# Patient Record
Sex: MALE | Race: BLACK OR AFRICAN AMERICAN | NOT HISPANIC OR LATINO | URBAN - METROPOLITAN AREA
[De-identification: names, ages, dates, MRNs, and addresses within clinical notes are randomized per-mention and may not be internally consistent; named-entity substitution may affect disease eponyms.]

---

## 2018-11-21 ENCOUNTER — INPATIENT (INPATIENT)
Facility: HOSPITAL | Age: 73
LOS: 14 days | Discharge: NURSING, REHAB OR LONG-TERM CARE | End: 2018-12-06
Attending: STUDENT IN AN ORGANIZED HEALTH CARE EDUCATION/TRAINING PROGRAM | Admitting: STUDENT IN AN ORGANIZED HEALTH CARE EDUCATION/TRAINING PROGRAM
Payer: MEDICARE

## 2018-11-21 VITALS
SYSTOLIC BLOOD PRESSURE: 150 MMHG | HEART RATE: 88 BPM | DIASTOLIC BLOOD PRESSURE: 112 MMHG | RESPIRATION RATE: 15 BRPM | TEMPERATURE: 99 F | OXYGEN SATURATION: 97 %

## 2018-11-21 LAB
BASE EXCESS BLDV CALC-SCNC: 2.6 MMOL/L — SIGNIFICANT CHANGE UP
BASOPHILS # BLD AUTO: 0.04 K/UL — SIGNIFICANT CHANGE UP (ref 0–0.2)
BASOPHILS NFR BLD AUTO: 0.2 % — SIGNIFICANT CHANGE UP (ref 0–2)
BLOOD GAS VENOUS - CREATININE: 0.96 MG/DL — SIGNIFICANT CHANGE UP (ref 0.5–1.3)
CHLORIDE BLDV-SCNC: 96 MMOL/L — SIGNIFICANT CHANGE UP (ref 96–108)
EOSINOPHIL # BLD AUTO: 0.01 K/UL — SIGNIFICANT CHANGE UP (ref 0–0.5)
EOSINOPHIL NFR BLD AUTO: 0.1 % — SIGNIFICANT CHANGE UP (ref 0–6)
GAS PNL BLDV: 135 MMOL/L — LOW (ref 136–146)
GLUCOSE BLDV-MCNC: 122 — HIGH (ref 70–99)
HCO3 BLDV-SCNC: 24 MMOL/L — SIGNIFICANT CHANGE UP (ref 20–27)
HCT VFR BLD CALC: 47.4 % — SIGNIFICANT CHANGE UP (ref 39–50)
HCT VFR BLDV CALC: 50.6 % — SIGNIFICANT CHANGE UP (ref 39–51)
HGB BLD-MCNC: 16.5 G/DL — SIGNIFICANT CHANGE UP (ref 13–17)
HGB BLDV-MCNC: 16.5 G/DL — SIGNIFICANT CHANGE UP (ref 13–17)
IMM GRANULOCYTES # BLD AUTO: 0.05 # — SIGNIFICANT CHANGE UP
IMM GRANULOCYTES NFR BLD AUTO: 0.3 % — SIGNIFICANT CHANGE UP (ref 0–1.5)
LACTATE BLDV-MCNC: 3.6 MMOL/L — HIGH (ref 0.5–2)
LYMPHOCYTES # BLD AUTO: 1.37 K/UL — SIGNIFICANT CHANGE UP (ref 1–3.3)
LYMPHOCYTES # BLD AUTO: 8.3 % — LOW (ref 13–44)
MCHC RBC-ENTMCNC: 30.3 PG — SIGNIFICANT CHANGE UP (ref 27–34)
MCHC RBC-ENTMCNC: 34.8 % — SIGNIFICANT CHANGE UP (ref 32–36)
MCV RBC AUTO: 87 FL — SIGNIFICANT CHANGE UP (ref 80–100)
MONOCYTES # BLD AUTO: 1.18 K/UL — HIGH (ref 0–0.9)
MONOCYTES NFR BLD AUTO: 7.1 % — SIGNIFICANT CHANGE UP (ref 2–14)
NEUTROPHILS # BLD AUTO: 13.91 K/UL — HIGH (ref 1.8–7.4)
NEUTROPHILS NFR BLD AUTO: 84 % — HIGH (ref 43–77)
NRBC # FLD: 0 — SIGNIFICANT CHANGE UP
PCO2 BLDV: 47 MMHG — SIGNIFICANT CHANGE UP (ref 41–51)
PH BLDV: 7.38 PH — SIGNIFICANT CHANGE UP (ref 7.32–7.43)
PLATELET # BLD AUTO: 319 K/UL — SIGNIFICANT CHANGE UP (ref 150–400)
PMV BLD: 10.1 FL — SIGNIFICANT CHANGE UP (ref 7–13)
PO2 BLDV: 21 MMHG — LOW (ref 35–40)
POTASSIUM BLDV-SCNC: 4.4 MMOL/L — SIGNIFICANT CHANGE UP (ref 3.4–4.5)
RBC # BLD: 5.45 M/UL — SIGNIFICANT CHANGE UP (ref 4.2–5.8)
RBC # FLD: 14.7 % — HIGH (ref 10.3–14.5)
SAO2 % BLDV: 26.1 % — LOW (ref 60–85)
WBC # BLD: 16.56 K/UL — HIGH (ref 3.8–10.5)
WBC # FLD AUTO: 16.56 K/UL — HIGH (ref 3.8–10.5)

## 2018-11-21 PROCEDURE — 70450 CT HEAD/BRAIN W/O DYE: CPT | Mod: 26

## 2018-11-21 NOTE — ED ADULT NURSE NOTE - INTERVENTIONS DEFINITIONS
Physically safe environment: no spills, clutter or unnecessary equipment/Non-slip footwear when patient is off stretcher/Stretcher in lowest position, wheels locked, appropriate side rails in place/Call bell, personal items and telephone within reach/Monitor for mental status changes and reorient to person, place, and time

## 2018-11-21 NOTE — ED ADULT TRIAGE NOTE - CHIEF COMPLAINT QUOTE
pt BIBEMS from home, found on floor, unknown length of time. family last contacted him at 2000 last night. pt does not remember falling on floor. hx cva with left sided residual. pt denies pain, as per ems left shoulder laceration noted with no active bleeding. as per family, pt usually ambulatory and was unable to lift himself from floor. left facial asymmetry noted, new occurrence as per son. vineet zamudio made aware, no code stroke called.

## 2018-11-21 NOTE — ED PROVIDER NOTE - ATTENDING CONTRIBUTION TO CARE
Patient bibems after being found down by family. Patient was last spoken to the day before by family, couldn't get in touch with him today. When they went to check in person, found him on the ground, awake, confused. Patient now awake and talking but does not remember what happened. The family noticed a new left facial droop as well.  exam  GEN - NAD; A+O x3   HEAD - NC/AT   EYES- PERRL, EOMI  ENT: Airway patent, dry mm, Oral cavity and pharynx normal.    NECK: Neck supple, non-tender without lymphadenopathy, no masses.  PULMONARY - CTA b/l, symmetric breath sounds.   CARDIAC -s1s2, RRR, no M,G,R  ABDOMEN - +BS, ND, NT, soft, no guarding, no rebound, no masses   BACK - no CVA tenderness, Normal  spine   EXTREMITIES - FROM, symmetric pulses, capillary refill < 2 seconds   SKIN - no rash  NEUROLOGIC - alert, speech clear, left facial droop, 4/5 strength lue, lle, 5/5 r. side, sensation grossly intact, ao3 at this time.  PSYCH -nl mood/affect, nl insight.  a/p-patient found down by family, awake and confused, new left facial droop, now ao3, appears dry, facial droop noted with left side weakness (weakness old per family and patient), last known normal 24 hours ago, will check labs, ct head, ekg, fluids reass.

## 2018-11-21 NOTE — ED PROVIDER NOTE - CARE PLAN
Principal Discharge DX:	Brain mass  Secondary Diagnosis:	NSTEMI (non-ST elevated myocardial infarction)

## 2018-11-21 NOTE — ED PROVIDER NOTE - OBJECTIVE STATEMENT
72yo male PMH CVA with left-sided weakness and slurred speech, HTN, DM, presenting with altered mental status and ?syncopal event. Patient's family last spoke to him yesterday. Today, family unable to get in touch with him all day, went to house and patient found down on floor, responsive to voice. Patient confused, does not know what day it is or where he is. Patient also noted to have left-sided facial droop that is new.

## 2018-11-21 NOTE — ED PROVIDER NOTE - PROGRESS NOTE DETAILS
Selina Ricci DO: I was called to evaluate the patient for a stroke. 74yo male PMH CVA with left-sided weakness and slurred speech presenting with left facial droop after being found down by family member this evening. Patient last seen normal last night. Patient now more lethargic and falling asleep, left facial droop worsening. Patient does not meet criteria for tPA, code stroke not called however patient ordered for urgent CT head and labs, instructed to go to critical section of emergency department. Selina Ricci DO Yolanda: Pt was signed out ot me by DR Matamoros pending neurosurgery and neurology recommendations. Neurosurgery recommending CTAp and chest for likely primary malignancy. CT head findings likely brain met. Pt troponin rising with abnormal EKG an dno prior to compare, cards consulted. We will not anticoagulate for now given CNA concerns. Will admit

## 2018-11-21 NOTE — ED PROVIDER NOTE - PHYSICAL EXAMINATION
Gen: NAD, oriented to self, awake, alert  Head: NCAT  Lung: CTAB, no respiratory distress, no wheezing, rales, rhonchi  CV: normal s1/s2, rrr, no murmurs, Normal perfusion  Abd: soft, NTND  MSK: No edema, no visible deformities, full range of motion in all 4 extremities  Neuro: left-sided facial droop present, left sided upper extremity weakness  Skin: No rash   Psych: normal affect

## 2018-11-21 NOTE — ED ADULT NURSE NOTE - OBJECTIVE STATEMENT
Pt presents to rm 12, A&Ox2- disoriented to time, ambulatory at baseline w/o assistance, pt brought in by EMS, granddaughter at bedside states, they found the pt on the floor, pt states he was not on the floor but on his bed, does not recall syncopal event, left sided facial droop observed. As per granddaughter this is not pt baseline.  Denies chest pain, shortness of breath, palpitations, diaphoresis, headaches, fevers, dizziness, nausea, vomiting, diarrhea, or urinary symptoms at this time. IV established in right AC with a 20G, labs drawn and sent, call bell in reach, warm blanket provided, bed in lowest position, side rails up x2. Report given to nurse taking care of pt. Will continue to monitor.

## 2018-11-21 NOTE — ED PROVIDER NOTE - MEDICAL DECISION MAKING DETAILS
74yo male with stroke-like symptoms and AMS, concerning for CVA vs metabolic encephalopathy, will check labs, EKG, CXR, CT head, neuro consult, reassess. Selina Ricci DO

## 2018-11-22 DIAGNOSIS — Z29.9 ENCOUNTER FOR PROPHYLACTIC MEASURES, UNSPECIFIED: ICD-10-CM

## 2018-11-22 DIAGNOSIS — G93.89 OTHER SPECIFIED DISORDERS OF BRAIN: ICD-10-CM

## 2018-11-22 DIAGNOSIS — N40.0 BENIGN PROSTATIC HYPERPLASIA WITHOUT LOWER URINARY TRACT SYMPTOMS: ICD-10-CM

## 2018-11-22 DIAGNOSIS — E11.9 TYPE 2 DIABETES MELLITUS WITHOUT COMPLICATIONS: ICD-10-CM

## 2018-11-22 DIAGNOSIS — G93.9 DISORDER OF BRAIN, UNSPECIFIED: ICD-10-CM

## 2018-11-22 DIAGNOSIS — M62.82 RHABDOMYOLYSIS: ICD-10-CM

## 2018-11-22 DIAGNOSIS — R55 SYNCOPE AND COLLAPSE: ICD-10-CM

## 2018-11-22 DIAGNOSIS — I10 ESSENTIAL (PRIMARY) HYPERTENSION: ICD-10-CM

## 2018-11-22 DIAGNOSIS — E87.6 HYPOKALEMIA: ICD-10-CM

## 2018-11-22 DIAGNOSIS — R74.8 ABNORMAL LEVELS OF OTHER SERUM ENZYMES: ICD-10-CM

## 2018-11-22 LAB
ALBUMIN SERPL ELPH-MCNC: 4 G/DL — SIGNIFICANT CHANGE UP (ref 3.3–5)
ALBUMIN SERPL ELPH-MCNC: 4.3 G/DL — SIGNIFICANT CHANGE UP (ref 3.3–5)
ALBUMIN SERPL ELPH-MCNC: 4.6 G/DL — SIGNIFICANT CHANGE UP (ref 3.3–5)
ALP SERPL-CCNC: 48 U/L — SIGNIFICANT CHANGE UP (ref 40–120)
ALP SERPL-CCNC: 67 U/L — SIGNIFICANT CHANGE UP (ref 40–120)
ALP SERPL-CCNC: 67 U/L — SIGNIFICANT CHANGE UP (ref 40–120)
ALT FLD-CCNC: 22 U/L — SIGNIFICANT CHANGE UP (ref 4–41)
ALT FLD-CCNC: 26 U/L — SIGNIFICANT CHANGE UP (ref 4–41)
ALT FLD-CCNC: SIGNIFICANT CHANGE UP U/L (ref 4–41)
AMPHET UR-MCNC: NEGATIVE — SIGNIFICANT CHANGE UP
APPEARANCE UR: SIGNIFICANT CHANGE UP
APTT BLD: 26.9 SEC — LOW (ref 27.5–36.3)
AST SERPL-CCNC: 102 U/L — HIGH (ref 4–40)
AST SERPL-CCNC: 118 U/L — HIGH (ref 4–40)
AST SERPL-CCNC: SIGNIFICANT CHANGE UP U/L (ref 4–40)
BACTERIA # UR AUTO: SIGNIFICANT CHANGE UP
BARBITURATES UR SCN-MCNC: NEGATIVE — SIGNIFICANT CHANGE UP
BASOPHILS # BLD AUTO: 0.04 K/UL — SIGNIFICANT CHANGE UP (ref 0–0.2)
BASOPHILS NFR BLD AUTO: 0.3 % — SIGNIFICANT CHANGE UP (ref 0–2)
BENZODIAZ UR-MCNC: NEGATIVE — SIGNIFICANT CHANGE UP
BILIRUB SERPL-MCNC: 0.6 MG/DL — SIGNIFICANT CHANGE UP (ref 0.2–1.2)
BILIRUB SERPL-MCNC: 0.7 MG/DL — SIGNIFICANT CHANGE UP (ref 0.2–1.2)
BILIRUB SERPL-MCNC: 0.9 MG/DL — SIGNIFICANT CHANGE UP (ref 0.2–1.2)
BILIRUB UR-MCNC: NEGATIVE — SIGNIFICANT CHANGE UP
BLOOD UR QL VISUAL: SIGNIFICANT CHANGE UP
BUN SERPL-MCNC: 10 MG/DL — SIGNIFICANT CHANGE UP (ref 7–23)
BUN SERPL-MCNC: 10 MG/DL — SIGNIFICANT CHANGE UP (ref 7–23)
BUN SERPL-MCNC: 11 MG/DL — SIGNIFICANT CHANGE UP (ref 7–23)
BUN SERPL-MCNC: 14 MG/DL — SIGNIFICANT CHANGE UP (ref 7–23)
CALCIUM SERPL-MCNC: 8.2 MG/DL — LOW (ref 8.4–10.5)
CALCIUM SERPL-MCNC: 8.9 MG/DL — SIGNIFICANT CHANGE UP (ref 8.4–10.5)
CALCIUM SERPL-MCNC: 9 MG/DL — SIGNIFICANT CHANGE UP (ref 8.4–10.5)
CALCIUM SERPL-MCNC: 9.1 MG/DL — SIGNIFICANT CHANGE UP (ref 8.4–10.5)
CANNABINOIDS UR-MCNC: NEGATIVE — SIGNIFICANT CHANGE UP
CHLORIDE SERPL-SCNC: 102 MMOL/L — SIGNIFICANT CHANGE UP (ref 98–107)
CHLORIDE SERPL-SCNC: 92 MMOL/L — LOW (ref 98–107)
CHLORIDE SERPL-SCNC: 96 MMOL/L — LOW (ref 98–107)
CHLORIDE SERPL-SCNC: 98 MMOL/L — SIGNIFICANT CHANGE UP (ref 98–107)
CK MB BLD-MCNC: 0.4 — SIGNIFICANT CHANGE UP (ref 0–2.5)
CK MB BLD-MCNC: 16.18 NG/ML — HIGH (ref 1–6.6)
CK MB BLD-MCNC: 24.96 NG/ML — HIGH (ref 1–6.6)
CK MB BLD-MCNC: 27.73 NG/ML — HIGH (ref 1–6.6)
CK MB BLD-MCNC: SIGNIFICANT CHANGE UP (ref 0–2.5)
CK SERPL-CCNC: 5846 U/L — HIGH (ref 30–200)
CK SERPL-CCNC: 6200 U/L — HIGH (ref 30–200)
CK SERPL-CCNC: SIGNIFICANT CHANGE UP U/L (ref 30–200)
CO2 SERPL-SCNC: 22 MMOL/L — SIGNIFICANT CHANGE UP (ref 22–31)
CO2 SERPL-SCNC: 24 MMOL/L — SIGNIFICANT CHANGE UP (ref 22–31)
COCAINE METAB.OTHER UR-MCNC: NEGATIVE — SIGNIFICANT CHANGE UP
COLOR SPEC: SIGNIFICANT CHANGE UP
CREAT SERPL-MCNC: 0.91 MG/DL — SIGNIFICANT CHANGE UP (ref 0.5–1.3)
CREAT SERPL-MCNC: 1.01 MG/DL — SIGNIFICANT CHANGE UP (ref 0.5–1.3)
CREAT SERPL-MCNC: 1.04 MG/DL — SIGNIFICANT CHANGE UP (ref 0.5–1.3)
CREAT SERPL-MCNC: 1.24 MG/DL — SIGNIFICANT CHANGE UP (ref 0.5–1.3)
CRYPTOC AG FLD QL: NEGATIVE — SIGNIFICANT CHANGE UP
EOSINOPHIL # BLD AUTO: 0.02 K/UL — SIGNIFICANT CHANGE UP (ref 0–0.5)
EOSINOPHIL NFR BLD AUTO: 0.2 % — SIGNIFICANT CHANGE UP (ref 0–6)
GLUCOSE SERPL-MCNC: 109 MG/DL — HIGH (ref 70–99)
GLUCOSE SERPL-MCNC: 110 MG/DL — HIGH (ref 70–99)
GLUCOSE SERPL-MCNC: 123 MG/DL — HIGH (ref 70–99)
GLUCOSE SERPL-MCNC: 123 MG/DL — HIGH (ref 70–99)
GLUCOSE UR-MCNC: NEGATIVE — SIGNIFICANT CHANGE UP
HBA1C BLD-MCNC: 5.9 % — HIGH (ref 4–5.6)
HCT VFR BLD CALC: 42.9 % — SIGNIFICANT CHANGE UP (ref 39–50)
HGB BLD-MCNC: 15.2 G/DL — SIGNIFICANT CHANGE UP (ref 13–17)
IMM GRANULOCYTES # BLD AUTO: 0.05 # — SIGNIFICANT CHANGE UP
IMM GRANULOCYTES NFR BLD AUTO: 0.4 % — SIGNIFICANT CHANGE UP (ref 0–1.5)
INR BLD: 1.14 — SIGNIFICANT CHANGE UP (ref 0.88–1.17)
KETONES UR-MCNC: SIGNIFICANT CHANGE UP
LACTATE SERPL-SCNC: 2.4 MMOL/L — HIGH (ref 0.5–2)
LEUKOCYTE ESTERASE UR-ACNC: SIGNIFICANT CHANGE UP
LYMPHOCYTES # BLD AUTO: 1.17 K/UL — SIGNIFICANT CHANGE UP (ref 1–3.3)
LYMPHOCYTES # BLD AUTO: 9.3 % — LOW (ref 13–44)
MAGNESIUM SERPL-MCNC: 2.1 MG/DL — SIGNIFICANT CHANGE UP (ref 1.6–2.6)
MCHC RBC-ENTMCNC: 30.6 PG — SIGNIFICANT CHANGE UP (ref 27–34)
MCHC RBC-ENTMCNC: 35.4 % — SIGNIFICANT CHANGE UP (ref 32–36)
MCV RBC AUTO: 86.3 FL — SIGNIFICANT CHANGE UP (ref 80–100)
METHADONE UR-MCNC: NEGATIVE — SIGNIFICANT CHANGE UP
MONOCYTES # BLD AUTO: 1.36 K/UL — HIGH (ref 0–0.9)
MONOCYTES NFR BLD AUTO: 10.8 % — SIGNIFICANT CHANGE UP (ref 2–14)
NEUTROPHILS # BLD AUTO: 9.95 K/UL — HIGH (ref 1.8–7.4)
NEUTROPHILS NFR BLD AUTO: 79 % — HIGH (ref 43–77)
NITRITE UR-MCNC: NEGATIVE — SIGNIFICANT CHANGE UP
NRBC # FLD: 0 — SIGNIFICANT CHANGE UP
OPIATES UR-MCNC: NEGATIVE — SIGNIFICANT CHANGE UP
OSMOLALITY UR: 403 MOSMO/KG — SIGNIFICANT CHANGE UP (ref 50–1200)
OXYCODONE UR-MCNC: NEGATIVE — SIGNIFICANT CHANGE UP
PCP UR-MCNC: NEGATIVE — SIGNIFICANT CHANGE UP
PH UR: 6 — SIGNIFICANT CHANGE UP (ref 5–8)
PHOSPHATE SERPL-MCNC: 3.1 MG/DL — SIGNIFICANT CHANGE UP (ref 2.5–4.5)
PLATELET # BLD AUTO: 277 K/UL — SIGNIFICANT CHANGE UP (ref 150–400)
PMV BLD: 10.9 FL — SIGNIFICANT CHANGE UP (ref 7–13)
POTASSIUM SERPL-MCNC: 2.7 MMOL/L — CRITICAL LOW (ref 3.5–5.3)
POTASSIUM SERPL-MCNC: 2.7 MMOL/L — CRITICAL LOW (ref 3.5–5.3)
POTASSIUM SERPL-MCNC: 3.9 MMOL/L — SIGNIFICANT CHANGE UP (ref 3.5–5.3)
POTASSIUM SERPL-MCNC: SIGNIFICANT CHANGE UP MMOL/L (ref 3.5–5.3)
POTASSIUM SERPL-SCNC: 2.7 MMOL/L — CRITICAL LOW (ref 3.5–5.3)
POTASSIUM SERPL-SCNC: 2.7 MMOL/L — CRITICAL LOW (ref 3.5–5.3)
POTASSIUM SERPL-SCNC: 3.9 MMOL/L — SIGNIFICANT CHANGE UP (ref 3.5–5.3)
POTASSIUM SERPL-SCNC: SIGNIFICANT CHANGE UP MMOL/L (ref 3.5–5.3)
PROCALCITONIN SERPL-MCNC: 0.71 NG/ML — HIGH (ref 0.02–0.1)
PROT SERPL-MCNC: 7.8 G/DL — SIGNIFICANT CHANGE UP (ref 6–8.3)
PROT SERPL-MCNC: 7.9 G/DL — SIGNIFICANT CHANGE UP (ref 6–8.3)
PROT SERPL-MCNC: SIGNIFICANT CHANGE UP G/DL (ref 6–8.3)
PROT UR-MCNC: 100 — HIGH
PROTHROM AB SERPL-ACNC: 13 SEC — SIGNIFICANT CHANGE UP (ref 9.8–13.1)
RBC # BLD: 4.97 M/UL — SIGNIFICANT CHANGE UP (ref 4.2–5.8)
RBC # FLD: 14.7 % — HIGH (ref 10.3–14.5)
RBC CASTS # UR COMP ASSIST: >50 — HIGH (ref 0–?)
SODIUM SERPL-SCNC: 129 MMOL/L — LOW (ref 135–145)
SODIUM SERPL-SCNC: 137 MMOL/L — SIGNIFICANT CHANGE UP (ref 135–145)
SODIUM SERPL-SCNC: 137 MMOL/L — SIGNIFICANT CHANGE UP (ref 135–145)
SODIUM SERPL-SCNC: 138 MMOL/L — SIGNIFICANT CHANGE UP (ref 135–145)
SODIUM UR-SCNC: 92 MMOL/L — SIGNIFICANT CHANGE UP
SP GR SPEC: 1.02 — SIGNIFICANT CHANGE UP (ref 1–1.04)
TROPONIN T, HIGH SENSITIVITY: 114 NG/L — CRITICAL HIGH (ref ?–14)
TROPONIN T, HIGH SENSITIVITY: 138 NG/L — CRITICAL HIGH (ref ?–14)
TROPONIN T, HIGH SENSITIVITY: 98 NG/L — CRITICAL HIGH (ref ?–14)
TSH SERPL-MCNC: 0.37 UIU/ML — SIGNIFICANT CHANGE UP (ref 0.27–4.2)
UROBILINOGEN FLD QL: NORMAL — SIGNIFICANT CHANGE UP
WBC # BLD: 12.59 K/UL — HIGH (ref 3.8–10.5)
WBC # FLD AUTO: 12.59 K/UL — HIGH (ref 3.8–10.5)
WBC UR QL: SIGNIFICANT CHANGE UP (ref 0–?)

## 2018-11-22 PROCEDURE — 99223 1ST HOSP IP/OBS HIGH 75: CPT

## 2018-11-22 PROCEDURE — 99233 SBSQ HOSP IP/OBS HIGH 50: CPT

## 2018-11-22 PROCEDURE — 71260 CT THORAX DX C+: CPT | Mod: 26

## 2018-11-22 PROCEDURE — 71045 X-RAY EXAM CHEST 1 VIEW: CPT | Mod: 26

## 2018-11-22 PROCEDURE — 70552 MRI BRAIN STEM W/DYE: CPT | Mod: 26

## 2018-11-22 PROCEDURE — 74177 CT ABD & PELVIS W/CONTRAST: CPT | Mod: 26

## 2018-11-22 RX ORDER — POTASSIUM CHLORIDE 20 MEQ
10 PACKET (EA) ORAL ONCE
Qty: 0 | Refills: 0 | Status: DISCONTINUED | OUTPATIENT
Start: 2018-11-22 | End: 2018-11-22

## 2018-11-22 RX ORDER — LEVETIRACETAM 250 MG/1
1000 TABLET, FILM COATED ORAL ONCE
Qty: 0 | Refills: 0 | Status: COMPLETED | OUTPATIENT
Start: 2018-11-22 | End: 2018-11-22

## 2018-11-22 RX ORDER — POTASSIUM CHLORIDE 20 MEQ
40 PACKET (EA) ORAL ONCE
Qty: 0 | Refills: 0 | Status: COMPLETED | OUTPATIENT
Start: 2018-11-22 | End: 2018-11-22

## 2018-11-22 RX ORDER — INSULIN LISPRO 100/ML
VIAL (ML) SUBCUTANEOUS EVERY 6 HOURS
Qty: 0 | Refills: 0 | Status: DISCONTINUED | OUTPATIENT
Start: 2018-11-22 | End: 2018-11-23

## 2018-11-22 RX ORDER — GLUCAGON INJECTION, SOLUTION 0.5 MG/.1ML
1 INJECTION, SOLUTION SUBCUTANEOUS ONCE
Qty: 0 | Refills: 0 | Status: DISCONTINUED | OUTPATIENT
Start: 2018-11-22 | End: 2018-11-28

## 2018-11-22 RX ORDER — POTASSIUM CHLORIDE 20 MEQ
40 PACKET (EA) ORAL ONCE
Qty: 0 | Refills: 0 | Status: DISCONTINUED | OUTPATIENT
Start: 2018-11-22 | End: 2018-11-22

## 2018-11-22 RX ORDER — DEXTROSE 50 % IN WATER 50 %
12.5 SYRINGE (ML) INTRAVENOUS ONCE
Qty: 0 | Refills: 0 | Status: DISCONTINUED | OUTPATIENT
Start: 2018-11-22 | End: 2018-11-28

## 2018-11-22 RX ORDER — INFLUENZA VIRUS VACCINE 15; 15; 15; 15 UG/.5ML; UG/.5ML; UG/.5ML; UG/.5ML
0.5 SUSPENSION INTRAMUSCULAR ONCE
Qty: 0 | Refills: 0 | Status: DISCONTINUED | OUTPATIENT
Start: 2018-11-22 | End: 2018-12-06

## 2018-11-22 RX ORDER — SODIUM CHLORIDE 9 MG/ML
2000 INJECTION INTRAMUSCULAR; INTRAVENOUS; SUBCUTANEOUS ONCE
Qty: 0 | Refills: 0 | Status: COMPLETED | OUTPATIENT
Start: 2018-11-22 | End: 2018-11-22

## 2018-11-22 RX ORDER — POTASSIUM CHLORIDE 20 MEQ
40 PACKET (EA) ORAL EVERY 4 HOURS
Qty: 0 | Refills: 0 | Status: DISCONTINUED | OUTPATIENT
Start: 2018-11-22 | End: 2018-11-22

## 2018-11-22 RX ORDER — DEXTROSE 50 % IN WATER 50 %
25 SYRINGE (ML) INTRAVENOUS ONCE
Qty: 0 | Refills: 0 | Status: DISCONTINUED | OUTPATIENT
Start: 2018-11-22 | End: 2018-11-28

## 2018-11-22 RX ORDER — DEXAMETHASONE 0.5 MG/5ML
4 ELIXIR ORAL EVERY 6 HOURS
Qty: 0 | Refills: 0 | Status: DISCONTINUED | OUTPATIENT
Start: 2018-11-22 | End: 2018-11-28

## 2018-11-22 RX ORDER — SODIUM CHLORIDE 9 MG/ML
1000 INJECTION, SOLUTION INTRAVENOUS
Qty: 0 | Refills: 0 | Status: DISCONTINUED | OUTPATIENT
Start: 2018-11-22 | End: 2018-11-28

## 2018-11-22 RX ORDER — LEVETIRACETAM 250 MG/1
500 TABLET, FILM COATED ORAL EVERY 12 HOURS
Qty: 0 | Refills: 0 | Status: DISCONTINUED | OUTPATIENT
Start: 2018-11-22 | End: 2018-11-30

## 2018-11-22 RX ORDER — DEXAMETHASONE 0.5 MG/5ML
4 ELIXIR ORAL ONCE
Qty: 0 | Refills: 0 | Status: COMPLETED | OUTPATIENT
Start: 2018-11-22 | End: 2018-11-22

## 2018-11-22 RX ORDER — POTASSIUM CHLORIDE 20 MEQ
10 PACKET (EA) ORAL
Qty: 0 | Refills: 0 | Status: COMPLETED | OUTPATIENT
Start: 2018-11-22 | End: 2018-11-22

## 2018-11-22 RX ORDER — POTASSIUM CHLORIDE 20 MEQ
10 PACKET (EA) ORAL
Qty: 0 | Refills: 0 | Status: DISCONTINUED | OUTPATIENT
Start: 2018-11-22 | End: 2018-11-22

## 2018-11-22 RX ORDER — DEXTROSE 50 % IN WATER 50 %
15 SYRINGE (ML) INTRAVENOUS ONCE
Qty: 0 | Refills: 0 | Status: DISCONTINUED | OUTPATIENT
Start: 2018-11-22 | End: 2018-11-28

## 2018-11-22 RX ORDER — SODIUM CHLORIDE 9 MG/ML
1000 INJECTION INTRAMUSCULAR; INTRAVENOUS; SUBCUTANEOUS
Qty: 0 | Refills: 0 | Status: DISCONTINUED | OUTPATIENT
Start: 2018-11-22 | End: 2018-11-23

## 2018-11-22 RX ORDER — POTASSIUM CHLORIDE 20 MEQ
40 PACKET (EA) ORAL
Qty: 0 | Refills: 0 | Status: DISCONTINUED | OUTPATIENT
Start: 2018-11-22 | End: 2018-11-22

## 2018-11-22 RX ADMIN — SODIUM CHLORIDE 1000 MILLILITER(S): 9 INJECTION INTRAMUSCULAR; INTRAVENOUS; SUBCUTANEOUS at 00:40

## 2018-11-22 RX ADMIN — Medication 100 MILLIEQUIVALENT(S): at 09:37

## 2018-11-22 RX ADMIN — Medication 40 MILLIEQUIVALENT(S): at 15:18

## 2018-11-22 RX ADMIN — Medication 100 MILLIEQUIVALENT(S): at 11:06

## 2018-11-22 RX ADMIN — Medication 4 MILLIGRAM(S): at 09:19

## 2018-11-22 RX ADMIN — Medication 100 MILLIEQUIVALENT(S): at 12:15

## 2018-11-22 RX ADMIN — Medication 40 MILLIEQUIVALENT(S): at 20:04

## 2018-11-22 RX ADMIN — Medication 100 MILLIEQUIVALENT(S): at 05:01

## 2018-11-22 RX ADMIN — Medication 100 MILLIEQUIVALENT(S): at 08:00

## 2018-11-22 RX ADMIN — LEVETIRACETAM 400 MILLIGRAM(S): 250 TABLET, FILM COATED ORAL at 19:19

## 2018-11-22 RX ADMIN — Medication 100 MILLIEQUIVALENT(S): at 07:38

## 2018-11-22 RX ADMIN — Medication 4 MILLIGRAM(S): at 21:41

## 2018-11-22 RX ADMIN — Medication 4 MILLIGRAM(S): at 15:19

## 2018-11-22 RX ADMIN — LEVETIRACETAM 400 MILLIGRAM(S): 250 TABLET, FILM COATED ORAL at 09:36

## 2018-11-22 NOTE — ED ADULT NURSE REASSESSMENT NOTE - NS ED NURSE REASSESS COMMENT FT1
pt noted to be hypertensive MAR covering called and notified no new orders at this time, report given to floor RN Kin, pt denies any current complaints including headache, chest pian, SOB. Comfort measures taken, pt brought to xray

## 2018-11-22 NOTE — H&P ADULT - PROBLEM SELECTOR PLAN 7
c/w finestaride 5mg qd   -obtain PVR bladder scan to r/o retention in setting of neuro event c/w finasteride 5mg qd   -obtain PVR bladder scan to r/o retention in setting of neuro event

## 2018-11-22 NOTE — H&P ADULT - NSHPLABSRESULTS_GEN_ALL_CORE
.  Labs reviewed personally.                          16.5   16.56 )-----------( 319      ( 2018 23:30 )             47.4     Hgb Trend: 16.5<--  11-    138  |  96<L>  |  10  ----------------------------<  109<H>  2.7<LL>   |  22  |  1.04    Ca    8.9      2018 01:54    TPro  7.9  /  Alb  4.3  /  TBili  0.6  /  DBili  x   /  AST  102<H>  /  ALT  22  /  AlkPhos  67  11    Creatinine Trend: 1.04<--, 0.91<--  PT/INR - ( 2018 23:30 )   PT: 13.0 SEC;   INR: 1.14          PTT - ( 2018 23:30 )  PTT:26.9 SEC  Urinalysis Basic - ( 2018 01:20 )    Color: PINK / Appearance: TURBID / S.016 / pH: 6.0  Gluc: NEGATIVE / Ketone: SMALL  / Bili: NEGATIVE / Urobili: NORMAL   Blood: LARGE / Protein: 100 / Nitrite: NEGATIVE   Leuk Esterase: SMALL / RBC: >50 / WBC 3-5   Sq Epi: x / Non Sq Epi: x / Bacteria: FEW    Imaging reviewed personally.  < from: CT Head No Cont (18 @ 23:20) >  Large fluid density mass within the region of old infarction in the right temporal and parietal lobes with resulting mass effect as described above. This may represent cystic encephalomalacia but can be further evaluated with contrast-enhanced MRI or alternatively comparison with prior imaging if available. No acute intracranial hemorrhage. Old right cerebellar infarct.  < end of copied text > EKG: Twave inversions lead 3 and avF, left anterior fascicular block, no EKG to compare baseline.  Labs reviewed personally.                          16.5   16.56 )-----------( 319      ( 2018 23:30 )             47.4     Hgb Trend: 16.5<--  11-22    138  |  96<L>  |  10  ----------------------------<  109<H>  2.7<LL>   |  22  |  1.04    Ca    8.9      2018 01:54    TPro  7.9  /  Alb  4.3  /  TBili  0.6  /  DBili  x   /  AST  102<H>  /  ALT  22  /  AlkPhos  67  1122    Creatinine Trend: 1.04<--, 0.91<--  PT/INR - ( 2018 23:30 )   PT: 13.0 SEC;   INR: 1.14          PTT - ( 2018 23:30 )  PTT:26.9 SEC  Urinalysis Basic - ( 2018 01:20 )    Color: PINK / Appearance: TURBID / S.016 / pH: 6.0  Gluc: NEGATIVE / Ketone: SMALL  / Bili: NEGATIVE / Urobili: NORMAL   Blood: LARGE / Protein: 100 / Nitrite: NEGATIVE   Leuk Esterase: SMALL / RBC: >50 / WBC 3-5   Sq Epi: x / Non Sq Epi: x / Bacteria: FEW    Imaging reviewed personally.  < from: CT Head No Cont (18 @ 23:20) >  Large fluid density mass within the region of old infarction in the right temporal and parietal lobes with resulting mass effect as described above. This may represent cystic encephalomalacia but can be further evaluated with contrast-enhanced MRI or alternatively comparison with prior imaging if available. No acute intracranial hemorrhage. Old right cerebellar infarct.  < end of copied text >

## 2018-11-22 NOTE — PROGRESS NOTE ADULT - SUBJECTIVE AND OBJECTIVE BOX
73m hx HTN, NIDDM, CVA (2013 w/ left side weakness), BPH brought in by EMS after being found down. admitted for syncope found to have large cystic brain mass, rhabdo and elevated cardiac enzymes.    patient seen and examined at bed side with nurses   more awake and alert   want to go home   does not remember about fall event       MEDICATIONS  (STANDING):  dexamethasone     Tablet 4 milliGRAM(s) Oral every 6 hours  dextrose 5%. 1000 milliLiter(s) (50 mL/Hr) IV Continuous <Continuous>  dextrose 50% Injectable 12.5 Gram(s) IV Push once  dextrose 50% Injectable 25 Gram(s) IV Push once  dextrose 50% Injectable 25 Gram(s) IV Push once  influenza   Vaccine 0.5 milliLiter(s) IntraMuscular once  insulin lispro (HumaLOG) corrective regimen sliding scale   SubCutaneous every 6 hours  levETIRAcetam  IVPB 500 milliGRAM(s) IV Intermittent every 12 hours  potassium chloride   Powder 40 milliEquivalent(s) Oral once  sodium chloride 0.9%. 1000 milliLiter(s) (100 mL/Hr) IV Continuous <Continuous>    MEDICATIONS  (PRN):  dextrose 40% Gel 15 Gram(s) Oral once PRN Blood Glucose LESS THAN 70 milliGRAM(s)/deciliter  glucagon  Injectable 1 milliGRAM(s) IntraMuscular once PRN Glucose LESS THAN 70 milligrams/deciliter  LORazepam     Tablet 2 milliGRAM(s) Oral daily PRN seizures        Vital Signs Last 24 Hrs  T(C): 37.3 (22 Nov 2018 12:31), Max: 37.3 (22 Nov 2018 12:31)  T(F): 99.1 (22 Nov 2018 12:31), Max: 99.1 (22 Nov 2018 12:31)  HR: 85 (22 Nov 2018 12:31) (85 - 90)  BP: 99/70 (22 Nov 2018 12:31) (99/70 - 161/108)  BP(mean): --  RR: 16 (22 Nov 2018 12:31) (15 - 17)  SpO2: 95% (22 Nov 2018 12:31) (95% - 99%)      Gen: awake, alert, NAD  	Head: atraumatic, normocephalic  	Neck: supple, full ROM, no JVD, no LAD  	Eye: PERRL, EOMI, sclerae anicteric  	CV: normal rate, regular rhythm, +S1S2, no murmurs, no LE edema b/l  	Pulm: good inspiratory effort, CTAB, no w/r/r auscultated  	Abd: +BS, soft, NT, ND  	Skin: warm, dry, no rashes  	Ext: 2+ peripheral pulses b/l, no cyanosis  Neuro:aaox3 , left sided weakness      ct head:     IMPRESSION:     Large fluid density mass within the region of old infarction in the right   temporal and parietal lobes with resulting mass effect as described   above. This may represent cystic encephalomalacia but can be further   evaluated with contrast-enhanced MRI or alternatively comparison with   prior imaging if available.    No acute intracranial hemorrhage. Old right cerebellar infarct.    If no contraindications exist, contrast-enhanced MRI is recommended for   further characterization.

## 2018-11-22 NOTE — H&P ADULT - ASSESSMENT
73m hx HTN, NIDDM, CVA (2013 w/ left side weakness), BPH brought in by EMS after being found down. admitted for syncope found to have large cystic brain mass, rhabdo and elevated cardiac enzymes. Awake, alert, Hemodynamically stable and able to protect airway. 73m hx HTN, NIDDM, CVA (2013 w/ left side weakness), BPH brought in by EMS after being found down. admitted for syncope found to have large cystic brain mass, rhabdo and elevated cardiac enzymes. Awake, alert, Hemodynamically stable and able to protect airway, chest pain free.

## 2018-11-22 NOTE — CONSULT NOTE ADULT - ASSESSMENT
73m hx HTN, NIDDM, CVA (2013 w/ left side weakness), BPH brought in by EMS after being found down. CT head w/ possible cystic encephalomalacia w/ 0.3 cm mass effect, MRI head w/ contrast final read pending.     Recs:  Will discuss with neuroradiology, further recs pending 73m hx HTN, NIDDM, CVA (2013 w/ left side weakness), BPH brought in by EMS after being found down. CT head w/ possible cystic encephalomalacia vs met w/ compression of R ventricle as well as 0.3 cm mass effect, MRI head w/ contrast final read pending.     Recs:  Will discuss with neuroradiology, further recs pending 73m hx HTN, NIDDM, CVA (2013 w/ left side weakness), BPH brought in by EMS after being found down. CT head w/ possible cystic encephalomalacia vs met w/ compression of R ventricle as well as 0.3 cm mass effect, MRI head w/ and w/o contrast prelim w/ redemonstration of cystic mass w/ edema, 5-6mm midline shift w/ compression into R lateral ventricle, slight uncal herniation. As per discussion with neurosurgery, patient to be started on steroids, further workup for malignancy pending. Given hx of being found down w/ urinary incontinence, concern for possible seizure at this time.    Recs:  Decadron 4mg q6h  Keppra 1g IV load followed by 500mg BID  EEG pending  q4h neurochecks(monitor closely for change in clinical/neurologic status)  Ativan 2mg PRN for seizure 73m hx HTN, NIDDM, CVA (2013 w/ left side weakness), BPH brought in by EMS after being found down. CT head w/ possible cystic encephalomalacia vs met w/ compression of R ventricle as well as 0.3 cm mass effect, MRI head w/ and w/o contrast prelim w/ redemonstration of cystic mass w/ edema, 5-6mm midline shift w/ compression into R lateral ventricle, slight uncal herniation. As per discussion with neurosurgery, patient to be started on steroids, further workup for malignancy pending. Given hx of being found down w/ urinary incontinence, concern for possible seizure at this time.    Recs:  Elevate head of bed by 30 degrees  Decadron 4mg q6h  Keppra 1g IV load followed by 500mg BID  EEG pending  q4h neurochecks(monitor closely for change in clinical/neurologic status)  Will defer to neurosurgery w/ regards to hypertonic saline, mannitol  Ativan 2mg PRN for seizure 73m hx HTN, NIDDM, CVA (2013 w/ left side weakness), BPH brought in by EMS after being found down. CT head w/ possible cystic encephalomalacia vs met w/ compression of R ventricle as well as 0.3 cm mass effect, MRI head w/ and w/o contrast prelim w/ redemonstration of cystic mass w/ edema, 5-6mm midline shift w/ compression into R lateral ventricle, slight uncal herniation. As per discussion with neurosurgery, patient to be started on steroids, further workup for malignancy pending. Given hx of being found down w/ urinary incontinence, concern for possible seizure at this time.    Recs:    Elevate head of bed by 30 degrees  Decadron 4mg q6h  Keppra 1g IV load followed by 500mg BID  EEG pending  q4h neurochecks(monitor closely for change in clinical/neurologic status)  Will defer to neurosurgery w/ regards to hypertonic saline, mannitol  Ativan 2mg PRN for seizure

## 2018-11-22 NOTE — H&P ADULT - PROBLEM SELECTOR PLAN 2
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  w/ 3mm MLS, alert and able to protect airway   -will start infectious work up w/  blood & urine cultures, aspergillus ab, cryptococcal ag, cysticercosis ab, quant gold  -neurosurgery following pending attending recs   -Consult neurology in AM, ?seizure given rhabdo, incontinence and unable to recall events may need seizure ppx   -neurochecks q4   -speech and swallow evaluation  -pt/ot eval   -seizure precautions, fall precautions, aspiration precautions  -obtain EEG 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

## 2018-11-22 NOTE — PROGRESS NOTE ADULT - PROBLEM SELECTOR PLAN 4
?in setting of seizure given found down in urine and CT findings   -c/w IVF hydration with NS at 100c/hr, stop after 24hrs  -continue to trend CK K replaced   monitor labs

## 2018-11-22 NOTE — H&P ADULT - PROBLEM SELECTOR PLAN 5
On home metformin, will hold while in hospital   keeping patient NPO for now, will start Low HISS w/ FSG checks q6 hrs   -check A1c

## 2018-11-22 NOTE — CONSULT NOTE ADULT - ATTENDING COMMENTS
Patient seen and examined  Agree with above assessment and plan  Chart reviewed and results noted, await results of MRI given mass effect in brain  Neuro and neurosurgical teams following  Elevated CKs could be in setting of NSTEMI +/- rhabdomyolisis  Given findings on CT head would hold off on any anticoagulation at this time and defer management to neuro/neurosurgical teams  Check TTE  Will follow
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
Patient seen and examined and above note reviewed with neurology team and I agree with assessment and plan as outlined. Patient presented after being found on the floor by son and was brought to ER where CT head revealed an abnormal lesion in the right temporal region with edema and mass effect. MRI brain confirmed the lesion with edema and compression on right lateral ventricle. Patient started on IV decadron and keppra and was seen by neurosurgery and plan is for OR on Sunday.  It appears to be a possible glioma however cannot rule out other forms of tumors.  Continue to keep head of bed elevated 30 degrees and IV decadron.   Continue medical management and supportive care and all questions answered and patient understood plan and is awaiting medical clearance prior to surgical resection.

## 2018-11-22 NOTE — PROGRESS NOTE ADULT - ASSESSMENT
73m hx HTN, NIDDM, CVA (2013 w/ left side weakness), BPH brought in by EMS after being found down. admitted for syncope found to have large cystic brain mass, rhabdo and elevated cardiac enzymes.

## 2018-11-22 NOTE — CONSULT NOTE ADULT - SUBJECTIVE AND OBJECTIVE BOX
CHIEF COMPLAINT: patient found on the floor  by family     HISTORY OF PRESENT ILLNESS:74yo male PMH CVA with left-sided weakness HTN, DM II ,live alone  presenting with left facial droop after being found down by family member on the floor  last evening around 7pm. Son last spoke to him 8pm on Tuesday. Last night  son  was unable to get in touch with him all day, so went  house and  found patient  down on floor, responsive to voice, but confused and  covered in urine.  In ED patient was found to have left facial droop. /112 in triage. 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 Cardiology consulted for elevated trop 98>> 138.Patient was seen and examined in 7th floor. Patient sleepy but easy to wake .Ax O 2. Patient denies any floor and stated that his son found him in the bed .He reported h/o stroke but does not remembered if he had any weakness. He also claimed he live alone and son time his son visit him. He denies any chest pain ,sob ,blurred vision, abdomen pain ,n/v or fever. His speech is clear. He does not  remembered when he saw the doctor time  Vital sign : /85,HR 86,RR17,sat 98% RA, temp 98.2  Lab: WBC 16.56,Na 129>>138,creat 0.91 -1.04,K 2.7 ,lactate 3.6  Trop 98>>138  CK 6200,CKMB 27.73 >> 24.96    Allergies: Allergy Status Unknown  	    MEDICATIONS:   metformin 500mg bid  lisinopril/HCTZ 10/12.5mg daily  finasteride 5mg daily  asa 81mg daily  Vit D    PAST MEDICAL & SURGICAL HISTORY:  CVA (cerebral vascular accident)  Diabetes  HTN (hypertension)      FAMILY HISTORY: unable to provide      SOCIAL HISTORY:    [ ] live with:alone  [x ] Non-smoker,[ ] smoker    [x ] no alcohol use [ ] alcohol use:      REVIEW OF SYSTEMS:  General: no fatigue/malaise, weight loss/gain.	  Cardiovascular: no chest pain ,no palpitation, no dizziness, no diaphoresis ,no edema  Respiratory: no SOB, cough or wheeze.  Gastrointestinal:  no N/V/D, no abdomen pain  Genitourinary: no dysuria/hesitancy or hematuria.  Neurological: +Left sided weakness ,no head ache ,no vision changes        PHYSICAL EXAM:  T(C): 36.8 (11-22-18 @ 04:02), Max: 37 (11-21-18 @ 22:18)  HR: 86 (11-22-18 @ 04:02) (86 - 88)  BP: 127/85 (11-22-18 @ 04:02) (127/85 - 161/108)  RR: 17 (11-22-18 @ 04:02) (15 - 17)  SpO2: 98% (11-22-18 @ 04:02) (97% - 98%)        Appearance: comfortable 	  HEENT:   Normal oral mucosa, PERRL, EOMI  Cardiovascular: Normal S1 S2, No JVD, No murmurs, No edema  Respiratory: Lungs clear to auscultation	  Psychiatry: A & O x 2, Mood & affect appropriate  Gastrointestinal:  Soft, Non-tender, + BS		  Neurologic: +left sided weakness ,clear speech, left facial droop ,  Extremities: decrease ROM in left side, No clubbing, cyanosis or edema  Vascular: Peripheral pulses palpable 2+ bilaterally      LABS:	 	    CBC Full  -  ( 21 Nov 2018 23:30 )  WBC Count : 16.56 K/uL  Hemoglobin : 16.5 g/dL  Hematocrit : 47.4 %  Platelet Count - Automated : 319 K/uL  Mean Cell Volume : 87.0 fL  Mean Cell Hemoglobin : 30.3 pg  Mean Cell Hemoglobin Concentration : 34.8 %  Auto Neutrophil # : 13.91 K/uL  Auto Lymphocyte # : 1.37 K/uL  Auto Monocyte # : 1.18 K/uL  Auto Eosinophil # : 0.01 K/uL  Auto Basophil # : 0.04 K/uL  Auto Neutrophil % : 84.0 %  Auto Lymphocyte % : 8.3 %  Auto Monocyte % : 7.1 %  Auto Eosinophil % : 0.1 %  Auto Basophil % : 0.2 %    11-22    138  |  96<L>  |  10  ----------------------------<  109<H>  2.7<LL>   |  22  |  1.04  11-21    129<L>  |  92<L>  |  11  ----------------------------<  110<H>  Test not performed SPECIMEN GROSSLY HEMOLYZED   |  24  |  0.91    Ca    8.9      22 Nov 2018 01:54  Ca    9.1      21 Nov 2018 23:30    TPro  7.9  /  Alb  4.3  /  TBili  0.6  /  DBili  x   /  AST  102<H>  /  ALT  22  /  AlkPhos  67  11-22  TPro  Test not performed SPECIMEN GROSSLY HEMOLYZED  /  Alb  4.6  /  TBili  0.9  /  DBili  x   /  AST  Test not performed SPECIMEN GROSSLY HEMOLYZED  /  ALT  Test not performed SPECIMEN GROSSLY HEMOLYZED  /  AlkPhos  48  11-21    :   TSH: Thyroid Stimulating Hormone, Serum: 0.37 uIU/mL (11-21 @ 23:30)        CARDIAC MARKERS:    CKMB: 24.96 ng/mL (11-22 @ 01:54)  CKMB: 27.73 ng/mL (11-21 @ 23:30)    CKMB Relative Index: 0.4 (11-22 @ 01:54)  CKMB Relative Index: Test not performed (11-21 @ 23:30)      TELEMETRY: 	NSR    ECG:  	NSR with CHRISTOPHER 0.24,deep s wave in II,aVF,V2,V3,TWI in III,aVF, <0.5mm STD in V4-V6   RADIOLOGY:< from: CT Head No Cont (11.21.18 @ 23:20) >  arge fluid density mass within the region of old infarction in the right temporal and parietal lobes with resulting mass effect as described   above. This may represent cystic encephalomalacia but can be further evaluated with contrast-enhanced MRI or alternatively comparison with   prior imaging if available.No acute intracranial hemorrhage. Old right cerebellar infarct.  	      ASSESSMENT/PLAN: 74yo male PMH CVA with left-sided weakness HTN, DM II ,live alone  presenting with left facial droop after being found down by family member on the floor for 24 hours found to have R cystic encephalomalacia area in old infarct area in CTH and elevated trop/CK likley in the setting of NSTEMI vs rhabdomyolysis.    Plan; Trend   CK/trop  serial EKG if chest pain or any change in patient's condition  will defer anticoagulation as patient is  high risk of bleeding  given cystic encephalomalacia verse malignancy mass  echo   will continue to follow

## 2018-11-22 NOTE — H&P ADULT - HISTORY OF PRESENT ILLNESS
73m hx HTN, NIDDM, CVA (2013 w/ left side weakness), BPH brought in by EMS after being found down.    Patient was last seen normal 23hrs prior to arrival. As per son, at baseline is is independent w/ ADLs and runs a welding company. After not answering calls from family of which is not like him, Son and daughter went to his house where upon knocking on the door no one answered. Police were called and patient was found face down in urine. He was alert upon arrival and acknowledged hearing knocking on the door, but was unable to get up. 73m hx HTN, NIDDM, CVA (2013 w/ left side weakness), BPH brought in by EMS after being found down.    Patient was last seen normal 23hrs prior to arrival. 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 After not answering calls from family of which is not like him, Son and daughter went to his house where upon knocking on the door no one answered. Police were called and patient was found face down in urine. He was alert upon arrival and acknowledged hearing knocking on the door, but was unable to get up.  Patient unable to recall events, states nothing is wrong. Denies fevers, chills, night sweats, nausea, vomiting, chest pain, palpations, headaches, changes in vision. myalgias.

## 2018-11-22 NOTE — H&P ADULT - NSHPPHYSICALEXAM_GEN_ALL_CORE
T(C): 36.8 (11-22-18 @ 04:02), Max: 37 (11-21-18 @ 22:18)  HR: 86 (11-22-18 @ 04:02) (86 - 88)  BP: 127/85 (11-22-18 @ 04:02) (127/85 - 161/108)  RR: 17 (11-22-18 @ 04:02) (15 - 17)  SpO2: 98% (11-22-18 @ 04:02) (97% - 98%)    Gen: awake, alert  HENT: neck soft / supple  Lymph: no LAD noted in neck  Eye: PERRL, sclerae anicteric  CV: normal rate, regular rhythm  Pulm: CTAB, no w/r/r auscultated  Abd: +BS, soft, NT, ND  Skin: warm, dry  Ext: no LE edema  Neuro: answering questions appropriately, following commands appropriately, recent and remote memory intact  Psych: normal mood / affect T(C): 36.8 (11-22-18 @ 04:02), Max: 37 (11-21-18 @ 22:18)  HR: 86 (11-22-18 @ 04:02) (86 - 88)  BP: 127/85 (11-22-18 @ 04:02) (127/85 - 161/108)  RR: 17 (11-22-18 @ 04:02) (15 - 17)  SpO2: 98% (11-22-18 @ 04:02) (97% - 98%)    Gen: awake, alert  HENT: neck soft / supple  Lymph: no LAD noted in neck  Eye: PERRL, sclerae anicteric  CV: normal rate, regular rhythm  Pulm: CTAB, no w/r/r auscultated  Abd: +BS, soft, NT, ND  Skin: warm, dry  Ext: no LE edema  Neuro: answering questions appropriately, following commands appropriately,  Psych: normal mood / affect T(C): 36.8 (18 @ 04:02), Max: 37 (18 @ 22:18)  HR: 86 (18 @ 04:02) (86 - 88)  BP: 127/85 (18 @ 04:02) (127/85 - 161/108)  RR: 17 (18 @ 04:02) (15 - 17)  SpO2: 98% (18 @ 04:02) (97% - 98%)    Gen: awake, alert  HENT: neck soft / supple  Lymph: no LAD noted in neck  Eye: PERRL, sclerae anicteric  CV: normal rate, regular rhythm  Pulm: CTAB, no w/r/r auscultated  Abd: +BS, soft, NT, ND  Skin: warm, dry  Ext: no LE edema  Neuro: answering questions appropriately, following commands appropriately, left upper and lower 3/5 strength, decreased sensation left lower extremity, left facial droop w/ flattening of nasolabial fold. speech intact. AOx2(knows name, ,place, month, wrong on year and day), distant memory intact (remembers [place] vs [place] Monday night football game) T(C): 36.8 (18 @ 04:02), Max: 37 (18 @ 22:18)  HR: 86 (18 @ 04:02) (86 - 88)  BP: 127/85 (18 @ 04:02) (127/85 - 161/108)  RR: 17 (18 @ 04:02) (15 - 17)  SpO2: 98% (18 @ 04:02) (97% - 98%)    Gen: awake, alert, NAD  Head: atraumatic, normocephalic  Neck: supple, full ROM, no JVD, no LAD  Eye: PERRL, EOMI, sclerae anicteric  CV: normal rate, regular rhythm, +S1S2, no murmurs, no LE edema b/l  Pulm: good inspiratory effort, CTAB, no w/r/r auscultated  Abd: +BS, soft, NT, ND  Skin: warm, dry, no rashes  Ext: 2+ peripheral pulses b/l, no cyanosis  Neuro: answering questions appropriately, following commands appropriately, left upper and lower 3/5 strength, decreased sensation left lower extremity, left facial droop w/ flattening of nasolabial fold. speech intact. A&Ox2 (knows name, , place, month, wrong on year and day), distant memory intact (remembers [place] vs [place] Monday night football game)

## 2018-11-22 NOTE — H&P ADULT - PROBLEM SELECTOR PLAN 1
Unclear eitiology, patient unable to recall events  -cardiac work-up w/ TTE, Troponins elevated ?ACS vs demand ischemia, will continue to monitor on telemetry   -Neuro workup w/ findings of large cystic mass w/ MLS, ?seizure given rhabdo, will obtain MR brain w/ IV contrast to better visualize mass. Neurosurgery following, obtain neurology consult in AM   -CTA chest preformed r/o PE also looking for malignancy; patient not in any respiratory distress, saturation well on RA. Unclear aitiology, patient unable to recall events  -cardiac work-up w/ TTE, Troponins elevated ?ACS vs demand ischemia, will continue to monitor on telemetry  -Neuro workup w/ findings of large cystic mass w/ MLS, ?seizure given rhabdo, will obtain MR brain w/ IV contrast to better visualize mass. Neurosurgery following, obtain neurology consult in AM   -CTA chest preformed r/o PE also looking for malignancy; patient not in any respiratory distress, saturation well on RA.  TSH WNL  -will obtain EEG and prolatin Unclear aitiology, patient unable to recall events  -cardiac work-up w/ TTE, Troponin elevated ?ACS vs demand ischemia, will continue to monitor on telemetry  -Neuro workup w/ findings of large cystic mass w/ MLS, ?seizure given rhabdo, will obtain MR brain w/ IV contrast to better visualize mass. Neurosurgery following, obtain neurology consult in AM   -CTA chest preformed r/o PE also looking for malignancy; patient not in any respiratory distress, saturation well on RA.  TSH WNL  -will obtain EEG and prolactin Unclear aitiology, patient unable to recall events  -cardiac work-up w/ TTE, Troponin elevated ?ACS vs demand ischemia, will continue to monitor on telemetry  -Neuro workup w/ findings of large cystic mass w/ MLS, ?seizure given rhabdo, will obtain MR brain w/ IV contrast to better visualize mass. Neurosurgery following, obtain neurology consult in AM   -CTA chest preformed r/o PE also looking for malignancy; patient not in any respiratory distress, saturation well on RA.  TSH WNL  -will obtain EEG and prolactin, urine toxicology Unclear aitiology, patient unable to recall events  -cardiac work-up w/ TTE, Troponin elevated ?ACS vs demand ischemia, will continue to monitor on telemetry  -Neuro workup w/ findings of large cystic mass w/ MLS, ?seizure given rhabdo, will obtain MR brain w/ IV contrast to better visualize mass. Neurosurgery following, obtain neurology consult in AM   -CTA chest preformed r/o PE also looking for malignancy; patient not in any respiratory distress, saturation well on RA.  TSH WNL  -will obtain EEG and prolactin, urine toxicology  -hyponatremia now resolved after 2L NS bolus, but corrected too quickly in the ED. will monitor to make sure serum Na is not corrected by more than 8-10 mEq in 24 hrs.

## 2018-11-22 NOTE — CONSULT NOTE ADULT - PROBLEM SELECTOR RECOMMENDATION 9
No acute neurosurgical intervention  MRI brain w/wo contrast  CT CAP to rule out underlying malignancy   will dw attending

## 2018-11-22 NOTE — H&P ADULT - PROBLEM SELECTOR PLAN 6
c/w home lisniopril 10mg, holding HCTZ   -continue to monitor vitals q6 for now A home lisinopril & HCTZ in setting of impending OJ 2/2 rhabdo, can start amlodipine 5mg   -continue to monitor vitals q6 for now A home lisinopril & HCTZ in setting of impending OJ 2/2 rhabdo, can start amlodipine 5mg if SBP >140   -continue to monitor vitals q6 for now

## 2018-11-22 NOTE — PROGRESS NOTE ADULT - PROBLEM SELECTOR PLAN 7
c/w finasteride 5mg qd   -obtain PVR bladder scan to r/o retention in setting of neuro event A home lisinopril & HCTZ in setting of impending OJ 2/2 rhabdo, can start amlodipine 5mg if SBP >140   -continue to monitor vitals q6 for now

## 2018-11-22 NOTE — PATIENT PROFILE ADULT - PATIENT REPRESENTATIVE: ( YOU CAN CHOOSE ANY PERSON THAT CAN ASSIST YOU WITH YOUR HEALTH CARE PREFERENCES, DOES NOT HAVE TO BE A SPOUSE, IMMEDIATE FAMILY OR SIGNIFICANT OTHER/PARTNER)
Pt c/o pain and states [place] didn't help. Had previous conversation with MD about the pt's pain management and request for IV fentanyl and refusal of Munira Khoury MD per pt requests. New orders received and initiated. Will continue to monitor. yes

## 2018-11-22 NOTE — PROVIDER CONTACT NOTE (CRITICAL VALUE NOTIFICATION) - ASSESSMENT
Pt A&O x2, confused and lethargic. Pt incontinent at this time. Clear lungs bilaterally. Apical pulse regular. Clear bowel sounds in all 4 quadrants.

## 2018-11-22 NOTE — PROGRESS NOTE ADULT - PROBLEM SELECTOR PLAN 9
IMPROVE score: 2 (age and possible CA)  DVT ppx: SCDs for now, pending neurosurgery official recs, can start heparin SQ if no anticipations for   Diet: NPO pending S&S   PT/OT consult, seizure precautions, aspiration precautions, fall precautions   Dr. Jade Carmona   Internal Medicine   PGY-2   [phone] (long range pager)  [phone] (short range)

## 2018-11-22 NOTE — H&P ADULT - PROBLEM SELECTOR PLAN 8
IMPROVE score: 2 (age and possible CA)  DVT ppx: SCDs for now, pending neurosurgery official recs, can start heparin SQ   Diet: NPO pending S&S   PT/OT consult, seizure precautions, aspiration precautions, fall precautions   Dr.Benziger Carmona   Internal Medicine   PGY-2   [phone] (long range pager)  [phone] (short range) IMPROVE score: 2 (age and possible CA)  DVT ppx: SCDs for now, pending neurosurgery official recs, can start heparin SQ if no anticipations for   Diet: NPO pending S&S   PT/OT consult, seizure precautions, aspiration precautions, fall precautions   Dr. Jade Carmona   Internal Medicine   PGY-2   [phone] (long range pager)  [phone] (short range)

## 2018-11-22 NOTE — PROGRESS NOTE ADULT - PROBLEM SELECTOR PLAN 6
A home lisinopril & HCTZ in setting of impending OJ 2/2 rhabdo, can start amlodipine 5mg if SBP >140   -continue to monitor vitals q6 for now On home metformin, will hold while in hospital   keeping patient NPO for now, will start Low HISS w/ FSG checks q6 hrs   -check A1c

## 2018-11-22 NOTE — CONSULT NOTE ADULT - SUBJECTIVE AND OBJECTIVE BOX
Neurology Consult    Reason for consult: Loss of consciousness    73m hx HTN, NIDDM, CVA (2013 w/ left side weakness), BPH brought in by EMS after being found down.    REVIEW OF SYSTEMS:  As above    MEDICATIONS  dextrose 40% Gel 15 Gram(s) Oral once PRN  dextrose 5%. 1000 milliLiter(s) IV Continuous <Continuous>  dextrose 50% Injectable 12.5 Gram(s) IV Push once  dextrose 50% Injectable 25 Gram(s) IV Push once  dextrose 50% Injectable 25 Gram(s) IV Push once  glucagon  Injectable 1 milliGRAM(s) IntraMuscular once PRN  influenza   Vaccine 0.5 milliLiter(s) IntraMuscular once  insulin lispro (HumaLOG) corrective regimen sliding scale   SubCutaneous every 6 hours  potassium chloride  10 mEq/100 mL IVPB 10 milliEquivalent(s) IV Intermittent every 1 hour  sodium chloride 0.9%. 1000 milliLiter(s) IV Continuous <Continuous>    PMH: BPH (benign prostatic hyperplasia)  CVA (cerebral vascular accident)  Diabetes  HTN (hypertension)    PSH: No significant past surgical history    FAMILY HISTORY:  No pertinent family history in first degree relatives  No history of dementia, strokes, or seizures     SOCIAL HISTORY:  No history of tobacco or alcohol use     Allergies  Allergy Status Unknown    Intolerances    Vital Signs Last 24 Hrs  T(C): 36.8 (22 Nov 2018 04:02), Max: 37 (21 Nov 2018 22:18)  T(F): 98.2 (22 Nov 2018 04:02), Max: 98.6 (21 Nov 2018 22:18)  HR: 86 (22 Nov 2018 04:02) (86 - 88)  BP: 127/85 (22 Nov 2018 04:02) (127/85 - 161/108)  BP(mean): --  RR: 17 (22 Nov 2018 04:02) (15 - 17)  SpO2: 98% (22 Nov 2018 04:02) (97% - 98%)    Neurological Examination:    Mental Status: Patient is alert, awake, oriented X3. Patient is fluent, no dysarthria, no aphasia. Follows commands well and able to answer questions appropriately. Mood and affect normal.    Cranial Nerves: PERRL, EOMI, visual field intact, V1-V3 intact, no gross facial asymmetry, tongue/uvula midline    Motor Exam: No drift  Right upper extremity: 5/5  Left upper extremity: 5/5  Right lower extremity: 5/5  Left lower extremity: 5/5    Normal bulk/tone    Sensory: Intact to light touch and pinprick bilaterally. No extinction    Coordination: Finger to nose intact bilaterally     Gait: Normal      Reflexes: Bilateral 2+ Biceps, Brachial, Patellar, Ankle  Plantar: Down bilateral    GENERAL: No acute distress  HEENT:  Normocephalic, atraumatic  EXTREMITIES: No edema, clubbing, cyanosis  MUSCULOSKELETAL: Normal range of motion  SKIN: No rashes Neurology Consult    Reason for consult: Loss of consciousness    73m hx HTN, NIDDM, CVA (2013 w/ left side weakness), BPH brought in by EMS after being found down. As per chart, son and daughter unable to reach patient, police called, patient found face down in urine. Has not had any similar previous episodes. As per patient, does not recall being found down. Currently patient feels well, no complaints. Believes he's fine and requesting to go home.     REVIEW OF SYSTEMS:  As above    MEDICATIONS  dextrose 40% Gel 15 Gram(s) Oral once PRN  dextrose 5%. 1000 milliLiter(s) IV Continuous <Continuous>  dextrose 50% Injectable 12.5 Gram(s) IV Push once  dextrose 50% Injectable 25 Gram(s) IV Push once  dextrose 50% Injectable 25 Gram(s) IV Push once  glucagon  Injectable 1 milliGRAM(s) IntraMuscular once PRN  influenza   Vaccine 0.5 milliLiter(s) IntraMuscular once  insulin lispro (HumaLOG) corrective regimen sliding scale   SubCutaneous every 6 hours  potassium chloride  10 mEq/100 mL IVPB 10 milliEquivalent(s) IV Intermittent every 1 hour  sodium chloride 0.9%. 1000 milliLiter(s) IV Continuous <Continuous>    PMH: BPH (benign prostatic hyperplasia)  CVA (cerebral vascular accident)  Diabetes  HTN (hypertension)    PSH: No significant past surgical history    FAMILY HISTORY:  No pertinent family history in first degree relatives  No history of dementia, strokes, or seizures     SOCIAL HISTORY:  No history of tobacco or alcohol use     Allergies  Allergy Status Unknown    Intolerances    Vital Signs Last 24 Hrs  T(C): 36.8 (22 Nov 2018 04:02), Max: 37 (21 Nov 2018 22:18)  T(F): 98.2 (22 Nov 2018 04:02), Max: 98.6 (21 Nov 2018 22:18)  HR: 86 (22 Nov 2018 04:02) (86 - 88)  BP: 127/85 (22 Nov 2018 04:02) (127/85 - 161/108)  BP(mean): --  RR: 17 (22 Nov 2018 04:02) (15 - 17)  SpO2: 98% (22 Nov 2018 04:02) (97% - 98%)    Neurological Examination:    Mental Status: Patient is alert, awake, oriented X3. Patient is fluent, no dysarthria, no aphasia. Follows commands well and able to answer questions appropriately. Mood and affect normal.    Cranial Nerves: PERRL, EOMI, visual field intact, V1-V3 intact, no gross facial asymmetry, tongue/uvula midline    Motor Exam: No drift  Right upper extremity: 5/5  Left upper extremity: 4/5  Right lower extremity: 5/5  Left lower extremity: 3/5    Normal bulk/tone    Sensory: Intact to light touch bilaterally. No extinction    Coordination: Finger to nose intact bilaterally     Reflexes: Bilateral 2+ Biceps, Brachial, Patellar, Ankle  Plantar: Down bilateral    GENERAL: No acute distress  HEENT:  Normocephalic, atraumatic  EXTREMITIES: No edema, clubbing, cyanosis  MUSCULOSKELETAL: Normal range of motion  SKIN: No rashes

## 2018-11-22 NOTE — CONSULT NOTE ADULT - SUBJECTIVE AND OBJECTIVE BOX
HPI: 73y male w/ hx of R CVA in past and residual L sided weakness - lives alone per his son. Patient was found down when son could not reach him. He was covered in urine and appeared extremely confused and not at baseline. New L facial droop noted.     PAST MEDICAL & SURGICAL HISTORY:  CVA (cerebral vascular accident)  Diabetes  HTN (hypertension)    Allergies    Allergy Status Unknown        Vital Signs Last 24 Hrs  T(C): 37 (21 Nov 2018 22:18), Max: 37 (21 Nov 2018 22:18)  T(F): 98.6 (21 Nov 2018 22:18), Max: 98.6 (21 Nov 2018 22:18)  HR: 88 (21 Nov 2018 22:18) (88 - 88)  BP: 150/112 (21 Nov 2018 22:18) (150/112 - 150/112)  BP(mean): --  RR: 15 (21 Nov 2018 22:18) (15 - 15)  SpO2: 97% (21 Nov 2018 22:18) (97% - 97%)    PHYSICAL EXAM:  Awake Alert Attentive Affect appropriate, OX2 (person and place)  Pupils: PERRL  Uncooperative with motor exam but does move all 4 ext   Speech clear  + L facial droop    LABS:                          16.5   16.56 )-----------( 319      ( 21 Nov 2018 23:30 )             47.4     11-21    129<L>  |  92<L>  |  11  ----------------------------<  110<H>  Test not performed SPECIMEN GROSSLY HEMOLYZED   |  24  |  0.91    Ca    9.1      21 Nov 2018 23:30    TPro  Test not performed SPECIMEN GROSSLY HEMOLYZED  /  Alb  4.6  /  TBili  0.9  /  DBili  x   /  AST  x   /  ALT  x   /  AlkPhos  48  11-21    PT/INR - ( 21 Nov 2018 23:30 )   PT: 13.0 SEC;   INR: 1.14          PTT - ( 21 Nov 2018 23:30 )  PTT:26.9 SEC      RADIOLOGY & ADDITIONAL STUDIES:  < from: CT Head No Cont (11.21.18 @ 23:20) >    IMPRESSION:     Large fluid density mass within the region of old infarction in the right   temporal and parietal lobes with resulting mass effect as described   above. This may represent cystic encephalomalacia but can be further   evaluated with contrast-enhanced MRI or alternatively comparison with   prior imaging if available.    No acute intracranial hemorrhage. Old right cerebellar infarct.    If no contraindications exist, contrast-enhanced MRI is recommended for   further characterization.    < end of copied text >

## 2018-11-22 NOTE — PROVIDER CONTACT NOTE (CRITICAL VALUE NOTIFICATION) - SITUATION
high sensitivity troponin 114
Pt has critical lab value of 2.7
patients potassium was 2.7 (received 3 potassium runs) with no change in potassium

## 2018-11-22 NOTE — H&P ADULT - NSHPSOCIALHISTORY_GEN_ALL_CORE
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 Never smoker/ETOH/recreational drug use. 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

## 2018-11-22 NOTE — PROGRESS NOTE ADULT - PROBLEM SELECTOR PLAN 2
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

## 2018-11-22 NOTE — H&P ADULT - PROBLEM SELECTOR PLAN 4
?in setting of seizure given found down in urine and CT findings   -c/w IVF hydration 100c/hr stop after 24hrs  -continue to trend CK ?in setting of seizure given found down in urine and CT findings   -c/w IVF hydration with NS at 100c/hr, stop after 24hrs  -continue to trend CK

## 2018-11-22 NOTE — H&P ADULT - PMH
BPH (benign prostatic hyperplasia)    CVA (cerebral vascular accident)    Diabetes    HTN (hypertension)

## 2018-11-22 NOTE — H&P ADULT - PROBLEM SELECTOR PLAN 3
possible type 2 NSTEMI given high troponin w/ upward trend, patient w/o chest pain, unable to recall events  -continue to trend troponin until peak  -obtain TTE  -cardiology following possible type 2 NSTEMI given high troponin w/ upward trend, also with T-wave inversions on lead 3 and AVF patient w/o chest pain, unable to recall events  -continue to trend troponin until peak  -obtain TTE  -cardiology following possible type 2 MI (demand) given high troponin w/ upward trend, also with T-wave inversions on lead 3 and AVF patient w/o chest pain, unable to recall events  -continue to trend troponin until peak  -obtain TTE  -cardiology following

## 2018-11-22 NOTE — PROVIDER CONTACT NOTE (CRITICAL VALUE NOTIFICATION) - ACTION/TREATMENT ORDERED:
no new orders received, continue to monitor patient
awaiting new orders
Potassium to be given as ordered.

## 2018-11-22 NOTE — PROVIDER CONTACT NOTE (CRITICAL VALUE NOTIFICATION) - BACKGROUND
patient admitted found down at home for unknown amount of time  cardiac enzymes positive, previous troponin 138
71 year old male admitted on 11-22-18 for other conditions of the brain. Pt s/p fall at home. PMH of Diabetes, HTN, & CVA with L sided weakness & new onset L sided facial weakness.
patient admitted with rhabdomyolysis

## 2018-11-22 NOTE — PROGRESS NOTE ADULT - PROBLEM SELECTOR PLAN 1
Unclear aitiology, patient unable to recall events  -cardiac work-up w/ TTE, Troponin elevated ?ACS vs demand ischemia, will continue to monitor on telemetry  echo   f/u with cardio

## 2018-11-22 NOTE — PROGRESS NOTE ADULT - PROBLEM SELECTOR PLAN 8
IMPROVE score: 2 (age and possible CA)  DVT ppx: SCDs for now, pending neurosurgery official recs, can start heparin SQ if no anticipations for   Diet: NPO pending S&S   PT/OT consult, seizure precautions, aspiration precautions, fall precautions   Dr. Jade Carmona   Internal Medicine   PGY-2   [phone] (long range pager)  [phone] (short range) c/w finasteride 5mg qd   -obtain PVR bladder scan to r/o retention in setting of neuro event

## 2018-11-23 LAB
GLUCOSE BLDC GLUCOMTR-MCNC: 122 MG/DL — HIGH (ref 70–99)
GLUCOSE BLDC GLUCOMTR-MCNC: 136 MG/DL — HIGH (ref 70–99)
GLUCOSE BLDC GLUCOMTR-MCNC: 138 MG/DL — HIGH (ref 70–99)
HBA1C BLD-MCNC: 5.8 % — HIGH (ref 4–5.6)
L PNEUMO AG UR QL: NEGATIVE — SIGNIFICANT CHANGE UP
SPECIMEN SOURCE: SIGNIFICANT CHANGE UP

## 2018-11-23 PROCEDURE — 99223 1ST HOSP IP/OBS HIGH 75: CPT | Mod: GC

## 2018-11-23 PROCEDURE — 99233 SBSQ HOSP IP/OBS HIGH 50: CPT

## 2018-11-23 PROCEDURE — 99222 1ST HOSP IP/OBS MODERATE 55: CPT

## 2018-11-23 PROCEDURE — 95819 EEG AWAKE AND ASLEEP: CPT | Mod: 26

## 2018-11-23 PROCEDURE — 93306 TTE W/DOPPLER COMPLETE: CPT | Mod: 26

## 2018-11-23 RX ORDER — SODIUM CHLORIDE 9 MG/ML
1000 INJECTION INTRAMUSCULAR; INTRAVENOUS; SUBCUTANEOUS
Qty: 0 | Refills: 0 | Status: DISCONTINUED | OUTPATIENT
Start: 2018-11-23 | End: 2018-11-26

## 2018-11-23 RX ORDER — INSULIN LISPRO 100/ML
VIAL (ML) SUBCUTANEOUS
Qty: 0 | Refills: 0 | Status: DISCONTINUED | OUTPATIENT
Start: 2018-11-23 | End: 2018-11-29

## 2018-11-23 RX ADMIN — SODIUM CHLORIDE 100 MILLILITER(S): 9 INJECTION INTRAMUSCULAR; INTRAVENOUS; SUBCUTANEOUS at 19:05

## 2018-11-23 RX ADMIN — Medication 4 MILLIGRAM(S): at 17:10

## 2018-11-23 RX ADMIN — Medication 4 MILLIGRAM(S): at 10:54

## 2018-11-23 RX ADMIN — Medication 4 MILLIGRAM(S): at 21:30

## 2018-11-23 RX ADMIN — LEVETIRACETAM 400 MILLIGRAM(S): 250 TABLET, FILM COATED ORAL at 17:10

## 2018-11-23 RX ADMIN — LEVETIRACETAM 400 MILLIGRAM(S): 250 TABLET, FILM COATED ORAL at 06:30

## 2018-11-23 RX ADMIN — Medication 4 MILLIGRAM(S): at 03:59

## 2018-11-23 RX ADMIN — SODIUM CHLORIDE 100 MILLILITER(S): 9 INJECTION INTRAMUSCULAR; INTRAVENOUS; SUBCUTANEOUS at 21:30

## 2018-11-23 NOTE — EEG REPORT - NS EEG TEXT BOX
HELGA WHITE [id number]     Study Date: 		11-23-18    ROUTINE EEG    Technical Information:			  		  Placement and Labeling of Electrodes:  The EEG was performed utilizing 20 channels referential EEG connections (coronal over temporal over parasagittal montage) using all standard 10-20 electrode placements with EKG.  Recording was at a sampling rate of 256 samples per second per channel.  Time synchronized digital video recording was done simultaneously with EEG recording.  A low light infrared camera was used for low light recording.  Rahul and seizure detection algorithms were utilized.    CSA Technical Component:  Quantitative EEG analysis using a separate Compressed Spectral Array (CSA) software package was conducted in real-time and run at bedside after set up by the technician, digitally displaying the power of electrographic frequencies included in the 1-30Hz band using a graded color map.  This data was reviewed and interpreted independently, and is reported in a separate section below.    --------------------------------------------------------------------------------------------------  History:  CC/ HPI Patient is a 73y old  Male who presents with a chief complaint of syncope/collapse/rhabdo/brain mass (23 Nov 2018 10:39)    MEDICATIONS  (STANDING):  dexamethasone     Tablet 4 milliGRAM(s) Oral every 6 hours  dextrose 5%. 1000 milliLiter(s) (50 mL/Hr) IV Continuous <Continuous>  dextrose 50% Injectable 12.5 Gram(s) IV Push once  dextrose 50% Injectable 25 Gram(s) IV Push once  dextrose 50% Injectable 25 Gram(s) IV Push once  influenza   Vaccine 0.5 milliLiter(s) IntraMuscular once  insulin lispro (HumaLOG) corrective regimen sliding scale   SubCutaneous every 6 hours  levETIRAcetam  IVPB 500 milliGRAM(s) IV Intermittent every 12 hours  sodium chloride 0.9%. 1000 milliLiter(s) (100 mL/Hr) IV Continuous <Continuous>    --------------------------------------------------------------------------------------------------  Study Interpretation:    FINDINGS:  The background was continuous, spontaneously variable and reactive. The background predominantly consisted of theta and delta activities. During wakefulness, there was no definitive and sustained posterior dominant rhythm. Low amplitude central beta was noted in wakefulness.     Background Slowing:  Generalized slowing: Diffuse theta and polymorphic delta slowing.  Focal slowing: Continuous rhythmic polymorphic delta slowing over left parasagittal head region.     Sleep Background:  Stage II sleep transients were not recorded.    Epileptiform Activity:   Right lateralized sharply contoured rhythmic delta activity with superimposed sharp waves    Events:  No clinical events were recorded.  No seizures were recorded.    Activation Procedures:   -Hyperventilation was not performed.    -Photic stimulation was not performed.    Artifacts:  Intermittent myogenic and movement artifacts were noted.    ECG:  The heart rate on single channel ECG was predominantly between 70-75 BPM.  -----------------------------------------------------------------------------------------------------    EEG Classification / Summary:  Abnormal EEG in the awake state:  - Right lateralized sharply contoured rhythmic delta activity with superimposed sharp waves  - Diffuse theta and polymorphic delta slowing.  -----------------------------------------------------------------------------------------------------    Clinical Impression:  - Findings indicated risk of focal onset seizures from the right parasagittal region. No seizures were recorded.   - Mild-moderate nonspecific diffuse or multifocal cerebral dysfunction.       -------------------------------------------------------------------------------------------------------  Edilberto Childers M.D.  Epilepsy Fellow    Ravi Fierro MD  Attending, [facility] [place]

## 2018-11-23 NOTE — PROGRESS NOTE ADULT - PROBLEM SELECTOR PLAN 4
?in setting of seizure given found down in urine and CT findings   -c/w IVF hydration with NS at 100c/hr, stop after 24hrs  -continue to trend CK ?in setting of seizure given found down in urine and CT findings   -c/w IVF hydration with NS @ 100c/hr  -continue to trend CK

## 2018-11-23 NOTE — OCCUPATIONAL THERAPY INITIAL EVALUATION ADULT - PERTINENT HX OF CURRENT PROBLEM, REHAB EVAL
admitted for syncope found to have large cystic brain mass, rhabdo and elevated cardiac enzymes Pt admitted for syncope found to have large cystic brain mass, rhabdo and elevated cardiac enzymes

## 2018-11-23 NOTE — PROGRESS NOTE ADULT - PROBLEM SELECTOR PLAN 2
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 Cardiology assessed patient as elevated risk with RCRI of 2 and MACE 6.2% but may proceed to OR w/o further cardiac testing.   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 Cardiology assessed patient as elevated risk with RCRI of 2 and MACE 6.2% but may proceed to OR w/o further cardiac testing.   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 Cardiology assessed patient as elevated risk with RCRI of 2 and MACE 6.2% but may proceed to OR w/o further cardiac testing.   f/u infectious work up w/  blood & urine cultures, aspergillus ab, cryptococcal ag, cysticercosis ab, quant gold  c/w decadron and Keppra  -neuro consult appreciated  -Cardiology consult appreciated  -seizure precautions, fall precautions, aspiration precautions  -EEG abnormal

## 2018-11-23 NOTE — PROGRESS NOTE ADULT - SUBJECTIVE AND OBJECTIVE BOX
Patient seen and examined at bedside.    Overnight Events:     Review Of Systems: No chest pain, shortness of breath, or palpitations            Medications:  dexamethasone     Tablet 4 milliGRAM(s) Oral every 6 hours  dextrose 40% Gel 15 Gram(s) Oral once PRN  dextrose 5%. 1000 milliLiter(s) IV Continuous <Continuous>  dextrose 50% Injectable 12.5 Gram(s) IV Push once  dextrose 50% Injectable 25 Gram(s) IV Push once  dextrose 50% Injectable 25 Gram(s) IV Push once  glucagon  Injectable 1 milliGRAM(s) IntraMuscular once PRN  influenza   Vaccine 0.5 milliLiter(s) IntraMuscular once  insulin lispro (HumaLOG) corrective regimen sliding scale   SubCutaneous every 6 hours  levETIRAcetam  IVPB 500 milliGRAM(s) IV Intermittent every 12 hours  LORazepam     Tablet 2 milliGRAM(s) Oral daily PRN  sodium chloride 0.9%. 1000 milliLiter(s) IV Continuous <Continuous>      PAST MEDICAL & SURGICAL HISTORY:  BPH (benign prostatic hyperplasia)  CVA (cerebral vascular accident)  Diabetes  HTN (hypertension)  No significant past surgical history      Vitals:  T(F): 97.8 (11-23), Max: 99.1 (11-22)  HR: 66 (11-23) (60 - 90)  BP: 101/70 (11-23) (99/70 - 127/89)  RR: 18 (11-23)  SpO2: 99% (11-23)  I&O's Summary    22 Nov 2018 07:01  -  23 Nov 2018 07:00  --------------------------------------------------------  IN: 1400 mL / OUT: 375 mL / NET: 1025 mL    23 Nov 2018 07:01  -  23 Nov 2018 10:39  --------------------------------------------------------  IN: 0 mL / OUT: 0 mL / NET: 0 mL        Physical Exam:  Appearance: No acute distress; well appearing  Eyes: PERRL, EOMI, pink conjunctiva  HENT: Normal oral muscosa  Cardiovascular: RRR, S1, S2, no murmurs, rubs, or gallops; no edema; no JVD  Respiratory: Clear to auscultation bilaterally  Gastrointestinal: soft, non-tender, non-distended with normal bowel sounds  Musculoskeletal: No clubbing; no joint deformity   Neurologic: Non-focal  Lymphatic: No lymphadenopathy  Psychiatry: AAOx3, mood & affect appropriate  Skin: No rashes, ecchymoses, or cyanosis                          15.2   12.59 )-----------( 277      ( 22 Nov 2018 07:15 )             42.9     11-22    137  |  102  |  14  ----------------------------<  123<H>  3.9   |  24  |  1.24    Ca    8.2<L>      22 Nov 2018 19:15  Phos  3.1     11-22  Mg     2.1     11-22    TPro  7.8  /  Alb  4.0  /  TBili  0.7  /  DBili  x   /  AST  118<H>  /  ALT  26  /  AlkPhos  67  11-22    PT/INR - ( 21 Nov 2018 23:30 )   PT: 13.0 SEC;   INR: 1.14          PTT - ( 21 Nov 2018 23:30 )  PTT:26.9 SEC  CARDIAC MARKERS ( 22 Nov 2018 08:15 )  x     / x     / x     / 5846 u/L / 16.18 ng/mL / x      CARDIAC MARKERS ( 22 Nov 2018 01:54 )  x     / x     / x     / 6200 u/L / 24.96 ng/mL / x      CARDIAC MARKERS ( 21 Nov 2018 23:30 )  x     / x     / x     / Test not performed SPECIMEN GROSSLY HEMOLYZED u/L / 27.73 ng/mL / x              Hemoglobin A1C, Whole Blood: 5.8 % (11-23 @ 06:30)      New ECG(s): Personally reviewed    Echo:    Stress Testing:     Cath:    Imaging:    Interpretation of Telemetry: Patient seen and examined at bedside.    Overnight Events: no acute events. Pt not aware of MRI report, explained to pt MRI findings     Review Of Systems: No chest pain, shortness of breath, or palpitations            Medications:  dexamethasone     Tablet 4 milliGRAM(s) Oral every 6 hours  dextrose 40% Gel 15 Gram(s) Oral once PRN  dextrose 5%. 1000 milliLiter(s) IV Continuous <Continuous>  dextrose 50% Injectable 12.5 Gram(s) IV Push once  dextrose 50% Injectable 25 Gram(s) IV Push once  dextrose 50% Injectable 25 Gram(s) IV Push once  glucagon  Injectable 1 milliGRAM(s) IntraMuscular once PRN  influenza   Vaccine 0.5 milliLiter(s) IntraMuscular once  insulin lispro (HumaLOG) corrective regimen sliding scale   SubCutaneous every 6 hours  levETIRAcetam  IVPB 500 milliGRAM(s) IV Intermittent every 12 hours  LORazepam     Tablet 2 milliGRAM(s) Oral daily PRN  sodium chloride 0.9%. 1000 milliLiter(s) IV Continuous <Continuous>      PAST MEDICAL & SURGICAL HISTORY:  BPH (benign prostatic hyperplasia)  CVA (cerebral vascular accident)  Diabetes  HTN (hypertension)  No significant past surgical history      Vitals:  T(F): 97.8 (11-23), Max: 99.1 (11-22)  HR: 66 (11-23) (60 - 90)  BP: 101/70 (11-23) (99/70 - 127/89)  RR: 18 (11-23)  SpO2: 99% (11-23)  I&O's Summary    22 Nov 2018 07:01  -  23 Nov 2018 07:00  --------------------------------------------------------  IN: 1400 mL / OUT: 375 mL / NET: 1025 mL    23 Nov 2018 07:01  -  23 Nov 2018 10:39  --------------------------------------------------------  IN: 0 mL / OUT: 0 mL / NET: 0 mL        Physical Exam:  Appearance: No acute distress; well appearing  Eyes: PERRL, EOMI, pink conjunctiva  HENT: Normal oral muscosa  Cardiovascular: RRR, S1, S2, no murmurs, rubs, or gallops; no edema; no JVD  Respiratory: Clear to auscultation bilaterally  Gastrointestinal: soft, non-tender, non-distended with normal bowel sounds  Musculoskeletal: No clubbing; no joint deformity   Neurologic: Non-focal  Lymphatic: No lymphadenopathy  Psychiatry: AAOx3, mood & affect appropriate  Skin: No rashes, ecchymoses, or cyanosis                          15.2   12.59 )-----------( 277      ( 22 Nov 2018 07:15 )             42.9     11-22    137  |  102  |  14  ----------------------------<  123<H>  3.9   |  24  |  1.24    Ca    8.2<L>      22 Nov 2018 19:15  Phos  3.1     11-22  Mg     2.1     11-22    TPro  7.8  /  Alb  4.0  /  TBili  0.7  /  DBili  x   /  AST  118<H>  /  ALT  26  /  AlkPhos  67  11-22    PT/INR - ( 21 Nov 2018 23:30 )   PT: 13.0 SEC;   INR: 1.14          PTT - ( 21 Nov 2018 23:30 )  PTT:26.9 SEC  CARDIAC MARKERS ( 22 Nov 2018 08:15 )  x     / x     / x     / 5846 u/L / 16.18 ng/mL / x      CARDIAC MARKERS ( 22 Nov 2018 01:54 )  x     / x     / x     / 6200 u/L / 24.96 ng/mL / x      CARDIAC MARKERS ( 21 Nov 2018 23:30 )  x     / x     / x     / Test not performed SPECIMEN GROSSLY HEMOLYZED u/L / 27.73 ng/mL / x              Hemoglobin A1C, Whole Blood: 5.8 % (11-23 @ 06:30)    MRI  < from: MR Head w/ IV Cont (11.22.18 @ 06:52) >  IMPRESSION:     Right 5.9 x 4 x 2.7 cm AP, TR, CC rim-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    Volume loss with encephalomalacia throughout the right inferior medial   cerebellar hemisphere, smaller foci of ischemia in the left cerebellar   hemisphere and supratentorially.    < end of copied text >

## 2018-11-23 NOTE — SWALLOW BEDSIDE ASSESSMENT ADULT - SWALLOW EVAL: RECOMMENDED FEEDING/EATING TECHNIQUES
allow for swallow between intakes/no straws/small sips/bites/maintain upright posture during/after eating for 30 mins/crush medication (when feasible)/position upright (90 degrees)

## 2018-11-23 NOTE — PROGRESS NOTE ADULT - ASSESSMENT
73 y.o. 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

## 2018-11-23 NOTE — PROGRESS NOTE ADULT - PROBLEM SELECTOR PLAN 1
Unclear etiology, patient unable to recall events  TTE negative  tele negative Unclear etiology, patient unable to recall events  may have been a seizure as suggested by EEG and since patient has brain lesion.   will consider keppra for seizure ppx  TTE negative  tele negative Unclear etiology, patient unable to recall events  may have been a seizure as suggested by EEG and since patient has brain lesion.   c/w keppra IV bid for seizure ppx  TTE negative  tele negative

## 2018-11-23 NOTE — PROGRESS NOTE ADULT - SUBJECTIVE AND OBJECTIVE BOX
Patient is a 73y old  Male who presents with a chief complaint of syncope/collapse/rhabdo/brain mass (2018 10:39)      SUBJECTIVE / OVERNIGHT EVENTS:    MEDICATIONS  (STANDING):  dexamethasone     Tablet 4 milliGRAM(s) Oral every 6 hours  dextrose 5%. 1000 milliLiter(s) (50 mL/Hr) IV Continuous <Continuous>  dextrose 50% Injectable 12.5 Gram(s) IV Push once  dextrose 50% Injectable 25 Gram(s) IV Push once  dextrose 50% Injectable 25 Gram(s) IV Push once  influenza   Vaccine 0.5 milliLiter(s) IntraMuscular once  insulin lispro (HumaLOG) corrective regimen sliding scale   SubCutaneous every 6 hours  levETIRAcetam  IVPB 500 milliGRAM(s) IV Intermittent every 12 hours  sodium chloride 0.9%. 1000 milliLiter(s) (100 mL/Hr) IV Continuous <Continuous>    MEDICATIONS  (PRN):  dextrose 40% Gel 15 Gram(s) Oral once PRN Blood Glucose LESS THAN 70 milliGRAM(s)/deciliter  glucagon  Injectable 1 milliGRAM(s) IntraMuscular once PRN Glucose LESS THAN 70 milligrams/deciliter  LORazepam     Tablet 2 milliGRAM(s) Oral daily PRN seizures      Vital Signs Last 24 Hrs  T(C): 36.7 (2018 16:36), Max: 37.1 (2018 21:15)  T(F): 98 (2018 16:36), Max: 98.7 (2018 21:15)  HR: 69 (2018 16:36) (60 - 79)  BP: 108/73 (2018 16:36) (100/71 - 110/73)  BP(mean): 3 (2018 08:59) (3 - 3)  RR: 17 (2018 16:36) (17 - 18)  SpO2: 98% (2018 16:36) (96% - 99%)  CAPILLARY BLOOD GLUCOSE      POCT Blood Glucose.: 136 mg/dL (2018 16:55)  POCT Blood Glucose.: 122 mg/dL (2018 14:06)  POCT Blood Glucose.: 118 mg/dL (2018 05:48)  POCT Blood Glucose.: 125 mg/dL (2018 23:40)    I&O's Summary    2018 07:01  -  2018 07:00  --------------------------------------------------------  IN: 1400 mL / OUT: 375 mL / NET: 1025 mL    2018 07:01  -  2018 17:56  --------------------------------------------------------  IN: 0 mL / OUT: 0 mL / NET: 0 mL        PHYSICAL EXAM:  GENERAL: NAD, well-developed  HEAD:  Atraumatic, Normocephalic  EYES: EOMI, PERRLA, conjunctiva and sclera clear  NECK: Supple, No JVD  CHEST/LUNG: Clear to auscultation bilaterally; No wheeze  HEART: Regular rate and rhythm; No murmurs, rubs, or gallops  ABDOMEN: Soft, Nontender, Nondistended; Bowel sounds present  EXTREMITIES:  2+ Peripheral Pulses, No clubbing, cyanosis, or edema  PSYCH: AAOx3  NEUROLOGY: non-focal  SKIN: No rashes or lesions    LABS:                        15.2   12.59 )-----------( 277      ( 2018 07:15 )             42.9         137  |  102  |  14  ----------------------------<  123<H>  3.9   |  24  |  1.24    Ca    8.2<L>      2018 19:15  Phos  3.1       Mg     2.1         TPro  7.8  /  Alb  4.0  /  TBili  0.7  /  DBili  x   /  AST  118<H>  /  ALT  26  /  AlkPhos  67  11-22    PT/INR - ( 2018 23:30 )   PT: 13.0 SEC;   INR: 1.14          PTT - ( 2018 23:30 )  PTT:26.9 SEC  CARDIAC MARKERS ( 2018 08:15 )  x     / x     / 5846 u/L / 16.18 ng/mL / x      CARDIAC MARKERS ( 2018 01:54 )  x     / x     / 6200 u/L / 24.96 ng/mL / x      CARDIAC MARKERS ( 2018 23:30 )  x     / x     / Test not performed SPECIMEN GROSSLY HEMOLYZED u/L / 27.73 ng/mL / x          Urinalysis Basic - ( 2018 01:20 )    Color: PINK / Appearance: TURBID / S.016 / pH: 6.0  Gluc: NEGATIVE / Ketone: SMALL  / Bili: NEGATIVE / Urobili: NORMAL   Blood: LARGE / Protein: 100 / Nitrite: NEGATIVE   Leuk Esterase: SMALL / RBC: >50 / WBC 3-5   Sq Epi: x / Non Sq Epi: x / Bacteria: FEW        RADIOLOGY & ADDITIONAL TESTS:    Imaging Personally Reviewed: < from: Transthoracic Echocardiogram (18 @ 11:49) >  CONCLUSIONS:  1. Mitral annular calcification, otherwise normal mitral  valve.  2. Calcified trileaflet aortic valve with normal opening.  Mild aortic regurgitation.  3. Increased relative wall thickness with normal left  ventricular mass index, consistent with concentric left  ventricular remodeling.  4. Endocardium not well visualized; grossly normal left  ventricular systolic function.  5. Normal right ventricular size and function.  6. Normal pulmonic valve.  Moderate pulmonic regurgitation.  Estimated pulmonary artery systolic pressure equals 38 mm  Hg, assuming right atrial pressure equals 10  mm Hg,  consistent with borderline pulmonary hypertension.    < end of copied text >      Consultant(s) Notes Reviewed:      Care Discussed with Consultants/Other Providers: Patient is a 73y old  Male who presents with a chief complaint of syncope/collapse/rhabdo/brain mass (2018 10:39)      SUBJECTIVE / OVERNIGHT EVENTS:    MEDICATIONS  (STANDING):  dexamethasone     Tablet 4 milliGRAM(s) Oral every 6 hours  dextrose 5%. 1000 milliLiter(s) (50 mL/Hr) IV Continuous <Continuous>  dextrose 50% Injectable 12.5 Gram(s) IV Push once  dextrose 50% Injectable 25 Gram(s) IV Push once  dextrose 50% Injectable 25 Gram(s) IV Push once  influenza   Vaccine 0.5 milliLiter(s) IntraMuscular once  insulin lispro (HumaLOG) corrective regimen sliding scale   SubCutaneous every 6 hours  levETIRAcetam  IVPB 500 milliGRAM(s) IV Intermittent every 12 hours  sodium chloride 0.9%. 1000 milliLiter(s) (100 mL/Hr) IV Continuous <Continuous>    MEDICATIONS  (PRN):  dextrose 40% Gel 15 Gram(s) Oral once PRN Blood Glucose LESS THAN 70 milliGRAM(s)/deciliter  glucagon  Injectable 1 milliGRAM(s) IntraMuscular once PRN Glucose LESS THAN 70 milligrams/deciliter  LORazepam     Tablet 2 milliGRAM(s) Oral daily PRN seizures      Vital Signs Last 24 Hrs  T(C): 36.7 (2018 16:36), Max: 37.1 (2018 21:15)  T(F): 98 (2018 16:36), Max: 98.7 (2018 21:15)  HR: 69 (2018 16:36) (60 - 79)  BP: 108/73 (2018 16:36) (100/71 - 110/73)  BP(mean): 3 (2018 08:59) (3 - 3)  RR: 17 (2018 16:36) (17 - 18)  SpO2: 98% (2018 16:36) (96% - 99%)  CAPILLARY BLOOD GLUCOSE      POCT Blood Glucose.: 136 mg/dL (2018 16:55)  POCT Blood Glucose.: 122 mg/dL (2018 14:06)  POCT Blood Glucose.: 118 mg/dL (2018 05:48)  POCT Blood Glucose.: 125 mg/dL (2018 23:40)    I&O's Summary    2018 07:01  -  2018 07:00  --------------------------------------------------------  IN: 1400 mL / OUT: 375 mL / NET: 1025 mL    2018 07:01  -  2018 17:56  --------------------------------------------------------  IN: 0 mL / OUT: 0 mL / NET: 0 mL        PHYSICAL EXAM:  GENERAL: NAD, well-developed  HEAD:  Atraumatic, Normocephalic  EYES: EOMI, PERRLA, conjunctiva and sclera clear  NECK: Supple, No JVD  CHEST/LUNG: Clear to auscultation bilaterally; No wheeze  HEART: Regular rate and rhythm; No murmurs, rubs, or gallops  ABDOMEN: Soft, Nontender, Nondistended; Bowel sounds present  EXTREMITIES:  2+ Peripheral Pulses, No clubbing, cyanosis, or edema  PSYCH: AAOx3  NEUROLOGY: non-focal  SKIN: No rashes or lesions    LABS:                        15.2   12.59 )-----------( 277      ( 2018 07:15 )             42.9         137  |  102  |  14  ----------------------------<  123<H>  3.9   |  24  |  1.24    Ca    8.2<L>      2018 19:15  Phos  3.1       Mg     2.1         TPro  7.8  /  Alb  4.0  /  TBili  0.7  /  DBili  x   /  AST  118<H>  /  ALT  26  /  AlkPhos  67  11-22    PT/INR - ( 2018 23:30 )   PT: 13.0 SEC;   INR: 1.14          PTT - ( 2018 23:30 )  PTT:26.9 SEC  CARDIAC MARKERS ( 2018 08:15 )  x     / x     / 5846 u/L / 16.18 ng/mL / x      CARDIAC MARKERS ( 2018 01:54 )  x     / x     / 6200 u/L / 24.96 ng/mL / x      CARDIAC MARKERS ( 2018 23:30 )  x     / x     / Test not performed SPECIMEN GROSSLY HEMOLYZED u/L / 27.73 ng/mL / x          Urinalysis Basic - ( 2018 01:20 )    Color: PINK / Appearance: TURBID / S.016 / pH: 6.0  Gluc: NEGATIVE / Ketone: SMALL  / Bili: NEGATIVE / Urobili: NORMAL   Blood: LARGE / Protein: 100 / Nitrite: NEGATIVE   Leuk Esterase: SMALL / RBC: >50 / WBC 3-5   Sq Epi: x / Non Sq Epi: x / Bacteria: FEW        RADIOLOGY & ADDITIONAL TESTS:    Imaging Personally Reviewed: < from: Transthoracic Echocardiogram (18 @ 11:49) >  CONCLUSIONS:  1. Mitral annular calcification, otherwise normal mitral  valve.  2. Calcified trileaflet aortic valve with normal opening.  Mild aortic regurgitation.  3. Increased relative wall thickness with normal left  ventricular mass index, consistent with concentric left  ventricular remodeling.  4. Endocardium not well visualized; grossly normal left  ventricular systolic function.  5. Normal right ventricular size and function.  6. Normal pulmonic valve.  Moderate pulmonic regurgitation.  Estimated pulmonary artery systolic pressure equals 38 mm  Hg, assuming right atrial pressure equals 10  mm Hg,  consistent with borderline pulmonary hypertension.    < end of copied text >    EEG Classification / Summary:  Abnormal EEG in the awake state:  - Right lateralized sharply contoured rhythmic delta activity with superimposed sharp waves  - Diffuse theta and polymorphic delta slowing.  -----------------------------------------------------------------------------------------------------    Clinical Impression:  - Findings indicated risk of focal onset seizures from the right parasagittal region. No seizures were recorded.   - Mild-moderate nonspecific diffuse or multifocal cerebral dysfunction.     Consultant(s) Notes Reviewed:      Care Discussed with Consultants/Other Providers: Patient is a 73y old  Male who presents with a chief complaint of syncope/collapse/rhabdo/brain mass (2018 10:39)      SUBJECTIVE / OVERNIGHT EVENTS:  Patient has no new complaints. Denies cp, SOB, abdominal pain, N/V/D     MEDICATIONS  (STANDING):  dexamethasone     Tablet 4 milliGRAM(s) Oral every 6 hours  dextrose 5%. 1000 milliLiter(s) (50 mL/Hr) IV Continuous <Continuous>  dextrose 50% Injectable 12.5 Gram(s) IV Push once  dextrose 50% Injectable 25 Gram(s) IV Push once  dextrose 50% Injectable 25 Gram(s) IV Push once  influenza   Vaccine 0.5 milliLiter(s) IntraMuscular once  insulin lispro (HumaLOG) corrective regimen sliding scale   SubCutaneous every 6 hours  levETIRAcetam  IVPB 500 milliGRAM(s) IV Intermittent every 12 hours  sodium chloride 0.9%. 1000 milliLiter(s) (100 mL/Hr) IV Continuous <Continuous>    MEDICATIONS  (PRN):  dextrose 40% Gel 15 Gram(s) Oral once PRN Blood Glucose LESS THAN 70 milliGRAM(s)/deciliter  glucagon  Injectable 1 milliGRAM(s) IntraMuscular once PRN Glucose LESS THAN 70 milligrams/deciliter  LORazepam     Tablet 2 milliGRAM(s) Oral daily PRN seizures      Vital Signs Last 24 Hrs  T(C): 36.7 (2018 16:36), Max: 37.1 (2018 21:15)  T(F): 98 (2018 16:36), Max: 98.7 (2018 21:15)  HR: 69 (2018 16:36) (60 - 79)  BP: 108/73 (2018 16:36) (100/71 - 110/73)  BP(mean): 3 (2018 08:59) (3 - 3)  RR: 17 (2018 16:36) (17 - 18)  SpO2: 98% (2018 16:36) (96% - 99%)  CAPILLARY BLOOD GLUCOSE      POCT Blood Glucose.: 136 mg/dL (2018 16:55)  POCT Blood Glucose.: 122 mg/dL (2018 14:06)  POCT Blood Glucose.: 118 mg/dL (2018 05:48)  POCT Blood Glucose.: 125 mg/dL (2018 23:40)    I&O's Summary    2018 07:  -  2018 07:00  --------------------------------------------------------  IN: 1400 mL / OUT: 375 mL / NET: 1025 mL    2018 07:01  -  2018 17:56  --------------------------------------------------------  IN: 0 mL / OUT: 0 mL / NET: 0 mL        PHYSICAL EXAM:  GENERAL: NAD, well-developed  HEAD:  Atraumatic, Normocephalic  EYES: EOMI, PERRLA, conjunctiva and sclera clear  NECK: Supple, No JVD  CHEST/LUNG: Clear to auscultation bilaterally; No wheeze  HEART: Regular rate and rhythm; No murmurs, rubs, or gallops  ABDOMEN: Soft, Nontender, Nondistended; Bowel sounds present  EXTREMITIES:  2+ Peripheral Pulses, No clubbing, cyanosis, or edema  PSYCH: AAOx3  NEUROLOGY: non-focal  SKIN: No rashes or lesions    LABS:                        15.2   12.59 )-----------( 277      ( 2018 07:15 )             42.9     -    137  |  102  |  14  ----------------------------<  123<H>  3.9   |  24  |  1.24    Ca    8.2<L>      2018 19:15  Phos  3.1     -  Mg     2.1         TPro  7.8  /  Alb  4.0  /  TBili  0.7  /  DBili  x   /  AST  118<H>  /  ALT  26  /  AlkPhos  67  11-22    PT/INR - ( 2018 23:30 )   PT: 13.0 SEC;   INR: 1.14          PTT - ( 2018 23:30 )  PTT:26.9 SEC  CARDIAC MARKERS ( 2018 08:15 )  x     / x     / 5846 u/L / 16.18 ng/mL / x      CARDIAC MARKERS ( 2018 01:54 )  x     / x     / 6200 u/L / 24.96 ng/mL / x      CARDIAC MARKERS ( 2018 23:30 )  x     / x     / Test not performed SPECIMEN GROSSLY HEMOLYZED u/L / 27.73 ng/mL / x          Urinalysis Basic - ( 2018 01:20 )    Color: PINK / Appearance: TURBID / S.016 / pH: 6.0  Gluc: NEGATIVE / Ketone: SMALL  / Bili: NEGATIVE / Urobili: NORMAL   Blood: LARGE / Protein: 100 / Nitrite: NEGATIVE   Leuk Esterase: SMALL / RBC: >50 / WBC 3-5   Sq Epi: x / Non Sq Epi: x / Bacteria: FEW        RADIOLOGY & ADDITIONAL TESTS:    Imaging Personally Reviewed: < from: Transthoracic Echocardiogram (18 @ 11:49) >  CONCLUSIONS:  1. Mitral annular calcification, otherwise normal mitral  valve.  2. Calcified trileaflet aortic valve with normal opening.  Mild aortic regurgitation.  3. Increased relative wall thickness with normal left  ventricular mass index, consistent with concentric left  ventricular remodeling.  4. Endocardium not well visualized; grossly normal left  ventricular systolic function.  5. Normal right ventricular size and function.  6. Normal pulmonic valve.  Moderate pulmonic regurgitation.  Estimated pulmonary artery systolic pressure equals 38 mm  Hg, assuming right atrial pressure equals 10  mm Hg,  consistent with borderline pulmonary hypertension.    < end of copied text >    EEG Classification / Summary:  Abnormal EEG in the awake state:  - Right lateralized sharply contoured rhythmic delta activity with superimposed sharp waves  - Diffuse theta and polymorphic delta slowing.  -----------------------------------------------------------------------------------------------------    Clinical Impression:  - Findings indicated risk of focal onset seizures from the right parasagittal region. No seizures were recorded.   - Mild-moderate nonspecific diffuse or multifocal cerebral dysfunction.     Consultant(s) Notes Reviewed:      Care Discussed with Consultants/Other Providers:

## 2018-11-23 NOTE — SWALLOW BEDSIDE ASSESSMENT ADULT - COMMENTS
Patient is a 73 year old male with PMHx of HTN, NIDDM, CVA (2013 w/ left side weakness), BPH brought in by EMS after being found down. CT head w/ possible cystic encephalomalacia vs met w/ compression of R ventricle as well as 0.3 cm mass effect, MRI head w/ and w/o contrast prelim w/ redemonstration of cystic mass w/ edema, 5-6mm midline shift w/ compression into R lateral ventricle, slight uncal herniation. As per discussion with neurosurgery, patient to be started on steroids, further workup for malignancy pending. Given hx of being found down w/ urinary incontinence, concern for possible seizure at this time. CXR: Small focus of linear subsegmental atelectasis or scar in peripheral left lower lung. Underinflated but otherwise clear remaining visualized lungs. No pleural effusions or pneumothorax.    Patient was received awake, alert and cooperative this AM. Recommendations discussed with patient and primary RN. Patient is a 73 year old male with PMHx of HTN, NIDDM, CVA (2013 w/ left side weakness), BPH brought in by EMS after being found down. CT head w/ possible cystic encephalomalacia vs met w/ compression of R ventricle as well as 0.3 cm mass effect, MRI head w/ and w/o contrast prelim w/ redemonstration of cystic mass w/ edema, 5-6mm midline shift w/ compression into R lateral ventricle, slight uncal herniation. As per discussion with neurosurgery, patient to be started on steroids, further workup for malignancy pending. Given hx of being found down w/ urinary incontinence, concern for possible seizure at this time. CXR: Small focus of linear subsegmental atelectasis or scar in peripheral left lower lung. Underinflated but otherwise clear remaining visualized lungs. No pleural effusions or pneumothorax.    Patient was received awake, alert and cooperative this AM. Recommendations discussed with patient, his girlfriend and primary RN.

## 2018-11-23 NOTE — PROGRESS NOTE ADULT - PROBLEM SELECTOR PLAN 6
A home lisinopril & HCTZ in setting of impending OJ 2/2 rhabdo, can start amlodipine 5mg if SBP >140   -continue to monitor vitals q6 for now

## 2018-11-23 NOTE — SWALLOW BEDSIDE ASSESSMENT ADULT - SWALLOW EVAL: DIAGNOSIS
Patient demonstrated oropharyngeal dysphagia exacerbated by edentulous status Patient demonstrated oropharyngeal dysphagia exacerbated by edentulous status and characterized by slow mastication, delayed bolus collection and delayed anterior-posterior transfer of soft solid textures. Functional oral management noted for puree, mechanical soft, nectar-thick and thin liquids. Pharyngeal stage was marked by a timely pharyngeal trigger, suspect reduced hyolaryngeal elevation upon digital palpation, and throat clearing after the swallow with thin liquids suggestive of larygneal penetration vs aspiration. No overt clinical s/s noted with puree, mechanical soft and nectar-thick liquids.

## 2018-11-23 NOTE — PROGRESS NOTE ADULT - PROBLEM SELECTOR PLAN 8
IMPROVE score: 2 (age and possible CA)  DVT ppx: SCDs for now, pending neurosurgery official recs, can start heparin SQ if no anticipations for   Diet: NPO pending S&S   PT/OT consult, seizure precautions, aspiration precautions, fall precautions   Dr. Jade Carmona   Internal Medicine   PGY-2   [phone] (long range pager)  [phone] (short range) IMPROVE score: 2 (age and possible CA)  DVT ppx: SCDs for now, pending neurosurgery official recs  PT/OT consult, seizure precautions, aspiration precautions, fall precautions

## 2018-11-23 NOTE — SWALLOW BEDSIDE ASSESSMENT ADULT - ASR SWALLOW DENTITION
edentulous, does not have dentures/pt reports obtaining new dentures ~2 months ago however left them at home

## 2018-11-23 NOTE — SWALLOW BEDSIDE ASSESSMENT ADULT - ASR SWALLOW ASPIRATION MONITOR
oral hygiene/fever/pneumonia/throat clearing/upper respiratory infection/change of breathing pattern/position upright (90Y)/cough/gurgly voice

## 2018-11-23 NOTE — PROGRESS NOTE ADULT - PROBLEM SELECTOR PLAN 5
On home metformin, will hold while in hospital   keeping patient NPO for now, will start Low HISS w/ FSG checks q6 hrs   -check A1c Hold home metformin   Low HISS w/ FSG checks q6 hrs   -check A1c

## 2018-11-23 NOTE — PROGRESS NOTE ADULT - ASSESSMENT
A/P:  74yo male with hx of CVA with left-sided weakness, HTN, DM II ,lives alone  presenting with left facial droop after being found down by family member on the floor for 24 hours found to have R temporal mass, and elevated CK, relative index of 0.8, likely in the setting of rhabdomyolysis. Less likely to be an ACS event on presentation. tele without any arrhythmias, exam without evidence of ADHF or active murmurs.     Plan:  - will follow up TTE results     Daniel Alfredo MD A/P:  74yo male with hx of CVA with left-sided weakness, HTN, DM II ,lives alone  presenting with left facial droop after being found down by family member on the floor for 24 hours found to have R temporal mass, and elevated CK, relative index of 0.8, likely in the setting of rhabdomyolysis. Less likely to be an ACS event on presentation. tele without any arrhythmias, exam without evidence of ADHF or active murmurs. TTE done today without evidence of ischemia or wall motion changes. Patient RCRI score is 2, with MACE of 6.2% ,while at increased risk, patient may proceed with neuro surgery from a cardiac standpoint without further risk stratification.     Plan:  - no further cardiac intervention, may proceed with neurosurgery if planned this weekend.     Daniel Alfredo MD

## 2018-11-23 NOTE — SWALLOW BEDSIDE ASSESSMENT ADULT - SWALLOW EVAL: CRITERIA FOR SKILLED INTERVENTION MET
to determine tolerance for further diet advancement/demonstrates skilled criteria for swallowing intervention

## 2018-11-24 LAB
-  AMIKACIN: SIGNIFICANT CHANGE UP
-  AMPICILLIN/SULBACTAM: SIGNIFICANT CHANGE UP
-  AMPICILLIN: SIGNIFICANT CHANGE UP
-  AZTREONAM: SIGNIFICANT CHANGE UP
-  CEFAZOLIN: SIGNIFICANT CHANGE UP
-  CEFEPIME: SIGNIFICANT CHANGE UP
-  CEFOXITIN: SIGNIFICANT CHANGE UP
-  CEFTAZIDIME: SIGNIFICANT CHANGE UP
-  CEFTRIAXONE: SIGNIFICANT CHANGE UP
-  CIPROFLOXACIN: SIGNIFICANT CHANGE UP
-  ERTAPENEM: SIGNIFICANT CHANGE UP
-  GENTAMICIN: SIGNIFICANT CHANGE UP
-  IMIPENEM: SIGNIFICANT CHANGE UP
-  LEVOFLOXACIN: SIGNIFICANT CHANGE UP
-  MEROPENEM: SIGNIFICANT CHANGE UP
-  NITROFURANTOIN: SIGNIFICANT CHANGE UP
-  PIPERACILLIN/TAZOBACTAM: SIGNIFICANT CHANGE UP
-  TIGECYCLINE: SIGNIFICANT CHANGE UP
-  TOBRAMYCIN: SIGNIFICANT CHANGE UP
-  TRIMETHOPRIM/SULFAMETHOXAZOLE: SIGNIFICANT CHANGE UP
BACTERIA UR CULT: SIGNIFICANT CHANGE UP
BASOPHILS # BLD AUTO: 0.01 K/UL — SIGNIFICANT CHANGE UP (ref 0–0.2)
BASOPHILS NFR BLD AUTO: 0.1 % — SIGNIFICANT CHANGE UP (ref 0–2)
BUN SERPL-MCNC: 23 MG/DL — SIGNIFICANT CHANGE UP (ref 7–23)
CALCIUM SERPL-MCNC: 8.5 MG/DL — SIGNIFICANT CHANGE UP (ref 8.4–10.5)
CHLORIDE SERPL-SCNC: 105 MMOL/L — SIGNIFICANT CHANGE UP (ref 98–107)
CK SERPL-CCNC: 3752 U/L — HIGH (ref 30–200)
CK SERPL-CCNC: 4296 U/L — HIGH (ref 30–200)
CO2 SERPL-SCNC: 23 MMOL/L — SIGNIFICANT CHANGE UP (ref 22–31)
CREAT SERPL-MCNC: 0.94 MG/DL — SIGNIFICANT CHANGE UP (ref 0.5–1.3)
EOSINOPHIL # BLD AUTO: 0 K/UL — SIGNIFICANT CHANGE UP (ref 0–0.5)
EOSINOPHIL NFR BLD AUTO: 0 % — SIGNIFICANT CHANGE UP (ref 0–6)
GAMMA INTERFERON BACKGROUND BLD IA-ACNC: 0.02 IU/ML — SIGNIFICANT CHANGE UP
GLUCOSE BLDC GLUCOMTR-MCNC: 120 MG/DL — HIGH (ref 70–99)
GLUCOSE BLDC GLUCOMTR-MCNC: 135 MG/DL — HIGH (ref 70–99)
GLUCOSE BLDC GLUCOMTR-MCNC: 152 MG/DL — HIGH (ref 70–99)
GLUCOSE BLDC GLUCOMTR-MCNC: 314 MG/DL — HIGH (ref 70–99)
GLUCOSE SERPL-MCNC: 139 MG/DL — HIGH (ref 70–99)
HCT VFR BLD CALC: 37.6 % — LOW (ref 39–50)
HGB BLD-MCNC: 12.7 G/DL — LOW (ref 13–17)
IMM GRANULOCYTES # BLD AUTO: 0.21 # — SIGNIFICANT CHANGE UP
IMM GRANULOCYTES NFR BLD AUTO: 1.1 % — SIGNIFICANT CHANGE UP (ref 0–1.5)
LYMPHOCYTES # BLD AUTO: 0.74 K/UL — LOW (ref 1–3.3)
LYMPHOCYTES # BLD AUTO: 3.8 % — LOW (ref 13–44)
M TB IFN-G BLD-IMP: NEGATIVE — SIGNIFICANT CHANGE UP
M TB IFN-G CD4+ BCKGRND COR BLD-ACNC: -0.01 IU/ML — SIGNIFICANT CHANGE UP
M TB IFN-G CD4+CD8+ BCKGRND COR BLD-ACNC: -0.01 IU/ML — SIGNIFICANT CHANGE UP
MAGNESIUM SERPL-MCNC: 2.1 MG/DL — SIGNIFICANT CHANGE UP (ref 1.6–2.6)
MCHC RBC-ENTMCNC: 29.7 PG — SIGNIFICANT CHANGE UP (ref 27–34)
MCHC RBC-ENTMCNC: 33.8 % — SIGNIFICANT CHANGE UP (ref 32–36)
MCV RBC AUTO: 87.9 FL — SIGNIFICANT CHANGE UP (ref 80–100)
METHOD TYPE: SIGNIFICANT CHANGE UP
MONOCYTES # BLD AUTO: 1.01 K/UL — HIGH (ref 0–0.9)
MONOCYTES NFR BLD AUTO: 5.2 % — SIGNIFICANT CHANGE UP (ref 2–14)
NEUTROPHILS # BLD AUTO: 17.64 K/UL — HIGH (ref 1.8–7.4)
NEUTROPHILS NFR BLD AUTO: 89.8 % — HIGH (ref 43–77)
NRBC # FLD: 0 — SIGNIFICANT CHANGE UP
ORGANISM # SPEC MICROSCOPIC CNT: SIGNIFICANT CHANGE UP
ORGANISM # SPEC MICROSCOPIC CNT: SIGNIFICANT CHANGE UP
PLATELET # BLD AUTO: 195 K/UL — SIGNIFICANT CHANGE UP (ref 150–400)
PMV BLD: 11.4 FL — SIGNIFICANT CHANGE UP (ref 7–13)
POTASSIUM SERPL-MCNC: 4.4 MMOL/L — SIGNIFICANT CHANGE UP (ref 3.5–5.3)
POTASSIUM SERPL-SCNC: 4.4 MMOL/L — SIGNIFICANT CHANGE UP (ref 3.5–5.3)
QUANT TB PLUS MITOGEN MINUS NIL: 1.35 IU/ML — SIGNIFICANT CHANGE UP
RBC # BLD: 4.28 M/UL — SIGNIFICANT CHANGE UP (ref 4.2–5.8)
RBC # FLD: 14.9 % — HIGH (ref 10.3–14.5)
SODIUM SERPL-SCNC: 138 MMOL/L — SIGNIFICANT CHANGE UP (ref 135–145)
WBC # BLD: 19.61 K/UL — HIGH (ref 3.8–10.5)
WBC # FLD AUTO: 19.61 K/UL — HIGH (ref 3.8–10.5)

## 2018-11-24 PROCEDURE — 99233 SBSQ HOSP IP/OBS HIGH 50: CPT

## 2018-11-24 RX ADMIN — Medication 4 MILLIGRAM(S): at 13:25

## 2018-11-24 RX ADMIN — LEVETIRACETAM 400 MILLIGRAM(S): 250 TABLET, FILM COATED ORAL at 17:23

## 2018-11-24 RX ADMIN — Medication 1: at 21:37

## 2018-11-24 RX ADMIN — LEVETIRACETAM 400 MILLIGRAM(S): 250 TABLET, FILM COATED ORAL at 05:11

## 2018-11-24 RX ADMIN — Medication 4 MILLIGRAM(S): at 17:23

## 2018-11-24 RX ADMIN — SODIUM CHLORIDE 100 MILLILITER(S): 9 INJECTION INTRAMUSCULAR; INTRAVENOUS; SUBCUTANEOUS at 21:24

## 2018-11-24 RX ADMIN — Medication 4 MILLIGRAM(S): at 21:24

## 2018-11-24 RX ADMIN — Medication 4 MILLIGRAM(S): at 05:10

## 2018-11-24 NOTE — PROGRESS NOTE ADULT - PROBLEM SELECTOR PLAN 2
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 Cardiology assessed patient as elevated risk with RCRI of 2 and MACE 6.2% but may proceed to OR w/o further cardiac testing.   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 Cardiology assessed patient as elevated risk with RCRI of 2 and MACE 6.2% but may proceed to OR w/o further cardiac testing.   f/u infectious work up w/  blood & urine cultures, aspergillus ab, cryptococcal ag, cysticercosis ab, quant gold  c/w decadron and Keppra  -neuro consult appreciated  -Cardiology consult appreciated  -seizure precautions, fall precautions, aspiration precautions  -EEG abnormal  Patient undecided about surgery and want to talk to son prior to making a decision.

## 2018-11-24 NOTE — PROGRESS NOTE ADULT - PROBLEM SELECTOR PLAN 8
IMPROVE score: 2 (age and possible CA)  DVT ppx: SCDs for now, pending neurosurgery official recs  PT/OT consult, seizure precautions, aspiration precautions, fall precautions

## 2018-11-24 NOTE — PROGRESS NOTE ADULT - SUBJECTIVE AND OBJECTIVE BOX
Patient is a 73y old  Male who presents with a chief complaint of syncope/collapse/rhabdo/brain mass (23 Nov 2018 17:55)      SUBJECTIVE / OVERNIGHT EVENTS:  Patient has no new complaints. Denies cp, SOB, abdominal pain, N/V/D     MEDICATIONS  (STANDING):  dexamethasone     Tablet 4 milliGRAM(s) Oral every 6 hours  dextrose 5%. 1000 milliLiter(s) (50 mL/Hr) IV Continuous <Continuous>  dextrose 50% Injectable 12.5 Gram(s) IV Push once  dextrose 50% Injectable 25 Gram(s) IV Push once  dextrose 50% Injectable 25 Gram(s) IV Push once  influenza   Vaccine 0.5 milliLiter(s) IntraMuscular once  insulin lispro (HumaLOG) corrective regimen sliding scale   SubCutaneous Before meals and at bedtime  levETIRAcetam  IVPB 500 milliGRAM(s) IV Intermittent every 12 hours  sodium chloride 0.9%. 1000 milliLiter(s) (100 mL/Hr) IV Continuous <Continuous>    MEDICATIONS  (PRN):  dextrose 40% Gel 15 Gram(s) Oral once PRN Blood Glucose LESS THAN 70 milliGRAM(s)/deciliter  glucagon  Injectable 1 milliGRAM(s) IntraMuscular once PRN Glucose LESS THAN 70 milligrams/deciliter  LORazepam     Tablet 2 milliGRAM(s) Oral daily PRN seizures      Vital Signs Last 24 Hrs  T(C): 36.8 (24 Nov 2018 05:10), Max: 36.8 (24 Nov 2018 05:10)  T(F): 98.2 (24 Nov 2018 05:10), Max: 98.2 (24 Nov 2018 05:10)  HR: 64 (24 Nov 2018 05:10) (60 - 69)  BP: 122/81 (24 Nov 2018 05:10) (101/63 - 122/81)  BP(mean): --  RR: 16 (24 Nov 2018 05:10) (16 - 17)  SpO2: 100% (24 Nov 2018 05:10) (98% - 100%)  CAPILLARY BLOOD GLUCOSE      POCT Blood Glucose.: 135 mg/dL (24 Nov 2018 07:31)  POCT Blood Glucose.: 138 mg/dL (23 Nov 2018 20:34)  POCT Blood Glucose.: 136 mg/dL (23 Nov 2018 16:55)  POCT Blood Glucose.: 122 mg/dL (23 Nov 2018 14:06)    I&O's Summary    23 Nov 2018 07:01  -  24 Nov 2018 07:00  --------------------------------------------------------  IN: 0 mL / OUT: 0 mL / NET: 0 mL        PHYSICAL EXAM:  GENERAL: NAD, well-developed  HEAD:  Atraumatic, Normocephalic  EYES: EOMI, PERRLA, conjunctiva and sclera clear  NECK: Supple, No JVD  CHEST/LUNG: Clear to auscultation bilaterally; No wheeze  HEART: Regular rate and rhythm; No murmurs, rubs, or gallops  ABDOMEN: Soft, Nontender, Nondistended; Bowel sounds present  EXTREMITIES:  2+ Peripheral Pulses, No clubbing, cyanosis, or edema  PSYCH: AAOx3  NEUROLOGY: non-focal  SKIN: No rashes or lesions    LABS:                        12.7   19.61 )-----------( 195      ( 24 Nov 2018 06:19 )             37.6     11-24    138  |  105  |  23  ----------------------------<  139<H>  4.4   |  23  |  0.94    Ca    8.5      24 Nov 2018 06:19  Mg     2.1     11-24        CARDIAC MARKERS ( 24 Nov 2018 06:19 )  x     / x     / 4296 u/L / x     / x              RADIOLOGY & ADDITIONAL TESTS:    Imaging Personally Reviewed:    Consultant(s) Notes Reviewed:      Care Discussed with Consultants/Other Providers:

## 2018-11-24 NOTE — PROGRESS NOTE ADULT - PROBLEM SELECTOR PLAN 1
Unclear etiology, patient unable to recall events  may have been a seizure as suggested by EEG and since patient has brain lesion.   c/w keppra IV bid for seizure ppx  TTE negative  tele negative

## 2018-11-24 NOTE — PROVIDER CONTACT NOTE (OTHER) - ASSESSMENT
Pt A&Ox3 laying comfortably in bed. patient with 5 beats of V tach on tele at this time, Tele PA Idania Valencia aware. Pt moving and VS being obtained at time of event. VS stable as documented /89 HR 60 RR 17 O2 100% T 97.9F. Pt asymptomatic.

## 2018-11-24 NOTE — PROGRESS NOTE ADULT - SUBJECTIVE AND OBJECTIVE BOX
OVERNIGHT EVENTS:  Pt refusing surgery at this time.     HPI:  73m hx HTN, NIDDM, CVA (2013 w/ left side weakness), BPH brought in by EMS after being found down.    Patient was last seen normal 23hrs prior to arrival. 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 After not answering calls from family of which is not like him, Son and daughter went to his house where upon knocking on the door no one answered. Police were called and patient was found face down in urine. He was alert upon arrival and acknowledged hearing knocking on the door, but was unable to get up.  Patient unable to recall events, states nothing is wrong. Denies fevers, chills, night sweats, nausea, vomiting, chest pain, palpations, headaches, changes in vision. myalgias. (22 Nov 2018 06:07)      Vital Signs Last 24 Hrs  T(C): 36.8 (24 Nov 2018 05:10), Max: 36.8 (24 Nov 2018 05:10)  T(F): 98.2 (24 Nov 2018 05:10), Max: 98.2 (24 Nov 2018 05:10)  HR: 64 (24 Nov 2018 05:10) (60 - 69)  BP: 122/81 (24 Nov 2018 05:10) (101/63 - 122/81)  BP(mean): --  RR: 16 (24 Nov 2018 05:10) (16 - 17)  SpO2: 100% (24 Nov 2018 05:10) (98% - 100%)    I&O's Summary    23 Nov 2018 07:01  -  24 Nov 2018 07:00  --------------------------------------------------------  IN: 0 mL / OUT: 0 mL / NET: 0 mL        PHYSICAL EXAM:  Awake Alert Attentive Affect appropriate, OX2 (person and place)  Pupils: PERRL  Uncooperative with motor exam but does move all 4 ext, LUE 4-/5  Speech clear  + L facial droop  LABS:                        12.7   19.61 )-----------( 195      ( 24 Nov 2018 06:19 )             37.6     11-24    138  |  105  |  23  ----------------------------<  139<H>  4.4   |  23  |  0.94    Ca    8.5      24 Nov 2018 06:19  Mg     2.1     11-24      MEDICATIONS:  Antibiotics:    Neuro:  levETIRAcetam  IVPB 500 milliGRAM(s) IV Intermittent every 12 hours  LORazepam     Tablet 2 milliGRAM(s) Oral daily PRN    Anticoagulation    OTHER:  dexamethasone     Tablet 4 milliGRAM(s) Oral every 6 hours  dextrose 40% Gel 15 Gram(s) Oral once PRN  dextrose 50% Injectable 12.5 Gram(s) IV Push once  dextrose 50% Injectable 25 Gram(s) IV Push once  dextrose 50% Injectable 25 Gram(s) IV Push once  glucagon  Injectable 1 milliGRAM(s) IntraMuscular once PRN  influenza   Vaccine 0.5 milliLiter(s) IntraMuscular once  insulin lispro (HumaLOG) corrective regimen sliding scale   SubCutaneous Before meals and at bedtime    IVF:  dextrose 5%. 1000 milliLiter(s) IV Continuous <Continuous>  sodium chloride 0.9%. 1000 milliLiter(s) IV Continuous <Continuous>        RADIOLOGY & ADDITIONAL TESTS:  < from: MR Head w/ IV Cont (11.22.18 @ 06:52) >  FINDINGS:    There is a right temporal 5.9 x 4 x 3.7 cm, AP, TR, CC rim-enhancing   cystic mass with nodular irregular enhancement at the lateral margins   with central hyperintense T2, decreased T1 and decreased DWI signal   without restricted diffusion concerning a tumoral mass with surrounding   edema and mass effect. There is extension of the mass to the ventricular   ependymal margins.    Right temporal lobe is effaced with right uncal herniation and medial   displacement of the right lateral ventricle trapped temporal horn, axial   T2 image 17. There is effacement of the right cerebral hemisphere lateral   ventricle with 6 mm leftward shift. And trapping of the left lateral   ventricle which with mild enlargement of the left temporal horn. There is   adjacent hyperintense T2 and FLAIR signal likely vasogenic edema or   nonenhancing tumor extending to the right lentiform nuclei, and right   splenium and body of the corpus callosum. There is faint restricted   diffusion along the margins of the tumor most demonstrating ADC T2 shine   through although some areas are restricted, differential includes tumoral   extension although ischemia is also not excluded. There is a remote area   of infarction with encephalomalacia gliosis at the right inferior medial   cerebellar hemisphere with small lacunar infarcts throughout the left   cerebellar hemisphere and supratentorial cerebral hemispheres. There are   scattered foci of hyperintense T2 and FLAIR signal in the white matter   likely microvascular disease. SWI demonstrates vascularity along the   margins of the mass without evidence for any large extra-axial   hemorrhage. Basal cisterns demonstrate slight effacement of the left   ambient cistern with widening of the right ambient cistern, T2 series 7   image 16.    Orbital globes are unremarkable, there are retention cyst or polyps in   the paranasal sinuses, the scattered mucosal thickening the mastoid tips.   Calvarium is intact.      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    Volume loss with encephalomalacia throughout the right inferior medial   cerebellar hemisphere, smaller foci of ischemia in the left cerebellar   hemisphere and supratentorially.    Findings discussed with Dr. Marques at immediate time of review on   11/22/2018 at 8:29 AM    < end of copied text >

## 2018-11-24 NOTE — PROGRESS NOTE ADULT - PROBLEM SELECTOR PLAN 1
Pt is not amenable to surgery at this time. Given he is A&O x II will discuss medical decision with his next of kin. Our team Spoke with Catarino (son): [phone] and Araceli(niece) [phone]. His family is out of state. His son catarino is onboard with surgery but would like to be present and speak with his father prior. His son will be in [place] Tuesday.

## 2018-11-24 NOTE — PROGRESS NOTE ADULT - PROBLEM SELECTOR PLAN 4
[facility] [address]    [address]    [place] [address]    Phone:  [phone]    Fax:  [phone]       Thank You for choosing us for your health care visit. We are glad to serve you and happy to provide you with this summary of your visit. Please help us to ensure we have accurate records. If you find anything that needs to be changed, please let our staff know as soon as possible.          Your Demographic Information     Patient Name Sex     Roya Rush Female 1944       Ethnic Group Patient Race    Not of  or  Origin White      Your Visit Details     Date & Time Provider Department    2017 3:00 PM Disha Foley MD [facility] [address]      Your Upcoming Appointment*(Max 10)     2017  8:50 AM CDT   Follow-up Visit with Nicko Slater MD   [facility] [address] ([facility] [address])    [address]  [place] [address]   [phone]              Your To Do List     Future Orders Please Complete On or Around Expires    BASIC METABOLIC PANEL  May 09, 2017 2017    THYROID STIMULATING HORMONE  May 09, 2017 2017    Follow-Up    Return in about 1 year (around 2018) for Medicare Wellness Visit.      Conditions Discussed Today or Order-Related Diagnoses        Comments    Medicare welcome visit    -  Primary     Essential hypertension         Acquired hypothyroidism           Your Vitals Were     BP Pulse SpO2 Smoking Status          124/80 (BP Location: [facility], Patient Position: Sitting, Cuff Size: Regular) 79 98% Never Smoker        Medications Prescribed or Re-Ordered Today     None      Your Current Medications Are        Disp Refills Start End    XELJANZ 5 MG tablet   2017     Sig - Route: Take 5 mg by mouth 2 times daily. - Oral    Class: Historical Med    butalbital-APAP-caffeine (FIORICET) -40 MG per tablet 15 tablet 3  4/13/2017     Sig - Route: Take 1 tablet by mouth every 8 hours as needed for Headaches. - Oral    Class: Script Not Printed    levothyroxine (SYNTHROID, LEVOTHROID) 75 MCG tablet 90 tablet 0 4/12/2017     Sig - Route: Take 1 tablet by mouth daily. - Oral    Class: Eprescribe    LORazepam (ATIVAN) 0.5 MG tablet 30 tablet 5 3/24/2017     Sig: Take 1 tablet daily as needed for anxiety    Class: Script Not Printed    Notes to Pharmacy: This request is for a new prescription for a controlled substance as required by Federal/State law..    cephALEXin (KEFLEX) 250 MG/5ML suspension 200 mL 1 3/9/2017     Sig: Take 4 teaspoons by mouth one hour prior to dental exam for joint replacement prophylaxis    Class: Eprescribe    triamcinolone (ARISTOCORT) 0.1 % cream 30 g 1 11/28/2016     Sig: APPLY TOPICALLY TO LEFT LOWER LEG 2 TIMES DAILY FOR 1 WEEK, THEN AS NEEDED.    Class: Eprescribe    levothyroxine (SYNTHROID, LEVOTHROID) 75 MCG tablet 90 tablet 3 5/9/2016     Sig: TAKE 1 TABLET BY MOUTH DAILY.    Class: Eprescribe    omeprazole (PRILOSEC) 20 MG capsule   1/19/2016     Sig - Route: Take 20 mg by mouth daily. Per Dr. Hernandez/Rheumatologist - Oral    Class: Historical Med    predniSONE (DELTASONE) 1 MG tablet 30 tablet 0 4/11/2016     Sig: Currently taking 5-6mg daily - Per Dr. Hernandez - pts Rheumatologist.    Class: Historical Med    fluticasone (FLONASE) 50 MCG/ACT nasal spray 3 Bottle 3 4/11/2016     Sig - Route: Spray 2 sprays in each nostril daily. - Nasal    Class: Eprescribe    Vitamin D, Ergocalciferol, 00416 UNITS capsule   6/15/2015     Sig: Once weekly.    Class: Historical Med    fexofenadine (ALLEGRA ALLERGY CHILDRENS) 30 MG/5ML suspension   8/3/2015     Sig - Route: Take 10 mLs by mouth daily. - Oral    Class: Historical Med    loperamide (IMODIUM) 1 MG/5ML solution        Sig - Route: Take 2 mg by mouth 3 times daily as needed. - Oral    Class: Historical Med    SUMAtriptan (IMITREX) 25 MG tablet 9 tablet 1  2/18/2014     Sig: Take 1 tablet at onset of migraine, may repeat in 2 hrs if needed.    Class: Eprescribe    lisinopril (ZESTRIL) 5 MG tablet 30 tablet 1 6/24/2011     Sig - Route: Take 1 tablet by mouth daily. 0 - Oral    Class: Eprescribe    pseudoephedrine (SUDAFED) 30 MG tablet 60 tablet 11 12/30/2010     Sig - Route: Take 1 tablet by mouth every 4 hours as needed for Congestion. - Oral    Acetaminophen (TYLENOL EXTRA STRENGTH) 167 MG/5ML LIQD 240 mL 11 12/30/2010     Sig - Route: Take 30 mLs by mouth. Three to Four times daily. - Oral    hydroxychloroquine (PLAQUENIL) 200 MG tablet  0 9/7/2010     Sig - Route: Take 1 tablet by mouth daily. - Oral    Class: Historical Med      Discontinued Medications        Reason for Discontinue    traMADOL (ULTRAM) 50 MG tablet Ineffective    ADALimumab (HUMIRA) 40 MG/0.8ML injection Alternate Therapy    famotidine-calcium carbonate-magnesium hydroxide (PEPCID COMPLETE) -165 MG CHEW Not Needed    ergocalciferol (DRISDOL) 8000 UNIT/ML solution Duplicate Order      Allergies     Penicillins RASH    As a child    Lactose Intolerance DIARRHEA    Latex PRURITUS    Nuts DIARRHEA    Pt Can Not Swallow Pills [Other]     due to surgery      Immunizations History as of 5/9/2017     Name Date    INFLUENZA QUADRIVALENT 9/23/2016    Influenza 10/20/2013, 9/19/2011  5:01 PM, 11/4/2008  4:45 PM, 11/14/2006, 10/28/2005, 10/22/2004, 10/13/2003, 10/21/2002    Pneumococcal Conjugate 13 Valent 3/20/2015    Pneumococcal Polysaccharide Adult 11/4/2008  4:46 PM    Td:Adult type tetanus/diphtheria 12/12/2002    Tdap 4/11/2016      Problem List as of 5/9/2017     Rheumatoid arthritis involving multiple sites with positive rheumatoid factor    Essential hypertension    Wheelchair bound    Cellulitis of right leg    Lower extremity venous stasis    Nausea with vomiting    Anemia, likely chronic disease    Vitamin D deficiency    Allergic rhinitis, cause unspecified    TRACHEA NARROWING,  DIFFICULT TO INTUBATE    Esophageal reflux    LOBULAR CA IN SITU BREAST    Headache    Unspecified hypothyroidism              Patient Instructions      Medicare Wellness Visit  Plan for Preventive Care    A good way for you to stay healthy is to use preventive care.  Medicare covers many services that can help you stay healthy.* The goal of these services is to find any health problems as quickly as possible. Finding problems early can help make them easier to treat.  Your personal plan below lists the services you may need and when they are due.     Health Maintenance Summary     Topic Due On Due Status Completed On Postpone Until Reason    MAMMOGRAM - BREAST CANCER SCREENING Feb 22, 2019 Not Due Feb 22, 2017      Colorectal Cancer Screening - Colonoscopy Oct 30, 1992 Postponed  Jul 15, 2034 Medical Complication    Osteoporosis Screening  Completed Jun 25, 2010      Immunization-Zoster Oct 30, 2002 Postponed  Apr 11, 2026 Medical Complication    Immunization - Pneumococcal  Completed Mar 20, 2015      Immunization - TDAP Pregnancy  Hidden       Medicare Wellness Visit Oct 30, 2007 Overdue       IMMUNIZATION - DTaP/Tdap/Td Apr 11, 2026 Not Due Apr 11, 2016      Immunization-Influenza  Completed Sep 23, 2016             Preventive Care for Women and Men    Heart Screenings (Cardiovascular):  · Blood tests are used to check your cholesterol, lipid and triglyceride levels. High levels can increase your risk for heart disease and stroke. High levels can be treated with medications, diet and exercise. Lowering your levels can help keep your heart and blood vessels healthy.  Your provider will order these tests if they are needed.    · An ultrasound is done to see if you have an abdominal aortic aneurysm (AAA).  This is an enlargement of one of the main blood vessels that delivers blood to the body.   In the [place], 9,000 deaths are caused by AAA.  You may not even know you have this problem and as many as 1 in 3  people will have a serious problem if it is not treated.  Early diagnosis allows for more effective treatment and cure.  If you have a family history of AAA or are a male age 65-75 who has smoked, you are at higher risk of an AAA.  Your provider can order this test, if needed.    Colorectal Screening:  · There are many tests that are used to check for cancer of your colon and rectum. You and your provider should discuss what test is best for you and when to have it done.  Options include:  · Screening Colonoscopy: exam of the entire colon, seen through a flexible lighted tube.  · Flexible Sigmoidoscopy: exam of the last third (sigmoid portion) of the colon and rectum, seen through a flexible lighted tube.  · Cologuard DNA stool test: a sample of your stool is used to screen for cancer and unseen blood in your stool.  · Fecal Occult Blood Test: a sample of your stool is studied to find any unseen blood    Flu Shot:  · An immunization that helps to prevent influenza (the flu). You should get this every year. The best time to get the shot is in the fall.    Pneumococcal Shot:  • Vaccines are available that can help prevent pneumococcal disease, which is any type of infection caused by Streptococcus pneumoniae bacteria.   Their use can prevent some cases of pneumonia, meningitis, and sepsis. There are two types of pneumococcal vaccines:   o Conjugate vaccines (PCV-13 or Prevnar 13®) - helps protect against the 13 types of pneumococcal bacteria that are the most common causes of serious infections in children and adults.    o Polysaccharide vaccine (PPSV23 or [id number]®) - helps protect against 23 types of pneumococcal bacteria for patients who are recommended to get it.  These vaccines should be given at least 12 months apart.  A booster is usually not needed.         Hepatitis B Shot:  · An immunization that helps to protect people from getting Hepatitis B. Hepatitis B is a virus that spreads through contact with  infected blood or body fluids. Many people with the virus do not have symptoms.  The virus can lead to serious problems, such as liver disease. Some people are at higher risk than others. Your doctor will tell you if you need this shot.     Diabetes Screening:  · A test to measure sugar (glucose) in your blood is called a fasting blood sugar. Fasting means you cannot have food or drink for at least 8 hours before the test. This test can detect diabetes long before you may notice symptoms.    Glaucoma Screening:  · Glaucoma screening is performed by your eye doctor. The test measures the fluid pressure inside your eyes to determine if you have glaucoma.     Hepatitis C Screening:  · A blood test to see if you have the hepatitis C virus.  Hepatitis C attacks the liver and is a major cause of chronic liver disease.  Medicare will cover a single screening for all adults born between 1945 & 1965, or high risk patients (people who have injected illegal drugs or people who have had blood transfusions).  High risk patients who continue to inject illegal drugs can be screened for Hepatitis C every year.    Smoking and Tobacco-Use Cessation Counseling:  · Tobacco is the single greatest cause of disease and early death in our country today. Medication and counseling together can increase a person’s chance of quitting for good.   · Medicare covers two quitting attempts per year, with four counseling sessions per attempt (eight sessions in a 12 month period)    Preventive Screening tests for Women    Screening Mammograms and Breast Exams:  · An x-ray of your breasts to check for breast cancer before you or your doctor may be able to feel it.  If breast cancer is found early it can usually be treated with success.    Pelvic Exams and Pap Tests:  · An exam to check for cervical and vaginal cancer. A Pap test is a lab test in which cells are taken from your cervix and sent to the lab to look for signs of cervical cancer. If cancer  of the cervix is found early, chances for a cure are good. Testing can generally end at age 65, or if a woman has a hysterectomy for a benign condition. Your provider may recommend more frequent testing if certain abnormal results are found.    Bone Mass Measurements:  · A painless x-ray of your bone density to see if you are at risk for a broken bone. Bone density refers to the thickness of bones or how tightly the bone tissue is packed.    Preventive Screening tests for Men    Prostate Screening:  · PSA - Prostate Cancer blood test.  Experts do not recommend routine screening of healthy men with no signs or symptoms of prostate disease.  However, men should not ignore urinary symptoms, and should discuss their family history with their doctor.    *Medicare pays for many preventive services to keep you healthy. For some of these services, you might have to pay a deductible, coinsurance, and / or copayment.  The amounts vary depending on the type of services you need and the kind of Medicare health plan you have.                    ?in setting of seizure given found down in urine and CT findings   CPK trending downward. Repeat CPK @ 15:00  -c/w IVF hydration with NS @ 100c/hr  -continue to trend CK

## 2018-11-25 LAB
BASOPHILS # BLD AUTO: 0.02 K/UL — SIGNIFICANT CHANGE UP (ref 0–0.2)
BASOPHILS NFR BLD AUTO: 0.1 % — SIGNIFICANT CHANGE UP (ref 0–2)
BUN SERPL-MCNC: 18 MG/DL — SIGNIFICANT CHANGE UP (ref 7–23)
CALCIUM SERPL-MCNC: 8.3 MG/DL — LOW (ref 8.4–10.5)
CHLORIDE SERPL-SCNC: 106 MMOL/L — SIGNIFICANT CHANGE UP (ref 98–107)
CO2 SERPL-SCNC: 23 MMOL/L — SIGNIFICANT CHANGE UP (ref 22–31)
CREAT SERPL-MCNC: 0.84 MG/DL — SIGNIFICANT CHANGE UP (ref 0.5–1.3)
EOSINOPHIL # BLD AUTO: 0 K/UL — SIGNIFICANT CHANGE UP (ref 0–0.5)
EOSINOPHIL NFR BLD AUTO: 0 % — SIGNIFICANT CHANGE UP (ref 0–6)
GLUCOSE BLDC GLUCOMTR-MCNC: 142 MG/DL — HIGH (ref 70–99)
GLUCOSE BLDC GLUCOMTR-MCNC: 149 MG/DL — HIGH (ref 70–99)
GLUCOSE BLDC GLUCOMTR-MCNC: 153 MG/DL — HIGH (ref 70–99)
GLUCOSE BLDC GLUCOMTR-MCNC: 154 MG/DL — HIGH (ref 70–99)
GLUCOSE SERPL-MCNC: 137 MG/DL — HIGH (ref 70–99)
HCT VFR BLD CALC: 36.9 % — LOW (ref 39–50)
HGB BLD-MCNC: 12.7 G/DL — LOW (ref 13–17)
IMM GRANULOCYTES # BLD AUTO: 0.11 # — SIGNIFICANT CHANGE UP
IMM GRANULOCYTES NFR BLD AUTO: 0.7 % — SIGNIFICANT CHANGE UP (ref 0–1.5)
LYMPHOCYTES # BLD AUTO: 0.53 K/UL — LOW (ref 1–3.3)
LYMPHOCYTES # BLD AUTO: 3.2 % — LOW (ref 13–44)
MAGNESIUM SERPL-MCNC: 1.9 MG/DL — SIGNIFICANT CHANGE UP (ref 1.6–2.6)
MCHC RBC-ENTMCNC: 30.5 PG — SIGNIFICANT CHANGE UP (ref 27–34)
MCHC RBC-ENTMCNC: 34.4 % — SIGNIFICANT CHANGE UP (ref 32–36)
MCV RBC AUTO: 88.7 FL — SIGNIFICANT CHANGE UP (ref 80–100)
MONOCYTES # BLD AUTO: 0.78 K/UL — SIGNIFICANT CHANGE UP (ref 0–0.9)
MONOCYTES NFR BLD AUTO: 4.7 % — SIGNIFICANT CHANGE UP (ref 2–14)
NEUTROPHILS # BLD AUTO: 15.03 K/UL — HIGH (ref 1.8–7.4)
NEUTROPHILS NFR BLD AUTO: 91.3 % — HIGH (ref 43–77)
NRBC # FLD: 0 — SIGNIFICANT CHANGE UP
PLATELET # BLD AUTO: 204 K/UL — SIGNIFICANT CHANGE UP (ref 150–400)
PMV BLD: 11.3 FL — SIGNIFICANT CHANGE UP (ref 7–13)
POTASSIUM SERPL-MCNC: 3.8 MMOL/L — SIGNIFICANT CHANGE UP (ref 3.5–5.3)
POTASSIUM SERPL-SCNC: 3.8 MMOL/L — SIGNIFICANT CHANGE UP (ref 3.5–5.3)
RBC # BLD: 4.16 M/UL — LOW (ref 4.2–5.8)
RBC # FLD: 14.9 % — HIGH (ref 10.3–14.5)
SODIUM SERPL-SCNC: 141 MMOL/L — SIGNIFICANT CHANGE UP (ref 135–145)
WBC # BLD: 16.47 K/UL — HIGH (ref 3.8–10.5)
WBC # FLD AUTO: 16.47 K/UL — HIGH (ref 3.8–10.5)

## 2018-11-25 PROCEDURE — 99233 SBSQ HOSP IP/OBS HIGH 50: CPT

## 2018-11-25 RX ADMIN — LEVETIRACETAM 400 MILLIGRAM(S): 250 TABLET, FILM COATED ORAL at 17:24

## 2018-11-25 RX ADMIN — Medication 1: at 17:24

## 2018-11-25 RX ADMIN — Medication 4 MILLIGRAM(S): at 12:11

## 2018-11-25 RX ADMIN — LEVETIRACETAM 400 MILLIGRAM(S): 250 TABLET, FILM COATED ORAL at 05:03

## 2018-11-25 RX ADMIN — Medication 4 MILLIGRAM(S): at 17:24

## 2018-11-25 RX ADMIN — SODIUM CHLORIDE 100 MILLILITER(S): 9 INJECTION INTRAMUSCULAR; INTRAVENOUS; SUBCUTANEOUS at 17:25

## 2018-11-25 RX ADMIN — Medication 1: at 22:03

## 2018-11-25 RX ADMIN — Medication 4 MILLIGRAM(S): at 05:04

## 2018-11-25 RX ADMIN — Medication 4 MILLIGRAM(S): at 22:05

## 2018-11-25 NOTE — PROGRESS NOTE ADULT - SUBJECTIVE AND OBJECTIVE BOX
Patient is a 73y old  Male who presents with a chief complaint of syncope/collapse/rhabdo/brain mass (24 Nov 2018 11:46)      SUBJECTIVE / OVERNIGHT EVENTS:    MEDICATIONS  (STANDING):  dexamethasone     Tablet 4 milliGRAM(s) Oral every 6 hours  dextrose 5%. 1000 milliLiter(s) (50 mL/Hr) IV Continuous <Continuous>  dextrose 50% Injectable 12.5 Gram(s) IV Push once  dextrose 50% Injectable 25 Gram(s) IV Push once  dextrose 50% Injectable 25 Gram(s) IV Push once  influenza   Vaccine 0.5 milliLiter(s) IntraMuscular once  insulin lispro (HumaLOG) corrective regimen sliding scale   SubCutaneous Before meals and at bedtime  levETIRAcetam  IVPB 500 milliGRAM(s) IV Intermittent every 12 hours  sodium chloride 0.9%. 1000 milliLiter(s) (100 mL/Hr) IV Continuous <Continuous>    MEDICATIONS  (PRN):  dextrose 40% Gel 15 Gram(s) Oral once PRN Blood Glucose LESS THAN 70 milliGRAM(s)/deciliter  glucagon  Injectable 1 milliGRAM(s) IntraMuscular once PRN Glucose LESS THAN 70 milligrams/deciliter  LORazepam     Tablet 2 milliGRAM(s) Oral daily PRN seizures      Vital Signs Last 24 Hrs  T(C): 36.6 (25 Nov 2018 11:00), Max: 36.7 (25 Nov 2018 03:00)  T(F): 97.8 (25 Nov 2018 11:00), Max: 98 (25 Nov 2018 03:00)  HR: 55 (25 Nov 2018 11:00) (55 - 61)  BP: 158/97 (25 Nov 2018 11:00) (126/80 - 158/97)  BP(mean): --  RR: 18 (25 Nov 2018 11:00) (16 - 18)  SpO2: 100% (25 Nov 2018 11:00) (100% - 100%)  CAPILLARY BLOOD GLUCOSE      POCT Blood Glucose.: 142 mg/dL (25 Nov 2018 12:14)  POCT Blood Glucose.: 149 mg/dL (25 Nov 2018 07:52)  POCT Blood Glucose.: 152 mg/dL (24 Nov 2018 21:34)  POCT Blood Glucose.: 120 mg/dL (24 Nov 2018 17:01)    I&O's Summary      PHYSICAL EXAM:  GENERAL: NAD, well-developed  HEAD:  Atraumatic, Normocephalic  EYES: EOMI, PERRLA, conjunctiva and sclera clear  NECK: Supple, No JVD  CHEST/LUNG: Clear to auscultation bilaterally; No wheeze  HEART: Regular rate and rhythm; No murmurs, rubs, or gallops  ABDOMEN: Soft, Nontender, Nondistended; Bowel sounds present  EXTREMITIES:  2+ Peripheral Pulses, No clubbing, cyanosis, or edema  PSYCH: AAOx3  NEUROLOGY: non-focal  SKIN: No rashes or lesions    LABS:                        12.7   16.47 )-----------( 204      ( 25 Nov 2018 06:30 )             36.9     11-25    141  |  106  |  18  ----------------------------<  137<H>  3.8   |  23  |  0.84    Ca    8.3<L>      25 Nov 2018 06:30  Mg     1.9     11-25        CARDIAC MARKERS ( 24 Nov 2018 19:45 )  x     / x     / 3752 u/L / x     / x      CARDIAC MARKERS ( 24 Nov 2018 06:19 )  x     / x     / 4296 u/L / x     / x              RADIOLOGY & ADDITIONAL TESTS:    Imaging Personally Reviewed:    Consultant(s) Notes Reviewed:      Care Discussed with Consultants/Other Providers:

## 2018-11-25 NOTE — PROGRESS NOTE ADULT - PROBLEM SELECTOR PLAN 2
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 Son (yumiko- [phone]) is out of state and will be in [place] on tuesday. Spoke to patient today and he is still undecided. Cardiology assessed patient as elevated risk with RCRI of 2 and MACE 6.2% but may proceed to OR w/o further cardiac testing.   f/u infectious work up w/  blood & urine cultures, aspergillus ab, cryptococcal ag, cysticercosis ab, quant gold  c/w decadron and Keppra  -neuro consult appreciated  -Cardiology consult appreciated  -seizure precautions, fall precautions, aspiration precautions  -EEG abnormal

## 2018-11-25 NOTE — PROGRESS NOTE ADULT - PROBLEM SELECTOR PLAN 2
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 Cardiology assessed patient as elevated risk with RCRI of 2 and MACE 6.2% but may proceed to OR w/o further cardiac testing.   f/u infectious work up w/  blood & urine cultures, aspergillus ab, cryptococcal ag, cysticercosis ab, quant gold  c/w decadron and Keppra  -neuro consult appreciated  -Cardiology consult appreciated  -seizure precautions, fall precautions, aspiration precautions  -EEG abnormal  Patient undecided about surgery and want to talk to son prior to making a decision.

## 2018-11-25 NOTE — PROGRESS NOTE ADULT - PROBLEM SELECTOR PLAN 4
?in setting of seizure given found down in urine and CT findings   CPK trending downward.   -c/w IVF hydration with NS @ 100ml/hr  -continue to trend CK

## 2018-11-25 NOTE — PROGRESS NOTE ADULT - PROBLEM SELECTOR PLAN 4
?in setting of seizure given found down in urine and CT findings   CPK trending downward. Repeat CPK @ 15:00  -c/w IVF hydration with NS @ 100c/hr  -continue to trend CK

## 2018-11-25 NOTE — PROGRESS NOTE ADULT - SUBJECTIVE AND OBJECTIVE BOX
Patient is a 73y old  Male who presents with a chief complaint of syncope/collapse/rhabdo/brain mass (25 Nov 2018 15:55)      SUBJECTIVE / OVERNIGHT EVENTS:  Patient has no new complaints. Patient says he is scared of surgery and needs time to think it over with his family.  Denies cp, SOB, abdominal pain, N/V/D     MEDICATIONS  (STANDING):  dexamethasone     Tablet 4 milliGRAM(s) Oral every 6 hours  dextrose 5%. 1000 milliLiter(s) (50 mL/Hr) IV Continuous <Continuous>  dextrose 50% Injectable 12.5 Gram(s) IV Push once  dextrose 50% Injectable 25 Gram(s) IV Push once  dextrose 50% Injectable 25 Gram(s) IV Push once  influenza   Vaccine 0.5 milliLiter(s) IntraMuscular once  insulin lispro (HumaLOG) corrective regimen sliding scale   SubCutaneous Before meals and at bedtime  levETIRAcetam  IVPB 500 milliGRAM(s) IV Intermittent every 12 hours  sodium chloride 0.9%. 1000 milliLiter(s) (100 mL/Hr) IV Continuous <Continuous>    MEDICATIONS  (PRN):  dextrose 40% Gel 15 Gram(s) Oral once PRN Blood Glucose LESS THAN 70 milliGRAM(s)/deciliter  glucagon  Injectable 1 milliGRAM(s) IntraMuscular once PRN Glucose LESS THAN 70 milligrams/deciliter  LORazepam     Tablet 2 milliGRAM(s) Oral daily PRN seizures      Vital Signs Last 24 Hrs  T(C): 36.6 (25 Nov 2018 11:00), Max: 36.7 (25 Nov 2018 03:00)  T(F): 97.8 (25 Nov 2018 11:00), Max: 98 (25 Nov 2018 03:00)  HR: 55 (25 Nov 2018 11:00) (55 - 61)  BP: 158/97 (25 Nov 2018 11:00) (126/80 - 158/97)  BP(mean): --  RR: 18 (25 Nov 2018 11:00) (16 - 18)  SpO2: 100% (25 Nov 2018 11:00) (100% - 100%)  CAPILLARY BLOOD GLUCOSE      POCT Blood Glucose.: 142 mg/dL (25 Nov 2018 12:14)  POCT Blood Glucose.: 149 mg/dL (25 Nov 2018 07:52)  POCT Blood Glucose.: 152 mg/dL (24 Nov 2018 21:34)  POCT Blood Glucose.: 120 mg/dL (24 Nov 2018 17:01)    I&O's Summary      PHYSICAL EXAM:  GENERAL: NAD, well-developed  HEAD:  Atraumatic, Normocephalic  EYES: EOMI, PERRLA, conjunctiva and sclera clear  NECK: Supple, No JVD  CHEST/LUNG: Clear to auscultation bilaterally; No wheeze  HEART: Regular rate and rhythm; No murmurs, rubs, or gallops  ABDOMEN: Soft, Nontender, Nondistended; Bowel sounds present  EXTREMITIES:  2+ Peripheral Pulses, No clubbing, cyanosis, or edema  PSYCH: AAOx3  NEUROLOGY: non-focal  SKIN: No rashes or lesions    LABS:                        12.7   16.47 )-----------( 204      ( 25 Nov 2018 06:30 )             36.9     11-25    141  |  106  |  18  ----------------------------<  137<H>  3.8   |  23  |  0.84    Ca    8.3<L>      25 Nov 2018 06:30  Mg     1.9     11-25        CARDIAC MARKERS ( 24 Nov 2018 19:45 )  x     / x     / 3752 u/L / x     / x      CARDIAC MARKERS ( 24 Nov 2018 06:19 )  x     / x     / 4296 u/L / x     / x              RADIOLOGY & ADDITIONAL TESTS:    Imaging Personally Reviewed:    Consultant(s) Notes Reviewed:      Care Discussed with Consultants/Other Providers:

## 2018-11-26 LAB
BASOPHILS # BLD AUTO: 0.01 K/UL — SIGNIFICANT CHANGE UP (ref 0–0.2)
BASOPHILS NFR BLD AUTO: 0.1 % — SIGNIFICANT CHANGE UP (ref 0–2)
BUN SERPL-MCNC: 16 MG/DL — SIGNIFICANT CHANGE UP (ref 7–23)
CALCIUM SERPL-MCNC: 8.2 MG/DL — LOW (ref 8.4–10.5)
CHLORIDE SERPL-SCNC: 105 MMOL/L — SIGNIFICANT CHANGE UP (ref 98–107)
CK SERPL-CCNC: 1611 U/L — HIGH (ref 30–200)
CO2 SERPL-SCNC: 24 MMOL/L — SIGNIFICANT CHANGE UP (ref 22–31)
CREAT SERPL-MCNC: 0.81 MG/DL — SIGNIFICANT CHANGE UP (ref 0.5–1.3)
EOSINOPHIL # BLD AUTO: 0 K/UL — SIGNIFICANT CHANGE UP (ref 0–0.5)
EOSINOPHIL NFR BLD AUTO: 0 % — SIGNIFICANT CHANGE UP (ref 0–6)
GLUCOSE BLDC GLUCOMTR-MCNC: 135 MG/DL — HIGH (ref 70–99)
GLUCOSE BLDC GLUCOMTR-MCNC: 141 MG/DL — HIGH (ref 70–99)
GLUCOSE BLDC GLUCOMTR-MCNC: 173 MG/DL — HIGH (ref 70–99)
GLUCOSE SERPL-MCNC: 138 MG/DL — HIGH (ref 70–99)
HCT VFR BLD CALC: 38.3 % — LOW (ref 39–50)
HGB BLD-MCNC: 13.2 G/DL — SIGNIFICANT CHANGE UP (ref 13–17)
IMM GRANULOCYTES # BLD AUTO: 0.1 # — SIGNIFICANT CHANGE UP
IMM GRANULOCYTES NFR BLD AUTO: 0.6 % — SIGNIFICANT CHANGE UP (ref 0–1.5)
LYMPHOCYTES # BLD AUTO: 0.49 K/UL — LOW (ref 1–3.3)
LYMPHOCYTES # BLD AUTO: 3.2 % — LOW (ref 13–44)
MAGNESIUM SERPL-MCNC: 1.9 MG/DL — SIGNIFICANT CHANGE UP (ref 1.6–2.6)
MCHC RBC-ENTMCNC: 30.3 PG — SIGNIFICANT CHANGE UP (ref 27–34)
MCHC RBC-ENTMCNC: 34.5 % — SIGNIFICANT CHANGE UP (ref 32–36)
MCV RBC AUTO: 87.8 FL — SIGNIFICANT CHANGE UP (ref 80–100)
MONOCYTES # BLD AUTO: 0.9 K/UL — SIGNIFICANT CHANGE UP (ref 0–0.9)
MONOCYTES NFR BLD AUTO: 5.8 % — SIGNIFICANT CHANGE UP (ref 2–14)
NEUTROPHILS # BLD AUTO: 13.98 K/UL — HIGH (ref 1.8–7.4)
NEUTROPHILS NFR BLD AUTO: 90.3 % — HIGH (ref 43–77)
NRBC # FLD: 0 — SIGNIFICANT CHANGE UP
PLATELET # BLD AUTO: 200 K/UL — SIGNIFICANT CHANGE UP (ref 150–400)
PMV BLD: 11.6 FL — SIGNIFICANT CHANGE UP (ref 7–13)
POTASSIUM SERPL-MCNC: 3.6 MMOL/L — SIGNIFICANT CHANGE UP (ref 3.5–5.3)
POTASSIUM SERPL-SCNC: 3.6 MMOL/L — SIGNIFICANT CHANGE UP (ref 3.5–5.3)
RBC # BLD: 4.36 M/UL — SIGNIFICANT CHANGE UP (ref 4.2–5.8)
RBC # FLD: 14.6 % — HIGH (ref 10.3–14.5)
SODIUM SERPL-SCNC: 140 MMOL/L — SIGNIFICANT CHANGE UP (ref 135–145)
WBC # BLD: 15.48 K/UL — HIGH (ref 3.8–10.5)
WBC # FLD AUTO: 15.48 K/UL — HIGH (ref 3.8–10.5)

## 2018-11-26 PROCEDURE — 99233 SBSQ HOSP IP/OBS HIGH 50: CPT

## 2018-11-26 RX ORDER — CHLORHEXIDINE GLUCONATE 213 G/1000ML
1 SOLUTION TOPICAL
Qty: 0 | Refills: 0 | Status: DISCONTINUED | OUTPATIENT
Start: 2018-11-26 | End: 2018-11-30

## 2018-11-26 RX ORDER — AMLODIPINE BESYLATE 2.5 MG/1
5 TABLET ORAL DAILY
Qty: 0 | Refills: 0 | Status: DISCONTINUED | OUTPATIENT
Start: 2018-11-26 | End: 2018-12-06

## 2018-11-26 RX ADMIN — Medication 4 MILLIGRAM(S): at 22:34

## 2018-11-26 RX ADMIN — LEVETIRACETAM 400 MILLIGRAM(S): 250 TABLET, FILM COATED ORAL at 05:41

## 2018-11-26 RX ADMIN — Medication 4 MILLIGRAM(S): at 04:44

## 2018-11-26 RX ADMIN — SODIUM CHLORIDE 100 MILLILITER(S): 9 INJECTION INTRAMUSCULAR; INTRAVENOUS; SUBCUTANEOUS at 04:45

## 2018-11-26 RX ADMIN — SODIUM CHLORIDE 100 MILLILITER(S): 9 INJECTION INTRAMUSCULAR; INTRAVENOUS; SUBCUTANEOUS at 11:06

## 2018-11-26 RX ADMIN — LEVETIRACETAM 400 MILLIGRAM(S): 250 TABLET, FILM COATED ORAL at 17:14

## 2018-11-26 RX ADMIN — Medication 1: at 12:24

## 2018-11-26 RX ADMIN — Medication 4 MILLIGRAM(S): at 11:04

## 2018-11-26 RX ADMIN — AMLODIPINE BESYLATE 5 MILLIGRAM(S): 2.5 TABLET ORAL at 17:20

## 2018-11-26 RX ADMIN — CHLORHEXIDINE GLUCONATE 1 APPLICATION(S): 213 SOLUTION TOPICAL at 11:05

## 2018-11-26 RX ADMIN — Medication 4 MILLIGRAM(S): at 17:20

## 2018-11-26 NOTE — PROGRESS NOTE ADULT - PROBLEM SELECTOR PLAN 1
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   f/u infectious work up w/  blood & urine cultures NGTD, aspergillus ab, cryptococcal ag negative, cysticercosis ab pending, quant gold negative  c/w decadron and Keppra  -seizure precautions, fall precautions, aspiration precautions, neuro checks q4

## 2018-11-26 NOTE — PROGRESS NOTE ADULT - PROBLEM SELECTOR PLAN 8
IMPROVE score: 2 (age and possible CA)  DVT ppx: SCDs for now  PT/OT consult, seizure precautions, aspiration precautions, fall precautions

## 2018-11-26 NOTE — PROGRESS NOTE ADULT - PROBLEM SELECTOR PLAN 2
Unclear etiology, possible seizure as suggested by EEG   Also with Right 5.9 x 4 x 2.7 cm AP, TR, CC rim-enhancing temporal mass  c/w keppra IV bid for seizure ppx  TTE negative, no events on tele

## 2018-11-26 NOTE — PROGRESS NOTE ADULT - SUBJECTIVE AND OBJECTIVE BOX
Patient is a 73y old  Male who presents with a chief complaint of syncope/collapse/rhabdo/brain mass (25 Nov 2018 16:00)      SUBJECTIVE / OVERNIGHT EVENTS: No acute events overnight. Denies chest pain, headache, SOB, N/V/D. States both Catarino and hCirag are his sons though different mothers. Patient would like providers to discuss with both sons plan.    MEDICATIONS  (STANDING):  chlorhexidine 4% Liquid 1 Application(s) Topical <User Schedule>  dexamethasone     Tablet 4 milliGRAM(s) Oral every 6 hours  dextrose 5%. 1000 milliLiter(s) (50 mL/Hr) IV Continuous <Continuous>  dextrose 50% Injectable 12.5 Gram(s) IV Push once  dextrose 50% Injectable 25 Gram(s) IV Push once  dextrose 50% Injectable 25 Gram(s) IV Push once  influenza   Vaccine 0.5 milliLiter(s) IntraMuscular once  insulin lispro (HumaLOG) corrective regimen sliding scale   SubCutaneous Before meals and at bedtime  levETIRAcetam  IVPB 500 milliGRAM(s) IV Intermittent every 12 hours  sodium chloride 0.9%. 1000 milliLiter(s) (100 mL/Hr) IV Continuous <Continuous>    MEDICATIONS  (PRN):  dextrose 40% Gel 15 Gram(s) Oral once PRN Blood Glucose LESS THAN 70 milliGRAM(s)/deciliter  glucagon  Injectable 1 milliGRAM(s) IntraMuscular once PRN Glucose LESS THAN 70 milligrams/deciliter  LORazepam     Tablet 2 milliGRAM(s) Oral daily PRN seizures      T(C): 36.8 (11-26-18 @ 14:10), Max: 36.8 (11-26-18 @ 14:10)  HR: 66 (11-26-18 @ 14:10) (53 - 66)  BP: 162/96 (11-26-18 @ 14:10) (150/90 - 165/92)  RR: 18 (11-26-18 @ 14:10) (16 - 18)  SpO2: 100% (11-26-18 @ 14:10) (99% - 100%)  CAPILLARY BLOOD GLUCOSE      POCT Blood Glucose.: 173 mg/dL (26 Nov 2018 11:50)  POCT Blood Glucose.: 141 mg/dL (26 Nov 2018 08:00)  POCT Blood Glucose.: 154 mg/dL (25 Nov 2018 21:07)  POCT Blood Glucose.: 153 mg/dL (25 Nov 2018 17:09)    I&O's Summary      PHYSICAL EXAM:  GENERAL: NAD, well-developed  EYES: sclera clear  CHEST/LUNG: Clear to auscultation bilaterally; No wheezing  HEART: Regular rate and rhythm; No murmurs, rubs, or gallops  ABDOMEN: Soft, Nontender, Nondistended; Bowel sounds present  EXTREMITIES:  2+ Peripheral Pulses, No clubbing, cyanosis, or edema  PSYCH: AAOx3  NEUROLOGY: moving all extremities, no slurred speech    LABS:                        13.2   15.48 )-----------( 200      ( 26 Nov 2018 05:45 )             38.3     11-26    140  |  105  |  16  ----------------------------<  138<H>  3.6   |  24  |  0.81    Ca    8.2<L>      26 Nov 2018 05:45  Mg     1.9     11-26        CARDIAC MARKERS ( 26 Nov 2018 05:45 )  x     / x     / 1611 u/L / x     / x      CARDIAC MARKERS ( 24 Nov 2018 19:45 )  x     / x     / 3752 u/L / x     / x              RADIOLOGY & ADDITIONAL TESTS:

## 2018-11-27 ENCOUNTER — TRANSCRIPTION ENCOUNTER (OUTPATIENT)
Age: 73
End: 2018-11-27

## 2018-11-27 LAB
APTT BLD: 25.3 SEC — LOW (ref 27.5–36.3)
BACTERIA BLD CULT: SIGNIFICANT CHANGE UP
BACTERIA BLD CULT: SIGNIFICANT CHANGE UP
BASOPHILS # BLD AUTO: 0.02 K/UL — SIGNIFICANT CHANGE UP (ref 0–0.2)
BASOPHILS NFR BLD AUTO: 0.1 % — SIGNIFICANT CHANGE UP (ref 0–2)
BLD GP AB SCN SERPL QL: NEGATIVE — SIGNIFICANT CHANGE UP
BUN SERPL-MCNC: 15 MG/DL — SIGNIFICANT CHANGE UP (ref 7–23)
BUN SERPL-MCNC: 16 MG/DL — SIGNIFICANT CHANGE UP (ref 7–23)
CALCIUM SERPL-MCNC: 8.7 MG/DL — SIGNIFICANT CHANGE UP (ref 8.4–10.5)
CALCIUM SERPL-MCNC: 8.8 MG/DL — SIGNIFICANT CHANGE UP (ref 8.4–10.5)
CHLORIDE SERPL-SCNC: 98 MMOL/L — SIGNIFICANT CHANGE UP (ref 98–107)
CHLORIDE SERPL-SCNC: 98 MMOL/L — SIGNIFICANT CHANGE UP (ref 98–107)
CK SERPL-CCNC: 937 U/L — HIGH (ref 30–200)
CO2 SERPL-SCNC: 26 MMOL/L — SIGNIFICANT CHANGE UP (ref 22–31)
CO2 SERPL-SCNC: 27 MMOL/L — SIGNIFICANT CHANGE UP (ref 22–31)
CREAT SERPL-MCNC: 0.71 MG/DL — SIGNIFICANT CHANGE UP (ref 0.5–1.3)
CREAT SERPL-MCNC: 0.8 MG/DL — SIGNIFICANT CHANGE UP (ref 0.5–1.3)
EOSINOPHIL # BLD AUTO: 0 K/UL — SIGNIFICANT CHANGE UP (ref 0–0.5)
EOSINOPHIL NFR BLD AUTO: 0 % — SIGNIFICANT CHANGE UP (ref 0–6)
GLUCOSE SERPL-MCNC: 135 MG/DL — HIGH (ref 70–99)
GLUCOSE SERPL-MCNC: 180 MG/DL — HIGH (ref 70–99)
HCT VFR BLD CALC: 39.7 % — SIGNIFICANT CHANGE UP (ref 39–50)
HGB BLD-MCNC: 13.9 G/DL — SIGNIFICANT CHANGE UP (ref 13–17)
IMM GRANULOCYTES # BLD AUTO: 0.15 # — SIGNIFICANT CHANGE UP
IMM GRANULOCYTES NFR BLD AUTO: 1 % — SIGNIFICANT CHANGE UP (ref 0–1.5)
INR BLD: 1.22 — HIGH (ref 0.88–1.17)
LYMPHOCYTES # BLD AUTO: 0.69 K/UL — LOW (ref 1–3.3)
LYMPHOCYTES # BLD AUTO: 4.5 % — LOW (ref 13–44)
MAGNESIUM SERPL-MCNC: 2 MG/DL — SIGNIFICANT CHANGE UP (ref 1.6–2.6)
MCHC RBC-ENTMCNC: 30.3 PG — SIGNIFICANT CHANGE UP (ref 27–34)
MCHC RBC-ENTMCNC: 35 % — SIGNIFICANT CHANGE UP (ref 32–36)
MCV RBC AUTO: 86.7 FL — SIGNIFICANT CHANGE UP (ref 80–100)
MONOCYTES # BLD AUTO: 0.93 K/UL — HIGH (ref 0–0.9)
MONOCYTES NFR BLD AUTO: 6.1 % — SIGNIFICANT CHANGE UP (ref 2–14)
NEUTROPHILS # BLD AUTO: 13.43 K/UL — HIGH (ref 1.8–7.4)
NEUTROPHILS NFR BLD AUTO: 88.3 % — HIGH (ref 43–77)
NRBC # FLD: 0 — SIGNIFICANT CHANGE UP
PLATELET # BLD AUTO: 224 K/UL — SIGNIFICANT CHANGE UP (ref 150–400)
PMV BLD: 11.3 FL — SIGNIFICANT CHANGE UP (ref 7–13)
POTASSIUM SERPL-MCNC: 3.4 MMOL/L — LOW (ref 3.5–5.3)
POTASSIUM SERPL-MCNC: 3.7 MMOL/L — SIGNIFICANT CHANGE UP (ref 3.5–5.3)
POTASSIUM SERPL-SCNC: 3.4 MMOL/L — LOW (ref 3.5–5.3)
POTASSIUM SERPL-SCNC: 3.7 MMOL/L — SIGNIFICANT CHANGE UP (ref 3.5–5.3)
PROTHROM AB SERPL-ACNC: 13.6 SEC — HIGH (ref 9.8–13.1)
RBC # BLD: 4.58 M/UL — SIGNIFICANT CHANGE UP (ref 4.2–5.8)
RBC # FLD: 14.1 % — SIGNIFICANT CHANGE UP (ref 10.3–14.5)
RH IG SCN BLD-IMP: POSITIVE — SIGNIFICANT CHANGE UP
SODIUM SERPL-SCNC: 136 MMOL/L — SIGNIFICANT CHANGE UP (ref 135–145)
SODIUM SERPL-SCNC: 138 MMOL/L — SIGNIFICANT CHANGE UP (ref 135–145)
T SOL LAR IGG SER IA-ACNC: SIGNIFICANT CHANGE UP
WBC # BLD: 15.22 K/UL — HIGH (ref 3.8–10.5)
WBC # FLD AUTO: 15.22 K/UL — HIGH (ref 3.8–10.5)

## 2018-11-27 PROCEDURE — 99233 SBSQ HOSP IP/OBS HIGH 50: CPT

## 2018-11-27 PROCEDURE — 99222 1ST HOSP IP/OBS MODERATE 55: CPT | Mod: GC

## 2018-11-27 RX ORDER — POTASSIUM CHLORIDE 20 MEQ
40 PACKET (EA) ORAL EVERY 4 HOURS
Qty: 0 | Refills: 0 | Status: COMPLETED | OUTPATIENT
Start: 2018-11-27 | End: 2018-11-27

## 2018-11-27 RX ORDER — SODIUM CHLORIDE 9 MG/ML
1000 INJECTION INTRAMUSCULAR; INTRAVENOUS; SUBCUTANEOUS
Qty: 0 | Refills: 0 | Status: DISCONTINUED | OUTPATIENT
Start: 2018-11-27 | End: 2018-11-30

## 2018-11-27 RX ADMIN — Medication 4 MILLIGRAM(S): at 05:51

## 2018-11-27 RX ADMIN — Medication 40 MILLIEQUIVALENT(S): at 10:47

## 2018-11-27 RX ADMIN — Medication 4 MILLIGRAM(S): at 10:47

## 2018-11-27 RX ADMIN — Medication 4 MILLIGRAM(S): at 17:04

## 2018-11-27 RX ADMIN — AMLODIPINE BESYLATE 5 MILLIGRAM(S): 2.5 TABLET ORAL at 05:51

## 2018-11-27 RX ADMIN — Medication 4 MILLIGRAM(S): at 22:21

## 2018-11-27 RX ADMIN — LEVETIRACETAM 400 MILLIGRAM(S): 250 TABLET, FILM COATED ORAL at 17:04

## 2018-11-27 RX ADMIN — LEVETIRACETAM 400 MILLIGRAM(S): 250 TABLET, FILM COATED ORAL at 05:51

## 2018-11-27 RX ADMIN — Medication 1: at 17:47

## 2018-11-27 RX ADMIN — Medication 40 MILLIEQUIVALENT(S): at 17:04

## 2018-11-27 RX ADMIN — CHLORHEXIDINE GLUCONATE 1 APPLICATION(S): 213 SOLUTION TOPICAL at 10:50

## 2018-11-27 NOTE — CONSULT NOTE ADULT - ASSESSMENT
1. PT- bed mobility,transfers, gait and balance training  2. OT- ADL'S  3. brain mass- surgery 11/28  4. Will assess  for rehab needs following surgery.

## 2018-11-27 NOTE — PROGRESS NOTE ADULT - SUBJECTIVE AND OBJECTIVE BOX
Patient is a 73y old  Male who presents with a chief complaint of syncope/collapse/rhabdo/brain mass (27 Nov 2018 09:55)      SUBJECTIVE / OVERNIGHT EVENTS: No acute events overnight. No complaints. No chest pain, SOB.    MEDICATIONS  (STANDING):  amLODIPine   Tablet 5 milliGRAM(s) Oral daily  chlorhexidine 4% Liquid 1 Application(s) Topical <User Schedule>  dexamethasone     Tablet 4 milliGRAM(s) Oral every 6 hours  dextrose 5%. 1000 milliLiter(s) (50 mL/Hr) IV Continuous <Continuous>  dextrose 50% Injectable 12.5 Gram(s) IV Push once  dextrose 50% Injectable 25 Gram(s) IV Push once  dextrose 50% Injectable 25 Gram(s) IV Push once  influenza   Vaccine 0.5 milliLiter(s) IntraMuscular once  insulin lispro (HumaLOG) corrective regimen sliding scale   SubCutaneous Before meals and at bedtime  levETIRAcetam  IVPB 500 milliGRAM(s) IV Intermittent every 12 hours  potassium chloride    Tablet ER 40 milliEquivalent(s) Oral every 4 hours  sodium chloride 0.9%. 1000 milliLiter(s) (60 mL/Hr) IV Continuous <Continuous>    MEDICATIONS  (PRN):  dextrose 40% Gel 15 Gram(s) Oral once PRN Blood Glucose LESS THAN 70 milliGRAM(s)/deciliter  glucagon  Injectable 1 milliGRAM(s) IntraMuscular once PRN Glucose LESS THAN 70 milligrams/deciliter  LORazepam     Tablet 2 milliGRAM(s) Oral daily PRN seizures      T(C): 36.9 (11-27-18 @ 05:49), Max: 36.9 (11-27-18 @ 05:49)  HR: 60 (11-27-18 @ 05:49) (60 - 77)  BP: 148/88 (11-27-18 @ 05:49) (143/103 - 162/96)  RR: 18 (11-27-18 @ 05:49) (18 - 19)  SpO2: 98% (11-27-18 @ 05:49) (97% - 100%)  CAPILLARY BLOOD GLUCOSE      POCT Blood Glucose.: 137 mg/dL (27 Nov 2018 12:31)  POCT Blood Glucose.: 132 mg/dL (27 Nov 2018 08:28)  POCT Blood Glucose.: 144 mg/dL (26 Nov 2018 22:46)  POCT Blood Glucose.: 135 mg/dL (26 Nov 2018 17:46)    I&O's Summary    PHYSICAL EXAM:  GENERAL: NAD, well-developed  EYES: sclera clear  CHEST/LUNG: Clear to auscultation bilaterally; No wheezing  HEART: Regular rate and rhythm; No murmurs, rubs, or gallops  ABDOMEN: Soft, Nontender, Nondistended; Bowel sounds present  EXTREMITIES:  2+ Peripheral Pulses, No clubbing, cyanosis, or edema  PSYCH: AAOx3  NEUROLOGY: left arm weaker>right 3-4/5 muscle strength on left, 5/5 on right, equal strength in b/l legs, no slurred speech    LABS:                        13.9   15.22 )-----------( 224      ( 27 Nov 2018 06:30 )             39.7     11-27    138  |  98  |  15  ----------------------------<  135<H>  3.4<L>   |  27  |  0.80    Ca    8.7      27 Nov 2018 06:30  Mg     2.0     11-27        CARDIAC MARKERS ( 27 Nov 2018 06:30 )  x     / x     / 937 u/L / x     / x      CARDIAC MARKERS ( 26 Nov 2018 05:45 )  x     / x     / 1611 u/L / x     / x              RADIOLOGY & ADDITIONAL TESTS:

## 2018-11-27 NOTE — CONSULT NOTE ADULT - SUBJECTIVE AND OBJECTIVE BOX
HPI:  73m hx HTN, NIDDM, CVA (2013 w/ left side weakness), BPH brought in by EMS after being found down.    Patient was last seen normal 23hrs prior to arrival. 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 After not answering calls from family of which is not like him, Son and daughter went to his house where upon knocking on the door no one answered. Police were called and patient was found face down in urine. He was alert upon arrival and acknowledged hearing knocking on the door, but was unable to get up.  Patient unable to recall events, states nothing is wrong. Denies fevers, chills, night sweats, nausea, vomiting, chest pain, palpations, headaches, changes in vision. myalgias. 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  On Keppra IV for possible seizure leading to syncope. On Decadron  Surgery planned for 11/28     REVIEW OF SYSTEMS: No chest pain, shortness of breath, nausea, vomiting or diarhea.      PAST MEDICAL & SURGICAL HISTORY  BPH (benign prostatic hyperplasia)  CVA (cerebral vascular accident)  Diabetes  HTN (hypertension)  No significant past surgical history      SOCIAL HISTORY  Smoking - Denied, EtOH - Denied, Drugs - Denied    FUNCTIONAL HISTORY:   Lives   Independent    CURRENT FUNCTIONAL STATUS:      FAMILY HISTORY   No pertinent family history in first degree relatives      RECENT LABS/IMAGING  CBC Full  -  ( 27 Nov 2018 06:30 )  WBC Count : 15.22 K/uL  Hemoglobin : 13.9 g/dL  Hematocrit : 39.7 %  Platelet Count - Automated : 224 K/uL  Mean Cell Volume : 86.7 fL  Mean Cell Hemoglobin : 30.3 pg  Mean Cell Hemoglobin Concentration : 35.0 %  Auto Neutrophil # : 13.43 K/uL  Auto Lymphocyte # : 0.69 K/uL  Auto Monocyte # : 0.93 K/uL  Auto Eosinophil # : 0.00 K/uL  Auto Basophil # : 0.02 K/uL  Auto Neutrophil % : 88.3 %  Auto Lymphocyte % : 4.5 %  Auto Monocyte % : 6.1 %  Auto Eosinophil % : 0.0 %  Auto Basophil % : 0.1 %    11-27    138  |  98  |  15  ----------------------------<  135<H>  3.4<L>   |  27  |  0.80    Ca    8.7      27 Nov 2018 06:30  Mg     2.0     11-27          VITALS  T(C): 36.9 (11-27-18 @ 05:49), Max: 36.9 (11-27-18 @ 05:49)  HR: 60 (11-27-18 @ 05:49) (60 - 77)  BP: 148/88 (11-27-18 @ 05:49) (143/103 - 162/96)  RR: 18 (11-27-18 @ 05:49) (18 - 19)  SpO2: 98% (11-27-18 @ 05:49) (97% - 100%)  Wt(kg): --    ALLERGIES  Allergy Status Unknown      MEDICATIONS   amLODIPine   Tablet 5 milliGRAM(s) Oral daily  chlorhexidine 4% Liquid 1 Application(s) Topical <User Schedule>  dexamethasone     Tablet 4 milliGRAM(s) Oral every 6 hours  dextrose 40% Gel 15 Gram(s) Oral once PRN  dextrose 5%. 1000 milliLiter(s) IV Continuous <Continuous>  dextrose 50% Injectable 12.5 Gram(s) IV Push once  dextrose 50% Injectable 25 Gram(s) IV Push once  dextrose 50% Injectable 25 Gram(s) IV Push once  glucagon  Injectable 1 milliGRAM(s) IntraMuscular once PRN  influenza   Vaccine 0.5 milliLiter(s) IntraMuscular once  insulin lispro (HumaLOG) corrective regimen sliding scale   SubCutaneous Before meals and at bedtime  levETIRAcetam  IVPB 500 milliGRAM(s) IV Intermittent every 12 hours  LORazepam     Tablet 2 milliGRAM(s) Oral daily PRN  potassium chloride    Tablet ER 40 milliEquivalent(s) Oral every 4 hours      ----------------------------------------------------------------------------------------  PHYSICAL EXAM  Constitutional - NAD, Comfortable  HEENT - NCAT, EOMI  Neck - Supple, No limited ROM  Chest - CTA bilaterally, No wheeze, No rhonchi, No crackles  Cardiovascular - RRR, S1S2, No murmurs  Abdomen - BS+, Soft, NTND  Extremities - No C/C/E, No calf tenderness   Neurologic Exam -                    Cognitive - Awake, Alert, AAO to self, place, date, year, situation     Communication - Fluent, No dysarthria, no aphasia     Cranial Nerves - CN 2-12 intact     Motor - No focal deficits                       Sensory - Intact to LT     Reflexes - DTR Intact, No primitive reflexive     Balance - WNL Static  Psychiatric - Mood stable, Affect WNL HPI:  73m hx HTN, NIDDM, CVA (2013 w/ left side weakness), BPH brought in by EMS after being found down.    Patient was last seen normal 23hrs prior to arrival. 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 After not answering calls from family of which is not like him, Son and daughter went to his house where upon knocking on the door no one answered. Police were called and patient was found face down in urine. He was alert upon arrival and acknowledged hearing knocking on the door, but was unable to get up.  Patient unable to recall events, states nothing is wrong. Denies fevers, chills, night sweats, nausea, vomiting, chest pain, palpations, headaches, changes in vision. myalgias. 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  On Keppra IV for possible seizure leading to syncope. On Decadron  Surgery planned for 11/28     REVIEW OF SYSTEMS: No chest pain, shortness of breath, nausea, vomiting or diarhea.      PAST MEDICAL & SURGICAL HISTORY  BPH (benign prostatic hyperplasia)  CVA (cerebral vascular accident)  Diabetes  HTN (hypertension)  No significant past surgical history      SOCIAL HISTORY  Smoking - Denied, EtOH - Denied, Drugs - Denied    FUNCTIONAL HISTORY:   Lives with spouse   Independent PTA    CURRENT FUNCTIONAL STATUS: CGA      FAMILY HISTORY   No pertinent family history in first degree relatives      RECENT LABS/IMAGING  CBC Full  -  ( 27 Nov 2018 06:30 )  WBC Count : 15.22 K/uL  Hemoglobin : 13.9 g/dL  Hematocrit : 39.7 %  Platelet Count - Automated : 224 K/uL  Mean Cell Volume : 86.7 fL  Mean Cell Hemoglobin : 30.3 pg  Mean Cell Hemoglobin Concentration : 35.0 %  Auto Neutrophil # : 13.43 K/uL  Auto Lymphocyte # : 0.69 K/uL  Auto Monocyte # : 0.93 K/uL  Auto Eosinophil # : 0.00 K/uL  Auto Basophil # : 0.02 K/uL  Auto Neutrophil % : 88.3 %  Auto Lymphocyte % : 4.5 %  Auto Monocyte % : 6.1 %  Auto Eosinophil % : 0.0 %  Auto Basophil % : 0.1 %    11-27    138  |  98  |  15  ----------------------------<  135<H>  3.4<L>   |  27  |  0.80    Ca    8.7      27 Nov 2018 06:30  Mg     2.0     11-27          VITALS  T(C): 36.9 (11-27-18 @ 05:49), Max: 36.9 (11-27-18 @ 05:49)  HR: 60 (11-27-18 @ 05:49) (60 - 77)  BP: 148/88 (11-27-18 @ 05:49) (143/103 - 162/96)  RR: 18 (11-27-18 @ 05:49) (18 - 19)  SpO2: 98% (11-27-18 @ 05:49) (97% - 100%)  Wt(kg): --    ALLERGIES  Allergy Status Unknown      MEDICATIONS   amLODIPine   Tablet 5 milliGRAM(s) Oral daily  chlorhexidine 4% Liquid 1 Application(s) Topical <User Schedule>  dexamethasone     Tablet 4 milliGRAM(s) Oral every 6 hours  dextrose 40% Gel 15 Gram(s) Oral once PRN  dextrose 5%. 1000 milliLiter(s) IV Continuous <Continuous>  dextrose 50% Injectable 12.5 Gram(s) IV Push once  dextrose 50% Injectable 25 Gram(s) IV Push once  dextrose 50% Injectable 25 Gram(s) IV Push once  glucagon  Injectable 1 milliGRAM(s) IntraMuscular once PRN  influenza   Vaccine 0.5 milliLiter(s) IntraMuscular once  insulin lispro (HumaLOG) corrective regimen sliding scale   SubCutaneous Before meals and at bedtime  levETIRAcetam  IVPB 500 milliGRAM(s) IV Intermittent every 12 hours  LORazepam     Tablet 2 milliGRAM(s) Oral daily PRN  potassium chloride    Tablet ER 40 milliEquivalent(s) Oral every 4 hours      ----------------------------------------------------------------------------------------  PHYSICAL EXAM  Constitutional - NAD, Comfortable  HEENT - NCAT, EOMI  Neck - Supple, No limited ROM  Chest - CTA bilaterally, No wheeze, No rhonchi, No crackles  Cardiovascular - RRR, S1S2, No murmurs  Abdomen - BS+, Soft, NTND  Extremities - No C/C/E, No calf tenderness   Neurologic Exam -    3/5                 Cognitive - Awake, Alert, AAO to self, place, date, year, situation     Communication - Fluent, No dysarthria, no aphasia     Cranial Nerves - CN 2-12 intact     Motor - RUE 4/5, RLE                        Sensory - Intact to LT     Reflexes - DTR Intact, No primitive reflexive     Balance - WNL Static  Psychiatric - Mood stable, Affect WNL

## 2018-11-27 NOTE — PROGRESS NOTE ADULT - PROBLEM SELECTOR PLAN 1
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   f/u infectious work up w/  blood & urine cultures NGTD, aspergillus ab, cryptococcal ag negative, cysticercosis ab pending, quant gold negative  c/w decadron and Keppra  -seizure precautions, fall precautions, aspiration precautions, neuro checks q4

## 2018-11-28 ENCOUNTER — RESULT REVIEW (OUTPATIENT)
Age: 73
End: 2018-11-28

## 2018-11-28 ENCOUNTER — APPOINTMENT (OUTPATIENT)
Dept: NEUROSURGERY | Facility: HOSPITAL | Age: 73
End: 2018-11-28

## 2018-11-28 LAB
BASE EXCESS BLDA CALC-SCNC: 2.4 MMOL/L — SIGNIFICANT CHANGE UP
BASOPHILS # BLD AUTO: 0.03 K/UL — SIGNIFICANT CHANGE UP (ref 0–0.2)
BASOPHILS NFR BLD AUTO: 0.2 % — SIGNIFICANT CHANGE UP (ref 0–2)
BUN SERPL-MCNC: 20 MG/DL — SIGNIFICANT CHANGE UP (ref 7–23)
CA-I BLDA-SCNC: 1.1 MMOL/L — LOW (ref 1.15–1.29)
CALCIUM SERPL-MCNC: 8.8 MG/DL — SIGNIFICANT CHANGE UP (ref 8.4–10.5)
CHLORIDE SERPL-SCNC: 99 MMOL/L — SIGNIFICANT CHANGE UP (ref 98–107)
CO2 SERPL-SCNC: 26 MMOL/L — SIGNIFICANT CHANGE UP (ref 22–31)
CREAT SERPL-MCNC: 0.77 MG/DL — SIGNIFICANT CHANGE UP (ref 0.5–1.3)
EOSINOPHIL # BLD AUTO: 0 K/UL — SIGNIFICANT CHANGE UP (ref 0–0.5)
EOSINOPHIL NFR BLD AUTO: 0 % — SIGNIFICANT CHANGE UP (ref 0–6)
GLUCOSE BLDA-MCNC: 149 MG/DL — HIGH (ref 70–99)
GLUCOSE SERPL-MCNC: 139 MG/DL — HIGH (ref 70–99)
HCO3 BLDA-SCNC: 27 MMOL/L — HIGH (ref 22–26)
HCT VFR BLD CALC: 42 % — SIGNIFICANT CHANGE UP (ref 39–50)
HCT VFR BLDA CALC: 38.1 % — LOW (ref 39–51)
HGB BLD-MCNC: 14.5 G/DL — SIGNIFICANT CHANGE UP (ref 13–17)
HGB BLDA-MCNC: 12.4 G/DL — LOW (ref 13–17)
IMM GRANULOCYTES # BLD AUTO: 0.22 # — SIGNIFICANT CHANGE UP
IMM GRANULOCYTES NFR BLD AUTO: 1.1 % — SIGNIFICANT CHANGE UP (ref 0–1.5)
LYMPHOCYTES # BLD AUTO: 0.72 K/UL — LOW (ref 1–3.3)
LYMPHOCYTES # BLD AUTO: 3.7 % — LOW (ref 13–44)
MAGNESIUM SERPL-MCNC: 2.1 MG/DL — SIGNIFICANT CHANGE UP (ref 1.6–2.6)
MCHC RBC-ENTMCNC: 30.7 PG — SIGNIFICANT CHANGE UP (ref 27–34)
MCHC RBC-ENTMCNC: 34.5 % — SIGNIFICANT CHANGE UP (ref 32–36)
MCV RBC AUTO: 88.8 FL — SIGNIFICANT CHANGE UP (ref 80–100)
MONOCYTES # BLD AUTO: 1.26 K/UL — HIGH (ref 0–0.9)
MONOCYTES NFR BLD AUTO: 6.4 % — SIGNIFICANT CHANGE UP (ref 2–14)
NEUTROPHILS # BLD AUTO: 17.39 K/UL — HIGH (ref 1.8–7.4)
NEUTROPHILS NFR BLD AUTO: 88.6 % — HIGH (ref 43–77)
NRBC # FLD: 0 — SIGNIFICANT CHANGE UP
PCO2 BLDA: 34 MMHG — LOW (ref 35–48)
PH BLDA: 7.5 PH — HIGH (ref 7.35–7.45)
PLATELET # BLD AUTO: 241 K/UL — SIGNIFICANT CHANGE UP (ref 150–400)
PMV BLD: 11.2 FL — SIGNIFICANT CHANGE UP (ref 7–13)
PO2 BLDA: 97 MMHG — SIGNIFICANT CHANGE UP (ref 83–108)
POTASSIUM BLDA-SCNC: 3.8 MMOL/L — SIGNIFICANT CHANGE UP (ref 3.4–4.5)
POTASSIUM SERPL-MCNC: 4.5 MMOL/L — SIGNIFICANT CHANGE UP (ref 3.5–5.3)
POTASSIUM SERPL-SCNC: 4.5 MMOL/L — SIGNIFICANT CHANGE UP (ref 3.5–5.3)
RBC # BLD: 4.73 M/UL — SIGNIFICANT CHANGE UP (ref 4.2–5.8)
RBC # FLD: 14.2 % — SIGNIFICANT CHANGE UP (ref 10.3–14.5)
RH IG SCN BLD-IMP: POSITIVE — SIGNIFICANT CHANGE UP
SAO2 % BLDA: 97.8 % — SIGNIFICANT CHANGE UP (ref 95–99)
SODIUM BLDA-SCNC: 130 MMOL/L — LOW (ref 136–146)
SODIUM SERPL-SCNC: 137 MMOL/L — SIGNIFICANT CHANGE UP (ref 135–145)
WBC # BLD: 19.62 K/UL — HIGH (ref 3.8–10.5)
WBC # FLD AUTO: 19.62 K/UL — HIGH (ref 3.8–10.5)

## 2018-11-28 PROCEDURE — 88307 TISSUE EXAM BY PATHOLOGIST: CPT | Mod: 26

## 2018-11-28 PROCEDURE — 88334 PATH CONSLTJ SURG CYTO XM EA: CPT | Mod: 26,59

## 2018-11-28 PROCEDURE — 61510 CRNEC TREPH EXC BRN TUM STTL: CPT

## 2018-11-28 PROCEDURE — 99233 SBSQ HOSP IP/OBS HIGH 50: CPT

## 2018-11-28 PROCEDURE — 88341 IMHCHEM/IMCYTCHM EA ADD ANTB: CPT | Mod: 26,59

## 2018-11-28 PROCEDURE — 88331 PATH CONSLTJ SURG 1 BLK 1SPC: CPT | Mod: 26

## 2018-11-28 PROCEDURE — 88360 TUMOR IMMUNOHISTOCHEM/MANUAL: CPT | Mod: 26

## 2018-11-28 PROCEDURE — 61781 SCAN PROC CRANIAL INTRA: CPT

## 2018-11-28 PROCEDURE — 88342 IMHCHEM/IMCYTCHM 1ST ANTB: CPT | Mod: 26,59

## 2018-11-28 RX ORDER — MIDAZOLAM HYDROCHLORIDE 1 MG/ML
2 INJECTION, SOLUTION INTRAMUSCULAR; INTRAVENOUS ONCE
Qty: 0 | Refills: 0 | Status: DISCONTINUED | OUTPATIENT
Start: 2018-11-28 | End: 2018-11-28

## 2018-11-28 RX ORDER — ONDANSETRON 8 MG/1
4 TABLET, FILM COATED ORAL ONCE
Qty: 0 | Refills: 0 | Status: DISCONTINUED | OUTPATIENT
Start: 2018-11-28 | End: 2018-11-29

## 2018-11-28 RX ORDER — HYDROMORPHONE HYDROCHLORIDE 2 MG/ML
0.5 INJECTION INTRAMUSCULAR; INTRAVENOUS; SUBCUTANEOUS ONCE
Qty: 0 | Refills: 0 | Status: DISCONTINUED | OUTPATIENT
Start: 2018-11-28 | End: 2018-11-29

## 2018-11-28 RX ORDER — HYDROMORPHONE HYDROCHLORIDE 2 MG/ML
0.25 INJECTION INTRAMUSCULAR; INTRAVENOUS; SUBCUTANEOUS
Qty: 0 | Refills: 0 | Status: DISCONTINUED | OUTPATIENT
Start: 2018-11-28 | End: 2018-11-29

## 2018-11-28 RX ORDER — FENTANYL CITRATE 50 UG/ML
25 INJECTION INTRAVENOUS
Qty: 0 | Refills: 0 | Status: DISCONTINUED | OUTPATIENT
Start: 2018-11-28 | End: 2018-11-29

## 2018-11-28 RX ORDER — DEXAMETHASONE 0.5 MG/5ML
4 ELIXIR ORAL EVERY 6 HOURS
Qty: 0 | Refills: 0 | Status: DISCONTINUED | OUTPATIENT
Start: 2018-11-28 | End: 2018-12-03

## 2018-11-28 RX ADMIN — MIDAZOLAM HYDROCHLORIDE 2 MILLIGRAM(S): 1 INJECTION, SOLUTION INTRAMUSCULAR; INTRAVENOUS at 16:20

## 2018-11-28 RX ADMIN — Medication 4 MILLIGRAM(S): at 12:15

## 2018-11-28 RX ADMIN — LEVETIRACETAM 400 MILLIGRAM(S): 250 TABLET, FILM COATED ORAL at 06:03

## 2018-11-28 RX ADMIN — Medication 1: at 23:33

## 2018-11-28 RX ADMIN — CHLORHEXIDINE GLUCONATE 1 APPLICATION(S): 213 SOLUTION TOPICAL at 12:17

## 2018-11-28 RX ADMIN — SODIUM CHLORIDE 60 MILLILITER(S): 9 INJECTION INTRAMUSCULAR; INTRAVENOUS; SUBCUTANEOUS at 06:03

## 2018-11-28 RX ADMIN — SODIUM CHLORIDE 60 MILLILITER(S): 9 INJECTION INTRAMUSCULAR; INTRAVENOUS; SUBCUTANEOUS at 00:13

## 2018-11-28 RX ADMIN — SODIUM CHLORIDE 60 MILLILITER(S): 9 INJECTION INTRAMUSCULAR; INTRAVENOUS; SUBCUTANEOUS at 12:18

## 2018-11-28 NOTE — PROGRESS NOTE ADULT - PROBLEM SELECTOR PLAN 1
MRI show right temporal rim enhancing cystic mass with midline shift -- neurosx planned for 11/28, sons: Catarino and Chirag aware  RCRI of 2 and MACE 6.2% but may proceed to OR w/o further cardiac testing.   f/u infectious work up w/  blood & urine cultures NGTD, aspergillus ab, cryptococcal ag negative, cysticercosis ab pending, quant gold negative  c/w decadron and Keppra  -seizure precautions, fall precautions, aspiration precautions, neuro checks q4

## 2018-11-28 NOTE — PROGRESS NOTE ADULT - PROBLEM SELECTOR PLAN 8
IMPROVE score: 2 (age and possible CA)  DVT ppx: SCDs for now  PT/OT consult, seizure precautions, aspiration precautions, fall precautions  Felicia Bautista to asses rehab needs post surgery

## 2018-11-28 NOTE — CONSULT NOTE ADULT - REASON FOR ADMISSION
stroke
syncope/collapse/rhabdo/brain mass

## 2018-11-28 NOTE — PROGRESS NOTE ADULT - PROBLEM SELECTOR PLAN 4
improving  ?in setting of seizure given found down in urine and CT findings   CPK trending downward.

## 2018-11-28 NOTE — PROGRESS NOTE ADULT - PROBLEM SELECTOR PROBLEM 3
Elevated troponin

## 2018-11-28 NOTE — PROGRESS NOTE ADULT - SUBJECTIVE AND OBJECTIVE BOX
Patient is a 73y old  Male who presents with a chief complaint of syncope/collapse/rhabdo/brain mass (28 Nov 2018 05:06)      SUBJECTIVE / OVERNIGHT EVENTS: No acute events overnight. Denies chest pain, SOB, N/V/D. NPO for surgery today    MEDICATIONS  (STANDING):  amLODIPine   Tablet 5 milliGRAM(s) Oral daily  chlorhexidine 4% Liquid 1 Application(s) Topical <User Schedule>  dexamethasone     Tablet 4 milliGRAM(s) Oral every 6 hours  dextrose 5%. 1000 milliLiter(s) (50 mL/Hr) IV Continuous <Continuous>  dextrose 50% Injectable 12.5 Gram(s) IV Push once  dextrose 50% Injectable 25 Gram(s) IV Push once  dextrose 50% Injectable 25 Gram(s) IV Push once  influenza   Vaccine 0.5 milliLiter(s) IntraMuscular once  insulin lispro (HumaLOG) corrective regimen sliding scale   SubCutaneous Before meals and at bedtime  levETIRAcetam  IVPB 500 milliGRAM(s) IV Intermittent every 12 hours  sodium chloride 0.9%. 1000 milliLiter(s) (60 mL/Hr) IV Continuous <Continuous>    MEDICATIONS  (PRN):  dextrose 40% Gel 15 Gram(s) Oral once PRN Blood Glucose LESS THAN 70 milliGRAM(s)/deciliter  glucagon  Injectable 1 milliGRAM(s) IntraMuscular once PRN Glucose LESS THAN 70 milligrams/deciliter  LORazepam     Tablet 2 milliGRAM(s) Oral daily PRN seizures      T(C): 37.1 (11-28-18 @ 06:01), Max: 37.2 (11-27-18 @ 22:58)  HR: 75 (11-28-18 @ 06:01) (65 - 75)  BP: 152/85 (11-28-18 @ 06:01) (129/92 - 152/85)  RR: 18 (11-28-18 @ 06:01) (18 - 18)  SpO2: 97% (11-28-18 @ 06:01) (94% - 97%)  CAPILLARY BLOOD GLUCOSE      POCT Blood Glucose.: 163 mg/dL (27 Nov 2018 22:22)  POCT Blood Glucose.: 180 mg/dL (27 Nov 2018 17:34)  POCT Blood Glucose.: 137 mg/dL (27 Nov 2018 12:31)    I&O's Summary      PHYSICAL EXAM:  GENERAL: NAD, lying in bed  EYES: sclera clear  CHEST/LUNG: Clear to auscultation bilaterally; No wheezing or crackles appreciated  HEART: s1/s2, no murmurs appreciated  ABDOMEN: Soft, Nontender, Nondistended; Bowel sounds present  EXTREMITIES:  2+ Peripheral Pulses, No clubbing, cyanosis, or edema  PSYCH: AAOx3, knows he is going for surgery today for brain mass  NEUROLOGY: left arm weaker>right 4/5 muscle strength on left, 5/5 on right, equal strength in b/l legs, no slurred speech    LABS:                        14.5   19.62 )-----------( 241      ( 28 Nov 2018 06:26 )             42.0     11-27    136  |  98  |  16  ----------------------------<  180<H>  3.7   |  26  |  0.71    Ca    8.8      27 Nov 2018 14:00  Mg     2.0     11-27      PT/INR - ( 27 Nov 2018 14:00 )   PT: 13.6 SEC;   INR: 1.22          PTT - ( 27 Nov 2018 14:00 )  PTT:25.3 SEC  CARDIAC MARKERS ( 27 Nov 2018 06:30 )  x     / x     / 937 u/L / x     / x              RADIOLOGY & ADDITIONAL TESTS:

## 2018-11-28 NOTE — BRIEF OPERATIVE NOTE - PROCEDURE
<<-----Click on this checkbox to enter Procedure Left craniotomy for tumor  11/28/2018    Active  RPRUITT

## 2018-11-28 NOTE — CONSULT NOTE ADULT - ASSESSMENT
ASSESSMENT:  73 y.o. 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 Pt s/p seizure-like activity in the pre-op period now post-ictal.     PLAN:   Pt to proceed with scheduled surgery and will be admitted to SICU post-op.    --------------------------------------------------------------------------------------

## 2018-11-28 NOTE — PROGRESS NOTE ADULT - SUBJECTIVE AND OBJECTIVE BOX
neurosurgery preop                          13.9   15.22 )-----------( 224      ( 27 Nov 2018 06:30 )             39.7   11-27    136  |  98  |  16  ----------------------------<  180<H>  3.7   |  26  |  0.71    Ca    8.8      27 Nov 2018 14:00  Mg     2.0     11-27    PT/INR - ( 27 Nov 2018 14:00 )   PT: 13.6 SEC;   INR: 1.22          PTT - ( 27 Nov 2018 14:00 )  PTT:25.3 SEC    Type + Screen (11.27.18 @ 12:53)    ABO Interpretation: B    Rh Interpretation: Positive    Antibody Screen: Negative

## 2018-11-28 NOTE — PROGRESS NOTE ADULT - SUBJECTIVE AND OBJECTIVE BOX
Anesthesia Holding Area Note    The patient is a 72yo Male with PMHx significant for BPH, CVA, Diabete, HTN, and with no significant past surgical history, who had a seizure on Thanksgiving day and was brought to the hospitsal and found to have a brain tumor.  He is pre-surgical for crainiotomy, tumor resection with Dr. Gonzalez.  While in the holding area, the patient had been AAO x 3 and conversing with staff when he suddenly became unconscious.  His son, who was at bedside at the time, described what sounded like a general tonic-clonic seizure.  A code was called by the holding area nursing staff.  When I arrived at bedside, the patient was unconscious, drooling, not moving extremities and was hypertensive and tachycardic.  Since he was obstructing a little as he breathed, I assisted his airway until he started to come around.  After a few minutes, he was somnolent but able to open eyes spontaneously to his name.  His HR and BP stablized to normal ranges.  He received 2 mg I.V. midazolam at 15:14 to prevent a recurrence. Mr. Duncan was not yet due for his keppra but did receive the AM dose on time this morning.  Further care was discussed with Dr. Gonzalez, who did not wish to give anything more than the midazolam.  Anesthesia and nursing staff will continue to watch the patient in holding area until he goes in for surgery.

## 2018-11-28 NOTE — PRE-OP CHECKLIST - 2.
Pt had seizure disorder at 15:03 and last about 5"-7" Rapid response called. Versed 2mg IVP given by Dr. Horta at 15: 15pm

## 2018-11-28 NOTE — PROGRESS NOTE ADULT - PROBLEM SELECTOR PLAN 2
Unclear etiology, possible seizure as suggested by EEG   Also with Right 5.9 x 4 x 2.7 cm AP, TR, CC rim-enhancing temporal mass  c/w keppra IV bid for seizure ppx  TTE negative, c/w tele monitoring

## 2018-11-28 NOTE — CONSULT NOTE ADULT - SUBJECTIVE AND OBJECTIVE BOX
SICU Consultation Note  =====================================================  HPI: 73y male  ***    Surgery Information: Right Craniotomy for tumor resection 11/28/18  Case Duration: 	EBL: 	IV Fluids: 	Blood Products: 	Urine Output:   Complications:     HISTORY  73y Male  HPI:  73 y.o. 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  Patient was last seen normal 23hrs prior to arrival 11/22. 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 After not answering calls from family, son and daughter went to his house where upon knocking on the door no one answered. Police were called and patient was found face down in urine. He was alert upon arrival to hospital and acknowledged hearing knocking on the door, but was unable to get up.  Patient unable to recall events, stated nothing was wrong. Pt found to have a right temporal rim enhancing cystic mass with midline shift on MRI and scheduled for right craniectomy for tumor resection 11/28. In holding pt was noted to have seizure activity by his eldest son. His son reports that the pt was alert and conversing prior to the event, then his right arm began to shake. The nurse at bedside reports tonic activity and pts eyes rolling in the back of his head. The pt was given versed and seizure activity stopped. Pt is more alert and pt's son reports that he is closer to baseline.    Allergies: NKDA    PAST MEDICAL & SURGICAL HISTORY:  BPH (benign prostatic hyperplasia)  CVA (cerebral vascular accident)  Diabetes  HTN (hypertension)  No significant past surgical history    FAMILY HISTORY:  No pertinent family history in first degree relatives      SOCIAL HISTORY:  Non-smoker. No alcohol use.     ADVANCE DIRECTIVES: Presumed Full Code     REVIEW OF SYSTEMS:   General: Non-Contributory  Skin/Breast: Non-Contributory  Ophthalmologic: Non-Contributory  ENMT: Non-Contributory  Respiratory and Thorax: Non-Contributory  Cardiovascular: Non-Contributory  Gastrointestinal: Non-Contributory  Genitourinary: Non-Contributory  Musculoskeletal: Non-Contributory  Neurological: Non-Contributory  Psychiatric: Non-Contributory  Hematology/Lymphatics: Non-Contributory  Endocrine: Non-Contributory  Allergic/Immunologic: Non-Contributory    HOME MEDICATIONS:  ***    CURRENT MEDICATIONS:   --------------------------------------------------------------------------------------  Neurologic Medications  levETIRAcetam  IVPB 500 milliGRAM(s) IV Intermittent every 12 hours  LORazepam     Tablet 2 milliGRAM(s) Oral daily PRN seizures  midazolam Injectable 2 milliGRAM(s) IV Push once    Respiratory Medications    Cardiovascular Medications  amLODIPine   Tablet 5 milliGRAM(s) Oral daily    Gastrointestinal Medications  dextrose 5%. 1000 milliLiter(s) IV Continuous <Continuous>  sodium chloride 0.9%. 1000 milliLiter(s) IV Continuous <Continuous>    Genitourinary Medications    Hematologic/Oncologic Medications  influenza   Vaccine 0.5 milliLiter(s) IntraMuscular once    Antimicrobial/Immunologic Medications    Endocrine/Metabolic Medications  dexamethasone     Tablet 4 milliGRAM(s) Oral every 6 hours  dextrose 40% Gel 15 Gram(s) Oral once PRN Blood Glucose LESS THAN 70 milliGRAM(s)/deciliter  dextrose 50% Injectable 12.5 Gram(s) IV Push once  dextrose 50% Injectable 25 Gram(s) IV Push once  dextrose 50% Injectable 25 Gram(s) IV Push once  glucagon  Injectable 1 milliGRAM(s) IntraMuscular once PRN Glucose LESS THAN 70 milligrams/deciliter  insulin lispro (HumaLOG) corrective regimen sliding scale   SubCutaneous Before meals and at bedtime    Topical/Other Medications  chlorhexidine 4% Liquid 1 Application(s) Topical <User Schedule>    --------------------------------------------------------------------------------------    VITAL SIGNS, INS/OUTS (last 24 hours):  --------------------------------------------------------------------------------------  T(C): 36.3 (28 Nov 2018 14:34), Max: 37.2 (27 Nov 2018 22:58)  T(F): 97.3 (28 Nov 2018 14:34), Max: 99 (27 Nov 2018 22:58)  HR: 90 (28 Nov 2018 16:15) (65 - 91)  BP: 121/94 (28 Nov 2018 16:15) (107/88 - 187/100)  RR: 15 (28 Nov 2018 16:15) (15 - 18)  SpO2: 96% (28 Nov 2018 16:15) (94% - 98%)  --------------------------------------------------------------------------------------    EXAM  NEUROLOGY  RASS:-1   	GCS: 14   Exam: Normal, NAD, alert, oriented x 3, residual left sided weakness from prior CVA.     SKIN:  Exam: Good skin turgor, no skin breakdown.     METABOLIC/FLUIDS/ELECTROLYTES  dextrose 5%. 1000 milliLiter(s) IV Continuous <Continuous>  sodium chloride 0.9%. 1000 milliLiter(s) IV Continuous <Continuous>      HEMATOLOGIC  [x] DVT Prophylaxis:   Transfusions:	[] PRBC	[] Platelets		[] FFP	[] Cryoprecipitate      Tubes/Lines/Drains  ***  [x] Peripheral IV  [] Central Venous Line     	[] R	[] L	[] IJ	[] Fem	[] SC	Date Placed:   [] Arterial Line		[] R	[] L	[] Fem	[] Rad	[] Ax	Date Placed:   [] PICC:         	[] Midline		[] Mediport  [] Urinary Catheter		Date Placed:     LABS  --------------------------------------------------------------------------------------                        14.5   19.62 )-----------( 241      ( 28 Nov 2018 06:26 )             42.0   11-28    137  |  99  |  20  ----------------------------<  139<H>  4.5   |  26  |  0.77    Ca    8.8      28 Nov 2018 06:26  Mg     2.1     11-28    PT/INR - ( 27 Nov 2018 14:00 )   PT: 13.6 SEC;   INR: 1.22          PTT - ( 27 Nov 2018 14:00 )  PTT:25.3 SEC  --------------------------------------------------------------------------------------    OTHER LABS    IMAGING RESULTS  Echo: < from: Transthoracic Echocardiogram (11.23.18 @ 11:49) >  Patient name: HELGA WHITE  YOB: 1945   Age: 73 (M)   MR#: [id number]  Study Date: 11/23/2018  Location: [id number]: Yadiel Al [address] Sonographer: Aryan Mora M.D.  Study quality: Technically Fair  Referring Physician: Ignacio Alas MD  Blood Pressure: 101/70 mmHg  Height: 177 cm  Weight: 98 kg  BSA: 2.2 m2  ------------------------------------------------------------------------  PROCEDURE: Transthoracic echocardiogram with 2-D, M-Mode  and complete spectraland color flow Doppler.  INDICATION: Syncope and collapse (R55)  ------------------------------------------------------------------------  DIMENSIONS:  Dimensions:     Normal Values:  LA:     3.6 cm    2.0 - 4.0 cm  Ao:     3.8 cm    2.0 - 3.8 cm  SEPTUM: 1.2 cm    0.6 - 1.2 cm  PWT:    1.2 cm    0.6 - 1.1 cm  LVIDd:  4.8 cm    3.0 - 5.6 cm  LVIDs:  3.2 cm    1.8 - 4.0 cm  Derived Variables:  LVMI: 101 g/m2  RWT: 0.50  Fractional short: 33 %  Ejection Fraction (Teicholtz): 62 %  ------------------------------------------------------------------------  OBSERVATIONS:  Mitral Valve: Mitral annular calcification, otherwise  normal mitral valve. Minimal mitral regurgitation.  Aortic Root: Aortic Root: 3.8 cm.  Ascending Aorta: 3.9 cm.  Aortic Valve: Calcified trileaflet aortic valve with normal  opening. Mild aortic regurgitation.  Left Atrium: Normal left atrium.  LA volume index = 17  cc/m2.  Left Ventricle: Endocardium not well visualized; grossly  normal left ventricular systolic function.Increased  relative wall thickness with normal left ventricular mass  index, consistent with concentric left ventricular  remodeling.  Right Heart: Normal right atrium. Normal right ventricular  size and function.  Normal tricuspid valve. Minimal  tricuspid regurgitation. Normal pulmonic valve.  Moderate  pulmonic regurgitation.  Pericardium/PleuraNormal pericardium with no pericardial  effusion.  Hemodynamic: Estimated right ventricular systolic pressure  equals 38 mm Hg, assuming right atrial pressure equals 10  mm Hg, consistent with borderline pulmonary hypertension.  ------------------------------------------------------------------------  CONCLUSIONS:  1. Mitral annular calcification, otherwise normal mitral  valve.  2. Calcified trileaflet aortic valve with normal opening.  Mild aortic regurgitation.  3. Increased relative wall thickness with normal left  ventricular mass index, consistent with concentric left  ventricular remodeling.  4. Endocardium not well visualized; grossly normal left  ventricular systolic function.  5. Normal right ventricular size and function.  6. Normal pulmonic valve.  Moderate pulmonic regurgitation.  Estimated pulmonary artery systolic pressure equals 38 mm  Hg, assuming right atrial pressure equals 10  mm Hg,  consistent with borderline pulmonary hypertension.  ------------------------------------------------------------------------  Confirmed on  11/23/2018 - 13:46:05 by Angel Bob M.D. RPVI  ------------------------------------------------------------------------    < end of copied text >      CT: < from: CT Chest w/ IV Cont (11.22.18 @ 02:31) >    EXAM:  CT ABDOMEN AND PELVIS IC      EXAM:  CT CHEST IC        PROCEDURE DATE:  Nov 22 2018         INTERPRETATION:  CLINICAL INFORMATION: Cystic brain lesion concerning for   encephalomalacia versus malignancy. Evaluate for metastases.    COMPARISON: None.    PROCEDURE:   CT of the Chest, Abdomen and Pelvis was performed with intravenous   contrast.   Intravenous contrast: 90 ml Omnipaque 350. 10 ml discarded.  Oral contrast: None.  Sagittal and coronal reformats were performed.    FINDINGS:    CHEST:     LUNGS AND LARGE AIRWAYS: Small amount tracheal debris. Bilateral   posterior dependent atelectasis and lingular linear atelectasis. No   pneumonia or  PLEURA: No pleural effusion.  VESSELS: Coronary artery calcifications.   HEART: Heart size is normal. No pericardial effusion.  MEDIASTINUM AND YOKO: No lymphadenopathy.  CHEST WALL AND LOWER NECK: Within normal limits.    ABDOMEN AND PELVIS:    Multiple images are degraded secondary to motion.    LIVER: Within normal limits.  BILE DUCTS: Normal caliber.  GALLBLADDER: Within normal limits.  SPLEEN: Within normal limits.  PANCREAS: Within normal limits.  ADRENALS: Indeterminant 1.3 cm right adrenal nodule measures 64   Hounsfield units. The left adrenal gland is unremarkable.  KIDNEYS/URETERS: Within normal limits.    BLADDER: Small bladder diverticula. Otherwise within normal limits.  REPRODUCTIVE ORGANS: Prostate is enlarged.    BOWEL: No bowel obstruction. Colonic diverticulosis. Appendix is normal.  PERITONEUM: No ascites.  VESSELS:  Atherosclerotic calcifications.  RETROPERITONEUM: No lymphadenopathy.    ABDOMINAL WALL: Small fat-containing umbilical hernia.  BONES: Degenerative changes.    IMPRESSION:     Indeterminant right adrenal gland nodule. Dedicated adrenal CT orMR may   be obtained for further evaluation as clinically indicated..     No evidence of intrathoracic metastatic disease.              ROBERTH HONG M.D., RADIOLOGY RESIDENT  This document has been electronically signed.  DIEGO RICKS M.D., ATTENDING RADIOLOGIST  This document has been electronically signed. Nov 22 2018 12:33PM        < end of copied text >    MRI: < from: MR Head w/ IV Cont (11.22.18 @ 06:52) >  EXAM:  MR BRAIN IC        PROCEDURE DATE:  Nov 22 2018         INTERPRETATION:  History: History of stroke, hypertension, diabetes   mellitus, found down, evaluate for brain mass    Technique: Contrast enhanced brain MRI was performed.    Sagittal and axial T1, axial T2, FLAIR, SWI, diffusion-weighted images   and an ADC map, contrast T1, contrast T1 MRPAGE, were obtained.  Coronal   and sagittal contrast reformations were made.    10 cc of Gadavist gallium was uneventfully administered.  0 cc was   discarded.    COMPARISON: Head CT dated 11/21/2018    FINDINGS:    There is a right temporal 5.9 x 4 x 3.7 cm, AP, TR, CC rim-enhancing   cystic mass with nodular irregular enhancement at the lateral margins   with central hyperintense T2, decreased T1 and decreased DWI signal   without restricted diffusion concerning a tumoral mass with surrounding   edema and mass effect. There is extension of the mass to the ventricular   ependymal margins.    Right temporal lobe is effaced with right uncal herniation and medial   displacement of the right lateral ventricle trapped temporal horn, axial   T2 image 17. There is effacement of the right cerebral hemisphere lateral   ventricle with 6 mm leftward shift. And trapping of the left lateral   ventricle which with mild enlargement of the left temporal horn. There is   adjacent hyperintense T2 and FLAIR signal likely vasogenic edema or   nonenhancing tumor extending to the right lentiform nuclei, and right   splenium and body of the corpus callosum. There is faint restricted   diffusion along the margins of the tumor most demonstrating ADC T2 shine   through although some areas are restricted, differential includes tumoral   extension although ischemia is also not excluded. There is a remote area   of infarction with encephalomalacia gliosis at the right inferior medial   cerebellar hemisphere with small lacunar infarcts throughout the left   cerebellar hemisphere and supratentorial cerebral hemispheres. There are   scattered foci of hyperintense T2 and FLAIR signal in the white matter   likely microvascular disease. SWI demonstrates vascularity along the   margins of the mass without evidence for any large extra-axial   hemorrhage. Basal cisterns demonstrate slight effacement of the left   ambient cistern with widening of the right ambient cistern, T2 series 7   image 16.    Orbital globes are unremarkable, there are retention cyst or polyps in   the paranasal sinuses, the scattered mucosal thickening the mastoid tips.   Calvarium is intact.      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    Volume loss with encephalomalacia throughout the right inferior medial   cerebellar hemisphere, smaller foci of ischemia in the left cerebellar   hemisphere and supratentorially.    Findings discussed with Dr. Marques at immediate time of review on   11/22/2018 at 8:29 AM                  RYAN DICKSON M.D., ATTENDING RADIOLOGIST  This document has been electronically signed. Nov 22 2018  1:53PM                  < end of copied text >    Xray: < from: Xray Chest 1 View AP/PA (11.22.18 @ 03:23) >  EXAM:  XR CHEST AP OR PA 1V        PROCEDURE DATE:  Nov 22 2018         INTERPRETATION:  CLINICAL INDICATION: altered mental status    EXAM:  Portable supine frontal chest from 11/22/2018 at 0323. Reviewed in   conjunction with concurrently performed chest CT.     Rotated right.    IMPRESSION:  Small focus of linear subsegmental atelectasis or scar in peripheral left   lower lung. Underinflated but otherwise clear remaining visualized lungs.   No pleural effusions or pneumothorax.    Prominent appearing cardiac and mediastinal silhouettes and tortuous   descending thoracic aorta.    Trachea projects to right of midline but proportionate to degree of   patient rotation.     Generalized osteopenia and spinal degenerative change again noted.      < end of copied text >

## 2018-11-29 PROBLEM — E11.9 TYPE 2 DIABETES MELLITUS WITHOUT COMPLICATIONS: Chronic | Status: ACTIVE | Noted: 2018-11-21

## 2018-11-29 PROBLEM — I10 ESSENTIAL (PRIMARY) HYPERTENSION: Chronic | Status: ACTIVE | Noted: 2018-11-21

## 2018-11-29 PROBLEM — I63.9 CEREBRAL INFARCTION, UNSPECIFIED: Chronic | Status: ACTIVE | Noted: 2018-11-21

## 2018-11-29 PROBLEM — N40.0 BENIGN PROSTATIC HYPERPLASIA WITHOUT LOWER URINARY TRACT SYMPTOMS: Chronic | Status: ACTIVE | Noted: 2018-11-22

## 2018-11-29 LAB
BASOPHILS # BLD AUTO: 0.03 K/UL — SIGNIFICANT CHANGE UP (ref 0–0.2)
BASOPHILS NFR BLD AUTO: 0.2 % — SIGNIFICANT CHANGE UP (ref 0–2)
BUN SERPL-MCNC: 20 MG/DL — SIGNIFICANT CHANGE UP (ref 7–23)
CALCIUM SERPL-MCNC: 8.3 MG/DL — LOW (ref 8.4–10.5)
CHLORIDE SERPL-SCNC: 100 MMOL/L — SIGNIFICANT CHANGE UP (ref 98–107)
CO2 SERPL-SCNC: 26 MMOL/L — SIGNIFICANT CHANGE UP (ref 22–31)
CREAT SERPL-MCNC: 0.81 MG/DL — SIGNIFICANT CHANGE UP (ref 0.5–1.3)
EOSINOPHIL # BLD AUTO: 0 K/UL — SIGNIFICANT CHANGE UP (ref 0–0.5)
EOSINOPHIL NFR BLD AUTO: 0 % — SIGNIFICANT CHANGE UP (ref 0–6)
GLUCOSE SERPL-MCNC: 167 MG/DL — HIGH (ref 70–99)
HCT VFR BLD CALC: 38.9 % — LOW (ref 39–50)
HGB BLD-MCNC: 13.2 G/DL — SIGNIFICANT CHANGE UP (ref 13–17)
IMM GRANULOCYTES # BLD AUTO: 0.25 # — SIGNIFICANT CHANGE UP
IMM GRANULOCYTES NFR BLD AUTO: 1.4 % — SIGNIFICANT CHANGE UP (ref 0–1.5)
LYMPHOCYTES # BLD AUTO: 0.36 K/UL — LOW (ref 1–3.3)
LYMPHOCYTES # BLD AUTO: 2 % — LOW (ref 13–44)
MAGNESIUM SERPL-MCNC: 2.1 MG/DL — SIGNIFICANT CHANGE UP (ref 1.6–2.6)
MCHC RBC-ENTMCNC: 29.8 PG — SIGNIFICANT CHANGE UP (ref 27–34)
MCHC RBC-ENTMCNC: 33.9 % — SIGNIFICANT CHANGE UP (ref 32–36)
MCV RBC AUTO: 87.8 FL — SIGNIFICANT CHANGE UP (ref 80–100)
MONOCYTES # BLD AUTO: 1.21 K/UL — HIGH (ref 0–0.9)
MONOCYTES NFR BLD AUTO: 6.7 % — SIGNIFICANT CHANGE UP (ref 2–14)
NEUTROPHILS # BLD AUTO: 16.3 K/UL — HIGH (ref 1.8–7.4)
NEUTROPHILS NFR BLD AUTO: 89.7 % — HIGH (ref 43–77)
NRBC # FLD: 0 — SIGNIFICANT CHANGE UP
PLATELET # BLD AUTO: 219 K/UL — SIGNIFICANT CHANGE UP (ref 150–400)
PMV BLD: 11.1 FL — SIGNIFICANT CHANGE UP (ref 7–13)
POTASSIUM SERPL-MCNC: 4.3 MMOL/L — SIGNIFICANT CHANGE UP (ref 3.5–5.3)
POTASSIUM SERPL-SCNC: 4.3 MMOL/L — SIGNIFICANT CHANGE UP (ref 3.5–5.3)
RBC # BLD: 4.43 M/UL — SIGNIFICANT CHANGE UP (ref 4.2–5.8)
RBC # FLD: 14.8 % — HIGH (ref 10.3–14.5)
SODIUM SERPL-SCNC: 136 MMOL/L — SIGNIFICANT CHANGE UP (ref 135–145)
WBC # BLD: 18.15 K/UL — HIGH (ref 3.8–10.5)
WBC # FLD AUTO: 18.15 K/UL — HIGH (ref 3.8–10.5)

## 2018-11-29 PROCEDURE — 99233 SBSQ HOSP IP/OBS HIGH 50: CPT

## 2018-11-29 PROCEDURE — 70450 CT HEAD/BRAIN W/O DYE: CPT | Mod: 26,GC

## 2018-11-29 RX ORDER — INSULIN LISPRO 100/ML
VIAL (ML) SUBCUTANEOUS
Qty: 0 | Refills: 0 | Status: DISCONTINUED | OUTPATIENT
Start: 2018-11-29 | End: 2018-12-06

## 2018-11-29 RX ORDER — CHLORHEXIDINE GLUCONATE 213 G/1000ML
1 SOLUTION TOPICAL
Qty: 0 | Refills: 0 | Status: DISCONTINUED | OUTPATIENT
Start: 2018-11-29 | End: 2018-11-30

## 2018-11-29 RX ORDER — CEFAZOLIN SODIUM 1 G
2000 VIAL (EA) INJECTION EVERY 8 HOURS
Qty: 0 | Refills: 0 | Status: COMPLETED | OUTPATIENT
Start: 2018-11-29 | End: 2018-11-30

## 2018-11-29 RX ORDER — INSULIN LISPRO 100/ML
VIAL (ML) SUBCUTANEOUS AT BEDTIME
Qty: 0 | Refills: 0 | Status: DISCONTINUED | OUTPATIENT
Start: 2018-11-29 | End: 2018-12-06

## 2018-11-29 RX ORDER — INSULIN LISPRO 100/ML
VIAL (ML) SUBCUTANEOUS EVERY 6 HOURS
Qty: 0 | Refills: 0 | Status: DISCONTINUED | OUTPATIENT
Start: 2018-11-29 | End: 2018-11-29

## 2018-11-29 RX ADMIN — Medication 4 MILLIGRAM(S): at 23:11

## 2018-11-29 RX ADMIN — AMLODIPINE BESYLATE 5 MILLIGRAM(S): 2.5 TABLET ORAL at 05:03

## 2018-11-29 RX ADMIN — SODIUM CHLORIDE 60 MILLILITER(S): 9 INJECTION INTRAMUSCULAR; INTRAVENOUS; SUBCUTANEOUS at 15:49

## 2018-11-29 RX ADMIN — Medication 4 MILLIGRAM(S): at 05:00

## 2018-11-29 RX ADMIN — LEVETIRACETAM 400 MILLIGRAM(S): 250 TABLET, FILM COATED ORAL at 18:46

## 2018-11-29 RX ADMIN — Medication 100 MILLIGRAM(S): at 14:48

## 2018-11-29 RX ADMIN — Medication 4 MILLIGRAM(S): at 00:55

## 2018-11-29 RX ADMIN — SODIUM CHLORIDE 60 MILLILITER(S): 9 INJECTION INTRAMUSCULAR; INTRAVENOUS; SUBCUTANEOUS at 08:09

## 2018-11-29 RX ADMIN — Medication 4 MILLIGRAM(S): at 12:49

## 2018-11-29 RX ADMIN — LEVETIRACETAM 400 MILLIGRAM(S): 250 TABLET, FILM COATED ORAL at 05:03

## 2018-11-29 RX ADMIN — Medication 4 MILLIGRAM(S): at 18:46

## 2018-11-29 RX ADMIN — Medication 100 MILLIGRAM(S): at 23:11

## 2018-11-29 NOTE — OCCUPATIONAL THERAPY INITIAL EVALUATION ADULT - PLANNED THERAPY INTERVENTIONS, OT EVAL
fine motor coordination training/parent/caregiver training.../strengthening/transfer training/cognitive, visual perceptual/neuromuscular re-education/ADL retraining/balance training/motor coordination training
cognitive, visual perceptual/neuromuscular re-education/strengthening/bed mobility training/balance training/ROM/ADL retraining/transfer training

## 2018-11-29 NOTE — OCCUPATIONAL THERAPY INITIAL EVALUATION ADULT - GENERAL OBSERVATIONS, REHAB EVAL
+ right head dressing clean/ dry/ intact. + bulb drain. + A-line. + luke. SREE Garza present throughout encounter.
Patient is alert and following directions.

## 2018-11-29 NOTE — OCCUPATIONAL THERAPY INITIAL EVALUATION ADULT - DIAGNOSIS, OT EVAL
s/p left craniotomy for tumor; decreased visual tracking, left UE weakness, decreased spatial awareness, decreased balance, decreased functional mobility, decreased ADL independnce
CVA

## 2018-11-29 NOTE — PHYSICAL THERAPY INITIAL EVALUATION ADULT - GENERAL OBSERVATIONS, REHAB EVAL
Pt received supine in bed this AM with visitor present
Consult received, chart reviewed. Patient received supine in bed, NAD, + SUSANA drain. Patient agreed to EVALUATION from Physical Therapist.

## 2018-11-29 NOTE — OCCUPATIONAL THERAPY INITIAL EVALUATION ADULT - NS ASR FOLLOW COMMAND OT EVAL
able to follow single-step instructions/75% of the time
able to follow multistep instructions/100% of the time

## 2018-11-29 NOTE — OCCUPATIONAL THERAPY INITIAL EVALUATION ADULT - LEVEL OF INDEPENDENCE: DRESS UPPER BODY, OT EVAL
minimum assist (75% patients effort)
moderate assist (50% patients effort)/minimum assist (75% patients effort)

## 2018-11-29 NOTE — DIETITIAN INITIAL EVALUATION ADULT. - ENERGY NEEDS
pt. meets the criteria for MODERATE malnutrition evidenced by wt. loss , fat & muscle loss and < 75% PO nutrition since admission to hospital - 7 days

## 2018-11-29 NOTE — DIETITIAN INITIAL EVALUATION ADULT. - PROBLEM SELECTOR PLAN 8
IMPROVE score: 2 (age and possible CA)  DVT ppx: SCDs for now, pending neurosurgery official recs, can start heparin SQ if no anticipations for   Diet: NPO pending S&S   PT/OT consult, seizure precautions, aspiration precautions, fall precautions   Dr. Jade Carmona   Internal Medicine   PGY-2   [phone] (long range pager)  [phone] (short range)

## 2018-11-29 NOTE — DIETITIAN INITIAL EVALUATION ADULT. - NUTRITIONGOAL OUTCOME1
pt. to initiate texture modified diet - Consistent carbohydrate , low sodium & take >75% of the meal

## 2018-11-29 NOTE — DIETITIAN INITIAL EVALUATION ADULT. - PROBLEM SELECTOR PLAN 1
Unclear aitiology, patient unable to recall events  -cardiac work-up w/ TTE, Troponin elevated ?ACS vs demand ischemia, will continue to monitor on telemetry  -Neuro workup w/ findings of large cystic mass w/ MLS, ?seizure given rhabdo, will obtain MR brain w/ IV contrast to better visualize mass. Neurosurgery following, obtain neurology consult in AM   -CTA chest preformed r/o PE also looking for malignancy; patient not in any respiratory distress, saturation well on RA.  TSH WNL  -will obtain EEG and prolactin, urine toxicology  -hyponatremia now resolved after 2L NS bolus, but corrected too quickly in the ED. will monitor to make sure serum Na is not corrected by more than 8-10 mEq in 24 hrs.

## 2018-11-29 NOTE — PROGRESS NOTE ADULT - PROBLEM SELECTOR PLAN 1
Stable post op patient  1. MRI brain w./w.o in AM  2. Monitor SUSNAA drain q Shift  3. Q1H neurochecks  4. C/w Dex  5. C/w Keppra  Case d/w Dr. Gonzaelz

## 2018-11-29 NOTE — PHYSICAL THERAPY INITIAL EVALUATION ADULT - CRITERIA FOR SKILLED THERAPEUTIC INTERVENTIONS
anticipated equipment needs at discharge/impairments found/predicted duration of therapy intervention/anticipated discharge recommendation/rehab potential/therapy frequency
impairments found

## 2018-11-29 NOTE — PHYSICAL THERAPY INITIAL EVALUATION ADULT - IMPAIRMENTS FOUND, PT EVAL
posture/muscle strength/arousal, attention, and cognition/gait, locomotion, and balance
gait, locomotion, and balance/aerobic capacity/endurance

## 2018-11-29 NOTE — DIETITIAN INITIAL EVALUATION ADULT. - PROBLEM SELECTOR PLAN 2
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

## 2018-11-29 NOTE — OCCUPATIONAL THERAPY INITIAL EVALUATION ADULT - RANGE OF MOTION EXAMINATION, UPPER EXTREMITY
Right UE Active ROM was WFL (within functional limits)/Left UE Active Assistive ROM was WFL  (within functional limits)
Left UE Active Assistive ROM was WFL  (within functional limits)/Right UE Active ROM was WFL (within functional limits)

## 2018-11-29 NOTE — DIETITIAN INITIAL EVALUATION ADULT. - FACTORS AFF FOOD INTAKE
other (specify)/Nutrition focused physical exam conducted - found signs of malnutrition - MODERATE Subcutaneous fat loss Orbital fat pads region,  Buccal fat region, &  MODERATE  Muscle wasting: Temples region,  Clavicle region , Shoulder region,  Scapula region,/difficulty swallowing

## 2018-11-29 NOTE — PHYSICAL THERAPY INITIAL EVALUATION ADULT - GAIT DEVIATIONS NOTED, PT EVAL
decreased stride length/decreased step length/decreased velocity of limb motion/decreased lyle/increased time in double stance
decreased lyle

## 2018-11-29 NOTE — PHYSICAL THERAPY INITIAL EVALUATION ADULT - RANGE OF MOTION EXAMINATION, REHAB EVAL
no ROM deficits were identified
bilateral upper extremity ROM was WFL (within functional limits)/bilateral lower extremity ROM was WFL (within functional limits)

## 2018-11-29 NOTE — DIETITIAN INITIAL EVALUATION ADULT. - OTHER INFO
Nutrition assessment initiated for critical care LOS. Pt. alert , oriented x2 upon waking , reports no known food allergies, was on PO diet , followed therapeutic diet , was checking serum glucose , unable to state  recent wt. changes . Noted the current diet order met with medical team and discussed  nutrition recommendation.

## 2018-11-29 NOTE — PROGRESS NOTE ADULT - SUBJECTIVE AND OBJECTIVE BOX
SICU Consultation Note  =====================================================  HPI: 73 y.o. 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                                                                                                                         Patient was last seen normal 23hrs prior to arrival 11/22. 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 After not answering calls from family, son and daughter went to his house where upon knocking on the door no one answered. Police were called and patient was found face down in urine. He was alert upon arrival to hospital and acknowledged hearing knocking on the door, but was unable to get up.  Patient unable to recall events, stated nothing was wrong. Pt found to have a right temporal rim enhancing cystic mass with midline shift on MRI and scheduled for right craniectomy for tumor resection 11/28. In holding pt was noted to have seizure activity by his eldest son. His son reports that the pt was alert and conversing prior to the event, then his right arm began to shake. The nurse at bedside reports tonic activity and pts eyes rolling in the back of his head. The pt was given versed and seizure activity stopped. Pt is more alert and pt's son reports that he is closer to baseline.      Surgery Information  OR time:      EBL:       UOP:              IV Fluids:       Blood Products:             PAST MEDICAL & SURGICAL HISTORY:  BPH (benign prostatic hyperplasia)  CVA (cerebral vascular accident)  Diabetes  HTN (hypertension)  No significant past surgical history    Home Meds:   Allergies:   Soc:   Advanced Directives: Presumed Full Code     ROS:    General: Non-Contributory  Skin/Breast: Non-Contributory  Ophthalmologic: Non-Contributory  ENMT: Non-Contributory  Respiratory and Thorax: Non-Contributory  Cardiovascular: Non-Contributory  Gastrointestinal: Non-Contributory  Genitourinary: Non-Contributory  Musculoskeletal: Non-Contributory  Neurological: Non-Contributory  Psychiatric: Non-Contributory  Hematology/Lymphatics: Non-Contributory  Endocrine: Non-Contributory  Allergic/Immunologic: Non-Contributory    CURRENT MEDICATIONS:   --------------------------------------------------------------------------------------  Neurologic Medications  levETIRAcetam  IVPB 500 milliGRAM(s) IV Intermittent every 12 hours  LORazepam     Tablet 2 milliGRAM(s) Oral daily PRN seizures    Respiratory Medications    Cardiovascular Medications  amLODIPine   Tablet 5 milliGRAM(s) Oral daily    Gastrointestinal Medications  sodium chloride 0.9%. 1000 milliLiter(s) IV Continuous <Continuous>    Genitourinary Medications    Hematologic/Oncologic Medications  influenza   Vaccine 0.5 milliLiter(s) IntraMuscular once    Antimicrobial/Immunologic Medications    Endocrine/Metabolic Medications  dexamethasone  Injectable 4 milliGRAM(s) IV Push every 6 hours  insulin lispro (HumaLOG) corrective regimen sliding scale   SubCutaneous Before meals and at bedtime    Topical/Other Medications  chlorhexidine 4% Liquid 1 Application(s) Topical <User Schedule>    --------------------------------------------------------------------------------------    VITAL SIGNS, INS/OUTS (last 24 hours):  --------------------------------------------------------------------------------------  ((Insert SICU Vitals / Is+Os here)) ***  --------------------------------------------------------------------------------------    EXAM:  General/Neuro  RASS:   GCS:   Exam: Normal, NAD, alert, oriented x 3, no focal deficits. PERRLA  ***    Respiratory  Exam: Lungs clear to auscultation, Normal expansion/effort.  ***  [] Tracheostomy   [] Intubated  Mechanical Ventilation:     Cardiovascular  Exam: S1, S2.  Regular rate and rhythm.  Peripheral edema  ***  Cardiac Rhythm: Normal Sinus Rhythm  ECHO:     GI  Exam: Abdomen soft, Non-tender, Non-distended.  Gastrostomy / Jejunostomy tube in place.  Nasogastric tube in place.  Colostomy / Ileostomy.  ***  Wound:   ***  Current Diet:  NPO***      Tubes/Lines/Drains  ***  [x] Peripheral IV  [] Central Venous Line     	[] R	[] L	[] IJ	[] Fem	[] SC        Type:	    Date Placed:   [] Arterial Line		[] R	[] L	[] Fem	[] Rad	[] Ax	Date Placed:   [] PICC:         	[] Midline		[] Mediport           [] Urinary Catheter		Date Placed:     Extremities  Exam: Extremities warm, pink, well-perfused.        Derm:  Exam: Good skin turgor, no skin breakdown.      :   Exam: Roberson catheter in place.     LABS  --------------------------------------------------------------------------------------  ((Insert SICU Labs here))***  --------------------------------------------------------------------------------------    OTHER LABS    IMAGING RESULTS      ASSESSMENT:  73y Male ***    PLAN:   Neurologic:   Respiratory:   Cardiovascular:   Gastrointestinal/Nutrition:   Renal/Genitourinary:   Hematologic:   Infectious Disease:   Lines/Tubes:  Endocrine:   Disposition:     --------------------------------------------------------------------------------------    Critical Care Diagnoses: SICU Consultation Note  =====================================================  HPI: 73 y.o. 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 Patient was last seen normal 23hrs prior to arrival 11/22. 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 After not answering calls from family, son and daughter went to his house where upon knocking on the door no one answered. Police were called and patient was found face down in urine. He was alert upon arrival to hospital and acknowledged hearing knocking on the door, but was unable to get up.  Patient unable to recall events, stated nothing was wrong. Pt found to have a right temporal rim enhancing cystic mass with midline shift on MRI and scheduled for right craniectomy for tumor resection 11/28. In holding, pt was noted to have seizure activity by his eldest son. His son reports that the pt was alert and conversing prior to the event, then his right arm began to shake. The nurse at bedside reports tonic activity and pts eyes rolling in the back of his head. The pt was given versed and seizure activity stopped. Pt is more alert and pt's son reports that he is closer to baseline.        PAST MEDICAL & SURGICAL HISTORY:  BPH (benign prostatic hyperplasia)  CVA (cerebral vascular accident)  Diabetes  HTN (hypertension)  No significant past surgical history      Advanced Directives: Presumed Full Code     ROS:    Unable to assess because pt being drowsy due to pain meds    CURRENT MEDICATIONS:   --------------------------------------------------------------------------------------  Neurologic Medications  levETIRAcetam  IVPB 500 milliGRAM(s) IV Intermittent every 12 hours  LORazepam     Tablet 2 milliGRAM(s) Oral daily PRN seizures    Respiratory Medications    Cardiovascular Medications  amLODIPine   Tablet 5 milliGRAM(s) Oral daily    Gastrointestinal Medications  sodium chloride 0.9%. 1000 milliLiter(s) IV Continuous <Continuous>    Genitourinary Medications    Hematologic/Oncologic Medications  influenza   Vaccine 0.5 milliLiter(s) IntraMuscular once    Antimicrobial/Immunologic Medications    Endocrine/Metabolic Medications  dexamethasone  Injectable 4 milliGRAM(s) IV Push every 6 hours  insulin lispro (HumaLOG) corrective regimen sliding scale   SubCutaneous Before meals and at bedtime    Topical/Other Medications  chlorhexidine 4% Liquid 1 Application(s) Topical <User Schedule>    --------------------------------------------------------------------------------------  ICU Vital Signs Last 24 Hrs  T(C): 37.1 (29 Nov 2018 00:00), Max: 37.1 (28 Nov 2018 06:01)  T(F): 98.8 (29 Nov 2018 00:00), Max: 98.8 (29 Nov 2018 00:00)  HR: 62 (29 Nov 2018 03:00) (60 - 91)  BP: 126/97 (29 Nov 2018 03:00) (107/88 - 187/100)  BP(mean): 104 (29 Nov 2018 03:00) (104 - 104)  ABP: 137/84 (29 Nov 2018 03:00) (137/84 - 164/95)  ABP(mean): 102 (29 Nov 2018 03:00) (102 - 119)  RR: 16 (29 Nov 2018 03:00) (8 - 18)  SpO2: 97% (29 Nov 2018 03:00) (95% - 98%)    --------------------------------------------------------------------------------------  I&O's Detail    28 Nov 2018 07:01  -  29 Nov 2018 03:52  --------------------------------------------------------  IN:    sodium chloride 0.9%.: 300 mL  Total IN: 300 mL    OUT:    Drain: 45 mL    Indwelling Catheter - Urethral: 660 mL    Voided: 300 mL  Total OUT: 1005 mL    Total NET: -705 mL    --------------------------------------------------------------------------------------    EXAM:  General/Neuro  Exam: NAD, awake, follows commands, oriented to self, time, and place , PERRL   L Facial Droop  RUE ad RLE adequate strength, motor, sensory  LUE: inability to move extremity, impaired sensation  LLE: some motor function but, impaired compared to contralateral side  Incision: C/D/I    Respiratory  Exam: Lungs clear to auscultation, Normal expansion/effort.       Cardiovascular  Exam: S1, S2.  Regular rate and rhythm.  GI  Exam: Abdomen soft, Non-tender, Non-distended.   Current Diet:  NPO      Tubes/Lines/Drains  [x] Peripheral IV  [x] Urinary Catheter		      :   Exam: Luke catheter in place.     LABS  --------------------------------------------------------------------------------------  CBC (11-29 @ 02:40)                              13.2                           18.15<H>  )----------------(  219        89.7<H>% Neutrophils, 2.0<L>% Lymphocytes, ANC: 16.30<H>                              38.9<L>  CBC (11-28 @ 06:26)                              14.5                           19.62<H>  )----------------(  241        88.6<H>% Neutrophils, 3.7<L>% Lymphocytes, ANC: 17.39<H>                              42.0      BMP (11-29 @ 02:40)             136     |  100     |  20    		Ca++ --      Ca 8.3<L>             ---------------------------------( 167<H>		Mg 2.1                4.3     |  26      |  0.81  			Ph --      BMP (11-28 @ 06:26)             137     |  99      |  20    		Ca++ --      Ca 8.8                ---------------------------------( 139<H>		Mg 2.1                4.5     |  26      |  0.77  			Ph --              ABG (11-28 @ 19:48)     7.50<H> / 34<L> / 97 / 27<H> / 2.4 / 97.8%     Lactate:      --------------------------------------------------------------------------------------    ASSESSMENT:   73 y.o. 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 Patient was last seen normal 23hrs prior to arrival 11/22. 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 After not answering calls from family, son and daughter went to his house where upon knocking on the door no one answered. Police were called and patient was found face down in urine. He was alert upon arrival to hospital and acknowledged hearing knocking on the door, but was unable to get up.  Patient unable to recall events, stated nothing was wrong. Pt found to have a right temporal rim enhancing cystic mass with midline shift on MRI and scheduled for right craniectomy for tumor resection 11/28. In holding, pt was noted to have seizure activity by his eldest son. His son reports that the pt was alert and conversing prior to the event, then his right arm began to shake. The nurse at bedside reports tonic activity and pts eyes rolling in the back of his head. The pt was given versed and seizure activity stopped. Pt is more alert and pt's son reports that he is closer to baseline.        PLAN:   Neurologic:   - Q1 hour neuro checks  - c/w decadron and keppra     Respiratory:   -IS    Cardiovascular:   -c/w amlodipine    Gastrointestinal/Nutrition:   -NPO with meds    Renal/Genitourinary:   -c/w luke  -strict Is and Os    Lines/Tubes:  SUSANA X 1    Endocrine:   CASI VEGAS    Disposition:   -SICU for Q1 hour neuro checks  --------------------------------------------------------------------------------------    Critical Care Diagnoses: s/p craniotomy SICU Consultation Note  =====================================================  HPI: 73 y.o. 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 Patient was last seen normal 23hrs prior to arrival 11/22. 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 After not answering calls from family, son and daughter went to his house where upon knocking on the door no one answered. Police were called and patient was found face down in urine. He was alert upon arrival to hospital and acknowledged hearing knocking on the door, but was unable to get up.  Patient unable to recall events, stated nothing was wrong. Pt found to have a right temporal rim enhancing cystic mass with midline shift on MRI and scheduled for right craniectomy for tumor resection 11/28. In holding, pt was noted to have seizure activity by his eldest son. His son reported that the pt was alert and conversing prior to the event, then his right arm began to shake. The nurse at bedside reported tonic activity and pts eyes rolling in the back of his head. The pt was given versed and seizure activity stopped. Pt was more alert and pt's son reported that he is closer to baseline.        PAST MEDICAL & SURGICAL HISTORY:  BPH (benign prostatic hyperplasia)  CVA (cerebral vascular accident)  Diabetes  HTN (hypertension)  No significant past surgical history      Advanced Directives: Presumed Full Code     ROS:        CURRENT MEDICATIONS:   --------------------------------------------------------------------------------------  Neurologic Medications  levETIRAcetam  IVPB 500 milliGRAM(s) IV Intermittent every 12 hours  LORazepam     Tablet 2 milliGRAM(s) Oral daily PRN seizures    Respiratory Medications    Cardiovascular Medications  amLODIPine   Tablet 5 milliGRAM(s) Oral daily    Gastrointestinal Medications  sodium chloride 0.9%. 1000 milliLiter(s) IV Continuous <Continuous>    Genitourinary Medications    Hematologic/Oncologic Medications  influenza   Vaccine 0.5 milliLiter(s) IntraMuscular once    Antimicrobial/Immunologic Medications    Endocrine/Metabolic Medications  dexamethasone  Injectable 4 milliGRAM(s) IV Push every 6 hours  insulin lispro (HumaLOG) corrective regimen sliding scale   SubCutaneous Before meals and at bedtime    Topical/Other Medications  chlorhexidine 4% Liquid 1 Application(s) Topical <User Schedule>    --------------------------------------------------------------------------------------  ICU Vital Signs Last 24 Hrs  T(C): 37.1 (29 Nov 2018 00:00), Max: 37.1 (28 Nov 2018 06:01)  T(F): 98.8 (29 Nov 2018 00:00), Max: 98.8 (29 Nov 2018 00:00)  HR: 62 (29 Nov 2018 03:00) (60 - 91)  BP: 126/97 (29 Nov 2018 03:00) (107/88 - 187/100)  BP(mean): 104 (29 Nov 2018 03:00) (104 - 104)  ABP: 137/84 (29 Nov 2018 03:00) (137/84 - 164/95)  ABP(mean): 102 (29 Nov 2018 03:00) (102 - 119)  RR: 16 (29 Nov 2018 03:00) (8 - 18)  SpO2: 97% (29 Nov 2018 03:00) (95% - 98%)    --------------------------------------------------------------------------------------  I&O's Detail    28 Nov 2018 07:01  -  29 Nov 2018 03:52  --------------------------------------------------------  IN:    sodium chloride 0.9%.: 300 mL  Total IN: 300 mL    OUT:    Drain: 45 mL    Indwelling Catheter - Urethral: 660 mL    Voided: 300 mL  Total OUT: 1005 mL    Total NET: -705 mL    --------------------------------------------------------------------------------------    EXAM:  General/Neuro  Exam: NAD, awake, follows commands, oriented to self and time, thinks he's at home, PERRL   L Facial Droop  RUE and RLE adequate strength, motor, sensory  LUE: inability to move extremity, impaired sensation  LLE: some motor function but, impaired compared to contralateral side  Incision: C/D/I, R cranial SUSANA drain - serosanguinous    Respiratory  Exam: Lungs clear to auscultation, Normal expansion/effort.       Cardiovascular  Exam: S1, S2.  Regular rate and rhythm.    GI  Exam: Abdomen soft, Non-tender, Non-distended.   Current Diet:  NPO    Tubes/Lines/Drains  [x] Peripheral IV  [x] Urinary Catheter		      :   Exam: Roberson catheter in place.     LABS  --------------------------------------------------------------------------------------  CBC (11-29 @ 02:40)                              13.2                           18.15<H>  )----------------(  219        89.7<H>% Neutrophils, 2.0<L>% Lymphocytes, ANC: 16.30<H>                              38.9<L>  CBC (11-28 @ 06:26)                              14.5                           19.62<H>  )----------------(  241        88.6<H>% Neutrophils, 3.7<L>% Lymphocytes, ANC: 17.39<H>                              42.0      BMP (11-29 @ 02:40)             136     |  100     |  20    		Ca++ --      Ca 8.3<L>             ---------------------------------( 167<H>		Mg 2.1                4.3     |  26      |  0.81  			Ph --      BMP (11-28 @ 06:26)             137     |  99      |  20    		Ca++ --      Ca 8.8                ---------------------------------( 139<H>		Mg 2.1                4.5     |  26      |  0.77  			Ph --              ABG (11-28 @ 19:48)     7.50<H> / 34<L> / 97 / 27<H> / 2.4 / 97.8%     Lactate:      -------------------------------------------------------------------------------------- SICU Progress Note  =====================================================  HPI: 73 y.o. 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 In holding prior to OR, pt was noted to have seizure activity by his eldest son. His son reported that the pt was alert and conversing prior to the event, then his right arm began to shake. The nurse at bedside reported tonic activity and pts eyes rolling in the back of his head. The pt was given versed and seizure activity stopped. Patient underwent R craniectomy and tumor resection 11/28. Admitted to SICU.    Advanced Directives: Presumed Full Code     CURRENT MEDICATIONS:   --------------------------------------------------------------------------------------  Neurologic Medications  levETIRAcetam  IVPB 500 milliGRAM(s) IV Intermittent every 12 hours  LORazepam     Tablet 2 milliGRAM(s) Oral daily PRN seizures    Respiratory Medications    Cardiovascular Medications  amLODIPine   Tablet 5 milliGRAM(s) Oral daily    Gastrointestinal Medications  sodium chloride 0.9%. 1000 milliLiter(s) IV Continuous <Continuous>    Genitourinary Medications    Hematologic/Oncologic Medications  influenza   Vaccine 0.5 milliLiter(s) IntraMuscular once    Antimicrobial/Immunologic Medications    Endocrine/Metabolic Medications  dexamethasone  Injectable 4 milliGRAM(s) IV Push every 6 hours  insulin lispro (HumaLOG) corrective regimen sliding scale   SubCutaneous Before meals and at bedtime    Topical/Other Medications  chlorhexidine 4% Liquid 1 Application(s) Topical <User Schedule>    --------------------------------------------------------------------------------------  ICU Vital Signs Last 24 Hrs  T(C): 37.1 (29 Nov 2018 00:00), Max: 37.1 (28 Nov 2018 06:01)  T(F): 98.8 (29 Nov 2018 00:00), Max: 98.8 (29 Nov 2018 00:00)  HR: 62 (29 Nov 2018 03:00) (60 - 91)  BP: 126/97 (29 Nov 2018 03:00) (107/88 - 187/100)  BP(mean): 104 (29 Nov 2018 03:00) (104 - 104)  ABP: 137/84 (29 Nov 2018 03:00) (137/84 - 164/95)  ABP(mean): 102 (29 Nov 2018 03:00) (102 - 119)  RR: 16 (29 Nov 2018 03:00) (8 - 18)  SpO2: 97% (29 Nov 2018 03:00) (95% - 98%)    --------------------------------------------------------------------------------------  I&O's Detail    28 Nov 2018 07:01  -  29 Nov 2018 03:52  --------------------------------------------------------  IN:    sodium chloride 0.9%.: 300 mL  Total IN: 300 mL    OUT:    Drain: 45 mL    Indwelling Catheter - Urethral: 660 mL    Voided: 300 mL  Total OUT: 1005 mL    Total NET: -705 mL    --------------------------------------------------------------------------------------    EXAM:  General/Neuro  Exam: NAD, awake, follows commands, oriented to self and time, thinks he's at home, PERRL   L Facial Droop  RUE and RLE adequate strength, motor, sensory  LUE: inability to move extremity, impaired sensation  LLE: some motor function but, impaired compared to contralateral side  Incision: C/D/I, R cranial SUSANA drain - serosanguinous    Respiratory  Exam: Lungs clear to auscultation, Normal expansion/effort.       Cardiovascular  Exam: S1, S2.  Regular rate and rhythm.    GI  Exam: Abdomen soft, Non-tender, Non-distended.   Current Diet:  NPO    Tubes/Lines/Drains  [x] Peripheral IV  [x] Urinary Catheter		      :   Exam: Roberson catheter in place.     LABS  --------------------------------------------------------------------------------------  CBC (11-29 @ 02:40)                              13.2                           18.15<H>  )----------------(  219        89.7<H>% Neutrophils, 2.0<L>% Lymphocytes, ANC: 16.30<H>                              38.9<L>  CBC (11-28 @ 06:26)                              14.5                           19.62<H>  )----------------(  241        88.6<H>% Neutrophils, 3.7<L>% Lymphocytes, ANC: 17.39<H>                              42.0      BMP (11-29 @ 02:40)             136     |  100     |  20    		Ca++ --      Ca 8.3<L>             ---------------------------------( 167<H>		Mg 2.1                4.3     |  26      |  0.81  			Ph --      BMP (11-28 @ 06:26)             137     |  99      |  20    		Ca++ --      Ca 8.8                ---------------------------------( 139<H>		Mg 2.1                4.5     |  26      |  0.77  			Ph --              LIANA (11-28 @ 19:48)     7.50<H> / 34<L> / 97 / 27<H> / 2.4 / 97.8%     Lactate:      --------------------------------------------------------------------------------------

## 2018-11-29 NOTE — CHART NOTE - NSCHARTNOTEFT_GEN_A_CORE
NUTRITION SERVICES                                                                                  MALNUTRITION ALERT     Attention Health Care Provider: Upon nutritional assessment by the Registered Dietitian your patient was determined to meet criteria / has evidence of the following diagnosis/diagnoses:    [ ] Mild Protein Calorie Malnutrition   [x ] Moderate Protein Calorie Malnutrition   [ ] Severe Protein Calorie Malnutrition   [ ] Unspecified Protein Calorie Malnutrition   [ ] Underweight / BMI <19  [ ] Morbid Obesity / BMI >40          By signing this assessment you are acknowledging the diagnosis/diagnoses.       PLAN OF CARE: Refer to Initial Dietitian Evaluation or Nutrition Follow-Up Documentation for Nutritional Recommendations.

## 2018-11-29 NOTE — PHYSICAL THERAPY INITIAL EVALUATION ADULT - MANUAL MUSCLE TESTING RESULTS, REHAB EVAL
cardiac issues/grossly assessed due to
Grossly >3/5 throughout through ability to perform functional mobility

## 2018-11-29 NOTE — PHYSICAL THERAPY INITIAL EVALUATION ADULT - PLANNED THERAPY INTERVENTIONS, PT EVAL
transfer training/balance training/bed mobility training/gait training/postural re-education/strengthening
gait training/strengthening/balance training/bed mobility training/transfer training

## 2018-11-29 NOTE — DIETITIAN INITIAL EVALUATION ADULT. - PROBLEM SELECTOR PLAN 4
?in setting of seizure given found down in urine and CT findings   -c/w IVF hydration with NS at 100c/hr, stop after 24hrs  -continue to trend CK

## 2018-11-29 NOTE — OCCUPATIONAL THERAPY INITIAL EVALUATION ADULT - MD ORDER
Occupational Therapy to evaluate and treat.  Ambulate as tolerated.
Occupational Therapy to evaluate and treat.

## 2018-11-29 NOTE — OCCUPATIONAL THERAPY INITIAL EVALUATION ADULT - IMPAIRED TRANSFERS: SIT/STAND, REHAB EVAL
impaired motor control/impaired postural control/impaired balance/decreased strength
impaired balance/impaired vision/impaired postural control/decreased strength

## 2018-11-29 NOTE — OCCUPATIONAL THERAPY INITIAL EVALUATION ADULT - LEVEL OF INDEPENDENCE: DRESS LOWER BODY, OT EVAL
maximum assist (25% patients effort)/moderate assist (50% patients effort)
maximum assist (25% patients effort)

## 2018-11-29 NOTE — PROGRESS NOTE ADULT - SUBJECTIVE AND OBJECTIVE BOX
POST ANESTHESIA EVALUATION    73y Male POSTOP DAY 1    MENTAL STATUS: Patient participation [x  ] Awake     [  ] Arousable     [  ] Sedated    AIRWAY PATENCY: [  x] Satisfactory  [  ] Other:     Vital Signs Last 24 Hrs  T(C): 36.7 (29 Nov 2018 08:00), Max: 37.1 (29 Nov 2018 00:00)  T(F): 98 (29 Nov 2018 08:00), Max: 98.8 (29 Nov 2018 00:00)  HR: 70 (29 Nov 2018 10:00) (58 - 91)  BP: 126/97 (29 Nov 2018 03:00) (107/88 - 187/100)  BP(mean): 104 (29 Nov 2018 03:00) (104 - 104)  RR: 14 (29 Nov 2018 10:00) (8 - 18)  SpO2: 93% (29 Nov 2018 10:00) (92% - 98%)  I&O's Summary    28 Nov 2018 07:01  -  29 Nov 2018 07:00  --------------------------------------------------------  IN: 730 mL / OUT: 1350 mL / NET: -620 mL    29 Nov 2018 07:01  -  29 Nov 2018 10:46  --------------------------------------------------------  IN: 180 mL / OUT: 245 mL / NET: -65 mL          NAUSEA/ VOMITTING:  [ x ] NONE  [  ] CONTROLLED [  ] OTHER     PAIN: [ x ] CONTROLLED WITH CURRENT REGIMEN  [  ] OTHER    [ x ] NO APPARENT ANESTHESIA COMPLICATIONS      Comments:

## 2018-11-29 NOTE — DIETITIAN INITIAL EVALUATION ADULT. - PROBLEM SELECTOR PLAN 3
possible type 2 MI (demand) given high troponin w/ upward trend, also with T-wave inversions on lead 3 and AVF patient w/o chest pain, unable to recall events  -continue to trend troponin until peak  -obtain TTE  -cardiology following

## 2018-11-29 NOTE — PROGRESS NOTE ADULT - SUBJECTIVE AND OBJECTIVE BOX
Post op Note    Dx: Brain Lesion    73y    Male   s/p R Crani for resection of Brain Tumor          T(C): 37.1 (11-29-18 @ 00:00), Max: 37.1 (11-28-18 @ 06:01)  HR: 62 (11-29-18 @ 00:00) (62 - 91)  BP: 135/91 (11-28-18 @ 21:50) (107/88 - 187/100)  RR: 16 (11-29-18 @ 00:00) (8 - 18)  SpO2: 98% (11-29-18 @ 00:00) (95% - 98%)  Wt(kg): --      Physical exam  Awake, alert, oriented x 1, PERRL, EOMI  RUE 5/5  LUE 0/5  RLE 5/5  LLE 4/5  Follows commands, speech clear  ERWIN X4 with good strength   Incision: C/D/I Post op Note    Dx: Brain Lesion    73y    Male   s/p R Crani for resection of Brain Tumor          T(C): 37.1 (11-29-18 @ 00:00), Max: 37.1 (11-28-18 @ 06:01)  HR: 62 (11-29-18 @ 00:00) (62 - 91)  BP: 135/91 (11-28-18 @ 21:50) (107/88 - 187/100)  RR: 16 (11-29-18 @ 00:00) (8 - 18)  SpO2: 98% (11-29-18 @ 00:00) (95% - 98%)  Wt(kg): --      Physical exam  Awake, alert, oriented x 1, PERRL, EOMI  L Facial Droop  RUE 5/5  LUE 0/5  RLE 5/5  LLE 4/5  Follows commands, speech clear  ERWIN X4 with good strength   Incision: C/D/I

## 2018-11-29 NOTE — OCCUPATIONAL THERAPY INITIAL EVALUATION ADULT - PERTINENT HX OF CURRENT PROBLEM, REHAB EVAL
73m hx HTN, NIDDM, CVA (2013 w/ left side weakness), BPH brought in by EMS after being found down. CT Brain: Large fluid density mass within the region of old infarction in the right temporal and parietal lobes with resulting mass effect. Pt now s/p left craniotomy for tumor;

## 2018-11-29 NOTE — PROGRESS NOTE ADULT - ASSESSMENT
ASSESSMENT:   73 y.o. 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     PLAN:   Neurologic:   - Q1 hour neuro checks  - c/w decadron and keppra     Respiratory:   -Incentive spirometer  -Nasal Cannula    Cardiovascular:   -c/w amlodipine    Gastrointestinal/Nutrition:   -Advance diet as tolerated    Renal/Genitourinary:   -c/w luke  -strict Is and Os    Lines/Tubes:  SUSANA X 1    Endocrine:   CASI VEGAS    Disposition:   -SICU for Q1 hour neuro checks  --------------------------------------------------------------------------------------    Critical Care Diagnoses: s/p craniotomy ASSESSMENT:   73 y.o. 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     PLAN:   Neurologic:   - Q1H neuro checks  - c/w decadron and keppra     Respiratory:   - Encourage IS  - Nasal cannula as needed    Cardiovascular:   - c/w amlodipine    Gastrointestinal/Nutrition:   - Diabetic diet    Renal/Genitourinary:   - c/w luke  - strict Is and Os    Heme  - H/h stable  - Plan to start SQH tomorrow AM    Lines/Tubes: SUSANA X 1    Endocrine:  - ASCENCION  - FS    Disposition: SICU for Q1 hour neuro checks    Critical Care Diagnoses: s/p craniotomy ASSESSMENT:   73 y.o. 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     PLAN:   Neurologic:   - Q1H neuro checks  - c/w decadron and keppra     Respiratory:   - Encourage IS  - Nasal cannula as needed    Cardiovascular:   - c/w amlodipine    Gastrointestinal/Nutrition:   - Diabetic diet    Renal/Genitourinary:   - c/w luke  - strict Is and Os    Heme  - H/h stable  - Plan to start SQH tomorrow AM    ID  - Trend WBC, Tmax  - Ancef x 3 doses for drain coverage    Lines/Tubes: SUSANA X 1    Endocrine:  - ASCENCION  - FS    Disposition: SICU for Q1 hour neuro checks    Critical Care Diagnoses: s/p craniotomy

## 2018-11-29 NOTE — PHYSICAL THERAPY INITIAL EVALUATION ADULT - PERTINENT HX OF CURRENT PROBLEM, REHAB EVAL
Pt presented with a syncopal episode at home
73 y.o. 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, rhabdo and elevated cardiac enzymes, pt. is now status post R craniectomy and tumor resection 11/28/2018.

## 2018-11-30 LAB
APTT BLD: 23 SEC — LOW (ref 27.5–36.3)
BUN SERPL-MCNC: 25 MG/DL — HIGH (ref 7–23)
CA-I BLD-SCNC: 1.07 MMOL/L — SIGNIFICANT CHANGE UP (ref 1.03–1.23)
CALCIUM SERPL-MCNC: 7.9 MG/DL — LOW (ref 8.4–10.5)
CHLORIDE SERPL-SCNC: 100 MMOL/L — SIGNIFICANT CHANGE UP (ref 98–107)
CO2 SERPL-SCNC: 26 MMOL/L — SIGNIFICANT CHANGE UP (ref 22–31)
CREAT SERPL-MCNC: 0.87 MG/DL — SIGNIFICANT CHANGE UP (ref 0.5–1.3)
GLUCOSE SERPL-MCNC: 165 MG/DL — HIGH (ref 70–99)
HCT VFR BLD CALC: 35.4 % — LOW (ref 39–50)
HGB BLD-MCNC: 12.1 G/DL — LOW (ref 13–17)
INR BLD: 1.39 — HIGH (ref 0.88–1.17)
MAGNESIUM SERPL-MCNC: 2.1 MG/DL — SIGNIFICANT CHANGE UP (ref 1.6–2.6)
MCHC RBC-ENTMCNC: 30.8 PG — SIGNIFICANT CHANGE UP (ref 27–34)
MCHC RBC-ENTMCNC: 34.2 % — SIGNIFICANT CHANGE UP (ref 32–36)
MCV RBC AUTO: 90.1 FL — SIGNIFICANT CHANGE UP (ref 80–100)
NRBC # FLD: 0 — SIGNIFICANT CHANGE UP
PHOSPHATE SERPL-MCNC: 2.5 MG/DL — SIGNIFICANT CHANGE UP (ref 2.5–4.5)
PLATELET # BLD AUTO: 187 K/UL — SIGNIFICANT CHANGE UP (ref 150–400)
PMV BLD: 10.8 FL — SIGNIFICANT CHANGE UP (ref 7–13)
POTASSIUM SERPL-MCNC: 4 MMOL/L — SIGNIFICANT CHANGE UP (ref 3.5–5.3)
POTASSIUM SERPL-SCNC: 4 MMOL/L — SIGNIFICANT CHANGE UP (ref 3.5–5.3)
PROTHROM AB SERPL-ACNC: 15.6 SEC — HIGH (ref 9.8–13.1)
RBC # BLD: 3.93 M/UL — LOW (ref 4.2–5.8)
RBC # FLD: 14.5 % — SIGNIFICANT CHANGE UP (ref 10.3–14.5)
SODIUM SERPL-SCNC: 136 MMOL/L — SIGNIFICANT CHANGE UP (ref 135–145)
WBC # BLD: 17.38 K/UL — HIGH (ref 3.8–10.5)
WBC # FLD AUTO: 17.38 K/UL — HIGH (ref 3.8–10.5)

## 2018-11-30 PROCEDURE — 99233 SBSQ HOSP IP/OBS HIGH 50: CPT

## 2018-11-30 PROCEDURE — 99232 SBSQ HOSP IP/OBS MODERATE 35: CPT | Mod: GC

## 2018-11-30 RX ORDER — LEVETIRACETAM 250 MG/1
1000 TABLET, FILM COATED ORAL EVERY 12 HOURS
Qty: 0 | Refills: 0 | Status: DISCONTINUED | OUTPATIENT
Start: 2018-11-30 | End: 2018-12-03

## 2018-11-30 RX ORDER — SODIUM CHLORIDE 9 MG/ML
3 INJECTION INTRAMUSCULAR; INTRAVENOUS; SUBCUTANEOUS EVERY 8 HOURS
Qty: 0 | Refills: 0 | Status: DISCONTINUED | OUTPATIENT
Start: 2018-11-30 | End: 2018-12-06

## 2018-11-30 RX ORDER — SODIUM CHLORIDE 9 MG/ML
1000 INJECTION INTRAMUSCULAR; INTRAVENOUS; SUBCUTANEOUS
Qty: 0 | Refills: 0 | Status: DISCONTINUED | OUTPATIENT
Start: 2018-11-30 | End: 2018-11-30

## 2018-11-30 RX ORDER — HEPARIN SODIUM 5000 [USP'U]/ML
5000 INJECTION INTRAVENOUS; SUBCUTANEOUS EVERY 8 HOURS
Qty: 0 | Refills: 0 | Status: DISCONTINUED | OUTPATIENT
Start: 2018-11-30 | End: 2018-12-06

## 2018-11-30 RX ADMIN — Medication 100 MILLIGRAM(S): at 05:31

## 2018-11-30 RX ADMIN — LEVETIRACETAM 400 MILLIGRAM(S): 250 TABLET, FILM COATED ORAL at 17:11

## 2018-11-30 RX ADMIN — LEVETIRACETAM 400 MILLIGRAM(S): 250 TABLET, FILM COATED ORAL at 05:31

## 2018-11-30 RX ADMIN — AMLODIPINE BESYLATE 5 MILLIGRAM(S): 2.5 TABLET ORAL at 05:31

## 2018-11-30 RX ADMIN — Medication 4 MILLIGRAM(S): at 05:30

## 2018-11-30 RX ADMIN — Medication 4 MILLIGRAM(S): at 17:03

## 2018-11-30 RX ADMIN — HEPARIN SODIUM 5000 UNIT(S): 5000 INJECTION INTRAVENOUS; SUBCUTANEOUS at 21:38

## 2018-11-30 RX ADMIN — SODIUM CHLORIDE 60 MILLILITER(S): 9 INJECTION INTRAMUSCULAR; INTRAVENOUS; SUBCUTANEOUS at 07:28

## 2018-11-30 RX ADMIN — Medication 1: at 11:13

## 2018-11-30 RX ADMIN — Medication 1: at 08:02

## 2018-11-30 RX ADMIN — SODIUM CHLORIDE 3 MILLILITER(S): 9 INJECTION INTRAMUSCULAR; INTRAVENOUS; SUBCUTANEOUS at 21:45

## 2018-11-30 RX ADMIN — Medication 2: at 16:03

## 2018-11-30 RX ADMIN — CHLORHEXIDINE GLUCONATE 1 APPLICATION(S): 213 SOLUTION TOPICAL at 11:14

## 2018-11-30 RX ADMIN — Medication 1: at 22:56

## 2018-11-30 RX ADMIN — HEPARIN SODIUM 5000 UNIT(S): 5000 INJECTION INTRAVENOUS; SUBCUTANEOUS at 13:34

## 2018-11-30 RX ADMIN — Medication 4 MILLIGRAM(S): at 11:13

## 2018-11-30 NOTE — PROGRESS NOTE ADULT - ATTENDING COMMENTS
73 y.o. 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, rhabdo and elevated cardiac enzymes s/p R craniectomy and tumor resection w/ subgaleal drain placement  11/28    PLAN:   Neurologic:  - Q2H neuro checks  - c/w decadron and keppra (keppra dose increased in the setting of concern for seizures)  - MRI pending - Consent done in chart - schedule today (11/30/18)   - 24h video EEG after MRI    Respiratory:   - Encourage IS  - Nasal cannula as needed    Cardiovascular:   - c/w amlodipine    Gastrointestinal/Nutrition:   - c/w Dysphagia 2 Diet     Renal/Genitourinary:   - c/w luke  - strict Is and Os    Heme  - H/h stable  - Start SQH today    ID  - Trend WBC, Tmax  - status post Ancef x 3 doses for drain coverage    Lines/Tubes: SUSANA X 1    Endocrine:  - ASCENCION  - FS    Disposition: SICU for Q1 hour neuro checks    Critical Care Diagnoses: s/p craniotomy   The patient is a critical care patient with life threatening hemodynamic and metabolic instability in SICU.  I have personally interviewed when possible and examined the patient, reviewed data and laboratory tests/x-rays and all pertinent electronic images.  I was physically present for the key portions of the evaluation and management (E/M) service provided.   The SICU team has a constant risk benefit analyzes discussion with the primary team, all consultants, House Staff and PA's on all decisions.  These diagnoses are unrelated to the surgical procedure noted above.  I meet with family if needed to get further history, discuss the case and make care decisions for this patient who might not be able to participate.  Time involved in performance of separately billable procedures was not counted toward my critical care time. There is no overlap.  I spent 55-75 minutes of critical care time for the diagnoses, assessment, plan and interventions.
Seen and examined, chart and note reviewed, case discussed with SICU team    s/p right craniectomy for tumor  a.  NVS Q 1  b.  Continue keppra, decadron  c.  Hold HSQ    Hypertension  a.  Restart amlodipine    At risk for malnutrition  a.  Diet as tolerated  b.   Continue NSS IVF
plan d/w with [facility] rosales and nurses
Conversation held with son, Chirag and Catarino. Updated on current status and recommendations by neurosurgery. Sons ok with patient on OR schedule for now. Tentative neurosurgery planned for Wed 11/28
Dispo: neurosurgery 11/28
Spoke to son: ChiragPhi Says he wants to talk to Catarino some more today but is aware of patient on schedule for OR 11/28

## 2018-11-30 NOTE — PROGRESS NOTE ADULT - SUBJECTIVE AND OBJECTIVE BOX
SICU Progress Note  =====================================================  Overnight events:    Pt was  out of bed to chair . Mental Status waxes and wanes . Pt was tolerating the  Dysphagia Diet @ . On ancef doses . No acute events overnight        HPI: 73 y.o. 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 In holding prior to OR, pt was noted to have seizure activity by his eldest son. His son reported that the pt was alert and conversing prior to the event, then his right arm began to shake. The nurse at bedside reported tonic activity and pts eyes rolling in the back of his head. The pt was given versed and seizure activity stopped. Patient underwent R craniectomy and tumor resection 11/28. Admitted to SICU.    Advanced Directives: Presumed Full Code     CURRENT MEDICATIONS:   --------------------------------------------------------------------------------------  Neurologic Medications:  levETIRAcetam  IVPB 500 milliGRAM(s) IV Intermittent every 12 hours  LORazepam     Tablet 2 milliGRAM(s) Oral daily PRN    Respiratory Medications:    Cardiovascular Medications:  amLODIPine   Tablet 5 milliGRAM(s) Oral daily    Gastrointestinal Medications:  sodium chloride 0.9%. 1000 milliLiter(s) IV Continuous <Continuous>    Genitourinary Medications:    Hematologic/Oncologic Medications:    Antimicrobials/Immunologic Medications:  ceFAZolin   IVPB 2000 milliGRAM(s) IV Intermittent every 8 hours  influenza   Vaccine 0.5 milliLiter(s) IntraMuscular once    Endocrine/Metabolic Medications:  dexamethasone  Injectable 4 milliGRAM(s) IV Push every 6 hours  insulin lispro (HumaLOG) corrective regimen sliding scale   SubCutaneous three times a day before meals  insulin lispro (HumaLOG) corrective regimen sliding scale   SubCutaneous at bedtime    Topical/Other Medications:  chlorhexidine 4% Liquid 1 Application(s) Topical <User Schedule>  chlorhexidine 4% Liquid 1 Application(s) Topical <User Schedule>    --------------------------------------------------------------------------------------  ICU Vital Signs Last 24 Hrs    T(C): 36.7 (11-30-18 @ 00:00), Max: 36.9 (11-29-18 @ 04:00)  HR: 68 (11-30-18 @ 00:00) (58 - 74)  BP: 130/85 (11-29-18 @ 23:00) (126/97 - 145/94)  BP(mean): 95 (11-29-18 @ 23:00) (91 - 104)  ABP: 154/88 (11-29-18 @ 16:00) (130/75 - 164/95)  ABP(mean): 110 (11-29-18 @ 16:00) (93 - 119)  RR: 15 (11-30-18 @ 00:00) (11 - 23)  SpO2: 98% (11-30-18 @ 00:00) (92% - 98%)  Wt(kg): --  CVP(mm Hg): --  CI: --  CAPILLARY BLOOD GLUCOSE      POCT Blood Glucose.: 165 mg/dL (29 Nov 2018 23:10)  POCT Blood Glucose.: 133 mg/dL (29 Nov 2018 12:47)  POCT Blood Glucose.: 143 mg/dL (29 Nov 2018 04:59)   N/A      11-28 @ 07:01  -  11-29 @ 07:00  --------------------------------------------------------  IN:    IV PiggyBack: 100 mL    Oral Fluid: 30 mL    sodium chloride 0.9%.: 600 mL  Total IN: 730 mL    OUT:    Drain: 105 mL    Indwelling Catheter - Urethral: 945 mL    Voided: 300 mL  Total OUT: 1350 mL    Total NET: -620 mL      11-29 @ 07:01  -  11-30 @ 00:16  --------------------------------------------------------  IN:    IV PiggyBack: 300 mL    sodium chloride 0.9%.: 780 mL  Total IN: 1080 mL    OUT:    Drain: 60 mL    Indwelling Catheter - Urethral: 1058 mL  Total OUT: 1118 mL    Total NET: -38 mL        --------------------------------------------------------------------------------------    EXAM:  General/Neuro  Exam: NAD, awake, follows commands, oriented to self and time, thinks he's at home, PERRL   L Facial Droop  RUE and RLE adequate strength, motor, sensory  LUE: inability to move extremity, impaired sensation  LLE: some motor function but, impaired compared to contralateral side  Incision: C/D/I, R subgaleal  SUSANA drain - serosanguinous output     Respiratory  Exam: Lungs clear to auscultation, Normal expansion/effort.       Cardiovascular  Exam: S1, S2.  Regular rate and rhythm.    GI  Exam: Abdomen soft, Non-tender, Non-distended.   Current Diet:  NPO    Tubes/Lines/Drains  [x] Peripheral IV  [x] Urinary Catheter		      :   Exam: Roberson catheter in place.     LABS  --------------------------------------------------------------------------------------  CBC (11-29 @ 02:40)                              13.2                         18.15<H>  )--------------(  219        89.7<H>% Neuts, 2.0<L>% Lymphs, ANC: 16.30<H>                              38.9<L>    BMP (11-29 @ 02:40)             136     |  100     |  20    		Ca++ --      Ca 8.3<L>             ---------------------------------( 167<H>		Mg 2.1                4.3     |  26      |  0.81  			Ph --              ABG (11-28 @ 19:48)     7.50<H> / 34<L> / 97 / 27<H> / 2.4 / 97.8%     Lactate:           -> BLOOD VENOUS Culture (11-22 @ 13:04)     NG    NG  NG    -> BLOOD PERIPHERAL Culture (11-22 @ 07:36)     NG    NG  NG    -> URINE MIDSTREAM Culture (11-22 @ 01:42)     NG    Escherichia coli  NG        -------------------------------------------------------------------------------------- SICU Progress Note  =====================================================  Overnight events: Pt was out of bed to chair . Mental Status waxes and wanes . Pt was tolerating the  Dysphagia Diet. On ancef doses. No acute events overnight        HPI: 73 y.o. 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 In holding prior to OR, pt was noted to have seizure activity by his eldest son. His son reported that the pt was alert and conversing prior to the event, then his right arm began to shake. The nurse at bedside reported tonic activity and pts eyes rolling in the back of his head. The pt was given versed and seizure activity stopped. Patient underwent R craniectomy and tumor resection 11/28. Admitted to SICU.    Advanced Directives: Presumed Full Code     CURRENT MEDICATIONS:   --------------------------------------------------------------------------------------  Neurologic Medications:  levETIRAcetam  IVPB 500 milliGRAM(s) IV Intermittent every 12 hours  LORazepam     Tablet 2 milliGRAM(s) Oral daily PRN    Respiratory Medications:    Cardiovascular Medications:  amLODIPine   Tablet 5 milliGRAM(s) Oral daily    Gastrointestinal Medications:  sodium chloride 0.9%. 1000 milliLiter(s) IV Continuous <Continuous>    Genitourinary Medications:    Hematologic/Oncologic Medications:    Antimicrobials/Immunologic Medications:  ceFAZolin   IVPB 2000 milliGRAM(s) IV Intermittent every 8 hours  influenza   Vaccine 0.5 milliLiter(s) IntraMuscular once    Endocrine/Metabolic Medications:  dexamethasone  Injectable 4 milliGRAM(s) IV Push every 6 hours  insulin lispro (HumaLOG) corrective regimen sliding scale   SubCutaneous three times a day before meals  insulin lispro (HumaLOG) corrective regimen sliding scale   SubCutaneous at bedtime    Topical/Other Medications:  chlorhexidine 4% Liquid 1 Application(s) Topical <User Schedule>  chlorhexidine 4% Liquid 1 Application(s) Topical <User Schedule>    --------------------------------------------------------------------------------------  ICU Vital Signs Last 24 Hrs    T(C): 36.7 (11-30-18 @ 00:00), Max: 36.9 (11-29-18 @ 04:00)  HR: 68 (11-30-18 @ 00:00) (58 - 74)  BP: 130/85 (11-29-18 @ 23:00) (126/97 - 145/94)  BP(mean): 95 (11-29-18 @ 23:00) (91 - 104)  ABP: 154/88 (11-29-18 @ 16:00) (130/75 - 164/95)  ABP(mean): 110 (11-29-18 @ 16:00) (93 - 119)  RR: 15 (11-30-18 @ 00:00) (11 - 23)  SpO2: 98% (11-30-18 @ 00:00) (92% - 98%)  Wt(kg): --  CVP(mm Hg): --  CI: --  CAPILLARY BLOOD GLUCOSE      POCT Blood Glucose.: 165 mg/dL (29 Nov 2018 23:10)  POCT Blood Glucose.: 133 mg/dL (29 Nov 2018 12:47)  POCT Blood Glucose.: 143 mg/dL (29 Nov 2018 04:59)   N/A      11-28 @ 07:01  -  11-29 @ 07:00  --------------------------------------------------------  IN:    IV PiggyBack: 100 mL    Oral Fluid: 30 mL    sodium chloride 0.9%.: 600 mL  Total IN: 730 mL    OUT:    Drain: 105 mL    Indwelling Catheter - Urethral: 945 mL    Voided: 300 mL  Total OUT: 1350 mL    Total NET: -620 mL      11-29 @ 07:01  -  11-30 @ 00:16  --------------------------------------------------------  IN:    IV PiggyBack: 300 mL    sodium chloride 0.9%.: 780 mL  Total IN: 1080 mL    OUT:    Drain: 60 mL    Indwelling Catheter - Urethral: 1058 mL  Total OUT: 1118 mL    Total NET: -38 mL        --------------------------------------------------------------------------------------    EXAM:  General/Neuro  Exam: NAD, awake, follows commands, oriented to self and time, thinks he's at home, PERRL   L Facial Droop  RUE and RLE adequate strength, motor, sensory  LUE: inability to move extremity, impaired sensation  LLE: some motor function but, impaired compared to contralateral side  Incision: C/D/I, R subgaleal  SUSANA drain - serosanguinous output     Respiratory  Exam: Lungs clear to auscultation, Normal expansion/effort.       Cardiovascular  Exam: S1, S2.  Regular rate and rhythm.    GI  Exam: Abdomen soft, Non-tender, Non-distended.   Current Diet:  NPO    Tubes/Lines/Drains  [x] Peripheral IV  [x] Urinary Catheter		      :   Exam: Roberson catheter in place.     LABS  --------------------------------------------------------------------------------------  CBC (11-29 @ 02:40)                              13.2                         18.15<H>  )--------------(  219        89.7<H>% Neuts, 2.0<L>% Lymphs, ANC: 16.30<H>                              38.9<L>    BMP (11-29 @ 02:40)             136     |  100     |  20    		Ca++ --      Ca 8.3<L>             ---------------------------------( 167<H>		Mg 2.1                4.3     |  26      |  0.81  			Ph --              ABG (11-28 @ 19:48)     7.50<H> / 34<L> / 97 / 27<H> / 2.4 / 97.8%     Lactate:           -> BLOOD VENOUS Culture (11-22 @ 13:04)     NG    NG  NG    -> BLOOD PERIPHERAL Culture (11-22 @ 07:36)     NG    NG  NG    -> URINE MIDSTREAM Culture (11-22 @ 01:42)     NG    Escherichia coli  NG        -------------------------------------------------------------------------------------- SICU Progress Note  =====================================================  Overnight events: Pt was out of bed to chair . Mental Status waxes and wanes, neuro exam neurosurgery concerned about seizures.      HPI: 73 y.o. 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 In holding prior to OR, pt was noted to have seizure activity by his eldest son. His son reported that the pt was alert and conversing prior to the event, then his right arm began to shake. The nurse at bedside reported tonic activity and pts eyes rolling in the back of his head. The pt was given versed and seizure activity stopped. Patient underwent R craniectomy and tumor resection 11/28. Admitted to SICU.    Advanced Directives: Presumed Full Code     CURRENT MEDICATIONS:   --------------------------------------------------------------------------------------  Neurologic Medications:  levETIRAcetam  IVPB 500 milliGRAM(s) IV Intermittent every 12 hours  LORazepam     Tablet 2 milliGRAM(s) Oral daily PRN    Respiratory Medications:    Cardiovascular Medications:  amLODIPine   Tablet 5 milliGRAM(s) Oral daily    Gastrointestinal Medications:  sodium chloride 0.9%. 1000 milliLiter(s) IV Continuous <Continuous>    Genitourinary Medications:    Hematologic/Oncologic Medications:    Antimicrobials/Immunologic Medications:  ceFAZolin   IVPB 2000 milliGRAM(s) IV Intermittent every 8 hours  influenza   Vaccine 0.5 milliLiter(s) IntraMuscular once    Endocrine/Metabolic Medications:  dexamethasone  Injectable 4 milliGRAM(s) IV Push every 6 hours  insulin lispro (HumaLOG) corrective regimen sliding scale   SubCutaneous three times a day before meals  insulin lispro (HumaLOG) corrective regimen sliding scale   SubCutaneous at bedtime    Topical/Other Medications:  chlorhexidine 4% Liquid 1 Application(s) Topical <User Schedule>  chlorhexidine 4% Liquid 1 Application(s) Topical <User Schedule>    --------------------------------------------------------------------------------------  ICU Vital Signs Last 24 Hrs    T(C): 36.7 (11-30-18 @ 00:00), Max: 36.9 (11-29-18 @ 04:00)  HR: 68 (11-30-18 @ 00:00) (58 - 74)  BP: 130/85 (11-29-18 @ 23:00) (126/97 - 145/94)  BP(mean): 95 (11-29-18 @ 23:00) (91 - 104)  ABP: 154/88 (11-29-18 @ 16:00) (130/75 - 164/95)  ABP(mean): 110 (11-29-18 @ 16:00) (93 - 119)  RR: 15 (11-30-18 @ 00:00) (11 - 23)  SpO2: 98% (11-30-18 @ 00:00) (92% - 98%)  Wt(kg): --  CVP(mm Hg): --  CI: --  CAPILLARY BLOOD GLUCOSE      POCT Blood Glucose.: 165 mg/dL (29 Nov 2018 23:10)  POCT Blood Glucose.: 133 mg/dL (29 Nov 2018 12:47)  POCT Blood Glucose.: 143 mg/dL (29 Nov 2018 04:59)   N/A      11-28 @ 07:01  -  11-29 @ 07:00  --------------------------------------------------------  IN:    IV PiggyBack: 100 mL    Oral Fluid: 30 mL    sodium chloride 0.9%.: 600 mL  Total IN: 730 mL    OUT:    Drain: 105 mL    Indwelling Catheter - Urethral: 945 mL    Voided: 300 mL  Total OUT: 1350 mL    Total NET: -620 mL      11-29 @ 07:01  -  11-30 @ 00:16  --------------------------------------------------------  IN:    IV PiggyBack: 300 mL    sodium chloride 0.9%.: 780 mL  Total IN: 1080 mL    OUT:    Drain: 60 mL    Indwelling Catheter - Urethral: 1058 mL  Total OUT: 1118 mL    Total NET: -38 mL        --------------------------------------------------------------------------------------    EXAM:  General/Neuro  Exam: NAD, awake, follows commands, oriented to self and time, thinks he's at home, PERRL   L Facial Droop  RUE and RLE adequate strength, motor, sensory  LUE: inability to move extremity, impaired sensation  LLE: some motor function but, impaired compared to contralateral side  Incision: C/D/I, R subgaleal  SUSANA drain - serosanguinous output     Respiratory  Exam: Lungs clear to auscultation, Normal expansion/effort.       Cardiovascular  Exam: S1, S2.  Regular rate and rhythm.    GI  Exam: Abdomen soft, Non-tender, Non-distended.   Current Diet:  NPO    Tubes/Lines/Drains  [x] Peripheral IV  [x] Urinary Catheter		      :   Exam: Roberson catheter in place.     LABS  --------------------------------------------------------------------------------------  CBC (11-29 @ 02:40)                              13.2                         18.15<H>  )--------------(  219        89.7<H>% Neuts, 2.0<L>% Lymphs, ANC: 16.30<H>                              38.9<L>    BMP (11-29 @ 02:40)             136     |  100     |  20    		Ca++ --      Ca 8.3<L>             ---------------------------------( 167<H>		Mg 2.1                4.3     |  26      |  0.81  			Ph --              ABG (11-28 @ 19:48)     7.50<H> / 34<L> / 97 / 27<H> / 2.4 / 97.8%     Lactate:           -> BLOOD VENOUS Culture (11-22 @ 13:04)     NG    NG  NG    -> BLOOD PERIPHERAL Culture (11-22 @ 07:36)     NG    NG  NG    -> URINE MIDSTREAM Culture (11-22 @ 01:42)     NG    Escherichia coli  NG        --------------------------------------------------------------------------------------

## 2018-11-30 NOTE — PROGRESS NOTE ADULT - SUBJECTIVE AND OBJECTIVE BOX
OVERNIGHT EVENTS:  Pt s/p R crani for resection of brain tumor POD #2. Doing well. SUSANA ouput 60cc/24H    HPI:  73m hx HTN, NIDDM, CVA (2013 w/ left side weakness), BPH brought in by EMS after being found down.    Patient was last seen normal 23hrs prior to arrival. 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 After not answering calls from family of which is not like him, Son and daughter went to his house where upon knocking on the door no one answered. Police were called and patient was found face down in urine. He was alert upon arrival and acknowledged hearing knocking on the door, but was unable to get up.  Patient unable to recall events, states nothing is wrong. Denies fevers, chills, night sweats, nausea, vomiting, chest pain, palpations, headaches, changes in vision. myalgias. (22 Nov 2018 06:07)          Vital Signs Last 24 Hrs  T(C): 36.7 (30 Nov 2018 00:00), Max: 36.7 (29 Nov 2018 08:00)  T(F): 98 (30 Nov 2018 00:00), Max: 98 (29 Nov 2018 08:00)  HR: 59 (30 Nov 2018 04:00) (58 - 74)  BP: 126/82 (30 Nov 2018 04:00) (115/77 - 152/95)  BP(mean): 90 (30 Nov 2018 04:00) (86 - 108)  RR: 14 (30 Nov 2018 04:00) (11 - 27)  SpO2: 96% (30 Nov 2018 04:00) (92% - 98%)    I&O's Summary    28 Nov 2018 07:01  -  29 Nov 2018 07:00  --------------------------------------------------------  IN: 730 mL / OUT: 1350 mL / NET: -620 mL    29 Nov 2018 07:01  -  30 Nov 2018 05:06  --------------------------------------------------------  IN: 1440 mL / OUT: 1483 mL / NET: -43 mL        PHYSICAL EXAM:  Mental Staus: AA&O x II  PERRL, EOMI  Motor:   RUE 5/5 strength  LUE 3/5  BLE 5/5  No drift  Incision/Wound: c/d/i    TUBES/LINES:  [ ] A-line   [ ] Lumbar Drain:   [ ] Ventriculostomy:  [x] Other SUSANA    DIET:  [ ] NPO  [ ] Regular    LABS:                        12.1   17.38 )-----------( 187      ( 30 Nov 2018 02:40 )             35.4     11-30    136  |  100  |  25<H>  ----------------------------<  165<H>  4.0   |  26  |  0.87    Ca    7.9<L>      30 Nov 2018 02:40  Phos  2.5     11-30  Mg     2.1     11-30      PT/INR - ( 30 Nov 2018 02:40 )   PT: 15.6 SEC;   INR: 1.39          PTT - ( 30 Nov 2018 02:40 )  PTT:23.0 SEC          MEDICATIONS:  Antibiotics:  ceFAZolin   IVPB 2000 milliGRAM(s) IV Intermittent every 8 hours    Neuro:  levETIRAcetam  IVPB 500 milliGRAM(s) IV Intermittent every 12 hours    Anticoagulation    OTHER:  amLODIPine   Tablet 5 milliGRAM(s) Oral daily  chlorhexidine 4% Liquid 1 Application(s) Topical <User Schedule>  chlorhexidine 4% Liquid 1 Application(s) Topical <User Schedule>  dexamethasone  Injectable 4 milliGRAM(s) IV Push every 6 hours  influenza   Vaccine 0.5 milliLiter(s) IntraMuscular once  insulin lispro (HumaLOG) corrective regimen sliding scale   SubCutaneous three times a day before meals  insulin lispro (HumaLOG) corrective regimen sliding scale   SubCutaneous at bedtime    IVF:  sodium chloride 0.9%. 1000 milliLiter(s) IV Continuous <Continuous>      DVT PROPHYLAXIS:  [] Venodynes                                [] Heparin/Lovenox    RADIOLOGY & ADDITIONAL TESTS:

## 2018-11-30 NOTE — PROGRESS NOTE ADULT - PROBLEM SELECTOR PLAN 1
1. MRI brain w./w.o  2. Monitor SUSANA drain q Shift  3. Q1H neurochecks  4. C/w Dex  5. C/w Keppra  Case d/w Dr. Gonzalez

## 2018-11-30 NOTE — PROGRESS NOTE ADULT - ASSESSMENT
1. PT- bed mobility,transfers, gait and balance training  2. OT- ADL'S  3. brain mass- w/u pending   4. dispo- acute rehab

## 2018-11-30 NOTE — PROGRESS NOTE ADULT - ASSESSMENT
ASSESSMENT:   73 y.o. 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, rhabdo and elevated cardiac enzymes s/p R craniectomy and tumor resection w/ subgaleal drain placement  11/28    PLAN:   Neurologic:   - Q1H neuro checks  - c/w decadron and keppra   -MRI pending - Consent done in chart - schedule today (11/30/18)     Respiratory:   - Encourage IS  - Nasal cannula as needed    Cardiovascular:   - c/w amlodipine    Gastrointestinal/Nutrition:   - Dysphagia 2 Diet     Renal/Genitourinary:   - c/w luke  - strict Is and Os    Heme  - H/h stable  - Plan to start SQH tomorrow AM    ID  - Trend WBC, Tmax  - Ancef x 3 doses for drain coverage    Lines/Tubes: SUSANA X 1    Endocrine:  - ASCENCION  - FS    Disposition: SICU for Q1 hour neuro checks    Critical Care Diagnoses: s/p craniotomy ASSESSMENT:   73 y.o. 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, rhabdo and elevated cardiac enzymes s/p R craniectomy and tumor resection w/ subgaleal drain placement  11/28    PLAN:   Neurologic:  - Q1H neuro checks  - c/w decadron and keppra (keppra dose increased in the setting of concern for seizures)  - MRI pending - Consent done in chart - schedule today (11/30/18)   - 24h video EEG after MRI    Respiratory:   - Encourage IS  - Nasal cannula as needed    Cardiovascular:   - c/w amlodipine    Gastrointestinal/Nutrition:   - c/w Dysphagia 2 Diet     Renal/Genitourinary:   - c/w luke  - strict Is and Os    Heme  - H/h stable  - Start SQH today    ID  - Trend WBC, Tmax  - status post Ancef x 3 doses for drain coverage    Lines/Tubes: SUSANA X 1    Endocrine:  - ASCENCION  - FS    Disposition: SICU for Q1 hour neuro checks    Critical Care Diagnoses: s/p craniotomy ASSESSMENT:   73 y.o. 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, rhabdo and elevated cardiac enzymes s/p R craniectomy and tumor resection w/ subgaleal drain placement  11/28    PLAN:   Neurologic:  - Q2H neuro checks  - c/w decadron and keppra (keppra dose increased in the setting of concern for seizures)  - MRI pending - Consent done in chart - schedule today (11/30/18)   - 24h video EEG after MRI    Respiratory:   - Encourage IS  - Nasal cannula as needed    Cardiovascular:   - c/w amlodipine    Gastrointestinal/Nutrition:   - c/w Dysphagia 2 Diet     Renal/Genitourinary:   - c/w luke  - strict Is and Os    Heme  - H/h stable  - Start SQH today    ID  - Trend WBC, Tmax  - status post Ancef x 3 doses for drain coverage    Lines/Tubes: SUSANA X 1    Endocrine:  - ASCENCION  - FS    Disposition: SICU for Q1 hour neuro checks    Critical Care Diagnoses: s/p craniotomy

## 2018-11-30 NOTE — PROGRESS NOTE ADULT - SUBJECTIVE AND OBJECTIVE BOX
HPI:  73m hx HTN, NIDDM, CVA (2013 w/ left side weakness), BPH brought in by EMS after being found down.    Patient was last seen normal 23hrs prior to arrival. 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 After not answering calls from family of which is not like him, Son and daughter went to his house where upon knocking on the door no one answered. Police were called and patient was found face down in urine. He was alert upon arrival and acknowledged hearing knocking on the door, but was unable to get up.  Patient unable to recall events, states nothing is wrong. Denies fevers, chills, night sweats, nausea, vomiting, chest pain, palpations, headaches, changes in vision. myalgias. 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  On Keppra, Decadron  s/p mass resection 11/28     REVIEW OF SYSTEMS: No chest pain, shortness of breath, nausea, vomiting or diarhea.        CURRENT FUNCTIONAL STATUS: mod a     ICU Vital Signs Last 24 Hrs  T(C): 36.7 (30 Nov 2018 12:00), Max: 36.7 (30 Nov 2018 00:00)  T(F): 98.1 (30 Nov 2018 12:00), Max: 98.1 (30 Nov 2018 04:00)  HR: 72 (30 Nov 2018 15:00) (58 - 74)  BP: 150/93 (30 Nov 2018 15:00) (115/77 - 152/95)  BP(mean): 108 (30 Nov 2018 15:00) (86 - 108)  ABP: 154/88 (29 Nov 2018 16:00) (154/88 - 154/88)  ABP(mean): 110 (29 Nov 2018 16:00) (110 - 110)  RR: 15 (30 Nov 2018 15:00) (11 - 27)  SpO2: 96% (30 Nov 2018 15:00) (94% - 98%)        ----------------------------------------------------------------------------------------  PHYSICAL EXAM  Constitutional - NAD, Comfortable  HEENT - NCAT, EOMI  Neck - Supple, No limited ROM  Chest - CTA bilaterally, No wheeze, No rhonchi, No crackles  Cardiovascular - RRR, S1S2, No murmurs  Abdomen - BS+, Soft, NTND  Extremities - No C/C/E, No calf tenderness   Neurologic Exam -    3/5                 Cognitive - Awake, Alert, AAO to self, place, date, year, situation     Communication - Fluent, No dysarthria, no aphasia     Cranial Nerves - CN 2-12 intact     Motor - RUE 4/5, RLE                        Sensory - Intact to LT     Reflexes - DTR Intact, No primitive reflexive     Balance - poor balance   Psychiatric - Mood stable, Affect WNL

## 2018-12-01 DIAGNOSIS — Z98.890 OTHER SPECIFIED POSTPROCEDURAL STATES: ICD-10-CM

## 2018-12-01 LAB
APTT BLD: 23.5 SEC — LOW (ref 27.5–36.3)
BUN SERPL-MCNC: 25 MG/DL — HIGH (ref 7–23)
CA-I BLD-SCNC: 1.06 MMOL/L — SIGNIFICANT CHANGE UP (ref 1.03–1.23)
CALCIUM SERPL-MCNC: 8 MG/DL — LOW (ref 8.4–10.5)
CHLORIDE SERPL-SCNC: 99 MMOL/L — SIGNIFICANT CHANGE UP (ref 98–107)
CO2 SERPL-SCNC: 24 MMOL/L — SIGNIFICANT CHANGE UP (ref 22–31)
CREAT SERPL-MCNC: 0.8 MG/DL — SIGNIFICANT CHANGE UP (ref 0.5–1.3)
GLUCOSE SERPL-MCNC: 186 MG/DL — HIGH (ref 70–99)
HCT VFR BLD CALC: 35.9 % — LOW (ref 39–50)
HGB BLD-MCNC: 12.3 G/DL — LOW (ref 13–17)
INR BLD: 1.29 — HIGH (ref 0.88–1.17)
MAGNESIUM SERPL-MCNC: 2.1 MG/DL — SIGNIFICANT CHANGE UP (ref 1.6–2.6)
MCHC RBC-ENTMCNC: 29.9 PG — SIGNIFICANT CHANGE UP (ref 27–34)
MCHC RBC-ENTMCNC: 34.3 % — SIGNIFICANT CHANGE UP (ref 32–36)
MCV RBC AUTO: 87.3 FL — SIGNIFICANT CHANGE UP (ref 80–100)
NRBC # FLD: 0 — SIGNIFICANT CHANGE UP
PHOSPHATE SERPL-MCNC: 2.2 MG/DL — LOW (ref 2.5–4.5)
PLATELET # BLD AUTO: 197 K/UL — SIGNIFICANT CHANGE UP (ref 150–400)
PMV BLD: 11.3 FL — SIGNIFICANT CHANGE UP (ref 7–13)
POTASSIUM SERPL-MCNC: 4.2 MMOL/L — SIGNIFICANT CHANGE UP (ref 3.5–5.3)
POTASSIUM SERPL-SCNC: 4.2 MMOL/L — SIGNIFICANT CHANGE UP (ref 3.5–5.3)
PROTHROM AB SERPL-ACNC: 14.4 SEC — HIGH (ref 9.8–13.1)
RBC # BLD: 4.11 M/UL — LOW (ref 4.2–5.8)
RBC # FLD: 14.6 % — HIGH (ref 10.3–14.5)
SODIUM SERPL-SCNC: 135 MMOL/L — SIGNIFICANT CHANGE UP (ref 135–145)
WBC # BLD: 16.51 K/UL — HIGH (ref 3.8–10.5)
WBC # FLD AUTO: 16.51 K/UL — HIGH (ref 3.8–10.5)

## 2018-12-01 PROCEDURE — 70553 MRI BRAIN STEM W/O & W/DYE: CPT | Mod: 26

## 2018-12-01 RX ADMIN — Medication 4 MILLIGRAM(S): at 17:29

## 2018-12-01 RX ADMIN — Medication 1: at 17:29

## 2018-12-01 RX ADMIN — HEPARIN SODIUM 5000 UNIT(S): 5000 INJECTION INTRAVENOUS; SUBCUTANEOUS at 05:55

## 2018-12-01 RX ADMIN — Medication 4 MILLIGRAM(S): at 00:31

## 2018-12-01 RX ADMIN — HEPARIN SODIUM 5000 UNIT(S): 5000 INJECTION INTRAVENOUS; SUBCUTANEOUS at 22:33

## 2018-12-01 RX ADMIN — AMLODIPINE BESYLATE 5 MILLIGRAM(S): 2.5 TABLET ORAL at 05:55

## 2018-12-01 RX ADMIN — HEPARIN SODIUM 5000 UNIT(S): 5000 INJECTION INTRAVENOUS; SUBCUTANEOUS at 14:13

## 2018-12-01 RX ADMIN — SODIUM CHLORIDE 3 MILLILITER(S): 9 INJECTION INTRAMUSCULAR; INTRAVENOUS; SUBCUTANEOUS at 22:22

## 2018-12-01 RX ADMIN — LEVETIRACETAM 400 MILLIGRAM(S): 250 TABLET, FILM COATED ORAL at 17:29

## 2018-12-01 RX ADMIN — SODIUM CHLORIDE 3 MILLILITER(S): 9 INJECTION INTRAMUSCULAR; INTRAVENOUS; SUBCUTANEOUS at 13:42

## 2018-12-01 RX ADMIN — Medication 3: at 12:39

## 2018-12-01 RX ADMIN — Medication 4 MILLIGRAM(S): at 05:55

## 2018-12-01 RX ADMIN — LEVETIRACETAM 400 MILLIGRAM(S): 250 TABLET, FILM COATED ORAL at 06:44

## 2018-12-01 RX ADMIN — Medication 1: at 22:34

## 2018-12-01 RX ADMIN — Medication 4 MILLIGRAM(S): at 12:40

## 2018-12-01 RX ADMIN — Medication 1: at 09:04

## 2018-12-01 NOTE — PROGRESS NOTE ADULT - SUBJECTIVE AND OBJECTIVE BOX
No issues overnight  Vital Signs Last 24 Hrs  T(C): 36.3 (01 Dec 2018 02:10), Max: 36.8 (30 Nov 2018 20:58)  T(F): 97.3 (01 Dec 2018 02:10), Max: 98.3 (30 Nov 2018 20:58)  HR: 67 (01 Dec 2018 02:10) (59 - 79)  BP: 138/76 (01 Dec 2018 02:10) (117/78 - 152/90)  BP(mean): 100 (30 Nov 2018 17:00) (88 - 108)  RR: 18 (01 Dec 2018 02:10) (11 - 18)  SpO2: 97% (01 Dec 2018 02:10) (94% - 98%)    AAO X 2  PERRLA, EOMI  RUE 5/5 strength  LUE 3/5  BLE 5/5  No drift    SUSANA: 80cc    MEDICATIONS  (STANDING):  amLODIPine   Tablet 5 milliGRAM(s) Oral daily  dexamethasone  Injectable 4 milliGRAM(s) IV Push every 6 hours  heparin  Injectable 5000 Unit(s) SubCutaneous every 8 hours  influenza   Vaccine 0.5 milliLiter(s) IntraMuscular once  insulin lispro (HumaLOG) corrective regimen sliding scale   SubCutaneous three times a day before meals  insulin lispro (HumaLOG) corrective regimen sliding scale   SubCutaneous at bedtime  levETIRAcetam  IVPB 1000 milliGRAM(s) IV Intermittent every 12 hours  sodium chloride 0.9% lock flush 3 milliLiter(s) IV Push every 8 hours    MEDICATIONS  (PRN):                          12.1   17.38 )-----------( 187      ( 30 Nov 2018 02:40 )             35.4   11-30    136  |  100  |  25<H>  ----------------------------<  165<H>  4.0   |  26  |  0.87    Ca    7.9<L>      30 Nov 2018 02:40  Phos  2.5     11-30  Mg     2.1     11-30

## 2018-12-02 RX ADMIN — SODIUM CHLORIDE 3 MILLILITER(S): 9 INJECTION INTRAMUSCULAR; INTRAVENOUS; SUBCUTANEOUS at 05:23

## 2018-12-02 RX ADMIN — AMLODIPINE BESYLATE 5 MILLIGRAM(S): 2.5 TABLET ORAL at 05:09

## 2018-12-02 RX ADMIN — SODIUM CHLORIDE 3 MILLILITER(S): 9 INJECTION INTRAMUSCULAR; INTRAVENOUS; SUBCUTANEOUS at 14:31

## 2018-12-02 RX ADMIN — LEVETIRACETAM 400 MILLIGRAM(S): 250 TABLET, FILM COATED ORAL at 05:09

## 2018-12-02 RX ADMIN — LEVETIRACETAM 400 MILLIGRAM(S): 250 TABLET, FILM COATED ORAL at 17:15

## 2018-12-02 RX ADMIN — Medication 3: at 22:19

## 2018-12-02 RX ADMIN — Medication 4 MILLIGRAM(S): at 17:16

## 2018-12-02 RX ADMIN — Medication 2: at 17:15

## 2018-12-02 RX ADMIN — Medication 4 MILLIGRAM(S): at 00:38

## 2018-12-02 RX ADMIN — Medication 4 MILLIGRAM(S): at 23:24

## 2018-12-02 RX ADMIN — HEPARIN SODIUM 5000 UNIT(S): 5000 INJECTION INTRAVENOUS; SUBCUTANEOUS at 05:09

## 2018-12-02 RX ADMIN — Medication 2: at 12:32

## 2018-12-02 RX ADMIN — HEPARIN SODIUM 5000 UNIT(S): 5000 INJECTION INTRAVENOUS; SUBCUTANEOUS at 14:32

## 2018-12-02 RX ADMIN — HEPARIN SODIUM 5000 UNIT(S): 5000 INJECTION INTRAVENOUS; SUBCUTANEOUS at 21:55

## 2018-12-02 RX ADMIN — SODIUM CHLORIDE 3 MILLILITER(S): 9 INJECTION INTRAMUSCULAR; INTRAVENOUS; SUBCUTANEOUS at 21:55

## 2018-12-02 RX ADMIN — Medication 4 MILLIGRAM(S): at 05:23

## 2018-12-02 RX ADMIN — Medication 4 MILLIGRAM(S): at 11:04

## 2018-12-02 RX ADMIN — Medication 1: at 07:57

## 2018-12-02 NOTE — PROGRESS NOTE ADULT - SUBJECTIVE AND OBJECTIVE BOX
No issues overnight  Vital Signs Last 24 Hrs  T(C): 37.1 (02 Dec 2018 01:12), Max: 37.1 (02 Dec 2018 01:12)  T(F): 98.7 (02 Dec 2018 01:12), Max: 98.7 (02 Dec 2018 01:12)  HR: 91 (02 Dec 2018 01:12) (53 - 91)  BP: 125/79 (02 Dec 2018 01:12) (121/81 - 183/97)  BP(mean): --  RR: 18 (02 Dec 2018 01:12) (16 - 18)  SpO2: 98% (02 Dec 2018 01:12) (98% - 100%)    AAO X 2  PERRLA, EOMI  RUE 5/5 strength  LUE 3/5  BLE 5/5  No drift    SUSANA: 20cc    MEDICATIONS  (STANDING):  amLODIPine   Tablet 5 milliGRAM(s) Oral daily  dexamethasone  Injectable 4 milliGRAM(s) IV Push every 6 hours  heparin  Injectable 5000 Unit(s) SubCutaneous every 8 hours  influenza   Vaccine 0.5 milliLiter(s) IntraMuscular once  insulin lispro (HumaLOG) corrective regimen sliding scale   SubCutaneous three times a day before meals  insulin lispro (HumaLOG) corrective regimen sliding scale   SubCutaneous at bedtime  levETIRAcetam  IVPB 1000 milliGRAM(s) IV Intermittent every 12 hours  sodium chloride 0.9% lock flush 3 milliLiter(s) IV Push every 8 hours    MEDICATIONS  (PRN):                          12.3   16.51 )-----------( 197      ( 01 Dec 2018 05:30 )             35.9     12-01    135  |  99  |  25<H>  ----------------------------<  186<H>  4.2   |  24  |  0.80    Ca    8.0<L>      01 Dec 2018 05:30  Phos  2.2     12-01  Mg     2.1     12-01

## 2018-12-03 PROCEDURE — 99232 SBSQ HOSP IP/OBS MODERATE 35: CPT | Mod: GC

## 2018-12-03 RX ORDER — DEXAMETHASONE 0.5 MG/5ML
4 ELIXIR ORAL EVERY 8 HOURS
Qty: 0 | Refills: 0 | Status: COMPLETED | OUTPATIENT
Start: 2018-12-03 | End: 2018-12-05

## 2018-12-03 RX ORDER — DEXAMETHASONE 0.5 MG/5ML
2 ELIXIR ORAL EVERY 12 HOURS
Qty: 0 | Refills: 0 | Status: CANCELLED | OUTPATIENT
Start: 2018-12-10 | End: 2018-12-06

## 2018-12-03 RX ORDER — LEVETIRACETAM 250 MG/1
1000 TABLET, FILM COATED ORAL
Qty: 0 | Refills: 0 | Status: DISCONTINUED | OUTPATIENT
Start: 2018-12-03 | End: 2018-12-06

## 2018-12-03 RX ORDER — DEXAMETHASONE 0.5 MG/5ML
3 ELIXIR ORAL EVERY 8 HOURS
Qty: 0 | Refills: 0 | Status: DISCONTINUED | OUTPATIENT
Start: 2018-12-05 | End: 2018-12-06

## 2018-12-03 RX ORDER — DEXAMETHASONE 0.5 MG/5ML
ELIXIR ORAL
Qty: 0 | Refills: 0 | Status: DISCONTINUED | OUTPATIENT
Start: 2018-12-03 | End: 2018-12-06

## 2018-12-03 RX ORDER — DEXAMETHASONE 0.5 MG/5ML
2 ELIXIR ORAL EVERY 8 HOURS
Qty: 0 | Refills: 0 | Status: DISCONTINUED | OUTPATIENT
Start: 2018-12-07 | End: 2018-12-06

## 2018-12-03 RX ADMIN — HEPARIN SODIUM 5000 UNIT(S): 5000 INJECTION INTRAVENOUS; SUBCUTANEOUS at 13:42

## 2018-12-03 RX ADMIN — Medication 4 MILLIGRAM(S): at 06:31

## 2018-12-03 RX ADMIN — Medication 2: at 21:44

## 2018-12-03 RX ADMIN — Medication 1: at 08:39

## 2018-12-03 RX ADMIN — SODIUM CHLORIDE 3 MILLILITER(S): 9 INJECTION INTRAMUSCULAR; INTRAVENOUS; SUBCUTANEOUS at 21:41

## 2018-12-03 RX ADMIN — SODIUM CHLORIDE 3 MILLILITER(S): 9 INJECTION INTRAMUSCULAR; INTRAVENOUS; SUBCUTANEOUS at 13:41

## 2018-12-03 RX ADMIN — LEVETIRACETAM 1000 MILLIGRAM(S): 250 TABLET, FILM COATED ORAL at 17:44

## 2018-12-03 RX ADMIN — AMLODIPINE BESYLATE 5 MILLIGRAM(S): 2.5 TABLET ORAL at 06:31

## 2018-12-03 RX ADMIN — SODIUM CHLORIDE 3 MILLILITER(S): 9 INJECTION INTRAMUSCULAR; INTRAVENOUS; SUBCUTANEOUS at 06:32

## 2018-12-03 RX ADMIN — Medication 4 MILLIGRAM(S): at 13:42

## 2018-12-03 RX ADMIN — HEPARIN SODIUM 5000 UNIT(S): 5000 INJECTION INTRAVENOUS; SUBCUTANEOUS at 21:42

## 2018-12-03 RX ADMIN — HEPARIN SODIUM 5000 UNIT(S): 5000 INJECTION INTRAVENOUS; SUBCUTANEOUS at 06:31

## 2018-12-03 RX ADMIN — Medication 4 MILLIGRAM(S): at 21:42

## 2018-12-03 RX ADMIN — Medication 1: at 16:53

## 2018-12-03 RX ADMIN — LEVETIRACETAM 400 MILLIGRAM(S): 250 TABLET, FILM COATED ORAL at 06:32

## 2018-12-03 NOTE — PROGRESS NOTE ADULT - SUBJECTIVE AND OBJECTIVE BOX
HPI:  73m hx HTN, NIDDM, CVA (2013 w/ left side weakness), BPH brought in by EMS after being found down.    Patient was last seen normal 23hrs prior to arrival. 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 After not answering calls from family of which is not like him, Son and daughter went to his house where upon knocking on the door no one answered. Police were called and patient was found face down in urine. He was alert upon arrival and acknowledged hearing knocking on the door, but was unable to get up.  Patient unable to recall events, states nothing is wrong. Denies fevers, chills, night sweats, nausea, vomiting, chest pain, palpations, headaches, changes in vision. myalgias. 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  On Keppra, Decadron  s/p mass resection 11/28     REVIEW OF SYSTEMS: No chest pain, shortness of breath, nausea, vomiting or diarhea.        CURRENT FUNCTIONAL STATUS: min a     ICU Vital Signs Last 24 Hrs  T(C): 36.8 (03 Dec 2018 13:39), Max: 36.9 (02 Dec 2018 22:01)  T(F): 98.2 (03 Dec 2018 13:39), Max: 98.4 (02 Dec 2018 22:01)  HR: 67 (03 Dec 2018 13:39) (61 - 79)  BP: 105/91 (03 Dec 2018 13:39) (105/91 - 136/85)  BP(mean): --  ABP: --  ABP(mean): --  RR: 16 (03 Dec 2018 13:39) (16 - 18)  SpO2: 94% (03 Dec 2018 13:39) (94% - 99%)        ----------------------------------------------------------------------------------------  PHYSICAL EXAM  Constitutional - NAD, Comfortable  HEENT - NCAT, EOMI  Neck - Supple, No limited ROM  Chest - CTA bilaterally, No wheeze, No rhonchi, No crackles  Cardiovascular - RRR, S1S2, No murmurs  Abdomen - BS+, Soft, NTND  Extremities - No C/C/E, No calf tenderness   Neurologic Exam -    3/5                 Cognitive - Awake, Alert, AAO to self, place, date, year, situation     Communication - Fluent, No dysarthria, no aphasia     Cranial Nerves - CN 2-12 intact     Motor - RUE 3/5, RLE 4/.5                       Sensory - Intact to LT     Reflexes - DTR Intact, No primitive reflexive     Balance - poor balance   Psychiatric - Mood stable, Affect WNL

## 2018-12-03 NOTE — PROGRESS NOTE ADULT - SUBJECTIVE AND OBJECTIVE BOX
No issues overnight  Vital Signs Last 24 Hrs  T(C): 37.1 (02 Dec 2018 01:12), Max: 37.1 (02 Dec 2018 01:12)  T(F): 98.7 (02 Dec 2018 01:12), Max: 98.7 (02 Dec 2018 01:12)  HR: 91 (02 Dec 2018 01:12) (53 - 91)  BP: 125/79 (02 Dec 2018 01:12) (121/81 - 183/97)  BP(mean): --  RR: 18 (02 Dec 2018 01:12) (16 - 18)  SpO2: 98% (02 Dec 2018 01:12) (98% - 100%)    AAO X 2  PERRLA, EOMI  RUE 5/5 strength  LUE 3/5  BLE 5/5  No drift    MEDICATIONS  (STANDING):  amLODIPine   Tablet 5 milliGRAM(s) Oral daily  dexamethasone  Injectable 4 milliGRAM(s) IV Push every 6 hours  heparin  Injectable 5000 Unit(s) SubCutaneous every 8 hours  influenza   Vaccine 0.5 milliLiter(s) IntraMuscular once  insulin lispro (HumaLOG) corrective regimen sliding scale   SubCutaneous three times a day before meals  insulin lispro (HumaLOG) corrective regimen sliding scale   SubCutaneous at bedtime  levETIRAcetam  IVPB 1000 milliGRAM(s) IV Intermittent every 12 hours  sodium chloride 0.9% lock flush 3 milliLiter(s) IV Push every 8 hours    MEDICATIONS  (PRN):

## 2018-12-04 PROCEDURE — 99232 SBSQ HOSP IP/OBS MODERATE 35: CPT | Mod: GC

## 2018-12-04 RX ORDER — PANTOPRAZOLE SODIUM 20 MG/1
40 TABLET, DELAYED RELEASE ORAL
Qty: 0 | Refills: 0 | Status: DISCONTINUED | OUTPATIENT
Start: 2018-12-04 | End: 2018-12-06

## 2018-12-04 RX ADMIN — Medication 3: at 21:51

## 2018-12-04 RX ADMIN — Medication 4 MILLIGRAM(S): at 05:13

## 2018-12-04 RX ADMIN — Medication 4 MILLIGRAM(S): at 13:14

## 2018-12-04 RX ADMIN — HEPARIN SODIUM 5000 UNIT(S): 5000 INJECTION INTRAVENOUS; SUBCUTANEOUS at 21:51

## 2018-12-04 RX ADMIN — HEPARIN SODIUM 5000 UNIT(S): 5000 INJECTION INTRAVENOUS; SUBCUTANEOUS at 05:13

## 2018-12-04 RX ADMIN — Medication 4 MILLIGRAM(S): at 21:51

## 2018-12-04 RX ADMIN — HEPARIN SODIUM 5000 UNIT(S): 5000 INJECTION INTRAVENOUS; SUBCUTANEOUS at 13:14

## 2018-12-04 RX ADMIN — LEVETIRACETAM 1000 MILLIGRAM(S): 250 TABLET, FILM COATED ORAL at 17:17

## 2018-12-04 RX ADMIN — SODIUM CHLORIDE 3 MILLILITER(S): 9 INJECTION INTRAMUSCULAR; INTRAVENOUS; SUBCUTANEOUS at 21:56

## 2018-12-04 RX ADMIN — AMLODIPINE BESYLATE 5 MILLIGRAM(S): 2.5 TABLET ORAL at 05:13

## 2018-12-04 RX ADMIN — LEVETIRACETAM 1000 MILLIGRAM(S): 250 TABLET, FILM COATED ORAL at 05:13

## 2018-12-04 RX ADMIN — SODIUM CHLORIDE 3 MILLILITER(S): 9 INJECTION INTRAMUSCULAR; INTRAVENOUS; SUBCUTANEOUS at 13:02

## 2018-12-04 RX ADMIN — Medication 1: at 08:51

## 2018-12-04 RX ADMIN — SODIUM CHLORIDE 3 MILLILITER(S): 9 INJECTION INTRAMUSCULAR; INTRAVENOUS; SUBCUTANEOUS at 05:13

## 2018-12-04 RX ADMIN — Medication 2: at 12:46

## 2018-12-04 RX ADMIN — Medication 2: at 17:18

## 2018-12-04 NOTE — PROGRESS NOTE ADULT - SUBJECTIVE AND OBJECTIVE BOX
No issues overnight  Vital Signs Last 24 Hrs  T(C): 36.3 (03 Dec 2018 21:03), Max: 36.8 (03 Dec 2018 13:39)  T(F): 97.3 (03 Dec 2018 21:03), Max: 98.2 (03 Dec 2018 13:39)  HR: 62 (03 Dec 2018 21:03) (61 - 71)  BP: 124/78 (03 Dec 2018 21:03) (105/91 - 134/70)  BP(mean): --  RR: 17 (03 Dec 2018 21:03) (16 - 18)  SpO2: 99% (03 Dec 2018 21:03) (94% - 99%)    AAO X 2  PERRLA, EOMI  RUE 5/5 strength  LUE 3/5  BLE 5/5  No drift    MEDICATIONS  (STANDING):  amLODIPine   Tablet 5 milliGRAM(s) Oral daily  dexamethasone     Tablet   Oral   dexamethasone     Tablet 4 milliGRAM(s) Oral every 8 hours  heparin  Injectable 5000 Unit(s) SubCutaneous every 8 hours  influenza   Vaccine 0.5 milliLiter(s) IntraMuscular once  insulin lispro (HumaLOG) corrective regimen sliding scale   SubCutaneous three times a day before meals  insulin lispro (HumaLOG) corrective regimen sliding scale   SubCutaneous at bedtime  levETIRAcetam 1000 milliGRAM(s) Oral two times a day  sodium chloride 0.9% lock flush 3 milliLiter(s) IV Push every 8 hours    MEDICATIONS  (PRN):

## 2018-12-04 NOTE — CHART NOTE - NSCHARTNOTEFT_GEN_A_CORE
Source: Patient [ ]    Family [ ]     other [ x] RN- pt sleeping at time of interview. A&Ox2- confused    Pt seen for nutrition follow-up. Pt is a 74 y/o M with brain tumor s/p craniotomy and tumor resection 11/38. Per RN, pt with good appetite of mechanical soft diet. Pt also drinks lots of fluids (nectar consistency). No GI distress (nausea/vomiting/diarrhea/constipation.) No chewing or swallowing difficulties with current diet order.       Current Diet : Dysphagia 2 Mechanical Soft-Nectar Consistency Fluids. Consistent carbohydrate, DASH.   PO intake:  < 50% [ ] 50-75% [ ]   % [ x]  other :    Current Weight: Weight 11/28 220 pounds. 12/2 209.8 pounds. Weights are inconsistent in chart; unable to determine true wt loss.     __________________ Pertinent Medications__________________   MEDICATIONS  (STANDING):  amLODIPine   Tablet 5 milliGRAM(s) Oral daily  dexamethasone     Tablet   Oral   dexamethasone     Tablet 4 milliGRAM(s) Oral every 8 hours  heparin  Injectable 5000 Unit(s) SubCutaneous every 8 hours  influenza   Vaccine 0.5 milliLiter(s) IntraMuscular once  insulin lispro (HumaLOG) corrective regimen sliding scale   SubCutaneous three times a day before meals  insulin lispro (HumaLOG) corrective regimen sliding scale   SubCutaneous at bedtime  levETIRAcetam 1000 milliGRAM(s) Oral two times a day  pantoprazole    Tablet 40 milliGRAM(s) Oral before breakfast  sodium chloride 0.9% lock flush 3 milliLiter(s) IV Push every 8 hours    __________________ Pertinent Labs__________________    12-01 Phos 2.2 mg/dL<L> 11-23 EvltgpsomeT1U 5.8 %<H>     CAPILLARY BLOOD GLUCOSE      POCT Blood Glucose.: 220 mg/dL (04 Dec 2018 12:13)  POCT Blood Glucose.: 184 mg/dL (04 Dec 2018 08:05)  POCT Blood Glucose.: 209 mg/dL (03 Dec 2018 21:38)  POCT Blood Glucose.: 189 mg/dL (03 Dec 2018 16:53)        Skin: No edema, no pressure injuries. Multiple skin tears documented in chart.    Estimated Needs:   [x ] no change since previous assessment  [ ] recalculated:       Previous Nutrition Diagnosis: Moderate malnutrition    Nutrition Diagnosis is [x ] ongoing  - improved PO intake     New Nutrition Diagnosis: [ x] not applicable      Recommend    1. Continue consistent carbohydrate, DASH mechanical soft, nectar thick liquids.   2. Encourage PO intake and honor food preferences as able.   3. Monitor weights, labs, BM's, skin integrity, p.o. intake.

## 2018-12-04 NOTE — PROGRESS NOTE ADULT - SUBJECTIVE AND OBJECTIVE BOX
HPI:  73m hx HTN, NIDDM, CVA (2013 w/ left side weakness), BPH brought in by EMS after being found down.    Patient was last seen normal 23hrs prior to arrival. 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 After not answering calls from family of which is not like him, Son and daughter went to his house where upon knocking on the door no one answered. Police were called and patient was found face down in urine. He was alert upon arrival and acknowledged hearing knocking on the door, but was unable to get up.  Patient unable to recall events, states nothing is wrong. Denies fevers, chills, night sweats, nausea, vomiting, chest pain, palpations, headaches, changes in vision. myalgias. 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  On Keppra, Decadron  s/p mass resection 11/28   Tolerating therapy     REVIEW OF SYSTEMS: No chest pain, shortness of breath, nausea, vomiting or diarhea.        CURRENT FUNCTIONAL STATUS mod a     Vital Signs Last 24 Hrs  T(C): 36.3 (04 Dec 2018 13:19), Max: 36.7 (04 Dec 2018 02:06)  T(F): 97.4 (04 Dec 2018 13:19), Max: 98.1 (04 Dec 2018 02:06)  HR: 69 (04 Dec 2018 13:19) (55 - 74)  BP: 119/84 (04 Dec 2018 13:19) (119/84 - 149/90)  BP(mean): --  RR: 16 (04 Dec 2018 13:19) (16 - 18)  SpO2: 99% (04 Dec 2018 13:19) (99% - 100%)  ----------------------------------------------------------------------------------------  PHYSICAL EXAM  Constitutional - NAD, Comfortable  HEENT - NCAT, EOMI  Neck - Supple, No limited ROM  Chest - CTA bilaterally, No wheeze, No rhonchi, No crackles  Cardiovascular - RRR, S1S2, No murmurs  Abdomen - BS+, Soft, NTND  Extremities - No C/C/E, No calf tenderness   Neurologic Exam -    3/5                 Cognitive - Awake, Alert, AAO to self, place, date, year, situation     Communication - Fluent, No dysarthria, no aphasia     Cranial Nerves - CN 2-12 intact     Motor - RUE 3/5, RLE 4/.5                       Sensory - Intact to LT     Reflexes - DTR Intact, No primitive reflexive     Balance - poor balance   Psychiatric - Mood stable, Affect WNL

## 2018-12-05 LAB
BUN SERPL-MCNC: 22 MG/DL — SIGNIFICANT CHANGE UP (ref 7–23)
CALCIUM SERPL-MCNC: 8.5 MG/DL — SIGNIFICANT CHANGE UP (ref 8.4–10.5)
CHLORIDE SERPL-SCNC: 97 MMOL/L — LOW (ref 98–107)
CO2 SERPL-SCNC: 26 MMOL/L — SIGNIFICANT CHANGE UP (ref 22–31)
CREAT SERPL-MCNC: 0.73 MG/DL — SIGNIFICANT CHANGE UP (ref 0.5–1.3)
GLUCOSE SERPL-MCNC: 195 MG/DL — HIGH (ref 70–99)
HCT VFR BLD CALC: 37.4 % — LOW (ref 39–50)
HGB BLD-MCNC: 13 G/DL — SIGNIFICANT CHANGE UP (ref 13–17)
MCHC RBC-ENTMCNC: 30 PG — SIGNIFICANT CHANGE UP (ref 27–34)
MCHC RBC-ENTMCNC: 34.8 % — SIGNIFICANT CHANGE UP (ref 32–36)
MCV RBC AUTO: 86.2 FL — SIGNIFICANT CHANGE UP (ref 80–100)
NRBC # FLD: 0 — SIGNIFICANT CHANGE UP
PLATELET # BLD AUTO: 217 K/UL — SIGNIFICANT CHANGE UP (ref 150–400)
PMV BLD: 10.8 FL — SIGNIFICANT CHANGE UP (ref 7–13)
POTASSIUM SERPL-MCNC: 4.6 MMOL/L — SIGNIFICANT CHANGE UP (ref 3.5–5.3)
POTASSIUM SERPL-SCNC: 4.6 MMOL/L — SIGNIFICANT CHANGE UP (ref 3.5–5.3)
RBC # BLD: 4.34 M/UL — SIGNIFICANT CHANGE UP (ref 4.2–5.8)
RBC # FLD: 14.7 % — HIGH (ref 10.3–14.5)
SODIUM SERPL-SCNC: 132 MMOL/L — LOW (ref 135–145)
WBC # BLD: 27.87 K/UL — HIGH (ref 3.8–10.5)
WBC # FLD AUTO: 27.87 K/UL — HIGH (ref 3.8–10.5)

## 2018-12-05 RX ADMIN — HEPARIN SODIUM 5000 UNIT(S): 5000 INJECTION INTRAVENOUS; SUBCUTANEOUS at 14:00

## 2018-12-05 RX ADMIN — SODIUM CHLORIDE 3 MILLILITER(S): 9 INJECTION INTRAMUSCULAR; INTRAVENOUS; SUBCUTANEOUS at 13:30

## 2018-12-05 RX ADMIN — LEVETIRACETAM 1000 MILLIGRAM(S): 250 TABLET, FILM COATED ORAL at 05:39

## 2018-12-05 RX ADMIN — Medication 3 MILLIGRAM(S): at 22:17

## 2018-12-05 RX ADMIN — SODIUM CHLORIDE 3 MILLILITER(S): 9 INJECTION INTRAMUSCULAR; INTRAVENOUS; SUBCUTANEOUS at 05:39

## 2018-12-05 RX ADMIN — HEPARIN SODIUM 5000 UNIT(S): 5000 INJECTION INTRAVENOUS; SUBCUTANEOUS at 22:17

## 2018-12-05 RX ADMIN — Medication 3 MILLIGRAM(S): at 13:49

## 2018-12-05 RX ADMIN — Medication 2: at 22:20

## 2018-12-05 RX ADMIN — Medication 1: at 08:42

## 2018-12-05 RX ADMIN — SODIUM CHLORIDE 3 MILLILITER(S): 9 INJECTION INTRAMUSCULAR; INTRAVENOUS; SUBCUTANEOUS at 22:14

## 2018-12-05 RX ADMIN — Medication 4 MILLIGRAM(S): at 05:39

## 2018-12-05 RX ADMIN — PANTOPRAZOLE SODIUM 40 MILLIGRAM(S): 20 TABLET, DELAYED RELEASE ORAL at 06:47

## 2018-12-05 RX ADMIN — LEVETIRACETAM 1000 MILLIGRAM(S): 250 TABLET, FILM COATED ORAL at 17:42

## 2018-12-05 RX ADMIN — Medication 1: at 17:42

## 2018-12-05 RX ADMIN — Medication 2: at 12:27

## 2018-12-05 RX ADMIN — HEPARIN SODIUM 5000 UNIT(S): 5000 INJECTION INTRAVENOUS; SUBCUTANEOUS at 05:39

## 2018-12-05 RX ADMIN — AMLODIPINE BESYLATE 5 MILLIGRAM(S): 2.5 TABLET ORAL at 05:39

## 2018-12-05 NOTE — PROGRESS NOTE ADULT - SUBJECTIVE AND OBJECTIVE BOX
No issues overnight  Vital Signs Last 24 Hrs  T(C): 36.4 (05 Dec 2018 01:10), Max: 37.3 (04 Dec 2018 17:36)  T(F): 97.5 (05 Dec 2018 01:10), Max: 99.2 (04 Dec 2018 17:36)  HR: 56 (05 Dec 2018 01:10) (55 - 74)  BP: 112/71 (05 Dec 2018 01:10) (112/71 - 149/90)  BP(mean): --  RR: 16 (05 Dec 2018 01:10) (16 - 18)  SpO2: 100% (05 Dec 2018 01:10) (98% - 100%)    AAO X 2  PERRLA, EOMI  RUE 5/5 strength  LUE 3/5  BLE 5/5  No drift    MEDICATIONS  (STANDING):  amLODIPine   Tablet 5 milliGRAM(s) Oral daily  dexamethasone     Tablet   Oral   dexamethasone     Tablet 4 milliGRAM(s) Oral every 8 hours  dexamethasone     Tablet 3 milliGRAM(s) Oral every 8 hours  heparin  Injectable 5000 Unit(s) SubCutaneous every 8 hours  influenza   Vaccine 0.5 milliLiter(s) IntraMuscular once  insulin lispro (HumaLOG) corrective regimen sliding scale   SubCutaneous three times a day before meals  insulin lispro (HumaLOG) corrective regimen sliding scale   SubCutaneous at bedtime  levETIRAcetam 1000 milliGRAM(s) Oral two times a day  pantoprazole    Tablet 40 milliGRAM(s) Oral before breakfast  sodium chloride 0.9% lock flush 3 milliLiter(s) IV Push every 8 hours    MEDICATIONS  (PRN):

## 2018-12-06 ENCOUNTER — TRANSCRIPTION ENCOUNTER (OUTPATIENT)
Age: 73
End: 2018-12-06

## 2018-12-06 VITALS
SYSTOLIC BLOOD PRESSURE: 115 MMHG | OXYGEN SATURATION: 99 % | HEART RATE: 64 BPM | DIASTOLIC BLOOD PRESSURE: 75 MMHG | RESPIRATION RATE: 17 BRPM | TEMPERATURE: 98 F

## 2018-12-06 PROCEDURE — 99232 SBSQ HOSP IP/OBS MODERATE 35: CPT | Mod: GC

## 2018-12-06 RX ORDER — DEXAMETHASONE 0.5 MG/5ML
2 ELIXIR ORAL
Qty: 66 | Refills: 0 | OUTPATIENT
Start: 2018-12-06 | End: 2019-01-04

## 2018-12-06 RX ORDER — ASPIRIN/CALCIUM CARB/MAGNESIUM 324 MG
81 TABLET ORAL
Qty: 0 | Refills: 0 | COMMUNITY

## 2018-12-06 RX ORDER — LEVETIRACETAM 250 MG/1
1 TABLET, FILM COATED ORAL
Qty: 60 | Refills: 0 | OUTPATIENT
Start: 2018-12-06 | End: 2019-01-04

## 2018-12-06 RX ORDER — DEXAMETHASONE 0.5 MG/5ML
2 ELIXIR ORAL
Qty: 0 | Refills: 0 | COMMUNITY
Start: 2018-12-06 | End: 2019-01-04

## 2018-12-06 RX ADMIN — SODIUM CHLORIDE 3 MILLILITER(S): 9 INJECTION INTRAMUSCULAR; INTRAVENOUS; SUBCUTANEOUS at 13:17

## 2018-12-06 RX ADMIN — LEVETIRACETAM 1000 MILLIGRAM(S): 250 TABLET, FILM COATED ORAL at 17:08

## 2018-12-06 RX ADMIN — Medication 2: at 12:08

## 2018-12-06 RX ADMIN — Medication 1: at 08:26

## 2018-12-06 RX ADMIN — Medication 3 MILLIGRAM(S): at 13:18

## 2018-12-06 RX ADMIN — HEPARIN SODIUM 5000 UNIT(S): 5000 INJECTION INTRAVENOUS; SUBCUTANEOUS at 05:28

## 2018-12-06 RX ADMIN — AMLODIPINE BESYLATE 5 MILLIGRAM(S): 2.5 TABLET ORAL at 05:29

## 2018-12-06 RX ADMIN — Medication 1: at 17:07

## 2018-12-06 RX ADMIN — LEVETIRACETAM 1000 MILLIGRAM(S): 250 TABLET, FILM COATED ORAL at 05:28

## 2018-12-06 RX ADMIN — SODIUM CHLORIDE 3 MILLILITER(S): 9 INJECTION INTRAMUSCULAR; INTRAVENOUS; SUBCUTANEOUS at 06:16

## 2018-12-06 RX ADMIN — Medication 3 MILLIGRAM(S): at 05:28

## 2018-12-06 RX ADMIN — PANTOPRAZOLE SODIUM 40 MILLIGRAM(S): 20 TABLET, DELAYED RELEASE ORAL at 06:21

## 2018-12-06 RX ADMIN — HEPARIN SODIUM 5000 UNIT(S): 5000 INJECTION INTRAVENOUS; SUBCUTANEOUS at 13:18

## 2018-12-06 NOTE — DISCHARGE NOTE ADULT - HOSPITAL COURSE
HPI:  73m hx HTN, NIDDM, CVA (2013 w/ left side weakness), BPH brought in by EMS after being found down.    Patient was last seen normal 23hrs prior to arrival. 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 After not answering calls from family of which is not like him, Son and daughter went to his house where upon knocking on the door no one answered. Police were called and patient was found face down in urine. He was alert upon arrival and acknowledged hearing knocking on the door, but was unable to get up.  Patient unable to recall events, states nothing is wrong. Denies fevers, chills, night sweats, nausea, vomiting, chest pain, palpations, headaches, changes in vision. myalgias. (22 Nov 2018 06:07)    patient underwent a craniotomy with dr. bowles on 11/28 for removal of brain tumor

## 2018-12-06 NOTE — DISCHARGE NOTE ADULT - CARE PROVIDERS DIRECT ADDRESSES
[email or web address]

## 2018-12-06 NOTE — DISCHARGE NOTE ADULT - PATIENT PORTAL LINK FT
You can access the [email or web address] Patient Portal, offered by [facility], by registering with the following website: [email or web address]

## 2018-12-06 NOTE — DISCHARGE NOTE ADULT - MEDICATION SUMMARY - MEDICATIONS TO TAKE
I will START or STAY ON the medications listed below when I get home from the hospital:    finasteride 5 mg oral tablet  -- 1 tab(s) by mouth once a day  -- Indication: For Home med     dexamethasone 2 mg oral tablet  -- take 2 tabs every 8 hours for 2 days, then take 2 tabs every 12 hours for 30 days  -- It is very important that you take or use this exactly as directed.  Do not skip doses or discontinue unless directed by your doctor.  Obtain medical advice before taking any non-prescription drugs as some may affect the action of this medication.  Take with food or milk.    -- Indication: For Steroids for swelling     levETIRAcetam 1000 mg oral tablet  -- 1 tab(s) by mouth 2 times a day   -- Check with your doctor before becoming pregnant.  It is very important that you take or use this exactly as directed.  Do not skip doses or discontinue unless directed by your doctor.  May cause drowsiness or dizziness.  Obtain medical advice before taking any non-prescription drugs as some may affect the action of this medication.  Swallow whole.  Do not crush.  This drug may impair the ability to drive or operate machinery.  Use care until you become familiar with its effects.    -- Indication: For Seziures     metFORMIN  -- 500 milligram(s) by mouth 2 times a day  -- Indication: For DM    lisinopril-hydroCHLOROthiazide 10 mg-12.5 mg oral tablet  -- 1 tab(s) by mouth once a day  -- Indication: For HLD

## 2018-12-06 NOTE — DISCHARGE NOTE ADULT - CARE PLAN
Principal Discharge DX:	Brain mass  Goal:	to recover from surgery  Assessment and plan of treatment:	needs to follow up with outpatient oncology for possible radiation treatment

## 2018-12-06 NOTE — PROGRESS NOTE ADULT - PROVIDER SPECIALTY LIST ADULT
Anesthesia
Anesthesia
Cardiology
Hospitalist
Neurosurgery
Rehab Medicine
SICU
SICU
Neurosurgery
Neurosurgery
Hospitalist

## 2018-12-06 NOTE — DISCHARGE NOTE ADULT - CARE PROVIDER_API CALL
Fabian Gonzalez (MD), Neurosurgery  General  [address] [address]  Phone: [phone]  Fax: [phone]

## 2018-12-06 NOTE — DISCHARGE NOTE ADULT - PLAN OF CARE
to recover from surgery needs to follow up with outpatient oncology for possible radiation treatment

## 2018-12-06 NOTE — PROGRESS NOTE ADULT - SUBJECTIVE AND OBJECTIVE BOX
HPI:  73m hx HTN, NIDDM, CVA (2013 w/ left side weakness), BPH brought in by EMS after being found down.    Patient was last seen normal 23hrs prior to arrival. 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 After not answering calls from family of which is not like him, Son and daughter went to his house where upon knocking on the door no one answered. Police were called and patient was found face down in urine. He was alert upon arrival and acknowledged hearing knocking on the door, but was unable to get up.  Patient unable to recall events, states nothing is wrong. Denies fevers, chills, night sweats, nausea, vomiting, chest pain, palpations, headaches, changes in vision. myalgias. 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  On Keppra, Decadron  s/p mass resection 11/28   No pain     REVIEW OF SYSTEMS: No chest pain, shortness of breath, nausea, vomiting or diarhea.        CURRENT FUNCTIONAL STATUS mod a   Vital Signs Last 24 Hrs  T(C): 36.9 (06 Dec 2018 13:20), Max: 36.9 (06 Dec 2018 13:20)  T(F): 98.4 (06 Dec 2018 13:20), Max: 98.4 (06 Dec 2018 13:20)  HR: 66 (06 Dec 2018 13:20) (57 - 67)  BP: 112/68 (06 Dec 2018 13:20) (103/71 - 123/77)  BP(mean): --  RR: 16 (06 Dec 2018 13:20) (16 - 18)  SpO2: 98% (06 Dec 2018 13:20) (97% - 100%)  ----------------------------------------------------------------------------------------  PHYSICAL EXAM  Constitutional - NAD, Comfortable  Extremities - No C/C/E, No calf tenderness   Neurologic Exam -    3/5                 Cognitive - Awake, Alert, AAO to self, place, date, year, situation     Communication - Fluent, No dysarthria, no aphasia     Cranial Nerves - CN 2-12 intact     Motor - RUE 3/5, RLE 4/.5                       Sensory - Intact to LT     Reflexes - DTR Intact, No primitive reflexive     Balance - poor balance   Psychiatric - Mood stable, Affect WNL

## 2018-12-06 NOTE — DISCHARGE NOTE ADULT - MEDICATION SUMMARY - MEDICATIONS TO STOP TAKING
I will STOP taking the medications listed below when I get home from the hospital:    aspirin  -- 81 milligram(s) by mouth once a day

## 2018-12-06 NOTE — DISCHARGE NOTE ADULT - NS AS ACTIVITY OBS
Walking-Indoors allowed/Walking-Outdoors allowed/Do not drive or operate machinery/No Heavy lifting/straining/Do not make important decisions/Showering allowed

## 2018-12-06 NOTE — PROGRESS NOTE ADULT - PROBLEM SELECTOR PROBLEM 1
Brain mass
S/P craniotomy
Syncope
Brain mass
Syncope

## 2018-12-18 ENCOUNTER — APPOINTMENT (OUTPATIENT)
Dept: NEUROSURGERY | Facility: CLINIC | Age: 73
End: 2018-12-18

## 2019-01-15 ENCOUNTER — APPOINTMENT (OUTPATIENT)
Dept: RADIATION ONCOLOGY | Facility: CLINIC | Age: 74
End: 2019-01-15
Payer: MEDICARE

## 2019-01-15 ENCOUNTER — OUTPATIENT (OUTPATIENT)
Dept: OUTPATIENT SERVICES | Facility: HOSPITAL | Age: 74
LOS: 1 days | Discharge: HOME | End: 2019-01-15
Payer: MEDICARE

## 2019-01-29 ENCOUNTER — FORM ENCOUNTER (OUTPATIENT)
Age: 74
End: 2019-01-29

## 2019-01-29 ENCOUNTER — APPOINTMENT (OUTPATIENT)
Dept: NEUROSURGERY | Facility: CLINIC | Age: 74
End: 2019-01-29
Payer: MEDICARE

## 2019-01-29 VITALS
DIASTOLIC BLOOD PRESSURE: 81 MMHG | WEIGHT: 216 LBS | BODY MASS INDEX: 30.92 KG/M2 | SYSTOLIC BLOOD PRESSURE: 111 MMHG | HEIGHT: 70 IN | HEART RATE: 96 BPM

## 2019-01-29 DIAGNOSIS — Z98.890 OTHER SPECIFIED POSTPROCEDURAL STATES: ICD-10-CM

## 2019-01-29 PROCEDURE — 99024 POSTOP FOLLOW-UP VISIT: CPT

## 2019-01-30 ENCOUNTER — APPOINTMENT (OUTPATIENT)
Dept: MRI IMAGING | Facility: CLINIC | Age: 74
End: 2019-01-30
Payer: MEDICARE

## 2019-01-30 ENCOUNTER — OUTPATIENT (OUTPATIENT)
Dept: OUTPATIENT SERVICES | Facility: HOSPITAL | Age: 74
LOS: 1 days | End: 2019-01-30
Payer: COMMERCIAL

## 2019-01-30 ENCOUNTER — APPOINTMENT (OUTPATIENT)
Dept: RADIATION ONCOLOGY | Facility: CLINIC | Age: 74
End: 2019-01-30
Payer: MEDICARE

## 2019-01-30 DIAGNOSIS — Z00.8 ENCOUNTER FOR OTHER GENERAL EXAMINATION: ICD-10-CM

## 2019-01-30 PROCEDURE — 70553 MRI BRAIN STEM W/O & W/DYE: CPT

## 2019-01-30 PROCEDURE — A9585: CPT

## 2019-01-30 PROCEDURE — 70553 MRI BRAIN STEM W/O & W/DYE: CPT | Mod: 26

## 2019-01-31 ENCOUNTER — APPOINTMENT (OUTPATIENT)
Dept: RADIATION ONCOLOGY | Facility: CLINIC | Age: 74
End: 2019-01-31
Payer: MEDICARE

## 2019-02-04 PROBLEM — Z98.890 S/P CRANIOTOMY: Status: ACTIVE | Noted: 2019-01-29

## 2019-02-05 NOTE — HISTORY OF PRESENT ILLNESS
[FreeTextEntry1] : Mr. Duncan is a 75 yo male with PMH of HTN, DM2, BPH, and CVA 2013 (residual Left weakness), who arrives via wheelchair for initial post op visit s/p 11/28/18 Right craniotomy for resection of a Right temporal mass.  Pathology returned GBM.  He is A+Ox2 to person and place, and to month not year.\par He is able to stand with assistance.  The surgical incision is clean, dry, well approximated, well healed with no signs of irritations, erythema, or drainage.\par He presented 11/22/18 s/p syncope, collapse, rhabdomyolysis, and brain mass found on MRI.\par 12/6/18 discharged to rehab.  I spoke with Dr. Frye from [facility] rehab on 12/12/18 when staples removed POD #14.  12/12/18, I also conveyed the pathology results to Dr. Frye and the necessary urgent follow up with both Dr. Grace and Dr. Strickland for coordination of care and urgent treatment for the GBM.  Dr. Frye provided the patient and family with all the information and contacts and ensured follow up secured.  \par The pt's son (Mr. Brewer), was also provided with all the information, and insisted he was bringing his father to [place] for treatment where he works in [place] so they can better take care of him.  We both explained the urgency of immediate follow up.\par \par Today, he is accompanied by his family, who states, "his son never conveyed the information to anyone", and "he is his first son that he had very young and that son thinks he is in charge".

## 2019-02-05 NOTE — PHYSICAL EXAM
[General Appearance - Alert] : alert [General Appearance - In No Acute Distress] : in no acute distress [General Appearance - Well Nourished] : well nourished [General Appearance - Well Developed] : well developed [Clean] : clean [Dry] : dry [Well-Healed] : well-healed [No Drainage] : without drainage [Normal Skin] : normal [Oriented To Time, Place, And Person] : oriented to person, place, and time [Impaired Insight] : insight and judgment were intact [Affect] : the affect was normal [Mood] : the mood was normal [Cranial Nerves Optic (II)] : visual acuity intact bilaterally,  pupils equal round and reactive to light [Cranial Nerves Oculomotor (III)] : extraocular motion intact [Cranial Nerves Trigeminal (V)] : facial sensation intact symmetrically [Cranial Nerves Facial (VII)] : face symmetrical [Cranial Nerves Vestibulocochlear (VIII)] : hearing was intact bilaterally [Cranial Nerves Glossopharyngeal (IX)] : tongue and palate midline [Cranial Nerves Accessory (XI - Cranial And Spinal)] : head turning and shoulder shrug symmetric [Cranial Nerves Hypoglossal (XII)] : there was no tongue deviation with protrusion [Sensation Tactile Decrease] : light touch was intact [Romberg's Sign] : a positive Romberg's sign was present [Limited Balance] : the patient's balance was impaired [5] : L4/5 ankle dorsiflexors 5/5 [4] : L4/5 ankle dorsiflexors 4/5 [Intact] : all sensory within normal limits bilaterally [PERRL With Normal Accommodation] : pupils were equal in size, round, reactive to light, with normal accommodation [Hearing Threshold Finger Rub Not Glascock] : hearing was normal [Neck Appearance] : the appearance of the neck was normal [] : no respiratory distress [Respiration, Rhythm And Depth] : normal respiratory rhythm and effort [Auscultation Breath Sounds / Voice Sounds] : lungs were clear to auscultation bilaterally [Heart Sounds] : normal S1 and S2 [Edema] : there was no peripheral edema [Involuntary Movements] : no involuntary movements were seen [Skin Color & Pigmentation] : normal skin color and pigmentation [Erythema] : not erythematous [Warm] : not warm [Tremor] : no tremor present [FreeTextEntry1] : mild Left sided weakness, - able to stand from w/c with mod assist

## 2019-02-05 NOTE — REASON FOR VISIT
[Family Member] : family member [de-identified] : s/p 11/28/18 Right craiotomy for Right temporal mass

## 2019-02-05 NOTE — ASSESSMENT
[FreeTextEntry1] : Mr. Duncan is a 75 yo male recovering well from recent GBM resection on 11/28/18.  He was in rehab and first seeking follow up at this time.  We will obtain an MRI asap to evaluate the progression of the GBM and develop a treatment plan.  He has appt.'s with both Dr. Grace and Dr. Strickland next week.  \par \par 1)  MRI Brain w/wo - ASAP (done 1/30/19)\par 2)  treatment to be determined\par 3)  RTO 1 month - to assess progress

## 2019-02-05 NOTE — REVIEW OF SYSTEMS
[As Noted in HPI] : as noted in HPI [Difficulty Walking] : difficulty walking [Inability to Walk] : inability to walk [Negative] : Endocrine [Difficulty with Language] : no ~M difficulty with language [Abdominal Pain] : no abdominal pain [Vomiting] : no vomiting [Diarrhea] : no diarrhea [Dysuria] : no dysuria [Incontinence] : no incontinence [Skin Lesions] : no skin lesions [Skin Wound] : no skin wound [Itching] : no itching [Easy Bleeding] : no tendency for easy bleeding [Easy Bruising] : no tendency for easy bruising

## 2019-02-05 NOTE — PROCEDURE
[FreeTextEntry1] : Patient Name: CHRISTOPHER PARTIDA   Report Date: 30-Jan-2019 16:23.00 \par Patient ID: [id number] ([id number] ([id number])  Accession No.: [id number] \par Patient Birth Date: 1945  Report Status: F \par Referring Physician: [id number] CAROLIN ZHOU   Reason For Study:  \par \par \par \par \par \par \par \par \par \par EXAM: MR BRAIN WAW IC \par \par \par PROCEDURE DATE: 01/30/2019 \par \par \par \par INTERPRETATION: Contrast-enhanced MRI of the brain. \par \par CLINICAL INDICATION: Status post resection of glioblastoma \par \par TECHNIQUE: Multiplanar, multisequence MR images of the brain were obtained \par with images acquired prior to and following the intravenous administration \par of 10 cc of gadovist. \par \par COMPARISON: Brain MRI dated 12/1/2008 \par \par FINDINGS: Redemonstrated is right temporal craniectomy defect. There is \par increasing irregular thick rimmed enhancement in the right temporal lobe \par with now measuring 4.9 cm AP x 3.1 cm transverse x 2.8 cm cc. This is \par associated with increasing surrounding FLAIR/T2 signal abnormality extending \par throughout the right temporal lobe and into the right occipital lobe \par surrounding the right occipital periventricular white matter. \par \par There is mild increase in size of the right lateral ventricle possibly from \par volume loss. \par \par No other areas of abnormal enhancement are identified. \par \par Scattered foci of elevated signal intensity are noted within the white \par matter which appear more bilaterally symmetrical and likely represent \par microvascular ischemic change. Chronic bilateral cerebellar infarcts are \par again identified. A chronic right thalamic infarct is present. Flow voids at \par the skull base remain within normal limits. \par \par The visualized paranasal sinuses and tympanomastoid cavities are free of \par acute disease. \par \par Impression: \par \par Increasing enhancement enhancement and nonenhancing FLAIR/T2 signal \par abnormality within the right temporal lobe. Differential diagnosis includes \par progressive a neoplasm versus pseudoprogression. Further imaging with \par spectroscopy and MR perfusion may be helpful for further evaluation. \par \par \par \par \par \par \par \par \par KENZIE LONG M.D., ATTENDING RADIOLOGIST \par This document has been electronically signed. Jan 30 2019 4:23PM \par \par \par  \par

## 2019-02-05 NOTE — DATA REVIEWED
[de-identified] : 1/30/19 MRI Brain w/wo - PACS - see data narrative below in procedure  (not able to put in results)

## 2019-02-07 ENCOUNTER — APPOINTMENT (OUTPATIENT)
Dept: NEUROLOGY | Facility: CLINIC | Age: 74
End: 2019-02-07
Payer: MEDICARE

## 2019-02-07 ENCOUNTER — APPOINTMENT (OUTPATIENT)
Dept: RADIATION ONCOLOGY | Facility: CLINIC | Age: 74
End: 2019-02-07
Payer: MEDICARE

## 2019-02-07 VITALS
RESPIRATION RATE: 18 BRPM | WEIGHT: 217 LBS | SYSTOLIC BLOOD PRESSURE: 105 MMHG | BODY MASS INDEX: 31.07 KG/M2 | TEMPERATURE: 98.3 F | HEART RATE: 96 BPM | DIASTOLIC BLOOD PRESSURE: 75 MMHG | HEIGHT: 70 IN | OXYGEN SATURATION: 99 %

## 2019-02-07 VITALS
WEIGHT: 181.22 LBS | RESPIRATION RATE: 16 BRPM | OXYGEN SATURATION: 94 % | SYSTOLIC BLOOD PRESSURE: 102 MMHG | DIASTOLIC BLOOD PRESSURE: 62 MMHG | HEART RATE: 80 BPM | BODY MASS INDEX: 26 KG/M2

## 2019-02-07 DIAGNOSIS — I10 ESSENTIAL (PRIMARY) HYPERTENSION: ICD-10-CM

## 2019-02-07 DIAGNOSIS — N40.0 BENIGN PROSTATIC HYPERPLASIA WITHOUT LOWER URINARY TRACT SYMPMS: ICD-10-CM

## 2019-02-07 DIAGNOSIS — Z78.9 OTHER SPECIFIED HEALTH STATUS: ICD-10-CM

## 2019-02-07 DIAGNOSIS — I63.9 CEREBRAL INFARCTION, UNSPECIFIED: ICD-10-CM

## 2019-02-07 DIAGNOSIS — E11.9 TYPE 2 DIABETES MELLITUS W/OUT COMPLICATIONS: ICD-10-CM

## 2019-02-07 DIAGNOSIS — C71.9 MALIGNANT NEOPLASM OF BRAIN, UNSPECIFIED: ICD-10-CM

## 2019-02-07 PROCEDURE — 99205 OFFICE O/P NEW HI 60 MIN: CPT | Mod: 25,GC

## 2019-02-07 PROCEDURE — 99214 OFFICE O/P EST MOD 30 MIN: CPT

## 2019-02-07 RX ORDER — AMLODIPINE BESYLATE 5 MG/1
5 TABLET ORAL DAILY
Qty: 30 | Refills: 3 | Status: ACTIVE | COMMUNITY

## 2019-02-07 RX ORDER — ZINC OXIDE 200 MG/G
20 OINTMENT TOPICAL
Qty: 20 | Refills: 0 | Status: ACTIVE | COMMUNITY
Start: 2019-02-07

## 2019-02-07 RX ORDER — FINASTERIDE 5 MG/1
5 TABLET, FILM COATED ORAL DAILY
Qty: 30 | Refills: 0 | Status: ACTIVE | COMMUNITY

## 2019-02-07 RX ORDER — CHOLECALCIFEROL (VITAMIN D3)
CRYSTALS MISCELLANEOUS
Qty: 30 | Refills: 0 | Status: ACTIVE | COMMUNITY
Start: 2019-02-07

## 2019-02-07 RX ORDER — ACETAMINOPHEN 325 MG/1
325 TABLET ORAL EVERY 6 HOURS
Qty: 60 | Refills: 0 | Status: ACTIVE | COMMUNITY

## 2019-02-07 RX ORDER — DEXAMETHASONE 4 MG/1
4 TABLET ORAL
Qty: 30 | Refills: 0 | Status: ACTIVE | COMMUNITY
Start: 2019-02-07

## 2019-02-07 RX ORDER — LEVETIRACETAM 750 MG/1
750 TABLET, FILM COATED ORAL TWICE DAILY
Qty: 120 | Refills: 6 | Status: ACTIVE | COMMUNITY

## 2019-02-07 RX ORDER — SIMVASTATIN 20 MG/1
20 TABLET, FILM COATED ORAL
Qty: 30 | Refills: 0 | Status: ACTIVE | COMMUNITY

## 2019-02-07 RX ORDER — DOCUSATE SODIUM 100 MG/1
100 CAPSULE, LIQUID FILLED ORAL TWICE DAILY
Qty: 30 | Refills: 0 | Status: ACTIVE | COMMUNITY
Start: 2019-02-07

## 2019-02-07 RX ORDER — METFORMIN HYDROCHLORIDE 500 MG/1
500 TABLET, COATED ORAL
Qty: 60 | Refills: 0 | Status: ACTIVE | COMMUNITY

## 2019-02-07 RX ORDER — MAGNESIUM HYDROXIDE 400 MG/5ML
400 SUSPENSION ORAL
Qty: 30 | Refills: 0 | Status: ACTIVE | COMMUNITY

## 2019-02-07 NOTE — HISTORY OF PRESENT ILLNESS
[FreeTextEntry1] : Mr. Guru Duncan is a 75 yo left handed man who was found on the floor of his home on 11/22/18. His daughter-in-law says that in the days before this he seemed unchanged but anxious. He was brought to the hospital and found to have a right temporal mass - 5.9 x 4 x 2.7 cm with ring enhancement and mass effect. He underwent a large resection of the mass on 11/28/18 - pathology was GBM, IDH wt. Post-operative MRI showed small nodular residual. He was sent to Rehab and family had some conflict about whether he was going to [place] for treatment or staying local and ultimately did nothing.\par \par Rehab was interrupted by seizures on 12/7 - he was admitted to [facility] where he also had bilateral PEs. He recovered well and has been back at Rehab for over one month.\par \par He was seen in follow up by Dr. Gonzalez who repeated the MRI brain on 1/30/19 which showed a recurrent mass - 4.9 x 3.1 x 2.8 cm. He remains in rehab.\par \par PMH notable for HTN, BPH, and history of a stroke in 2013 which had manifested as right sided weakness from which he made a good recovery. Per the daughter-in-law he was functioning independently prior to this brain tumor diagnosis.\par \par He denies headache, focal weakness, or numbness. He reports that he is now being allowed to walk with a walker at rehab but he is deconditioned.\par \par He is not on Decadron. He is taking Keppra 1500 mg bid and has been seizure free.\par

## 2019-02-07 NOTE — DISCUSSION/SUMMARY
[FreeTextEntry1] : In summary, this is a 73 yo man with a diagnosis of a left temporal GBM, IDH wt, in 11/18 now with disease recurrence prior to starting treatment.\par Options at this point include reresection as well as radiation and Temodar.\par Will discuss with Bridgette Gonzalez and Lesly.\par Recommend adding Decadron 4 mg bid for alertness and to see if left neglect improves.\par Will speak to family after above.

## 2019-02-07 NOTE — PHYSICAL EXAM
[FreeTextEntry1] : He is awake but a little sleepy - oriented to month, year, and President.\par Able to name objects without difficulty and follow commands.\par He can spell the word "HOUSE" forwards but not backwards.\par His memory at 5 minutes was 3/3.\par \par PERRl, EOMI,\par Left VF cut.\par Face is symmetric and tongue protrudes midline.\par There is trace left drift\par Strength on direct power testing is full.\par Extinction on the left to double simultaneous stimulation.\par His balance is impaired.

## 2019-02-08 PROBLEM — Z78.9 NO FAMILY HISTORY OF CANCER: Status: ACTIVE | Noted: 2019-02-08

## 2019-02-08 PROBLEM — C71.9 GLIOBLASTOMA: Status: ACTIVE | Noted: 2018-12-17

## 2019-02-08 PROBLEM — I63.9 STROKE: Status: ACTIVE | Noted: 2019-02-07

## 2019-02-08 NOTE — VITALS
[Maximal Pain Intensity: 0/10] : 0/10 [Least Pain Intensity: 0/10] : 0/10 [60: Requires occasional assistance, but is able to care for most of his/her needs] : 60: Requires occasional assistance, but is able to care for most of his/her needs [ECOG Performance Status: 2 - Ambulatory and capable of all self care but unable to carry out any work activities] : Performance Status: 2 - Ambulatory and capable of all self care but unable to carry out any work activities. Up and about more than 50% of waking hours

## 2019-02-12 ENCOUNTER — INPATIENT (INPATIENT)
Facility: HOSPITAL | Age: 74
LOS: 21 days | Discharge: NURSING, REHAB OR LONG-TERM CARE | End: 2019-03-06
Attending: STUDENT IN AN ORGANIZED HEALTH CARE EDUCATION/TRAINING PROGRAM | Admitting: STUDENT IN AN ORGANIZED HEALTH CARE EDUCATION/TRAINING PROGRAM
Payer: MEDICARE

## 2019-02-12 VITALS
SYSTOLIC BLOOD PRESSURE: 110 MMHG | DIASTOLIC BLOOD PRESSURE: 80 MMHG | HEART RATE: 101 BPM | TEMPERATURE: 101 F | OXYGEN SATURATION: 100 % | RESPIRATION RATE: 19 BRPM

## 2019-02-12 DIAGNOSIS — R56.9 UNSPECIFIED CONVULSIONS: ICD-10-CM

## 2019-02-12 LAB
ALBUMIN SERPL ELPH-MCNC: 4.1 G/DL — SIGNIFICANT CHANGE UP (ref 3.3–5)
ALP SERPL-CCNC: 78 U/L — SIGNIFICANT CHANGE UP (ref 40–120)
ALT FLD-CCNC: 29 U/L — SIGNIFICANT CHANGE UP (ref 4–41)
ANION GAP SERPL CALC-SCNC: 25 MMO/L — HIGH (ref 7–14)
AST SERPL-CCNC: 22 U/L — SIGNIFICANT CHANGE UP (ref 4–40)
B PERT DNA SPEC QL NAA+PROBE: NOT DETECTED — SIGNIFICANT CHANGE UP
BASE EXCESS BLDV CALC-SCNC: -6.3 MMOL/L — SIGNIFICANT CHANGE UP
BASOPHILS # BLD AUTO: 0.18 K/UL — SIGNIFICANT CHANGE UP (ref 0–0.2)
BASOPHILS NFR BLD AUTO: 0.6 % — SIGNIFICANT CHANGE UP (ref 0–2)
BASOPHILS NFR SPEC: 0 % — SIGNIFICANT CHANGE UP (ref 0–2)
BILIRUB SERPL-MCNC: < 0.2 MG/DL — LOW (ref 0.2–1.2)
BLASTS # FLD: 0 % — SIGNIFICANT CHANGE UP (ref 0–0)
BLOOD GAS VENOUS - CREATININE: 0.65 MG/DL — SIGNIFICANT CHANGE UP (ref 0.5–1.3)
BUN SERPL-MCNC: 20 MG/DL — SIGNIFICANT CHANGE UP (ref 7–23)
BURR CELLS BLD QL SMEAR: PRESENT — SIGNIFICANT CHANGE UP
C PNEUM DNA SPEC QL NAA+PROBE: NOT DETECTED — SIGNIFICANT CHANGE UP
CALCIUM SERPL-MCNC: 10.1 MG/DL — SIGNIFICANT CHANGE UP (ref 8.4–10.5)
CHLORIDE BLDV-SCNC: 107 MMOL/L — SIGNIFICANT CHANGE UP (ref 96–108)
CHLORIDE SERPL-SCNC: 98 MMOL/L — SIGNIFICANT CHANGE UP (ref 98–107)
CK SERPL-CCNC: 30 U/L — SIGNIFICANT CHANGE UP (ref 30–200)
CO2 SERPL-SCNC: 17 MMOL/L — LOW (ref 22–31)
CREAT SERPL-MCNC: 0.82 MG/DL — SIGNIFICANT CHANGE UP (ref 0.5–1.3)
EOSINOPHIL # BLD AUTO: 0.01 K/UL — SIGNIFICANT CHANGE UP (ref 0–0.5)
EOSINOPHIL NFR BLD AUTO: 0 % — SIGNIFICANT CHANGE UP (ref 0–6)
EOSINOPHIL NFR FLD: 0 % — SIGNIFICANT CHANGE UP (ref 0–6)
FLUAV H1 2009 PAND RNA SPEC QL NAA+PROBE: NOT DETECTED — SIGNIFICANT CHANGE UP
FLUAV H1 RNA SPEC QL NAA+PROBE: NOT DETECTED — SIGNIFICANT CHANGE UP
FLUAV H3 RNA SPEC QL NAA+PROBE: NOT DETECTED — SIGNIFICANT CHANGE UP
FLUAV SUBTYP SPEC NAA+PROBE: NOT DETECTED — SIGNIFICANT CHANGE UP
FLUBV RNA SPEC QL NAA+PROBE: NOT DETECTED — SIGNIFICANT CHANGE UP
GAS PNL BLDV: 134 MMOL/L — LOW (ref 136–146)
GLUCOSE BLDV-MCNC: 147 — HIGH (ref 70–99)
GLUCOSE SERPL-MCNC: 145 MG/DL — HIGH (ref 70–99)
HADV DNA SPEC QL NAA+PROBE: NOT DETECTED — SIGNIFICANT CHANGE UP
HCO3 BLDV-SCNC: 19 MMOL/L — LOW (ref 20–27)
HCOV PNL SPEC NAA+PROBE: DETECTED — HIGH
HCT VFR BLD CALC: 40.7 % — SIGNIFICANT CHANGE UP (ref 39–50)
HCT VFR BLDV CALC: 40.8 % — SIGNIFICANT CHANGE UP (ref 39–51)
HGB BLD-MCNC: 13 G/DL — SIGNIFICANT CHANGE UP (ref 13–17)
HGB BLDV-MCNC: 13.3 G/DL — SIGNIFICANT CHANGE UP (ref 13–17)
HMPV RNA SPEC QL NAA+PROBE: NOT DETECTED — SIGNIFICANT CHANGE UP
HPIV1 RNA SPEC QL NAA+PROBE: NOT DETECTED — SIGNIFICANT CHANGE UP
HPIV2 RNA SPEC QL NAA+PROBE: NOT DETECTED — SIGNIFICANT CHANGE UP
HPIV3 RNA SPEC QL NAA+PROBE: NOT DETECTED — SIGNIFICANT CHANGE UP
HPIV4 RNA SPEC QL NAA+PROBE: NOT DETECTED — SIGNIFICANT CHANGE UP
IMM GRANULOCYTES NFR BLD AUTO: 3.6 % — HIGH (ref 0–1.5)
LACTATE BLDV-MCNC: 12.3 MMOL/L — CRITICAL HIGH (ref 0.5–2)
LYMPHOCYTES # BLD AUTO: 22.6 % — SIGNIFICANT CHANGE UP (ref 13–44)
LYMPHOCYTES # BLD AUTO: 7.12 K/UL — HIGH (ref 1–3.3)
LYMPHOCYTES NFR SPEC AUTO: 21 % — SIGNIFICANT CHANGE UP (ref 13–44)
MAGNESIUM SERPL-MCNC: 1.8 MG/DL — SIGNIFICANT CHANGE UP (ref 1.6–2.6)
MCHC RBC-ENTMCNC: 29 PG — SIGNIFICANT CHANGE UP (ref 27–34)
MCHC RBC-ENTMCNC: 31.9 % — LOW (ref 32–36)
MCV RBC AUTO: 90.8 FL — SIGNIFICANT CHANGE UP (ref 80–100)
METAMYELOCYTES # FLD: 0.9 % — SIGNIFICANT CHANGE UP (ref 0–1)
MONOCYTES # BLD AUTO: 2.26 K/UL — HIGH (ref 0–0.9)
MONOCYTES NFR BLD AUTO: 7.2 % — SIGNIFICANT CHANGE UP (ref 2–14)
MONOCYTES NFR BLD: 8.8 % — SIGNIFICANT CHANGE UP (ref 2–9)
MYELOCYTES NFR BLD: 0.9 % — HIGH (ref 0–0)
NEUTROPHIL AB SER-ACNC: 67.5 % — SIGNIFICANT CHANGE UP (ref 43–77)
NEUTROPHILS # BLD AUTO: 20.86 K/UL — HIGH (ref 1.8–7.4)
NEUTROPHILS NFR BLD AUTO: 66 % — SIGNIFICANT CHANGE UP (ref 43–77)
NEUTS BAND # BLD: 0 % — SIGNIFICANT CHANGE UP (ref 0–6)
NRBC # FLD: 0.25 K/UL — LOW (ref 25–125)
OTHER - HEMATOLOGY %: 0 — SIGNIFICANT CHANGE UP
PCO2 BLDV: 33 MMHG — LOW (ref 41–51)
PH BLDV: 7.36 PH — SIGNIFICANT CHANGE UP (ref 7.32–7.43)
PHOSPHATE SERPL-MCNC: 4.2 MG/DL — SIGNIFICANT CHANGE UP (ref 2.5–4.5)
PLATELET # BLD AUTO: 580 K/UL — HIGH (ref 150–400)
PLATELET COUNT - ESTIMATE: NORMAL — SIGNIFICANT CHANGE UP
PMV BLD: 10.2 FL — SIGNIFICANT CHANGE UP (ref 7–13)
PO2 BLDV: 71 MMHG — HIGH (ref 35–40)
POIKILOCYTOSIS BLD QL AUTO: SLIGHT — SIGNIFICANT CHANGE UP
POTASSIUM BLDV-SCNC: 5.1 MMOL/L — HIGH (ref 3.4–4.5)
POTASSIUM SERPL-MCNC: 5.5 MMOL/L — HIGH (ref 3.5–5.3)
POTASSIUM SERPL-SCNC: 5.5 MMOL/L — HIGH (ref 3.5–5.3)
PROMYELOCYTES # FLD: 0 % — SIGNIFICANT CHANGE UP (ref 0–0)
PROT SERPL-MCNC: 7.9 G/DL — SIGNIFICANT CHANGE UP (ref 6–8.3)
RBC # BLD: 4.48 M/UL — SIGNIFICANT CHANGE UP (ref 4.2–5.8)
RBC # FLD: 14.7 % — HIGH (ref 10.3–14.5)
RSV RNA SPEC QL NAA+PROBE: NOT DETECTED — SIGNIFICANT CHANGE UP
RV+EV RNA SPEC QL NAA+PROBE: NOT DETECTED — SIGNIFICANT CHANGE UP
SAO2 % BLDV: 92 % — HIGH (ref 60–85)
SODIUM SERPL-SCNC: 140 MMOL/L — SIGNIFICANT CHANGE UP (ref 135–145)
VARIANT LYMPHS # BLD: 0.9 % — SIGNIFICANT CHANGE UP
WBC # BLD: 31.56 K/UL — HIGH (ref 3.8–10.5)
WBC # FLD AUTO: 31.56 K/UL — HIGH (ref 3.8–10.5)

## 2019-02-12 PROCEDURE — 71045 X-RAY EXAM CHEST 1 VIEW: CPT | Mod: 26

## 2019-02-12 PROCEDURE — 70450 CT HEAD/BRAIN W/O DYE: CPT | Mod: 26

## 2019-02-12 RX ORDER — FOSPHENYTOIN 50 MG/ML
1600 INJECTION INTRAMUSCULAR; INTRAVENOUS ONCE
Qty: 0 | Refills: 0 | Status: DISCONTINUED | OUTPATIENT
Start: 2019-02-12 | End: 2019-02-12

## 2019-02-12 RX ORDER — DEXAMETHASONE 0.5 MG/5ML
10 ELIXIR ORAL ONCE
Qty: 0 | Refills: 0 | Status: COMPLETED | OUTPATIENT
Start: 2019-02-12 | End: 2019-02-12

## 2019-02-12 RX ORDER — PIPERACILLIN AND TAZOBACTAM 4; .5 G/20ML; G/20ML
3.38 INJECTION, POWDER, LYOPHILIZED, FOR SOLUTION INTRAVENOUS ONCE
Qty: 0 | Refills: 0 | Status: COMPLETED | OUTPATIENT
Start: 2019-02-12 | End: 2019-02-12

## 2019-02-12 RX ORDER — SODIUM CHLORIDE 9 MG/ML
2000 INJECTION INTRAMUSCULAR; INTRAVENOUS; SUBCUTANEOUS ONCE
Qty: 0 | Refills: 0 | Status: COMPLETED | OUTPATIENT
Start: 2019-02-12 | End: 2019-02-12

## 2019-02-12 RX ORDER — ACETAMINOPHEN 500 MG
650 TABLET ORAL ONCE
Qty: 0 | Refills: 0 | Status: COMPLETED | OUTPATIENT
Start: 2019-02-12 | End: 2019-02-12

## 2019-02-12 RX ORDER — LEVETIRACETAM 250 MG/1
1000 TABLET, FILM COATED ORAL ONCE
Qty: 0 | Refills: 0 | Status: COMPLETED | OUTPATIENT
Start: 2019-02-12 | End: 2019-02-12

## 2019-02-12 RX ORDER — VANCOMYCIN HCL 1 G
1000 VIAL (EA) INTRAVENOUS ONCE
Qty: 0 | Refills: 0 | Status: COMPLETED | OUTPATIENT
Start: 2019-02-12 | End: 2019-02-12

## 2019-02-12 RX ADMIN — Medication 2 MILLIGRAM(S): at 20:53

## 2019-02-12 RX ADMIN — Medication 650 MILLIGRAM(S): at 21:05

## 2019-02-12 RX ADMIN — PIPERACILLIN AND TAZOBACTAM 200 GRAM(S): 4; .5 INJECTION, POWDER, LYOPHILIZED, FOR SOLUTION INTRAVENOUS at 21:39

## 2019-02-12 RX ADMIN — Medication 2 MILLIGRAM(S): at 20:58

## 2019-02-12 RX ADMIN — Medication 102 MILLIGRAM(S): at 21:01

## 2019-02-12 RX ADMIN — SODIUM CHLORIDE 4000 MILLILITER(S): 9 INJECTION INTRAMUSCULAR; INTRAVENOUS; SUBCUTANEOUS at 21:30

## 2019-02-12 RX ADMIN — Medication 2 MILLIGRAM(S): at 21:10

## 2019-02-12 RX ADMIN — Medication 250 MILLIGRAM(S): at 22:24

## 2019-02-12 RX ADMIN — LEVETIRACETAM 400 MILLIGRAM(S): 250 TABLET, FILM COATED ORAL at 21:00

## 2019-02-12 NOTE — CONSULT NOTE ADULT - ASSESSMENT
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 Patient had jerking of the left face, arm and leg, approx 45-60 minutes in duration and was given a total of 6mg of Ativan in ED and 1000mg Keppra. Most recent MRI on 1/30/19 revealed increasing enhancement within right temporal lobe concerning for progressive a neoplasm versus pseudoprogression. Patient's seizure clinically correlates with area of previous mass/surgery. Will optimize Keppra dosing with additional 1000mg bolus and increase Keppra to 750mg TID. Appreciate NS recs regarding possibility of tumor progression.     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 Patient had jerking of the left face, arm and leg, approx 45-60 minutes in duration and was given a total of 6mg of Ativan in ED and 1000mg Keppra. Most recent MRI on 1/30/19 revealed increasing enhancement within right temporal lobe concerning for progressive a neoplasm versus pseudoprogression. CT done today revealed "Edema is visualized in the right temporal convexity. Unable to determine whether this is simply postoperative change or tumor progression."  Patient's seizure clinically correlates with area of previous mass/surgery. Will optimize Keppra dosing with additional 1000mg bolus and increase Keppra to 750mg TID. Appreciate NS recs regarding possibility of tumor progression.     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 Patient had jerking of the left face, arm and leg, approx 45-60 minutes in duration and was given a total of 6mg of Ativan in ED and 1000mg Keppra. Most recent MRI on 1/30/19 revealed increasing enhancement within right temporal lobe concerning for progressive a neoplasm versus pseudoprogression. CT done today revealed "Edema is visualized in the right temporal convexity. Unable to determine whether this is simply postoperative change or tumor progression."  Patient's seizure clinically correlates with area of previous mass/surgery. Will optimize Keppra dosing with additional 1000mg bolus and increase Keppra to 750mg TID. Appreciate NS recs regarding possibility of tumor progression.     Plan  - Give additional 1000mg Keppra bolus now  - Increase Keppra to 1000mg BID  - MRI brain and/or MR Spect to evaluate for progression of tumor  - If continues to seize, consider addition of focal AED such as Vimpat (Lacosamide)

## 2019-02-12 NOTE — ED ADULT NURSE REASSESSMENT NOTE - NS ED NURSE REASSESS COMMENT FT1
Following multiple rounds of medications for status epilepticus pt improved - S/E resolved currently.  Pt vitals stable, rectally febrile, medicated w/ tylenol suppository, as noted on CM, resps even/unlabored on 3L NC - maintaining airway patency, HR STachy on CM.  Pt easily arousable to voice, responsive and appropriate at this time.  Facilitator RN at bedside at present for continued monitoring and care.

## 2019-02-12 NOTE — CONSULT NOTE ADULT - SUBJECTIVE AND OBJECTIVE BOX
HPI:  74 year old male with HTN, NIDDM, CVA (2013 w/ left side weakness), R glioma (s/p R craniectomy and tumor resection 11/28/2018), BPH brought in by EMS for active seizure.   In ED  REVIEW OF SYSTEMS:  Constitutional - no irritability, no fever, no recent weight loss, no poor weight gain  Eyes - no conjunctivitis, no blurry vision, no double vision  Ears/Nose/Mouth/Throat - no ear pain, no rhinorrhea, no congestion, no sore throat  Neck - no pain or stiffness  Respiratory - no tachypnea, no increased work of breathing, no cough  Cardiovascular - no chest pain, no palpitations, no cyanosis, no syncope  Gastrointestinal - no abdominal pain, no nausea, no vomiting, no diarrhea  Genitourinary - no change in urination, no hematuria  Integumentary - no rash, no jaundice, no pallor, no color change  Musculoskeletal - no joint swelling, no joint stiffness, no back pain, no extremity pain  Endocrine - no heat or cold intolerance, no jitteriness, no failure to thrive  Hematologic- no easy bruising, no bleeding  Neurological - see HPI  Psychiatric: No depression, anxiety, mood swings or difficulty sleeping  All Other Systems - reviewed, negative    PAST MEDICAL & SURGICAL HISTORY:  BPH (benign prostatic hyperplasia)  CVA (cerebral vascular accident)  Diabetes  HTN (hypertension)  No significant past surgical history      MEDICATIONS  (STANDING):    MEDICATIONS  (PRN):    Allergies  No Known Allergies    Social History  Lives with in nursing home.    Vital Signs Last 24 Hrs  T(C): 38.2 (12 Feb 2019 21:20), Max: 38.2 (12 Feb 2019 21:20)  T(F): 100.7 (12 Feb 2019 21:20), Max: 100.7 (12 Feb 2019 21:20)  HR: 79 (12 Feb 2019 23:05) (79 - 101)  BP: 121/87 (12 Feb 2019 23:05) (110/80 - 121/87)  BP(mean): --  RR: 16 (12 Feb 2019 23:05) (16 - 19)  SpO2: 100% (12 Feb 2019 23:05) (100% - 100%)    GENERAL PHYSICAL EXAM  General:        Well nourished, no acute distress  HEENT:         Normocephalic, atraumatic, clear conjunctiva, external ear normal, nasal mucosa normal, oral pharynx clear  Neck:            Supple, full range of motion, no nuchal rigidity  CV:               Regular rate and rhythm, no murmurs. Warm and well perfused.  Respiratory:   Clear to auscultation; Even, nonlabored breathing  Abdominal:    Soft, nontender, nondistended, no masses, no organomegaly  Extremities:    No joint swelling, erythema, tenderness; normal ROM, no contractures  Skin:              No rash, no neurocutaneous stigmata     NEUROLOGIC EXAM  Mental Status:     Oriented to person, place, and date; Good eye contact; follows simple commands  Cranial Nerves:    PERRL, EOMI, no facial asymmetry, V1-V3 intact , symmetric palate, tongue midline.   Muscle Strength:  Full strength 5/5, proximal and distal,  upper and lower extremities  Muscle Tone:       Normal tone  DTR:                    2+/4 Biceps, Brachioradialis, Triceps Bilateral;  2+/4  Patellar, Ankle bilateral. No clonus.  Babinski:              Plantar reflexes flexion bilaterally  Sensation:            Intact to pain, light touch, temperature and vibration throughout.  Coordination:       No dysmetria in finger to nose test bilaterally  Gait:                    Normal gait, normal tandem gait, normal toe walking, normal heel walking  Romberg:            Negative Romberg    Lab Results:                        13.0   31.56 )-----------( 580      ( 12 Feb 2019 21:00 )             40.7     02-12    140  |  98  |  20  ----------------------------<  145<H>  5.5<H>   |  17<L>  |  0.82    Ca    10.1      12 Feb 2019 21:00  Phos  4.2     02-12  Mg     1.8     02-12    TPro  7.9  /  Alb  4.1  /  TBili  < 0.2<L>  /  DBili  x   /  AST  22  /  ALT  29  /  AlkPhos  78  02-12    LIVER FUNCTIONS - ( 12 Feb 2019 21:00 )  Alb: 4.1 g/dL / Pro: 7.9 g/dL / ALK PHOS: 78 u/L / ALT: 29 u/L / AST: 22 u/L / GGT: x           EEG Results:  EEG 11-23-18  Clinical Impression:  - Findings indicated risk of focal onset seizures from the right parasagittal region. No seizures were recorded.   - Mild-moderate nonspecific diffuse or multifocal cerebral dysfunction.     Imaging Studies:  MR Head w/ IV Cont (11.22.18 @ 06:52)   IMPRESSION:   Right 5.9 x 4 x 2.7 cm AP, TR, CC rim-enhancing temporal mass concerning for recurrent tumor with trapping of the ventricles, 6 cm leftward shift, with stranding areas of restricted diffusion which may reflect tumor or possible ischemic change as detailed above.  Volume loss with encephalomalacia throughout the right inferior medial cerebellar hemisphere, smaller foci of ischemia in the left cerebellar hemisphere and supratentorially.    R craniectomy and tumor resection 11/28/2018    MR Head w/wo IV Cont (01.30.19 @ 15:12)   Impression:  Increasing enhancement enhancement and nonenhancingFLAIR/T2 signal abnormality within the right temporal lobe. Differential diagnosis includes progressive a neoplasm versus pseudoprogression. Further imaging with spectroscopy and MR perfusion may be helpful for further evaluation.    MR Head w/wo IV Cont (12.01.18 @ 10:43)  Impression:   Postop changes are identified. Please note the possibility of residual or recurrent tumor must be considered as described above. 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  Patient began to have left sided muscle jerking approx half hour prior to EMS arrival, on EMS arrival continues to have jerking of the left face, arm and leg, approx 45-60 minutes in duration upon ED arrival. Pt responsive, able to move right side and follow commands during this episode. On keppra 750 BID, dexamethasone 4mg daily.    In ED patient received Ativan 2mg x 3 and 1000mg Keppra x1. Patient febrile to 100.7 in ED.     REVIEW OF SYSTEMS:  Constitutional - no irritability, +fever  Eyes - no conjunctivitis, no blurry vision, no double vision  Ears/Nose/Mouth/Throat - no ear pain, no rhinorrhea, no congestion, no sore throat  Neck - no pain or stiffness  Respiratory - no tachypnea, no increased work of breathing, no cough  Cardiovascular - no chest pain, no palpitations, no cyanosis, no syncope  Gastrointestinal - no abdominal pain, no nausea, no vomiting, no diarrhea  Genitourinary - no change in urination, no hematuria  Integumentary - no rash, no jaundice, no pallor, no color change  Musculoskeletal - no joint swelling, no joint stiffness, no back pain, no extremity pain  Endocrine - no heat or cold intolerance, no jitteriness, no failure to thrive  Hematologic- no easy bruising, no bleeding  Neurological - see HPI  Psychiatric: No depression, anxiety, mood swings or difficulty sleeping  All Other Systems - reviewed, negative    PAST MEDICAL & SURGICAL HISTORY:  BPH (benign prostatic hyperplasia)  CVA (cerebral vascular accident)  Diabetes  HTN (hypertension)  No significant past surgical history      MEDICATIONS  (STANDING):    MEDICATIONS  (PRN):    Allergies  No Known Allergies    Social History  Lives with in nursing home.    Vital Signs Last 24 Hrs  T(C): 38.2 (12 Feb 2019 21:20), Max: 38.2 (12 Feb 2019 21:20)  T(F): 100.7 (12 Feb 2019 21:20), Max: 100.7 (12 Feb 2019 21:20)  HR: 79 (12 Feb 2019 23:05) (79 - 101)  BP: 121/87 (12 Feb 2019 23:05) (110/80 - 121/87)  BP(mean): --  RR: 16 (12 Feb 2019 23:05) (16 - 19)  SpO2: 100% (12 Feb 2019 23:05) (100% - 100%)    GENERAL PHYSICAL EXAM  General:        Well nourished, no acute distress  HEENT:         Normocephalic, atraumatic, clear conjunctiva, external ear normal, nasal mucosa normal, oral pharynx clear  Neck:            Supple, full range of motion, no nuchal rigidity  CV:               Regular rate and rhythm, no murmurs. Warm and well perfused.  Respiratory:   Clear to auscultation; Even, nonlabored breathing  Abdominal:    Soft, nontender, nondistended, no masses, no organomegaly  Extremities:    No joint swelling, erythema, tenderness; normal ROM, no contractures  Skin:              No rash, no neurocutaneous stigmata     NEUROLOGIC EXAM  Mental Status:     Oriented to person, place, and date; Good eye contact; follows simple commands  Cranial Nerves:    PERRL, EOMI, no facial asymmetry, V1-V3 intact , symmetric palate, tongue midline.   Muscle Strength:  Full strength 5/5, proximal and distal,  upper and lower extremities  Muscle Tone:       Normal tone  DTR:                    2+/4 Biceps, Brachioradialis, Triceps Bilateral;  2+/4  Patellar, Ankle bilateral. No clonus.  Babinski:              Plantar reflexes flexion bilaterally  Sensation:            Intact to pain, light touch, temperature and vibration throughout.  Coordination:       No dysmetria in finger to nose test bilaterally  Gait:                    Normal gait, normal tandem gait, normal toe walking, normal heel walking  Romberg:            Negative Romberg    Lab Results:                        13.0   31.56 )-----------( 580      ( 12 Feb 2019 21:00 )             40.7     02-12    140  |  98  |  20  ----------------------------<  145<H>  5.5<H>   |  17<L>  |  0.82    Ca    10.1      12 Feb 2019 21:00  Phos  4.2     02-12  Mg     1.8     02-12    TPro  7.9  /  Alb  4.1  /  TBili  < 0.2<L>  /  DBili  x   /  AST  22  /  ALT  29  /  AlkPhos  78  02-12    LIVER FUNCTIONS - ( 12 Feb 2019 21:00 )  Alb: 4.1 g/dL / Pro: 7.9 g/dL / ALK PHOS: 78 u/L / ALT: 29 u/L / AST: 22 u/L / GGT: x           EEG Results:  EEG 11-23-18  Clinical Impression:  - Findings indicated risk of focal onset seizures from the right parasagittal region. No seizures were recorded.   - Mild-moderate nonspecific diffuse or multifocal cerebral dysfunction.     Imaging Studies:  MR Head w/ IV Cont (11.22.18 @ 06:52)   IMPRESSION:   Right 5.9 x 4 x 2.7 cm AP, TR, CC rim-enhancing temporal mass concerning for recurrent tumor with trapping of the ventricles, 6 cm leftward shift, with stranding areas of restricted diffusion which may reflect tumor or possible ischemic change as detailed above.  Volume loss with encephalomalacia throughout the right inferior medial cerebellar hemisphere, smaller foci of ischemia in the left cerebellar hemisphere and supratentorially.    R craniectomy and tumor resection 11/28/2018    MR Head w/wo IV Cont (12.01.18 @ 10:43)  Impression:   Postop changes are identified. Please note the possibility of residual or recurrent tumor must be considered as described above.    MR Head w/wo IV Cont (01.30.19 @ 15:12)   Impression:  Increasing enhancement enhancement and nonenhancingFLAIR/T2 signal abnormality within the right temporal lobe. Differential diagnosis includes progressive a neoplasm versus pseudoprogression. Further imaging with spectroscopy and MR perfusion may be helpful for further evaluation. HPI:    HPI: Patient unable to provide hx, hx provided by chart review and ED providers.    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  Patient began to have left sided muscle jerking approx half hour prior to EMS arrival, on EMS arrival continues to have jerking of the left face, arm and leg, approx 45-60 minutes in duration upon ED arrival. Pt responsive, able to move right side and follow commands during this episode. On keppra 750 BID, dexamethasone 4mg daily.    In ED patient received Ativan 2mg x 3 and 1000mg Keppra x1. Patient febrile to 100.7 in ED.     REVIEW OF SYSTEMS:  Unable to obtain due to patient condition  All Other Systems - reviewed, negative    PAST MEDICAL & SURGICAL HISTORY:  BPH (benign prostatic hyperplasia)  CVA (cerebral vascular accident)  Diabetes  HTN (hypertension)  R craniectomy and tumor resection 11/28/2018    Allergies  No Known Allergies    Social History  Lives with in nursing home.    Vital Signs Last 24 Hrs  T(C): 38.2 (12 Feb 2019 21:20), Max: 38.2 (12 Feb 2019 21:20)  T(F): 100.7 (12 Feb 2019 21:20), Max: 100.7 (12 Feb 2019 21:20)  HR: 79 (12 Feb 2019 23:05) (79 - 101)  BP: 121/87 (12 Feb 2019 23:05) (110/80 - 121/87)  BP(mean): --  RR: 16 (12 Feb 2019 23:05) (16 - 19)  SpO2: 100% (12 Feb 2019 23:05) (100% - 100%)    GENERAL PHYSICAL EXAM  **Patient examined after receiving total 6mg Ativan, exam limited by patient's mental status and cooperation.**  General:        Laying in bed, no acute distress  HEENT:         Healed surgical scar  CV:               Warm and well perfused.  Respiratory:   Even, nonlabored breathing  Abdominal:    Soft, nontender, nondistended, no masses, no organomegaly  Extremities:    No joint swelling, erythema, tenderness  Skin:              No rash     NEUROLOGIC EXAM  Mental Status:     Drifting in and out of wakefulness Follows some commands but ignores others. Spoke a few words that were incomprehensible.  Cranial Nerves:    No obvious facial asymmetry.   Muscle Strength:  Moving RUE and RLE extremity against gravity. Seemed to move right side spontaneously > L side. Able to move LUE against gravity and squeeze fingers. Did not voluntarily move LLE.   Sensation:            Intact to pain throughout.  Coordination:       Did not test due to patient condition  Gait:                    Did not test due to patient condition  Romberg:            Did not test due to patient condition    Lab Results:                        13.0   31.56 )-----------( 580      ( 12 Feb 2019 21:00 )             40.7     02-12    140  |  98  |  20  ----------------------------<  145<H>  5.5<H>   |  17<L>  |  0.82    Ca    10.1      12 Feb 2019 21:00  Phos  4.2     02-12  Mg     1.8     02-12    TPro  7.9  /  Alb  4.1  /  TBili  < 0.2<L>  /  DBili  x   /  AST  22  /  ALT  29  /  AlkPhos  78  02-12    LIVER FUNCTIONS - ( 12 Feb 2019 21:00 )  Alb: 4.1 g/dL / Pro: 7.9 g/dL / ALK PHOS: 78 u/L / ALT: 29 u/L / AST: 22 u/L / GGT: x           EEG Results:  EEG 11-23-18  Clinical Impression:  - Findings indicated risk of focal onset seizures from the right parasagittal region. No seizures were recorded.   - Mild-moderate nonspecific diffuse or multifocal cerebral dysfunction.     Imaging Studies:  MR Head w/ IV Cont (11.22.18 @ 06:52)   IMPRESSION:   Right 5.9 x 4 x 2.7 cm AP, TR, CC rim-enhancing temporal mass concerning for recurrent tumor with trapping of the ventricles, 6 cm leftward shift, with stranding areas of restricted diffusion which may reflect tumor or possible ischemic change as detailed above.  Volume loss with encephalomalacia throughout the right inferior medial cerebellar hemisphere, smaller foci of ischemia in the left cerebellar hemisphere and supratentorially.    R craniectomy and tumor resection 11/28/2018    MR Head w/wo IV Cont (12.01.18 @ 10:43)  Impression:   Postop changes are identified. Please note the possibility of residual or recurrent tumor must be considered as described above.    MR Head w/wo IV Cont (01.30.19 @ 15:12)   Impression:  Increasing enhancement enhancement and nonenhancingFLAIR/T2 signal abnormality within the right temporal lobe. Differential diagnosis includes progressive a neoplasm versus pseudoprogression. Further imaging with spectroscopy and MR perfusion may be helpful for further evaluation.    CT Head No Cont (02.12.19 @ 22:40)   IMPRESSION:   No CT evidence of acute intracranial hemorrhage, extra-axial collection, or calvarial fracture. Edema is visualized in the right temporal convexity. Unable to determine whether this is simply postoperative change or tumor progression. Recommend brain MRI with and without contrast if there is continued suspicion for tumor progression. HPI: Patient unable to provide hx, hx provided by chart review and ED providers.    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  Patient began to have left sided muscle jerking approx half hour prior to EMS arrival, on EMS arrival continues to have jerking of the left face, arm and leg, approx 45-60 minutes in duration upon ED arrival. Pt responsive, able to move right side and follow commands during this episode. On keppra 750 BID, dexamethasone 4mg daily.    In ED patient received Ativan 2mg x 3 and 1000mg Keppra x1. Patient febrile to 100.7 in ED.     REVIEW OF SYSTEMS:  Unable to obtain due to patient condition  All Other Systems - reviewed, negative    PAST MEDICAL & SURGICAL HISTORY:  BPH (benign prostatic hyperplasia)  CVA (cerebral vascular accident)  Diabetes  HTN (hypertension)  R craniectomy and tumor resection 11/28/2018    Allergies  No Known Allergies    Social History  Lives with in nursing home.    Vital Signs Last 24 Hrs  T(C): 38.2 (12 Feb 2019 21:20), Max: 38.2 (12 Feb 2019 21:20)  T(F): 100.7 (12 Feb 2019 21:20), Max: 100.7 (12 Feb 2019 21:20)  HR: 79 (12 Feb 2019 23:05) (79 - 101)  BP: 121/87 (12 Feb 2019 23:05) (110/80 - 121/87)  BP(mean): --  RR: 16 (12 Feb 2019 23:05) (16 - 19)  SpO2: 100% (12 Feb 2019 23:05) (100% - 100%)    GENERAL PHYSICAL EXAM  **Patient examined after receiving total 6mg Ativan, exam limited by patient's mental status and cooperation.**  General:        Laying in bed, no acute distress  HEENT:         Healed surgical scar  CV:               Warm and well perfused.  Respiratory:   Even, nonlabored breathing  Abdominal:    Soft, nontender, nondistended, no masses, no organomegaly  Extremities:    No joint swelling, erythema, tenderness  Skin:              No rash     NEUROLOGIC EXAM  Mental Status:     Drifting in and out of wakefulness Follows some commands but ignores others. Spoke a few words that were incomprehensible.  Cranial Nerves:    No obvious facial asymmetry.   Muscle Strength:  Moving RUE and RLE extremity against gravity. Seemed to move right side spontaneously > L side. Able to move LUE against gravity and squeeze fingers. Did not voluntarily move LLE.   Sensation:            Intact to pain throughout.  Coordination:       Did not test due to patient condition  Gait:                    Did not test due to patient condition  Romberg:            Did not test due to patient condition    Lab Results:                        13.0   31.56 )-----------( 580      ( 12 Feb 2019 21:00 )             40.7     02-12    140  |  98  |  20  ----------------------------<  145<H>  5.5<H>   |  17<L>  |  0.82    Ca    10.1      12 Feb 2019 21:00  Phos  4.2     02-12  Mg     1.8     02-12    TPro  7.9  /  Alb  4.1  /  TBili  < 0.2<L>  /  DBili  x   /  AST  22  /  ALT  29  /  AlkPhos  78  02-12    LIVER FUNCTIONS - ( 12 Feb 2019 21:00 )  Alb: 4.1 g/dL / Pro: 7.9 g/dL / ALK PHOS: 78 u/L / ALT: 29 u/L / AST: 22 u/L / GGT: x           EEG Results:  EEG 11-23-18  Clinical Impression:  - Findings indicated risk of focal onset seizures from the right parasagittal region. No seizures were recorded.   - Mild-moderate nonspecific diffuse or multifocal cerebral dysfunction.     Imaging Studies:  MR Head w/ IV Cont (11.22.18 @ 06:52)   IMPRESSION:   Right 5.9 x 4 x 2.7 cm AP, TR, CC rim-enhancing temporal mass concerning for recurrent tumor with trapping of the ventricles, 6 cm leftward shift, with stranding areas of restricted diffusion which may reflect tumor or possible ischemic change as detailed above.  Volume loss with encephalomalacia throughout the right inferior medial cerebellar hemisphere, smaller foci of ischemia in the left cerebellar hemisphere and supratentorially.    R craniectomy and tumor resection 11/28/2018    MR Head w/wo IV Cont (12.01.18 @ 10:43)  Impression:   Postop changes are identified. Please note the possibility of residual or recurrent tumor must be considered as described above.    MR Head w/wo IV Cont (01.30.19 @ 15:12)   Impression:  Increasing enhancement enhancement and nonenhancingFLAIR/T2 signal abnormality within the right temporal lobe. Differential diagnosis includes progressive a neoplasm versus pseudoprogression. Further imaging with spectroscopy and MR perfusion may be helpful for further evaluation.    CT Head No Cont (02.12.19 @ 22:40)   IMPRESSION:   No CT evidence of acute intracranial hemorrhage, extra-axial collection, or calvarial fracture. Edema is visualized in the right temporal convexity. Unable to determine whether this is simply postoperative change or tumor progression. Recommend brain MRI with and without contrast if there is continued suspicion for tumor progression.

## 2019-02-12 NOTE — ED ADULT NURSE NOTE - OBJECTIVE STATEMENT
pt received to [facility] as a notification, arrives in status epilepticus x 40 minutes as per [facility] EMS crew. pt comes from [facility], resides there for hx CVA with residual left sided weakness, hx seizure disorder, brain tumor.  pt presents with left sides body convulsions, on supplemental O2 via NRB mask, diaphoretic, warm to touch. pt able to respond verbally and appropriately to questions asked to him upon arrival.  seizure activity continues for approximately 20 more minutes with medications given. rectal temp 100.7. 20G IV placed R AC, 20G IV placed L AC, 18G IV placed right hand. labs sent. seizure activity ceased. 14 Fr Roberson placed with little to no urine output. skin intact. pt cleaned, dried, repositioned, comfort measure provided. NSR on monitor. pt resting, arousable to voice, appears comfortable and in NAD at this time. on nasal cannula supplemental O2.

## 2019-02-12 NOTE — CONSULT NOTE ADULT - SUBJECTIVE AND OBJECTIVE BOX
CHIEF COMPLAINT: Seizure    HPI: Patient unable to provide hx, hx provided by chart review and ED providers.    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 The patient came form a nursing home where he was seizing for 40 minutes PTA. The patient was then brought to the ED and was having continuos seizure activity characterized by L arm twitching. The patient was reportedly AAox4 and said he is full code. The patient was given ativan 6 mg and keppra with resolution of the seizure activity after 20 minutes. Was given Vanc, Zosyn, RVP BCx, UCx collected. Neuro consulted. CT Head pending.     PAST MEDICAL & SURGICAL HISTORY:  BPH (benign prostatic hyperplasia)  CVA (cerebral vascular accident)  Diabetes  HTN (hypertension)  No significant past surgical history      FAMILY HISTORY:  No pertinent family history in first degree relatives      SOCIAL HISTORY:  Unable to obtain    Allergies    No Known Allergies    Intolerances        HOME MEDICATIONS:  Unable to obtain.    REVIEW OF SYSTEMS:  Constitutional: [ ] negative [ ] fevers [ ] chills [ ] weight loss [ ] weight gain  HEENT: [ ] negative [ ] dry eyes [ ] eye irritation [ ] postnasal drip [ ] nasal congestion  CV: [ ] negative  [ ] chest pain [ ] orthopnea [ ] palpitations [ ] murmur  Resp: [ ] negative [ ] cough [ ] shortness of breath [ ] dyspnea [ ] wheezing [ ] sputum [ ] hemoptysis  GI: [ ] negative [ ] nausea [ ] vomiting [ ] diarrhea [ ] constipation [ ] abd pain [ ] dysphagia   : [ ] negative [ ] dysuria [ ] nocturia [ ] hematuria [ ] increased urinary frequency  Musculoskeletal: [ ] negative [ ] back pain [ ] myalgias [ ] arthralgias [ ] fracture  Skin: [ ] negative [ ] rash [ ] itch  Neurological: [ ] negative [ ] headache [ ] dizziness [ ] syncope [ ] weakness [ ] numbness  Psychiatric: [ ] negative [ ] anxiety [ ] depression  Endocrine: [ ] negative [ ] diabetes [ ] thyroid problem  Hematologic/Lymphatic: [ ] negative [ ] anemia [ ] bleeding problem  Allergic/Immunologic: [ ] negative [ ] itchy eyes [ ] nasal discharge [ ] hives [ ] angioedema  [ ] All other systems negative  [x ] Unable to assess ROS because __altered______    OBJECTIVE:  ICU Vital Signs Last 24 Hrs  T(C): 38.2 (12 Feb 2019 21:20), Max: 38.2 (12 Feb 2019 21:20)  T(F): 100.7 (12 Feb 2019 21:20), Max: 100.7 (12 Feb 2019 21:20)  HR: 79 (12 Feb 2019 23:05) (79 - 101)  BP: 121/87 (12 Feb 2019 23:05) (110/80 - 121/87)  BP(mean): --  ABP: --  ABP(mean): --  RR: 16 (12 Feb 2019 23:05) (16 - 19)  SpO2: 100% (12 Feb 2019 23:05) (100% - 100%)        CAPILLARY BLOOD GLUCOSE      POCT Blood Glucose.: 147 mg/dL (12 Feb 2019 20:57)      PHYSICAL EXAM:    GENERAL: Altered, somnolent  HEAD:  Normocephalic, atraumatic  EYES: EOMI, PERRLA  HEENT: Moist mucous membranes  NECK: Supple, No JVD  NERVOUS SYSTEM:  Altered. Unable to follow commands. Responds to noxious stimuli.  CHEST/LUNG: CTAB no wrr  HEART: Regular rate and rhythm, no murmur   ABDOMEN: Soft, Nontender, Nondistended, Bowel sounds present  EXTREMITIES:   No clubbing, cyanosis, or edema  MUSCULOSKELETAL: No muscle tenderness, no joint tenderness  SKIN: warm and dry, no rash         LINES:     HOSPITAL MEDICATIONS:    VANC  ZOSYN  ATCARSON MOSCOSORA  DECADRON    LABS:                        13.0   31.56 )-----------( 580      ( 12 Feb 2019 21:00 )             40.7     Hgb Trend: 13.0<--  02-12    140  |  98  |  20  ----------------------------<  145<H>  5.5<H>   |  17<L>  |  0.82    Ca    10.1      12 Feb 2019 21:00  Phos  4.2     02-12  Mg     1.8     02-12    TPro  7.9  /  Alb  4.1  /  TBili  < 0.2<L>  /  DBili  x   /  AST  22  /  ALT  29  /  AlkPhos  78  02-12    Creatinine Trend: 0.82<--        Venous Blood Gas:  02-12 @ 21:00  7.36/33/71/19/92.0  VBG Lactate: 12.3      MICROBIOLOGY:   RVP  BCx  UCx  UA  pending    RADIOLOGY:  CT Head performed    EKG: Sinus tachy    EKG:

## 2019-02-12 NOTE — ED PROVIDER NOTE - ATTENDING CONTRIBUTION TO CARE
DR. WILLAMS, ATTENDING MD-  I performed a face to face bedside interview with patient regarding history of present illness, review of symptoms and past medical history. I completed an independent physical exam.  I have discussed patient's plan of care with the resident.   Documentation as above in the note.    73 y/o male bibems from rehab facility for status epilepticus.  Per xfer note, pt has known right brain mass s/p resection, however, recent onc eval demonstrated concern for recurrence.  Has known seizure d/o on keppra.  Per ems, pt had been having rhythmic twitching to left side of body x40 min pta.  Febrile, tachy, hypertensive, conversant, oriented to person and place, left sided gaze dev, rhythmic convulsions to lue/lle, poor insp effort otherwise ctabil, s1s2 tachy reg rhythm no m/r/g, abd soft non ttp no r/g, no cva tenderness b/l, no leg swelling b/l, no rash.  Pt with complex partial seizure in status, concern for brain mass / edema / bld.  Fever likely secondary to central seizure activity though infectious etiology cannot be excluded.  Will attempt to break with mult rounds of ativan, keppra load, may need intubation for propofol infusion if continued seizure.  Obtain cbc, cmp, vbg, blood cx x2, ua, ucx, cxr, ct head, give ivf bolus, will need admission for further care and evaluation.

## 2019-02-12 NOTE — CONSULT NOTE ADULT - ASSESSMENT
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    - F/u Neuro recs  - F/u CT head  - Full note to follow      Bernardino Stafford MD  PGY3 Internal Medicine Resident  Pager: [phone] - [place]  [phone] - [facility] The patient is a 74 year old male with HTN, NIDDM, CVA (2013 w/ left side weakness), R glioma (s/p R craniectomy and tumor resection 11/28/2018), BPH brought in by EMS for active seizure.    - F/u Neuro recs  - F/u CT head  - Does not require MICU level of care at this time.  - Suggest admit to telemetry.      Bernardino Stafford MD  PGY3 Internal Medicine Resident  Pager: [phone] - [place]  [phone] - [facility]

## 2019-02-12 NOTE — ED PROVIDER NOTE - NEUROLOGICAL, MLM
Alert and oriented, left sided seizure activity face, arm and leg, following commands, moving right side

## 2019-02-12 NOTE — ED PROVIDER NOTE - CLINICAL SUMMARY MEDICAL DECISION MAKING FREE TEXT BOX
Known seizure history w/ status requiring multiple rounds of benzo, keppra. Steroid given empirically due to history of brain mass. Will need CT of the head to eval for progression of mass, edema, bleed, etc. Unclear source of fever, possible secondary to prolonged seizure activity but will pan culture, check rvp, given empiric antibiotics. Neurology eval for recommendations regarding EEG and AED management, possible MICU level care if recurrent status.

## 2019-02-12 NOTE — ED PROVIDER NOTE - OBJECTIVE STATEMENT
74yom w/ known seizure history w/ a prior right parietal mass had onset of left sided muscle jerking approx half hour prior to EMS arrival, on EMS arrival continues to have jerking of the left face, arm and leg, approx 45-60 minutes in duration upon ED arrival. Pt responsive, able to move right side and follow commands. On keppra 750 BID, dexamethasone 4mg daily.

## 2019-02-12 NOTE — ED PROVIDER NOTE - PROGRESS NOTE DETAILS
Guszack: Cessation of seizures w/ 6mg lorazepam, 1000mg keppra, 10mg dexamethasone. Protecting airway at this time. Neurology, MICU consulted for status epilepticus. CTH performed, pending read, will consult neurosurgery if needed. Elio Harrison (Resident): Spoke with Dixie from Neurology - recommended giving extra 1000 of keppra and increasing to 750 BID - paged hospitalist for admission

## 2019-02-12 NOTE — ED PROVIDER NOTE - CRITICAL CARE PROVIDED
consultation with other physicians/conducted a detailed discussion of DNR status/documentation/direct patient care (not related to procedure)/interpretation of diagnostic studies/additional history taking

## 2019-02-13 ENCOUNTER — TRANSCRIPTION ENCOUNTER (OUTPATIENT)
Age: 74
End: 2019-02-13

## 2019-02-13 DIAGNOSIS — Z29.9 ENCOUNTER FOR PROPHYLACTIC MEASURES, UNSPECIFIED: ICD-10-CM

## 2019-02-13 DIAGNOSIS — R50.9 FEVER, UNSPECIFIED: ICD-10-CM

## 2019-02-13 DIAGNOSIS — A41.9 SEPSIS, UNSPECIFIED ORGANISM: ICD-10-CM

## 2019-02-13 DIAGNOSIS — R56.9 UNSPECIFIED CONVULSIONS: ICD-10-CM

## 2019-02-13 DIAGNOSIS — E11.8 TYPE 2 DIABETES MELLITUS WITH UNSPECIFIED COMPLICATIONS: ICD-10-CM

## 2019-02-13 DIAGNOSIS — B34.2 CORONAVIRUS INFECTION, UNSPECIFIED: ICD-10-CM

## 2019-02-13 DIAGNOSIS — I10 ESSENTIAL (PRIMARY) HYPERTENSION: ICD-10-CM

## 2019-02-13 DIAGNOSIS — G40.901 EPILEPSY, UNSPECIFIED, NOT INTRACTABLE, WITH STATUS EPILEPTICUS: ICD-10-CM

## 2019-02-13 DIAGNOSIS — R65.10 SYSTEMIC INFLAMMATORY RESPONSE SYNDROME (SIRS) OF NON-INFECTIOUS ORIGIN WITHOUT ACUTE ORGAN DYSFUNCTION: ICD-10-CM

## 2019-02-13 DIAGNOSIS — Z00.8 ENCOUNTER FOR OTHER GENERAL EXAMINATION: ICD-10-CM

## 2019-02-13 DIAGNOSIS — D72.829 ELEVATED WHITE BLOOD CELL COUNT, UNSPECIFIED: ICD-10-CM

## 2019-02-13 DIAGNOSIS — Z98.890 OTHER SPECIFIED POSTPROCEDURAL STATES: Chronic | ICD-10-CM

## 2019-02-13 LAB
ANION GAP SERPL CALC-SCNC: 13 MMO/L — SIGNIFICANT CHANGE UP (ref 7–14)
ANION GAP SERPL CALC-SCNC: 34 MMO/L — HIGH (ref 7–14)
APTT BLD: 24.2 SEC — LOW (ref 27.5–36.3)
BASE EXCESS BLDV CALC-SCNC: 1 MMOL/L — SIGNIFICANT CHANGE UP
BASE EXCESS BLDV CALC-SCNC: 1.2 MMOL/L — SIGNIFICANT CHANGE UP
BASE EXCESS BLDV CALC-SCNC: 4 MMOL/L — SIGNIFICANT CHANGE UP
BLOOD GAS VENOUS - CREATININE: 0.48 MG/DL — LOW (ref 0.5–1.3)
BLOOD GAS VENOUS - CREATININE: 0.58 MG/DL — SIGNIFICANT CHANGE UP (ref 0.5–1.3)
BLOOD GAS VENOUS - CREATININE: 0.61 MG/DL — SIGNIFICANT CHANGE UP (ref 0.5–1.3)
BUN SERPL-MCNC: 13 MG/DL — SIGNIFICANT CHANGE UP (ref 7–23)
BUN SERPL-MCNC: 17 MG/DL — SIGNIFICANT CHANGE UP (ref 7–23)
CALCIUM SERPL-MCNC: 6.6 MG/DL — LOW (ref 8.4–10.5)
CALCIUM SERPL-MCNC: 9.5 MG/DL — SIGNIFICANT CHANGE UP (ref 8.4–10.5)
CHLORIDE BLDV-SCNC: 103 MMOL/L — SIGNIFICANT CHANGE UP (ref 96–108)
CHLORIDE BLDV-SCNC: 104 MMOL/L — SIGNIFICANT CHANGE UP (ref 96–108)
CHLORIDE BLDV-SCNC: 107 MMOL/L — SIGNIFICANT CHANGE UP (ref 96–108)
CHLORIDE SERPL-SCNC: 66 MMOL/L — LOW (ref 98–107)
CHLORIDE SERPL-SCNC: 98 MMOL/L — SIGNIFICANT CHANGE UP (ref 98–107)
CK SERPL-CCNC: 20 U/L — LOW (ref 30–200)
CO2 SERPL-SCNC: 18 MMOL/L — LOW (ref 22–31)
CO2 SERPL-SCNC: 26 MMOL/L — SIGNIFICANT CHANGE UP (ref 22–31)
CREAT SERPL-MCNC: 0.5 MG/DL — SIGNIFICANT CHANGE UP (ref 0.5–1.3)
CREAT SERPL-MCNC: 0.7 MG/DL — SIGNIFICANT CHANGE UP (ref 0.5–1.3)
GAS PNL BLDV: 131 MMOL/L — LOW (ref 136–146)
GAS PNL BLDV: 132 MMOL/L — LOW (ref 136–146)
GAS PNL BLDV: 133 MMOL/L — LOW (ref 136–146)
GLUCOSE BLDC GLUCOMTR-MCNC: 169 MG/DL — HIGH (ref 70–99)
GLUCOSE BLDC GLUCOMTR-MCNC: 200 MG/DL — HIGH (ref 70–99)
GLUCOSE BLDV-MCNC: 146 — HIGH (ref 70–99)
GLUCOSE BLDV-MCNC: 205 — HIGH (ref 70–99)
GLUCOSE BLDV-MCNC: 227 — HIGH (ref 70–99)
GLUCOSE SERPL-MCNC: 1205 MG/DL — CRITICAL HIGH (ref 70–99)
GLUCOSE SERPL-MCNC: 151 MG/DL — HIGH (ref 70–99)
HBA1C BLD-MCNC: 7.7 % — HIGH (ref 4–5.6)
HCO3 BLDV-SCNC: 24 MMOL/L — SIGNIFICANT CHANGE UP (ref 20–27)
HCO3 BLDV-SCNC: 25 MMOL/L — SIGNIFICANT CHANGE UP (ref 20–27)
HCO3 BLDV-SCNC: 26 MMOL/L — SIGNIFICANT CHANGE UP (ref 20–27)
HCT VFR BLD CALC: 35.8 % — LOW (ref 39–50)
HCT VFR BLDV CALC: 34.2 % — LOW (ref 39–51)
HCT VFR BLDV CALC: 36.8 % — LOW (ref 39–51)
HCT VFR BLDV CALC: 40 % — SIGNIFICANT CHANGE UP (ref 39–51)
HGB BLD-MCNC: 11.9 G/DL — LOW (ref 13–17)
HGB BLDV-MCNC: 11.1 G/DL — LOW (ref 13–17)
HGB BLDV-MCNC: 12 G/DL — LOW (ref 13–17)
HGB BLDV-MCNC: 13 G/DL — SIGNIFICANT CHANGE UP (ref 13–17)
INR BLD: 1.14 — SIGNIFICANT CHANGE UP (ref 0.88–1.17)
LACTATE BLDV-MCNC: 3.8 MMOL/L — HIGH (ref 0.5–2)
LACTATE BLDV-MCNC: 3.8 MMOL/L — HIGH (ref 0.5–2)
LACTATE BLDV-MCNC: 4.7 MMOL/L — CRITICAL HIGH (ref 0.5–2)
MAGNESIUM SERPL-MCNC: 1.2 MG/DL — LOW (ref 1.6–2.6)
MCHC RBC-ENTMCNC: 29.9 PG — SIGNIFICANT CHANGE UP (ref 27–34)
MCHC RBC-ENTMCNC: 33.2 % — SIGNIFICANT CHANGE UP (ref 32–36)
MCV RBC AUTO: 89.9 FL — SIGNIFICANT CHANGE UP (ref 80–100)
METHOD TYPE: SIGNIFICANT CHANGE UP
NRBC # FLD: 0.04 K/UL — LOW (ref 25–125)
NT-PROBNP SERPL-SCNC: 673 PG/ML — SIGNIFICANT CHANGE UP
ORGANISM # SPEC MICROSCOPIC CNT: SIGNIFICANT CHANGE UP
ORGANISM # SPEC MICROSCOPIC CNT: SIGNIFICANT CHANGE UP
PCO2 BLDV: 42 MMHG — SIGNIFICANT CHANGE UP (ref 41–51)
PCO2 BLDV: 46 MMHG — SIGNIFICANT CHANGE UP (ref 41–51)
PCO2 BLDV: 50 MMHG — SIGNIFICANT CHANGE UP (ref 41–51)
PH BLDV: 7.37 PH — SIGNIFICANT CHANGE UP (ref 7.32–7.43)
PH BLDV: 7.38 PH — SIGNIFICANT CHANGE UP (ref 7.32–7.43)
PH BLDV: 7.4 PH — SIGNIFICANT CHANGE UP (ref 7.32–7.43)
PHOSPHATE SERPL-MCNC: 2.5 MG/DL — SIGNIFICANT CHANGE UP (ref 2.5–4.5)
PLATELET # BLD AUTO: 386 K/UL — SIGNIFICANT CHANGE UP (ref 150–400)
PMV BLD: 10.1 FL — SIGNIFICANT CHANGE UP (ref 7–13)
PO2 BLDV: 28 MMHG — LOW (ref 35–40)
PO2 BLDV: 38 MMHG — SIGNIFICANT CHANGE UP (ref 35–40)
PO2 BLDV: 56 MMHG — HIGH (ref 35–40)
POTASSIUM BLDV-SCNC: 4 MMOL/L — SIGNIFICANT CHANGE UP (ref 3.4–4.5)
POTASSIUM BLDV-SCNC: 4 MMOL/L — SIGNIFICANT CHANGE UP (ref 3.4–4.5)
POTASSIUM BLDV-SCNC: 4.1 MMOL/L — SIGNIFICANT CHANGE UP (ref 3.4–4.5)
POTASSIUM SERPL-MCNC: 3.3 MMOL/L — LOW (ref 3.5–5.3)
POTASSIUM SERPL-MCNC: 4.4 MMOL/L — SIGNIFICANT CHANGE UP (ref 3.5–5.3)
POTASSIUM SERPL-SCNC: 3.3 MMOL/L — LOW (ref 3.5–5.3)
POTASSIUM SERPL-SCNC: 4.4 MMOL/L — SIGNIFICANT CHANGE UP (ref 3.5–5.3)
PROTHROM AB SERPL-ACNC: 12.7 SEC — SIGNIFICANT CHANGE UP (ref 9.8–13.1)
RBC # BLD: 3.98 M/UL — LOW (ref 4.2–5.8)
RBC # FLD: 14.8 % — HIGH (ref 10.3–14.5)
SAO2 % BLDV: 41.6 % — LOW (ref 60–85)
SAO2 % BLDV: 62.7 % — SIGNIFICANT CHANGE UP (ref 60–85)
SAO2 % BLDV: 86.2 % — HIGH (ref 60–85)
SODIUM SERPL-SCNC: 118 MMOL/L — CRITICAL LOW (ref 135–145)
SODIUM SERPL-SCNC: 137 MMOL/L — SIGNIFICANT CHANGE UP (ref 135–145)
SPECIMEN SOURCE: SIGNIFICANT CHANGE UP
SPECIMEN SOURCE: SIGNIFICANT CHANGE UP
WBC # BLD: 20.87 K/UL — HIGH (ref 3.8–10.5)
WBC # FLD AUTO: 20.87 K/UL — HIGH (ref 3.8–10.5)

## 2019-02-13 PROCEDURE — 12345: CPT | Mod: NC

## 2019-02-13 PROCEDURE — 99222 1ST HOSP IP/OBS MODERATE 55: CPT | Mod: 24

## 2019-02-13 PROCEDURE — 99223 1ST HOSP IP/OBS HIGH 75: CPT

## 2019-02-13 PROCEDURE — 99223 1ST HOSP IP/OBS HIGH 75: CPT | Mod: GC

## 2019-02-13 RX ORDER — ACETAMINOPHEN 500 MG
650 TABLET ORAL EVERY 6 HOURS
Qty: 0 | Refills: 0 | Status: DISCONTINUED | OUTPATIENT
Start: 2019-02-13 | End: 2019-02-13

## 2019-02-13 RX ORDER — LEVETIRACETAM 250 MG/1
1000 TABLET, FILM COATED ORAL EVERY 12 HOURS
Qty: 0 | Refills: 0 | Status: DISCONTINUED | OUTPATIENT
Start: 2019-02-13 | End: 2019-02-15

## 2019-02-13 RX ORDER — LEVETIRACETAM 250 MG/1
750 TABLET, FILM COATED ORAL
Qty: 0 | Refills: 0 | Status: DISCONTINUED | OUTPATIENT
Start: 2019-02-13 | End: 2019-02-13

## 2019-02-13 RX ORDER — PIPERACILLIN AND TAZOBACTAM 4; .5 G/20ML; G/20ML
3.38 INJECTION, POWDER, LYOPHILIZED, FOR SOLUTION INTRAVENOUS EVERY 8 HOURS
Qty: 0 | Refills: 0 | Status: DISCONTINUED | OUTPATIENT
Start: 2019-02-13 | End: 2019-02-15

## 2019-02-13 RX ORDER — LEVETIRACETAM 250 MG/1
1000 TABLET, FILM COATED ORAL ONCE
Qty: 0 | Refills: 0 | Status: DISCONTINUED | OUTPATIENT
Start: 2019-02-13 | End: 2019-02-13

## 2019-02-13 RX ADMIN — PIPERACILLIN AND TAZOBACTAM 25 GRAM(S): 4; .5 INJECTION, POWDER, LYOPHILIZED, FOR SOLUTION INTRAVENOUS at 13:27

## 2019-02-13 RX ADMIN — PIPERACILLIN AND TAZOBACTAM 25 GRAM(S): 4; .5 INJECTION, POWDER, LYOPHILIZED, FOR SOLUTION INTRAVENOUS at 07:14

## 2019-02-13 RX ADMIN — LEVETIRACETAM 400 MILLIGRAM(S): 250 TABLET, FILM COATED ORAL at 06:30

## 2019-02-13 RX ADMIN — PIPERACILLIN AND TAZOBACTAM 25 GRAM(S): 4; .5 INJECTION, POWDER, LYOPHILIZED, FOR SOLUTION INTRAVENOUS at 21:43

## 2019-02-13 RX ADMIN — LEVETIRACETAM 400 MILLIGRAM(S): 250 TABLET, FILM COATED ORAL at 18:35

## 2019-02-13 RX ADMIN — LEVETIRACETAM 400 MILLIGRAM(S): 250 TABLET, FILM COATED ORAL at 13:10

## 2019-02-13 NOTE — H&P ADULT - NSHPLABSRESULTS_GEN_ALL_CORE
The labs were reviewed by me. Imaging was reviewed by me. EKG was reviewed by me.                        13.0   31.56 )-----------( 580      ( 12 Feb 2019 21:00 )             40.7       02-12    140  |  98  |  20  ----------------------------<  145<H>  5.5<H>   |  17<L>  |  0.82    Ca    10.1      12 Feb 2019 21:00  Phos  4.2     02-12  Mg     1.8     02-12    TPro  7.9  /  Alb  4.1  /  TBili  < 0.2<L>  /  DBili  x   /  AST  22  /  ALT  29  /  AlkPhos  78  02-12            POCT Blood Glucose.: 147 mg/dL (12 Feb 2019 20:57)  Blood Gas Venous Comprehensive (02.13.19 @ 00:49)    Blood Gas Venous - Lactate: 4.7: Please note updated reference range. mmol/L    Blood Gas Venous - Chloride: 104 mmol/L    Blood Gas Venous - Creatinine: 0.58 mg/dL    pH, Venous: 7.37 pH    pCO2, Venous: 46 mmHg    pO2, Venous: 38 mmHg    HCO3, Venous: 24 mmol/L    Base Excess, Venous: 1.0: REFERENCE RANGE = -3 + 2 mmol/L mmol/L    Oxygen Saturation, Venous: 62.7 %    Blood Gas Venous - Sodium: 131 mmol/L    Blood Gas Venous - Potassium: 4.0 mmol/L    Blood Gas Venous - Glucose: 205    Blood Gas Venous - Hemoglobin: 11.1 g/dL    Blood Gas Venous - Hematocrit: 34.2 %    Rapid Respiratory Viral Panel (02.12.19 @ 21:00)    Coronavirus (229E,HKU1,NL63,OC43): Detected:     EKG: NSR - vR 76, qtc 461, TWI in V1 - seen on previous    RADIOLOGY:  < from: CT Head No Cont (02.12.19 @ 22:40) >    IMPRESSION:   No CT evidence of acute intracranial hemorrhage, extra-axial collection,   or calvarial fracture. Edema is visualized in the right temporal   convexity. Unable to determine whether this is simply postoperative   change or tumor progression. Recommend brain MRI with and without   contrast if there is continued suspicion for tumor progression.    < from: Xray Chest 1 View- PORTABLE-Urgent (02.12.19 @ 22:15) >    ******PRELIMINARY REPORT******            INTERPRETATION:  The cardiac silhouette appears widened on this   projection and is unchanged compared to prior from 11/22/2018 and CT   chest 11/20/2018.  Clear lungs.    < end of copied text >

## 2019-02-13 NOTE — PROGRESS NOTE ADULT - PROBLEM SELECTOR PLAN 2
- sepsis on admission- sepsis cascade resolving   - CXR with no infiltrates concerning for PNA but due to prolonged seizure activity there is concern for aspiration  - cont supportive care; cont with vanc/zosyn - check trough before 4th dose  - f/u blood cultures  - monitor fever curve, acetaminophen PRN  - monitor leukocytosis, bmp and CK  - consider ID consult - sepsis on admission- sepsis cascade resolving   - CXR with no infiltrates concerning for PNA but due to prolonged seizure activity there is concern for aspiration  - cont supportive care; cont with zosyn   - f/u blood cultures  - monitor fever curve, acetaminophen PRN  - monitor leukocytosis, bmp and CK  - consider ID consult

## 2019-02-13 NOTE — CONSULT NOTE ADULT - SUBJECTIVE AND OBJECTIVE BOX
HELGA WHITE 74y Male  MRN-[id number]    Patient is a 74y old  Male who presents with a chief complaint of Seizure (13 Feb 2019 13:14)      HPI:  74 year old male with HTN, NIDDM, CVA (2013 w/ left side weakness), R glioma (s/p R craniectomy and tumor resection 11/28/2018), BPH brought in by EMS from nursing home for active seizure. Per ED notes, pt was actively seizing for 30 mins prior to EMS arrival. Pt displayed left sided muscle jerking and continued with jerking of left face, arm, and leg in the ED. He received ativan 2mg x3 and keppra 1g and seizure broke after 20 minutes in the ED.     On interview, pt is responsive to commands but currently nonverbal, attempting to speak but not audible and with no active muscle jerking.   In the ED VS Tm100.7, Hr 101, /80, RR 19/100% on 3L NC. He also received vanc 1g, zosyn, dexamethasone 10mg. (13 Feb 2019 01:21)      PAST MEDICAL & SURGICAL HISTORY:  Oxygen dependent  Vitamin deficiency  Constipation  Pulmonary embolism  Glioblastoma  BPH (benign prostatic hyperplasia)  CVA (cerebral vascular accident)  Diabetes  HTN (hypertension)  S/P craniotomy: ~ resection of R-pariental mass (11/28/2018)      Allergies    No Known Allergies    Intolerances        ANTIMICROBIALS:  piperacillin/tazobactam IVPB. 3.375 every 8 hours      MEDICATIONS  (STANDING):  dextrose 5%. 1000 milliLiter(s) (50 mL/Hr) IV Continuous <Continuous>  dextrose 50% Injectable 12.5 Gram(s) IV Push once  dextrose 50% Injectable 25 Gram(s) IV Push once  dextrose 50% Injectable 25 Gram(s) IV Push once  insulin lispro (HumaLOG) corrective regimen sliding scale   SubCutaneous three times a day before meals  insulin lispro (HumaLOG) corrective regimen sliding scale   SubCutaneous at bedtime  levETIRAcetam  IVPB 750 milliGRAM(s) IV Intermittent <User Schedule>  piperacillin/tazobactam IVPB. 3.375 Gram(s) IV Intermittent every 8 hours  sodium chloride 0.9%. 1000 milliLiter(s) (75 mL/Hr) IV Continuous <Continuous>      Social History  Smoking:  Etoh:  Drug use:      FAMILY HISTORY:  No pertinent family history in first degree relatives      Vital Signs Last 24 Hrs  T(C): 36.3 (13 Feb 2019 13:40), Max: 38.2 (12 Feb 2019 21:20)  T(F): 97.4 (13 Feb 2019 13:40), Max: 100.7 (12 Feb 2019 21:20)  HR: 72 (13 Feb 2019 13:40) (63 - 101)  BP: 144/99 (13 Feb 2019 13:40) (110/80 - 144/99)  BP(mean): --  RR: 17 (13 Feb 2019 13:40) (14 - 19)  SpO2: 97% (13 Feb 2019 13:40) (97% - 100%)    CBC Full  -  ( 13 Feb 2019 06:00 )  WBC Count : 20.87 K/uL  Hemoglobin : 11.9 g/dL  Hematocrit : 35.8 %  Platelet Count - Automated : 386 K/uL  Mean Cell Volume : 89.9 fL  Mean Cell Hemoglobin : 29.9 pg  Mean Cell Hemoglobin Concentration : 33.2 %  Auto Neutrophil # : x  Auto Lymphocyte # : x  Auto Monocyte # : x  Auto Eosinophil # : x  Auto Basophil # : x  Auto Neutrophil % : x  Auto Lymphocyte % : x  Auto Monocyte % : x  Auto Eosinophil % : x  Auto Basophil % : x    02-13    137  |  98  |  17  ----------------------------<  151<H>  4.4   |  26  |  0.70    Ca    9.5      13 Feb 2019 14:18  Phos  2.5     02-13  Mg     1.2     02-13    TPro  7.9  /  Alb  4.1  /  TBili  < 0.2<L>  /  DBili  x   /  AST  22  /  ALT  29  /  AlkPhos  78  02-12    LIVER FUNCTIONS - ( 12 Feb 2019 21:00 )  Alb: 4.1 g/dL / Pro: 7.9 g/dL / ALK PHOS: 78 u/L / ALT: 29 u/L / AST: 22 u/L / GGT: x               MICROBIOLOGY:    Rapid Respiratory Viral Panel (02.12.19 @ 21:00)    Adenovirus (RapRVP): Not Detected    Influenza A (RapRVP): Not Detected    Influenza AH1 2009 (RapRVP): Not Detected    Influenza AH1 (RapRVP): Not Detected    Influenza AH3 (RapRVP): Not Detected    Influenza B (RapRVP): Not Detected    Parainfluenza 1 (RapRVP): Not Detected    Parainfluenza 2 (RapRVP): Not Detected    Parainfluenza 3 (RapRVP): Not Detected    Parainfluenza 4 (RapRVP): Not Detected    Resp Syncytial Virus (RapRVP): Not Detected    Chlamydia pneumoniae (RapRVP): Not Detected    Mycoplasma pneumoniae (RapRVP): Not Detected    Entero/Rhinovirus (RapRVP): Not Detected    hMPV (RapRVP): Not Detected    Coronavirus (229E,HKU1,NL63,OC43): Detected: This Respiratory Panel uses polymerase chain reaction (PCR)  to detect for adenovirus; coronavirus (HKU1, NL63, 229E,  OC43); human metapneumovirus (hMPV); human  enterovirus/rhinovirus (Entero/RV); influenza A; influenza  A/H1: influenza A/H3; influenza A/H1-2009; influenza B;  parainfluenza viruses 1,2,3,4; respiratory syncytial virus;  Mycoplasma pneumoniae; and Chlamydophila pneumoniae.      RADIOLOGY  CT Head No Cont (02.12.19 @ 22:40) >  No CT evidence of acute intracranial hemorrhage, extra-axial collection,   or calvarial fracture. Edema is visualized in the right temporal   convexity. Unable to determine whether this is simply postoperative   change or tumor progression. Recommend brain MRI with and without   contrast if there is continued suspicion for tumor progression.    Xray Chest 1 View- PORTABLE-Urgent (02.12.19 @ 22:15) >  Clear lungs.    MR Head w/wo IV Cont (01.30.19 @ 15:12) >  Increasing enhancement enhancement and nonenhancingFLAIR/T2 signal   abnormality within the right temporal lobe. Differential diagnosis   includes progressive a neoplasm versus pseudoprogression. Further imaging   with spectroscopy and MR perfusion may be helpful for further evaluation.

## 2019-02-13 NOTE — H&P ADULT - NSHPPHYSICALEXAM_GEN_ALL_CORE
T(C): 38.2 (02-12-19 @ 21:20), Max: 38.2 (02-12-19 @ 21:20)  HR: 79 (02-12-19 @ 23:05) (79 - 101)  BP: 121/87 (02-12-19 @ 23:05) (110/80 - 121/87)  RR: 16 (02-12-19 @ 23:05) (16 - 19)  SpO2: 100% (02-12-19 @ 23:05) (100% - 100%)    PHYSICAL EXAM:  GENERAL: NAD, lethargic  HEAD:  Atraumatic, Normocephalic  EYES: PERRL, conjunctiva and sclera clear  ENMT: Neck supple, dry mucous membranes, no obvious oral lesions  NECK: Supple, +JVD  CHEST/LUNG: Clear to auscultation bilaterally although diminished breaht sounds; No rales, rhonchi, wheezing  HEART: Regular rate and rhythm; No murmurs, rubs, or gallops  ABDOMEN: Soft, Nontender, Nondistended; Bowel sounds present  EXTREMITIES:  2+ Peripheral Pulses, No clubbing, cyanosis, or edema  LYMPH: No lymphadenopathy noted  SKIN: No rashes or lesions  NERVOUS SYSTEM:  responds to commands, attempting to open eyes and speak but nonverbal, strength - spontaneously moves UEs and RLE. unable to move LLE extremity. T(C): 38.2 (02-12-19 @ 21:20), Max: 38.2 (02-12-19 @ 21:20)  HR: 79 (02-12-19 @ 23:05) (79 - 101)  BP: 121/87 (02-12-19 @ 23:05) (110/80 - 121/87)  RR: 16 (02-12-19 @ 23:05) (16 - 19)  SpO2: 100% (02-12-19 @ 23:05) (100% - 100%)    PHYSICAL EXAM:  GENERAL: NAD, lethargic  HEAD:  Atraumatic, Normocephalic  EYES: PERRL, conjunctiva and sclera clear  ENMT: Neck supple, dry mucous membranes, no obvious oral lesions  NECK: Supple, +JVD  CHEST/LUNG: Clear to auscultation bilaterally although diminished breath sounds; No rales, rhonchi, wheezing  HEART: Regular rate and rhythm; No murmurs, rubs, or gallops  ABDOMEN: Soft, Nontender, Nondistended; Bowel sounds present  EXTREMITIES:  2+ Peripheral Pulses, No clubbing, cyanosis, or edema  LYMPH: No lymphadenopathy noted  SKIN: No rashes or lesions  NERVOUS SYSTEM:  responds to commands, attempting to open eyes and speak but nonverbal, strength - spontaneously moves UEs and RLE. unable to move LLE extremity.

## 2019-02-13 NOTE — DISCHARGE NOTE ADULT - CARE PROVIDERS DIRECT ADDRESSES
[email or web address],[email or web address]

## 2019-02-13 NOTE — CHART NOTE - NSCHARTNOTEFT_GEN_A_CORE
Neurology called for f/u question MRI with spec vs MRI with Con. Informed by covering resident they are currently in rounds. TELE PA will contacted once they round on this patient.

## 2019-02-13 NOTE — PROGRESS NOTE ADULT - ASSESSMENT
74 year old male with HTN, NIDDM, CVA (2013 w/ left side weakness), R glioma (s/p R craniectomy and tumor resection 11/28/2018), BPH presents with seizure episode 2/2 brain malignancy vs infection, and found to have sepsis 2/2 coronavirus+

## 2019-02-13 NOTE — H&P ADULT - PROBLEM SELECTOR PLAN 1
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 Evaluated by Neuro. CT with edema in right temporal region concerning for post op changes vs recurrence of tumor. s/p keppra 1g in ED and dexamethasone 10mg. Also received Vanc and zosyn  - Neuro recs appreciated, start keppra 750 TID  - Will f/u in AM with Neuro to determine need for MR spec vs MR w/wo cont  - treat viral infection as below 2/2 recurrent brain malignancy vs infection. Evaluated by Neuro. CT with edema in right temporal region concerning for post op changes vs recurrence of tumor. s/p keppra 1g in ED and dexamethasone 10mg. Also received Vanc and zosyn  - Neuro recs appreciated, start keppra 750 TID  - Will f/u in AM with Neuro to determine need for MR spec vs MR w/wo cont  - repeat VBG for to monitor lactate  - supportive care for viral infection 2/2 recurrent brain malignancy vs infection. Significant leukocytosis and fever in setting of chronic steroid use and after seizure episode of ~1 hr. Evaluated by Neuro. CT with edema in right temporal region concerning for post op changes vs recurrence of tumor. s/p keppra 1g in ED and dexamethasone 10mg. Also received Vanc and zosyn  - Neuro recs appreciated, start keppra 750 TID  - f/u in AM with Day Neuro team to determine need for MR spec vs MR w/wo cont as pt with recent MRI 2 weeks ago and to determine need for continuing steroids  - Consider EEG in AM to eval for ongoing seizure activity  - repeat VBG to monitor lactate and CK in AM  - supportive care for viral infection  - cont zosyn for possible aspiration event during seizure, consider repeat CXR in 24 hr 2/2 recurrent brain malignancy vs infection. Significant leukocytosis and fever in setting of chronic steroid use and after seizure episode of ~1 hr.   - initially with unintelligible slow speech, but with some improvement when seen again this AM  - Evaluated by Neuro. CT with edema in right temporal region concerning for post op changes vs recurrence of tumor.  S/P keppra 1g in ED and dexamethasone 10mg. Also received Vanc and zosyn  - Neuro recs appreciated, start keppra 750 TID  - f/u in AM with Day Neuro team to determine need for MR spec vs MR w/wo cont as pt with recent MRI 2 weeks ago and to determine need for continuing steroids  - Consider EEG in AM to eval for ongoing seizure activity  - repeat VBG to monitor lactate and CK in AM  - supportive care for viral infection  - cont zosyn for possible aspiration event during seizure, consider repeat CXR in 24 hr  - IVF hydration  - f/u renal function  - HOB elevation, Aspiration precautions

## 2019-02-13 NOTE — DISCHARGE NOTE ADULT - MEDICATION SUMMARY - MEDICATIONS TO TAKE
I will START or STAY ON the medications listed below when I get home from the hospital:    finasteride 5 mg oral tablet  -- 1 tab(s) by mouth once a day  -- Indication: For Home med    dexamethasone 1.5 mg oral tablet  -- 3mg q8H x 2 days  2mg q8H x 2 days  2mg q12H x 30 days  -- Indication: For S/P craniotomy    acetaminophen 325 mg oral tablet  -- 2 tab(s) by mouth every 6 hours, As needed, Mild Pain (1 - 3)  -- Indication: For Fever/pain    Oxaydo 5 mg oral tablet  -- 1 tab(s) by mouth every 4 hours, As needed, Moderate Pain (4 - 6)  -- Indication: For S/P craniotomy    lisinopril 20 mg oral tablet  -- 1 tab(s) by mouth once a day  -- Indication: For Home med    lacosamide 100 mg oral tablet  -- 1 tab(s) by mouth 2 times a day  -- Indication: For Seizure    levETIRAcetam 750 mg oral tablet  -- 2 tab(s) by mouth 2 times a day  -- Indication: For Seizure    insulin glargine  -- Indication: For Hyperglycemia    metFORMIN  -- 500 milligram(s) by mouth 2 times a day  -- Indication: For Home med    lisinopril-hydroCHLOROthiazide 10 mg-12.5 mg oral tablet  -- 1 tab(s) by mouth once a day  -- Indication: For Home med    nystatin 100,000 units/g topical powder  -- 1 application on skin 3 times a day, As needed, candida  -- Indication: For Candida    docusate sodium 100 mg oral capsule  -- 1 cap(s) by mouth 3 times a day  -- Indication: For Constipation    senna oral tablet  -- 2 tab(s) by mouth once a day (at bedtime)  -- Indication: For Constipation    pantoprazole 40 mg oral delayed release tablet  -- 1 tab(s) by mouth once a day (before a meal)  -- Indication: For Gi ppx

## 2019-02-13 NOTE — H&P ADULT - PROBLEM SELECTOR PLAN 2
2/2 corona virus infection. CXR with no infiltrates concerning for PNA. s/p 2L NS and vanc zosyn in ED.  - cont supportive care  - cont IVF at 75cc hr x10 hr  - f/u blood cultures  - monitor fever curve, acetaminophen PRN 2/2 corona virus infection. CXR with no infiltrates concerning for PNA. s/p 2L NS and vanc zosyn in ED.  - cont supportive care  - cont IVF at 75cc hr x10 hr  - f/u blood cultures  - monitor fever curve, acetaminophen PRN  - f/u repeat 2/2 corona virus infection. CXR with no infiltrates concerning for PNA. s/p 2L NS and vanc zosyn in ED.  - cont supportive care  - cont IVF at 75cc hr x10 hr  - f/u blood cultures  - monitor fever curve, acetaminophen PRN  - monitor leukocytosis, bmp and CK Likely due to 2/2 Coronvirus infection, but could also be due to aspiration since patient with prolonged seizure activity  - CXR with no infiltrates concerning for PNA. s/p 2L NS and vanc zosyn in ED.  - cont supportive care  - cont IVF at 75cc hr x10 hr, then re-evaluate for continuation (especially since possibility of rhabdomyolysis)  - f/u blood cultures  - monitor fever curve, acetaminophen PRN  - monitor leukocytosis, bmp and CK  - (leukocytosis may also be influenced by chronic steroid use, as well as stress demargination from seizure activity)  - ID consult in the AM (please call) Likely due to 2/2 Coronavirus infection, but could also be due to aspiration since patient with prolonged seizure activity  - CXR with no infiltrates concerning for PNA. s/p 2L NS and vanc zosyn in ED.  - cont supportive care  - cont IVF at 75cc hr x10 hr, then re-evaluate for continuation (especially since possibility of rhabdomyolysis)  - f/u blood cultures  - monitor fever curve, acetaminophen PRN  - monitor leukocytosis, bmp and CK  - (leukocytosis may also be influenced by chronic steroid use, as well as stress demargination from seizure activity)  - ID consult in the AM (please call)

## 2019-02-13 NOTE — DISCHARGE NOTE ADULT - CARE PROVIDER_API CALL
Fabian Gonzalez)  Neurosurgery  General  [address] [facility], [address]  [place] [address]  Phone: [phone]  Fax: [phone]  Follow Up Time:     Dhiraj Redd)  Neurology  [address], [place] [address]  Phone: [phone]  Fax: [phone]  Follow Up Time: Fabian Gonzalez)  Neurosurgery  General  [address] [facility], [address]  [place] [address]  Phone: [phone]  Fax: [phone]  Follow Up Time:     Dhiraj Redd)  Neurology  [address] [facility], [place] [address]  Phone: [phone]  Fax: [phone]  Follow Up Time:     Mil Grace)  Radiation Oncology  [address] [place] [address], [facility] [place] [address]  Phone: [phone]  Fax: [phone]  Follow Up Time: Fabian Gonzalez)  Neurosurgery  General  [address] [facility], [address]  [place] [address]  Phone: [phone]  Fax: [phone]  Follow Up Time: 2 weeks    Dhiraj Redd)  Neurology  [address] [place] [address], [place] [address]  Phone: [phone]  Fax: [phone]  Follow Up Time: 2 weeks    Mil Grace)  Radiation Oncology  [address] [place] [address], [facility]  [place] [address]  Phone: [phone]  Fax: [phone]  Follow Up Time: 2 weeks

## 2019-02-13 NOTE — CONSULT NOTE ADULT - SUBJECTIVE AND OBJECTIVE BOX
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  Patient began to have left sided muscle jerking approx half hour prior to EMS arrival, on EMS arrival continues to have jerking of the left face, arm and leg, approx 45-60 minutes in duration upon ED arrival. Pt responsive, able to move right side and follow commands during this episode. On keppra 750 BID, dexamethasone 4mg daily.    In ED patient received Ativan 2mg x 3 and 1000mg Keppra x1. Patient febrile to 100.7 in ED.     MEDS:   acetaminophen  Suppository .. 650 milliGRAM(s) Rectal every 6 hours PRN  dextrose 40% Gel 15 Gram(s) Oral once PRN  dextrose 5%. 1000 milliLiter(s) IV Continuous <Continuous>  dextrose 50% Injectable 12.5 Gram(s) IV Push once  dextrose 50% Injectable 25 Gram(s) IV Push once  dextrose 50% Injectable 25 Gram(s) IV Push once  glucagon  Injectable 1 milliGRAM(s) IntraMuscular once PRN  insulin lispro (HumaLOG) corrective regimen sliding scale   SubCutaneous three times a day before meals  insulin lispro (HumaLOG) corrective regimen sliding scale   SubCutaneous at bedtime  levETIRAcetam  IVPB 750 milliGRAM(s) IV Intermittent <User Schedule>  piperacillin/tazobactam IVPB. 3.375 Gram(s) IV Intermittent every 8 hours  sodium chloride 0.9%. 1000 milliLiter(s) IV Continuous <Continuous>    PHYSICAL EXAM:    Vital Signs Last 24 Hrs  T(C): 36.2 (13 Feb 2019 09:48), Max: 38.2 (12 Feb 2019 21:20)  T(F): 97.2 (13 Feb 2019 09:48), Max: 100.7 (12 Feb 2019 21:20)  HR: 66 (13 Feb 2019 09:48) (63 - 101)  BP: 125/91 (13 Feb 2019 09:48) (110/80 - 139/87)  BP(mean): --  RR: 17 (13 Feb 2019 09:48) (14 - 19)  SpO2: 100% (13 Feb 2019 09:48) (100% - 100%)    AOx1 to self   PERRL, EOMI  RUE 5/5 strength  LUE 3/5  BLE 5/5  No drift    LABS:    [id number] @ 13:14                        11.9   20.87 )-----------( 386      ( 13 Feb 2019 06:00 )             35.8     02-13    118<LL>  |  66<L>  |  13  ----------------------------<  1205<HH>  3.3<L>   |  18<L>  |  0.50    Ca    6.6<L>      13 Feb 2019 11:15  Phos  2.5     02-13  Mg     1.2     02-13    TPro  7.9  /  Alb  4.1  /  TBili  < 0.2<L>  /  DBili  x   /  AST  22  /  ALT  29  /  AlkPhos  78  02-12  PT/INR - ( 13 Feb 2019 05:45 )   PT: 12.7 SEC;   INR: 1.14     PTT - ( 13 Feb 2019 05:45 )  PTT:24.2 SEC    RADIOLOGY:   < from: CT Head No Cont (02.12.19 @ 22:40) >  Area of gyral and fingerlike hyperdensity as well as encephalomalacia   noted within the right temporal lobe. There is compensatory mild dilation   of the right lateral ventricle. Increasing enhancement and signal   abnormality in the right temporal lobe visualized on MRI is not well   visualized on the current exam due to the apparent differences in   sensitivity between computed tomography and MRI as well as lack of   administration of intravenous contrast.    Mild parenchymal volume loss and microvascular ischemic changes.    The visualized paranasal sinuses are clear.    The mastoid air cells and middle ear cavities are grossly clear.    Status post right pterional craniotomy..    IMPRESSION:   No CT evidence of acute intracranial hemorrhage, extra-axial collection,   or calvarial fracture. Edema is visualized in the right temporal   convexity. Unable to determine whether this is simply postoperative   change or tumor progression. Recommend brain MRI with and without   contrast if there is continued suspicion for tumor progression.

## 2019-02-13 NOTE — DISCHARGE NOTE ADULT - CONDITIONS AT DISCHARGE
pt axox4; with periods of forgetfulness. pt denies pain/discomfort. educated on discharge information. verbalized understanding via teachback. safety maintained. discharged safely with transport team to [facility].

## 2019-02-13 NOTE — DISCHARGE NOTE ADULT - PROVIDER TOKENS
PROVIDER:[TOKEN:[[id number]]],PROVIDER:[TOKEN:[[id number]]] PROVIDER:[TOKEN:[[id number]]],PROVIDER:[TOKEN:[[id number]]],PROVIDER:[TOKEN:[[id number]]] PROVIDER:[TOKEN:[[id number]],FOLLOWUP:[2 weeks]],PROVIDER:[TOKEN:[[id number]],FOLLOWUP:[2 weeks]],PROVIDER:[TOKEN:[[id number]],FOLLOWUP:[2 weeks]]

## 2019-02-13 NOTE — H&P ADULT - PROBLEM SELECTOR PLAN 3
Not on home insulin.  - start ISS, FS TID and QHS  - diabetic diet/ monitor blood glucose Not on home insulin.  - start ISS, FS TID and QHS  - diabetic diet (when optimal)/ monitor blood glucose

## 2019-02-13 NOTE — H&P ADULT - HISTORY OF PRESENT ILLNESS
a 74 year old male with HTN, NIDDM, CVA (2013 w/ left side weakness), R glioma (s/p R craniectomy and tumor resection 11/28/2018), BPH brought in by EMS from nursing home for active seizure. Per ED notes, pt was actively seizing for 30 mins prior to EMS arrival. Pt displayed left sided muscle jerking and continued with jerking of left face, arm, and leg in the ED. He recieved ativan 2mg x3 and keppra 1g and seizure broke after 20 minutes in the ED.     On interview, pt is responsive to commands but currently nonverbal, attempting to speak with no active muscle jerking.   In the ED VS Tm100.7, Hr 101, /80, RR 19/100% on 3L NC. 74 year old male with HTN, NIDDM, CVA (2013 w/ left side weakness), R glioma (s/p R craniectomy and tumor resection 11/28/2018), BPH brought in by EMS from nursing home for active seizure. Per ED notes, pt was actively seizing for 30 mins prior to EMS arrival. Pt displayed left sided muscle jerking and continued with jerking of left face, arm, and leg in the ED. He recieved ativan 2mg x3 and keppra 1g and seizure broke after 20 minutes in the ED.     On interview, pt is responsive to commands but currently nonverbal, attempting to speak but not audible and wwith no active muscle jerking.   In the ED VS Tm100.7, Hr 101, /80, RR 19/100% on 3L NC. He also received vanc 1g, zosyn, dexamethasone 10mg. 74 year old male with HTN, NIDDM, CVA (2013 w/ left side weakness), R glioma (s/p R craniectomy and tumor resection 11/28/2018), BPH brought in by EMS from nursing home for active seizure. Per ED notes, pt was actively seizing for 30 mins prior to EMS arrival. Pt displayed left sided muscle jerking and continued with jerking of left face, arm, and leg in the ED. He received ativan 2mg x3 and keppra 1g and seizure broke after 20 minutes in the ED.     On interview, pt is responsive to commands but currently nonverbal, attempting to speak but not audible and with no active muscle jerking.   In the ED VS Tm100.7, Hr 101, /80, RR 19/100% on 3L NC. He also received vanc 1g, zosyn, dexamethasone 10mg.

## 2019-02-13 NOTE — DISCHARGE NOTE ADULT - PATIENT PORTAL LINK FT
You can access the [facility] Patient Portal, offered by [facility], by registering with the following website: [email or web address]

## 2019-02-13 NOTE — H&P ADULT - PMH
BPH (benign prostatic hyperplasia)    CVA (cerebral vascular accident)    Diabetes    HTN (hypertension) BPH (benign prostatic hyperplasia)    Constipation    CVA (cerebral vascular accident)    Diabetes    Glioblastoma    HTN (hypertension)    Oxygen dependent    Pulmonary embolism    Vitamin deficiency

## 2019-02-13 NOTE — H&P ADULT - ASSESSMENT
4 year old male with HTN, NIDDM, CVA (2013 w/ left side weakness), R glioma (s/p R craniectomy and tumor resection 11/28/2018), BPH presents with seizure episode 2/2 brain malignancy vs infection, and found to have sepsis 2/2 coronavirus+ 74 year old male with HTN, NIDDM, CVA (2013 w/ left side weakness), R glioma (s/p R craniectomy and tumor resection 11/28/2018), BPH presents with seizure episode 2/2 brain malignancy vs infection, and found to have sepsis 2/2 coronavirus+

## 2019-02-13 NOTE — CONSULT NOTE ADULT - ASSESSMENT
75 yo male history of craniotomy nov 2018, presenting with seizure, possible progression of GBM on CTH

## 2019-02-13 NOTE — H&P ADULT - NSHPREVIEWOFSYSTEMS_GEN_ALL_CORE
REVIEW OF SYSTEMS:  CONSTITUTIONAL: No weakness, fevers or chills  EYES/ENT: No visual changes;  No vertigo or throat pain   NECK: No pain or stiffness  RESPIRATORY: No cough, wheezing, hemoptysis; No shortness of breath  CARDIOVASCULAR: No chest pain or palpitations  GASTROINTESTINAL: No abdominal or epigastric pain. No nausea, vomiting, or hematemesis; No diarrhea or constipation. No melena or hematochezia.  GENITOURINARY: No dysuria, frequency or hematuria  NEUROLOGICAL: No numbness or weakness  SKIN: No itching, burning, rashes, or lesions   All other review of systems is negative unless indicated above. REVIEW OF SYSTEMS:  unable to assess as pt currently nonverbal.

## 2019-02-13 NOTE — H&P ADULT - PROBLEM SELECTOR PLAN 4
Hold home HTN meds in setting of sepsis. No issues presently  - Hold home HTN meds in setting of sepsis.

## 2019-02-13 NOTE — DISCHARGE NOTE ADULT - CARE PLAN
Principal Discharge DX:	Status epilepticus  Goal:	Transfer to [facility].  Assessment and plan of treatment:	Patient presented with seizure episode and initially with unintelligible slow speech but now with fluent speech. There was concern for progression of disease on outpt MRI and CT here. S/P keppra 1g in ED and dexamethasone 10mg. Seen by neurology who recommended increasing Keppra to 1000mg. Neurosurgery also saw patient - recommended transfer to [facility] for surgery. Will initiate transfer. Lactate has been downtrending and likely due to prolonged seizure activity rather than sepsis. Patient is s/p IVF and on mechanical soft diet. HOB elevation, Aspiration precautions in place. Await bed assignment.  Secondary Diagnosis:	Essential hypertension  Goal:	To maintain a normal blood pressure to prevent heart attack, stroke and renal failure.  Assessment and plan of treatment:	Low sodium and fat diet, continue anti-hypertensive medications, and follow up with primary care physician.  Secondary Diagnosis:	Sepsis, due to unspecified organism  Goal:	Monitor.  Assessment and plan of treatment:	Sepsis cascade resolving, CXR with no infiltrates concerning for PNA but due to prolonged seizure activity there is concern for aspiration, continue supportive care and Zosyn. BCx pending, monitor fever curve. Principal Discharge DX:	Glioblastoma  Goal:	To Recover From Surgery  Assessment and plan of treatment:	Recover at Rehab Facility. Radiation as outpatient with Dr. Grace  Secondary Diagnosis:	Essential hypertension  Goal:	To maintain a normal blood pressure to prevent heart attack, stroke and renal failure.  Assessment and plan of treatment:	Low sodium and fat diet, continue anti-hypertensive medications, and follow up with primary care physician.

## 2019-02-13 NOTE — PROGRESS NOTE ADULT - SUBJECTIVE AND OBJECTIVE BOX
Patient is a 74y old  Male who presents with a chief complaint of Seizure (13 Feb 2019 01:21)      SUBJECTIVE / OVERNIGHT EVENTS:  pt alert and oriented - aware of his surroundings, cannot recall details of seizure but notes that he was eating soup right before.  denies pain or sob.  no vision changes.  no difficulty breathing.  denies sinus congestion or runny nose.     MEDICATIONS  (STANDING):  dextrose 5%. 1000 milliLiter(s) (50 mL/Hr) IV Continuous <Continuous>  dextrose 50% Injectable 12.5 Gram(s) IV Push once  dextrose 50% Injectable 25 Gram(s) IV Push once  dextrose 50% Injectable 25 Gram(s) IV Push once  insulin lispro (HumaLOG) corrective regimen sliding scale   SubCutaneous three times a day before meals  insulin lispro (HumaLOG) corrective regimen sliding scale   SubCutaneous at bedtime  levETIRAcetam  IVPB 750 milliGRAM(s) IV Intermittent <User Schedule>  piperacillin/tazobactam IVPB. 3.375 Gram(s) IV Intermittent every 8 hours  sodium chloride 0.9%. 1000 milliLiter(s) (75 mL/Hr) IV Continuous <Continuous>    MEDICATIONS  (PRN):  acetaminophen  Suppository .. 650 milliGRAM(s) Rectal every 6 hours PRN Temp greater or equal to 38C (100.4F)  dextrose 40% Gel 15 Gram(s) Oral once PRN Blood Glucose LESS THAN 70 milliGRAM(s)/deciliter  glucagon  Injectable 1 milliGRAM(s) IntraMuscular once PRN Glucose LESS THAN 70 milligrams/deciliter      Vital Signs Last 24 Hrs  T(C): 36.2 (13 Feb 2019 09:48), Max: 38.2 (12 Feb 2019 21:20)  T(F): 97.2 (13 Feb 2019 09:48), Max: 100.7 (12 Feb 2019 21:20)  HR: 66 (13 Feb 2019 09:48) (63 - 101)  BP: 125/91 (13 Feb 2019 09:48) (110/80 - 139/87)  BP(mean): --  RR: 17 (13 Feb 2019 09:48) (14 - 19)  SpO2: 100% (13 Feb 2019 09:48) (100% - 100%)  CAPILLARY BLOOD GLUCOSE      POCT Blood Glucose.: 147 mg/dL (12 Feb 2019 20:57)    I&O's Summary    13 Feb 2019 07:01  -  13 Feb 2019 12:00  --------------------------------------------------------  IN: 0 mL / OUT: 400 mL / NET: -400 mL      PHYSICAL EXAM:  GENERAL: NAD, well-developed  HEAD:  Atraumatic, Normocephalic  EYES: EOMI, conjunctiva and sclera clear  NECK: Supple, No JVD  CHEST/LUNG: Clear to auscultation bilaterally; No wheeze  HEART: Regular rate and rhythm; No murmurs  ABDOMEN: Soft, Nontender, Nondistended; Bowel sounds present  EXTREMITIES:  2+ Peripheral Pulses, No edema  PSYCH: AAOx3  NEUROLOGY: non-focal  SKIN: No rashes or lesions    LABS:                        11.9   20.87 )-----------( 386      ( 13 Feb 2019 06:00 )             35.8     02-12    140  |  98  |  20  ----------------------------<  145<H>  5.5<H>   |  17<L>  |  0.82    Ca    10.1      12 Feb 2019 21:00  Phos  4.2     02-12  Mg     1.8     02-12    TPro  7.9  /  Alb  4.1  /  TBili  < 0.2<L>  /  DBili  x   /  AST  22  /  ALT  29  /  AlkPhos  78  02-12    PT/INR - ( 13 Feb 2019 05:45 )   PT: 12.7 SEC;   INR: 1.14          PTT - ( 13 Feb 2019 05:45 )  PTT:24.2 SEC  CARDIAC MARKERS ( 13 Feb 2019 05:45 )  x     / x     / 20 u/L / x     / x      CARDIAC MARKERS ( 12 Feb 2019 21:00 )  x     / x     / 30 u/L / x     / x Patient is a 74y old  Male who presents with a chief complaint of Seizure (13 Feb 2019 01:21)      SUBJECTIVE / OVERNIGHT EVENTS:  pt alert and oriented - aware of his surroundings, cannot recall details of seizure but notes that he was eating soup right before.  denies pain or sob.  no vision changes.  no difficulty breathing.  denies sinus congestion or runny nose.     MEDICATIONS  (STANDING):  dextrose 5%. 1000 milliLiter(s) (50 mL/Hr) IV Continuous <Continuous>  dextrose 50% Injectable 12.5 Gram(s) IV Push once  dextrose 50% Injectable 25 Gram(s) IV Push once  dextrose 50% Injectable 25 Gram(s) IV Push once  insulin lispro (HumaLOG) corrective regimen sliding scale   SubCutaneous three times a day before meals  insulin lispro (HumaLOG) corrective regimen sliding scale   SubCutaneous at bedtime  levETIRAcetam  IVPB 750 milliGRAM(s) IV Intermittent <User Schedule>  piperacillin/tazobactam IVPB. 3.375 Gram(s) IV Intermittent every 8 hours  sodium chloride 0.9%. 1000 milliLiter(s) (75 mL/Hr) IV Continuous <Continuous>    MEDICATIONS  (PRN):  acetaminophen  Suppository .. 650 milliGRAM(s) Rectal every 6 hours PRN Temp greater or equal to 38C (100.4F)  dextrose 40% Gel 15 Gram(s) Oral once PRN Blood Glucose LESS THAN 70 milliGRAM(s)/deciliter  glucagon  Injectable 1 milliGRAM(s) IntraMuscular once PRN Glucose LESS THAN 70 milligrams/deciliter      Vital Signs Last 24 Hrs  T(C): 36.2 (13 Feb 2019 09:48), Max: 38.2 (12 Feb 2019 21:20)  T(F): 97.2 (13 Feb 2019 09:48), Max: 100.7 (12 Feb 2019 21:20)  HR: 66 (13 Feb 2019 09:48) (63 - 101)  BP: 125/91 (13 Feb 2019 09:48) (110/80 - 139/87)  BP(mean): --  RR: 17 (13 Feb 2019 09:48) (14 - 19)  SpO2: 100% (13 Feb 2019 09:48) (100% - 100%)  CAPILLARY BLOOD GLUCOSE      POCT Blood Glucose.: 147 mg/dL (12 Feb 2019 20:57)    I&O's Summary    13 Feb 2019 07:01  -  13 Feb 2019 12:00  --------------------------------------------------------  IN: 0 mL / OUT: 400 mL / NET: -400 mL      PHYSICAL EXAM:  GENERAL: NAD, well-developed  HEAD:  Atraumatic, Normocephalic  EYES: EOMI, conjunctiva and sclera clear  NECK: Supple, No JVD  CHEST/LUNG: Clear to auscultation bilaterally; No wheeze  HEART: Regular rate and rhythm; No murmurs  ABDOMEN: Soft, Nontender, Nondistended; Bowel sounds present  EXTREMITIES:  2+ Peripheral Pulses, No edema  PSYCH: AAOx3  NEUROLOGY: LUE strength 3/5; other extremities 5/5  SKIN: No rashes or lesions    LABS:                        11.9   20.87 )-----------( 386      ( 13 Feb 2019 06:00 )             35.8     02-12    140  |  98  |  20  ----------------------------<  145<H>  5.5<H>   |  17<L>  |  0.82    Ca    10.1      12 Feb 2019 21:00  Phos  4.2     02-12  Mg     1.8     02-12    TPro  7.9  /  Alb  4.1  /  TBili  < 0.2<L>  /  DBili  x   /  AST  22  /  ALT  29  /  AlkPhos  78  02-12    PT/INR - ( 13 Feb 2019 05:45 )   PT: 12.7 SEC;   INR: 1.14          PTT - ( 13 Feb 2019 05:45 )  PTT:24.2 SEC  CARDIAC MARKERS ( 13 Feb 2019 05:45 )  x     / x     / 20 u/L / x     / x      CARDIAC MARKERS ( 12 Feb 2019 21:00 )  x     / x     / 30 u/L / x     / x

## 2019-02-13 NOTE — DISCHARGE NOTE ADULT - PLAN OF CARE
Transfer to [facility]. Patient presented with seizure episode and initially with unintelligible slow speech but now with fluent speech. There was concern for progression of disease on outpt MRI and CT here. S/P keppra 1g in ED and dexamethasone 10mg. Seen by neurology who recommended increasing Keppra to 1000mg. Neurosurgery also saw patient - recommended transfer to [facility] for surgery. Will initiate transfer. Lactate has been downtrending and likely due to prolonged seizure activity rather than sepsis. Patient is s/p IVF and on mechanical soft diet. HOB elevation, Aspiration precautions in place. Await bed assignment. To maintain a normal blood pressure to prevent heart attack, stroke and renal failure. Low sodium and fat diet, continue anti-hypertensive medications, and follow up with primary care physician. Monitor. Sepsis cascade resolving, CXR with no infiltrates concerning for PNA but due to prolonged seizure activity there is concern for aspiration, continue supportive care and Zosyn. BCx pending, monitor fever curve. To Recover From Surgery Recover at Rehab Facility. Radiation as outpatient with Dr. Grace

## 2019-02-13 NOTE — DISCHARGE NOTE ADULT - HOSPITAL COURSE
74 year old male with HTN, NIDDM, CVA (2013 w/ left side weakness), R glioma (s/p R craniectomy and tumor resection 11/28/2018), BPH presents with seizure episode 2/2 brain malignancy vs infection, and found to have sepsis 2/2 coronavirus+    1. Seizure: initially with unintelligible slow speech but now pt has fluent speech, concern for progression of disease on outpt MRI and CT here. S/P keppra 1g in ED and dexamethasone 10mg  - Neuro recs appreciated, keppra 750 TID  - paged neurosx - will follow up their recs for further imaging; possible transfer to [facility] for neurosx  - will repeat lactate but likely due to prolonged seizure activity than sepsis   - IVF hydration  - f/u renal function  - HOB elevation, Aspiration precautions.      Problem/Plan - 2:  ·  Problem: Sepsis, due to unspecified organism.  Plan: - sepsis on admission- sepsis cascade resolving   - CXR with no infiltrates concerning for PNA but due to prolonged seizure activity there is concern for aspiration  - cont supportive care; cont with zosyn   - f/u blood cultures  - monitor fever curve, acetaminophen PRN  - monitor leukocytosis, bmp and CK  - consider ID consult.      Problem/Plan - 3:  ·  Problem: Type 2 diabetes mellitus with complication, without long-term current use of insulin.  Plan: - start ISS, FS TID and QHS  - diabetic diet (when optimal)/ monitor blood glucose.      Problem/Plan - 4:  ·  Problem: Essential hypertension.  Plan: No issues presently  - Hold home HTN meds in setting of sepsis.      Problem/Plan - 5:  ·  Problem: Nutritional assessment.  Plan: - would start mechanical soft diet - pt usually tolerates regular diet at NH/rehab   - consider swallow eval if there is concern for dysphagia.      Problem/Plan - 6:  Problem: Prophylactic measure. Plan: VTE ppx: SCDs  dispo: possible txer to [facility] for neurosx. 74 year old male with HTN, NIDDM, CVA (2013 w/ left side weakness), R glioma (s/p R craniectomy and tumor resection 11/28/2018), BPH presents with seizure episode 2/2 brain malignancy vs infection, and found to have sepsis 2/2 coronavirus+    1. Seizure: initially with unintelligible slow speech but now pt has fluent speech, concern for progression of disease on outpt MRI and CT here. S/P keppra 1g in ED and dexamethasone 10mg. Seen by neurology who recommended increasing Keppra. Neurosurgery also saw patient - recommended transfer to [facility] for surgery. Awaiting bed. Lactate downtrending, likely due to prolonged seizure activity rather than sepsis. Patient is s/p IVF and on mechanical soft diet. HOB elevation, Aspiration precautions in place.   2. Sepsis: sepsis cascade resolving, CXR with no infiltrates concerning for PNA but due to prolonged seizure activity there is concern for aspiration, continue supportive care and Zosyn. BCx pending, will monitor fever curve.   3. DM2: Started ISS.    4. HTN: Holding home HTN meds in setting of sepsis.   5. Nutritional assessment - initially NPO, now on mechanical soft diet though usually tolerates regular diet at NH/rehab. Consider swallow eval if there is concern for dysphagia.     Patient seen and evaluated, no acute somatic complaints. Medically cleared/stable for TRANSFER to [facility] as per Dr. Ramírez and neurosurgery team. 74 year old male with HTN, NIDDM, CVA (2013 w/ left side weakness), R glioma (s/p R craniectomy and tumor resection 11/28/2018), BPH presents with seizure episode 2/2 brain malignancy vs infection, and found to have sepsis 2/2 coronavirus+    1. Seizure: initially with unintelligible slow speech but now pt has fluent speech, concern for progression of disease on outpt MRI and CT here. S/P keppra 1g in ED and dexamethasone 10mg. Seen by neurology who recommended increasing Keppra. Neurosurgery also saw patient - recommended transfer to [facility] for surgery. Awaiting bed. Lactate downtrending, likely due to prolonged seizure activity rather than sepsis. Patient is s/p IVF and on mechanical soft diet. HOB elevation, Aspiration precautions in place.   2. Sepsis: sepsis cascade resolving, CXR with no infiltrates concerning for PNA but due to prolonged seizure activity there is concern for aspiration, continue supportive care and Zosyn. BCx pending, will monitor fever curve.   3. DM2: Started ISS.    4. HTN: Holding home HTN meds in setting of sepsis.   5. Nutritional assessment - initially NPO, now on mechanical soft diet though usually tolerates regular diet at NH/rehab. Consider swallow eval if there is concern for dysphagia.     Pt went ot OR 2/27/19 for resection of recurrent GBM. He tolerated the procedure well. He was found to have some hemorrhage in the post operative bed on MRI/CTH. He had seizure post operatively, keppra was increased and vimpat was added. Neurology was consulted. Pt was monitored in SICU with q1H neurochecks. He is not stable for discharge to Rehab.

## 2019-02-13 NOTE — H&P ADULT - PROBLEM SELECTOR PLAN 5
VTE ppx: SCDs  diet: npo  dispo: pending clinical improvement Currently NPO due to prolonged seizure activity, AMS (improving)  - reassess for ability to tolerate oral nutrition  - IVF in progress; evaluate for continuation  - f/u AM lab-work

## 2019-02-13 NOTE — DISCHARGE NOTE ADULT - ADDITIONAL INSTRUCTIONS
Follow up with PCP within 1-2 weeks of discharge.   Follow up with neurology/neurosurgery within 1-2 weeks of discharge. Follow up with Dr. Grace for scheduling of Radiation Therapy.  Follow up with neurology/neurosurgery within 1weeks of discharge.  Follow up with PCP within 1-2 weeks of discharge.   Staples to be removed on 3/13/19 in Rehab Facility or at Dr. Gonzalez's office.  No Anticoagulation until 3/10/19.  Do not lift more than 5-10 lbs.   You may shower or shampoo your incision 4 days after surgery. You can let soap and water run on it and pat it dry.   Keep incision clean and dry and do not use any ointments.   Report to the ED for persistent fever, vomiting, headaches not relieved by medications, drainage from the incision or any change in neurologic or mental status or seizures.   Call the office to make a follow up appointment Follow up with Dr. Grace 3/20/19 @ 1:30pm @ [address] [facility]  Follow up with neurology/neurosurgery within 1weeks of discharge.  Follow up with PCP within 1-2 weeks of discharge.   Staples to be removed on 3/13/19 in Rehab Facility or at Dr. Gonzalez's office.  No Anticoagulation until 3/10/19.  Do not lift more than 5-10 lbs.   You may shower or shampoo your incision 4 days after surgery. You can let soap and water run on it and pat it dry.   Keep incision clean and dry and do not use any ointments.   Report to the ED for persistent fever, vomiting, headaches not relieved by medications, drainage from the incision or any change in neurologic or mental status or seizures.   Call the office to make a follow up appointment

## 2019-02-13 NOTE — CONSULT NOTE ADULT - ASSESSMENT
74 year old male with HTN, NIDDM, CVA (2013 w/ left side weakness), R glioma (s/p R craniectomy and tumor resection 11/28/2018), BPH presents with seizure episode 2/2 brain malignancy with fever/leukocytosis/SIRS and found to have coronavirus+

## 2019-02-13 NOTE — PROGRESS NOTE ADULT - PROBLEM SELECTOR PLAN 1
- initially with unintelligible slow speech but now pt has fluent speech   - concern for progression of disease on outpt MRI and CT here  - S/P keppra 1g in ED and dexamethasone 10mg  - Neuro recs appreciated, keppra 750 TID  - paged neurosx - will follow up their recs for further imaging; possible transfer to [facility] for neurosx  - will repeat lactate but likely due to prolonged seizure activity than sepsis   - IVF hydration  - f/u renal function  - HOB elevation, Aspiration precautions

## 2019-02-13 NOTE — H&P ADULT - NSHPSOCIALHISTORY_GEN_ALL_CORE
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    Former /business owner.   Never smoker and no ETOH/recreational drug use.

## 2019-02-14 DIAGNOSIS — C71.9 MALIGNANT NEOPLASM OF BRAIN, UNSPECIFIED: ICD-10-CM

## 2019-02-14 DIAGNOSIS — R78.81 BACTEREMIA: ICD-10-CM

## 2019-02-14 DIAGNOSIS — I63.9 CEREBRAL INFARCTION, UNSPECIFIED: ICD-10-CM

## 2019-02-14 DIAGNOSIS — E11.8 TYPE 2 DIABETES MELLITUS WITH UNSPECIFIED COMPLICATIONS: ICD-10-CM

## 2019-02-14 DIAGNOSIS — J06.9 ACUTE UPPER RESPIRATORY INFECTION, UNSPECIFIED: ICD-10-CM

## 2019-02-14 LAB
ANION GAP SERPL CALC-SCNC: 12 MMO/L — SIGNIFICANT CHANGE UP (ref 7–14)
BASOPHILS # BLD AUTO: 0.13 K/UL — SIGNIFICANT CHANGE UP (ref 0–0.2)
BASOPHILS NFR BLD AUTO: 0.7 % — SIGNIFICANT CHANGE UP (ref 0–2)
BUN SERPL-MCNC: 13 MG/DL — SIGNIFICANT CHANGE UP (ref 7–23)
CALCIUM SERPL-MCNC: 9.1 MG/DL — SIGNIFICANT CHANGE UP (ref 8.4–10.5)
CHLORIDE SERPL-SCNC: 99 MMOL/L — SIGNIFICANT CHANGE UP (ref 98–107)
CO2 SERPL-SCNC: 27 MMOL/L — SIGNIFICANT CHANGE UP (ref 22–31)
CREAT SERPL-MCNC: 0.67 MG/DL — SIGNIFICANT CHANGE UP (ref 0.5–1.3)
EOSINOPHIL # BLD AUTO: 0.15 K/UL — SIGNIFICANT CHANGE UP (ref 0–0.5)
EOSINOPHIL NFR BLD AUTO: 0.9 % — SIGNIFICANT CHANGE UP (ref 0–6)
GLUCOSE BLDC GLUCOMTR-MCNC: 118 MG/DL — HIGH (ref 70–99)
GLUCOSE BLDC GLUCOMTR-MCNC: 130 MG/DL — HIGH (ref 70–99)
GLUCOSE SERPL-MCNC: 116 MG/DL — HIGH (ref 70–99)
HCT VFR BLD CALC: 40.5 % — SIGNIFICANT CHANGE UP (ref 39–50)
HGB BLD-MCNC: 13.1 G/DL — SIGNIFICANT CHANGE UP (ref 13–17)
IMM GRANULOCYTES NFR BLD AUTO: 3.1 % — HIGH (ref 0–1.5)
LYMPHOCYTES # BLD AUTO: 15.4 % — SIGNIFICANT CHANGE UP (ref 13–44)
LYMPHOCYTES # BLD AUTO: 2.71 K/UL — SIGNIFICANT CHANGE UP (ref 1–3.3)
MAGNESIUM SERPL-MCNC: 1.9 MG/DL — SIGNIFICANT CHANGE UP (ref 1.6–2.6)
MCHC RBC-ENTMCNC: 29.5 PG — SIGNIFICANT CHANGE UP (ref 27–34)
MCHC RBC-ENTMCNC: 32.3 % — SIGNIFICANT CHANGE UP (ref 32–36)
MCV RBC AUTO: 91.2 FL — SIGNIFICANT CHANGE UP (ref 80–100)
MONOCYTES # BLD AUTO: 1.72 K/UL — HIGH (ref 0–0.9)
MONOCYTES NFR BLD AUTO: 9.8 % — SIGNIFICANT CHANGE UP (ref 2–14)
NEUTROPHILS # BLD AUTO: 12.29 K/UL — HIGH (ref 1.8–7.4)
NEUTROPHILS NFR BLD AUTO: 70.1 % — SIGNIFICANT CHANGE UP (ref 43–77)
NRBC # FLD: 0.02 K/UL — LOW (ref 25–125)
PHOSPHATE SERPL-MCNC: 3.1 MG/DL — SIGNIFICANT CHANGE UP (ref 2.5–4.5)
PLATELET # BLD AUTO: 327 K/UL — SIGNIFICANT CHANGE UP (ref 150–400)
PMV BLD: 10.3 FL — SIGNIFICANT CHANGE UP (ref 7–13)
POTASSIUM SERPL-MCNC: 3.7 MMOL/L — SIGNIFICANT CHANGE UP (ref 3.5–5.3)
POTASSIUM SERPL-SCNC: 3.7 MMOL/L — SIGNIFICANT CHANGE UP (ref 3.5–5.3)
RBC # BLD: 4.44 M/UL — SIGNIFICANT CHANGE UP (ref 4.2–5.8)
RBC # FLD: 14.8 % — HIGH (ref 10.3–14.5)
SODIUM SERPL-SCNC: 138 MMOL/L — SIGNIFICANT CHANGE UP (ref 135–145)
WBC # BLD: 17.55 K/UL — HIGH (ref 3.8–10.5)
WBC # FLD AUTO: 17.55 K/UL — HIGH (ref 3.8–10.5)

## 2019-02-14 PROCEDURE — 99233 SBSQ HOSP IP/OBS HIGH 50: CPT

## 2019-02-14 PROCEDURE — 99232 SBSQ HOSP IP/OBS MODERATE 35: CPT

## 2019-02-14 RX ORDER — DEXAMETHASONE 0.5 MG/5ML
2 ELIXIR ORAL
Qty: 0 | Refills: 0 | Status: DISCONTINUED | OUTPATIENT
Start: 2019-02-14 | End: 2019-02-15

## 2019-02-14 RX ADMIN — Medication 2 MILLIGRAM(S): at 18:38

## 2019-02-14 RX ADMIN — PIPERACILLIN AND TAZOBACTAM 25 GRAM(S): 4; .5 INJECTION, POWDER, LYOPHILIZED, FOR SOLUTION INTRAVENOUS at 14:14

## 2019-02-14 RX ADMIN — PIPERACILLIN AND TAZOBACTAM 25 GRAM(S): 4; .5 INJECTION, POWDER, LYOPHILIZED, FOR SOLUTION INTRAVENOUS at 06:00

## 2019-02-14 RX ADMIN — LEVETIRACETAM 400 MILLIGRAM(S): 250 TABLET, FILM COATED ORAL at 18:00

## 2019-02-14 RX ADMIN — PIPERACILLIN AND TAZOBACTAM 25 GRAM(S): 4; .5 INJECTION, POWDER, LYOPHILIZED, FOR SOLUTION INTRAVENOUS at 22:04

## 2019-02-14 RX ADMIN — LEVETIRACETAM 400 MILLIGRAM(S): 250 TABLET, FILM COATED ORAL at 05:00

## 2019-02-14 NOTE — HISTORY OF PRESENT ILLNESS
[FreeTextEntry1] : Mr. Duncan presents today for consideration for radiation therapy.   \par \par 73/m with HTN, BPH, CVA with left sided weakness [2013] found down by EMS, police called as patient did not answer his phone.   MRI brain from 11/22/18 showed Right 5.9 x 4 x 2.7 cm AP, TR, CC rim-enhancing temporal mass concerning for recurrent tumor with trapping of the ventricles, 6 cm leftward shift, with stranding areas of restricted diffusion which may reflect tumor or possible ischemic change as detailed above. Volume loss with encephalomalacia throughout the right inferior medial cerebellar hemisphere, smaller foci of ischemia in the left cerebellar hemisphere and supratentorially.   EEG at that time showed increased risk of focal onset seizures from the right parasagittal region.  Underwent resection on 11/28. 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 IDH negative.   Post operative MRI on 12/1 showed nodular areas of enhancement in the postop region. This enhancement is identified along the posterior lateral edge of a dilated right temporal horn.  There is a nodular area of enhancement measures approximately 2.3 x 1.1 cm.   Discharged to acute rehab. He has unfortunately been delayed for various reasons in being able to come to this appointment.  His most recent imaging from 1/30/2019 shows increasing enhancement within the right temporal lobe, suggestive of progression.  He now presents to discuss radiation therapy options.\par \par In clinic today he feels mostly well.  He continues to stay at his rehabilitation center and is walking with a cane.  On questioning, he denies any significant symptoms.  However, he requires occasional assistance with bathing and dressing himself.  He can typically use the bathroom by himself but someone checks on him.  He is able to feed himself, though not prepare food.  He met with Dr. Strickland just prior to this appointment.

## 2019-02-14 NOTE — PROGRESS NOTE ADULT - SUBJECTIVE AND OBJECTIVE BOX
HELGA WHITE 74y MRN-[id number]    Patient is a 74y old  Male who presents with a chief complaint of Seizure (14 Feb 2019 12:42)      Follow Up/CC:  ID following for fever    Interval History/ROS: no fever, denies HA    Allergies    No Known Allergies    Intolerances        ANTIMICROBIALS:  piperacillin/tazobactam IVPB. 3.375 every 8 hours      MEDICATIONS  (STANDING):  dextrose 5%. 1000 milliLiter(s) (50 mL/Hr) IV Continuous <Continuous>  dextrose 50% Injectable 12.5 Gram(s) IV Push once  dextrose 50% Injectable 25 Gram(s) IV Push once  dextrose 50% Injectable 25 Gram(s) IV Push once  influenza   Vaccine 0.5 milliLiter(s) IntraMuscular once  insulin lispro (HumaLOG) corrective regimen sliding scale   SubCutaneous three times a day before meals  insulin lispro (HumaLOG) corrective regimen sliding scale   SubCutaneous at bedtime  levETIRAcetam  IVPB 1000 milliGRAM(s) IV Intermittent every 12 hours  piperacillin/tazobactam IVPB. 3.375 Gram(s) IV Intermittent every 8 hours  sodium chloride 0.9%. 1000 milliLiter(s) (75 mL/Hr) IV Continuous <Continuous>    MEDICATIONS  (PRN):  acetaminophen  Suppository .. 650 milliGRAM(s) Rectal every 6 hours PRN Temp greater or equal to 38C (100.4F)  dextrose 40% Gel 15 Gram(s) Oral once PRN Blood Glucose LESS THAN 70 milliGRAM(s)/deciliter  glucagon  Injectable 1 milliGRAM(s) IntraMuscular once PRN Glucose LESS THAN 70 milligrams/deciliter        Vital Signs Last 24 Hrs  T(C): 36.8 (14 Feb 2019 08:50), Max: 37.3 (14 Feb 2019 02:04)  T(F): 98.3 (14 Feb 2019 08:50), Max: 99.2 (14 Feb 2019 02:04)  HR: 78 (14 Feb 2019 08:50) (62 - 78)  BP: 129/99 (14 Feb 2019 08:50) (126/89 - 144/99)  BP(mean): --  RR: 16 (14 Feb 2019 08:50) (16 - 18)  SpO2: 99% (14 Feb 2019 08:50) (97% - 100%)    CBC Full  -  ( 14 Feb 2019 06:30 )  WBC Count : 17.55 K/uL  Hemoglobin : 13.1 g/dL  Hematocrit : 40.5 %  Platelet Count - Automated : 327 K/uL  Mean Cell Volume : 91.2 fL  Mean Cell Hemoglobin : 29.5 pg  Mean Cell Hemoglobin Concentration : 32.3 %  Auto Neutrophil # : 12.29 K/uL  Auto Lymphocyte # : 2.71 K/uL  Auto Monocyte # : 1.72 K/uL  Auto Eosinophil # : 0.15 K/uL  Auto Basophil # : 0.13 K/uL  Auto Neutrophil % : 70.1 %  Auto Lymphocyte % : 15.4 %  Auto Monocyte % : 9.8 %  Auto Eosinophil % : 0.9 %  Auto Basophil % : 0.7 %    02-14    138  |  99  |  13  ----------------------------<  116<H>  3.7   |  27  |  0.67    Ca    9.1      14 Feb 2019 06:30  Phos  3.1     02-14  Mg     1.9     02-14    TPro  7.9  /  Alb  4.1  /  TBili  < 0.2<L>  /  DBili  x   /  AST  22  /  ALT  29  /  AlkPhos  78  02-12    LIVER FUNCTIONS - ( 12 Feb 2019 21:00 )  Alb: 4.1 g/dL / Pro: 7.9 g/dL / ALK PHOS: 78 u/L / ALT: 29 u/L / AST: 22 u/L / GGT: x               MICROBIOLOGY:  BLOOD PERIPHERAL  02-12-19 --  --  BLOOD CULTURE PCR    Rapid Respiratory Viral Panel (02.12.19 @ 21:00)    Adenovirus (RapRVP): Not Detected    Influenza A (RapRVP): Not Detected    Influenza AH1 2009 (RapRVP): Not Detected    Influenza AH1 (RapRVP): Not Detected    Influenza AH3 (RapRVP): Not Detected    Influenza B (RapRVP): Not Detected    Parainfluenza 1 (RapRVP): Not Detected    Parainfluenza 2 (RapRVP): Not Detected    Parainfluenza 3 (RapRVP): Not Detected    Parainfluenza 4 (RapRVP): Not Detected    Resp Syncytial Virus (RapRVP): Not Detected    Chlamydia pneumoniae (RapRVP): Not Detected    Mycoplasma pneumoniae (RapRVP): Not Detected    Entero/Rhinovirus (RapRVP): Not Detected    hMPV (RapRVP): Not Detected    Coronavirus (229E,HKU1,NL63,OC43): Detected: This Respiratory Panel uses polymerase chain reaction (PCR)  to detect for adenovirus; coronavirus (HKU1, NL63, 229E,  OC43); human metapneumovirus (hMPV); human  enterovirus/rhinovirus (Entero/RV); influenza A; influenza  A/H1: influenza A/H3; influenza A/H1-2009; influenza B;  parainfluenza viruses 1,2,3,4; respiratory syncytial virus;  Mycoplasma pneumoniae; and Chlamydophila pneumoniae.        RADIOLOGY    CT Head No Cont (02.12.19 @ 22:40) >  IMPRESSION:   No CT evidence of acute intracranial hemorrhage, extra-axial collection,   or calvarial fracture. Edema is visualized in the right temporal   convexity. Unable to determine whether this is simply postoperative   change or tumor progression. Recommend brain MRI with and without   contrast if there is continued suspicion for tumor progression.

## 2019-02-14 NOTE — PROGRESS NOTE ADULT - ATTENDING COMMENTS
Chauncey Neal  Attending Physician   Division of Infectious Disease  Pager #[phone]  After 5pm/weekend or no response, call #[phone]

## 2019-02-14 NOTE — PROVIDER CONTACT NOTE (OTHER) - BACKGROUND
Presented to the ED following a seizure episode, found to have sepsis . {Patient has a past medical history of PE, BPH, HTN, DM and CVA.

## 2019-02-14 NOTE — DISEASE MANAGEMENT
[Clinical] : TNM Stage: c [N/A] : Currently not applicable [FreeTextEntry4] : Glioblastoma [TTNM] : - [NTNM] : - [MTNM] : -

## 2019-02-14 NOTE — PROGRESS NOTE ADULT - ASSESSMENT
74M HTN, DM2, CVA w/ residual L weakness, GBM s/p Rt craniotomy 2018, BPH presents with SZ likely from recurrence of GBM complicated by sepsis POA from viral URI, bacteremia - suspect contaminant.

## 2019-02-14 NOTE — PROGRESS NOTE ADULT - SUBJECTIVE AND OBJECTIVE BOX
CC: F/U for seizure    SUBJECTIVE / OVERNIGHT EVENTS:  Patient offers no new complaints.  No F/C, N/V, CP, SOB, Cough, lightheadedness, dizziness, abdominal pain, diarrhea, dysuria.    MEDICATIONS  (STANDING):  dextrose 5%. 1000 milliLiter(s) (50 mL/Hr) IV Continuous <Continuous>  dextrose 50% Injectable 12.5 Gram(s) IV Push once  dextrose 50% Injectable 25 Gram(s) IV Push once  dextrose 50% Injectable 25 Gram(s) IV Push once  influenza   Vaccine 0.5 milliLiter(s) IntraMuscular once  insulin lispro (HumaLOG) corrective regimen sliding scale   SubCutaneous three times a day before meals  insulin lispro (HumaLOG) corrective regimen sliding scale   SubCutaneous at bedtime  levETIRAcetam  IVPB 1000 milliGRAM(s) IV Intermittent every 12 hours  piperacillin/tazobactam IVPB. 3.375 Gram(s) IV Intermittent every 8 hours  sodium chloride 0.9%. 1000 milliLiter(s) (75 mL/Hr) IV Continuous <Continuous>    MEDICATIONS  (PRN):  acetaminophen  Suppository .. 650 milliGRAM(s) Rectal every 6 hours PRN Temp greater or equal to 38C (100.4F)  dextrose 40% Gel 15 Gram(s) Oral once PRN Blood Glucose LESS THAN 70 milliGRAM(s)/deciliter  glucagon  Injectable 1 milliGRAM(s) IntraMuscular once PRN Glucose LESS THAN 70 milligrams/deciliter      Vital Signs Last 24 Hrs  T(C): 36.8 (14 Feb 2019 08:50), Max: 37.3 (14 Feb 2019 02:04)  T(F): 98.3 (14 Feb 2019 08:50), Max: 99.2 (14 Feb 2019 02:04)  HR: 78 (14 Feb 2019 08:50) (62 - 78)  BP: 129/99 (14 Feb 2019 08:50) (126/89 - 144/99)  BP(mean): --  RR: 16 (14 Feb 2019 08:50) (16 - 18)  SpO2: 99% (14 Feb 2019 08:50) (97% - 100%)  CAPILLARY BLOOD GLUCOSE      POCT Blood Glucose.: 118 mg/dL (14 Feb 2019 09:03)  POCT Blood Glucose.: 200 mg/dL (13 Feb 2019 22:07)  POCT Blood Glucose.: 169 mg/dL (13 Feb 2019 17:37)    I&O's Summary    13 Feb 2019 07:01  -  14 Feb 2019 07:00  --------------------------------------------------------  IN: 240 mL / OUT: 1500 mL / NET: -1260 mL    14 Feb 2019 07:01  -  14 Feb 2019 12:43  --------------------------------------------------------  IN: 600 mL / OUT: 0 mL / NET: 600 mL        PHYSICAL EXAM:  GENERAL: NAD  HEAD:  Atraumatic, Normocephalic  EYES: EOMI, PERRLA, conjunctiva and sclera clear  NECK: Supple, No JVD  CHEST/LUNG: Clear to auscultation bilaterally; No wheeze  HEART: Regular rate and rhythm; No murmurs, rubs, or gallops  ABDOMEN: Soft, Nontender, Nondistended; Bowel sounds present  EXTREMITIES:  2+ Peripheral Pulses, No clubbing, cyanosis, or edema  PSYCH: Calm  NEUROLOGY: A/Ox3, LUE weakness  SKIN: No rashes or lesions    LABS:                        13.1   17.55 )-----------( 327      ( 14 Feb 2019 06:30 )             40.5     02-14    138  |  99  |  13  ----------------------------<  116<H>  3.7   |  27  |  0.67    Ca    9.1      14 Feb 2019 06:30  Phos  3.1     02-14  Mg     1.9     02-14    TPro  7.9  /  Alb  4.1  /  TBili  < 0.2<L>  /  DBili  x   /  AST  22  /  ALT  29  /  AlkPhos  78  02-12    PT/INR - ( 13 Feb 2019 05:45 )   PT: 12.7 SEC;   INR: 1.14          PTT - ( 13 Feb 2019 05:45 )  PTT:24.2 SEC  CARDIAC MARKERS ( 13 Feb 2019 05:45 )  x     / x     / 20 u/L / x     / x      CARDIAC MARKERS ( 12 Feb 2019 21:00 )  x     / x     / 30 u/L / x     / x          Culture - Blood (collected 12 Feb 2019 21:29)  Source: BLOOD VENOUS  Preliminary Report (13 Feb 2019 21:27):    NO ORGANISMS ISOLATED    NO ORGANISMS ISOLATED AT 24 HOURS    Culture - Blood (collected 12 Feb 2019 21:29)  Source: BLOOD PERIPHERAL  Preliminary Report (14 Feb 2019 09:51):    Insufficient growth, culture re-incubated.  Organism: BLOOD CULTURE PCR (13 Feb 2019 22:50)  Organism: BLOOD CULTURE PCR  ***Blood Panel PCR results on this specimen are available  approximately 3 hours after the Gram stain result***  Gram stain, PCR, and/or culture results may not always  correspond due to difference in methodologies  ------------------------------------------------------------  This PCR assay was performed using [employer].  The  following targets are tested for:  Enterococcus, vancomycin  resistant enterococci, Listeria monocytogenes,  coagulase  negative staphylococci, S. aureus, methicillin resistant S.  aureus, Streptococcus agalactiae (Group B), S. pneumoniae,  S. pyogenes (Group A), Acinetobacter baumannii, Enterobacter  cloacae, E. coli, Klebsiella oxytoca, K. pneumoniae, Proteus  sp., Serratia marcescens, Haemophilus influenzae, Neisseria  meningitidis, Pseudomonas aeruginosa, Candida albicans, C.  glabrata, C. krusei, C. parapsilosis, C. tropicalis and the  KPC resistance gene.  **NOTE: Due to technical problems, Proteus sp. will NOT be  reported as part of the BCID panel until further notice. (13 Feb 2019 22:50)          RADIOLOGY & ADDITIONAL TESTS:    Imaging Personally Reviewed:    Care Discussed with Consultants/Other Providers:

## 2019-02-14 NOTE — PHYSICAL EXAM
[Normal] : oriented to person, place and time, the affect was normal, the mood was normal and not anxious [de-identified] : Neurologic examination interrupted arguments between family members.  Strength appears is 5/5, with asymmetry on the left weaker than right. Unable to examine gait. [de-identified] : M [FreeTextEntry1] : D

## 2019-02-14 NOTE — PROGRESS NOTE ADULT - PROBLEM SELECTOR PLAN 2
complicated by seizure d/o.  Continue keppra IV.  Pending neurosurgical procedure - delayed due to concern for bacteremia.  Neurosurgery input appreciated.

## 2019-02-15 DIAGNOSIS — C71.9 MALIGNANT NEOPLASM OF BRAIN, UNSPECIFIED: ICD-10-CM

## 2019-02-15 LAB
-  COAGULASE NEGATIVE STAPHYLOCOCCUS: SIGNIFICANT CHANGE UP
ALBUMIN SERPL ELPH-MCNC: 2.9 G/DL — LOW (ref 3.3–5)
ALP SERPL-CCNC: 65 U/L — SIGNIFICANT CHANGE UP (ref 40–120)
ALT FLD-CCNC: 54 U/L — HIGH (ref 4–41)
ANION GAP SERPL CALC-SCNC: 14 MMO/L — SIGNIFICANT CHANGE UP (ref 7–14)
ANION GAP SERPL CALC-SCNC: 25 MMO/L — HIGH (ref 7–14)
APTT BLD: 23.7 SEC — LOW (ref 27.5–36.3)
AST SERPL-CCNC: 22 U/L — SIGNIFICANT CHANGE UP (ref 4–40)
BACTERIA BLD CULT: SIGNIFICANT CHANGE UP
BASE EXCESS BLDA CALC-SCNC: -7.5 MMOL/L — SIGNIFICANT CHANGE UP
BASOPHILS # BLD AUTO: 0.08 K/UL — SIGNIFICANT CHANGE UP (ref 0–0.2)
BASOPHILS # BLD AUTO: 0.11 K/UL — SIGNIFICANT CHANGE UP (ref 0–0.2)
BASOPHILS NFR BLD AUTO: 0.4 % — SIGNIFICANT CHANGE UP (ref 0–2)
BASOPHILS NFR BLD AUTO: 0.5 % — SIGNIFICANT CHANGE UP (ref 0–2)
BILIRUB SERPL-MCNC: 0.3 MG/DL — SIGNIFICANT CHANGE UP (ref 0.2–1.2)
BLD GP AB SCN SERPL QL: NEGATIVE — SIGNIFICANT CHANGE UP
BUN SERPL-MCNC: 13 MG/DL — SIGNIFICANT CHANGE UP (ref 7–23)
BUN SERPL-MCNC: 13 MG/DL — SIGNIFICANT CHANGE UP (ref 7–23)
CA-I BLDA-SCNC: 1.13 MMOL/L — LOW (ref 1.15–1.29)
CALCIUM SERPL-MCNC: 8.7 MG/DL — SIGNIFICANT CHANGE UP (ref 8.4–10.5)
CALCIUM SERPL-MCNC: 9 MG/DL — SIGNIFICANT CHANGE UP (ref 8.4–10.5)
CHLORIDE SERPL-SCNC: 96 MMOL/L — LOW (ref 98–107)
CHLORIDE SERPL-SCNC: 99 MMOL/L — SIGNIFICANT CHANGE UP (ref 98–107)
CO2 SERPL-SCNC: 16 MMOL/L — LOW (ref 22–31)
CO2 SERPL-SCNC: 25 MMOL/L — SIGNIFICANT CHANGE UP (ref 22–31)
CREAT SERPL-MCNC: 0.72 MG/DL — SIGNIFICANT CHANGE UP (ref 0.5–1.3)
CREAT SERPL-MCNC: 0.83 MG/DL — SIGNIFICANT CHANGE UP (ref 0.5–1.3)
EOSINOPHIL # BLD AUTO: 0.05 K/UL — SIGNIFICANT CHANGE UP (ref 0–0.5)
EOSINOPHIL # BLD AUTO: 0.05 K/UL — SIGNIFICANT CHANGE UP (ref 0–0.5)
EOSINOPHIL NFR BLD AUTO: 0.2 % — SIGNIFICANT CHANGE UP (ref 0–6)
EOSINOPHIL NFR BLD AUTO: 0.3 % — SIGNIFICANT CHANGE UP (ref 0–6)
GLUCOSE BLDA-MCNC: 190 MG/DL — HIGH (ref 70–99)
GLUCOSE SERPL-MCNC: 162 MG/DL — HIGH (ref 70–99)
GLUCOSE SERPL-MCNC: 192 MG/DL — HIGH (ref 70–99)
HCO3 BLDA-SCNC: 19 MMOL/L — LOW (ref 22–26)
HCT VFR BLD CALC: 37.5 % — LOW (ref 39–50)
HCT VFR BLD CALC: 38.4 % — LOW (ref 39–50)
HCT VFR BLDA CALC: 39.1 % — SIGNIFICANT CHANGE UP (ref 39–51)
HGB BLD-MCNC: 12.2 G/DL — LOW (ref 13–17)
HGB BLD-MCNC: 12.7 G/DL — LOW (ref 13–17)
HGB BLDA-MCNC: 12.7 G/DL — LOW (ref 13–17)
IMM GRANULOCYTES NFR BLD AUTO: 2.7 % — HIGH (ref 0–1.5)
IMM GRANULOCYTES NFR BLD AUTO: 2.7 % — HIGH (ref 0–1.5)
INR BLD: 1.22 — HIGH (ref 0.88–1.17)
LACTATE BLDA-SCNC: 13.6 MMOL/L — CRITICAL HIGH (ref 0.5–2)
LYMPHOCYTES # BLD AUTO: 1.75 K/UL — SIGNIFICANT CHANGE UP (ref 1–3.3)
LYMPHOCYTES # BLD AUTO: 10.7 % — LOW (ref 13–44)
LYMPHOCYTES # BLD AUTO: 29.2 % — SIGNIFICANT CHANGE UP (ref 13–44)
LYMPHOCYTES # BLD AUTO: 7.42 K/UL — HIGH (ref 1–3.3)
MAGNESIUM SERPL-MCNC: 1.8 MG/DL — SIGNIFICANT CHANGE UP (ref 1.6–2.6)
MAGNESIUM SERPL-MCNC: 1.9 MG/DL — SIGNIFICANT CHANGE UP (ref 1.6–2.6)
MCHC RBC-ENTMCNC: 29.3 PG — SIGNIFICANT CHANGE UP (ref 27–34)
MCHC RBC-ENTMCNC: 30.1 PG — SIGNIFICANT CHANGE UP (ref 27–34)
MCHC RBC-ENTMCNC: 32.5 % — SIGNIFICANT CHANGE UP (ref 32–36)
MCHC RBC-ENTMCNC: 33.1 % — SIGNIFICANT CHANGE UP (ref 32–36)
MCV RBC AUTO: 89.9 FL — SIGNIFICANT CHANGE UP (ref 80–100)
MCV RBC AUTO: 91 FL — SIGNIFICANT CHANGE UP (ref 80–100)
MONOCYTES # BLD AUTO: 1.41 K/UL — HIGH (ref 0–0.9)
MONOCYTES # BLD AUTO: 1.87 K/UL — HIGH (ref 0–0.9)
MONOCYTES NFR BLD AUTO: 7.4 % — SIGNIFICANT CHANGE UP (ref 2–14)
MONOCYTES NFR BLD AUTO: 8.6 % — SIGNIFICANT CHANGE UP (ref 2–14)
NEUTROPHILS # BLD AUTO: 12.69 K/UL — HIGH (ref 1.8–7.4)
NEUTROPHILS # BLD AUTO: 15.28 K/UL — HIGH (ref 1.8–7.4)
NEUTROPHILS NFR BLD AUTO: 60.1 % — SIGNIFICANT CHANGE UP (ref 43–77)
NEUTROPHILS NFR BLD AUTO: 77.2 % — HIGH (ref 43–77)
NRBC # FLD: 0 K/UL — LOW (ref 25–125)
NRBC # FLD: 0.02 K/UL — LOW (ref 25–125)
ORGANISM # SPEC MICROSCOPIC CNT: SIGNIFICANT CHANGE UP
ORGANISM # SPEC MICROSCOPIC CNT: SIGNIFICANT CHANGE UP
PCO2 BLDA: 31 MMHG — LOW (ref 35–48)
PH BLDA: 7.36 PH — SIGNIFICANT CHANGE UP (ref 7.35–7.45)
PHOSPHATE SERPL-MCNC: 2.7 MG/DL — SIGNIFICANT CHANGE UP (ref 2.5–4.5)
PHOSPHATE SERPL-MCNC: 3.9 MG/DL — SIGNIFICANT CHANGE UP (ref 2.5–4.5)
PLATELET # BLD AUTO: 342 K/UL — SIGNIFICANT CHANGE UP (ref 150–400)
PLATELET # BLD AUTO: 359 K/UL — SIGNIFICANT CHANGE UP (ref 150–400)
PMV BLD: 10.1 FL — SIGNIFICANT CHANGE UP (ref 7–13)
PMV BLD: 9.4 FL — SIGNIFICANT CHANGE UP (ref 7–13)
PO2 BLDA: 117 MMHG — HIGH (ref 83–108)
POTASSIUM BLDA-SCNC: 3.1 MMOL/L — LOW (ref 3.4–4.5)
POTASSIUM SERPL-MCNC: 3.6 MMOL/L — SIGNIFICANT CHANGE UP (ref 3.5–5.3)
POTASSIUM SERPL-MCNC: 3.7 MMOL/L — SIGNIFICANT CHANGE UP (ref 3.5–5.3)
POTASSIUM SERPL-SCNC: 3.6 MMOL/L — SIGNIFICANT CHANGE UP (ref 3.5–5.3)
POTASSIUM SERPL-SCNC: 3.7 MMOL/L — SIGNIFICANT CHANGE UP (ref 3.5–5.3)
PROT SERPL-MCNC: 6.2 G/DL — SIGNIFICANT CHANGE UP (ref 6–8.3)
PROTHROM AB SERPL-ACNC: 13.6 SEC — HIGH (ref 9.8–13.1)
RBC # BLD: 4.17 M/UL — LOW (ref 4.2–5.8)
RBC # BLD: 4.22 M/UL — SIGNIFICANT CHANGE UP (ref 4.2–5.8)
RBC # FLD: 15.3 % — HIGH (ref 10.3–14.5)
RBC # FLD: 15.4 % — HIGH (ref 10.3–14.5)
RH IG SCN BLD-IMP: POSITIVE — SIGNIFICANT CHANGE UP
SAO2 % BLDA: 97.9 % — SIGNIFICANT CHANGE UP (ref 95–99)
SODIUM BLDA-SCNC: 131 MMOL/L — LOW (ref 136–146)
SODIUM SERPL-SCNC: 137 MMOL/L — SIGNIFICANT CHANGE UP (ref 135–145)
SODIUM SERPL-SCNC: 138 MMOL/L — SIGNIFICANT CHANGE UP (ref 135–145)
SPECIMEN SOURCE: SIGNIFICANT CHANGE UP
SPECIMEN SOURCE: SIGNIFICANT CHANGE UP
WBC # BLD: 16.42 K/UL — HIGH (ref 3.8–10.5)
WBC # BLD: 25.41 K/UL — HIGH (ref 3.8–10.5)
WBC # FLD AUTO: 16.42 K/UL — HIGH (ref 3.8–10.5)
WBC # FLD AUTO: 25.41 K/UL — HIGH (ref 3.8–10.5)

## 2019-02-15 PROCEDURE — 93010 ELECTROCARDIOGRAM REPORT: CPT | Mod: 76

## 2019-02-15 PROCEDURE — 99024 POSTOP FOLLOW-UP VISIT: CPT

## 2019-02-15 PROCEDURE — 99233 SBSQ HOSP IP/OBS HIGH 50: CPT

## 2019-02-15 PROCEDURE — 99232 SBSQ HOSP IP/OBS MODERATE 35: CPT

## 2019-02-15 PROCEDURE — 70552 MRI BRAIN STEM W/DYE: CPT | Mod: 26

## 2019-02-15 PROCEDURE — 70450 CT HEAD/BRAIN W/O DYE: CPT | Mod: 26

## 2019-02-15 PROCEDURE — 95819 EEG AWAKE AND ASLEEP: CPT | Mod: 26

## 2019-02-15 RX ORDER — MAGNESIUM SULFATE 500 MG/ML
1 VIAL (ML) INJECTION ONCE
Qty: 0 | Refills: 0 | Status: COMPLETED | OUTPATIENT
Start: 2019-02-15 | End: 2019-02-15

## 2019-02-15 RX ORDER — DEXAMETHASONE 0.5 MG/5ML
4 ELIXIR ORAL EVERY 6 HOURS
Qty: 0 | Refills: 0 | Status: DISCONTINUED | OUTPATIENT
Start: 2019-02-15 | End: 2019-02-15

## 2019-02-15 RX ORDER — POTASSIUM CHLORIDE 20 MEQ
40 PACKET (EA) ORAL ONCE
Qty: 0 | Refills: 0 | Status: COMPLETED | OUTPATIENT
Start: 2019-02-15 | End: 2019-02-15

## 2019-02-15 RX ADMIN — Medication 4 MILLIGRAM(S): at 06:12

## 2019-02-15 RX ADMIN — PIPERACILLIN AND TAZOBACTAM 25 GRAM(S): 4; .5 INJECTION, POWDER, LYOPHILIZED, FOR SOLUTION INTRAVENOUS at 06:12

## 2019-02-15 NOTE — PROGRESS NOTE ADULT - ATTENDING COMMENTS
Chauncey Neal  Attending Physician   Division of Infectious Disease  Pager #[phone]  After 5pm/weekend or no response, call #[phone]    Will sign off, recall ID if needed #[phone].

## 2019-02-15 NOTE — PROGRESS NOTE ADULT - ASSESSMENT
74 year old h/o Glioblastoma Multiforme Dx Nov 2018 after Craniotomy for resection - patient did not pursue adjunctive treatment (Chemo/RT) and Tumor recurred as expected s/p seizures. CTH Stable- no midline shift

## 2019-02-15 NOTE — PROGRESS NOTE ADULT - SUBJECTIVE AND OBJECTIVE BOX
CC: F/U for seizure    SUBJECTIVE / OVERNIGHT EVENTS:  Events from previous 24 hours.  Patient is post-ictal and lethargic.  Unable to interact with history taking.  No F/C, vomiting, SOB, Cough, diarrhea, dysuria.    MEDICATIONS  (STANDING):  dexamethasone  Injectable 6 milliGRAM(s) IV Push every 6 hours  dextrose 5%. 1000 milliLiter(s) (50 mL/Hr) IV Continuous <Continuous>  dextrose 50% Injectable 12.5 Gram(s) IV Push once  dextrose 50% Injectable 25 Gram(s) IV Push once  dextrose 50% Injectable 25 Gram(s) IV Push once  influenza   Vaccine 0.5 milliLiter(s) IntraMuscular once  insulin lispro (HumaLOG) corrective regimen sliding scale   SubCutaneous three times a day before meals  insulin lispro (HumaLOG) corrective regimen sliding scale   SubCutaneous at bedtime  levETIRAcetam  IVPB 1250 milliGRAM(s) IV Intermittent every 12 hours  sodium chloride 0.9%. 1000 milliLiter(s) (75 mL/Hr) IV Continuous <Continuous>    MEDICATIONS  (PRN):  acetaminophen  Suppository .. 650 milliGRAM(s) Rectal every 6 hours PRN Temp greater or equal to 38C (100.4F)  dextrose 40% Gel 15 Gram(s) Oral once PRN Blood Glucose LESS THAN 70 milliGRAM(s)/deciliter  glucagon  Injectable 1 milliGRAM(s) IntraMuscular once PRN Glucose LESS THAN 70 milligrams/deciliter  LORazepam   Injectable 1 milliGRAM(s) IV Push every 6 hours PRN seizure      Vital Signs Last 24 Hrs  T(C): 37.1 (15 Feb 2019 07:05), Max: 37.5 (14 Feb 2019 19:52)  T(F): 98.8 (15 Feb 2019 07:05), Max: 99.5 (14 Feb 2019 19:52)  HR: 100 (15 Feb 2019 07:05) (80 - 100)  BP: 149/110 (15 Feb 2019 07:05) (97/73 - 149/110)  BP(mean): --  RR: 22 (15 Feb 2019 07:05) (16 - 22)  SpO2: 100% (15 Feb 2019 07:05) (96% - 100%)  CAPILLARY BLOOD GLUCOSE      POCT Blood Glucose.: 148 mg/dL (15 Feb 2019 08:48)  POCT Blood Glucose.: 164 mg/dL (15 Feb 2019 07:07)  POCT Blood Glucose.: 174 mg/dL (15 Feb 2019 03:49)  POCT Blood Glucose.: 184 mg/dL (14 Feb 2019 22:55)  POCT Blood Glucose.: 177 mg/dL (14 Feb 2019 22:34)  POCT Blood Glucose.: 158 mg/dL (14 Feb 2019 18:21)  POCT Blood Glucose.: 130 mg/dL (14 Feb 2019 12:55)    I&O's Summary    14 Feb 2019 07:01  -  15 Feb 2019 07:00  --------------------------------------------------------  IN: 600 mL / OUT: 0 mL / NET: 600 mL        PHYSICAL EXAM:  GENERAL: NAD  HEAD:  Atraumatic, Normocephalic  EYES: EOMI, PERRLA, conjunctiva and sclera clear  NECK: Supple, No JVD  CHEST/LUNG: Clear to auscultation bilaterally; No wheeze  HEART: Regular rate and rhythm; No murmurs, rubs, or gallops  ABDOMEN: Soft, Nontender, Nondistended; Bowel sounds present  EXTREMITIES:  2+ Peripheral Pulses, No clubbing, cyanosis, or edema  PSYCH: Calm  NEUROLOGY: Obtunded, LUE weakness as baseline.  SKIN: No rashes or lesions    LABS:                        12.2   16.42 )-----------( 342      ( 15 Feb 2019 05:30 )             37.5     02-15    138  |  99  |  13  ----------------------------<  162<H>  3.6   |  25  |  0.72    Ca    8.7      15 Feb 2019 05:30  Phos  2.7     02-15  Mg     1.8     02-15    TPro  6.2  /  Alb  2.9<L>  /  TBili  0.3  /  DBili  x   /  AST  22  /  ALT  54<H>  /  AlkPhos  65  02-15    PT/INR - ( 15 Feb 2019 03:55 )   PT: 13.6 SEC;   INR: 1.22          PTT - ( 15 Feb 2019 03:55 )  PTT:23.7 SEC          RADIOLOGY & ADDITIONAL TESTS:    Imaging Personally Reviewed:    Care Discussed with Consultants/Other Providers:

## 2019-02-15 NOTE — PROVIDER CONTACT NOTE (OTHER) - BACKGROUND
Patient presented with seizure episode  due to brain malignancy vs infection and found to have sepsis due to coronavirus. Patient has a history of CVA (residual L side weakness),

## 2019-02-15 NOTE — CHART NOTE - NSCHARTNOTEFT_GEN_A_CORE
MAR RRT NOTE MAR RRT NOTE    RRT called for seizure and unresponsiveness.  In summary, 74 year old male with PMH R GBM s/p prior resection, CVA (2013 w/ left side weakness),  BPH brought in by EMS from nursing home for active seizure likely 2/2 recurrent GBM. MAR RRT NOTE    RRT called for seizure and unresponsiveness.  In summary, 74 year old male with PMH R GBM s/p prior resection, CVA (2013 w/ left side weakness), BPH presented w/ seizures likely 2/2 recurrent GBM, pending transfer to [facility] for [place].  Per RN patient w/ witnessed seizure activity followed by unresponsiveness so called RRT.  At time of arrival patient lethargic but arousable, AAOx2, moving all extremities, intermittently follows commands.  /87 98.4 90 100% 2L . CBC, CMP, coags, ABG w/ lactate drawn.  Seizure activity broke and did not require ativan.  Patient w/ seizure again lasting < 1 minute.  Patient sent for urgent CTH.  Primary team to notify neurosurgery and neurology of events, neurosurgery to f/u on CTH.      Anne Soto, PGY-3  [id number] MAR RRT NOTE    RRT called for seizure and unresponsiveness.  In summary, 74 year old male with PMH R GBM s/p prior resection, CVA (2013 w/ left side weakness), BPH presented w/ seizures likely 2/2 recurrent GBM, pending transfer to [facility] for resection.  Per RN patient w/ witnessed seizure activity followed by unresponsiveness so called RRT.  At time of arrival patient lethargic but arousable, AAOx2, moving all extremities, intermittently follows commands.  /87 98.4 90 100% 2L . CBC, CMP, coags, ABG w/ lactate drawn.  Seizure activity broke and did not require ativan.  Patient w/ seizure again lasting < 1 minute.  Patient sent for urgent CTH.  Primary team to notify neurosurgery and neurology of events, and to f/u on CTH and labs.    D/w primary team     Anne Soto, PGY-3  [id number] MAR RRT NOTE    RRT called for seizure and unresponsiveness.  In summary, 74 year old male with PMH R GBM s/p prior resection, CVA (2013 w/ left side weakness), BPH presented w/ seizures likely 2/2 recurrent GBM, pending transfer to [facility] for resection.  Per RN patient w/ witnessed seizure activity followed by unresponsiveness so called RRT.  At time of arrival patient lethargic but arousable, AAOx2, moving all extremities, intermittently follows commands.  /87 98.4 axillary, 99.2 rectal, 90 100% 2L . CBC, CMP, coags, ABG w/ lactate drawn.  Seizure activity broke and did not require ativan.  Patient w/ 2nd seizure w/ facial twitching again lasting < 1 minute.  Patient sent for urgent CTH.  Primary team to notify neurosurgery and neurology of events, and to f/u on CTH and labs.    D/w primary team     Anne Soto, PGY-3  [id number] MAR RRT NOTE    RRT called for seizure and unresponsiveness.  In summary, 74 year old male with PMH R GBM s/p prior resection, CVA (2013 w/ left side weakness), BPH presented w/ seizures likely 2/2 recurrent GBM, pending transfer to [facility] for resection.  Per RN patient w/ witnessed seizure activity followed by unresponsiveness so called RRT.  At time of arrival patient lethargic but arousable, AAOx2, moving all extremities, intermittently follows commands.  /87 98.4 axillary, 99.2 rectal, 90 100% 2L . CBC, CMP, coags, ABG w/ lactate drawn.  Seizure activity broke and did not require ativan.  Patient w/ 2nd seizure w/ facial twitching again lasting < 1 minute.  Patient sent for urgent CTH.  Primary team to notify neurosurgery and neurology of events, and to f/u on CTH and labs.    RRT called again at 703AM for repeat seizure activity, broke with 1mg ativan.  Patient lethargic but arousable, new L facial droop noted.  CT scan done during last RRT reviewed with neurosurgery, unchanged and no intervention recommended.  Repeat seizure activity with facial twitching, resolved with additional 1 mg ativan.  Neurology called to RRT -     D/w primary team     Anne Soto, PGY-3  [id number] MAR RRT NOTE    RRT called for seizure and unresponsiveness.  In summary, 74 year old male with PMH R GBM s/p prior resection, CVA (2013 w/ left side weakness), BPH presented w/ seizures likely 2/2 recurrent GBM, pending transfer to [facility] for resection.  Per RN patient w/ witnessed seizure activity followed by unresponsiveness so called RRT.  At time of arrival patient lethargic but arousable, AAOx2, moving all extremities, intermittently follows commands.  /87 98.4 axillary, 99.2 rectal, 90 100% 2L . CBC, CMP, coags, ABG w/ lactate drawn.  Seizure activity broke and did not require ativan.  Patient w/ 2nd seizure w/ facial twitching again lasting < 1 minute.  Patient sent for urgent CTH.  Primary team to notify neurosurgery and neurology of events, and to f/u on CTH and labs.    RRT called again at 703AM for repeat seizure activity, broke with 1mg ativan.  Patient lethargic but arousable, new L facial droop noted.  CT scan done during last RRT reviewed with neurosurgery, unchanged and no intervention recommended.  Repeat seizure activity with facial twitching, resolved with additional 1 mg ativan.  Neurology called to RRT    D/w primary team     Anne Soto, PGY-3  [id number] MAR RRT NOTE    RRT called for seizure and unresponsiveness.  In summary, 74 year old male with PMH R GBM s/p prior resection, CVA (2013 w/ left side weakness), BPH presented w/ seizures likely 2/2 recurrent GBM, pending transfer to [facility] for resection.  Per RN patient w/ witnessed seizure activity followed by unresponsiveness so called RRT.  At time of arrival patient lethargic but arousable, AAOx2, moving all extremities, intermittently follows commands.  /87 98.4 axillary, 99.2 rectal, 90 100% 2L . CBC, CMP, coags, ABG w/ lactate drawn.  Seizure activity broke and did not require ativan.  Patient w/ 2nd seizure w/ facial twitching again lasting < 1 minute.  Patient sent for urgent CTH.  Primary team to notify neurosurgery and neurology of events, and to f/u on CTH and labs.    RRT called again at 703AM for repeat seizure activity, broke with 1mg ativan.  Patient lethargic but arousable, new L facial droop noted.  Repeat /108 98%2L 98.5 109  CT scan done during last RRT reviewed with neurosurgery, unchanged and no intervention recommended.  Repeat seizure activity with facial twitching, resolved with additional 1 mg ativan.  Neurology called to RRT to evaluate given new facial droop - at time of their exam no droop noted as patient lethargic and not participating in neuro exam.  Patient protecting airway, no need for intubation, no sign of status epilepticus.  24-hour EEG ordered, primary team to f/u.  D/w primary team and neuro at bedside.       D/w primary team     Anne Soto, PGY-3  [id number]

## 2019-02-15 NOTE — PROGRESS NOTE ADULT - PROBLEM SELECTOR PLAN 2
Asymptomatic.  High suspicion for contaminant. Repeat BCx from 2/14 - results pending.  Monitor off abx.  ID consult appreciated - no contraindication to proceed to neurosurgical intervention.

## 2019-02-15 NOTE — PROGRESS NOTE ADULT - PROBLEM SELECTOR PLAN 6
-coag neg staph is likely a contaminant -coag neg staph is likely a contaminant  -f/u repeat bcx  -no need for abx  -no obvious ID contraindication to neurosurgical procedure if needed

## 2019-02-15 NOTE — PROVIDER CONTACT NOTE (CRITICAL VALUE NOTIFICATION) - ASSESSMENT
Patient received AXOX4. Patient found seizing, PATs up to 169, AXOX0. BP  147/89, HR 90, Temp 98.4, RR 22, 100% on 2L NC.

## 2019-02-15 NOTE — PROGRESS NOTE ADULT - PROBLEM SELECTOR PLAN 1
complicated by seizure d/o.  Continue keppra IV.  Pending neurosurgical procedure - potentially on 2/16 @ [facility] vs early next week @ [facility].  Patient had good functional status prior to admission with no documented TAY, LOC, CHF symptoms.  Medically optimized to proceed to neurosurgical intervention.

## 2019-02-15 NOTE — PROVIDER CONTACT NOTE (CRITICAL VALUE NOTIFICATION) - BACKGROUND
Patient presented with seizure episode  due to brain malignancy vs infection and found to have sepsis due to coronavirus. Patient has a history of CVA (residual L side weakness), Right glioma (s/p resection), BPH, DM, PE and HTN

## 2019-02-15 NOTE — PROGRESS NOTE ADULT - RS GEN PE MLT RESP DETAILS PC
clear to auscultation bilaterally/good air movement/respirations non-labored
respirations non-labored/clear to auscultation bilaterally

## 2019-02-15 NOTE — PROGRESS NOTE ADULT - PROBLEM SELECTOR PLAN 1
1. CT head stable. Please restart steroids at 6mg q 6 hours, then increase Keppra for now. Consider Neurology eval for AED managment  2. Surgical rescheduled for 2/27 with Dr. Gonzalez  as he is out of town next week  3. Will continue to follow along with you- Dr. Gonzalez working on booking OR for tomorrow- if Unable- will consult Dr. Aly Sibley for OR availability next week at [facility].   4. Seizure precautions  5. Neuro checks q 4 hours

## 2019-02-15 NOTE — PROGRESS NOTE ADULT - SUBJECTIVE AND OBJECTIVE BOX
OVERNIGHT EVENTS: s/p 3 seizure overnight. Surgery originally scheduled for today however OR cancelled due to Coag Neg Staph Blood cultures not confirmed to be contaminent. Cleared from ID perspective for surgery       Vital Signs Last 24 Hrs  T(C): 37.1 (15 Feb 2019 07:05), Max: 37.5 (14 Feb 2019 19:52)  T(F): 98.8 (15 Feb 2019 07:05), Max: 99.5 (14 Feb 2019 19:52)  HR: 100 (15 Feb 2019 07:05) (80 - 100)  BP: 149/110 (15 Feb 2019 07:05) (97/73 - 149/110)  BP(mean): --  RR: 22 (15 Feb 2019 07:05) (16 - 22)  SpO2: 100% (15 Feb 2019 07:05) (96% - 100%)      PHYSICAL EXAM:  Lethargic (post ictal) Patient was evaluated at 7:45  Moans   OE to voice  ERWIN spontaeously  Incision/Wound: c/d/i      LABS:                        12.2   16.42 )-----------( 342      ( 15 Feb 2019 05:30 )             37.5     02-15    138  |  99  |  13  ----------------------------<  162<H>  3.6   |  25  |  0.72    Ca    8.7      15 Feb 2019 05:30  Phos  2.7     02-15  Mg     1.8     02-15    TPro  6.2  /  Alb  2.9<L>  /  TBili  0.3  /  DBili  x   /  AST  22  /  ALT  54<H>  /  AlkPhos  65  02-15    PT/INR - ( 15 Feb 2019 03:55 )   PT: 13.6 SEC;   INR: 1.22         Neuro:  acetaminophen  Suppository .. 650 milliGRAM(s) Rectal every 6 hours PRN  levETIRAcetam  IVPB 1250 milliGRAM(s) IV Intermittent every 12 hours  LORazepam   Injectable 1 milliGRAM(s) IV Push every 6 hours PRN    Anticoagulation    OTHER:  dexamethasone  Injectable 6 milliGRAM(s) IV Push every 6 hours  dextrose 40% Gel 15 Gram(s) Oral once PRN  dextrose 50% Injectable 12.5 Gram(s) IV Push once  dextrose 50% Injectable 25 Gram(s) IV Push once  dextrose 50% Injectable 25 Gram(s) IV Push once  glucagon  Injectable 1 milliGRAM(s) IntraMuscular once PRN  influenza   Vaccine 0.5 milliLiter(s) IntraMuscular once  insulin lispro (HumaLOG) corrective regimen sliding scale   SubCutaneous three times a day before meals  insulin lispro (HumaLOG) corrective regimen sliding scale   SubCutaneous at bedtime    IVF:  dextrose 5%. 1000 milliLiter(s) IV Continuous <Continuous>  sodium chloride 0.9%. 1000 milliLiter(s) IV Continuous <Continuous>            RADIOLOGY & ADDITIONAL TESTS:  < from: CT Head No Cont (02.15.19 @ 04:27) >  There is an unchanged right MCA territory infarction with heterogeneous   hypodensity involving the right temporal and parietal lobes.    Chronic right thalamic infarction with third ventricular and subarachnoid   dilatation is unchanged. Chronic right PICA territory infarction along   the right paramedian inferior cerebellum is seen. There is no   intraparenchymal hematoma, mass effect or midline shift. No abnormal   extra-axial fluid collections are present. There is no evidence of acute   transcortical territorial infarction.    The calvariumis intact. The visualized intraorbital compartments,   paranasal sinuses and tympanomastoid cavities appear free of acute   disease.    IMPRESSION:  Stable exam.  No interval acute intracranial hemorrhage.      < end of copied text >

## 2019-02-15 NOTE — CHART NOTE - NSCHARTNOTEFT_GEN_A_CORE
Called by nurse for 42 beats of AIVR on tele  Pt is asymptomatic   VSS  12 Lead EKG:  Mg  & K repleted Called by nurse for 42 beats of AIVR on tele  Pt is asymptomatic   Vital Signs Last 24 Hrs  T(C): 36.7 (15 Feb 2019 22:25), Max: 37.3 (15 Feb 2019 05:23)  T(F): 98 (15 Feb 2019 22:25), Max: 99.2 (15 Feb 2019 05:23)  HR: 89 (15 Feb 2019 22:25) (81 - 100)  BP: 111/68 (15 Feb 2019 22:25) (97/73 - 149/110)  RR: 18 (15 Feb 2019 22:25) (16 - 22)  SpO2: 98% (15 Feb 2019 22:25) (97% - 100%)    12 Lead EKG: NSR, no acute changes  Will Keep Mg > 2 & K > 4, Mg  & K repleted  Case discussed with Dr. Irwin, agreed with above  Will continue to monitor.

## 2019-02-15 NOTE — PROGRESS NOTE ADULT - ATTENDING COMMENTS
Patient examined, case discussed on rounds.  Multiple seizures overnight may be related to Tx with piperacillin which can decrease seizure threshold. Suggest discuss change in Abx with ID. Pt is post-ictal and S/P Tx with ativan.  Agree with above assessment and plan.

## 2019-02-15 NOTE — PROGRESS NOTE ADULT - SUBJECTIVE AND OBJECTIVE BOX
HELGA WHITE 74y [id number]    Patient is a 74y old  Male who presents with a chief complaint of Seizure (14 Feb 2019 13:07)      Follow Up/CC:  ID following for seizure    Interval History/ROS: RRT o/n, no fever, given ativan so lethargic this AM    Allergies    No Known Allergies    Intolerances        ANTIMICROBIALS:  piperacillin/tazobactam IVPB. 3.375 every 8 hours      MEDICATIONS  (STANDING):  dexamethasone     Tablet 4 milliGRAM(s) Oral every 6 hours  dextrose 5%. 1000 milliLiter(s) (50 mL/Hr) IV Continuous <Continuous>  dextrose 50% Injectable 12.5 Gram(s) IV Push once  dextrose 50% Injectable 25 Gram(s) IV Push once  dextrose 50% Injectable 25 Gram(s) IV Push once  influenza   Vaccine 0.5 milliLiter(s) IntraMuscular once  insulin lispro (HumaLOG) corrective regimen sliding scale   SubCutaneous three times a day before meals  insulin lispro (HumaLOG) corrective regimen sliding scale   SubCutaneous at bedtime  levETIRAcetam  IVPB 1250 milliGRAM(s) IV Intermittent every 12 hours  piperacillin/tazobactam IVPB. 3.375 Gram(s) IV Intermittent every 8 hours  sodium chloride 0.9%. 1000 milliLiter(s) (75 mL/Hr) IV Continuous <Continuous>    MEDICATIONS  (PRN):  acetaminophen  Suppository .. 650 milliGRAM(s) Rectal every 6 hours PRN Temp greater or equal to 38C (100.4F)  dextrose 40% Gel 15 Gram(s) Oral once PRN Blood Glucose LESS THAN 70 milliGRAM(s)/deciliter  glucagon  Injectable 1 milliGRAM(s) IntraMuscular once PRN Glucose LESS THAN 70 milligrams/deciliter  LORazepam   Injectable 1 milliGRAM(s) IV Push every 6 hours PRN seizure        Vital Signs Last 24 Hrs  T(C): 37.3 (15 Feb 2019 05:23), Max: 37.5 (14 Feb 2019 19:52)  T(F): 99.2 (15 Feb 2019 05:23), Max: 99.5 (14 Feb 2019 19:52)  HR: 91 (15 Feb 2019 05:23) (78 - 95)  BP: 97/73 (15 Feb 2019 05:23) (97/73 - 147/89)  BP(mean): --  RR: 16 (15 Feb 2019 05:23) (16 - 22)  SpO2: 97% (15 Feb 2019 05:23) (96% - 100%)    CBC Full  -  ( 15 Feb 2019 05:30 )  WBC Count : 16.42 K/uL  Hemoglobin : 12.2 g/dL  Hematocrit : 37.5 %  Platelet Count - Automated : 342 K/uL  Mean Cell Volume : 89.9 fL  Mean Cell Hemoglobin : 29.3 pg  Mean Cell Hemoglobin Concentration : 32.5 %  Auto Neutrophil # : 12.69 K/uL  Auto Lymphocyte # : 1.75 K/uL  Auto Monocyte # : 1.41 K/uL  Auto Eosinophil # : 0.05 K/uL  Auto Basophil # : 0.08 K/uL  Auto Neutrophil % : 77.2 %  Auto Lymphocyte % : 10.7 %  Auto Monocyte % : 8.6 %  Auto Eosinophil % : 0.3 %  Auto Basophil % : 0.5 %    02-15    138  |  99  |  13  ----------------------------<  162<H>  3.6   |  25  |  0.72    Ca    8.7      15 Feb 2019 05:30  Phos  2.7     02-15  Mg     1.8     02-15    TPro  6.2  /  Alb  2.9<L>  /  TBili  0.3  /  DBili  x   /  AST  22  /  ALT  54<H>  /  AlkPhos  65  02-15    LIVER FUNCTIONS - ( 15 Feb 2019 03:55 )  Alb: 2.9 g/dL / Pro: 6.2 g/dL / ALK PHOS: 65 u/L / ALT: 54 u/L / AST: 22 u/L / GGT: x               MICROBIOLOGY:  Culture - Blood (02.12.19 @ 21:29)    Culture - Blood:   NO ORGANISMS ISOLATED  NO ORGANISMS ISOLATED AT 48 HRS.    Specimen Source: BLOOD VENOUS    Culture - Blood (02.12.19 @ 21:29)    Culture - Blood:   Insufficient growth, culture re-incubated.    -  Coagulase negative Staphylococcus: + DETECT SHAYY    Specimen Source: BLOOD PERIPHERAL    Organism: BLOOD CULTURE PCR  ***Blood Panel PCR results on this specimen are available  approximately 3 hours after the Gram stain result***  Gram stain, PCR, and/or culture results may not always  correspond due to difference in methodologies  ------------------------------------------------------------  This PCR assay was performed using [employer].  The  following targets are tested for:  Enterococcus, vancomycin  resistant enterococci, Listeria monocytogenes,  coagulase  negative staphylococci, S. aureus, methicillin resistant S.  aureus, Streptococcus agalactiae (Group B), S. pneumoniae,  S. pyogenes (Group A), Acinetobacter baumannii, Enterobacter  cloacae, E. coli, Klebsiella oxytoca, K. pneumoniae, Proteus  sp., Serratia marcescens, Haemophilus influenzae, Neisseria  meningitidis, Pseudomonas aeruginosa, Candida albicans, C.  glabrata, C. krusei, C. parapsilosis, C. tropicalis and the  KPC resistance gene.  **NOTE: Due to technical problems, Proteus sp. will NOT be  reported as part of the BCID panel until further notice.    Gram Stain Blood:   ***** CRITICAL RESULT *****  PERSON CALLED / READ-BACK:ALANNA QUEEN  DATE / TIME CALLED: 02/13/19 2248  CALLED BY: SOPHY MARSHALL  GPCCL^Gram Pos Cocci In Clusters  AFTER: 22 HOURS INCUBATION  BOTTLE: AEROBIC BOTTLE    Organism Identification: BLOOD CULTURE PCR    Method Type: PCR    Rapid Respiratory Viral Panel (02.12.19 @ 21:00)    Adenovirus (RapRVP): Not Detected    Influenza A (RapRVP): Not Detected    Influenza AH1 2009 (RapRVP): Not Detected    Influenza AH1 (RapRVP): Not Detected    Influenza AH3 (RapRVP): Not Detected    Influenza B (RapRVP): Not Detected    Parainfluenza 1 (RapRVP): Not Detected    Parainfluenza 2 (RapRVP): Not Detected    Parainfluenza 3 (RapRVP): Not Detected    Parainfluenza 4 (RapRVP): Not Detected    Resp Syncytial Virus (RapRVP): Not Detected    Chlamydia pneumoniae (RapRVP): Not Detected    Mycoplasma pneumoniae (RapRVP): Not Detected    Entero/Rhinovirus (RapRVP): Not Detected    hMPV (RapRVP): Not Detected    Coronavirus (229E,HKU1,NL63,OC43): Detected: This Respiratory Panel uses polymerase chain reaction (PCR)  to detect for adenovirus; coronavirus (HKU1, NL63, 229E,  OC43); human metapneumovirus (hMPV); human  enterovirus/rhinovirus (Entero/RV); influenza A; influenza  A/H1: influenza A/H3; influenza A/H1-2009; influenza B;  parainfluenza viruses 1,2,3,4; respiratory syncytial virus;  Mycoplasma pneumoniae; and Chlamydophila pneumoniae.            RADIOLOGY    Xray Chest 1 View- PORTABLE-Urgent (02.12.19 @ 22:15) >  Clear lungs.

## 2019-02-15 NOTE — CHART NOTE - NSCHARTNOTEFT_GEN_A_CORE
RRT called again at 7am for pt having active seizure. Ativan 1mg IV given. Neurosurgery called and stated to continue Keppra and Ativan. Neurology at bedside and EEG ordered. If pt continues to be lethargic, will call MICU consult.

## 2019-02-15 NOTE — EEG REPORT - NS EEG TEXT BOX
[facility]   COMPREHENSIVE EPILEPSY CENTER   REPORT OF ROUTINE VIDEO EEG     [facility]: [address], [place] [address], Ph#: [phone]    Patient Name: HELGA WHITE  Age and : 74y ([birth year])  MRN #: [id number]  Location: [place] [address] [address]  Referring Physician: Fabrice Jackson    Study Date: 02-15-19    _____________________________________________________________  TECHNICAL INFORMATION    Placement and Labeling of Electrodes:  The EEG was performed utilizing 20 channels referential EEG connections (coronal over temporal over parasagittal montage) using all standard 10-20 electrode placements with EKG.  Recording was at a sampling rate of 256 samples per second per channel.  Time synchronized digital video recording was done simultaneously with EEG recording.  A low light infrared camera was used for low light recording.  Rahul and seizure detection algorithms were utilized.    _____________________________________________________________  HISTORY    Patient is a 74y old  Male who presents with a chief complaint of Seizure (15 Feb 2019 11:43)      PERTINENT MEDICATION: s/p ativan  MEDICATIONS  (STANDING):  levETIRAcetam  IVPB 1250 milliGRAM(s) IV Intermittent every 12 hours    _____________________________________________________________  STUDY INTERPRETATION    Findings: The background was continuous, spontaneously variable and reactive. During wakefulness, the posterior dominant rhythm consisted of symmetric, well-modulated 8-9 Hz activity, with amplitude to 16 uV, that attenuated to eye opening.      Generalized Slowing:  Intermittent diffuse polymorphic theta and delta slowing.    Focal Slowing:   Continuous right sided slowing.    Sleep Background:  Drowsiness was characterized by fragmentation, attenuation, and slowing of the background activity.      Other Non-Epileptiform Findings:  None were present.    Interictal Epileptiform Activity:   Lateralized periodic discharges, maximum over right centro-parietal region.    Events:  Clinical events: None recorded.  Seizures: None recorded.    Activation Procedures:   Hyperventilation was not performed.    Photic stimulation was performed and did not elicit any abnormality.     Artifacts:  Intermittent myogenic and movement artifacts were noted.    ECG:  The heart rate on single channel ECG was predominantly between 70-90 BPM.    _____________________________________________________________  EEG SUMMARY/CLASSIFICATION    Abnormal EEG in the awake and drowsy states.  -Lateralized periodic discharges, maximum over right centro-parietal region.  -Continuous right sided slowing.  -Intermittent diffuse theta and delta polymorphic slowing.  _____________________________________________________________  EEG IMPRESSION/CLINICAL CORRELATE    Abnormal EEG study.  1. Acute-subacute structural lesion in the right centro-parietal region, that is potentially epileptogenic.  2. Structural abnormality in the right hemisphere.  3. Mild nonspecific diffuse cerebral dysfunction.     _____________________________________________________________  PRELIMINARY REPORT:    Neema Gracia MD  Neurology Resident, [facility]    Magdi Winn MD  Attending Physician, [facility]ilepsy [facility]   COMPREHENSIVE EPILEPSY CENTER   REPORT OF ROUTINE VIDEO EEG     [facility]: [address], [place] [address], Ph#: [phone]    Patient Name: HELGA WHITE  Age and : 74y (43)  MRN #: [id number]  Location: [place] [address] [address]  Referring Physician: Fabrice Jackson    Study Date: 02-15-19    _____________________________________________________________  TECHNICAL INFORMATION    Placement and Labeling of Electrodes:  The EEG was performed utilizing 20 channels referential EEG connections (coronal over temporal over parasagittal montage) using all standard 10-20 electrode placements with EKG.  Recording was at a sampling rate of 256 samples per second per channel.  Time synchronized digital video recording was done simultaneously with EEG recording.  A low light infrared camera was used for low light recording.  Rahul and seizure detection algorithms were utilized.    _____________________________________________________________  HISTORY    Patient is a 74y old  Male who presents with a chief complaint of Seizure (15 Feb 2019 11:43)      PERTINENT MEDICATION: s/p ativan  MEDICATIONS  (STANDING):  levETIRAcetam  IVPB 1250 milliGRAM(s) IV Intermittent every 12 hours    _____________________________________________________________  STUDY INTERPRETATION    Findings: The background was continuous, spontaneously variable and reactive. During wakefulness, the posterior dominant rhythm consisted of symmetric, well-modulated 8-9 Hz activity, with amplitude to 16 uV, that attenuated to eye opening.      Generalized Slowing:  Intermittent diffuse polymorphic theta and delta slowing.    Focal Slowing:   Continuous Slowing, Lateralized, Right hemisphere    Sleep Background:  Drowsiness was characterized by fragmentation, attenuation, and slowing of the background activity.      Other Non-Epileptiform Findings:  None were present.    Interictal Epileptiform Activity:   Lateralized periodic discharges, Regional, Right centro-parietal region (1 hz)    Events:  Clinical events: None recorded.  Seizures: None recorded.    Activation Procedures:   Hyperventilation was not performed.    Photic stimulation was performed and did not elicit any abnormality.     Artifacts:  Intermittent myogenic and movement artifacts were noted.    ECG:  The heart rate on single channel ECG was predominantly between 70-90 BPM.    _____________________________________________________________  EEG SUMMARY/CLASSIFICATION    Abnormal EEG in the awake and drowsy states.  -Lateralized periodic discharges, Regional, Right centro-parietal region (1 hz)  -Continuous Slowing, Lateralized, Right hemisphere  -Intermittent diffuse theta and delta polymorphic slowing.  _____________________________________________________________  EEG IMPRESSION/CLINICAL CORRELATE    Abnormal EEG study.  1. Acute/subacute structural lesion in the right centro-parietal region, that is potentially epileptogenic.  2. Structural abnormality in the right hemisphere.  3. Mild nonspecific diffuse cerebral dysfunction.       Neema Gracia MD  Neurology Resident, [facility]    Magdi Winn MD  Attending Physician, [facility] Epilepsy [facility]

## 2019-02-15 NOTE — PROGRESS NOTE ADULT - SUBJECTIVE AND OBJECTIVE BOX
Neurology Follow up note    Name  HELGA WHITE        Subjective: Saw and examined patient at bedside. In no acute distress but seems postictal, lethargic from ativan from earlier in AM. Had 2 seizures this morning, given ativan and keppra.    MEDICATIONS  (STANDING):  dexamethasone  Injectable 6 milliGRAM(s) IV Push every 6 hours  dextrose 5%. 1000 milliLiter(s) (50 mL/Hr) IV Continuous <Continuous>  dextrose 50% Injectable 12.5 Gram(s) IV Push once  dextrose 50% Injectable 25 Gram(s) IV Push once  dextrose 50% Injectable 25 Gram(s) IV Push once  influenza   Vaccine 0.5 milliLiter(s) IntraMuscular once  insulin lispro (HumaLOG) corrective regimen sliding scale   SubCutaneous three times a day before meals  insulin lispro (HumaLOG) corrective regimen sliding scale   SubCutaneous at bedtime  levETIRAcetam  IVPB 1250 milliGRAM(s) IV Intermittent every 12 hours  sodium chloride 0.9%. 1000 milliLiter(s) (75 mL/Hr) IV Continuous <Continuous>    MEDICATIONS  (PRN):  acetaminophen  Suppository .. 650 milliGRAM(s) Rectal every 6 hours PRN Temp greater or equal to 38C (100.4F)  dextrose 40% Gel 15 Gram(s) Oral once PRN Blood Glucose LESS THAN 70 milliGRAM(s)/deciliter  glucagon  Injectable 1 milliGRAM(s) IntraMuscular once PRN Glucose LESS THAN 70 milligrams/deciliter  LORazepam   Injectable 1 milliGRAM(s) IV Push every 6 hours PRN seizure      Allergies    No Known Allergies    Intolerances        Objective:   Vital Signs Last 24 Hrs  T(C): 37.1 (15 Feb 2019 07:05), Max: 37.5 (14 Feb 2019 19:52)  T(F): 98.8 (15 Feb 2019 07:05), Max: 99.5 (14 Feb 2019 19:52)  HR: 100 (15 Feb 2019 07:05) (80 - 100)  BP: 149/110 (15 Feb 2019 07:05) (97/73 - 149/110)  BP(mean): --  RR: 22 (15 Feb 2019 07:05) (16 - 22)  SpO2: 100% (15 Feb 2019 07:05) (96% - 100%)    limited exam due to MS from post-ictal state and ativan administration    General Exam:   General appearance: No acute distress                   Neurological Exam:  Mental Status: lethargic, seemingly still post-ictal, does not open eyes or follow commands    Cranial Nerves: PERRL, facial symmetry intact    Motor: hemiparetic on left side, moves right side spontaneously        Other:    02-15    138  |  99  |  13  ----------------------------<  162<H>  3.6   |  25  |  0.72    Ca    8.7      15 Feb 2019 05:30  Phos  2.7     02-15  Mg     1.8     02-15    TPro  6.2  /  Alb  2.9<L>  /  TBili  0.3  /  DBili  x   /  AST  22  /  ALT  54<H>  /  AlkPhos  65  02-15    02-15    138  |  99  |  13  ----------------------------<  162<H>  3.6   |  25  |  0.72    Ca    8.7      15 Feb 2019 05:30  Phos  2.7     02-15  Mg     1.8     02-15    TPro  6.2  /  Alb  2.9<L>  /  TBili  0.3  /  DBili  x   /  AST  22  /  ALT  54<H>  /  AlkPhos  65  02-15    LIVER FUNCTIONS - ( 15 Feb 2019 03:55 )  Alb: 2.9 g/dL / Pro: 6.2 g/dL / ALK PHOS: 65 u/L / ALT: 54 u/L / AST: 22 u/L / GGT: x             Radiology    EKG:  tele:  TTE:  EEG:

## 2019-02-15 NOTE — PROGRESS NOTE ADULT - GASTROINTESTINAL DETAILS
nontender/no rigidity/no guarding/soft/no rebound tenderness/no distention
no distention/no guarding/nontender/soft/no rebound tenderness/no rigidity

## 2019-02-15 NOTE — PROGRESS NOTE ADULT - ASSESSMENT
74 year old h/o Glioblastoma Multiforme Dx Nov 2018 after Craniotomy for resection - now presenting with seizures. Had 3 seizures overnight, given ativan and keppra increased. Now lethargic and postictal.  CTH Stable- no midline shift    c/w Keppra 1250 BID  Patient to be transferred to [facility], however got an infection and is currently being treated.   EEG 74 year old h/o Glioblastoma Multiforme Dx Nov 2018 after Craniotomy for resection - now presenting with seizures.     Had 3 seizures overnight, given ativan and keppra increased. Now lethargic and postictal.  CTH Stable- no midline shift    Plan:  c/w Keppra 1250 BID  Patient to be transferred to [facility] when infection under control  EEG

## 2019-02-16 LAB
ANION GAP SERPL CALC-SCNC: 14 MMO/L — SIGNIFICANT CHANGE UP (ref 7–14)
BASOPHILS # BLD AUTO: 0.02 K/UL — SIGNIFICANT CHANGE UP (ref 0–0.2)
BASOPHILS NFR BLD AUTO: 0.2 % — SIGNIFICANT CHANGE UP (ref 0–2)
BUN SERPL-MCNC: 16 MG/DL — SIGNIFICANT CHANGE UP (ref 7–23)
CALCIUM SERPL-MCNC: 8.5 MG/DL — SIGNIFICANT CHANGE UP (ref 8.4–10.5)
CHLORIDE SERPL-SCNC: 99 MMOL/L — SIGNIFICANT CHANGE UP (ref 98–107)
CO2 SERPL-SCNC: 24 MMOL/L — SIGNIFICANT CHANGE UP (ref 22–31)
CREAT SERPL-MCNC: 0.65 MG/DL — SIGNIFICANT CHANGE UP (ref 0.5–1.3)
EOSINOPHIL # BLD AUTO: 0 K/UL — SIGNIFICANT CHANGE UP (ref 0–0.5)
EOSINOPHIL NFR BLD AUTO: 0 % — SIGNIFICANT CHANGE UP (ref 0–6)
GLUCOSE SERPL-MCNC: 224 MG/DL — HIGH (ref 70–99)
HCT VFR BLD CALC: 32.7 % — LOW (ref 39–50)
HGB BLD-MCNC: 10.7 G/DL — LOW (ref 13–17)
IMM GRANULOCYTES NFR BLD AUTO: 1.9 % — HIGH (ref 0–1.5)
LYMPHOCYTES # BLD AUTO: 1.29 K/UL — SIGNIFICANT CHANGE UP (ref 1–3.3)
LYMPHOCYTES # BLD AUTO: 10.2 % — LOW (ref 13–44)
MAGNESIUM SERPL-MCNC: 2.1 MG/DL — SIGNIFICANT CHANGE UP (ref 1.6–2.6)
MCHC RBC-ENTMCNC: 30.1 PG — SIGNIFICANT CHANGE UP (ref 27–34)
MCHC RBC-ENTMCNC: 32.7 % — SIGNIFICANT CHANGE UP (ref 32–36)
MCV RBC AUTO: 91.9 FL — SIGNIFICANT CHANGE UP (ref 80–100)
MONOCYTES # BLD AUTO: 0.36 K/UL — SIGNIFICANT CHANGE UP (ref 0–0.9)
MONOCYTES NFR BLD AUTO: 2.8 % — SIGNIFICANT CHANGE UP (ref 2–14)
NEUTROPHILS # BLD AUTO: 10.75 K/UL — HIGH (ref 1.8–7.4)
NEUTROPHILS NFR BLD AUTO: 84.9 % — HIGH (ref 43–77)
NRBC # FLD: 0 K/UL — LOW (ref 25–125)
PHOSPHATE SERPL-MCNC: 2.6 MG/DL — SIGNIFICANT CHANGE UP (ref 2.5–4.5)
PLATELET # BLD AUTO: 269 K/UL — SIGNIFICANT CHANGE UP (ref 150–400)
PMV BLD: 9.9 FL — SIGNIFICANT CHANGE UP (ref 7–13)
POTASSIUM SERPL-MCNC: 4.5 MMOL/L — SIGNIFICANT CHANGE UP (ref 3.5–5.3)
POTASSIUM SERPL-SCNC: 4.5 MMOL/L — SIGNIFICANT CHANGE UP (ref 3.5–5.3)
RBC # BLD: 3.56 M/UL — LOW (ref 4.2–5.8)
RBC # FLD: 15.4 % — HIGH (ref 10.3–14.5)
SODIUM SERPL-SCNC: 137 MMOL/L — SIGNIFICANT CHANGE UP (ref 135–145)
WBC # BLD: 12.66 K/UL — HIGH (ref 3.8–10.5)
WBC # FLD AUTO: 12.66 K/UL — HIGH (ref 3.8–10.5)

## 2019-02-16 PROCEDURE — 99233 SBSQ HOSP IP/OBS HIGH 50: CPT

## 2019-02-16 RX ADMIN — Medication 40 MILLIEQUIVALENT(S): at 00:23

## 2019-02-16 RX ADMIN — Medication 100 GRAM(S): at 00:23

## 2019-02-16 NOTE — PROGRESS NOTE ADULT - ATTENDING COMMENTS
Pt examined, chart reviewed and case presented on rounds. MRI images reviewed with bilateral subcortical acute infarcts consistent with cardiogenic emboli.  Neuro exam remains stable with no reported seizure activity. Right hemispheric syndrome with left hemimotor, hemisensory and hemianopsia and bilateral upgoing toes. Patient may have PAF and would recommend telemetry. Agree with above recommendations.

## 2019-02-16 NOTE — PROGRESS NOTE ADULT - SUBJECTIVE AND OBJECTIVE BOX
Neurology Follow up note    Patient is a 74y old  Male who presents with a chief complaint of Seizure (16 Feb 2019 09:17)      Subjective:Interval History - No events overnight    Objective:   Vital Signs Last 24 Hrs  T(C): 36.7 (16 Feb 2019 06:24), Max: 37 (15 Feb 2019 15:32)  T(F): 98 (16 Feb 2019 06:24), Max: 98.6 (15 Feb 2019 15:32)  HR: 81 (16 Feb 2019 06:24) (75 - 89)  BP: 115/76 (16 Feb 2019 06:24) (105/72 - 115/76)  BP(mean): --  RR: 18 (16 Feb 2019 06:24) (17 - 19)  SpO2: 97% (16 Feb 2019 06:24) (97% - 100%)    General Exam:   General appearance: No acute distress                 Cardiovascular: Pedal dorsalis pulses intact bilaterally    Neurological Exam:  Mental Status: AOx3 Attention intact.  No dysarthria, aphasia or neglect.  Knowledge intact.  Registration intact.  Short and long term memory grossly intact.      Cranial Nerves:  L homonymous hemionopsia  No facial asymmetry. L neglect    Motor:   Tone: normal.                  Strength: hemiparesis on LUE, LLE  Pronator drift: none                 No truncal ataxia.    Tremor: No resting, postural or action tremor.  No myoclonus.  Sensation: decreased sensation on L  Toes bilaterally upgoing  Gait: deferred      Other:  02-16    137  |  99  |  16  ----------------------------<  224<H>  4.5   |  24  |  0.65    Ca    8.5      16 Feb 2019 05:15  Phos  2.6     02-16  Mg     2.1     02-16    TPro  6.2  /  Alb  2.9<L>  /  TBili  0.3  /  DBili  x   /  AST  22  /  ALT  54<H>  /  AlkPhos  65  02-15    LIVER FUNCTIONS - ( 15 Feb 2019 03:55 )  Alb: 2.9 g/dL / Pro: 6.2 g/dL / ALK PHOS: 65 u/L / ALT: 54 u/L / AST: 22 u/L / GGT: x                               10.7   12.66 )-----------( 269      ( 16 Feb 2019 05:15 )             32.7     MEDICATIONS  (STANDING):  dexamethasone  Injectable 6 milliGRAM(s) IV Push every 6 hours  dextrose 5%. 1000 milliLiter(s) (50 mL/Hr) IV Continuous <Continuous>  dextrose 50% Injectable 12.5 Gram(s) IV Push once  dextrose 50% Injectable 25 Gram(s) IV Push once  dextrose 50% Injectable 25 Gram(s) IV Push once  influenza   Vaccine 0.5 milliLiter(s) IntraMuscular once  insulin lispro (HumaLOG) corrective regimen sliding scale   SubCutaneous three times a day before meals  insulin lispro (HumaLOG) corrective regimen sliding scale   SubCutaneous at bedtime  levETIRAcetam  IVPB 1250 milliGRAM(s) IV Intermittent every 12 hours  sodium chloride 0.9%. 1000 milliLiter(s) (75 mL/Hr) IV Continuous <Continuous>    MEDICATIONS  (PRN):  acetaminophen  Suppository .. 650 milliGRAM(s) Rectal every 6 hours PRN Temp greater or equal to 38C (100.4F)  dextrose 40% Gel 15 Gram(s) Oral once PRN Blood Glucose LESS THAN 70 milliGRAM(s)/deciliter  glucagon  Injectable 1 milliGRAM(s) IntraMuscular once PRN Glucose LESS THAN 70 milligrams/deciliter  LORazepam   Injectable 1 milliGRAM(s) IV Push every 6 hours PRN seizure

## 2019-02-16 NOTE — PROGRESS NOTE ADULT - ASSESSMENT
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 Patient had jerking of the left face, arm and leg, approx 45-60 minutes in duration and was given a total of 6mg of Ativan in ED and 1000mg Keppra. Most recent MRI on 1/30/19 revealed increasing enhancement within right temporal lobe concerning for progressive a neoplasm versus pseudoprogression. 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 Patient had jerking of the left face, arm and leg, approx 45-60 minutes in duration and was given a total of 6mg of Ativan in ED and 1000mg Keppra. Most recent MRI on 1/30/19 revealed increasing enhancement within right temporal lobe concerning for progressive a neoplasm versus pseudoprogression. 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 Patient had jerking of the left face, arm and leg, approx 45-60 minutes in duration and was given a total of 6mg of Ativan in ED and 1000mg Keppra. Most recent MRI on 1/30/19 revealed increasing enhancement within right temporal lobe concerning for progressive a neoplasm versus pseudoprogression. Patient's seizure clinically correlates with area of previous mass/surgery.PAtient was scheduled for tumor resection at [facility] however on MRI was found to have 3 small acute infarcts in both hemispheres, procedure will be rescheduled    Plan  [] resume ASA 81  [] TTE noted from November 2018, please repeat + ALBERTO to r/o cardioembolic source  [] telemetry  [] venous duplex  [] continue keppra 1250 BID  [] ativan PRN for seizures  [] A1C: 7.7 - continue risk modification

## 2019-02-16 NOTE — PROGRESS NOTE ADULT - SUBJECTIVE AND OBJECTIVE BOX
Patient is a 74y old  Male who presents with a chief complaint of Seizure (15 Feb 2019 11:43)      SUBJECTIVE / OVERNIGHT EVENTS: Pt seen and examined and chart reviewed. No complaints. Denies any pain. No HA, No CP or SOB    MEDICATIONS  (STANDING):  dexamethasone  Injectable 6 milliGRAM(s) IV Push every 6 hours  dextrose 5%. 1000 milliLiter(s) (50 mL/Hr) IV Continuous <Continuous>  dextrose 50% Injectable 12.5 Gram(s) IV Push once  dextrose 50% Injectable 25 Gram(s) IV Push once  dextrose 50% Injectable 25 Gram(s) IV Push once  influenza   Vaccine 0.5 milliLiter(s) IntraMuscular once  insulin lispro (HumaLOG) corrective regimen sliding scale   SubCutaneous three times a day before meals  insulin lispro (HumaLOG) corrective regimen sliding scale   SubCutaneous at bedtime  levETIRAcetam  IVPB 1250 milliGRAM(s) IV Intermittent every 12 hours  sodium chloride 0.9%. 1000 milliLiter(s) (75 mL/Hr) IV Continuous <Continuous>    MEDICATIONS  (PRN):  acetaminophen  Suppository .. 650 milliGRAM(s) Rectal every 6 hours PRN Temp greater or equal to 38C (100.4F)  dextrose 40% Gel 15 Gram(s) Oral once PRN Blood Glucose LESS THAN 70 milliGRAM(s)/deciliter  glucagon  Injectable 1 milliGRAM(s) IntraMuscular once PRN Glucose LESS THAN 70 milligrams/deciliter  LORazepam   Injectable 1 milliGRAM(s) IV Push every 6 hours PRN seizure      Vital Signs Last 24 Hrs  T(C): 36.7 (16 Feb 2019 06:24), Max: 37 (15 Feb 2019 11:00)  T(F): 98 (16 Feb 2019 06:24), Max: 98.6 (15 Feb 2019 11:00)  HR: 81 (16 Feb 2019 06:24) (75 - 89)  BP: 115/76 (16 Feb 2019 06:24) (105/72 - 115/76)  BP(mean): --  RR: 18 (16 Feb 2019 06:24) (17 - 19)  SpO2: 97% (16 Feb 2019 06:24) (97% - 100%)  CAPILLARY BLOOD GLUCOSE      POCT Blood Glucose.: 210 mg/dL (16 Feb 2019 08:50)  POCT Blood Glucose.: 246 mg/dL (16 Feb 2019 05:13)  POCT Blood Glucose.: 223 mg/dL (16 Feb 2019 00:25)  POCT Blood Glucose.: 167 mg/dL (15 Feb 2019 12:55)    I&O's Summary    15 Feb 2019 07:01  -  16 Feb 2019 07:00  --------------------------------------------------------  IN: 2540 mL / OUT: 800 mL / NET: 1740 mL        PHYSICAL EXAM:  GENERAL: NAD, well-developed  HEAD:  Atraumatic, Normocephalic  EYES: EOMI, PERRLA, conjunctiva and sclera clear  NECK: Supple, No JVD  CHEST/LUNG: Clear to auscultation bilaterally; No wheeze  HEART: Regular rate and rhythm; No murmurs, rubs, or gallops  ABDOMEN: Soft, Nontender, Nondistended; Bowel sounds present  EXTREMITIES:  2+ Peripheral Pulses, No clubbing, cyanosis, or edema  PSYCH: AAO  NEUROLOGY: (+) left hemiparesis  SKIN: No rashes or lesions    LABS:                        10.7   12.66 )-----------( 269      ( 16 Feb 2019 05:15 )             32.7     02-16    137  |  99  |  16  ----------------------------<  224<H>  4.5   |  24  |  0.65    Ca    8.5      16 Feb 2019 05:15  Phos  2.6     02-16  Mg     2.1     02-16    TPro  6.2  /  Alb  2.9<L>  /  TBili  0.3  /  DBili  x   /  AST  22  /  ALT  54<H>  /  AlkPhos  65  02-15    PT/INR - ( 15 Feb 2019 03:55 )   PT: 13.6 SEC;   INR: 1.22          PTT - ( 15 Feb 2019 03:55 )  PTT:23.7 SEC          RADIOLOGY & ADDITIONAL TESTS:  < from: MR Brain Stereotactic w/ IV Cont (02.15.19 @ 17:51) >    IMPRESSION:    Right temporal lobe mass without significant interval change from the   prior exam. Surrounding T2 hyperintensity has increased which may be   secondary to increasing edema, posttreatment changes or tumor extension.    3 discrete foci of acute infarction within the cerebral hemispheres as   described above. The possibility of an embolic source is raised.    < end of copied text >  < from: MR Brain Stereotactic w/ IV Cont (02.15.19 @ 17:51) >  *** ADDENDUM 02/15/2019  ***    New findings involving acute infarcts were discussed with primary team   provider LOVE Blackburn by radiology resident, Letha Stern MD, with readback   on 2/15/2019 at 7:10 PM.    < end of copied text >      Imaging Personally Reviewed:    Consultant(s) Notes Reviewed:      Care Discussed with Consultants/Other Providers:

## 2019-02-16 NOTE — PROGRESS NOTE ADULT - PROBLEM SELECTOR PLAN 1
complicated by seizure d/o.  Continue keppra IV.  Pending neurosurgical procedure - potentially on 2/16 @ [facility] vs early next week @ [facility].  Patient had good functional status prior to admission with no documented TAY, LOC, CHF symptoms.  Medically optimized to proceed to neurosurgical intervention.  however, MRI showed 3 foci of acute infarcts. Procedure cancelled by Neurosurgery.  -Contact Neurology for the findings of MRI. Pt is asymptomatic at this time.

## 2019-02-16 NOTE — CHART NOTE - NSCHARTNOTEFT_GEN_A_CORE
consulted with neuro svc for Heparin for  patient has h/o PE and brain has s/p 3 foci infarcts.  NEURO svc agreed to start patient on low dose ASA 81mg QD since patient has multiple medical comorbidities.  Krissy ALEMAN

## 2019-02-17 LAB
ANION GAP SERPL CALC-SCNC: 12 MMO/L — SIGNIFICANT CHANGE UP (ref 7–14)
BACTERIA BLD CULT: SIGNIFICANT CHANGE UP
BASOPHILS # BLD AUTO: 0.05 K/UL — SIGNIFICANT CHANGE UP (ref 0–0.2)
BASOPHILS NFR BLD AUTO: 0.3 % — SIGNIFICANT CHANGE UP (ref 0–2)
BUN SERPL-MCNC: 15 MG/DL — SIGNIFICANT CHANGE UP (ref 7–23)
CALCIUM SERPL-MCNC: 8.8 MG/DL — SIGNIFICANT CHANGE UP (ref 8.4–10.5)
CHLORIDE SERPL-SCNC: 99 MMOL/L — SIGNIFICANT CHANGE UP (ref 98–107)
CO2 SERPL-SCNC: 25 MMOL/L — SIGNIFICANT CHANGE UP (ref 22–31)
CREAT SERPL-MCNC: 0.63 MG/DL — SIGNIFICANT CHANGE UP (ref 0.5–1.3)
EOSINOPHIL # BLD AUTO: 0 K/UL — SIGNIFICANT CHANGE UP (ref 0–0.5)
EOSINOPHIL NFR BLD AUTO: 0 % — SIGNIFICANT CHANGE UP (ref 0–6)
GLUCOSE SERPL-MCNC: 225 MG/DL — HIGH (ref 70–99)
HCT VFR BLD CALC: 34.5 % — LOW (ref 39–50)
HGB BLD-MCNC: 11.3 G/DL — LOW (ref 13–17)
IMM GRANULOCYTES NFR BLD AUTO: 4 % — HIGH (ref 0–1.5)
LYMPHOCYTES # BLD AUTO: 1.37 K/UL — SIGNIFICANT CHANGE UP (ref 1–3.3)
LYMPHOCYTES # BLD AUTO: 9.5 % — LOW (ref 13–44)
MAGNESIUM SERPL-MCNC: 1.9 MG/DL — SIGNIFICANT CHANGE UP (ref 1.6–2.6)
MCHC RBC-ENTMCNC: 29.2 PG — SIGNIFICANT CHANGE UP (ref 27–34)
MCHC RBC-ENTMCNC: 32.8 % — SIGNIFICANT CHANGE UP (ref 32–36)
MCV RBC AUTO: 89.1 FL — SIGNIFICANT CHANGE UP (ref 80–100)
MONOCYTES # BLD AUTO: 0.52 K/UL — SIGNIFICANT CHANGE UP (ref 0–0.9)
MONOCYTES NFR BLD AUTO: 3.6 % — SIGNIFICANT CHANGE UP (ref 2–14)
NEUTROPHILS # BLD AUTO: 11.91 K/UL — HIGH (ref 1.8–7.4)
NEUTROPHILS NFR BLD AUTO: 82.6 % — HIGH (ref 43–77)
NRBC # FLD: 0.02 K/UL — LOW (ref 25–125)
PHOSPHATE SERPL-MCNC: 2.8 MG/DL — SIGNIFICANT CHANGE UP (ref 2.5–4.5)
PLATELET # BLD AUTO: 223 K/UL — SIGNIFICANT CHANGE UP (ref 150–400)
PMV BLD: 10 FL — SIGNIFICANT CHANGE UP (ref 7–13)
POTASSIUM SERPL-MCNC: 4.2 MMOL/L — SIGNIFICANT CHANGE UP (ref 3.5–5.3)
POTASSIUM SERPL-SCNC: 4.2 MMOL/L — SIGNIFICANT CHANGE UP (ref 3.5–5.3)
RBC # BLD: 3.87 M/UL — LOW (ref 4.2–5.8)
RBC # FLD: 15.3 % — HIGH (ref 10.3–14.5)
SODIUM SERPL-SCNC: 136 MMOL/L — SIGNIFICANT CHANGE UP (ref 135–145)
WBC # BLD: 14.43 K/UL — HIGH (ref 3.8–10.5)
WBC # FLD AUTO: 14.43 K/UL — HIGH (ref 3.8–10.5)

## 2019-02-17 PROCEDURE — 99233 SBSQ HOSP IP/OBS HIGH 50: CPT

## 2019-02-17 RX ORDER — MAGNESIUM OXIDE 400 MG ORAL TABLET 241.3 MG
400 TABLET ORAL ONCE
Qty: 0 | Refills: 0 | Status: COMPLETED | OUTPATIENT
Start: 2019-02-17 | End: 2019-02-17

## 2019-02-17 RX ADMIN — MAGNESIUM OXIDE 400 MG ORAL TABLET 400 MILLIGRAM(S): 241.3 TABLET ORAL at 12:34

## 2019-02-17 NOTE — PROGRESS NOTE ADULT - PROBLEM SELECTOR PLAN 2
Asymptomatic.  High suspicion for contaminant. Repeat BCx from 2/14 - results pending.  Monitor off abx.  ID consult appreciated - no contraindication to proceed to neurosurgical intervention. Blood cx negative so far

## 2019-02-17 NOTE — PROGRESS NOTE ADULT - ATTENDING COMMENTS
Patient examined, chart reviewed and case presented on rounds. Pt is awake and alert today.  No seizures reported overnight. Stable left hemimotor, hemisensory and hemianopia.  Now with 4/5 strength in the LUE. Bilateral upgoing toes. EEG shows right sided focal slowing with a potential right epileptic focus. On Keppra 1250 bid and high dose steroids. Awaiting cardiac echo to R/O embolic source of bilateral acute punctate strokes. Continue telemetry.  Awaiting neurosurgery.

## 2019-02-17 NOTE — PROGRESS NOTE ADULT - SUBJECTIVE AND OBJECTIVE BOX
Patient is a 74y old  Male who presents with a chief complaint of Seizure (16 Feb 2019 12:04)      SUBJECTIVE / OVERNIGHT EVENTS: No complaints.     MEDICATIONS  (STANDING):  aspirin enteric coated 81 milliGRAM(s) Oral daily  dexamethasone  Injectable 6 milliGRAM(s) IV Push every 6 hours  dextrose 5%. 1000 milliLiter(s) (50 mL/Hr) IV Continuous <Continuous>  dextrose 50% Injectable 12.5 Gram(s) IV Push once  dextrose 50% Injectable 25 Gram(s) IV Push once  dextrose 50% Injectable 25 Gram(s) IV Push once  influenza   Vaccine 0.5 milliLiter(s) IntraMuscular once  insulin lispro (HumaLOG) corrective regimen sliding scale   SubCutaneous three times a day before meals  insulin lispro (HumaLOG) corrective regimen sliding scale   SubCutaneous at bedtime  levETIRAcetam  IVPB 1250 milliGRAM(s) IV Intermittent every 12 hours  sodium chloride 0.9%. 1000 milliLiter(s) (75 mL/Hr) IV Continuous <Continuous>    MEDICATIONS  (PRN):  acetaminophen  Suppository .. 650 milliGRAM(s) Rectal every 6 hours PRN Temp greater or equal to 38C (100.4F)  dextrose 40% Gel 15 Gram(s) Oral once PRN Blood Glucose LESS THAN 70 milliGRAM(s)/deciliter  glucagon  Injectable 1 milliGRAM(s) IntraMuscular once PRN Glucose LESS THAN 70 milligrams/deciliter  LORazepam   Injectable 1 milliGRAM(s) IV Push every 6 hours PRN seizure      Vital Signs Last 24 Hrs  T(C): 36.8 (17 Feb 2019 06:18), Max: 36.8 (16 Feb 2019 18:24)  T(F): 98.3 (17 Feb 2019 06:18), Max: 98.3 (17 Feb 2019 06:18)  HR: 61 (17 Feb 2019 06:18) (61 - 72)  BP: 121/82 (17 Feb 2019 06:18) (109/81 - 126/71)  BP(mean): --  RR: 18 (17 Feb 2019 06:18) (18 - 18)  SpO2: 99% (17 Feb 2019 06:18) (92% - 99%)  CAPILLARY BLOOD GLUCOSE      POCT Blood Glucose.: 218 mg/dL (17 Feb 2019 08:30)  POCT Blood Glucose.: 270 mg/dL (16 Feb 2019 23:20)  POCT Blood Glucose.: 308 mg/dL (16 Feb 2019 17:36)  POCT Blood Glucose.: 182 mg/dL (16 Feb 2019 12:29)    I&O's Summary    16 Feb 2019 07:01  -  17 Feb 2019 07:00  --------------------------------------------------------  IN: 1000 mL / OUT: 450 mL / NET: 550 mL        PHYSICAL EXAM:  GENERAL: NAD, well-developed  HEAD:  Atraumatic, Normocephalic  EYES: EOMI, PERRLA, conjunctiva and sclera clear  NECK: Supple, No JVD  CHEST/LUNG: Clear to auscultation bilaterally; No wheeze  HEART: Regular rate and rhythm; No murmurs, rubs, or gallops  ABDOMEN: Soft, Nontender, Nondistended; Bowel sounds present  EXTREMITIES:  2+ Peripheral Pulses, No clubbing, cyanosis, or edema  PSYCH: AAOx3  NEUROLOGY: (+) left hemiparesis  SKIN: No rashes or lesions    LABS:                        11.3   14.43 )-----------( 223      ( 17 Feb 2019 07:40 )             34.5     02-16    137  |  99  |  16  ----------------------------<  224<H>  4.5   |  24  |  0.65    Ca    8.5      16 Feb 2019 05:15  Phos  2.6     02-16  Mg     2.1     02-16                RADIOLOGY & ADDITIONAL TESTS:    Imaging Personally Reviewed:    Consultant(s) Notes Reviewed:  Neuro    Care Discussed with Consultants/Other Providers:

## 2019-02-17 NOTE — PROGRESS NOTE ADULT - PROBLEM SELECTOR PLAN 1
complicated by seizure d/o.  Continue keppra IV.  Pending neurosurgical procedure - potentially on 2/16 @ [facility] vs early next week @ [facility].  Patient had good functional status prior to admission with no documented TAY, LOC, CHF symptoms.  Medically optimized to proceed to neurosurgical intervention.  however, MRI showed 3 foci of acute infarcts. Procedure cancelled by Neurosurgery.  -Contact Neurology for the findings of MRI. Pt is asymptomatic at this time.

## 2019-02-18 LAB
ANION GAP SERPL CALC-SCNC: 12 MMO/L — SIGNIFICANT CHANGE UP (ref 7–14)
APTT BLD: 23 SEC — LOW (ref 27.5–36.3)
BASOPHILS # BLD AUTO: 0.06 K/UL — SIGNIFICANT CHANGE UP (ref 0–0.2)
BASOPHILS NFR BLD AUTO: 0.4 % — SIGNIFICANT CHANGE UP (ref 0–2)
BUN SERPL-MCNC: 12 MG/DL — SIGNIFICANT CHANGE UP (ref 7–23)
CALCIUM SERPL-MCNC: 9.1 MG/DL — SIGNIFICANT CHANGE UP (ref 8.4–10.5)
CHLORIDE SERPL-SCNC: 97 MMOL/L — LOW (ref 98–107)
CO2 SERPL-SCNC: 27 MMOL/L — SIGNIFICANT CHANGE UP (ref 22–31)
CREAT SERPL-MCNC: 0.64 MG/DL — SIGNIFICANT CHANGE UP (ref 0.5–1.3)
EOSINOPHIL # BLD AUTO: 0 K/UL — SIGNIFICANT CHANGE UP (ref 0–0.5)
EOSINOPHIL NFR BLD AUTO: 0 % — SIGNIFICANT CHANGE UP (ref 0–6)
GLUCOSE SERPL-MCNC: 243 MG/DL — HIGH (ref 70–99)
HCT VFR BLD CALC: 38.3 % — LOW (ref 39–50)
HGB BLD-MCNC: 12.4 G/DL — LOW (ref 13–17)
IMM GRANULOCYTES NFR BLD AUTO: 4.8 % — HIGH (ref 0–1.5)
LYMPHOCYTES # BLD AUTO: 1.37 K/UL — SIGNIFICANT CHANGE UP (ref 1–3.3)
LYMPHOCYTES # BLD AUTO: 8.7 % — LOW (ref 13–44)
MAGNESIUM SERPL-MCNC: 1.8 MG/DL — SIGNIFICANT CHANGE UP (ref 1.6–2.6)
MCHC RBC-ENTMCNC: 29.1 PG — SIGNIFICANT CHANGE UP (ref 27–34)
MCHC RBC-ENTMCNC: 32.4 % — SIGNIFICANT CHANGE UP (ref 32–36)
MCV RBC AUTO: 89.9 FL — SIGNIFICANT CHANGE UP (ref 80–100)
MONOCYTES # BLD AUTO: 0.78 K/UL — SIGNIFICANT CHANGE UP (ref 0–0.9)
MONOCYTES NFR BLD AUTO: 4.9 % — SIGNIFICANT CHANGE UP (ref 2–14)
NEUTROPHILS # BLD AUTO: 12.86 K/UL — HIGH (ref 1.8–7.4)
NEUTROPHILS NFR BLD AUTO: 81.2 % — HIGH (ref 43–77)
NRBC # FLD: 0.04 K/UL — LOW (ref 25–125)
PHOSPHATE SERPL-MCNC: 2.5 MG/DL — SIGNIFICANT CHANGE UP (ref 2.5–4.5)
PLATELET # BLD AUTO: 246 K/UL — SIGNIFICANT CHANGE UP (ref 150–400)
PMV BLD: 10.6 FL — SIGNIFICANT CHANGE UP (ref 7–13)
POTASSIUM SERPL-MCNC: 3.9 MMOL/L — SIGNIFICANT CHANGE UP (ref 3.5–5.3)
POTASSIUM SERPL-SCNC: 3.9 MMOL/L — SIGNIFICANT CHANGE UP (ref 3.5–5.3)
RBC # BLD: 4.26 M/UL — SIGNIFICANT CHANGE UP (ref 4.2–5.8)
RBC # FLD: 15.2 % — HIGH (ref 10.3–14.5)
SODIUM SERPL-SCNC: 136 MMOL/L — SIGNIFICANT CHANGE UP (ref 135–145)
WBC # BLD: 15.83 K/UL — HIGH (ref 3.8–10.5)
WBC # FLD AUTO: 15.83 K/UL — HIGH (ref 3.8–10.5)

## 2019-02-18 PROCEDURE — 99233 SBSQ HOSP IP/OBS HIGH 50: CPT

## 2019-02-18 PROCEDURE — 93970 EXTREMITY STUDY: CPT | Mod: 26

## 2019-02-18 RX ORDER — HEPARIN SODIUM 5000 [USP'U]/ML
1000 INJECTION INTRAVENOUS; SUBCUTANEOUS
Qty: 25000 | Refills: 0 | Status: DISCONTINUED | OUTPATIENT
Start: 2019-02-18 | End: 2019-02-19

## 2019-02-18 RX ORDER — POTASSIUM CHLORIDE 20 MEQ
40 PACKET (EA) ORAL ONCE
Qty: 0 | Refills: 0 | Status: COMPLETED | OUTPATIENT
Start: 2019-02-18 | End: 2019-02-18

## 2019-02-18 RX ORDER — MAGNESIUM OXIDE 400 MG ORAL TABLET 241.3 MG
400 TABLET ORAL ONCE
Qty: 0 | Refills: 0 | Status: COMPLETED | OUTPATIENT
Start: 2019-02-18 | End: 2019-02-18

## 2019-02-18 RX ORDER — HEPARIN SODIUM 5000 [USP'U]/ML
1400 INJECTION INTRAVENOUS; SUBCUTANEOUS
Qty: 25000 | Refills: 0 | Status: DISCONTINUED | OUTPATIENT
Start: 2019-02-18 | End: 2019-02-18

## 2019-02-18 RX ORDER — LISINOPRIL 2.5 MG/1
10 TABLET ORAL DAILY
Qty: 0 | Refills: 0 | Status: DISCONTINUED | OUTPATIENT
Start: 2019-02-18 | End: 2019-02-22

## 2019-02-18 RX ADMIN — LISINOPRIL 10 MILLIGRAM(S): 2.5 TABLET ORAL at 17:15

## 2019-02-18 RX ADMIN — Medication 40 MILLIEQUIVALENT(S): at 11:47

## 2019-02-18 RX ADMIN — MAGNESIUM OXIDE 400 MG ORAL TABLET 400 MILLIGRAM(S): 241.3 TABLET ORAL at 11:46

## 2019-02-18 RX ADMIN — HEPARIN SODIUM 10 UNIT(S)/HR: 5000 INJECTION INTRAVENOUS; SUBCUTANEOUS at 19:31

## 2019-02-18 NOTE — PROGRESS NOTE ADULT - SUBJECTIVE AND OBJECTIVE BOX
Patient is a 74y old  Male who presents with a chief complaint of Seizure (17 Feb 2019 13:49)      SUBJECTIVE / OVERNIGHT EVENTS: No complaints.     MEDICATIONS  (STANDING):  aspirin enteric coated 81 milliGRAM(s) Oral daily  dexamethasone  Injectable 6 milliGRAM(s) IV Push every 6 hours  dextrose 5%. 1000 milliLiter(s) (50 mL/Hr) IV Continuous <Continuous>  dextrose 50% Injectable 12.5 Gram(s) IV Push once  dextrose 50% Injectable 25 Gram(s) IV Push once  dextrose 50% Injectable 25 Gram(s) IV Push once  influenza   Vaccine 0.5 milliLiter(s) IntraMuscular once  insulin lispro (HumaLOG) corrective regimen sliding scale   SubCutaneous three times a day before meals  insulin lispro (HumaLOG) corrective regimen sliding scale   SubCutaneous at bedtime  levETIRAcetam  IVPB 1250 milliGRAM(s) IV Intermittent every 12 hours  sodium chloride 0.9%. 1000 milliLiter(s) (75 mL/Hr) IV Continuous <Continuous>    MEDICATIONS  (PRN):  acetaminophen  Suppository .. 650 milliGRAM(s) Rectal every 6 hours PRN Temp greater or equal to 38C (100.4F)  dextrose 40% Gel 15 Gram(s) Oral once PRN Blood Glucose LESS THAN 70 milliGRAM(s)/deciliter  glucagon  Injectable 1 milliGRAM(s) IntraMuscular once PRN Glucose LESS THAN 70 milligrams/deciliter  LORazepam   Injectable 1 milliGRAM(s) IV Push every 6 hours PRN seizure      Vital Signs Last 24 Hrs  T(C): 36.2 (18 Feb 2019 05:00), Max: 37 (17 Feb 2019 13:54)  T(F): 97.1 (18 Feb 2019 05:00), Max: 98.6 (17 Feb 2019 13:54)  HR: 60 (18 Feb 2019 05:00) (54 - 76)  BP: 162/104 (18 Feb 2019 05:00) (119/79 - 163/100)  BP(mean): --  RR: 18 (18 Feb 2019 05:00) (16 - 18)  SpO2: 96% (18 Feb 2019 05:00) (95% - 97%)  CAPILLARY BLOOD GLUCOSE      POCT Blood Glucose.: 213 mg/dL (18 Feb 2019 08:42)  POCT Blood Glucose.: 259 mg/dL (17 Feb 2019 21:27)  POCT Blood Glucose.: 306 mg/dL (17 Feb 2019 17:26)  POCT Blood Glucose.: 324 mg/dL (17 Feb 2019 15:00)  POCT Blood Glucose.: 239 mg/dL (17 Feb 2019 12:37)    I&O's Summary    17 Feb 2019 07:01  -  18 Feb 2019 07:00  --------------------------------------------------------  IN: 1775 mL / OUT: 325 mL / NET: 1450 mL        PHYSICAL EXAM:  GENERAL: NAD, well-developed  HEAD:  Atraumatic, Normocephalic  EYES: EOMI, PERRLA, conjunctiva and sclera clear  NECK: Supple, No JVD  CHEST/LUNG: Clear to auscultation bilaterally; No wheeze  HEART: Regular rate and rhythm; No murmurs, rubs, or gallops  ABDOMEN: Soft, Nontender, Nondistended; Bowel sounds present  EXTREMITIES:  2+ Peripheral Pulses, No clubbing, cyanosis, or edema  PSYCH: AAOx3  NEUROLOGY: (+) left hemiparesis  SKIN: No rashes or lesions    LABS:                        11.3   14.43 )-----------( 223      ( 17 Feb 2019 07:40 )             34.5     02-17    136  |  99  |  15  ----------------------------<  225<H>  4.2   |  25  |  0.63    Ca    8.8      17 Feb 2019 07:40  Phos  2.8     02-17  Mg     1.9     02-17                RADIOLOGY & ADDITIONAL TESTS:    Imaging Personally Reviewed:    Consultant(s) Notes Reviewed:  Neuro    Care Discussed with Consultants/Other Providers:

## 2019-02-18 NOTE — CHART NOTE - NSCHARTNOTEFT_GEN_A_CORE
Neurology Consult Service      MRI brain reviewed. Case discussed with stroke fellow, Dr. Best. From stroke perspective, 3 discrete foci of acute infarcts are not contraindication to heparin gtt. 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    Cristo Ramirez MD  PGY-2  Neurology resident

## 2019-02-18 NOTE — PROGRESS NOTE ADULT - PROBLEM SELECTOR PLAN 1
complicated by seizure d/o.  Continue keppra IV.  Pending neurosurgical procedure - potentially on 2/16 @ [facility] vs early next week @ [facility].  Patient had good functional status prior to admission with no documented TAY, LOC, CHF symptoms.  Medically optimized to proceed to neurosurgical intervention.  however, MRI showed 3 foci of acute infarcts. Procedure cancelled by Neurosurgery.  -Neuro input appreciated. Will obtain echo to search for source of emboli  -continue ASA

## 2019-02-18 NOTE — CHART NOTE - NSCHARTNOTEFT_GEN_A_CORE
LE U/S with acute DVT of left common femoral, popliteal, posterior tibial and peroneal vein. Discussed with neurosurgery- unable to start heparin gtt for AC as patient is planned for OR on Wednesday 2/20/19. Discussed with vascular- team unable to place IVC filter today. Writer and Dr. Us d/w Dr. Cope from interventional radiology regarding urgency of IVC filter as patient cannot be anticoagulated. Interventional radiology to determine if patient can be placed on the schedule for today. Will follow.    Therey Nicholson PA-C  [id number] LE U/S with acute DVT of left common femoral, popliteal, posterior tibial and peroneal vein. Discussed with neurosurgery- patient is planned for OR on Wednesday 2/20/19 however no contraindication to starting hep gtt from neurosurgery standpoint. OR may need to be delayed due intervention for DVT. Discussed with neurology- no contraindication from stroke perspective to start heparin gtt.     Discussed with vascular- team unable to place IVC filter today. Writer and Dr. Us d/w Dr. Cope from interventional radiology regarding urgency of IVC filter as patient cannot be anticoagulated. Interventional radiology to determine if patient can be placed on the schedule for today. If patient not going for IVC filter tonight, will start hep gtt at 19:00. No bolus with goal PTT 60-80. Discussed in depth with Dr. Us. Will monitor patient closely.     Heraclio Nicholson PA-C  [id number] LE U/S with acute DVT of left common femoral, popliteal, posterior tibial and peroneal vein. Discussed with neurosurgery- patient is planned for OR on Wednesday 2/20/19 however no contraindication to starting hep gtt from neurosurgery standpoint. OR may need to be delayed due intervention for DVT. Discussed with neurology- no contraindication from stroke perspective to start heparin gtt.     Discussed with vascular- team unable to place IVC filter today. Writer and Dr. Us d/w Dr. Cope from interventional radiology regarding urgency of IVC filter as patient cannot be anticoagulated. Interventional radiology to determine if patient can be placed on the schedule for today. If patient not going for IVC filter tonight, will start hep gtt at 19:00. No bolus with goal PTT 40-60 as per neurosurgery. Discussed in depth with Dr. Us. Will monitor patient closely.     Heraclio Nicholson PA-C  [id number]

## 2019-02-18 NOTE — CHART NOTE - NSCHARTNOTEFT_GEN_A_CORE
Patient with uncontrolled blood pressure. On Lisinopril-HCTZ at home. Started on Lisinopril 10mg daily. Monitor BP.

## 2019-02-19 DIAGNOSIS — I82.402 ACUTE EMBOLISM AND THROMBOSIS OF UNSPECIFIED DEEP VEINS OF LEFT LOWER EXTREMITY: ICD-10-CM

## 2019-02-19 LAB
ANION GAP SERPL CALC-SCNC: 12 MMO/L — SIGNIFICANT CHANGE UP (ref 7–14)
APTT BLD: 42.3 SEC — HIGH (ref 27.5–36.3)
APTT BLD: 78.7 SEC — HIGH (ref 27.5–36.3)
BACTERIA BLD CULT: SIGNIFICANT CHANGE UP
BACTERIA BLD CULT: SIGNIFICANT CHANGE UP
BASOPHILS # BLD AUTO: 0.07 K/UL — SIGNIFICANT CHANGE UP (ref 0–0.2)
BASOPHILS NFR BLD AUTO: 0.4 % — SIGNIFICANT CHANGE UP (ref 0–2)
BLD GP AB SCN SERPL QL: NEGATIVE — SIGNIFICANT CHANGE UP
BUN SERPL-MCNC: 16 MG/DL — SIGNIFICANT CHANGE UP (ref 7–23)
CALCIUM SERPL-MCNC: 9 MG/DL — SIGNIFICANT CHANGE UP (ref 8.4–10.5)
CHLORIDE SERPL-SCNC: 96 MMOL/L — LOW (ref 98–107)
CO2 SERPL-SCNC: 26 MMOL/L — SIGNIFICANT CHANGE UP (ref 22–31)
CREAT SERPL-MCNC: 0.64 MG/DL — SIGNIFICANT CHANGE UP (ref 0.5–1.3)
EOSINOPHIL # BLD AUTO: 0 K/UL — SIGNIFICANT CHANGE UP (ref 0–0.5)
EOSINOPHIL NFR BLD AUTO: 0 % — SIGNIFICANT CHANGE UP (ref 0–6)
GLUCOSE SERPL-MCNC: 291 MG/DL — HIGH (ref 70–99)
HCT VFR BLD CALC: 35.4 % — LOW (ref 39–50)
HCT VFR BLD CALC: 36.6 % — LOW (ref 39–50)
HGB BLD-MCNC: 12.1 G/DL — LOW (ref 13–17)
HGB BLD-MCNC: 12.2 G/DL — LOW (ref 13–17)
IMM GRANULOCYTES NFR BLD AUTO: 4.1 % — HIGH (ref 0–1.5)
INR BLD: 1.29 — HIGH (ref 0.88–1.17)
LYMPHOCYTES # BLD AUTO: 1.59 K/UL — SIGNIFICANT CHANGE UP (ref 1–3.3)
LYMPHOCYTES # BLD AUTO: 8.7 % — LOW (ref 13–44)
MAGNESIUM SERPL-MCNC: 1.8 MG/DL — SIGNIFICANT CHANGE UP (ref 1.6–2.6)
MCHC RBC-ENTMCNC: 29.7 PG — SIGNIFICANT CHANGE UP (ref 27–34)
MCHC RBC-ENTMCNC: 30.1 PG — SIGNIFICANT CHANGE UP (ref 27–34)
MCHC RBC-ENTMCNC: 33.3 % — SIGNIFICANT CHANGE UP (ref 32–36)
MCHC RBC-ENTMCNC: 34.2 % — SIGNIFICANT CHANGE UP (ref 32–36)
MCV RBC AUTO: 88.1 FL — SIGNIFICANT CHANGE UP (ref 80–100)
MCV RBC AUTO: 89.1 FL — SIGNIFICANT CHANGE UP (ref 80–100)
MONOCYTES # BLD AUTO: 0.88 K/UL — SIGNIFICANT CHANGE UP (ref 0–0.9)
MONOCYTES NFR BLD AUTO: 4.8 % — SIGNIFICANT CHANGE UP (ref 2–14)
NEUTROPHILS # BLD AUTO: 15.02 K/UL — HIGH (ref 1.8–7.4)
NEUTROPHILS NFR BLD AUTO: 82 % — HIGH (ref 43–77)
NRBC # FLD: 0.1 K/UL — LOW (ref 25–125)
NRBC # FLD: 0.11 K/UL — LOW (ref 25–125)
PHOSPHATE SERPL-MCNC: 2.7 MG/DL — SIGNIFICANT CHANGE UP (ref 2.5–4.5)
PLATELET # BLD AUTO: 204 K/UL — SIGNIFICANT CHANGE UP (ref 150–400)
PLATELET # BLD AUTO: 208 K/UL — SIGNIFICANT CHANGE UP (ref 150–400)
PMV BLD: 10.5 FL — SIGNIFICANT CHANGE UP (ref 7–13)
PMV BLD: 9.7 FL — SIGNIFICANT CHANGE UP (ref 7–13)
POTASSIUM SERPL-MCNC: 4 MMOL/L — SIGNIFICANT CHANGE UP (ref 3.5–5.3)
POTASSIUM SERPL-SCNC: 4 MMOL/L — SIGNIFICANT CHANGE UP (ref 3.5–5.3)
PROTHROM AB SERPL-ACNC: 14.4 SEC — HIGH (ref 9.8–13.1)
RBC # BLD: 4.02 M/UL — LOW (ref 4.2–5.8)
RBC # BLD: 4.11 M/UL — LOW (ref 4.2–5.8)
RBC # FLD: 15.1 % — HIGH (ref 10.3–14.5)
RBC # FLD: 15.2 % — HIGH (ref 10.3–14.5)
RH IG SCN BLD-IMP: POSITIVE — SIGNIFICANT CHANGE UP
SODIUM SERPL-SCNC: 134 MMOL/L — LOW (ref 135–145)
WBC # BLD: 17.24 K/UL — HIGH (ref 3.8–10.5)
WBC # BLD: 18.32 K/UL — HIGH (ref 3.8–10.5)
WBC # FLD AUTO: 17.24 K/UL — HIGH (ref 3.8–10.5)
WBC # FLD AUTO: 18.32 K/UL — HIGH (ref 3.8–10.5)

## 2019-02-19 PROCEDURE — 37191 INS ENDOVAS VENA CAVA FILTR: CPT

## 2019-02-19 PROCEDURE — 99233 SBSQ HOSP IP/OBS HIGH 50: CPT

## 2019-02-19 RX ORDER — HEPARIN SODIUM 5000 [USP'U]/ML
1300 INJECTION INTRAVENOUS; SUBCUTANEOUS
Qty: 25000 | Refills: 0 | Status: DISCONTINUED | OUTPATIENT
Start: 2019-02-19 | End: 2019-02-19

## 2019-02-19 RX ORDER — HEPARIN SODIUM 5000 [USP'U]/ML
1300 INJECTION INTRAVENOUS; SUBCUTANEOUS
Qty: 25000 | Refills: 0 | Status: DISCONTINUED | OUTPATIENT
Start: 2019-02-19 | End: 2019-02-20

## 2019-02-19 RX ADMIN — HEPARIN SODIUM 13 UNIT(S)/HR: 5000 INJECTION INTRAVENOUS; SUBCUTANEOUS at 18:50

## 2019-02-19 RX ADMIN — HEPARIN SODIUM 13 UNIT(S)/HR: 5000 INJECTION INTRAVENOUS; SUBCUTANEOUS at 02:22

## 2019-02-19 RX ADMIN — LISINOPRIL 10 MILLIGRAM(S): 2.5 TABLET ORAL at 17:23

## 2019-02-19 NOTE — PROGRESS NOTE ADULT - PROBLEM SELECTOR PLAN 2
New CVA on MRI  - F/U ALBERTO to r/o LV thrombus or PFO as embolic source.  - heparin gtt for now - discussed with neurosurgery and approved the hep gtt  - continue ASA New CVA on MRI - unlikely to be the etiology for the intractable seizure that he's been having.  - F/U ALBERTO to r/o LV thrombus or PFO as embolic source.  - heparin gtt for now - discussed with neurosurgery and approved the hep gtt  - continue ASA

## 2019-02-19 NOTE — CHART NOTE - NSCHARTNOTEFT_GEN_A_CORE
Interventional Radiology Brief- Operative Note    Operators: Guillermo Cope MD    Procedure:   IVC cavogram  Deployment of a retrievable Ruth IVC filter    Post-op Dx: Left lower extremity DVT, glioblastoma    EBL: 10 mL    Medications: 1% lidocaine    Contrast: 40 mL    Complications: no immediate complications    Findings/Plan:  IVC cavogram demonstrated no evidence of IVC thrombus or anatomic variation  Successful deployment of an infrarenal IVC filter   Restart Heparin per primary team Interventional Radiology Brief- Operative Note    Operators: Guillermo Cope MD    Procedure:   IVC cavogram  Deployment of a retrievable Ruth IVC filter    Post-op Dx: Left lower extremity DVT, glioblastoma    EBL: 10 mL    Medications: 1% lidocaine    Contrast: 40 mL    Complications: no immediate complications    Findings/Plan:  IVC cavogram demonstrated no evidence of IVC thrombus or anatomic variation  Successful deployment of an infrarenal IVC filter   Restart Heparin per primary team  When ready to restart anticoagulation, please contact IR for scheduling IVC filter retrieval

## 2019-02-19 NOTE — PROGRESS NOTE ADULT - ASSESSMENT
74M HTN, DM2, CVA w/ residual L weakness, GBM s/p Rt craniotomy 2018, BPH presents with SZ likely from recurrence of GBM complicated by sepsis POA from viral URI, acute embolic CVA, LLE DVT.

## 2019-02-19 NOTE — PROGRESS NOTE ADULT - PROBLEM SELECTOR PLAN 4
Likely contaminant. Repeat BCx from 2/14 - no growth. Monitor off abx.  ID consult appreciated - no contraindication to proceed to neurosurgical intervention.

## 2019-02-19 NOTE — PROGRESS NOTE ADULT - SUBJECTIVE AND OBJECTIVE BOX
CC: F/U for Seizure    SUBJECTIVE / OVERNIGHT EVENTS:  No new episodes of seizure overnight. Offers no new complaints.    No F/C, N/V, CP, SOB, Cough, lightheadedness, dizziness, abdominal pain, diarrhea, dysuria.    MEDICATIONS  (STANDING):  dexamethasone  Injectable 6 milliGRAM(s) IV Push every 6 hours  dextrose 5%. 1000 milliLiter(s) (50 mL/Hr) IV Continuous <Continuous>  dextrose 50% Injectable 12.5 Gram(s) IV Push once  dextrose 50% Injectable 25 Gram(s) IV Push once  dextrose 50% Injectable 25 Gram(s) IV Push once  heparin  Infusion 1300 Unit(s)/Hr (13 mL/Hr) IV Continuous <Continuous>  influenza   Vaccine 0.5 milliLiter(s) IntraMuscular once  insulin lispro (HumaLOG) corrective regimen sliding scale   SubCutaneous three times a day before meals  insulin lispro (HumaLOG) corrective regimen sliding scale   SubCutaneous at bedtime  levETIRAcetam  IVPB 1250 milliGRAM(s) IV Intermittent every 12 hours  lisinopril 10 milliGRAM(s) Oral daily  sodium chloride 0.9%. 1000 milliLiter(s) (75 mL/Hr) IV Continuous <Continuous>    MEDICATIONS  (PRN):  acetaminophen  Suppository .. 650 milliGRAM(s) Rectal every 6 hours PRN Temp greater or equal to 38C (100.4F)  dextrose 40% Gel 15 Gram(s) Oral once PRN Blood Glucose LESS THAN 70 milliGRAM(s)/deciliter  glucagon  Injectable 1 milliGRAM(s) IntraMuscular once PRN Glucose LESS THAN 70 milligrams/deciliter  LORazepam   Injectable 1 milliGRAM(s) IV Push every 6 hours PRN seizure      Vital Signs Last 24 Hrs  T(C): 37 (19 Feb 2019 09:29), Max: 37 (19 Feb 2019 09:29)  T(F): 98.6 (19 Feb 2019 09:29), Max: 98.6 (19 Feb 2019 09:29)  HR: 96 (19 Feb 2019 09:29) (60 - 96)  BP: 127/91 (19 Feb 2019 09:29) (116/88 - 148/96)  BP(mean): --  RR: 15 (19 Feb 2019 09:29) (15 - 18)  SpO2: 96% (19 Feb 2019 09:29) (96% - 97%)  CAPILLARY BLOOD GLUCOSE      POCT Blood Glucose.: 280 mg/dL (19 Feb 2019 06:18)  POCT Blood Glucose.: 323 mg/dL (18 Feb 2019 21:39)  POCT Blood Glucose.: 422 mg/dL (18 Feb 2019 21:37)  POCT Blood Glucose.: 360 mg/dL (18 Feb 2019 17:43)  POCT Blood Glucose.: 312 mg/dL (18 Feb 2019 12:31)    I&O's Summary    18 Feb 2019 07:01  -  19 Feb 2019 07:00  --------------------------------------------------------  IN: 1650 mL / OUT: 1030 mL / NET: 620 mL    19 Feb 2019 07:01  -  19 Feb 2019 12:18  --------------------------------------------------------  IN: 352 mL / OUT: 0 mL / NET: 352 mL        PHYSICAL EXAM:  GENERAL: NAD, well-developed  HEAD:  Atraumatic, Normocephalic  EYES: EOMI, PERRLA, conjunctiva and sclera clear  NECK: Supple, No JVD  CHEST/LUNG: Clear to auscultation bilaterally; No wheeze  HEART: Regular rate and rhythm; No murmurs, rubs, or gallops  ABDOMEN: Soft, Nontender, Nondistended; Bowel sounds present  EXTREMITIES:  2+ Peripheral Pulses, No clubbing, cyanosis. LLE edema present.  PSYCH: AAOx3  NEUROLOGY: (+) left hemiparesis  SKIN: No rashes or lesions    LABS:                        12.2   18.32 )-----------( 208      ( 19 Feb 2019 07:50 )             36.6     02-19    134<L>  |  96<L>  |  16  ----------------------------<  291<H>  4.0   |  26  |  0.64    Ca    9.0      19 Feb 2019 07:50  Phos  2.7     02-19  Mg     1.8     02-19      PT/INR - ( 19 Feb 2019 07:50 )   PT: 14.4 SEC;   INR: 1.29          PTT - ( 19 Feb 2019 07:50 )  PTT:78.7 SEC          RADIOLOGY & ADDITIONAL TESTS:  < from: US Duplex Venous Lower Ext Complete, Bilateral (02.18.19 @ 14:30) >  IMPRESSION:     Acute DVT of left common femoral, popliteal, posterior tibial and   peroneal vein.    Dr. Ma discussed the findings with LOVE Nicholson at 2:41 PM on   2/18/2019 with read back.               ALMA MA M.D., RADIOLOGY RESIDENT  This document has been electronically signed.  NIHARIKA MORAN M.D., ATTENDING RADIOLOGIST  This document has been electronically signed. Feb 18 2019  2:47PM    < end of copied text >    Imaging Personally Reviewed:    Care Discussed with Consultants/Other Providers:

## 2019-02-19 NOTE — PROGRESS NOTE ADULT - PROBLEM SELECTOR PLAN 1
complicated by seizure d/o.  Continue keppra IV.    - Pending neurosurgical procedure - awaiting IVC placement to discontinue hep gtt  - Patient had good functional status prior to admission with no documented TAY, LOC, CHF symptoms.  Medically optimized to proceed to neurosurgical intervention. complicated by seizure d/o.  Continue keppra IV  - This malignancy is very vascular and ideally would avoid anticoagulation if possible - but due to extensive DVT and embolic CVA, benefits of A/C outweighs the risks at this time  - Pending neurosurgical procedure - planned for 2/20  - awaiting IVC placement to discontinue hep gtt for the craniotomy  - Patient had good functional status prior to admission with no documented TAY, LOC, CHF symptoms.  Medically optimized to proceed to neurosurgical intervention.

## 2019-02-19 NOTE — PROGRESS NOTE ADULT - PROBLEM SELECTOR PLAN 3
- on hep gtt  - IR planning to place IVC filter Likely due to history of malignancy and post-CVA L sided weakness as baseline.   - on hep gtt  - IR planning to place IVC filter

## 2019-02-20 LAB
ANION GAP SERPL CALC-SCNC: 13 MMO/L — SIGNIFICANT CHANGE UP (ref 7–14)
APTT BLD: 20.5 SEC — LOW (ref 27.5–36.3)
APTT BLD: 51.6 SEC — HIGH (ref 27.5–36.3)
BUN SERPL-MCNC: 17 MG/DL — SIGNIFICANT CHANGE UP (ref 7–23)
CALCIUM SERPL-MCNC: 8.9 MG/DL — SIGNIFICANT CHANGE UP (ref 8.4–10.5)
CHLORIDE SERPL-SCNC: 96 MMOL/L — LOW (ref 98–107)
CO2 SERPL-SCNC: 24 MMOL/L — SIGNIFICANT CHANGE UP (ref 22–31)
CREAT SERPL-MCNC: 0.62 MG/DL — SIGNIFICANT CHANGE UP (ref 0.5–1.3)
GLUCOSE SERPL-MCNC: 279 MG/DL — HIGH (ref 70–99)
HCT VFR BLD CALC: 37 % — LOW (ref 39–50)
HCT VFR BLD CALC: 39.2 % — SIGNIFICANT CHANGE UP (ref 39–50)
HGB BLD-MCNC: 12.2 G/DL — LOW (ref 13–17)
HGB BLD-MCNC: 12.9 G/DL — LOW (ref 13–17)
INR BLD: 1.18 — HIGH (ref 0.88–1.17)
MAGNESIUM SERPL-MCNC: 1.8 MG/DL — SIGNIFICANT CHANGE UP (ref 1.6–2.6)
MCHC RBC-ENTMCNC: 29.6 PG — SIGNIFICANT CHANGE UP (ref 27–34)
MCHC RBC-ENTMCNC: 29.8 PG — SIGNIFICANT CHANGE UP (ref 27–34)
MCHC RBC-ENTMCNC: 32.9 % — SIGNIFICANT CHANGE UP (ref 32–36)
MCHC RBC-ENTMCNC: 33 % — SIGNIFICANT CHANGE UP (ref 32–36)
MCV RBC AUTO: 89.8 FL — SIGNIFICANT CHANGE UP (ref 80–100)
MCV RBC AUTO: 90.5 FL — SIGNIFICANT CHANGE UP (ref 80–100)
NRBC # FLD: 0.03 K/UL — LOW (ref 25–125)
NRBC # FLD: 0.04 K/UL — LOW (ref 25–125)
PHOSPHATE SERPL-MCNC: 3.5 MG/DL — SIGNIFICANT CHANGE UP (ref 2.5–4.5)
PLATELET # BLD AUTO: 203 K/UL — SIGNIFICANT CHANGE UP (ref 150–400)
PLATELET # BLD AUTO: 216 K/UL — SIGNIFICANT CHANGE UP (ref 150–400)
PMV BLD: 10.2 FL — SIGNIFICANT CHANGE UP (ref 7–13)
PMV BLD: 10.8 FL — SIGNIFICANT CHANGE UP (ref 7–13)
POTASSIUM SERPL-MCNC: 4.2 MMOL/L — SIGNIFICANT CHANGE UP (ref 3.5–5.3)
POTASSIUM SERPL-SCNC: 4.2 MMOL/L — SIGNIFICANT CHANGE UP (ref 3.5–5.3)
PROTHROM AB SERPL-ACNC: 13.5 SEC — HIGH (ref 9.8–13.1)
RBC # BLD: 4.12 M/UL — LOW (ref 4.2–5.8)
RBC # BLD: 4.33 M/UL — SIGNIFICANT CHANGE UP (ref 4.2–5.8)
RBC # FLD: 15.4 % — HIGH (ref 10.3–14.5)
RBC # FLD: 15.6 % — HIGH (ref 10.3–14.5)
SODIUM SERPL-SCNC: 133 MMOL/L — LOW (ref 135–145)
WBC # BLD: 15.76 K/UL — HIGH (ref 3.8–10.5)
WBC # BLD: 16.55 K/UL — HIGH (ref 3.8–10.5)
WBC # FLD AUTO: 15.76 K/UL — HIGH (ref 3.8–10.5)
WBC # FLD AUTO: 16.55 K/UL — HIGH (ref 3.8–10.5)

## 2019-02-20 PROCEDURE — 93325 DOPPLER ECHO COLOR FLOW MAPG: CPT | Mod: 26,GC

## 2019-02-20 PROCEDURE — 99233 SBSQ HOSP IP/OBS HIGH 50: CPT

## 2019-02-20 PROCEDURE — 93312 ECHO TRANSESOPHAGEAL: CPT | Mod: 26

## 2019-02-20 PROCEDURE — 93320 DOPPLER ECHO COMPLETE: CPT | Mod: 26,GC

## 2019-02-20 PROCEDURE — ZZZZZ: CPT

## 2019-02-20 RX ADMIN — LISINOPRIL 10 MILLIGRAM(S): 2.5 TABLET ORAL at 05:21

## 2019-02-20 NOTE — PROGRESS NOTE ADULT - ASSESSMENT
74M HTN, DM2, CVA w/ residual L weakness, GBM s/p Rt craniotomy 2018, BPH presents with SZ likely from recurrence of GBM complicated by sepsis POA from viral URI, acute embolic CVA, LLE DVT s/p IVC.

## 2019-02-20 NOTE — PROGRESS NOTE ADULT - SUBJECTIVE AND OBJECTIVE BOX
CC: F/U for seizures    SUBJECTIVE / OVERNIGHT EVENTS:  No new events overnight.  Tolerated IVC filter placement well.  No F/C, N/V, CP, SOB, Cough, lightheadedness, dizziness, abdominal pain, diarrhea, dysuria.    MEDICATIONS  (STANDING):  dexamethasone  Injectable 6 milliGRAM(s) IV Push every 6 hours  dextrose 5%. 1000 milliLiter(s) (50 mL/Hr) IV Continuous <Continuous>  dextrose 50% Injectable 12.5 Gram(s) IV Push once  dextrose 50% Injectable 25 Gram(s) IV Push once  dextrose 50% Injectable 25 Gram(s) IV Push once  influenza   Vaccine 0.5 milliLiter(s) IntraMuscular once  insulin lispro (HumaLOG) corrective regimen sliding scale   SubCutaneous three times a day before meals  insulin lispro (HumaLOG) corrective regimen sliding scale   SubCutaneous at bedtime  levETIRAcetam  IVPB 1250 milliGRAM(s) IV Intermittent every 12 hours  lisinopril 10 milliGRAM(s) Oral daily  sodium chloride 0.9%. 1000 milliLiter(s) (75 mL/Hr) IV Continuous <Continuous>    MEDICATIONS  (PRN):  acetaminophen  Suppository .. 650 milliGRAM(s) Rectal every 6 hours PRN Temp greater or equal to 38C (100.4F)  dextrose 40% Gel 15 Gram(s) Oral once PRN Blood Glucose LESS THAN 70 milliGRAM(s)/deciliter  glucagon  Injectable 1 milliGRAM(s) IntraMuscular once PRN Glucose LESS THAN 70 milligrams/deciliter  LORazepam   Injectable 1 milliGRAM(s) IV Push every 6 hours PRN seizure      Vital Signs Last 24 Hrs  T(C): 36.6 (20 Feb 2019 13:15), Max: 36.6 (19 Feb 2019 14:53)  T(F): 97.8 (20 Feb 2019 13:15), Max: 97.9 (19 Feb 2019 17:53)  HR: 47 (20 Feb 2019 13:15) (47 - 56)  BP: 143/89 (20 Feb 2019 13:15) (129/88 - 148/91)  BP(mean): --  RR: 16 (20 Feb 2019 13:15) (14 - 17)  SpO2: 95% (20 Feb 2019 13:15) (95% - 100%)  CAPILLARY BLOOD GLUCOSE      POCT Blood Glucose.: 232 mg/dL (20 Feb 2019 12:07)  POCT Blood Glucose.: 247 mg/dL (20 Feb 2019 05:50)  POCT Blood Glucose.: 242 mg/dL (19 Feb 2019 23:29)  POCT Blood Glucose.: 201 mg/dL (19 Feb 2019 17:51)  POCT Blood Glucose.: 212 mg/dL (19 Feb 2019 14:46)    I&O's Summary    19 Feb 2019 07:01  -  20 Feb 2019 07:00  --------------------------------------------------------  IN: 1028 mL / OUT: 400 mL / NET: 628 mL        PHYSICAL EXAM:  GENERAL: NAD, well-developed  HEAD:  Atraumatic, Normocephalic  EYES: EOMI, PERRLA, conjunctiva and sclera clear  NECK: Supple, No JVD  CHEST/LUNG: Clear to auscultation bilaterally; No wheeze  HEART: Regular rate and rhythm; No murmurs, rubs, or gallops  ABDOMEN: Soft, Nontender, Nondistended; Bowel sounds present  EXTREMITIES:  2+ Peripheral Pulses, No clubbing, cyanosis. LLE edema present.  PSYCH: AAOx3  NEUROLOGY: (+) left hemiparesis  SKIN: No rashes or lesions    LABS:                        12.9   16.55 )-----------( 216      ( 20 Feb 2019 08:59 )             39.2     02-20    133<L>  |  96<L>  |  17  ----------------------------<  279<H>  4.2   |  24  |  0.62    Ca    8.9      20 Feb 2019 08:05  Phos  3.5     02-20  Mg     1.8     02-20      PT/INR - ( 20 Feb 2019 08:59 )   PT: 13.5 SEC;   INR: 1.18          PTT - ( 20 Feb 2019 08:59 )  PTT:20.5 SEC          RADIOLOGY & ADDITIONAL TESTS:    Imaging Personally Reviewed:    Care Discussed with Consultants/Other Providers:

## 2019-02-20 NOTE — PROGRESS NOTE ADULT - PROBLEM SELECTOR PLAN 2
New CVA on MRI - unlikely to be the etiology for the intractable seizure that he's been having.  - F/U ALBERTO to r/o LV thrombus or PFO as embolic source.  - off heparin gtt  - continue ASA

## 2019-02-20 NOTE — PROGRESS NOTE ADULT - SUBJECTIVE AND OBJECTIVE BOX
neurosurgery preop                          12.2   15.76 )-----------( 203      ( 20 Feb 2019 01:00 )             37.0   02-19    134<L>  |  96<L>  |  16  ----------------------------<  291<H>  4.0   |  26  |  0.64    Ca    9.0      19 Feb 2019 07:50  Phos  2.7     02-19  Mg     1.8     02-19    PT/INR - ( 19 Feb 2019 07:50 )   PT: 14.4 SEC;   INR: 1.29          PTT - ( 20 Feb 2019 01:00 )  PTT:51.6 SEC    Type + Screen (02.19.19 @ 06:00)    ABO Interpretation: B    Rh Interpretation: Positive    Antibody Screen: Negative

## 2019-02-20 NOTE — PROGRESS NOTE ADULT - PROBLEM SELECTOR PLAN 1
complicated by seizure d/o.  Continue keppra IV  - Pending neurosurgical procedure - planned for 2/20  - Patient had good functional status prior to admission with no documented TAY, LOC, CHF symptoms.  Medically optimized to proceed to neurosurgical intervention.

## 2019-02-21 LAB
ANION GAP SERPL CALC-SCNC: 11 MMO/L — SIGNIFICANT CHANGE UP (ref 7–14)
BASOPHILS # BLD AUTO: 0.04 K/UL — SIGNIFICANT CHANGE UP (ref 0–0.2)
BASOPHILS NFR BLD AUTO: 0.2 % — SIGNIFICANT CHANGE UP (ref 0–2)
BUN SERPL-MCNC: 19 MG/DL — SIGNIFICANT CHANGE UP (ref 7–23)
CALCIUM SERPL-MCNC: 8.3 MG/DL — LOW (ref 8.4–10.5)
CHLORIDE SERPL-SCNC: 97 MMOL/L — LOW (ref 98–107)
CO2 SERPL-SCNC: 24 MMOL/L — SIGNIFICANT CHANGE UP (ref 22–31)
CREAT SERPL-MCNC: 0.64 MG/DL — SIGNIFICANT CHANGE UP (ref 0.5–1.3)
EOSINOPHIL # BLD AUTO: 0 K/UL — SIGNIFICANT CHANGE UP (ref 0–0.5)
EOSINOPHIL NFR BLD AUTO: 0 % — SIGNIFICANT CHANGE UP (ref 0–6)
GLUCOSE SERPL-MCNC: 306 MG/DL — HIGH (ref 70–99)
HCT VFR BLD CALC: 35.9 % — LOW (ref 39–50)
HGB BLD-MCNC: 12.1 G/DL — LOW (ref 13–17)
IMM GRANULOCYTES NFR BLD AUTO: 1.8 % — HIGH (ref 0–1.5)
LYMPHOCYTES # BLD AUTO: 1.02 K/UL — SIGNIFICANT CHANGE UP (ref 1–3.3)
LYMPHOCYTES # BLD AUTO: 5.3 % — LOW (ref 13–44)
MAGNESIUM SERPL-MCNC: 1.9 MG/DL — SIGNIFICANT CHANGE UP (ref 1.6–2.6)
MCHC RBC-ENTMCNC: 30 PG — SIGNIFICANT CHANGE UP (ref 27–34)
MCHC RBC-ENTMCNC: 33.7 % — SIGNIFICANT CHANGE UP (ref 32–36)
MCV RBC AUTO: 88.9 FL — SIGNIFICANT CHANGE UP (ref 80–100)
MONOCYTES # BLD AUTO: 1.07 K/UL — HIGH (ref 0–0.9)
MONOCYTES NFR BLD AUTO: 5.5 % — SIGNIFICANT CHANGE UP (ref 2–14)
NEUTROPHILS # BLD AUTO: 16.81 K/UL — HIGH (ref 1.8–7.4)
NEUTROPHILS NFR BLD AUTO: 87.2 % — HIGH (ref 43–77)
NRBC # FLD: 0.02 K/UL — LOW (ref 25–125)
PHOSPHATE SERPL-MCNC: 2.4 MG/DL — LOW (ref 2.5–4.5)
PLATELET # BLD AUTO: 165 K/UL — SIGNIFICANT CHANGE UP (ref 150–400)
PMV BLD: 11 FL — SIGNIFICANT CHANGE UP (ref 7–13)
POTASSIUM SERPL-MCNC: 4.1 MMOL/L — SIGNIFICANT CHANGE UP (ref 3.5–5.3)
POTASSIUM SERPL-SCNC: 4.1 MMOL/L — SIGNIFICANT CHANGE UP (ref 3.5–5.3)
RBC # BLD: 4.04 M/UL — LOW (ref 4.2–5.8)
RBC # FLD: 15.4 % — HIGH (ref 10.3–14.5)
SODIUM SERPL-SCNC: 132 MMOL/L — LOW (ref 135–145)
WBC # BLD: 19.29 K/UL — HIGH (ref 3.8–10.5)
WBC # FLD AUTO: 19.29 K/UL — HIGH (ref 3.8–10.5)

## 2019-02-21 PROCEDURE — 99233 SBSQ HOSP IP/OBS HIGH 50: CPT

## 2019-02-21 RX ORDER — INSULIN GLARGINE 100 [IU]/ML
6 INJECTION, SOLUTION SUBCUTANEOUS AT BEDTIME
Qty: 0 | Refills: 0 | Status: DISCONTINUED | OUTPATIENT
Start: 2019-02-21 | End: 2019-02-22

## 2019-02-21 RX ADMIN — INSULIN GLARGINE 6 UNIT(S): 100 INJECTION, SOLUTION SUBCUTANEOUS at 22:58

## 2019-02-21 RX ADMIN — LISINOPRIL 10 MILLIGRAM(S): 2.5 TABLET ORAL at 05:58

## 2019-02-21 NOTE — PROGRESS NOTE ADULT - ATTENDING COMMENTS
Fabrice Jackson MD  pager# [phone] Updated the son on a phone about patient's condition.  Answered all the questions.    Fabrice Jackson MD  pager# [phone]

## 2019-02-21 NOTE — PROVIDER CONTACT NOTE (CRITICAL VALUE NOTIFICATION) - ACTION/TREATMENT ORDERED:
BMP re-ordered.
RRT called. Stat CTH. AM blood work.
provider made aware. insulin coverage administered as ordered. RN educated pt on diet.

## 2019-02-21 NOTE — PROGRESS NOTE ADULT - PROBLEM SELECTOR PLAN 2
New CVA on MRI - likely embolic due to DVT and PFO.  - will review the need for ASA after discussion with neurology.

## 2019-02-21 NOTE — PROGRESS NOTE ADULT - PROBLEM SELECTOR PLAN 4
FS QID, NISS.  Continue to monitor. FS QID, NISS.  Likely elevated due to decadron.  Added lantus for more optimal control.

## 2019-02-21 NOTE — PROGRESS NOTE ADULT - SUBJECTIVE AND OBJECTIVE BOX
CC: F/U for SZ    SUBJECTIVE / OVERNIGHT EVENTS:  Unable to get neurosurgical procedure yesterday.  Patient denies any new complaints.  Upset about being NPO all day yesterday but explained to him that we were hoping to get him to OR yesterday - patient understood.  No F/C, N/V, CP, SOB, Cough, lightheadedness, dizziness, abdominal pain, diarrhea, dysuria.    MEDICATIONS  (STANDING):  dexamethasone  Injectable 6 milliGRAM(s) IV Push every 6 hours  dextrose 5%. 1000 milliLiter(s) (50 mL/Hr) IV Continuous <Continuous>  dextrose 50% Injectable 12.5 Gram(s) IV Push once  dextrose 50% Injectable 25 Gram(s) IV Push once  dextrose 50% Injectable 25 Gram(s) IV Push once  influenza   Vaccine 0.5 milliLiter(s) IntraMuscular once  insulin glargine Injectable (LANTUS) 6 Unit(s) SubCutaneous at bedtime  insulin lispro (HumaLOG) corrective regimen sliding scale   SubCutaneous at bedtime  insulin lispro (HumaLOG) corrective regimen sliding scale   SubCutaneous three times a day before meals  levETIRAcetam  IVPB 1250 milliGRAM(s) IV Intermittent every 12 hours  lisinopril 10 milliGRAM(s) Oral daily  sodium chloride 0.9%. 1000 milliLiter(s) (75 mL/Hr) IV Continuous <Continuous>    MEDICATIONS  (PRN):  acetaminophen  Suppository .. 650 milliGRAM(s) Rectal every 6 hours PRN Temp greater or equal to 38C (100.4F)  dextrose 40% Gel 15 Gram(s) Oral once PRN Blood Glucose LESS THAN 70 milliGRAM(s)/deciliter  glucagon  Injectable 1 milliGRAM(s) IntraMuscular once PRN Glucose LESS THAN 70 milligrams/deciliter  LORazepam   Injectable 1 milliGRAM(s) IV Push every 6 hours PRN seizure      Vital Signs Last 24 Hrs  T(C): 36.8 (21 Feb 2019 09:15), Max: 37 (21 Feb 2019 05:15)  T(F): 98.2 (21 Feb 2019 09:15), Max: 98.6 (21 Feb 2019 05:15)  HR: 59 (21 Feb 2019 09:15) (59 - 74)  BP: 139/97 (21 Feb 2019 09:15) (110/75 - 139/97)  BP(mean): 88 (20 Feb 2019 21:15) (88 - 88)  RR: 16 (21 Feb 2019 09:15) (15 - 16)  SpO2: 97% (21 Feb 2019 09:15) (97% - 100%)  CAPILLARY BLOOD GLUCOSE      POCT Blood Glucose.: 321 mg/dL (21 Feb 2019 12:42)  POCT Blood Glucose.: 272 mg/dL (21 Feb 2019 09:01)  POCT Blood Glucose.: 450 mg/dL (20 Feb 2019 23:15)  POCT Blood Glucose.: 396 mg/dL (20 Feb 2019 17:51)    I&O's Summary    20 Feb 2019 07:01  -  21 Feb 2019 07:00  --------------------------------------------------------  IN: 100 mL / OUT: 200 mL / NET: -100 mL        PHYSICAL EXAM:  GENERAL: NAD, well-developed  HEAD:  Atraumatic, Normocephalic  EYES: EOMI, PERRLA, conjunctiva and sclera clear  NECK: Supple, No JVD  CHEST/LUNG: Clear to auscultation bilaterally; No wheeze  HEART: Regular rate and rhythm; No murmurs, rubs, or gallops  ABDOMEN: Soft, Nontender, Nondistended; Bowel sounds present  EXTREMITIES:  2+ Peripheral Pulses, No clubbing, cyanosis. LLE edema present.  PSYCH: AAOx3  NEUROLOGY: (+) left hemiparesis  SKIN: No rashes or lesions    LABS:                        12.1   19.29 )-----------( 165      ( 21 Feb 2019 06:40 )             35.9     02-21    132<L>  |  97<L>  |  19  ----------------------------<  306<H>  4.1   |  24  |  0.64    Ca    8.3<L>      21 Feb 2019 06:40  Phos  2.4     02-21  Mg     1.9     02-21      PT/INR - ( 20 Feb 2019 08:59 )   PT: 13.5 SEC;   INR: 1.18          PTT - ( 20 Feb 2019 08:59 )  PTT:20.5 SEC          RADIOLOGY & ADDITIONAL TESTS:  < from: Transesophageal Echocardiogram w/o TTE (02.20.19 @ 17:14) >  CONCLUSIONS:  1. Normal mitral valve. Minimal mitral regurgitation.  2. Calcified trileaflet aortic valve with normal opening.  3. Normal left ventricular systolic function. No segmental  wall motion abnormalities.  4. Normal right ventricular size and function.  5. Agitated saline injection demonstrates evidence of a  patent foramen ovale.  ------------------------------------------------------------------------  Confirmed on  2/20/2019 - 17:59:52 by Angel Bob M.D. [facility]  ------------------------------------------------------------------------    < end of copied text >    Imaging Personally Reviewed:    Care Discussed with Consultants/Other Providers:

## 2019-02-21 NOTE — PROGRESS NOTE ADULT - PROBLEM SELECTOR PLAN 1
complicated by seizure d/o.  Continue keppra IV  - Awaiting Neurosurgery for updated OR date  - Patient had good functional status prior to admission with no documented TAY, LOC, CHF symptoms.  Medically optimized to proceed to neurosurgical intervention. ALBERTO and tele monitor reviewed.

## 2019-02-22 LAB
ANION GAP SERPL CALC-SCNC: 11 MMO/L — SIGNIFICANT CHANGE UP (ref 7–14)
BASOPHILS # BLD AUTO: 0.05 K/UL — SIGNIFICANT CHANGE UP (ref 0–0.2)
BASOPHILS NFR BLD AUTO: 0.2 % — SIGNIFICANT CHANGE UP (ref 0–2)
BUN SERPL-MCNC: 21 MG/DL — SIGNIFICANT CHANGE UP (ref 7–23)
CALCIUM SERPL-MCNC: 8.4 MG/DL — SIGNIFICANT CHANGE UP (ref 8.4–10.5)
CHLORIDE SERPL-SCNC: 98 MMOL/L — SIGNIFICANT CHANGE UP (ref 98–107)
CO2 SERPL-SCNC: 25 MMOL/L — SIGNIFICANT CHANGE UP (ref 22–31)
CREAT SERPL-MCNC: 0.72 MG/DL — SIGNIFICANT CHANGE UP (ref 0.5–1.3)
EOSINOPHIL # BLD AUTO: 0 K/UL — SIGNIFICANT CHANGE UP (ref 0–0.5)
EOSINOPHIL NFR BLD AUTO: 0 % — SIGNIFICANT CHANGE UP (ref 0–6)
GLUCOSE SERPL-MCNC: 375 MG/DL — HIGH (ref 70–99)
HCT VFR BLD CALC: 34.7 % — LOW (ref 39–50)
HGB BLD-MCNC: 11.4 G/DL — LOW (ref 13–17)
IMM GRANULOCYTES NFR BLD AUTO: 2.2 % — HIGH (ref 0–1.5)
LYMPHOCYTES # BLD AUTO: 0.99 K/UL — LOW (ref 1–3.3)
LYMPHOCYTES # BLD AUTO: 4.7 % — LOW (ref 13–44)
MAGNESIUM SERPL-MCNC: 1.8 MG/DL — SIGNIFICANT CHANGE UP (ref 1.6–2.6)
MCHC RBC-ENTMCNC: 30.1 PG — SIGNIFICANT CHANGE UP (ref 27–34)
MCHC RBC-ENTMCNC: 32.9 % — SIGNIFICANT CHANGE UP (ref 32–36)
MCV RBC AUTO: 91.6 FL — SIGNIFICANT CHANGE UP (ref 80–100)
MONOCYTES # BLD AUTO: 1.21 K/UL — HIGH (ref 0–0.9)
MONOCYTES NFR BLD AUTO: 5.8 % — SIGNIFICANT CHANGE UP (ref 2–14)
NEUTROPHILS # BLD AUTO: 18.3 K/UL — HIGH (ref 1.8–7.4)
NEUTROPHILS NFR BLD AUTO: 87.1 % — HIGH (ref 43–77)
NRBC # FLD: 0 K/UL — LOW (ref 25–125)
PHOSPHATE SERPL-MCNC: 2.1 MG/DL — LOW (ref 2.5–4.5)
PLATELET # BLD AUTO: 135 K/UL — LOW (ref 150–400)
PMV BLD: 11.2 FL — SIGNIFICANT CHANGE UP (ref 7–13)
POTASSIUM SERPL-MCNC: 4.1 MMOL/L — SIGNIFICANT CHANGE UP (ref 3.5–5.3)
POTASSIUM SERPL-SCNC: 4.1 MMOL/L — SIGNIFICANT CHANGE UP (ref 3.5–5.3)
RBC # BLD: 3.79 M/UL — LOW (ref 4.2–5.8)
RBC # FLD: 15.6 % — HIGH (ref 10.3–14.5)
SODIUM SERPL-SCNC: 134 MMOL/L — LOW (ref 135–145)
WBC # BLD: 21.02 K/UL — HIGH (ref 3.8–10.5)
WBC # FLD AUTO: 21.02 K/UL — HIGH (ref 3.8–10.5)

## 2019-02-22 PROCEDURE — 99233 SBSQ HOSP IP/OBS HIGH 50: CPT

## 2019-02-22 RX ORDER — INSULIN GLARGINE 100 [IU]/ML
10 INJECTION, SOLUTION SUBCUTANEOUS AT BEDTIME
Qty: 0 | Refills: 0 | Status: DISCONTINUED | OUTPATIENT
Start: 2019-02-22 | End: 2019-02-23

## 2019-02-22 RX ORDER — MAGNESIUM SULFATE 500 MG/ML
1 VIAL (ML) INJECTION ONCE
Qty: 0 | Refills: 0 | Status: COMPLETED | OUTPATIENT
Start: 2019-02-22 | End: 2019-02-22

## 2019-02-22 RX ORDER — LISINOPRIL 2.5 MG/1
20 TABLET ORAL DAILY
Qty: 0 | Refills: 0 | Status: DISCONTINUED | OUTPATIENT
Start: 2019-02-22 | End: 2019-03-06

## 2019-02-22 RX ADMIN — INSULIN GLARGINE 10 UNIT(S): 100 INJECTION, SOLUTION SUBCUTANEOUS at 22:12

## 2019-02-22 RX ADMIN — LISINOPRIL 20 MILLIGRAM(S): 2.5 TABLET ORAL at 12:49

## 2019-02-22 RX ADMIN — Medication 100 GRAM(S): at 17:50

## 2019-02-22 NOTE — DIETITIAN INITIAL EVALUATION ADULT. - PROBLEM SELECTOR PLAN 2
Likely due to 2/2 Coronavirus infection, but could also be due to aspiration since patient with prolonged seizure activity  - CXR with no infiltrates concerning for PNA. s/p 2L NS and vanc zosyn in ED.  - cont supportive care  - cont IVF at 75cc hr x10 hr, then re-evaluate for continuation (especially since possibility of rhabdomyolysis)  - f/u blood cultures  - monitor fever curve, acetaminophen PRN  - monitor leukocytosis, bmp and CK  - (leukocytosis may also be influenced by chronic steroid use, as well as stress demargination from seizure activity)  - ID consult in the AM (please call)

## 2019-02-22 NOTE — CHART NOTE - NSCHARTNOTEFT_GEN_A_CORE
Imaging and notes reviewed  Patient has IVC filter in place for +PFO, found to have DVT  No contraindication from neurovascular standpoint for resection of GBM  Avoid intraop and post-op hypotensive episodes given recent infarcts    Discussed with Dr Libman and Dr Renetta Luna  [facility] Neurology Resident Imaging and notes reviewed  Patient has IVC filter in place for +PFO, found to have DVT  No contraindication from neurovascular standpoint for resection of GBM  Avoid intraop and post-op hypotension given recent infarcts    Discussed with Dr Libman and Dr Renetta Luna  [facility]2 Neurology Resident    Stroke attending. Agree with above.

## 2019-02-22 NOTE — PROGRESS NOTE ADULT - PROBLEM SELECTOR PLAN 2
New CVA on MRI - likely embolic due to DVT and PFO.  - IVC filter in place  - neuro to clear the patient for craniotomy

## 2019-02-22 NOTE — DIETITIAN INITIAL EVALUATION ADULT. - PROBLEM SELECTOR PLAN 3
Not on home insulin.  - start ISS, FS TID and QHS  - diabetic diet (when optimal)/ monitor blood glucose

## 2019-02-22 NOTE — PROGRESS NOTE ADULT - ASSESSMENT
74M HTN, DM2, CVA w/ residual L weakness, GBM s/p Rt craniotomy 2018, BPH presents with SZ likely from recurrence of GBM complicated by sepsis POA from viral URI, acute embolic CVA, PFO, LLE DVT s/p IVC.

## 2019-02-22 NOTE — DIETITIAN INITIAL EVALUATION ADULT. - ENERGY NEEDS
Ht: 70 in Wt: (2/21) 195.3 pounds  BMI: 28.0   IBW: 166 pounds   Skin: No edema, no pressure injuries

## 2019-02-22 NOTE — DIETITIAN INITIAL EVALUATION ADULT. - PROBLEM SELECTOR PLAN 5
Currently NPO due to prolonged seizure activity, AMS (improving)  - reassess for ability to tolerate oral nutrition  - IVF in progress; evaluate for continuation  - f/u AM lab-work

## 2019-02-22 NOTE — DIETITIAN INITIAL EVALUATION ADULT. - PROBLEM SELECTOR PLAN 1
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 Significant leukocytosis and fever in setting of chronic steroid use and after seizure episode of ~1 hr.   - initially with unintelligible slow speech, but with some improvement when seen again this AM  - Evaluated by Neuro. CT with edema in right temporal region concerning for post op changes vs recurrence of tumor.  S/P keppra 1g in ED and dexamethasone 10mg. Also received Vanc and zosyn  - Neuro recs appreciated, start keppra 750 TID  - f/u in AM with Day Neuro team to determine need for MR spec vs MR w/wo cont as pt with recent MRI 2 weeks ago and to determine need for continuing steroids  - Consider EEG in AM to eval for ongoing seizure activity  - repeat VBG to monitor lactate and CK in AM  - supportive care for viral infection  - cont zosyn for possible aspiration event during seizure, consider repeat CXR in 24 hr  - IVF hydration  - f/u renal function  - HOB elevation, Aspiration precautions

## 2019-02-22 NOTE — DIETITIAN INITIAL EVALUATION ADULT. - PERTINENT MEDS FT
CAPILLARY BLOOD GLUCOSE  POCT Blood Glucose.: 462 mg/dL (22 Feb 2019 12:36)  POCT Blood Glucose.: 339 mg/dL (22 Feb 2019 09:01)  POCT Blood Glucose.: 399 mg/dL (21 Feb 2019 22:09)  POCT Blood Glucose.: 430 mg/dL (21 Feb 2019 22:00)  POCT Blood Glucose.: 344 mg/dL (21 Feb 2019 17:27)    MEDICATIONS  (STANDING):  dexamethasone  Injectable 6 milliGRAM(s) IV Push every 6 hours  dextrose 5%. 1000 milliLiter(s) (50 mL/Hr) IV Continuous <Continuous>  dextrose 50% Injectable 12.5 Gram(s) IV Push once  dextrose 50% Injectable 25 Gram(s) IV Push once  dextrose 50% Injectable 25 Gram(s) IV Push once  influenza   Vaccine 0.5 milliLiter(s) IntraMuscular once  insulin glargine Injectable (LANTUS) 10 Unit(s) SubCutaneous at bedtime  insulin lispro (HumaLOG) corrective regimen sliding scale   SubCutaneous three times a day before meals  insulin lispro (HumaLOG) corrective regimen sliding scale   SubCutaneous at bedtime  levETIRAcetam  IVPB 1250 milliGRAM(s) IV Intermittent every 12 hours  lisinopril 20 milliGRAM(s) Oral daily  sodium chloride 0.9%. 1000 milliLiter(s) (75 mL/Hr) IV Continuous <Continuous>

## 2019-02-22 NOTE — DIETITIAN INITIAL EVALUATION ADULT. - OTHER INFO
Length Of Stay nutrition evaluation. 75 y/o presenting from NH with seizures due to recurrence of GBM. PMH of CVA, DM2, GBM s/p craniectomy resection 11/2018. Pt is A&Ox4 at time of RD visit. Reports good PO intake. No GI distress (nausea/vomiting/diarrhea/constipation.) No chewing or swallowing difficulties at this time. Currently on mechanical soft diet with thin liquids. Unable to obtain diet hx at this time.

## 2019-02-22 NOTE — DIETITIAN INITIAL EVALUATION ADULT. - NS AS NUTRI INTERV MEALS SNACK
1. Continue PO diet. Recommend consistent carbohydrate, mechanical soft diet as pt with ^FS. Pt declined supplements at this time. Monitor for malnutrition.

## 2019-02-22 NOTE — PROGRESS NOTE ADULT - PROBLEM SELECTOR PLAN 1
complicated by seizure d/o.  Continue jazmine PELAYO  - Dr. Gonzalez attempting to obtain OR time for 2/25 AM.  Otherwise 2/27.  - Patient had good functional status prior to admission with no documented TAY, LOC, CHF symptoms.  Medically optimized to proceed to neurosurgical intervention. ALBERTO and tele monitor reviewed.

## 2019-02-22 NOTE — PROGRESS NOTE ADULT - SUBJECTIVE AND OBJECTIVE BOX
CC: F/U for SZ    SUBJECTIVE / OVERNIGHT EVENTS:  No new complaints today.    No F/C, N/V, CP, SOB, Cough, lightheadedness, dizziness, abdominal pain, diarrhea, dysuria.      MEDICATIONS  (STANDING):  dexamethasone  Injectable 6 milliGRAM(s) IV Push every 6 hours  dextrose 5%. 1000 milliLiter(s) (50 mL/Hr) IV Continuous <Continuous>  dextrose 50% Injectable 12.5 Gram(s) IV Push once  dextrose 50% Injectable 25 Gram(s) IV Push once  dextrose 50% Injectable 25 Gram(s) IV Push once  influenza   Vaccine 0.5 milliLiter(s) IntraMuscular once  insulin glargine Injectable (LANTUS) 10 Unit(s) SubCutaneous at bedtime  insulin lispro (HumaLOG) corrective regimen sliding scale   SubCutaneous three times a day before meals  insulin lispro (HumaLOG) corrective regimen sliding scale   SubCutaneous at bedtime  levETIRAcetam  IVPB 1250 milliGRAM(s) IV Intermittent every 12 hours  lisinopril 20 milliGRAM(s) Oral daily  sodium chloride 0.9%. 1000 milliLiter(s) (75 mL/Hr) IV Continuous <Continuous>    MEDICATIONS  (PRN):  acetaminophen  Suppository .. 650 milliGRAM(s) Rectal every 6 hours PRN Temp greater or equal to 38C (100.4F)  dextrose 40% Gel 15 Gram(s) Oral once PRN Blood Glucose LESS THAN 70 milliGRAM(s)/deciliter  glucagon  Injectable 1 milliGRAM(s) IntraMuscular once PRN Glucose LESS THAN 70 milligrams/deciliter  LORazepam   Injectable 1 milliGRAM(s) IV Push every 6 hours PRN seizure      Vital Signs Last 24 Hrs  T(C): 36.5 (22 Feb 2019 08:30), Max: 37.2 (21 Feb 2019 17:08)  T(F): 97.7 (22 Feb 2019 08:30), Max: 98.9 (21 Feb 2019 17:08)  HR: 63 (22 Feb 2019 08:30) (58 - 73)  BP: 113/92 (22 Feb 2019 08:30) (113/92 - 142/91)  BP(mean): --  RR: 18 (22 Feb 2019 08:30) (16 - 18)  SpO2: 96% (22 Feb 2019 08:30) (96% - 100%)  CAPILLARY BLOOD GLUCOSE      POCT Blood Glucose.: 339 mg/dL (22 Feb 2019 09:01)  POCT Blood Glucose.: 399 mg/dL (21 Feb 2019 22:09)  POCT Blood Glucose.: 430 mg/dL (21 Feb 2019 22:00)  POCT Blood Glucose.: 344 mg/dL (21 Feb 2019 17:27)  POCT Blood Glucose.: 321 mg/dL (21 Feb 2019 12:42)    I&O's Summary    21 Feb 2019 07:01  -  22 Feb 2019 07:00  --------------------------------------------------------  IN: 0 mL / OUT: 150 mL / NET: -150 mL        PHYSICAL EXAM:  GENERAL: NAD, well-developed  HEAD:  Atraumatic, Normocephalic  EYES: EOMI, PERRLA, conjunctiva and sclera clear  NECK: Supple, No JVD  CHEST/LUNG: Clear to auscultation bilaterally; No wheeze  HEART: Regular rate and rhythm; No murmurs, rubs, or gallops  ABDOMEN: Soft, Nontender, Nondistended; Bowel sounds present  EXTREMITIES:  2+ Peripheral Pulses, No clubbing, cyanosis. LLE edema present.  PSYCH: AAOx3  NEUROLOGY: (+) left hemiparesis  SKIN: No rashes or lesions    LABS:                        11.4   21.02 )-----------( 135      ( 22 Feb 2019 07:40 )             34.7     02-22    134<L>  |  98  |  21  ----------------------------<  375<H>  4.1   |  25  |  0.72    Ca    8.4      22 Feb 2019 07:40  Phos  2.1     02-22  Mg     1.8     02-22                RADIOLOGY & ADDITIONAL TESTS:    Imaging Personally Reviewed:    Care Discussed with Consultants/Other Providers:

## 2019-02-22 NOTE — DIETITIAN INITIAL EVALUATION ADULT. - PERTINENT LABORATORY DATA
02-22 Na 134 mmol/L<L> Glu 375 mg/dL<H> K+ 4.1 mmol/L Cr 0.72 mg/dL BUN 21 mg/dL Phos 2.1 mg/dL<L>  Hgb 11.4 g/dL<L> Hct 34.7 %<L> Hemoglobin A1C, Whole Blood: 7.7 % (02-13-19 @ 05:45)

## 2019-02-23 LAB
ANION GAP SERPL CALC-SCNC: 12 MMO/L — SIGNIFICANT CHANGE UP (ref 7–14)
BASOPHILS # BLD AUTO: 0.07 K/UL — SIGNIFICANT CHANGE UP (ref 0–0.2)
BASOPHILS NFR BLD AUTO: 0.4 % — SIGNIFICANT CHANGE UP (ref 0–2)
BUN SERPL-MCNC: 16 MG/DL — SIGNIFICANT CHANGE UP (ref 7–23)
CALCIUM SERPL-MCNC: 8.6 MG/DL — SIGNIFICANT CHANGE UP (ref 8.4–10.5)
CHLORIDE SERPL-SCNC: 96 MMOL/L — LOW (ref 98–107)
CO2 SERPL-SCNC: 25 MMOL/L — SIGNIFICANT CHANGE UP (ref 22–31)
CREAT SERPL-MCNC: 0.6 MG/DL — SIGNIFICANT CHANGE UP (ref 0.5–1.3)
EOSINOPHIL # BLD AUTO: 0 K/UL — SIGNIFICANT CHANGE UP (ref 0–0.5)
EOSINOPHIL NFR BLD AUTO: 0 % — SIGNIFICANT CHANGE UP (ref 0–6)
GLUCOSE SERPL-MCNC: 365 MG/DL — HIGH (ref 70–99)
HCT VFR BLD CALC: 36.6 % — LOW (ref 39–50)
HGB BLD-MCNC: 12.1 G/DL — LOW (ref 13–17)
IMM GRANULOCYTES NFR BLD AUTO: 2.3 % — HIGH (ref 0–1.5)
LYMPHOCYTES # BLD AUTO: 1 K/UL — SIGNIFICANT CHANGE UP (ref 1–3.3)
LYMPHOCYTES # BLD AUTO: 5 % — LOW (ref 13–44)
MAGNESIUM SERPL-MCNC: 1.9 MG/DL — SIGNIFICANT CHANGE UP (ref 1.6–2.6)
MCHC RBC-ENTMCNC: 29.5 PG — SIGNIFICANT CHANGE UP (ref 27–34)
MCHC RBC-ENTMCNC: 33.1 % — SIGNIFICANT CHANGE UP (ref 32–36)
MCV RBC AUTO: 89.3 FL — SIGNIFICANT CHANGE UP (ref 80–100)
MONOCYTES # BLD AUTO: 0.75 K/UL — SIGNIFICANT CHANGE UP (ref 0–0.9)
MONOCYTES NFR BLD AUTO: 3.8 % — SIGNIFICANT CHANGE UP (ref 2–14)
NEUTROPHILS # BLD AUTO: 17.69 K/UL — HIGH (ref 1.8–7.4)
NEUTROPHILS NFR BLD AUTO: 88.5 % — HIGH (ref 43–77)
NRBC # FLD: 0 K/UL — LOW (ref 25–125)
PHOSPHATE SERPL-MCNC: 2.5 MG/DL — SIGNIFICANT CHANGE UP (ref 2.5–4.5)
PLATELET # BLD AUTO: 115 K/UL — LOW (ref 150–400)
PMV BLD: 10.8 FL — SIGNIFICANT CHANGE UP (ref 7–13)
POTASSIUM SERPL-MCNC: 4.1 MMOL/L — SIGNIFICANT CHANGE UP (ref 3.5–5.3)
POTASSIUM SERPL-SCNC: 4.1 MMOL/L — SIGNIFICANT CHANGE UP (ref 3.5–5.3)
RBC # BLD: 4.1 M/UL — LOW (ref 4.2–5.8)
RBC # FLD: 15.8 % — HIGH (ref 10.3–14.5)
SODIUM SERPL-SCNC: 133 MMOL/L — LOW (ref 135–145)
WBC # BLD: 19.97 K/UL — HIGH (ref 3.8–10.5)
WBC # FLD AUTO: 19.97 K/UL — HIGH (ref 3.8–10.5)

## 2019-02-23 PROCEDURE — 99233 SBSQ HOSP IP/OBS HIGH 50: CPT

## 2019-02-23 RX ORDER — INSULIN GLARGINE 100 [IU]/ML
15 INJECTION, SOLUTION SUBCUTANEOUS AT BEDTIME
Qty: 0 | Refills: 0 | Status: DISCONTINUED | OUTPATIENT
Start: 2019-02-23 | End: 2019-02-24

## 2019-02-23 RX ORDER — INSULIN LISPRO 100/ML
4 VIAL (ML) SUBCUTANEOUS ONCE
Qty: 0 | Refills: 0 | Status: COMPLETED | OUTPATIENT
Start: 2019-02-23 | End: 2019-02-23

## 2019-02-23 RX ADMIN — Medication 4 UNIT(S): at 22:36

## 2019-02-23 RX ADMIN — INSULIN GLARGINE 15 UNIT(S): 100 INJECTION, SOLUTION SUBCUTANEOUS at 21:12

## 2019-02-23 RX ADMIN — LISINOPRIL 20 MILLIGRAM(S): 2.5 TABLET ORAL at 05:32

## 2019-02-23 NOTE — CHART NOTE - NSCHARTNOTEFT_GEN_A_CORE
Notified by RN pt's Finger stick 450, pt asymptomatic.   finger stick trend evaluated pt noted to have persistent hyperglycemia , blood glucose was 365     Patient on Decadron IV - as recommended by Neurosurgery. Patient currently on Insulin sliding scale coverage pre meals and bedtime coverage & Lantus @ bedtime     Assessment:  Uncontrolled DM - likely related to steroids.     Patient received Humalog 4 Units bedtime ISS coverage & Lantus 15 Units qhs, will give additional insulin lispro Injectable (HumaLOG) 4 Unit(s) SubCutaneous once   Will reassess     CAPILLARY BLOOD GLUCOSE      POCT Blood Glucose.: 450 mg/dL (23 Feb 2019 21:49)  POCT Blood Glucose.: 308 mg/dL (23 Feb 2019 17:31)  POCT Blood Glucose.: 317 mg/dL (23 Feb 2019 12:58)  POCT Blood Glucose.: 340 mg/dL (23 Feb 2019 08:39)      02-23    133<L>  |  96<L>  |  16  ----------------------------<  365<H>  4.1   |  25  |  0.60    Ca    8.6      23 Feb 2019 06:00  Phos  2.5     02-23  Mg     1.9     02-23

## 2019-02-23 NOTE — PROGRESS NOTE ADULT - SUBJECTIVE AND OBJECTIVE BOX
Camacho Roberson Jr, MD  page [phone]  Patient is a 74y old  Male who presents with a chief complaint of Seizure (22 Feb 2019 11:37)      SUBJECTIVE / OVERNIGHT EVENTS: Denies c/o    MEDICATIONS  (STANDING):  dexamethasone  Injectable 6 milliGRAM(s) IV Push every 6 hours  dextrose 5%. 1000 milliLiter(s) (50 mL/Hr) IV Continuous <Continuous>  dextrose 50% Injectable 12.5 Gram(s) IV Push once  dextrose 50% Injectable 25 Gram(s) IV Push once  dextrose 50% Injectable 25 Gram(s) IV Push once  influenza   Vaccine 0.5 milliLiter(s) IntraMuscular once  insulin glargine Injectable (LANTUS) 10 Unit(s) SubCutaneous at bedtime  insulin lispro (HumaLOG) corrective regimen sliding scale   SubCutaneous three times a day before meals  insulin lispro (HumaLOG) corrective regimen sliding scale   SubCutaneous at bedtime  levETIRAcetam  IVPB 1250 milliGRAM(s) IV Intermittent every 12 hours  lisinopril 20 milliGRAM(s) Oral daily  sodium chloride 0.9%. 1000 milliLiter(s) (75 mL/Hr) IV Continuous <Continuous>    MEDICATIONS  (PRN):  acetaminophen  Suppository .. 650 milliGRAM(s) Rectal every 6 hours PRN Temp greater or equal to 38C (100.4F)  dextrose 40% Gel 15 Gram(s) Oral once PRN Blood Glucose LESS THAN 70 milliGRAM(s)/deciliter  glucagon  Injectable 1 milliGRAM(s) IntraMuscular once PRN Glucose LESS THAN 70 milligrams/deciliter      Vital Signs Last 24 Hrs  T(C): 36.9 (22 Feb 2019 18:20), Max: 36.9 (22 Feb 2019 18:20)  T(F): 98.4 (22 Feb 2019 18:20), Max: 98.4 (22 Feb 2019 18:20)  HR: 54 (22 Feb 2019 18:20) (54 - 62)  BP: 146/94 (22 Feb 2019 18:20) (137/75 - 146/94)  BP(mean): --  RR: 16 (22 Feb 2019 18:20) (16 - 18)  SpO2: 100% (22 Feb 2019 18:20) (98% - 100%)  CAPILLARY BLOOD GLUCOSE      POCT Blood Glucose.: 340 mg/dL (23 Feb 2019 08:39)  POCT Blood Glucose.: 363 mg/dL (22 Feb 2019 23:02)  POCT Blood Glucose.: 315 mg/dL (22 Feb 2019 17:32)    I&O's Summary      PHYSICAL EXAM:  GENERAL: NAD, well-developed  HEAD:  Atraumatic, Normocephalic  EYES: EOMI, conjunctiva and sclera clear  NECK: Supple, No JVD  CHEST/LUNG: Clear to auscultation bilaterally; No wheeze  HEART: Regular rate and rhythm; No murmurs, rubs, or gallops  ABDOMEN: Soft, Nontender, Nondistended; Bowel sounds present  EXTREMITIES:  2+ Peripheral Pulses, No clubbing, cyanosis, or edema  PSYCH: AAOx3  NEUROLOGY: non-focal  SKIN: No rashes or lesions    LABS:                        12.1   19.97 )-----------( 115      ( 23 Feb 2019 06:00 )             36.6     02-23    133<L>  |  96<L>  |  16  ----------------------------<  365<H>  4.1   |  25  |  0.60    Ca    8.6      23 Feb 2019 06:00  Phos  2.5     02-23  Mg     1.9     02-23          Care Discussed with Consultants/Other Providers: [email or web address]

## 2019-02-23 NOTE — PROGRESS NOTE ADULT - PROBLEM SELECTOR PLAN 1
complicated by seizure d/o.  Continue keppra IV  - For surgery by Dr. Gonzalez 2/25 AM.   - Patient had good functional status prior to admission with no documented TAY, LOC, CHF symptoms.  Medically optimized to proceed to neurosurgical intervention. ALBERTO and tele monitor reviewed.

## 2019-02-24 DIAGNOSIS — E87.1 HYPO-OSMOLALITY AND HYPONATREMIA: ICD-10-CM

## 2019-02-24 LAB
ANION GAP SERPL CALC-SCNC: 7 MMO/L — SIGNIFICANT CHANGE UP (ref 7–14)
ANION GAP SERPL CALC-SCNC: 7 MMO/L — SIGNIFICANT CHANGE UP (ref 7–14)
APTT BLD: 23.8 SEC — LOW (ref 27.5–36.3)
BASOPHILS # BLD AUTO: 0.05 K/UL — SIGNIFICANT CHANGE UP (ref 0–0.2)
BASOPHILS NFR BLD AUTO: 0.3 % — SIGNIFICANT CHANGE UP (ref 0–2)
BLD GP AB SCN SERPL QL: NEGATIVE — SIGNIFICANT CHANGE UP
BUN SERPL-MCNC: 17 MG/DL — SIGNIFICANT CHANGE UP (ref 7–23)
BUN SERPL-MCNC: 17 MG/DL — SIGNIFICANT CHANGE UP (ref 7–23)
CALCIUM SERPL-MCNC: 8.5 MG/DL — SIGNIFICANT CHANGE UP (ref 8.4–10.5)
CALCIUM SERPL-MCNC: 8.5 MG/DL — SIGNIFICANT CHANGE UP (ref 8.4–10.5)
CHLORIDE SERPL-SCNC: 94 MMOL/L — LOW (ref 98–107)
CHLORIDE SERPL-SCNC: 94 MMOL/L — LOW (ref 98–107)
CO2 SERPL-SCNC: 28 MMOL/L — SIGNIFICANT CHANGE UP (ref 22–31)
CO2 SERPL-SCNC: 28 MMOL/L — SIGNIFICANT CHANGE UP (ref 22–31)
CREAT SERPL-MCNC: 0.61 MG/DL — SIGNIFICANT CHANGE UP (ref 0.5–1.3)
CREAT SERPL-MCNC: 0.61 MG/DL — SIGNIFICANT CHANGE UP (ref 0.5–1.3)
EOSINOPHIL # BLD AUTO: 0 K/UL — SIGNIFICANT CHANGE UP (ref 0–0.5)
EOSINOPHIL NFR BLD AUTO: 0 % — SIGNIFICANT CHANGE UP (ref 0–6)
GLUCOSE SERPL-MCNC: 315 MG/DL — HIGH (ref 70–99)
GLUCOSE SERPL-MCNC: 315 MG/DL — HIGH (ref 70–99)
HCT VFR BLD CALC: 37.9 % — LOW (ref 39–50)
HCT VFR BLD CALC: 37.9 % — LOW (ref 39–50)
HGB BLD-MCNC: 12.8 G/DL — LOW (ref 13–17)
HGB BLD-MCNC: 12.8 G/DL — LOW (ref 13–17)
IMM GRANULOCYTES NFR BLD AUTO: 2.5 % — HIGH (ref 0–1.5)
INR BLD: 1.2 — HIGH (ref 0.88–1.17)
LYMPHOCYTES # BLD AUTO: 0.95 K/UL — LOW (ref 1–3.3)
LYMPHOCYTES # BLD AUTO: 4.9 % — LOW (ref 13–44)
MAGNESIUM SERPL-MCNC: 1.9 MG/DL — SIGNIFICANT CHANGE UP (ref 1.6–2.6)
MCHC RBC-ENTMCNC: 29.8 PG — SIGNIFICANT CHANGE UP (ref 27–34)
MCHC RBC-ENTMCNC: 29.8 PG — SIGNIFICANT CHANGE UP (ref 27–34)
MCHC RBC-ENTMCNC: 33.8 % — SIGNIFICANT CHANGE UP (ref 32–36)
MCHC RBC-ENTMCNC: 33.8 % — SIGNIFICANT CHANGE UP (ref 32–36)
MCV RBC AUTO: 88.3 FL — SIGNIFICANT CHANGE UP (ref 80–100)
MCV RBC AUTO: 88.3 FL — SIGNIFICANT CHANGE UP (ref 80–100)
MONOCYTES # BLD AUTO: 0.87 K/UL — SIGNIFICANT CHANGE UP (ref 0–0.9)
MONOCYTES NFR BLD AUTO: 4.5 % — SIGNIFICANT CHANGE UP (ref 2–14)
NEUTROPHILS # BLD AUTO: 16.99 K/UL — HIGH (ref 1.8–7.4)
NEUTROPHILS NFR BLD AUTO: 87.8 % — HIGH (ref 43–77)
NRBC # FLD: 0 K/UL — LOW (ref 25–125)
NRBC # FLD: 0 K/UL — LOW (ref 25–125)
PHOSPHATE SERPL-MCNC: 2.3 MG/DL — LOW (ref 2.5–4.5)
PLATELET # BLD AUTO: 124 K/UL — LOW (ref 150–400)
PLATELET # BLD AUTO: 124 K/UL — LOW (ref 150–400)
PMV BLD: 11.6 FL — SIGNIFICANT CHANGE UP (ref 7–13)
PMV BLD: 11.6 FL — SIGNIFICANT CHANGE UP (ref 7–13)
POTASSIUM SERPL-MCNC: 3.7 MMOL/L — SIGNIFICANT CHANGE UP (ref 3.5–5.3)
POTASSIUM SERPL-MCNC: 3.7 MMOL/L — SIGNIFICANT CHANGE UP (ref 3.5–5.3)
POTASSIUM SERPL-SCNC: 3.7 MMOL/L — SIGNIFICANT CHANGE UP (ref 3.5–5.3)
POTASSIUM SERPL-SCNC: 3.7 MMOL/L — SIGNIFICANT CHANGE UP (ref 3.5–5.3)
PROTHROM AB SERPL-ACNC: 13.7 SEC — HIGH (ref 9.8–13.1)
RBC # BLD: 4.29 M/UL — SIGNIFICANT CHANGE UP (ref 4.2–5.8)
RBC # BLD: 4.29 M/UL — SIGNIFICANT CHANGE UP (ref 4.2–5.8)
RBC # FLD: 15.9 % — HIGH (ref 10.3–14.5)
RBC # FLD: 15.9 % — HIGH (ref 10.3–14.5)
RH IG SCN BLD-IMP: POSITIVE — SIGNIFICANT CHANGE UP
SODIUM SERPL-SCNC: 129 MMOL/L — LOW (ref 135–145)
SODIUM SERPL-SCNC: 129 MMOL/L — LOW (ref 135–145)
WBC # BLD: 19.35 K/UL — HIGH (ref 3.8–10.5)
WBC # BLD: 19.35 K/UL — HIGH (ref 3.8–10.5)
WBC # FLD AUTO: 19.35 K/UL — HIGH (ref 3.8–10.5)
WBC # FLD AUTO: 19.35 K/UL — HIGH (ref 3.8–10.5)

## 2019-02-24 PROCEDURE — 99233 SBSQ HOSP IP/OBS HIGH 50: CPT

## 2019-02-24 RX ORDER — SODIUM,POTASSIUM PHOSPHATES 278-250MG
1 POWDER IN PACKET (EA) ORAL
Qty: 0 | Refills: 0 | Status: COMPLETED | OUTPATIENT
Start: 2019-02-24 | End: 2019-02-25

## 2019-02-24 RX ORDER — INSULIN GLARGINE 100 [IU]/ML
20 INJECTION, SOLUTION SUBCUTANEOUS AT BEDTIME
Qty: 0 | Refills: 0 | Status: DISCONTINUED | OUTPATIENT
Start: 2019-02-24 | End: 2019-02-26

## 2019-02-24 RX ORDER — INSULIN LISPRO 100/ML
5 VIAL (ML) SUBCUTANEOUS
Qty: 0 | Refills: 0 | Status: DISCONTINUED | OUTPATIENT
Start: 2019-02-24 | End: 2019-03-01

## 2019-02-24 RX ADMIN — Medication 1 TABLET(S): at 21:06

## 2019-02-24 RX ADMIN — Medication 5 UNIT(S): at 18:52

## 2019-02-24 RX ADMIN — Medication 1 TABLET(S): at 18:58

## 2019-02-24 RX ADMIN — LISINOPRIL 20 MILLIGRAM(S): 2.5 TABLET ORAL at 06:01

## 2019-02-24 RX ADMIN — INSULIN GLARGINE 20 UNIT(S): 100 INJECTION, SOLUTION SUBCUTANEOUS at 23:12

## 2019-02-24 RX ADMIN — Medication 1 TABLET(S): at 12:49

## 2019-02-24 NOTE — PROGRESS NOTE ADULT - SUBJECTIVE AND OBJECTIVE BOX
Camacho Roberson Jr, MD  page [phone]  Patient is a 74y old  Male who presents with a chief complaint of Seizure (23 Feb 2019 12:56)      SUBJECTIVE / OVERNIGHT EVENTS: denies c/o    MEDICATIONS  (STANDING):  dexamethasone  Injectable 6 milliGRAM(s) IV Push every 6 hours  dextrose 5%. 1000 milliLiter(s) (50 mL/Hr) IV Continuous <Continuous>  dextrose 50% Injectable 12.5 Gram(s) IV Push once  dextrose 50% Injectable 25 Gram(s) IV Push once  dextrose 50% Injectable 25 Gram(s) IV Push once  influenza   Vaccine 0.5 milliLiter(s) IntraMuscular once  insulin glargine Injectable (LANTUS) 15 Unit(s) SubCutaneous at bedtime  insulin lispro (HumaLOG) corrective regimen sliding scale   SubCutaneous three times a day before meals  insulin lispro (HumaLOG) corrective regimen sliding scale   SubCutaneous at bedtime  levETIRAcetam  IVPB 1250 milliGRAM(s) IV Intermittent every 12 hours  lisinopril 20 milliGRAM(s) Oral daily  potassium acid phosphate/sodium acid phosphate tablet (K-PHOS No. 2) 1 Tablet(s) Oral four times a day with meals  sodium chloride 0.9%. 1000 milliLiter(s) (75 mL/Hr) IV Continuous <Continuous>    MEDICATIONS  (PRN):  acetaminophen  Suppository .. 650 milliGRAM(s) Rectal every 6 hours PRN Temp greater or equal to 38C (100.4F)  dextrose 40% Gel 15 Gram(s) Oral once PRN Blood Glucose LESS THAN 70 milliGRAM(s)/deciliter  glucagon  Injectable 1 milliGRAM(s) IntraMuscular once PRN Glucose LESS THAN 70 milligrams/deciliter      Vital Signs Last 24 Hrs  T(C): 36.7 (24 Feb 2019 04:02), Max: 36.8 (23 Feb 2019 16:09)  T(F): 98 (24 Feb 2019 04:02), Max: 98.2 (23 Feb 2019 16:09)  HR: 64 (24 Feb 2019 04:02) (64 - 79)  BP: 131/84 (24 Feb 2019 04:02) (115/79 - 132/98)  BP(mean): --  RR: 18 (24 Feb 2019 04:02) (18 - 22)  SpO2: 98% (24 Feb 2019 04:02) (98% - 98%)  CAPILLARY BLOOD GLUCOSE      POCT Blood Glucose.: 374 mg/dL (24 Feb 2019 12:40)  POCT Blood Glucose.: 300 mg/dL (24 Feb 2019 08:58)  POCT Blood Glucose.: 377 mg/dL (23 Feb 2019 23:38)  POCT Blood Glucose.: 450 mg/dL (23 Feb 2019 21:49)  POCT Blood Glucose.: 308 mg/dL (23 Feb 2019 17:31)    I&O's Summary    23 Feb 2019 07:01  -  24 Feb 2019 07:00  --------------------------------------------------------  IN: 1080 mL / OUT: 750 mL / NET: 330 mL        PHYSICAL EXAM:  GENERAL: NAD, well-developed  HEAD:  Atraumatic, Normocephalic  EYES: EOMI, PERRLA, conjunctiva and sclera clear  NECK: Supple, No JVD  CHEST/LUNG: Clear to auscultation bilaterally; No wheeze  HEART: Regular rate and rhythm; No murmurs, rubs, or gallops  ABDOMEN: Soft, Nontender, Nondistended; Bowel sounds present  EXTREMITIES:  2+ Peripheral Pulses, No clubbing, cyanosis. LLE edema present.  PSYCH: AAOx3  NEUROLOGY: (+) left hemiparesis  SKIN: No rashes or lesions      LABS:                        12.8   19.35 )-----------( 124      ( 24 Feb 2019 06:11 )             37.9     02-24    129<L>  |  94<L>  |  17  ----------------------------<  315<H>  3.7   |  28  |  0.61    Ca    8.5      24 Feb 2019 06:11  Phos  2.3     02-24  Mg     1.9     02-24      PT/INR - ( 24 Feb 2019 06:11 )   PT: 13.7 SEC;   INR: 1.20          PTT - ( 24 Feb 2019 06:11 )  PTT:23.8 SEC

## 2019-02-24 NOTE — PROVIDER CONTACT NOTE (OTHER) - ACTION/TREATMENT ORDERED:
No further intervention ordered at this time per LOVE Gonzalez. Next fingerstick to be re-checked at 08:00 before breakfast.
Additional Humalog 4 units subQ ordered per LOVE Gonzalez. Recheck fingerstick at midnight.
None ordered.
Perform 12-Lead EKG.
Please refer to RRT. EEG ordered.
hold heparin per karina larson telephone order
provider to assess pt.
will repeat ptt in 6 hours ~130p

## 2019-02-24 NOTE — PROVIDER CONTACT NOTE (OTHER) - RECOMMENDATIONS
Give additional insulin if warranted.
Perform 12-Lead EKG. Continue to monitor.
Provider to assess pt.
Re-check fingerstick at later time. Administer additional insulin.
Will continue to monitor patient for any signs/symptoms of acute distress.
Will continue to monitor.
discussed in am rounds in person, pt at goal x 1,
discussed with pa

## 2019-02-24 NOTE — PROGRESS NOTE ADULT - PROBLEM SELECTOR PROBLEM 3
Acute deep vein thrombosis (DVT) of left lower extremity, unspecified vein Cerebrovascular accident (CVA), unspecified mechanism

## 2019-02-24 NOTE — PROGRESS NOTE ADULT - PROBLEM SELECTOR PLAN 4
FS QID, NISS.  Likely elevated due to decadron.  Titrate lantus for more optimal control--to 20 units tonight from 15 Likely due to history of malignancy and post-CVA L sided weakness as baseline.   - s/p IVC filter

## 2019-02-24 NOTE — PROGRESS NOTE ADULT - PROBLEM SELECTOR PROBLEM 4
Type 2 diabetes mellitus with complication, unspecified whether long term insulin use Acute deep vein thrombosis (DVT) of left lower extremity, unspecified vein

## 2019-02-24 NOTE — PROGRESS NOTE ADULT - PROBLEM SELECTOR PLAN 2
New CVA on MRI - likely embolic due to DVT and PFO.  - IVC filter in place  - neuro to clear the patient for craniotomy Slow downtrend, asymptomatic.    May be a component of psuedohyponatremia due to elevated BG.  Incresing lantus and adding mealtime coverage.    Will place pt on fluid retriction, check urine osm and lytes.

## 2019-02-24 NOTE — PROGRESS NOTE ADULT - PROBLEM SELECTOR PLAN 3
Likely due to history of malignancy and post-CVA L sided weakness as baseline.   - s/p IVC filter New CVA on MRI - likely embolic due to DVT and PFO.  - IVC filter in place  - neuro to clear the patient for craniotomy

## 2019-02-24 NOTE — PROVIDER CONTACT NOTE (OTHER) - ASSESSMENT
Patient remains asymptomatic.
Patient asymptomatic.
Patient asymptomatic. See flowsheet for vital signs.
Patient found to be actively seizing with PSVT up to 190s. Patient AXOX0. /110, , RR 20, Rectal temp 98.8. BPH, DM, PE and HTN.
Patient is alert and oriented X3, reoriented as needed. VSS.
Pt is A&OX4, Denies headache, chest pain, SOB and NV>
alert, hx of sz
alert, incontinent

## 2019-02-25 LAB
ANION GAP SERPL CALC-SCNC: 11 MMO/L — SIGNIFICANT CHANGE UP (ref 7–14)
APTT BLD: 23.8 SEC — LOW (ref 27.5–36.3)
BASOPHILS # BLD AUTO: 0.03 K/UL — SIGNIFICANT CHANGE UP (ref 0–0.2)
BASOPHILS NFR BLD AUTO: 0.2 % — SIGNIFICANT CHANGE UP (ref 0–2)
BLD GP AB SCN SERPL QL: NEGATIVE — SIGNIFICANT CHANGE UP
BUN SERPL-MCNC: 17 MG/DL — SIGNIFICANT CHANGE UP (ref 7–23)
CALCIUM SERPL-MCNC: 8.4 MG/DL — SIGNIFICANT CHANGE UP (ref 8.4–10.5)
CHLORIDE SERPL-SCNC: 94 MMOL/L — LOW (ref 98–107)
CO2 SERPL-SCNC: 26 MMOL/L — SIGNIFICANT CHANGE UP (ref 22–31)
CREAT SERPL-MCNC: 0.65 MG/DL — SIGNIFICANT CHANGE UP (ref 0.5–1.3)
EOSINOPHIL # BLD AUTO: 0 K/UL — SIGNIFICANT CHANGE UP (ref 0–0.5)
EOSINOPHIL NFR BLD AUTO: 0 % — SIGNIFICANT CHANGE UP (ref 0–6)
GLUCOSE SERPL-MCNC: 321 MG/DL — HIGH (ref 70–99)
HCT VFR BLD CALC: 38 % — LOW (ref 39–50)
HGB BLD-MCNC: 12.6 G/DL — LOW (ref 13–17)
IMM GRANULOCYTES NFR BLD AUTO: 1.9 % — HIGH (ref 0–1.5)
INR BLD: 1.18 — HIGH (ref 0.88–1.17)
LYMPHOCYTES # BLD AUTO: 0.85 K/UL — LOW (ref 1–3.3)
LYMPHOCYTES # BLD AUTO: 4.7 % — LOW (ref 13–44)
MAGNESIUM SERPL-MCNC: 1.8 MG/DL — SIGNIFICANT CHANGE UP (ref 1.6–2.6)
MCHC RBC-ENTMCNC: 30 PG — SIGNIFICANT CHANGE UP (ref 27–34)
MCHC RBC-ENTMCNC: 33.2 % — SIGNIFICANT CHANGE UP (ref 32–36)
MCV RBC AUTO: 90.5 FL — SIGNIFICANT CHANGE UP (ref 80–100)
MONOCYTES # BLD AUTO: 0.61 K/UL — SIGNIFICANT CHANGE UP (ref 0–0.9)
MONOCYTES NFR BLD AUTO: 3.4 % — SIGNIFICANT CHANGE UP (ref 2–14)
NEUTROPHILS # BLD AUTO: 16.11 K/UL — HIGH (ref 1.8–7.4)
NEUTROPHILS NFR BLD AUTO: 89.8 % — HIGH (ref 43–77)
NRBC # FLD: 0 K/UL — LOW (ref 25–125)
PHOSPHATE SERPL-MCNC: 2.8 MG/DL — SIGNIFICANT CHANGE UP (ref 2.5–4.5)
PLATELET # BLD AUTO: 117 K/UL — LOW (ref 150–400)
PMV BLD: 11.6 FL — SIGNIFICANT CHANGE UP (ref 7–13)
POTASSIUM SERPL-MCNC: 4.1 MMOL/L — SIGNIFICANT CHANGE UP (ref 3.5–5.3)
POTASSIUM SERPL-SCNC: 4.1 MMOL/L — SIGNIFICANT CHANGE UP (ref 3.5–5.3)
PROTHROM AB SERPL-ACNC: 13.2 SEC — HIGH (ref 9.8–13.1)
RBC # BLD: 4.2 M/UL — SIGNIFICANT CHANGE UP (ref 4.2–5.8)
RBC # FLD: 16 % — HIGH (ref 10.3–14.5)
RH IG SCN BLD-IMP: POSITIVE — SIGNIFICANT CHANGE UP
SODIUM SERPL-SCNC: 131 MMOL/L — LOW (ref 135–145)
WBC # BLD: 17.94 K/UL — HIGH (ref 3.8–10.5)
WBC # FLD AUTO: 17.94 K/UL — HIGH (ref 3.8–10.5)

## 2019-02-25 PROCEDURE — 99233 SBSQ HOSP IP/OBS HIGH 50: CPT

## 2019-02-25 RX ORDER — LEVETIRACETAM 250 MG/1
1250 TABLET, FILM COATED ORAL EVERY 12 HOURS
Qty: 0 | Refills: 0 | Status: DISCONTINUED | OUTPATIENT
Start: 2019-02-25 | End: 2019-02-28

## 2019-02-25 RX ORDER — DEXAMETHASONE 0.5 MG/5ML
6 ELIXIR ORAL EVERY 6 HOURS
Qty: 0 | Refills: 0 | Status: DISCONTINUED | OUTPATIENT
Start: 2019-02-25 | End: 2019-03-02

## 2019-02-25 RX ORDER — INSULIN LISPRO 100/ML
VIAL (ML) SUBCUTANEOUS AT BEDTIME
Qty: 0 | Refills: 0 | Status: DISCONTINUED | OUTPATIENT
Start: 2019-02-25 | End: 2019-03-06

## 2019-02-25 RX ORDER — INSULIN LISPRO 100/ML
VIAL (ML) SUBCUTANEOUS
Qty: 0 | Refills: 0 | Status: DISCONTINUED | OUTPATIENT
Start: 2019-02-25 | End: 2019-03-06

## 2019-02-25 RX ADMIN — Medication 6 MILLIGRAM(S): at 21:27

## 2019-02-25 RX ADMIN — INSULIN GLARGINE 20 UNIT(S): 100 INJECTION, SOLUTION SUBCUTANEOUS at 23:00

## 2019-02-25 RX ADMIN — Medication 3: at 18:35

## 2019-02-25 RX ADMIN — Medication 5 UNIT(S): at 12:40

## 2019-02-25 RX ADMIN — Medication 2: at 23:00

## 2019-02-25 RX ADMIN — Medication 5 UNIT(S): at 08:46

## 2019-02-25 RX ADMIN — LISINOPRIL 20 MILLIGRAM(S): 2.5 TABLET ORAL at 06:37

## 2019-02-25 RX ADMIN — Medication 5 UNIT(S): at 18:36

## 2019-02-25 RX ADMIN — LEVETIRACETAM 400 MILLIGRAM(S): 250 TABLET, FILM COATED ORAL at 18:35

## 2019-02-25 NOTE — PROGRESS NOTE ADULT - SUBJECTIVE AND OBJECTIVE BOX
Patient is a 74y old  Male who presents with a chief complaint of Seizure (25 Feb 2019 03:12)      SUBJECTIVE / OVERNIGHT EVENTS: Pt noted to have elevated BP this AM no acute complaints. denies N/V/D. FS elevated    MEDICATIONS  (STANDING):  dexamethasone  Injectable 6 milliGRAM(s) IV Push every 6 hours  dextrose 5%. 1000 milliLiter(s) (50 mL/Hr) IV Continuous <Continuous>  dextrose 50% Injectable 12.5 Gram(s) IV Push once  dextrose 50% Injectable 25 Gram(s) IV Push once  dextrose 50% Injectable 25 Gram(s) IV Push once  influenza   Vaccine 0.5 milliLiter(s) IntraMuscular once  insulin glargine Injectable (LANTUS) 20 Unit(s) SubCutaneous at bedtime  insulin lispro (HumaLOG) corrective regimen sliding scale   SubCutaneous three times a day before meals  insulin lispro (HumaLOG) corrective regimen sliding scale   SubCutaneous at bedtime  insulin lispro Injectable (HumaLOG) 5 Unit(s) SubCutaneous three times a day before meals  levETIRAcetam  IVPB 1250 milliGRAM(s) IV Intermittent every 12 hours  lisinopril 20 milliGRAM(s) Oral daily  potassium acid phosphate/sodium acid phosphate tablet (K-PHOS No. 2) 1 Tablet(s) Oral four times a day with meals    MEDICATIONS  (PRN):  acetaminophen  Suppository .. 650 milliGRAM(s) Rectal every 6 hours PRN Temp greater or equal to 38C (100.4F)  dextrose 40% Gel 15 Gram(s) Oral once PRN Blood Glucose LESS THAN 70 milliGRAM(s)/deciliter  glucagon  Injectable 1 milliGRAM(s) IntraMuscular once PRN Glucose LESS THAN 70 milligrams/deciliter        CAPILLARY BLOOD GLUCOSE      POCT Blood Glucose.: 278 mg/dL (25 Feb 2019 08:56)  POCT Blood Glucose.: 263 mg/dL (25 Feb 2019 07:10)  POCT Blood Glucose.: 416 mg/dL (24 Feb 2019 22:42)  POCT Blood Glucose.: 320 mg/dL (24 Feb 2019 17:32)  POCT Blood Glucose.: 374 mg/dL (24 Feb 2019 12:40)    I&O's Summary    24 Feb 2019 07:01  -  25 Feb 2019 07:00  --------------------------------------------------------  IN: 700 mL / OUT: 0 mL / NET: 700 mL        T(C): 36.8 (02-25-19 @ 06:34), Max: 37 (02-24-19 @ 14:54)  HR: 56 (02-25-19 @ 10:12) (52 - 69)  BP: 148/99 (02-25-19 @ 10:12) (113/60 - 173/105)  RR: 18 (02-25-19 @ 06:34) (18 - 18)  SpO2: 99% (02-25-19 @ 06:34) (96% - 99%)    PHYSICAL EXAM:  GENERAL: NAD,   HEAD:  Atraumatic,   EYES: EOMI,  conjunctiva and sclera clear  NECK: Supple, No JVD  CHEST/LUNG: Clear to auscultation bilaterally; No wheeze  HEART: Regular rate and rhythm;   ABDOMEN: Soft, Nontender, Nondistended; Bowel sounds present  EXTREMITIES:  2+ Peripheral Pulses, No clubbing, cyanosis, or edema  PSYCH: AAOx3  NEUROLOGY: +Lt hemiparesis 4/5  SKIN: No rashes or lesions    LABS:                        12.6   17.94 )-----------( 117      ( 25 Feb 2019 07:56 )             38.0     02-25    131<L>  |  94<L>  |  17  ----------------------------<  321<H>  4.1   |  26  |  0.65    Ca    8.4      25 Feb 2019 07:56  Phos  2.8     02-25  Mg     1.8     02-25      PT/INR - ( 25 Feb 2019 07:56 )   PT: 13.2 SEC;   INR: 1.18          PTT - ( 25 Feb 2019 07:56 )  PTT:23.8 SEC            RADIOLOGY & ADDITIONAL TESTS:    Imaging Personally Reviewed:    Consultant(s) Notes Reviewed:      Care Discussed with Consultants/Other Providers: Patient is a 74y old  Male who presents with a chief complaint of Seizure (25 Feb 2019 03:12)      SUBJECTIVE / OVERNIGHT EVENTS: Pt noted to have elevated BP this AM no acute complaints now improved. denies N/V/D. FS elevated    MEDICATIONS  (STANDING):  dexamethasone  Injectable 6 milliGRAM(s) IV Push every 6 hours  dextrose 5%. 1000 milliLiter(s) (50 mL/Hr) IV Continuous <Continuous>  dextrose 50% Injectable 12.5 Gram(s) IV Push once  dextrose 50% Injectable 25 Gram(s) IV Push once  dextrose 50% Injectable 25 Gram(s) IV Push once  influenza   Vaccine 0.5 milliLiter(s) IntraMuscular once  insulin glargine Injectable (LANTUS) 20 Unit(s) SubCutaneous at bedtime  insulin lispro (HumaLOG) corrective regimen sliding scale   SubCutaneous three times a day before meals  insulin lispro (HumaLOG) corrective regimen sliding scale   SubCutaneous at bedtime  insulin lispro Injectable (HumaLOG) 5 Unit(s) SubCutaneous three times a day before meals  levETIRAcetam  IVPB 1250 milliGRAM(s) IV Intermittent every 12 hours  lisinopril 20 milliGRAM(s) Oral daily  potassium acid phosphate/sodium acid phosphate tablet (K-PHOS No. 2) 1 Tablet(s) Oral four times a day with meals    MEDICATIONS  (PRN):  acetaminophen  Suppository .. 650 milliGRAM(s) Rectal every 6 hours PRN Temp greater or equal to 38C (100.4F)  dextrose 40% Gel 15 Gram(s) Oral once PRN Blood Glucose LESS THAN 70 milliGRAM(s)/deciliter  glucagon  Injectable 1 milliGRAM(s) IntraMuscular once PRN Glucose LESS THAN 70 milligrams/deciliter        CAPILLARY BLOOD GLUCOSE      POCT Blood Glucose.: 278 mg/dL (25 Feb 2019 08:56)  POCT Blood Glucose.: 263 mg/dL (25 Feb 2019 07:10)  POCT Blood Glucose.: 416 mg/dL (24 Feb 2019 22:42)  POCT Blood Glucose.: 320 mg/dL (24 Feb 2019 17:32)  POCT Blood Glucose.: 374 mg/dL (24 Feb 2019 12:40)    I&O's Summary    24 Feb 2019 07:01  -  25 Feb 2019 07:00  --------------------------------------------------------  IN: 700 mL / OUT: 0 mL / NET: 700 mL        T(C): 36.8 (02-25-19 @ 06:34), Max: 37 (02-24-19 @ 14:54)  HR: 56 (02-25-19 @ 10:12) (52 - 69)  BP: 148/99 (02-25-19 @ 10:12) (113/60 - 173/105)  RR: 18 (02-25-19 @ 06:34) (18 - 18)  SpO2: 99% (02-25-19 @ 06:34) (96% - 99%)    PHYSICAL EXAM:  GENERAL: NAD,   HEAD:  Atraumatic,   EYES: EOMI,  conjunctiva and sclera clear  NECK: Supple, No JVD  CHEST/LUNG: Clear to auscultation bilaterally; No wheeze  HEART: Regular rate and rhythm;   ABDOMEN: Soft, Nontender, Nondistended; Bowel sounds present  EXTREMITIES:  2+ Peripheral Pulses, No clubbing, cyanosis, or edema  PSYCH: AAOx3  NEUROLOGY: +Lt hemiparesis 4/5  SKIN: No rashes or lesions    LABS:                        12.6   17.94 )-----------( 117      ( 25 Feb 2019 07:56 )             38.0     02-25    131<L>  |  94<L>  |  17  ----------------------------<  321<H>  4.1   |  26  |  0.65    Ca    8.4      25 Feb 2019 07:56  Phos  2.8     02-25  Mg     1.8     02-25      PT/INR - ( 25 Feb 2019 07:56 )   PT: 13.2 SEC;   INR: 1.18          PTT - ( 25 Feb 2019 07:56 )  PTT:23.8 SEC            RADIOLOGY & ADDITIONAL TESTS:    Imaging Personally Reviewed:    Consultant(s) Notes Reviewed:      Care Discussed with Consultants/Other Providers:

## 2019-02-25 NOTE — PROGRESS NOTE ADULT - SUBJECTIVE AND OBJECTIVE BOX
Neurosurgery preop                          12.8   19.35 )-----------( 124      ( 24 Feb 2019 06:11 )             37.9   02-24    129<L>  |  94<L>  |  17  ----------------------------<  315<H>  3.7   |  28  |  0.61    Ca    8.5      24 Feb 2019 06:11  Phos  2.3     02-24  Mg     1.9     02-24    PT/INR - ( 24 Feb 2019 06:11 )   PT: 13.7 SEC;   INR: 1.20          PTT - ( 24 Feb 2019 06:11 )  PTT:23.8 SEC    Type + Screen (02.24.19 @ 06:00)    ABO Interpretation: B    Rh Interpretation: Positive    Antibody Screen: Negative    < from: 12 Lead ECG (02.12.19 @ 22:45) >    Ventricular Rate 76 BPM    Atrial Rate 76 BPM    P-R Interval 154 ms    QRS Duration 98 ms    Q-T Interval 410 ms    QTC Calculation(Bezet) 461 ms    P Axis 21 degrees    R Axis -44 degrees    T Axis 3 degrees    Diagnosis Line Sinus rhythm with Premature atrial complexes  Left axis deviation  Abnormal ECG    < end of copied text >    < from: Transesophageal Echocardiogram w/o TTE (02.20.19 @ 17:14) >  Patient name: HELGA WHITE  YOB: 1945   Age: 74 (M)   MR#: [id number]  Study Date: 2/20/2019  Location: [place] [address] [address] OnlySonographer: Kevin Ferrari M.D.  Study quality: Technically Fair  Referring Physician: Fabrice Jackson MD  Blood Pressure: 166/108 mmHg  Height: 152 cm  Weight: 78 kg  BSA: 1.8 m2  ------------------------------------------------------------------------  PROCEDURE: Transesophageal (ALBERTO) was performed in the  echocardiography laboratory.  Informed consent was first  obtained for ALBERTO.  The patient was not sedated.   The  procedure was monitored with automatic blood pressure  monitoring, ECG tracings and pulse oximetry.  Gag reflex  was abolished with topical Cetacaine and the  transesophageal probe was placed in the esophagus posterior  to the heart without complications.  The patient tolerated  the procedure well.  INDICATION: Cerebral infarction, unspecified (I63.9)  ------------------------------------------------------------------------  OBSERVATIONS:  Mitral Valve: Normal mitral valve. Minimal mitral  regurgitation.  Aortic Root: Normal aortic root, aortic arch and descending  thoracic aorta.  Aortic Valve: Calcified trileaflet aortic valve with normal  opening.  Left Atrium: Normal left atrium.  Left Ventricle: Endocardium not well visualized; grossly  normal left ventricular systolic function.  Right Heart: Normal right atrium. Normal right ventricular  size and function. Normal tricuspid valve. Normal pulmonic  valve.  Pericardium/PleuraNormal pericardium with no pericardial  effusion.  Hemodynamic: Agitated saline injection demonstrates  evidence of a patent foramen ovale.  ------------------------------------------------------------------------  CONCLUSIONS:  1. Normal mitral valve. Minimal mitral regurgitation.  2. Calcified trileaflet aortic valve with normal opening.  3. Normal left ventricular systolic function. No segmental  wall motion abnormalities.  4. Normal right ventricular size and function.  5. Agitated saline injection demonstrates evidence of a  patent foramen ovale.  ------------------------------------------------------------------------  Confirmed on  2/20/2019 - 17:59:52 by Angel Bob M.D. [facility]    < end of copied text >

## 2019-02-25 NOTE — PROGRESS NOTE ADULT - PROBLEM SELECTOR PLAN 2
-suspect component of SIADH and psuedohyponatremia secondary to hyperglycemia  -improved with adjusting insulin and fluid restrict   May be a component of psuedohyponatremia due to elevated BG.  Increasing lantus and adding mealtime coverage.    Will place pt on fluid retriction, check urine osm and lytes. -suspect component of SIADH and psuedohyponatremia secondary to hyperglycemia  -improved with adjusting insulin and fluid restrict   - check urine osm and lytes.

## 2019-02-25 NOTE — PROGRESS NOTE ADULT - PROBLEM SELECTOR PLAN 3
New CVA on MRI - likely embolic due to DVT and PFO.  - IVC filter in place  - neuro to clear the patient for craniotomy New CVA on MRI - likely embolic due to DVT and PFO.  - IVC filter in place

## 2019-02-25 NOTE — CHART NOTE - NSCHARTNOTEFT_GEN_A_CORE
plan for patient to go to OR with neurosurgery on Wednesday. Will let primary team know when time is confirmed.

## 2019-02-26 ENCOUNTER — APPOINTMENT (OUTPATIENT)
Dept: NEUROSURGERY | Facility: CLINIC | Age: 74
End: 2019-02-26

## 2019-02-26 LAB
ANION GAP SERPL CALC-SCNC: 11 MMO/L — SIGNIFICANT CHANGE UP (ref 7–14)
BASOPHILS # BLD AUTO: 0.04 K/UL — SIGNIFICANT CHANGE UP (ref 0–0.2)
BASOPHILS NFR BLD AUTO: 0.2 % — SIGNIFICANT CHANGE UP (ref 0–2)
BUN SERPL-MCNC: 20 MG/DL — SIGNIFICANT CHANGE UP (ref 7–23)
CALCIUM SERPL-MCNC: 8.5 MG/DL — SIGNIFICANT CHANGE UP (ref 8.4–10.5)
CHLORIDE SERPL-SCNC: 96 MMOL/L — LOW (ref 98–107)
CO2 SERPL-SCNC: 26 MMOL/L — SIGNIFICANT CHANGE UP (ref 22–31)
CREAT SERPL-MCNC: 0.6 MG/DL — SIGNIFICANT CHANGE UP (ref 0.5–1.3)
EOSINOPHIL # BLD AUTO: 0 K/UL — SIGNIFICANT CHANGE UP (ref 0–0.5)
EOSINOPHIL NFR BLD AUTO: 0 % — SIGNIFICANT CHANGE UP (ref 0–6)
GLUCOSE SERPL-MCNC: 291 MG/DL — HIGH (ref 70–99)
HCT VFR BLD CALC: 38.2 % — LOW (ref 39–50)
HGB BLD-MCNC: 12.8 G/DL — LOW (ref 13–17)
IMM GRANULOCYTES NFR BLD AUTO: 1.9 % — HIGH (ref 0–1.5)
LYMPHOCYTES # BLD AUTO: 0.77 K/UL — LOW (ref 1–3.3)
LYMPHOCYTES # BLD AUTO: 4.3 % — LOW (ref 13–44)
MAGNESIUM SERPL-MCNC: 1.8 MG/DL — SIGNIFICANT CHANGE UP (ref 1.6–2.6)
MCHC RBC-ENTMCNC: 29.7 PG — SIGNIFICANT CHANGE UP (ref 27–34)
MCHC RBC-ENTMCNC: 33.5 % — SIGNIFICANT CHANGE UP (ref 32–36)
MCV RBC AUTO: 88.6 FL — SIGNIFICANT CHANGE UP (ref 80–100)
MONOCYTES # BLD AUTO: 0.77 K/UL — SIGNIFICANT CHANGE UP (ref 0–0.9)
MONOCYTES NFR BLD AUTO: 4.3 % — SIGNIFICANT CHANGE UP (ref 2–14)
NEUTROPHILS # BLD AUTO: 15.8 K/UL — HIGH (ref 1.8–7.4)
NEUTROPHILS NFR BLD AUTO: 89.3 % — HIGH (ref 43–77)
NRBC # FLD: 0 K/UL — LOW (ref 25–125)
PHOSPHATE SERPL-MCNC: 2.8 MG/DL — SIGNIFICANT CHANGE UP (ref 2.5–4.5)
PLATELET # BLD AUTO: 126 K/UL — LOW (ref 150–400)
PMV BLD: 11.5 FL — SIGNIFICANT CHANGE UP (ref 7–13)
POTASSIUM SERPL-MCNC: 4.1 MMOL/L — SIGNIFICANT CHANGE UP (ref 3.5–5.3)
POTASSIUM SERPL-SCNC: 4.1 MMOL/L — SIGNIFICANT CHANGE UP (ref 3.5–5.3)
RBC # BLD: 4.31 M/UL — SIGNIFICANT CHANGE UP (ref 4.2–5.8)
RBC # FLD: 16.2 % — HIGH (ref 10.3–14.5)
SODIUM SERPL-SCNC: 133 MMOL/L — LOW (ref 135–145)
WBC # BLD: 17.71 K/UL — HIGH (ref 3.8–10.5)
WBC # FLD AUTO: 17.71 K/UL — HIGH (ref 3.8–10.5)

## 2019-02-26 PROCEDURE — 99233 SBSQ HOSP IP/OBS HIGH 50: CPT

## 2019-02-26 PROCEDURE — 99222 1ST HOSP IP/OBS MODERATE 55: CPT

## 2019-02-26 RX ORDER — INSULIN GLARGINE 100 [IU]/ML
25 INJECTION, SOLUTION SUBCUTANEOUS AT BEDTIME
Qty: 0 | Refills: 0 | Status: DISCONTINUED | OUTPATIENT
Start: 2019-02-26 | End: 2019-03-01

## 2019-02-26 RX ORDER — SODIUM CHLORIDE 9 MG/ML
1000 INJECTION INTRAMUSCULAR; INTRAVENOUS; SUBCUTANEOUS
Qty: 0 | Refills: 0 | Status: DISCONTINUED | OUTPATIENT
Start: 2019-02-26 | End: 2019-02-28

## 2019-02-26 RX ADMIN — INSULIN GLARGINE 25 UNIT(S): 100 INJECTION, SOLUTION SUBCUTANEOUS at 23:44

## 2019-02-26 RX ADMIN — Medication 5 UNIT(S): at 13:17

## 2019-02-26 RX ADMIN — Medication 4: at 17:49

## 2019-02-26 RX ADMIN — LEVETIRACETAM 400 MILLIGRAM(S): 250 TABLET, FILM COATED ORAL at 05:39

## 2019-02-26 RX ADMIN — LISINOPRIL 20 MILLIGRAM(S): 2.5 TABLET ORAL at 05:39

## 2019-02-26 RX ADMIN — Medication 6 MILLIGRAM(S): at 15:54

## 2019-02-26 RX ADMIN — Medication 5 UNIT(S): at 17:49

## 2019-02-26 RX ADMIN — Medication 3: at 09:28

## 2019-02-26 RX ADMIN — LEVETIRACETAM 400 MILLIGRAM(S): 250 TABLET, FILM COATED ORAL at 17:50

## 2019-02-26 RX ADMIN — Medication 6 MILLIGRAM(S): at 03:29

## 2019-02-26 RX ADMIN — Medication 6 MILLIGRAM(S): at 22:14

## 2019-02-26 RX ADMIN — Medication 4: at 13:17

## 2019-02-26 RX ADMIN — Medication 6 MILLIGRAM(S): at 09:35

## 2019-02-26 RX ADMIN — Medication 1: at 23:44

## 2019-02-26 RX ADMIN — Medication 5 UNIT(S): at 09:27

## 2019-02-26 NOTE — CONSULT NOTE ADULT - ASSESSMENT
74 year old male with a PMH of CVA 2013 with L sided weakness, glioblastoma s/p craniotomy 11/2018, pulmonary embolism, DVT, DM, HTN and BPH presented on 2/13 with seizure.  MRI of brain showed 3 foci of acute embolic infarction and recurrent R temporal mass on this admission.  Patient is scheduled for craniotomy tomorrow.  Findings of asymptomatic PAT/transient SB in 40's /non-sustained AIVR occurred on 2/14 &2/15(on telemetry most likely associated with acute cerebral ischemia.  His rhythm improved at present time.  HR trend in 60's-70 bpm without persistent significant bradycardia with HR <40.    -Avoid AV shi agents  -No acute pacing indication  -Keep K >4 and Mg >1.8   -No contraindication for OR from EP standpoint  -Will discuss with Dr. Renae

## 2019-02-26 NOTE — PROGRESS NOTE ADULT - PROBLEM SELECTOR PLAN 3
FS QID, NISS.  Likely elevated due to decadron.  Titrate lantus for more optimal control--to 20 units will not adjust as pt NPO for OR in AM cont long acting insulin regimen at current dose as pt with cont hyperglycemia. FS QID, NISS.  Likely elevated due to decadron.  Titrate lantus for more optimal control--increase to 25 U SQ as FS still ranging 250-300

## 2019-02-26 NOTE — PROGRESS NOTE ADULT - PROBLEM SELECTOR PLAN 1
complicated by seizure d/o.  Continue keppra IV  - For surgery by Dr. Gonzalez 2/27  - Patient had good functional status prior to admission with no documented TAY, LOC, CHF symptoms.  ALBERTO no WMA and tele monitor showed some bradycardia in 30-40 off BB will have EP eval suspect likely vagal in etiology/post CVA. will have EP eval if clear by EP then no contraindication for OR.

## 2019-02-26 NOTE — PROGRESS NOTE ADULT - SUBJECTIVE AND OBJECTIVE BOX
Patient is a 74y old  Male who presents with a chief complaint of Seizure (25 Feb 2019 10:25)      SUBJECTIVE / OVERNIGHT EVENTS: pt in NAD episodes of bradycardia in 40's denies SOB/CP    MEDICATIONS  (STANDING):  dexamethasone  Injectable 6 milliGRAM(s) IV Push every 6 hours  dextrose 5%. 1000 milliLiter(s) (50 mL/Hr) IV Continuous <Continuous>  dextrose 50% Injectable 12.5 Gram(s) IV Push once  dextrose 50% Injectable 25 Gram(s) IV Push once  dextrose 50% Injectable 25 Gram(s) IV Push once  influenza   Vaccine 0.5 milliLiter(s) IntraMuscular once  insulin glargine Injectable (LANTUS) 20 Unit(s) SubCutaneous at bedtime  insulin lispro (HumaLOG) corrective regimen sliding scale   SubCutaneous three times a day before meals  insulin lispro (HumaLOG) corrective regimen sliding scale   SubCutaneous at bedtime  insulin lispro Injectable (HumaLOG) 5 Unit(s) SubCutaneous three times a day before meals  levETIRAcetam  IVPB 1250 milliGRAM(s) IV Intermittent every 12 hours  lisinopril 20 milliGRAM(s) Oral daily    MEDICATIONS  (PRN):  acetaminophen  Suppository .. 650 milliGRAM(s) Rectal every 6 hours PRN Temp greater or equal to 38C (100.4F)  dextrose 40% Gel 15 Gram(s) Oral once PRN Blood Glucose LESS THAN 70 milliGRAM(s)/deciliter  glucagon  Injectable 1 milliGRAM(s) IntraMuscular once PRN Glucose LESS THAN 70 milligrams/deciliter        CAPILLARY BLOOD GLUCOSE      POCT Blood Glucose.: 259 mg/dL (26 Feb 2019 09:00)  POCT Blood Glucose.: 331 mg/dL (25 Feb 2019 22:31)  POCT Blood Glucose.: 295 mg/dL (25 Feb 2019 17:44)  POCT Blood Glucose.: 280 mg/dL (25 Feb 2019 12:17)    I&O's Summary    25 Feb 2019 07:01  -  26 Feb 2019 07:00  --------------------------------------------------------  IN: 500 mL / OUT: 500 mL / NET: 0 mL        T(C): 36.4 (02-26-19 @ 05:36), Max: 36.8 (02-25-19 @ 21:24)  HR: 57 (02-26-19 @ 05:36) (57 - 70)  BP: 131/95 (02-26-19 @ 05:36) (103/85 - 131/95)  RR: 18 (02-26-19 @ 05:36) (18 - 18)  SpO2: 99% (02-26-19 @ 05:36) (98% - 99%)    PHYSICAL EXAM:    LABS:                        12.8   17.71 )-----------( 126      ( 26 Feb 2019 07:28 )             38.2     02-26    133<L>  |  96<L>  |  20  ----------------------------<  291<H>  4.1   |  26  |  0.60    Ca    8.5      26 Feb 2019 07:28  Phos  2.8     02-26  Mg     1.8     02-26      PT/INR - ( 25 Feb 2019 07:56 )   PT: 13.2 SEC;   INR: 1.18          PTT - ( 25 Feb 2019 07:56 )  PTT:23.8 SEC            RADIOLOGY & ADDITIONAL TESTS:    Imaging Personally Reviewed:    Consultant(s) Notes Reviewed:      Care Discussed with Consultants/Other Providers: Patient is a 74y old  Male who presents with a chief complaint of Seizure (25 Feb 2019 10:25)      SUBJECTIVE / OVERNIGHT EVENTS: pt in NAD episodes of bradycardia in 40's. procedure canceled yesterday plan for OR tomm denies SOB/CP    MEDICATIONS  (STANDING):  dexamethasone  Injectable 6 milliGRAM(s) IV Push every 6 hours  dextrose 5%. 1000 milliLiter(s) (50 mL/Hr) IV Continuous <Continuous>  dextrose 50% Injectable 12.5 Gram(s) IV Push once  dextrose 50% Injectable 25 Gram(s) IV Push once  dextrose 50% Injectable 25 Gram(s) IV Push once  influenza   Vaccine 0.5 milliLiter(s) IntraMuscular once  insulin glargine Injectable (LANTUS) 20 Unit(s) SubCutaneous at bedtime  insulin lispro (HumaLOG) corrective regimen sliding scale   SubCutaneous three times a day before meals  insulin lispro (HumaLOG) corrective regimen sliding scale   SubCutaneous at bedtime  insulin lispro Injectable (HumaLOG) 5 Unit(s) SubCutaneous three times a day before meals  levETIRAcetam  IVPB 1250 milliGRAM(s) IV Intermittent every 12 hours  lisinopril 20 milliGRAM(s) Oral daily    MEDICATIONS  (PRN):  acetaminophen  Suppository .. 650 milliGRAM(s) Rectal every 6 hours PRN Temp greater or equal to 38C (100.4F)  dextrose 40% Gel 15 Gram(s) Oral once PRN Blood Glucose LESS THAN 70 milliGRAM(s)/deciliter  glucagon  Injectable 1 milliGRAM(s) IntraMuscular once PRN Glucose LESS THAN 70 milligrams/deciliter        CAPILLARY BLOOD GLUCOSE      POCT Blood Glucose.: 259 mg/dL (26 Feb 2019 09:00)  POCT Blood Glucose.: 331 mg/dL (25 Feb 2019 22:31)  POCT Blood Glucose.: 295 mg/dL (25 Feb 2019 17:44)  POCT Blood Glucose.: 280 mg/dL (25 Feb 2019 12:17)    I&O's Summary    25 Feb 2019 07:01  -  26 Feb 2019 07:00  --------------------------------------------------------  IN: 500 mL / OUT: 500 mL / NET: 0 mL        T(C): 36.4 (02-26-19 @ 05:36), Max: 36.8 (02-25-19 @ 21:24)  HR: 57 (02-26-19 @ 05:36) (57 - 70)  BP: 131/95 (02-26-19 @ 05:36) (103/85 - 131/95)  RR: 18 (02-26-19 @ 05:36) (18 - 18)  SpO2: 99% (02-26-19 @ 05:36) (98% - 99%)    PHYSICAL EXAM:    LABS:                        12.8   17.71 )-----------( 126      ( 26 Feb 2019 07:28 )             38.2     02-26    133<L>  |  96<L>  |  20  ----------------------------<  291<H>  4.1   |  26  |  0.60    Ca    8.5      26 Feb 2019 07:28  Phos  2.8     02-26  Mg     1.8     02-26      PT/INR - ( 25 Feb 2019 07:56 )   PT: 13.2 SEC;   INR: 1.18          PTT - ( 25 Feb 2019 07:56 )  PTT:23.8 SEC            RADIOLOGY & ADDITIONAL TESTS:    Imaging Personally Reviewed:    Consultant(s) Notes Reviewed:      Care Discussed with Consultants/Other Providers:

## 2019-02-26 NOTE — PROGRESS NOTE ADULT - PROBLEM SELECTOR PLAN 2
-suspect component of SIADH and psuedohyponatremia secondary to hyperglycemia  -improved with adjusting insulin and fluid restrict

## 2019-02-26 NOTE — CONSULT NOTE ADULT - SUBJECTIVE AND OBJECTIVE BOX
Patient is a 74y old  Male who presents with a chief complaint of Seizure (26 Feb 2019 12:03)          HPI:        74 year old male with a PMH of CVA 2013 with L sided weakness, glioblastoma s/p craniotomy 11/2018, pulmonary embolism, DVT, DM, HTN and BPH presented on 2/13 with seizure.  MRI of brain showed 3 foci of acute embolic infarction and recurrent R temporal mass on this admission.  Patient is scheduled for craniotomy tomorrow.  Telemetry recorded SR with episodes very transient SB down to 40's for 3-4 seconds and few runs of transient PAT.  HR trend in 60-70 bpm.  Patient was asymptomatic during the nevin episodes.  Review of his telemetry recordings from 2/14 and 2/15 demonstrated non-sustained PAT with RVR and AIVR at 90bpm.  ALBERTO showed normal LV systolic function without evidence of intracardiac source of emboli.  Duplex of LE showed DVT.       PAST MEDICAL & SURGICAL HISTORY:  Oxygen dependent  Vitamin deficiency  Constipation  Pulmonary embolism  Glioblastoma  BPH (benign prostatic hyperplasia)  CVA (cerebral vascular accident)  Diabetes  HTN (hypertension)  S/P craniotomy: ~ resection of R-pariental mass (11/28/2018)      MEDICATIONS  (STANDING):  dexamethasone  Injectable 6 milliGRAM(s) IV Push every 6 hours  dextrose 5%. 1000 milliLiter(s) (50 mL/Hr) IV Continuous <Continuous>  dextrose 50% Injectable 12.5 Gram(s) IV Push once  dextrose 50% Injectable 25 Gram(s) IV Push once  dextrose 50% Injectable 25 Gram(s) IV Push once  influenza   Vaccine 0.5 milliLiter(s) IntraMuscular once  insulin glargine Injectable (LANTUS) 25 Unit(s) SubCutaneous at bedtime  insulin lispro (HumaLOG) corrective regimen sliding scale   SubCutaneous three times a day before meals  insulin lispro (HumaLOG) corrective regimen sliding scale   SubCutaneous at bedtime  insulin lispro Injectable (HumaLOG) 5 Unit(s) SubCutaneous three times a day before meals  levETIRAcetam  IVPB 1250 milliGRAM(s) IV Intermittent every 12 hours  lisinopril 20 milliGRAM(s) Oral daily    MEDICATIONS  (PRN):  acetaminophen  Suppository .. 650 milliGRAM(s) Rectal every 6 hours PRN Temp greater or equal to 38C (100.4F)  dextrose 40% Gel 15 Gram(s) Oral once PRN Blood Glucose LESS THAN 70 milliGRAM(s)/deciliter  glucagon  Injectable 1 milliGRAM(s) IntraMuscular once PRN Glucose LESS THAN 70 milligrams/deciliter    Allergies    No Known Allergies    Intolerances      FAMILY HISTORY:  No pertinent family history in first degree relatives      SOCIAL HISTORY:  Denies smoking; no   Alcohol  or  Drug abuse               REVIEW OF SYSTEMS:    CONSTITUTIONAL: No fever, weight loss, chills, shakes, or fatigue  EYES: No eye pain, visual disturbances, or discharge  ENMT:  No difficulty hearing, tinnitus, vertigo; No sinus or throat pain  NECK: No pain or stiffness  RESPIRATORY: No cough, wheezing, hemoptysis, or shortness of breath  CARDIOVASCULAR: No chest pain, dyspnea, palpitations, dizziness, syncope, paroxysmal nocturnal dyspnea, orthopnea, or arm or leg swelling  GASTROINTESTINAL: No abdominal  or epigastric pain, nausea, vomiting, hematemesis, diarrhea, constipation, melena or bright red blood.  GENITOURINARY: No dysuria, nocturia, hematuria, or urinary incontinence  NEUROLOGICAL: No headaches, memory loss, slurred speech, limb weakness, loss of strength, numbness, or tremors  SKIN: No itching, burning, rashes, or lesions   LYMPH NODES: No enlarged glands  ENDOCRINE: No heat or cold intolerance, or hair loss  MUSCULOSKELETAL: No joint pain or swelling, muscle, back, or extremity pain  PSYCHIATRIC: No depression, anxiety, or difficulty sleeping  HEME/LYMPH: No easy bruising or bleeding gums  ALLERY AND IMMUNOLOGIC: No hives or rash.      Vital Signs Last 24 Hrs  T(C): 36.4 (26 Feb 2019 05:36), Max: 36.8 (25 Feb 2019 21:24)  T(F): 97.6 (26 Feb 2019 05:36), Max: 98.3 (25 Feb 2019 21:24)  HR: 57 (26 Feb 2019 05:36) (57 - 66)  BP: 131/95 (26 Feb 2019 05:36) (111/74 - 131/95)  BP(mean): --  RR: 18 (26 Feb 2019 05:36) (18 - 18)  SpO2: 99% (26 Feb 2019 05:36) (98% - 99%)    PHYSICAL EXAM:    GENERAL: In no apparent distress, well nourished, and hydrated.  HEAD:  Atraumatic, Normocephalic  NECK: Supple . No JVD or carotid bruit or thyroidmegaly.  Carotid pulse is 2+ bilaterally.  PULMONARY: Clear to auscultation and perfusion.  No rales, wheezing, or rhonchi bilaterally.  HEART: Regular rate and rhythm; No murmurs, rubs, or gallops.  ABDOMEN: Soft, Nontender, Nondistended; Bowel sounds present  EXTREMITIES: No clubbing, cyanosis, or edema  NEUROLOGICAL: Alert oriented to person, place and time.  Speech clear.  +L hemiparesis  Skin: Dry intact, no rashes or lesions.          INTERPRETATION OF TELEMETRY:  Sinus rhythm episodes of transient SB down to 40 for few seconds, occasional PVC's/APC's,   Telemetry recording on 2/14 and 2/15:   SR with bursts of PAT up to 190 bpm; runs of non-sustained AIVR at 90 bpm lasting few seconds  ECG: NSR 67pbm.         LABS:                        12.8   17.71 )-----------( 126      ( 26 Feb 2019 07:28 )             38.2     02-26    133<L>  |  96<L>  |  20  ----------------------------<  291<H>  4.1   |  26  |  0.60    Ca    8.5      26 Feb 2019 07:28  Phos  2.8     02-26  Mg     1.8     02-26          PT/INR - ( 25 Feb 2019 07:56 )   PT: 13.2 SEC;   INR: 1.18          PTT - ( 25 Feb 2019 07:56 )  PTT:23.8 SEC      BNP  RADIOLOGY & ADDITIONAL STUDIES:  PREVIOUS DIAGNOSTIC TESTING:      ECHO FINDINGS:  CONCLUSIONS:  1. Normal mitral valve. Minimal mitral regurgitation.  2. Calcified trileaflet aortic valve with normal opening.  3. Normal left ventricular systolic function. No segmental  wall motion abnormalities.  4. Normal right ventricular size and function.  5. Agitated saline injection demonstrates evidence of a  patent foramen ovale.  ------------------------------------------------------------------------  Confirmed on  2/20/2019 - 17:59:52 by Angel Bob M.D. [facility]PVI  ------------------------------------------------------------------------    STRESS FINDINGS:    CATHETERIZATION FINDINGS:

## 2019-02-26 NOTE — PROGRESS NOTE ADULT - SUBJECTIVE AND OBJECTIVE BOX
Surgery: Right side craniotomy for resection of brain tumor   Consent: telephone from son Catarino     T(C): 36.4 (02-26-19 @ 05:36), Max: 36.8 (02-25-19 @ 21:24)  HR: 57 (02-26-19 @ 05:36) (57 - 70)  BP: 131/95 (02-26-19 @ 05:36) (103/85 - 131/95)  RR: 18 (02-26-19 @ 05:36) (18 - 18)  SpO2: 99% (02-26-19 @ 05:36) (98% - 99%)  Wt(kg): --  02-26    133<L>  |  96<L>  |  20  ----------------------------<  291<H>  4.1   |  26  |  0.60    Ca    8.5      26 Feb 2019 07:28  Phos  2.8     02-26  Mg     1.8     02-26      CBC Full  -  ( 26 Feb 2019 07:28 )  WBC Count : 17.71 K/uL  Hemoglobin : 12.8 g/dL  Hematocrit : 38.2 %  Platelet Count - Automated : 126 K/uL  Mean Cell Volume : 88.6 fL  Mean Cell Hemoglobin : 29.7 pg  Mean Cell Hemoglobin Concentration : 33.5 %  Auto Neutrophil # : 15.80 K/uL  Auto Lymphocyte # : 0.77 K/uL  Auto Monocyte # : 0.77 K/uL  Auto Eosinophil # : 0.00 K/uL  Auto Basophil # : 0.04 K/uL  Auto Neutrophil % : 89.3 %  Auto Lymphocyte % : 4.3 %  Auto Monocyte % : 4.3 %  Auto Eosinophil % : 0.0 %  Auto Basophil % : 0.2 %  PT/INR - ( 25 Feb 2019 07:56 )   PT: 13.2 SEC;   INR: 1.18     PTT - ( 25 Feb 2019 07:56 )  PTT:23.8 SEC    Type & Screen (in past 72hrs): ordered

## 2019-02-27 ENCOUNTER — APPOINTMENT (OUTPATIENT)
Dept: NEUROSURGERY | Facility: HOSPITAL | Age: 74
End: 2019-02-27

## 2019-02-27 ENCOUNTER — RESULT REVIEW (OUTPATIENT)
Age: 74
End: 2019-02-27

## 2019-02-27 LAB
ANION GAP SERPL CALC-SCNC: 9 MMO/L — SIGNIFICANT CHANGE UP (ref 7–14)
APTT BLD: 23.4 SEC — LOW (ref 27.5–36.3)
BLD GP AB SCN SERPL QL: NEGATIVE — SIGNIFICANT CHANGE UP
BUN SERPL-MCNC: 18 MG/DL — SIGNIFICANT CHANGE UP (ref 7–23)
CALCIUM SERPL-MCNC: 9 MG/DL — SIGNIFICANT CHANGE UP (ref 8.4–10.5)
CHLORIDE SERPL-SCNC: 95 MMOL/L — LOW (ref 98–107)
CO2 SERPL-SCNC: 30 MMOL/L — SIGNIFICANT CHANGE UP (ref 22–31)
CREAT SERPL-MCNC: 0.8 MG/DL — SIGNIFICANT CHANGE UP (ref 0.5–1.3)
GLUCOSE SERPL-MCNC: 236 MG/DL — HIGH (ref 70–99)
HCT VFR BLD CALC: 43.4 % — SIGNIFICANT CHANGE UP (ref 39–50)
HGB BLD-MCNC: 14 G/DL — SIGNIFICANT CHANGE UP (ref 13–17)
INR BLD: 1.17 — SIGNIFICANT CHANGE UP (ref 0.88–1.17)
MAGNESIUM SERPL-MCNC: 2 MG/DL — SIGNIFICANT CHANGE UP (ref 1.6–2.6)
MCHC RBC-ENTMCNC: 29.9 PG — SIGNIFICANT CHANGE UP (ref 27–34)
MCHC RBC-ENTMCNC: 32.3 % — SIGNIFICANT CHANGE UP (ref 32–36)
MCV RBC AUTO: 92.7 FL — SIGNIFICANT CHANGE UP (ref 80–100)
NRBC # FLD: 0 K/UL — LOW (ref 25–125)
PLATELET # BLD AUTO: 124 K/UL — LOW (ref 150–400)
PMV BLD: 11.6 FL — SIGNIFICANT CHANGE UP (ref 7–13)
POTASSIUM SERPL-MCNC: 4.5 MMOL/L — SIGNIFICANT CHANGE UP (ref 3.5–5.3)
POTASSIUM SERPL-SCNC: 4.5 MMOL/L — SIGNIFICANT CHANGE UP (ref 3.5–5.3)
PROTHROM AB SERPL-ACNC: 13.4 SEC — HIGH (ref 9.8–13.1)
RBC # BLD: 4.68 M/UL — SIGNIFICANT CHANGE UP (ref 4.2–5.8)
RBC # FLD: 16.8 % — HIGH (ref 10.3–14.5)
RH IG SCN BLD-IMP: POSITIVE — SIGNIFICANT CHANGE UP
SODIUM SERPL-SCNC: 134 MMOL/L — LOW (ref 135–145)
WBC # BLD: 18.15 K/UL — HIGH (ref 3.8–10.5)
WBC # FLD AUTO: 18.15 K/UL — HIGH (ref 3.8–10.5)

## 2019-02-27 PROCEDURE — 99233 SBSQ HOSP IP/OBS HIGH 50: CPT

## 2019-02-27 PROCEDURE — 61781 SCAN PROC CRANIAL INTRA: CPT

## 2019-02-27 PROCEDURE — 99223 1ST HOSP IP/OBS HIGH 75: CPT

## 2019-02-27 PROCEDURE — 69990 MICROSURGERY ADD-ON: CPT | Mod: 59

## 2019-02-27 PROCEDURE — 88307 TISSUE EXAM BY PATHOLOGIST: CPT | Mod: 26

## 2019-02-27 PROCEDURE — 61510 CRNEC TREPH EXC BRN TUM STTL: CPT

## 2019-02-27 RX ORDER — OXYCODONE HYDROCHLORIDE 5 MG/1
10 TABLET ORAL EVERY 4 HOURS
Qty: 0 | Refills: 0 | Status: DISCONTINUED | OUTPATIENT
Start: 2019-02-27 | End: 2019-03-01

## 2019-02-27 RX ORDER — HYDROMORPHONE HYDROCHLORIDE 2 MG/ML
0.5 INJECTION INTRAMUSCULAR; INTRAVENOUS; SUBCUTANEOUS
Qty: 0 | Refills: 0 | Status: DISCONTINUED | OUTPATIENT
Start: 2019-02-27 | End: 2019-02-28

## 2019-02-27 RX ORDER — OXYCODONE HYDROCHLORIDE 5 MG/1
5 TABLET ORAL EVERY 4 HOURS
Qty: 0 | Refills: 0 | Status: DISCONTINUED | OUTPATIENT
Start: 2019-02-27 | End: 2019-02-27

## 2019-02-27 RX ORDER — CEFAZOLIN SODIUM 1 G
2000 VIAL (EA) INJECTION EVERY 8 HOURS
Qty: 0 | Refills: 0 | Status: COMPLETED | OUTPATIENT
Start: 2019-02-28 | End: 2019-02-28

## 2019-02-27 RX ADMIN — Medication 6 MILLIGRAM(S): at 23:40

## 2019-02-27 RX ADMIN — LEVETIRACETAM 400 MILLIGRAM(S): 250 TABLET, FILM COATED ORAL at 05:56

## 2019-02-27 RX ADMIN — Medication 6 MILLIGRAM(S): at 11:11

## 2019-02-27 RX ADMIN — SODIUM CHLORIDE 75 MILLILITER(S): 9 INJECTION INTRAMUSCULAR; INTRAVENOUS; SUBCUTANEOUS at 11:10

## 2019-02-27 RX ADMIN — SODIUM CHLORIDE 75 MILLILITER(S): 9 INJECTION INTRAMUSCULAR; INTRAVENOUS; SUBCUTANEOUS at 05:57

## 2019-02-27 RX ADMIN — Medication 6 MILLIGRAM(S): at 05:57

## 2019-02-27 RX ADMIN — Medication 2: at 13:21

## 2019-02-27 RX ADMIN — SODIUM CHLORIDE 75 MILLILITER(S): 9 INJECTION INTRAMUSCULAR; INTRAVENOUS; SUBCUTANEOUS at 22:00

## 2019-02-27 RX ADMIN — LISINOPRIL 20 MILLIGRAM(S): 2.5 TABLET ORAL at 05:56

## 2019-02-27 RX ADMIN — Medication 6 MILLIGRAM(S): at 02:57

## 2019-02-27 RX ADMIN — INSULIN GLARGINE 25 UNIT(S): 100 INJECTION, SOLUTION SUBCUTANEOUS at 23:44

## 2019-02-27 RX ADMIN — Medication 2: at 09:12

## 2019-02-27 RX ADMIN — SODIUM CHLORIDE 75 MILLILITER(S): 9 INJECTION INTRAMUSCULAR; INTRAVENOUS; SUBCUTANEOUS at 00:02

## 2019-02-27 NOTE — PROGRESS NOTE ADULT - PROBLEM SELECTOR PLAN 1
complicated by seizure d/o.  Continue keppra IV  - For surgery by Dr. Gonzalez 2/27  - Patient had good functional status prior to admission with no documented TAY, LOC, CHF symptoms.  ALBERTO no WMA and tele monitor showed some bradycardia in 30-40 off BB likely post CVA no contraindication for OR.

## 2019-02-27 NOTE — PROGRESS NOTE ADULT - PROBLEM SELECTOR PLAN 3
FS QID, NISS.  Likely elevated due to decadron.  Titrate lantus for more optimal control--cont lantus 25 U SQ would attempt tighter control post op.

## 2019-02-27 NOTE — CONSULT NOTE ADULT - ASSESSMENT
ASSESSMENT: 74yMale     PLAN:   Neurologic:    Respiratory:    Cardiovascular:    Gastrointestinal/Nutrition:    Genitourinary/Renal:    Hematologic:    Infectious Disease:    Endocrine:    Disposition: ASSESSMENT: 75 yo male history of craniotomy nov 2018, presenting with seizure, possible progression of GBM on CTH for resection of recurrent GBM.    PLAN:   Neurologic:  - Decadron 6mg IV q6h  - Keppra 1250mg IV q12h  - Pain control w/ Tylenol, Oxy, breakthrough dilaudid    Respiratory:  - Monitor oxygen saturation, RR    Cardiovascular:  - Lisinopril    Gastrointestinal/Nutrition:  - Diabetic mechanical soft w/ 1200 ml fluid restriction    Genitourinary/Renal:  - Monitor BUN/Cr, urine output    Hematologic:  - Start SQH in 24 hrs    Infectious Disease:  - Cont. Cefazolin  - Monitor WBC, temp    Endocrine:  - ISS    Disposition: PACU overnight under SICU care to be reassessed in the AM.

## 2019-02-27 NOTE — PRE-OP CHECKLIST - AS BP NONINV SITE
2018  EMPLOYEE INFORMATION: EMPLOYER INFORMATION:   NAME: Tamera Loya [facility]   : 1995 [phone]   DATE OF INJURY/EVENT: 2018           [facility] [place] [facility]   Treating Provider: Patsy Thomas MD  Time In:  10:58 AM Time Out:  11:35 AM      DIAGNOSIS:   1. Mid back pain      STATUS: This injury is determined to be WORK RELATED.    RETURN TO WORK:   Employee may return to work without restrictions.  Employee is discharged from care.              Thank you for the privilege of providing medical care for this injury/condition.  If there are any questions, please call the occupational health clinic at Dept: [phone].      Electronically signed on 2018 at 11:35 AM by:   Patsy Thomas MD   [facility]  
left upper arm

## 2019-02-27 NOTE — PROGRESS NOTE ADULT - SUBJECTIVE AND OBJECTIVE BOX
Patient is a 74y old  Male who presents with a chief complaint of Seizure (26 Feb 2019 15:33)      SUBJECTIVE / OVERNIGHT EVENTS: Plan for OR today     MEDICATIONS  (STANDING):  dexamethasone  Injectable 6 milliGRAM(s) IV Push every 6 hours  dextrose 5%. 1000 milliLiter(s) (50 mL/Hr) IV Continuous <Continuous>  dextrose 50% Injectable 12.5 Gram(s) IV Push once  dextrose 50% Injectable 25 Gram(s) IV Push once  dextrose 50% Injectable 25 Gram(s) IV Push once  influenza   Vaccine 0.5 milliLiter(s) IntraMuscular once  insulin glargine Injectable (LANTUS) 25 Unit(s) SubCutaneous at bedtime  insulin lispro (HumaLOG) corrective regimen sliding scale   SubCutaneous three times a day before meals  insulin lispro (HumaLOG) corrective regimen sliding scale   SubCutaneous at bedtime  insulin lispro Injectable (HumaLOG) 5 Unit(s) SubCutaneous three times a day before meals  levETIRAcetam  IVPB 1250 milliGRAM(s) IV Intermittent every 12 hours  lisinopril 20 milliGRAM(s) Oral daily  sodium chloride 0.9%. 1000 milliLiter(s) (75 mL/Hr) IV Continuous <Continuous>    MEDICATIONS  (PRN):  acetaminophen  Suppository .. 650 milliGRAM(s) Rectal every 6 hours PRN Temp greater or equal to 38C (100.4F)  dextrose 40% Gel 15 Gram(s) Oral once PRN Blood Glucose LESS THAN 70 milliGRAM(s)/deciliter  glucagon  Injectable 1 milliGRAM(s) IntraMuscular once PRN Glucose LESS THAN 70 milligrams/deciliter        CAPILLARY BLOOD GLUCOSE      POCT Blood Glucose.: 221 mg/dL (27 Feb 2019 09:07)  POCT Blood Glucose.: 261 mg/dL (26 Feb 2019 23:36)  POCT Blood Glucose.: 323 mg/dL (26 Feb 2019 17:15)  POCT Blood Glucose.: 303 mg/dL (26 Feb 2019 13:00)    I&O's Summary      T(C): 36.6 (02-27-19 @ 05:55), Max: 36.7 (02-26-19 @ 22:08)  HR: 68 (02-27-19 @ 05:55) (60 - 68)  BP: 142/94 (02-27-19 @ 05:55) (125/90 - 142/94)  RR: 18 (02-27-19 @ 05:55) (16 - 18)  SpO2: 97% (02-27-19 @ 05:55) (97% - 98%)    PHYSICAL EXAM:  GENERAL: NAD, well-developed  HEAD:  Atraumatic, Normocephalic  EYES: EOMI, PERRLA, conjunctiva and sclera clear  NECK: Supple, No JVD  CHEST/LUNG: Clear to auscultation bilaterally; No wheeze  HEART: Regular rate and rhythm; No murmurs, rubs, or gallops  ABDOMEN: Soft, Nontender, Nondistended; Bowel sounds present  EXTREMITIES:  2+ Peripheral Pulses, No clubbing, cyanosis, or edema  PSYCH: AAOx3  NEUROLOGY: non-focal  SKIN: No rashes or lesions    LABS:                        12.8   17.71 )-----------( 126      ( 26 Feb 2019 07:28 )             38.2     02-26    133<L>  |  96<L>  |  20  ----------------------------<  291<H>  4.1   |  26  |  0.60    Ca    8.5      26 Feb 2019 07:28  Phos  2.8     02-26  Mg     1.8     02-26                  RADIOLOGY & ADDITIONAL TESTS:    Imaging Personally Reviewed:    Consultant(s) Notes Reviewed:      Care Discussed with Consultants/Other Providers: Patient is a 74y old  Male who presents with a chief complaint of Seizure (26 Feb 2019 15:33)      SUBJECTIVE / OVERNIGHT EVENTS: Plan for OR today     MEDICATIONS  (STANDING):  dexamethasone  Injectable 6 milliGRAM(s) IV Push every 6 hours  dextrose 5%. 1000 milliLiter(s) (50 mL/Hr) IV Continuous <Continuous>  dextrose 50% Injectable 12.5 Gram(s) IV Push once  dextrose 50% Injectable 25 Gram(s) IV Push once  dextrose 50% Injectable 25 Gram(s) IV Push once  influenza   Vaccine 0.5 milliLiter(s) IntraMuscular once  insulin glargine Injectable (LANTUS) 25 Unit(s) SubCutaneous at bedtime  insulin lispro (HumaLOG) corrective regimen sliding scale   SubCutaneous three times a day before meals  insulin lispro (HumaLOG) corrective regimen sliding scale   SubCutaneous at bedtime  insulin lispro Injectable (HumaLOG) 5 Unit(s) SubCutaneous three times a day before meals  levETIRAcetam  IVPB 1250 milliGRAM(s) IV Intermittent every 12 hours  lisinopril 20 milliGRAM(s) Oral daily  sodium chloride 0.9%. 1000 milliLiter(s) (75 mL/Hr) IV Continuous <Continuous>    MEDICATIONS  (PRN):  acetaminophen  Suppository .. 650 milliGRAM(s) Rectal every 6 hours PRN Temp greater or equal to 38C (100.4F)  dextrose 40% Gel 15 Gram(s) Oral once PRN Blood Glucose LESS THAN 70 milliGRAM(s)/deciliter  glucagon  Injectable 1 milliGRAM(s) IntraMuscular once PRN Glucose LESS THAN 70 milligrams/deciliter        CAPILLARY BLOOD GLUCOSE      POCT Blood Glucose.: 221 mg/dL (27 Feb 2019 09:07)  POCT Blood Glucose.: 261 mg/dL (26 Feb 2019 23:36)  POCT Blood Glucose.: 323 mg/dL (26 Feb 2019 17:15)  POCT Blood Glucose.: 303 mg/dL (26 Feb 2019 13:00)    I&O's Summary      T(C): 36.6 (02-27-19 @ 05:55), Max: 36.7 (02-26-19 @ 22:08)  HR: 68 (02-27-19 @ 05:55) (60 - 68)  BP: 142/94 (02-27-19 @ 05:55) (125/90 - 142/94)  RR: 18 (02-27-19 @ 05:55) (16 - 18)  SpO2: 97% (02-27-19 @ 05:55) (97% - 98%)    PHYSICAL EXAM:  GENERAL: NAD,   HEAD:  Atraumatic,   EYES: EOMI,  conjunctiva and sclera clear  NECK: Supple, No JVD  CHEST/LUNG: Clear to auscultation bilaterally; No wheeze  HEART: Regular rate and rhythm;   ABDOMEN: Soft, Nontender, Nondistended; Bowel sounds present  EXTREMITIES:  2+ Peripheral Pulses, No clubbing, cyanosis, or edema  PSYCH: AAOx3  NEUROLOGY: +Lt hemiparesis 4/5  SKIN: No rashes or lesions    LABS:                        12.8   17.71 )-----------( 126      ( 26 Feb 2019 07:28 )             38.2     02-26    133<L>  |  96<L>  |  20  ----------------------------<  291<H>  4.1   |  26  |  0.60    Ca    8.5      26 Feb 2019 07:28  Phos  2.8     02-26  Mg     1.8     02-26                  RADIOLOGY & ADDITIONAL TESTS:    Imaging Personally Reviewed:    Consultant(s) Notes Reviewed:      Care Discussed with Consultants/Other Providers:

## 2019-02-27 NOTE — PROGRESS NOTE ADULT - PROBLEM SELECTOR PROBLEM 5
Acute deep vein thrombosis (DVT) of left lower extremity, unspecified vein
Cerebrovascular accident (CVA), unspecified mechanism
Acute deep vein thrombosis (DVT) of left lower extremity, unspecified vein
Cerebrovascular accident (CVA), unspecified mechanism
Nutritional assessment
Type 2 diabetes mellitus with complication, unspecified whether long term insulin use
Coronavirus infection
Coronavirus infection

## 2019-02-27 NOTE — CONSULT NOTE ADULT - SUBJECTIVE AND OBJECTIVE BOX
HISTORY OF PRESENT ILLNESS:  HELGA WHITE is a 74y Male     PAST MEDICAL HISTORY: Oxygen dependent  Vitamin deficiency  Constipation  Pulmonary embolism  Glioblastoma  BPH (benign prostatic hyperplasia)  CVA (cerebral vascular accident)  Diabetes  HTN (hypertension)      PAST SURGICAL HISTORY: S/P craniotomy  No significant past surgical history      FAMILY HISTORY: No pertinent family history in first degree relatives      SOCIAL HISTORY:    CODE STATUS:     HOME MEDICATIONS:    ALLERGIES: No Known Allergies      VITAL SIGNS:  ICU Vital Signs Last 24 Hrs  T(C): 36.8 (27 Feb 2019 23:00), Max: 36.8 (27 Feb 2019 21:40)  T(F): 98.2 (27 Feb 2019 23:00), Max: 98.2 (27 Feb 2019 21:40)  HR: 61 (27 Feb 2019 23:30) (54 - 68)  BP: 115/84 (27 Feb 2019 23:30) (109/78 - 142/94)  BP(mean): 91 (27 Feb 2019 23:30) (84 - 95)  ABP: 128/62 (27 Feb 2019 23:30) (101/54 - 131/61)  ABP(mean): 81 (27 Feb 2019 23:30) (69 - 89)  RR: 13 (27 Feb 2019 23:30) (11 - 27)  SpO2: 98% (27 Feb 2019 23:30) (92% - 98%)      NEURO  Exam:  acetaminophen  Suppository .. 650 milliGRAM(s) Rectal every 6 hours PRN Temp greater or equal to 38C (100.4F)  HYDROmorphone  Injectable 0.5 milliGRAM(s) IV Push every 15 minutes PRN Severe Pain (7 - 10)  levETIRAcetam  IVPB 1250 milliGRAM(s) IV Intermittent every 12 hours  oxyCODONE    IR 5 milliGRAM(s) Oral every 4 hours PRN Moderate Pain (4 - 6)  oxyCODONE    IR 10 milliGRAM(s) Oral every 4 hours PRN Severe Pain (7 - 10)      RESPIRATORY  Mechanical Ventilation:     Exam:      CARDIOVASCULAR    Exam:  Cardiac Rhythm:  lisinopril 20 milliGRAM(s) Oral daily      GI/NUTRITION  Exam:  Diet:      GENITOURINARY/RENAL  sodium chloride 0.9%. 1000 milliLiter(s) IV Continuous <Continuous>      02-27 @ 07:01  -  02-27 @ 23:45  --------------------------------------------------------  IN:    sodium chloride 0.9%.: 75 mL  Total IN: 75 mL    OUT:    Indwelling Catheter - Urethral: 260 mL  Total OUT: 260 mL    Total NET: -185 mL          02-27    134<L>  |  95<L>  |  18  ----------------------------<  236<H>  4.5   |  30  |  0.80    Ca    9.0      27 Feb 2019 09:55  Phos  2.8     02-26  Mg     2.0     02-27      [ ] Roberson catheter, indication: urine output monitoring in critically ill patient    HEMATOLOGIC  [ ] VTE Prophylaxis:                          14.0   18.15 )-----------( 124      ( 27 Feb 2019 09:55 )             43.4     PT/INR - ( 27 Feb 2019 09:55 )   PT: 13.4 SEC;   INR: 1.17          PTT - ( 27 Feb 2019 09:55 )  PTT:23.4 SEC  Transfusion: [ ] PRBC	[ ] Platelets	[ ] FFP	[ ] Cryoprecipitate      INFECTIOUS DISEASES  influenza   Vaccine 0.5 milliLiter(s) IntraMuscular once    RECENT CULTURES:      ENDOCRINE  dexamethasone  Injectable 6 milliGRAM(s) IV Push every 6 hours  dextrose 40% Gel 15 Gram(s) Oral once PRN  dextrose 50% Injectable 12.5 Gram(s) IV Push once  dextrose 50% Injectable 25 Gram(s) IV Push once  dextrose 50% Injectable 25 Gram(s) IV Push once  glucagon  Injectable 1 milliGRAM(s) IntraMuscular once PRN  insulin glargine Injectable (LANTUS) 25 Unit(s) SubCutaneous at bedtime  insulin lispro (HumaLOG) corrective regimen sliding scale   SubCutaneous three times a day before meals  insulin lispro (HumaLOG) corrective regimen sliding scale   SubCutaneous at bedtime  insulin lispro Injectable (HumaLOG) 5 Unit(s) SubCutaneous three times a day before meals    CAPILLARY BLOOD GLUCOSE      POCT Blood Glucose.: 246 mg/dL (27 Feb 2019 23:43)  POCT Blood Glucose.: 221 mg/dL (27 Feb 2019 22:37)  POCT Blood Glucose.: 226 mg/dL (27 Feb 2019 13:05)  POCT Blood Glucose.: 221 mg/dL (27 Feb 2019 09:07)      PATIENT CARE ACCESS DEVICES:  [ ] Peripheral IV  [ ] Central Venous Line	[ ] R	[ ] L	[ ] IJ	[ ] Fem	[ ] SC	Placed:   [ ] Arterial Line		[ ] R	[ ] L	[ ] Fem	[ ] Rad	[ ] Ax	Placed:   [ ] PICC:					[ ] Mediport  [ ] Urinary Catheter, Date Placed:   [x] Necessity of urinary, arterial, and venous catheters discussed    OTHER MEDICATIONS:     IMAGING STUDIES: HISTORY OF PRESENT ILLNESS:  74 year old male with HTN, NIDDM, CVA (2013 w/ left side weakness), R glioma (s/p R craniectomy and tumor resection 11/28/2018), BPH brought in by EMS from nursing home for active seizure. Per ED notes, pt was actively seizing for 30 mins prior to EMS arrival. Pt displayed left sided muscle jerking and continued with jerking of left face, arm, and leg in the ED. He received ativan 2mg x3 and keppra 1g and seizure broke after 20 minutes in the ED.   On interview, pt is responsive to commands but currently nonverbal, attempting to speak but not audible and with no active muscle jerking.   In the ED VS Tm100.7, Hr 101, /80, RR 19/100% on 3L NC. He also received vanc 1g, zosyn, dexamethasone 10mg. (13 Feb 2019 01:21)      PAST MEDICAL HISTORY: Oxygen dependent  Vitamin deficiency  Constipation  Pulmonary embolism  Glioblastoma  BPH (benign prostatic hyperplasia)  CVA (cerebral vascular accident)  Diabetes  HTN (hypertension)      PAST SURGICAL HISTORY: S/P craniotomy  No significant past surgical history      FAMILY HISTORY: No pertinent family history in first degree relatives      SOCIAL HISTORY:    CODE STATUS:     HOME MEDICATIONS:    ALLERGIES: No Known Allergies      VITAL SIGNS:  ICU Vital Signs Last 24 Hrs  T(C): 36.8 (27 Feb 2019 23:00), Max: 36.8 (27 Feb 2019 21:40)  T(F): 98.2 (27 Feb 2019 23:00), Max: 98.2 (27 Feb 2019 21:40)  HR: 61 (27 Feb 2019 23:30) (54 - 68)  BP: 115/84 (27 Feb 2019 23:30) (109/78 - 142/94)  BP(mean): 91 (27 Feb 2019 23:30) (84 - 95)  ABP: 128/62 (27 Feb 2019 23:30) (101/54 - 131/61)  ABP(mean): 81 (27 Feb 2019 23:30) (69 - 89)  RR: 13 (27 Feb 2019 23:30) (11 - 27)  SpO2: 98% (27 Feb 2019 23:30) (92% - 98%)      NEURO  Exam: alert, oriented, following commands  acetaminophen  Suppository .. 650 milliGRAM(s) Rectal every 6 hours PRN Temp greater or equal to 38C (100.4F)  HYDROmorphone  Injectable 0.5 milliGRAM(s) IV Push every 15 minutes PRN Severe Pain (7 - 10)  levETIRAcetam  IVPB 1250 milliGRAM(s) IV Intermittent every 12 hours  oxyCODONE    IR 5 milliGRAM(s) Oral every 4 hours PRN Moderate Pain (4 - 6)  oxyCODONE    IR 10 milliGRAM(s) Oral every 4 hours PRN Severe Pain (7 - 10)      RESPIRATORY  Exam: no increased wob    CARDIOVASCULAR    Exam: rrr, no m/r/g  Cardiac Rhythm: nsr  lisinopril 20 milliGRAM(s) Oral daily      GI/NUTRITION  Exam: soft, NT, ND  Diet: diabetic mechanical soft w/ 1200 ml fluid restriction      GENITOURINARY/RENAL  sodium chloride 0.9%. 1000 milliLiter(s) IV Continuous <Continuous>      02-27 @ 07:01  -  02-27 @ 23:45  --------------------------------------------------------  IN:    sodium chloride 0.9%.: 75 mL  Total IN: 75 mL    OUT:    Indwelling Catheter - Urethral: 260 mL  Total OUT: 260 mL    Total NET: -185 mL      02-27    134<L>  |  95<L>  |  18  ----------------------------<  236<H>  4.5   |  30  |  0.80    Ca    9.0      27 Feb 2019 09:55  Phos  2.8     02-26  Mg     2.0     02-27      [ ] Roberson catheter, indication: urine output monitoring in critically ill patient    HEMATOLOGIC  [ ] VTE Prophylaxis:                          14.0   18.15 )-----------( 124      ( 27 Feb 2019 09:55 )             43.4     PT/INR - ( 27 Feb 2019 09:55 )   PT: 13.4 SEC;   INR: 1.17          PTT - ( 27 Feb 2019 09:55 )  PTT:23.4 SEC  Transfusion: [ ] PRBC	[ ] Platelets	[ ] FFP	[ ] Cryoprecipitate      INFECTIOUS DISEASES  influenza   Vaccine 0.5 milliLiter(s) IntraMuscular once    RECENT CULTURES:      ENDOCRINE  dexamethasone  Injectable 6 milliGRAM(s) IV Push every 6 hours  dextrose 40% Gel 15 Gram(s) Oral once PRN  dextrose 50% Injectable 12.5 Gram(s) IV Push once  dextrose 50% Injectable 25 Gram(s) IV Push once  dextrose 50% Injectable 25 Gram(s) IV Push once  glucagon  Injectable 1 milliGRAM(s) IntraMuscular once PRN  insulin glargine Injectable (LANTUS) 25 Unit(s) SubCutaneous at bedtime  insulin lispro (HumaLOG) corrective regimen sliding scale   SubCutaneous three times a day before meals  insulin lispro (HumaLOG) corrective regimen sliding scale   SubCutaneous at bedtime  insulin lispro Injectable (HumaLOG) 5 Unit(s) SubCutaneous three times a day before meals    CAPILLARY BLOOD GLUCOSE    POCT Blood Glucose.: 246 mg/dL (27 Feb 2019 23:43)  POCT Blood Glucose.: 221 mg/dL (27 Feb 2019 22:37)  POCT Blood Glucose.: 226 mg/dL (27 Feb 2019 13:05)  POCT Blood Glucose.: 221 mg/dL (27 Feb 2019 09:07)      PATIENT CARE ACCESS DEVICES:  [x] Peripheral IV  [ ] Central Venous Line	[ ] R	[ ] L	[ ] IJ	[ ] Fem	[ ] SC	Placed:   [ ] Arterial Line		[ ] R	[ ] L	[ ] Fem	[ ] Rad	[ ] Ax	Placed:   [ ] PICC:					[ ] Mediport  [ ] Urinary Catheter, Date Placed:   [x] Necessity of urinary, arterial, and venous catheters discussed    OTHER MEDICATIONS:

## 2019-02-27 NOTE — PROGRESS NOTE ADULT - PROBLEM SELECTOR PLAN 5
Likely due to history of malignancy and post-CVA L sided weakness as baseline.   - s/p IVC filter
ASA held due to pending neurosurgical intervention.
(+) New CVA on MRI, pt asymptomatic.  -ASA started as per Neuro rec.  -W/U as per neuro rec.
(+) New CVA on MRI, pt asymptomatic.  ASA started as per Neuro rec.  -W/U as per neuro rec.
- would start mechanical soft diet - pt usually tolerates regular diet at NH/rehab   - consider swallow eval if there is concern for dysphagia
ASA held due to pending neurosurgical intervention.
ASA held due to pending neurosurgical intervention.
FS QID, NISS.  Continue to monitor.
FS QID, NISS.  Continue to monitor.
FS QID, NISS.  Likely elevated due to decadron.  Titrate lantus for more optimal control--to 20 units will not adjust as pt NPO for OR today
Likely due to history of malignancy and post-CVA L sided weakness as baseline.   - s/p IVC filter
FS QID, NISS.  Likely elevated due to decadron.  Titrate lantus for more optimal control--to 20 units tonight from 15
-supportive care
-supportive care

## 2019-02-28 LAB
ANION GAP SERPL CALC-SCNC: 16 MMO/L — HIGH (ref 7–14)
BUN SERPL-MCNC: 17 MG/DL — SIGNIFICANT CHANGE UP (ref 7–23)
CALCIUM SERPL-MCNC: 8.3 MG/DL — LOW (ref 8.4–10.5)
CHLORIDE SERPL-SCNC: 97 MMOL/L — LOW (ref 98–107)
CO2 SERPL-SCNC: 21 MMOL/L — LOW (ref 22–31)
CREAT SERPL-MCNC: 0.65 MG/DL — SIGNIFICANT CHANGE UP (ref 0.5–1.3)
GLUCOSE SERPL-MCNC: 315 MG/DL — HIGH (ref 70–99)
HCT VFR BLD CALC: 38.2 % — LOW (ref 39–50)
HGB BLD-MCNC: 12.6 G/DL — LOW (ref 13–17)
MAGNESIUM SERPL-MCNC: 1.7 MG/DL — SIGNIFICANT CHANGE UP (ref 1.6–2.6)
MCHC RBC-ENTMCNC: 30.1 PG — SIGNIFICANT CHANGE UP (ref 27–34)
MCHC RBC-ENTMCNC: 33 % — SIGNIFICANT CHANGE UP (ref 32–36)
MCV RBC AUTO: 91.4 FL — SIGNIFICANT CHANGE UP (ref 80–100)
NRBC # FLD: 0 K/UL — LOW (ref 25–125)
PLATELET # BLD AUTO: 116 K/UL — LOW (ref 150–400)
PMV BLD: 11.6 FL — SIGNIFICANT CHANGE UP (ref 7–13)
POTASSIUM SERPL-MCNC: 4 MMOL/L — SIGNIFICANT CHANGE UP (ref 3.5–5.3)
POTASSIUM SERPL-SCNC: 4 MMOL/L — SIGNIFICANT CHANGE UP (ref 3.5–5.3)
RBC # BLD: 4.18 M/UL — LOW (ref 4.2–5.8)
RBC # FLD: 16.8 % — HIGH (ref 10.3–14.5)
SODIUM SERPL-SCNC: 134 MMOL/L — LOW (ref 135–145)
WBC # BLD: 29.22 K/UL — HIGH (ref 3.8–10.5)
WBC # FLD AUTO: 29.22 K/UL — HIGH (ref 3.8–10.5)

## 2019-02-28 PROCEDURE — 99233 SBSQ HOSP IP/OBS HIGH 50: CPT

## 2019-02-28 PROCEDURE — 70450 CT HEAD/BRAIN W/O DYE: CPT | Mod: 26,76

## 2019-02-28 PROCEDURE — 70553 MRI BRAIN STEM W/O & W/DYE: CPT | Mod: 26

## 2019-02-28 RX ORDER — ENOXAPARIN SODIUM 100 MG/ML
40 INJECTION SUBCUTANEOUS AT BEDTIME
Qty: 0 | Refills: 0 | Status: DISCONTINUED | OUTPATIENT
Start: 2019-02-28 | End: 2019-03-02

## 2019-02-28 RX ORDER — DOCUSATE SODIUM 100 MG
100 CAPSULE ORAL THREE TIMES A DAY
Qty: 0 | Refills: 0 | Status: DISCONTINUED | OUTPATIENT
Start: 2019-02-28 | End: 2019-03-06

## 2019-02-28 RX ORDER — LEVETIRACETAM 250 MG/1
1250 TABLET, FILM COATED ORAL ONCE
Qty: 0 | Refills: 0 | Status: COMPLETED | OUTPATIENT
Start: 2019-02-28 | End: 2019-02-28

## 2019-02-28 RX ORDER — ACETAMINOPHEN 500 MG
650 TABLET ORAL EVERY 6 HOURS
Qty: 0 | Refills: 0 | Status: DISCONTINUED | OUTPATIENT
Start: 2019-02-28 | End: 2019-03-06

## 2019-02-28 RX ORDER — LEVETIRACETAM 250 MG/1
250 TABLET, FILM COATED ORAL ONCE
Qty: 0 | Refills: 0 | Status: COMPLETED | OUTPATIENT
Start: 2019-02-28 | End: 2019-02-28

## 2019-02-28 RX ORDER — SENNA PLUS 8.6 MG/1
2 TABLET ORAL AT BEDTIME
Qty: 0 | Refills: 0 | Status: DISCONTINUED | OUTPATIENT
Start: 2019-02-28 | End: 2019-03-06

## 2019-02-28 RX ORDER — LEVETIRACETAM 250 MG/1
1500 TABLET, FILM COATED ORAL EVERY 12 HOURS
Qty: 0 | Refills: 0 | Status: DISCONTINUED | OUTPATIENT
Start: 2019-02-28 | End: 2019-03-03

## 2019-02-28 RX ORDER — PANTOPRAZOLE SODIUM 20 MG/1
40 TABLET, DELAYED RELEASE ORAL
Qty: 0 | Refills: 0 | Status: DISCONTINUED | OUTPATIENT
Start: 2019-02-28 | End: 2019-03-06

## 2019-02-28 RX ORDER — MAGNESIUM SULFATE 500 MG/ML
2 VIAL (ML) INJECTION ONCE
Qty: 0 | Refills: 0 | Status: COMPLETED | OUTPATIENT
Start: 2019-02-28 | End: 2019-02-28

## 2019-02-28 RX ADMIN — LISINOPRIL 20 MILLIGRAM(S): 2.5 TABLET ORAL at 06:03

## 2019-02-28 RX ADMIN — LEVETIRACETAM 400 MILLIGRAM(S): 250 TABLET, FILM COATED ORAL at 19:07

## 2019-02-28 RX ADMIN — Medication 4: at 13:22

## 2019-02-28 RX ADMIN — Medication 1 MILLIGRAM(S): at 02:48

## 2019-02-28 RX ADMIN — PANTOPRAZOLE SODIUM 40 MILLIGRAM(S): 20 TABLET, DELAYED RELEASE ORAL at 09:15

## 2019-02-28 RX ADMIN — Medication 100 MILLIGRAM(S): at 12:42

## 2019-02-28 RX ADMIN — Medication 5 UNIT(S): at 17:40

## 2019-02-28 RX ADMIN — LEVETIRACETAM 400 MILLIGRAM(S): 250 TABLET, FILM COATED ORAL at 06:03

## 2019-02-28 RX ADMIN — Medication 6 MILLIGRAM(S): at 12:41

## 2019-02-28 RX ADMIN — Medication 3: at 07:09

## 2019-02-28 RX ADMIN — Medication 5 UNIT(S): at 14:17

## 2019-02-28 RX ADMIN — Medication 100 MILLIGRAM(S): at 22:10

## 2019-02-28 RX ADMIN — Medication 6 MILLIGRAM(S): at 06:03

## 2019-02-28 RX ADMIN — ENOXAPARIN SODIUM 40 MILLIGRAM(S): 100 INJECTION SUBCUTANEOUS at 22:11

## 2019-02-28 RX ADMIN — Medication 50 GRAM(S): at 08:47

## 2019-02-28 RX ADMIN — SENNA PLUS 2 TABLET(S): 8.6 TABLET ORAL at 22:10

## 2019-02-28 RX ADMIN — INSULIN GLARGINE 25 UNIT(S): 100 INJECTION, SOLUTION SUBCUTANEOUS at 22:10

## 2019-02-28 RX ADMIN — Medication 2: at 22:09

## 2019-02-28 RX ADMIN — Medication 4: at 17:40

## 2019-02-28 RX ADMIN — Medication 100 MILLIGRAM(S): at 02:18

## 2019-02-28 RX ADMIN — Medication 5 UNIT(S): at 08:24

## 2019-02-28 RX ADMIN — Medication 6 MILLIGRAM(S): at 18:05

## 2019-02-28 RX ADMIN — SODIUM CHLORIDE 75 MILLILITER(S): 9 INJECTION INTRAMUSCULAR; INTRAVENOUS; SUBCUTANEOUS at 14:54

## 2019-02-28 RX ADMIN — LEVETIRACETAM 400 MILLIGRAM(S): 250 TABLET, FILM COATED ORAL at 18:15

## 2019-02-28 NOTE — PROGRESS NOTE ADULT - SUBJECTIVE AND OBJECTIVE BOX
NEUROSURGERY POST OP CHECK 02-28-19 @ 00:30    Dx: 74y Male s/p R side craniotomy for resection of brain tumor. Patient awake, alert, at baseline. Not complaining of pain. Roberson in     T(C): 36.8 (02-28-19 @ 00:00), Max: 36.8 (02-27-19 @ 21:40)  HR: 63 (02-28-19 @ 00:00) (54 - 68)  BP: 119/84 (02-28-19 @ 00:00) (109/78 - 142/94)  RR: 19 (02-28-19 @ 00:00) (11 - 27)  SpO2: 98% (02-28-19 @ 00:00) (92% - 98%)  acetaminophen  Suppository .. 650 milliGRAM(s) Rectal every 6 hours PRN  ceFAZolin   IVPB 2000 milliGRAM(s) IV Intermittent every 8 hours  dexamethasone  Injectable 6 milliGRAM(s) IV Push every 6 hours  dextrose 40% Gel 15 Gram(s) Oral once PRN  dextrose 50% Injectable 12.5 Gram(s) IV Push once  dextrose 50% Injectable 25 Gram(s) IV Push once  dextrose 50% Injectable 25 Gram(s) IV Push once  glucagon  Injectable 1 milliGRAM(s) IntraMuscular once PRN  HYDROmorphone  Injectable 0.5 milliGRAM(s) IV Push every 15 minutes PRN  influenza   Vaccine 0.5 milliLiter(s) IntraMuscular once  insulin glargine Injectable (LANTUS) 25 Unit(s) SubCutaneous at bedtime  insulin lispro (HumaLOG) corrective regimen sliding scale   SubCutaneous three times a day before meals  insulin lispro (HumaLOG) corrective regimen sliding scale   SubCutaneous at bedtime  insulin lispro Injectable (HumaLOG) 5 Unit(s) SubCutaneous three times a day before meals  levETIRAcetam  IVPB 1250 milliGRAM(s) IV Intermittent every 12 hours  lisinopril 20 milliGRAM(s) Oral daily  oxyCODONE    IR 5 milliGRAM(s) Oral every 4 hours PRN  oxyCODONE    IR 10 milliGRAM(s) Oral every 4 hours PRN  sodium chloride 0.9%. 1000 milliLiter(s) IV Continuous <Continuous>    02-27-19 @ 07:01  -  02-28-19 @ 00:30  --------------------------------------------------------  IN: 150 mL / OUT: 305 mL / NET: -155 mL    Awake, alert, oriented x 2  PERRL, EOMI  Follows commands  ERWIN x4 with good strength   Incision C/D/I

## 2019-02-28 NOTE — CHART NOTE - NSCHARTNOTEFT_GEN_A_CORE
Neurology Consult Service    Patient seen and examined.    General Exam:   General appearance: No acute distress      Neurological Exam:  Mental Status: Orientated to self, date and place.  Attention intact.  No dysarthria. Speech fluent.  Cranial Nerves:   PERRL, EOMI, VFF, no nystagmus.    CN V1-3 intact to light touch .  mild left facial palsy.  Tongue, uvula and palate midline.     Tone: increased in LUE    Strength:     [] Upper extremity                      Delt       Bicep    Tricep                                                  R         5/5        5/5        5/5       5/5                                               L          3/5        4/5        4/5       4/5    [] Lower extremity                       HF          KE          KF        DF         PF                                               R        5/5        5/5        5/5       5/5       5/5                                               L         4+/5        4+/5       4+/5       5/5        5/5  Pronator drift: drift in LUE    Sensation: intact to light touch    Deep Tendon Reflexes:     Biceps          Triceps      BR        Patellar        Ankle         Babinski                                         R       2+                   2+           2+            2+               2+           downgoing                                         L        3+                  3+           3+            2+               2+           upgoing    Case discussed with Dr. Bhavik Beatty.     Recommendations:  -24 hour video EEG  -increase keppra to 1500 mg BID, first dose to start tonight (to replace the 1250 mg dose scheduled w/ 1500 mg of keppra)      Cristo Ramirez MD  PGY-2  Neurology

## 2019-02-28 NOTE — PROGRESS NOTE ADULT - ATTENDING COMMENTS
75 yo male history of craniotomy nov 2018, presenting with seizure, possible progression of GBM on CTH for resection of recurrent GBM.    PLAN:   Neurologic:  - Decadron 6mg IV q6h  - Keppra 1250mg IV q12h  - Pain control w/ Tylenol, Oxy,  -f/u MRI    Respiratory:  - Monitor oxygen saturation, RR    Cardiovascular:  - Lisinopril    Gastrointestinal/Nutrition:  - Diabetic mechanical soft w/ 1200 ml fluid restriction    Genitourinary/Renal:  - Monitor BUN/Cr, urine output    Hematologic:  - Start SQH in 24 hrs    Infectious Disease:  - Cont. Cefazolin  - Monitor WBC, temp    Endocrine:  - ISS    Disposition: PACU under SICU care  CCDx. respiratory abnormality.  The patient is a critical care patient with life threatening hemodynamic and metabolic instability in SICU.  I have personally interviewed when possible and examined the patient, reviewed data and laboratory tests/x-rays and all pertinent electronic images.  I was physically present for the key portions of the evaluation and management (E/M) service provided.   The SICU team has a constant risk benefit analyzes discussion with the primary team, all consultants, House Staff and PA's on all decisions.  These diagnoses are unrelated to the surgical procedure noted above.  I meet with family if needed to get further history, discuss the case and make care decisions for this patient who might not be able to participate.  Time involved in performance of separately billable procedures was not counted toward my critical care time. There is no overlap.  I spent 55-75 minutes of critical care time for the diagnoses, assessment, plan and interventions.

## 2019-02-28 NOTE — PROGRESS NOTE ADULT - SUBJECTIVE AND OBJECTIVE BOX
POST ANESTHESIA EVALUATION    74y Male POSTOP DAY 1 S/P     MENTAL STATUS: Patient participation [  ] Awake     [ x ] Arousable     [  ] Sedated    AIRWAY PATENCY: [x  ] Satisfactory  [  ] Other:     Vital Signs Last 24 Hrs  T(C): 36.8 (28 Feb 2019 08:00), Max: 36.9 (28 Feb 2019 07:00)  T(F): 98.3 (28 Feb 2019 08:00), Max: 98.4 (28 Feb 2019 07:00)  HR: 75 (28 Feb 2019 09:00) (54 - 83)  BP: 116/79 (28 Feb 2019 09:00) (97/73 - 141/99)  BP(mean): 88 (28 Feb 2019 09:00) (79 - 104)  RR: 15 (28 Feb 2019 09:00) (11 - 27)  SpO2: 95% (28 Feb 2019 09:00) (92% - 98%)  I&O's Summary    27 Feb 2019 07:01  -  28 Feb 2019 07:00  --------------------------------------------------------  IN: 925 mL / OUT: 865 mL / NET: 60 mL    28 Feb 2019 07:01  -  28 Feb 2019 10:16  --------------------------------------------------------  IN: 270 mL / OUT: 140 mL / NET: 130 mL          NAUSEA/ VOMITTING:  [ x ] NONE  [  ] CONTROLLED [  ] OTHER     PAIN: [x  ] CONTROLLED WITH CURRENT REGIMEN  [  ] OTHER    [ x ] NO APPARENT ANESTHESIA COMPLICATIONS      Comments:

## 2019-02-28 NOTE — PROGRESS NOTE ADULT - PROBLEM SELECTOR PLAN 1
PACU overnight, will reeval in AM   MRI brain thursday   dec 6q6   keppra   ancef x 24 hrs   Case discussed with attending neurosurgeon.

## 2019-02-28 NOTE — PROGRESS NOTE ADULT - SUBJECTIVE AND OBJECTIVE BOX
SICU Daily Progress Note  =====================================================  Interval/Overnight Events:  Focal seizure.  CTH performed IMPRESSION: Stable right temporal convexity postoperative change.  No large acute intracranial hemorrhage.  Pt remained hemodynamically stable.    POD #     1     	SICU Day #   2    HPI:   74 year old male with HTN, NIDDM, CVA (2013 w/ left side weakness), R glioma (s/p R craniectomy and tumor resection 11/28/2018), BPH brought in by EMS from nursing home for active seizure. Per ED notes, pt was actively seizing for 30 mins prior to EMS arrival. Pt displayed left sided muscle jerking and continued with jerking of left face, arm, and leg in the ED. He received ativan 2mg x3 and keppra 1g and seizure broke after 20 minutes in the ED.   On interview, pt is responsive to commands but currently nonverbal, attempting to speak but not audible and with no active muscle jerking.   In the ED VS Tm100.7, Hr 101, /80, RR 19/100% on 3L NC. He also received vanc 1g, zosyn, dexamethasone 10mg. (13 Feb 2019 01:21)  Allergies: No Known Allergies      MEDICATIONS:   --------------------------------------------------------------------------------------  Neurologic Medications  acetaminophen  Suppository .. 650 milliGRAM(s) Rectal every 6 hours PRN Temp greater or equal to 38C (100.4F)  HYDROmorphone  Injectable 0.5 milliGRAM(s) IV Push every 15 minutes PRN Severe Pain (7 - 10)  levETIRAcetam  IVPB 1250 milliGRAM(s) IV Intermittent every 12 hours  oxyCODONE    IR 5 milliGRAM(s) Oral every 4 hours PRN Moderate Pain (4 - 6)  oxyCODONE    IR 10 milliGRAM(s) Oral every 4 hours PRN Severe Pain (7 - 10)    Respiratory Medications    Cardiovascular Medications  lisinopril 20 milliGRAM(s) Oral daily    Gastrointestinal Medications  pantoprazole    Tablet 40 milliGRAM(s) Oral before breakfast  sodium chloride 0.9%. 1000 milliLiter(s) IV Continuous <Continuous>    Genitourinary Medications    Hematologic/Oncologic Medications  influenza   Vaccine 0.5 milliLiter(s) IntraMuscular once    Antimicrobial/Immunologic Medications  ceFAZolin   IVPB 2000 milliGRAM(s) IV Intermittent every 8 hours    Endocrine/Metabolic Medications  dexamethasone  Injectable 6 milliGRAM(s) IV Push every 6 hours  dextrose 40% Gel 15 Gram(s) Oral once PRN Blood Glucose LESS THAN 70 milliGRAM(s)/deciliter  dextrose 50% Injectable 12.5 Gram(s) IV Push once  dextrose 50% Injectable 25 Gram(s) IV Push once  dextrose 50% Injectable 25 Gram(s) IV Push once  glucagon  Injectable 1 milliGRAM(s) IntraMuscular once PRN Glucose LESS THAN 70 milligrams/deciliter  insulin glargine Injectable (LANTUS) 25 Unit(s) SubCutaneous at bedtime  insulin lispro (HumaLOG) corrective regimen sliding scale   SubCutaneous three times a day before meals  insulin lispro (HumaLOG) corrective regimen sliding scale   SubCutaneous at bedtime  insulin lispro Injectable (HumaLOG) 5 Unit(s) SubCutaneous three times a day before meals    Topical/Other Medications    --------------------------------------------------------------------------------------    VITAL SIGNS, INS/OUTS (last 24 hours):  --------------------------------------------------------------------------------------  T(C): 36.8 (02-28-19 @ 08:00), Max: 36.9 (02-28-19 @ 07:00)  HR: 73 (02-28-19 @ 10:00) (54 - 83)  BP: 107/73 (02-28-19 @ 10:00) (97/73 - 141/99)  BP(mean): 81 (02-28-19 @ 10:00) (79 - 104)  ABP: 119/61 (02-28-19 @ 10:00) (101/54 - 145/81)  ABP(mean): 80 (02-28-19 @ 10:00) (69 - 101)  RR: 17 (02-28-19 @ 10:00) (11 - 27)  SpO2: 97% (02-28-19 @ 10:00) (92% - 98%)  Wt(kg): --  CVP(mm Hg): --  CI: --  CAPILLARY BLOOD GLUCOSE      POCT Blood Glucose.: 282 mg/dL (28 Feb 2019 08:21)  POCT Blood Glucose.: 293 mg/dL (28 Feb 2019 07:01)  POCT Blood Glucose.: 246 mg/dL (27 Feb 2019 23:43)  POCT Blood Glucose.: 221 mg/dL (27 Feb 2019 22:37)  POCT Blood Glucose.: 226 mg/dL (27 Feb 2019 13:05)   N/A      02-27 @ 07:01  -  02-28 @ 07:00  --------------------------------------------------------  IN:    IV PiggyBack: 150 mL    Oral Fluid: 100 mL    sodium chloride 0.9%.: 675 mL  Total IN: 925 mL    OUT:    Indwelling Catheter - Urethral: 865 mL  Total OUT: 865 mL    Total NET: 60 mL      02-28 @ 07:01  -  02-28 @ 11:39  --------------------------------------------------------  IN:    Oral Fluid: 120 mL    sodium chloride 0.9%.: 150 mL  Total IN: 270 mL    OUT:    Indwelling Catheter - Urethral: 140 mL  Total OUT: 140 mL    Total NET: 130 mL        --------------------------------------------------------------------------------------    EXAM  NEUROLOGY  RASS:   	GCS:    Exam: Normal, NAD, alert, oriented x3, no focal deficits. ***    HEENT  Exam: Normocephalic, atraumatic, EOMI.  ***    RESPIRATORY  Exam: Lungs clear to auscultation, Normal expansion/effort. ***  Mechanical Ventilation:     CARDIOVASCULAR  Exam: S1, S2.  Regular rate and rhythm.   ***    GI/NUTRITION  Exam: Abdomen soft, Non-tender, Non-distended.  ***  Wound:  ***  Current Diet:  NPO***    VASCULAR  Exam: Extremities warm, pink, well-perfused. ***    MUSCULOSKELETAL  Exam: All extremities moving spontaneously without limitations. ***    SKIN  Exam: Good skin turgor, no skin breakdown. ***    METABOLIC/FLUIDS/ELECTROLYTES  sodium chloride 0.9%. 1000 milliLiter(s) IV Continuous <Continuous>      HEMATOLOGIC  [x] VTE Prophylaxis:   Transfusions:	[] PRBC	[] Platelets		[] FFP	[] Cryoprecipitate    INFECTIOUS DISEASE  Antimicrobials/Immunologic Medications:  ceFAZolin   IVPB 2000 milliGRAM(s) IV Intermittent every 8 hours  influenza   Vaccine 0.5 milliLiter(s) IntraMuscular once    Day #      of     ***    Tubes/Lines/Drains  ***  [x] Peripheral IV  [] Central Venous Line     	[] R	[] L	[] IJ	[] Fem	[] SC	Date Placed:   [] Arterial Line		[] R	[] L	[] Fem	[] Rad	[] Ax	Date Placed:   [] PICC		[] Midline		[] Mediport  [] Urinary Catheter		Date Placed:   [x] Necessity of urinary, arterial, and venous catheters discussed    LABS  --------------------------------------------------------------------------------------  CBC (02-28 @ 05:15)                              12.6<L>                         29.22<H>  )----------------(  116<L>     --    % Neutrophils, --    % Lymphocytes, ANC: --                                  38.2<L>              CBC (02-27 @ 09:55)                              14.0                           18.15<H>  )----------------(  124<L>     --    % Neutrophils, --    % Lymphocytes, ANC: --                                  43.4                  BMP (02-28 @ 05:15)             134<L>  |  97<L>   |  17    		Ca++ --      Ca 8.3<L>             ---------------------------------( 315<H>		Mg 1.7                4.0     |  21<L>   |  0.65  			Ph --      BMP (02-27 @ 09:55)             134<L>  |  95<L>   |  18    		Ca++ --      Ca 9.0                ---------------------------------( 236<H>		Mg 2.0                4.5     |  30      |  0.80  			Ph --          Coags (02-27 @ 09:55)  aPTT 23.4<L> / INR 1.17 / PT 13.4<H>          --------------------------------------------------------------------------------------    OTHER LABORATORY:     IMAGING STUDIES:   CXR:     ASSESSMENT:  74y Male    ((insert from previous))***    PLAN:   ***  Neurologic:   Respiratory:   Cardiovascular:   Gastrointestinal/Nutrition:   Renal/Genitourinary:   Hematologic:   Infectious Disease:   Tubes/Lines/Drains:   Endocrine:   Disposition:     --------------------------------------------------------------------------------------    Critical Care Diagnoses: SICU Daily Progress Note  =====================================================  Interval/Overnight Events:  Focal seizure.  CTH performed IMPRESSION: Stable right temporal convexity postoperative change.  No large acute intracranial hemorrhage.  Pt remained hemodynamically stable.    POD #     1     	SICU Day #   2    HPI:   74 year old male with HTN, NIDDM, CVA (2013 w/ left side weakness), R glioma (s/p R craniectomy and tumor resection 11/28/2018), BPH brought in by EMS from nursing home for active seizure. Per ED notes, pt was actively seizing for 30 mins prior to EMS arrival. Pt displayed left sided muscle jerking and continued with jerking of left face, arm, and leg in the ED. He received ativan 2mg x3 and keppra 1g and seizure broke after 20 minutes in the ED.   On interview, pt is responsive to commands but currently nonverbal, attempting to speak but not audible and with no active muscle jerking.   In the ED VS Tm100.7, Hr 101, /80, RR 19/100% on 3L NC. He also received vanc 1g, zosyn, dexamethasone 10mg. (13 Feb 2019 01:21)  Allergies: No Known Allergies      MEDICATIONS:   --------------------------------------------------------------------------------------  Neurologic Medications  acetaminophen  Suppository .. 650 milliGRAM(s) Rectal every 6 hours PRN Temp greater or equal to 38C (100.4F)  HYDROmorphone  Injectable 0.5 milliGRAM(s) IV Push every 15 minutes PRN Severe Pain (7 - 10)  levETIRAcetam  IVPB 1250 milliGRAM(s) IV Intermittent every 12 hours  oxyCODONE    IR 5 milliGRAM(s) Oral every 4 hours PRN Moderate Pain (4 - 6)  oxyCODONE    IR 10 milliGRAM(s) Oral every 4 hours PRN Severe Pain (7 - 10)    Respiratory Medications    Cardiovascular Medications  lisinopril 20 milliGRAM(s) Oral daily    Gastrointestinal Medications  pantoprazole    Tablet 40 milliGRAM(s) Oral before breakfast  sodium chloride 0.9%. 1000 milliLiter(s) IV Continuous <Continuous>    Genitourinary Medications    Hematologic/Oncologic Medications  influenza   Vaccine 0.5 milliLiter(s) IntraMuscular once    Antimicrobial/Immunologic Medications  ceFAZolin   IVPB 2000 milliGRAM(s) IV Intermittent every 8 hours    Endocrine/Metabolic Medications  dexamethasone  Injectable 6 milliGRAM(s) IV Push every 6 hours  dextrose 40% Gel 15 Gram(s) Oral once PRN Blood Glucose LESS THAN 70 milliGRAM(s)/deciliter  dextrose 50% Injectable 12.5 Gram(s) IV Push once  dextrose 50% Injectable 25 Gram(s) IV Push once  dextrose 50% Injectable 25 Gram(s) IV Push once  glucagon  Injectable 1 milliGRAM(s) IntraMuscular once PRN Glucose LESS THAN 70 milligrams/deciliter  insulin glargine Injectable (LANTUS) 25 Unit(s) SubCutaneous at bedtime  insulin lispro (HumaLOG) corrective regimen sliding scale   SubCutaneous three times a day before meals  insulin lispro (HumaLOG) corrective regimen sliding scale   SubCutaneous at bedtime  insulin lispro Injectable (HumaLOG) 5 Unit(s) SubCutaneous three times a day before meals    Topical/Other Medications    --------------------------------------------------------------------------------------    VITAL SIGNS, INS/OUTS (last 24 hours):  --------------------------------------------------------------------------------------  T(C): 36.8 (02-28-19 @ 08:00), Max: 36.9 (02-28-19 @ 07:00)  HR: 73 (02-28-19 @ 10:00) (54 - 83)  BP: 107/73 (02-28-19 @ 10:00) (97/73 - 141/99)  BP(mean): 81 (02-28-19 @ 10:00) (79 - 104)  ABP: 119/61 (02-28-19 @ 10:00) (101/54 - 145/81)  ABP(mean): 80 (02-28-19 @ 10:00) (69 - 101)  RR: 17 (02-28-19 @ 10:00) (11 - 27)  SpO2: 97% (02-28-19 @ 10:00) (92% - 98%)  Wt(kg): --  CVP(mm Hg): --  CI: --  CAPILLARY BLOOD GLUCOSE      POCT Blood Glucose.: 282 mg/dL (28 Feb 2019 08:21)  POCT Blood Glucose.: 293 mg/dL (28 Feb 2019 07:01)  POCT Blood Glucose.: 246 mg/dL (27 Feb 2019 23:43)  POCT Blood Glucose.: 221 mg/dL (27 Feb 2019 22:37)  POCT Blood Glucose.: 226 mg/dL (27 Feb 2019 13:05)   N/A      02-27 @ 07:01  -  02-28 @ 07:00  --------------------------------------------------------  IN:    IV PiggyBack: 150 mL    Oral Fluid: 100 mL    sodium chloride 0.9%.: 675 mL  Total IN: 925 mL    OUT:    Indwelling Catheter - Urethral: 865 mL  Total OUT: 865 mL    Total NET: 60 mL      02-28 @ 07:01  -  02-28 @ 11:39  --------------------------------------------------------  IN:    Oral Fluid: 120 mL    sodium chloride 0.9%.: 150 mL  Total IN: 270 mL    OUT:    Indwelling Catheter - Urethral: 140 mL  Total OUT: 140 mL    Total NET: 130 mL        --------------------------------------------------------------------------------------    EXAM  NEUROLOGY  RASS:   	GCS:    Exam: NAD, awake, alert, following commands    RESPIRATORY  Exam: CTAB  Mechanical Ventilation:     CARDIOVASCULAR  Exam: S1, S2.    GI/NUTRITION  Exam: Abdomen soft, Non-tender, Non-distended.    Current Diet:  NPO    VASCULAR  Exam: Extremities warm    MUSCULOSKELETAL  Exam: All extremities moving spontaneously     SKIN  Exam: Good skin turgor, no skin breakdown.     METABOLIC/FLUIDS/ELECTROLYTES  sodium chloride 0.9%. 1000 milliLiter(s) IV Continuous <Continuous>      HEMATOLOGIC  [x] VTE Prophylaxis:   Transfusions:	[] PRBC	[] Platelets		[] FFP	[] Cryoprecipitate    INFECTIOUS DISEASE  Antimicrobials/Immunologic Medications:  ceFAZolin   IVPB 2000 milliGRAM(s) IV Intermittent every 8 hours  influenza   Vaccine 0.5 milliLiter(s) IntraMuscular once      Tubes/Lines/Drains   [x] Peripheral IV  [] Central Venous Line     	[] R	[] L	[] IJ	[] Fem	[] SC	Date Placed:   [] Arterial Line		[] R	[] L	[] Fem	[] Rad	[] Ax	Date Placed:   [] PICC		[] Midline		[] Mediport  [] Urinary Catheter		Date Placed:   [x] Necessity of urinary, arterial, and venous catheters discussed    LABS  --------------------------------------------------------------------------------------  CBC (02-28 @ 05:15)                              12.6<L>                         29.22<H>  )----------------(  116<L>     --    % Neutrophils, --    % Lymphocytes, ANC: --                                  38.2<L>              CBC (02-27 @ 09:55)                              14.0                           18.15<H>  )----------------(  124<L>     --    % Neutrophils, --    % Lymphocytes, ANC: --                                  43.4                  BMP (02-28 @ 05:15)             134<L>  |  97<L>   |  17    		Ca++ --      Ca 8.3<L>             ---------------------------------( 315<H>		Mg 1.7                4.0     |  21<L>   |  0.65  			Ph --      BMP (02-27 @ 09:55)             134<L>  |  95<L>   |  18    		Ca++ --      Ca 9.0                ---------------------------------( 236<H>		Mg 2.0                4.5     |  30      |  0.80  			Ph --          Coags (02-27 @ 09:55)  aPTT 23.4<L> / INR 1.17 / PT 13.4<H>          --------------------------------------------------------------------------------------    OTHER LABORATORY:     IMAGING STUDIES:   CXR:     ASSESSMENT: 73 yo male history of craniotomy nov 2018, presenting with seizure, possible progression of GBM on CTH for resection of recurrent GBM.    PLAN:   Neurologic:  - Decadron 6mg IV q6h  - Keppra 1250mg IV q12h  - Pain control w/ Tylenol, Oxy,  -f/u MRI    Respiratory:  - Monitor oxygen saturation, RR    Cardiovascular:  - Lisinopril    Gastrointestinal/Nutrition:  - Diabetic mechanical soft w/ 1200 ml fluid restriction    Genitourinary/Renal:  - Monitor BUN/Cr, urine output    Hematologic:  - Start SQH in 24 hrs    Infectious Disease:  - Cont. Cefazolin  - Monitor WBC, temp    Endocrine:  - ISS    Disposition: PACU under SICU care  --------------------------------------------------------------------------------------    Critical Care Diagnoses:

## 2019-03-01 DIAGNOSIS — Z98.890 OTHER SPECIFIED POSTPROCEDURAL STATES: ICD-10-CM

## 2019-03-01 DIAGNOSIS — R56.9 UNSPECIFIED CONVULSIONS: ICD-10-CM

## 2019-03-01 LAB
ANION GAP SERPL CALC-SCNC: 13 MMO/L — SIGNIFICANT CHANGE UP (ref 7–14)
BUN SERPL-MCNC: 16 MG/DL — SIGNIFICANT CHANGE UP (ref 7–23)
CA-I BLD-SCNC: 1.04 MMOL/L — SIGNIFICANT CHANGE UP (ref 1.03–1.23)
CALCIUM SERPL-MCNC: 8.3 MG/DL — LOW (ref 8.4–10.5)
CHLORIDE SERPL-SCNC: 97 MMOL/L — LOW (ref 98–107)
CO2 SERPL-SCNC: 23 MMOL/L — SIGNIFICANT CHANGE UP (ref 22–31)
CREAT SERPL-MCNC: 0.64 MG/DL — SIGNIFICANT CHANGE UP (ref 0.5–1.3)
GLUCOSE SERPL-MCNC: 251 MG/DL — HIGH (ref 70–99)
HCT VFR BLD CALC: 35.8 % — LOW (ref 39–50)
HGB BLD-MCNC: 11.5 G/DL — LOW (ref 13–17)
MAGNESIUM SERPL-MCNC: 1.9 MG/DL — SIGNIFICANT CHANGE UP (ref 1.6–2.6)
MCHC RBC-ENTMCNC: 30.3 PG — SIGNIFICANT CHANGE UP (ref 27–34)
MCHC RBC-ENTMCNC: 32.1 % — SIGNIFICANT CHANGE UP (ref 32–36)
MCV RBC AUTO: 94.2 FL — SIGNIFICANT CHANGE UP (ref 80–100)
NRBC # FLD: 0 K/UL — LOW (ref 25–125)
PHOSPHATE SERPL-MCNC: 1.8 MG/DL — LOW (ref 2.5–4.5)
PLATELET # BLD AUTO: 113 K/UL — LOW (ref 150–400)
PMV BLD: 11.7 FL — SIGNIFICANT CHANGE UP (ref 7–13)
POTASSIUM SERPL-MCNC: 4 MMOL/L — SIGNIFICANT CHANGE UP (ref 3.5–5.3)
POTASSIUM SERPL-SCNC: 4 MMOL/L — SIGNIFICANT CHANGE UP (ref 3.5–5.3)
RBC # BLD: 3.8 M/UL — LOW (ref 4.2–5.8)
RBC # FLD: 17.2 % — HIGH (ref 10.3–14.5)
SODIUM SERPL-SCNC: 133 MMOL/L — LOW (ref 135–145)
WBC # BLD: 19.17 K/UL — HIGH (ref 3.8–10.5)
WBC # FLD AUTO: 19.17 K/UL — HIGH (ref 3.8–10.5)

## 2019-03-01 PROCEDURE — 99233 SBSQ HOSP IP/OBS HIGH 50: CPT

## 2019-03-01 RX ORDER — INSULIN LISPRO 100/ML
8 VIAL (ML) SUBCUTANEOUS
Qty: 0 | Refills: 0 | Status: DISCONTINUED | OUTPATIENT
Start: 2019-03-01 | End: 2019-03-06

## 2019-03-01 RX ORDER — INSULIN GLARGINE 100 [IU]/ML
28 INJECTION, SOLUTION SUBCUTANEOUS AT BEDTIME
Qty: 0 | Refills: 0 | Status: DISCONTINUED | OUTPATIENT
Start: 2019-03-01 | End: 2019-03-06

## 2019-03-01 RX ADMIN — Medication 100 MILLIGRAM(S): at 06:31

## 2019-03-01 RX ADMIN — Medication 8 UNIT(S): at 11:59

## 2019-03-01 RX ADMIN — LEVETIRACETAM 400 MILLIGRAM(S): 250 TABLET, FILM COATED ORAL at 06:16

## 2019-03-01 RX ADMIN — Medication 6 MILLIGRAM(S): at 06:15

## 2019-03-01 RX ADMIN — PANTOPRAZOLE SODIUM 40 MILLIGRAM(S): 20 TABLET, DELAYED RELEASE ORAL at 06:34

## 2019-03-01 RX ADMIN — Medication 100 MILLIGRAM(S): at 14:00

## 2019-03-01 RX ADMIN — SENNA PLUS 2 TABLET(S): 8.6 TABLET ORAL at 22:31

## 2019-03-01 RX ADMIN — INSULIN GLARGINE 28 UNIT(S): 100 INJECTION, SOLUTION SUBCUTANEOUS at 22:25

## 2019-03-01 RX ADMIN — Medication 100 MILLIGRAM(S): at 22:31

## 2019-03-01 RX ADMIN — Medication 2: at 06:33

## 2019-03-01 RX ADMIN — ENOXAPARIN SODIUM 40 MILLIGRAM(S): 100 INJECTION SUBCUTANEOUS at 22:25

## 2019-03-01 RX ADMIN — Medication 2: at 16:01

## 2019-03-01 RX ADMIN — Medication 4: at 11:58

## 2019-03-01 RX ADMIN — OXYCODONE HYDROCHLORIDE 10 MILLIGRAM(S): 5 TABLET ORAL at 03:14

## 2019-03-01 RX ADMIN — Medication 6 MILLIGRAM(S): at 00:35

## 2019-03-01 RX ADMIN — Medication 6 MILLIGRAM(S): at 18:05

## 2019-03-01 RX ADMIN — Medication 8 UNIT(S): at 17:37

## 2019-03-01 RX ADMIN — OXYCODONE HYDROCHLORIDE 10 MILLIGRAM(S): 5 TABLET ORAL at 02:41

## 2019-03-01 RX ADMIN — LISINOPRIL 20 MILLIGRAM(S): 2.5 TABLET ORAL at 06:14

## 2019-03-01 RX ADMIN — LEVETIRACETAM 400 MILLIGRAM(S): 250 TABLET, FILM COATED ORAL at 18:05

## 2019-03-01 RX ADMIN — Medication 6 MILLIGRAM(S): at 11:59

## 2019-03-01 NOTE — CONSULT NOTE ADULT - PROBLEM SELECTOR RECOMMENDATION 3
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
-improving  -likely from seizure  -cont to trend

## 2019-03-01 NOTE — CONSULT NOTE ADULT - PROBLEM SELECTOR RECOMMENDATION 6
as above, s/p IVC filter  recommend full dose AC with SC Lovenox when safe from neurosurgical perspective - likely currently contraindicated as recent MRI with intracranial hemorrhagic fluid

## 2019-03-01 NOTE — CONSULT NOTE ADULT - PROBLEM SELECTOR RECOMMENDATION 2
s/p craniotomy and tumor resection on 2/27  post-op management per neurosurgery  per primary team, planning for rehab  likely will need medical and radiation oncology evaluation for further cancer management
-from seizures  -trend temps

## 2019-03-01 NOTE — CONSULT NOTE ADULT - PROBLEM SELECTOR RECOMMENDATION 4
fingersticks not well controlled in setting of surgery, high dose decadron  increased Lantus to 28 U qhs, Humalog to 8 U TID with meals  Continue correction scale insulin and monitor fingersticks
-suspect due to seizures >infection  -f/u blood cx  -DC abx  -no PNA on CXR

## 2019-03-01 NOTE — PROGRESS NOTE ADULT - SUBJECTIVE AND OBJECTIVE BOX
No issues overnight  ICU Vital Signs Last 24 Hrs  T(C): 36.6 (01 Mar 2019 03:00), Max: 36.9 (28 Feb 2019 07:00)  T(F): 97.8 (01 Mar 2019 03:00), Max: 98.4 (28 Feb 2019 07:00)  HR: 85 (01 Mar 2019 03:00) (64 - 89)  BP: 118/75 (01 Mar 2019 03:00) (107/73 - 142/94)  BP(mean): 86 (01 Mar 2019 03:00) (81 - 114)  ABP: 155/59 (01 Mar 2019 03:00) (113/65 - 160/84)  ABP(mean): 222 (01 Mar 2019 03:00) (80 - 222)  RR: 17 (01 Mar 2019 03:00) (13 - 20)  SpO2: 99% (01 Mar 2019 03:00) (92% - 100%)    AAO X 3  PERRLA, EOMI  CN 2-12 grossly intact  ERWIN, Left UE 4-/5, LLE 4/5. RUE/RLE 5/5  SILT    MEDICATIONS  (STANDING):  dexamethasone  Injectable 6 milliGRAM(s) IV Push every 6 hours  dextrose 50% Injectable 12.5 Gram(s) IV Push once  dextrose 50% Injectable 25 Gram(s) IV Push once  dextrose 50% Injectable 25 Gram(s) IV Push once  docusate sodium 100 milliGRAM(s) Oral three times a day  enoxaparin Injectable 40 milliGRAM(s) SubCutaneous at bedtime  influenza   Vaccine 0.5 milliLiter(s) IntraMuscular once  insulin glargine Injectable (LANTUS) 25 Unit(s) SubCutaneous at bedtime  insulin lispro (HumaLOG) corrective regimen sliding scale   SubCutaneous three times a day before meals  insulin lispro (HumaLOG) corrective regimen sliding scale   SubCutaneous at bedtime  insulin lispro Injectable (HumaLOG) 5 Unit(s) SubCutaneous three times a day before meals  levETIRAcetam  IVPB 1500 milliGRAM(s) IV Intermittent every 12 hours  lisinopril 20 milliGRAM(s) Oral daily  pantoprazole    Tablet 40 milliGRAM(s) Oral before breakfast  senna 2 Tablet(s) Oral at bedtime    MEDICATIONS  (PRN):  acetaminophen   Tablet .. 650 milliGRAM(s) Oral every 6 hours PRN Mild Pain (1 - 3)  dextrose 40% Gel 15 Gram(s) Oral once PRN Blood Glucose LESS THAN 70 milliGRAM(s)/deciliter  glucagon  Injectable 1 milliGRAM(s) IntraMuscular once PRN Glucose LESS THAN 70 milligrams/deciliter  oxyCODONE    IR 5 milliGRAM(s) Oral every 4 hours PRN Moderate Pain (4 - 6)  oxyCODONE    IR 10 milliGRAM(s) Oral every 4 hours PRN Severe Pain (7 - 10)                          12.6   29.22 )-----------( 116      ( 28 Feb 2019 05:15 )             38.2   02-28    134<L>  |  97<L>  |  17  ----------------------------<  315<H>  4.0   |  21<L>  |  0.65    Ca    8.3<L>      28 Feb 2019 05:15  Mg     1.7     02-28    < from: MR Head w/wo IV Cont (02.28.19 @ 11:57) >    FINDINGS:    Patient is status post a right frontotemporal pterional craniotomy with   resection cavity in the right frontotemporal region containing   hemorrhagic fluid fluid levels, intraparenchymal and subarachnoid   hemorrhage, with hemosiderin staining with 1 cm subjacent right  holohemispheric mixed density subdural collection extending to the right   tentorial leaflet pneumocephalus and 6 mm left frontotemporal subdural   collection with pneumocephalus.      There is redemonstration of surrounding T2 prolongation within the  right   frontal and temporal lobes adjacent to the resection cavity which may   reflect  vasogenic edema, gliosis, post treatment change, and/or   nonenhancing tumor. There is enhancement along the margins of the   resection cavity likely postoperative change, although residual tumor is   again not excluded. Abnormal susceptibility is identified within the the   cavity, likely hemorrhagic products. On the DWI and ADC sequences there   is artifact from the susceptibility and hardware with restricted   diffusion along the posterior lateral right temporal lobe associated with   hyperintense uterus signal, areas of associated new ischemia along the   surgical margins not excluded. Postcontrast images demonstrate irregular   enhancement and signal along the ependymal margins of the lateral   ventricles, leptomeningeal tumoral extension is not excluded. The   ventricular size is unchanged from prior CT with continued slight   effacement of the right lateral ventricle there is no significantmidline   shift there are fluid fluid levels likely reflecting extension of   hemorrhagic and or produce debris into the lateral ventricles on T2   series 7 image 17. Basal cisterns remain patent and unchanged.    There is redemonstration of volume loss  commensurate with patient age,   with remote right PICA territory infarct and remote lacunar infarcts in   the left cerebellum. There is nonspecific T2 prolongation within the   periventricular and subcortical white matter consistent with chronic  microvascular ischemic disease. There is a partially empty sella. Orbits   demonstrate disconjugate gaze. There is bibasilar noted consistent polyps   in the maxillary sinuses there is mild mucosal thickening the ethmoid and   frontal sinuses and within the mastoid air cells. Hyperintense T1 marrow   signal likely reflects treatment therapy sequela.    IMPRESSION:    Unchanged right pterional craniotomy with right temporal lobe resection   cavity with hemorrhagic fluid, right holohemispheric subdural extension   involving the right tentorial leaflet, bifrontal pneumocephalus, with   residual enhancement along the cavity margin, and adjacent restricted   diffusion, new ischemia not excluded. There is unchanged mild effacement   of the right cerebral hemisphere and lateral ventricle, with fluid fluid   levels in  Horns suggesting hemorrhagic/proteinaceous debris layering and   enhancement of the ependymal margins, there is no new midline shift.    < end of copied text >

## 2019-03-01 NOTE — CONSULT NOTE ADULT - SUBJECTIVE AND OBJECTIVE BOX
Patient is a 74y old  Male who presents with a chief complaint of Seizure (01 Mar 2019 04:19)      HPI:  74 year old male with HTN, NIDDM, CVA (2013 w/ left side weakness), R glioma (s/p R craniectomy and tumor resection 11/28/2018), BPH brought in by EMS from nursing home for active seizure. Per ED notes, pt was actively seizing for 30 mins prior to EMS arrival. Pt displayed left sided muscle jerking and continued with jerking of left face, arm, and leg in the ED. He received ativan 2mg x3 and keppra 1g and seizure broke after 20 minutes in the ED.    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     Patient underwent R craniotomy and resection of recurrent GBM 2/27. Post-op course c/b seizures, with neurology consulted, and patient being given Keppra 1500 mg BID, and video EEG.    History limited due to: [] Dementia  [] Delirium  [] Condition	    Function: [] Independent  [] Assistance  [] Total care  [] Non-ambulatory    Allergies    No Known Allergies    Intolerances        HOME MEDICATIONS: [] Reviewed    MEDICATIONS  (STANDING):  dexamethasone  Injectable 6 milliGRAM(s) IV Push every 6 hours  dextrose 50% Injectable 12.5 Gram(s) IV Push once  dextrose 50% Injectable 25 Gram(s) IV Push once  dextrose 50% Injectable 25 Gram(s) IV Push once  docusate sodium 100 milliGRAM(s) Oral three times a day  enoxaparin Injectable 40 milliGRAM(s) SubCutaneous at bedtime  influenza   Vaccine 0.5 milliLiter(s) IntraMuscular once  insulin glargine Injectable (LANTUS) 28 Unit(s) SubCutaneous at bedtime  insulin lispro (HumaLOG) corrective regimen sliding scale   SubCutaneous three times a day before meals  insulin lispro (HumaLOG) corrective regimen sliding scale   SubCutaneous at bedtime  insulin lispro Injectable (HumaLOG) 8 Unit(s) SubCutaneous three times a day with meals  levETIRAcetam  IVPB 1500 milliGRAM(s) IV Intermittent every 12 hours  lisinopril 20 milliGRAM(s) Oral daily  pantoprazole    Tablet 40 milliGRAM(s) Oral before breakfast  senna 2 Tablet(s) Oral at bedtime    MEDICATIONS  (PRN):  acetaminophen   Tablet .. 650 milliGRAM(s) Oral every 6 hours PRN Mild Pain (1 - 3)  dextrose 40% Gel 15 Gram(s) Oral once PRN Blood Glucose LESS THAN 70 milliGRAM(s)/deciliter  glucagon  Injectable 1 milliGRAM(s) IntraMuscular once PRN Glucose LESS THAN 70 milligrams/deciliter  oxyCODONE    IR 5 milliGRAM(s) Oral every 4 hours PRN Moderate Pain (4 - 6)  oxyCODONE    IR 10 milliGRAM(s) Oral every 4 hours PRN Severe Pain (7 - 10)      PAST MEDICAL & SURGICAL HISTORY:  Oxygen dependent  Vitamin deficiency  Constipation  Pulmonary embolism  Glioblastoma  BPH (benign prostatic hyperplasia)  CVA (cerebral vascular accident)  Diabetes  HTN (hypertension)  S/P craniotomy: ~ resection of R-pariental mass (11/28/2018)  [ ] Reviewed     SOCIAL HISTORY:  Residence: [] [facility]  [] [facility]  [] Community  [] Substance abuse:   [] Tobacco:   [ ] Alcohol use:     FAMILY HISTORY:  No pertinent family history in first degree relatives  [] No pertinent family history in first degree relatives     REVIEW OF SYSTEMS:    CONSTITUTIONAL: No fever, no weight loss  ENMT:  No difficulty hearing, tinnitus, vertigo  NECK: No pain or stiffness  RESPIRATORY: No cough. No dyspnea. No wheezing.  CARDIOVASCULAR: No CP. No orthopnea.  GASTROINTESTINAL: No abdominal pain. No nausea. No vomiting.  GENITOURINARY: No dysuria  SKIN: No itching. No rash  ENDOCRINE: No heat or cold intolerance  MUSCULOSKELETAL: No muscle pain. No back pain  HEME/LYMPH: No easy bruising, or bleeding gums  ALLERGY AND IMMUNOLOGIC: No hives. No eczema    [] All other ROS negative  [] Unable to obtain due to poor mental status    Vital Signs Last 24 Hrs  T(C): 36.7 (01 Mar 2019 09:00), Max: 36.8 (28 Feb 2019 21:00)  T(F): 98.1 (01 Mar 2019 09:00), Max: 98.2 (28 Feb 2019 21:00)  HR: 62 (01 Mar 2019 10:00) (62 - 89)  BP: 123/96 (01 Mar 2019 10:00) (109/74 - 142/94)  BP(mean): 101 (01 Mar 2019 10:00) (83 - 114)  RR: 16 (01 Mar 2019 10:00) (13 - 20)  SpO2: 94% (01 Mar 2019 10:00) (92% - 100%)    PHYSICAL EXAM:  GENERAL: elderly man lying in bed, awake, answering questions appropriately, NAD  HEAD:  Atraumatic, Normocephalic  EYES: EOMI  NECK: Supple, No JVD  CHEST/LUNG: nonlabored breathing  HEART: nl S1/S2  ABDOMEN: nondistended, soft  EXTREMITIES:  no LE edema  PSYCH: alert, answering questions appropriately  SKIN: No rashes noted    LABS:                        11.5   19.17 )-----------( 113      ( 01 Mar 2019 05:45 )             35.8     03-01    133<L>  |  97<L>  |  16  ----------------------------<  251<H>  4.0   |  23  |  0.64    Ca    8.3<L>      01 Mar 2019 05:45  Phos  1.8     03-01  Mg     1.9     03-01          CAPILLARY BLOOD GLUCOSE      POCT Blood Glucose.: 248 mg/dL (01 Mar 2019 06:20)      RADIOLOGY & ADDITIONAL STUDIES:    EKG:   Personally Reviewed:  [] YES     Imaging:   Personally Reviewed:  [x] YES     < from: MR Head w/wo IV Cont (02.28.19 @ 11:57) >  IMPRESSION:    Unchanged right pterional craniotomy with right temporal lobe resection   cavity with hemorrhagic fluid, right holohemispheric subdural extension   involving the right tentorial leaflet, bifrontal pneumocephalus, with   residual enhancement along the cavity margin, and adjacent restricted   diffusion, new ischemia not excluded. There is unchanged mild effacement   of the right cerebral hemisphere and lateral ventricle, with fluid fluid   levels in  Horns suggesting hemorrhagic/proteinaceous debris layering and   enhancement of the ependymal margins, there is no new midline shift.    < end of copied text >                Consultant(s) notes reviewed:      Care Discussed with Consultant(s)/Other Providers: neurosurgery    Advanced Directives: [] DNR  [] No feeding tube  [] MOLST in chart  [] MOLST completed today  [] Unknown        Leroy Jones M.D.  Hospitalist  Pager [phone]

## 2019-03-01 NOTE — CONSULT NOTE ADULT - PROBLEM SELECTOR RECOMMENDATION 9
1. transfer to [facility] - in progress, waiting for bed   2. plan for surgery friday   Case discussed with attending neurosurgeon.
course c/b post-op seizures after craniotomy, tumor resection on 2/27  appreciate neurology recs  continue Keppra 1500 mg BID  24 hour video EEG  seizure precautions
-likely from brain mass  -per neuro/medicine

## 2019-03-01 NOTE — CONSULT NOTE ADULT - PROBLEM SELECTOR RECOMMENDATION 5
s/p IVC filter for presumed paradoxical embolism  on prophylactic dose of SC lovenox given recent intra-cranial surgery  when safe to initiate full dose AC from neurosurgical perspective, recommend full dose SC Lovenox given active malignancy
-supportive   -DC abx

## 2019-03-02 LAB
ANION GAP SERPL CALC-SCNC: 13 MMO/L — SIGNIFICANT CHANGE UP (ref 7–14)
APTT BLD: 23.8 SEC — LOW (ref 27.5–36.3)
BUN SERPL-MCNC: 19 MG/DL — SIGNIFICANT CHANGE UP (ref 7–23)
CALCIUM SERPL-MCNC: 8.3 MG/DL — LOW (ref 8.4–10.5)
CHLORIDE SERPL-SCNC: 95 MMOL/L — LOW (ref 98–107)
CO2 SERPL-SCNC: 25 MMOL/L — SIGNIFICANT CHANGE UP (ref 22–31)
CREAT SERPL-MCNC: 0.65 MG/DL — SIGNIFICANT CHANGE UP (ref 0.5–1.3)
GLUCOSE SERPL-MCNC: 375 MG/DL — HIGH (ref 70–99)
HCT VFR BLD CALC: 34.1 % — LOW (ref 39–50)
HGB BLD-MCNC: 11.3 G/DL — LOW (ref 13–17)
INR BLD: 1.11 — SIGNIFICANT CHANGE UP (ref 0.88–1.17)
MCHC RBC-ENTMCNC: 30.1 PG — SIGNIFICANT CHANGE UP (ref 27–34)
MCHC RBC-ENTMCNC: 33.1 % — SIGNIFICANT CHANGE UP (ref 32–36)
MCV RBC AUTO: 90.7 FL — SIGNIFICANT CHANGE UP (ref 80–100)
NRBC # FLD: 0 K/UL — LOW (ref 25–125)
PLATELET # BLD AUTO: 119 K/UL — LOW (ref 150–400)
PMV BLD: 10.9 FL — SIGNIFICANT CHANGE UP (ref 7–13)
POTASSIUM SERPL-MCNC: 3.8 MMOL/L — SIGNIFICANT CHANGE UP (ref 3.5–5.3)
POTASSIUM SERPL-SCNC: 3.8 MMOL/L — SIGNIFICANT CHANGE UP (ref 3.5–5.3)
PROTHROM AB SERPL-ACNC: 12.4 SEC — SIGNIFICANT CHANGE UP (ref 9.8–13.1)
RBC # BLD: 3.76 M/UL — LOW (ref 4.2–5.8)
RBC # FLD: 16.9 % — HIGH (ref 10.3–14.5)
SODIUM SERPL-SCNC: 133 MMOL/L — LOW (ref 135–145)
WBC # BLD: 17.39 K/UL — HIGH (ref 3.8–10.5)
WBC # FLD AUTO: 17.39 K/UL — HIGH (ref 3.8–10.5)

## 2019-03-02 PROCEDURE — 70450 CT HEAD/BRAIN W/O DYE: CPT | Mod: 26

## 2019-03-02 PROCEDURE — 99233 SBSQ HOSP IP/OBS HIGH 50: CPT

## 2019-03-02 PROCEDURE — 99291 CRITICAL CARE FIRST HOUR: CPT

## 2019-03-02 RX ORDER — DEXAMETHASONE 0.5 MG/5ML
4 ELIXIR ORAL EVERY 6 HOURS
Qty: 0 | Refills: 0 | Status: DISCONTINUED | OUTPATIENT
Start: 2019-03-02 | End: 2019-03-06

## 2019-03-02 RX ORDER — LACOSAMIDE 50 MG/1
100 TABLET ORAL
Qty: 0 | Refills: 0 | Status: DISCONTINUED | OUTPATIENT
Start: 2019-03-02 | End: 2019-03-06

## 2019-03-02 RX ORDER — LACOSAMIDE 50 MG/1
300 TABLET ORAL ONCE
Qty: 0 | Refills: 0 | Status: DISCONTINUED | OUTPATIENT
Start: 2019-03-02 | End: 2019-03-02

## 2019-03-02 RX ADMIN — Medication 8 UNIT(S): at 12:25

## 2019-03-02 RX ADMIN — LEVETIRACETAM 400 MILLIGRAM(S): 250 TABLET, FILM COATED ORAL at 05:26

## 2019-03-02 RX ADMIN — Medication 100 MILLIGRAM(S): at 05:31

## 2019-03-02 RX ADMIN — Medication 100 MILLIGRAM(S): at 13:48

## 2019-03-02 RX ADMIN — Medication 100 MILLIGRAM(S): at 23:30

## 2019-03-02 RX ADMIN — Medication 4: at 12:25

## 2019-03-02 RX ADMIN — LISINOPRIL 20 MILLIGRAM(S): 2.5 TABLET ORAL at 05:27

## 2019-03-02 RX ADMIN — Medication 4 MILLIGRAM(S): at 17:39

## 2019-03-02 RX ADMIN — Medication 6 MILLIGRAM(S): at 00:00

## 2019-03-02 RX ADMIN — Medication 8 UNIT(S): at 17:27

## 2019-03-02 RX ADMIN — Medication 6 MILLIGRAM(S): at 05:28

## 2019-03-02 RX ADMIN — LACOSAMIDE 160 MILLIGRAM(S): 50 TABLET ORAL at 18:11

## 2019-03-02 RX ADMIN — PANTOPRAZOLE SODIUM 40 MILLIGRAM(S): 20 TABLET, DELAYED RELEASE ORAL at 05:31

## 2019-03-02 RX ADMIN — Medication 1: at 17:27

## 2019-03-02 RX ADMIN — Medication 8 UNIT(S): at 08:47

## 2019-03-02 RX ADMIN — INSULIN GLARGINE 28 UNIT(S): 100 INJECTION, SOLUTION SUBCUTANEOUS at 23:45

## 2019-03-02 RX ADMIN — Medication 4: at 08:48

## 2019-03-02 RX ADMIN — Medication 4 MILLIGRAM(S): at 11:59

## 2019-03-02 RX ADMIN — LEVETIRACETAM 400 MILLIGRAM(S): 250 TABLET, FILM COATED ORAL at 17:39

## 2019-03-02 RX ADMIN — SENNA PLUS 2 TABLET(S): 8.6 TABLET ORAL at 23:30

## 2019-03-02 NOTE — PHYSICAL THERAPY INITIAL EVALUATION ADULT - GAIT DEVIATIONS NOTED, PT EVAL
increased stride width/decreased weight-shifting ability/decreased lyle/decreased step length/increased time in double stance/decreased stride length

## 2019-03-02 NOTE — CONSULT NOTE ADULT - ATTENDING COMMENTS
pt is a 75 yo male hx htn, cva, hx r glioma with left sided weakness, seizure,  noted to have left sided focal seizure, in the er given ativan x3 and keppra  .Pt arousable, opens eyes , protecting the airway. hemodynamically stable  pupils 3 mm b/l, heart regular, abdomen mildy distended, ext no edema.  neuro left sided stiffness, , labs noted, ct of the head no bleed,   -Pt s/p seizure given ativan, keppra,  -No seizures for the last 1.5 hours, pt arousable,   -pt does not require icu level care, Please reconsult  if condition changes.
73 yo male history of craniotomy nov 2018, presenting with seizure, possible progression of GBM on CTH for resection of recurrent GBM.    PLAN:   Neurologic:  - Decadron 6mg IV q6h  - Keppra 1250mg IV q12h  - Pain control w/ Tylenol, Oxy, breakthrough dilaudid    Respiratory:  - Monitor oxygen saturation, RR    Cardiovascular:  - Lisinopril    Gastrointestinal/Nutrition:  - Diabetic mechanical soft w/ 1200 ml fluid restriction    Genitourinary/Renal:  - Monitor BUN/Cr, urine output    Hematologic:  - Start SQH in 24 hrs    Infectious Disease:  - Cont. Cefazolin  - Monitor WBC, temp    Endocrine:  - ISS    Disposition: PACU overnight under SICU.    CCDx. respiratory abnormality.  The patient is a critical care patient with life threatening hemodynamic and metabolic instability in SICU.  I have personally interviewed when possible and examined the patient, reviewed data and laboratory tests/x-rays and all pertinent electronic images.  I was physically present for the key portions of the evaluation and management (E/M) service provided.   The SICU team has a constant risk benefit analyzes discussion with the primary team, all consultants, House Staff and PA's on all decisions.  These diagnoses are unrelated to the surgical procedure noted above.  I meet with family if needed to get further history, discuss the case and make care decisions for this patient who might not be able to participate.  Time involved in performance of separately billable procedures was not counted toward my critical care time. There is no overlap.  I spent 55-75 minutes of critical care time for the diagnoses, assessment, plan and interventions.
Patient seen and examined  Recurrent right temporal GBM in setting of no adjuvant treatment following previous resection.  Now plan in place for adjuvant radiation and chemotherapy following repeat resection.  Case discussed in [place] tumor board with consensus agreement for surgery.
The patient is a critical care patient with hemodynamic and metabolic instability in SICU.  I have personally interviewed and examined this patient, reviewed labs and x-rays, discussed with other consultants, House staff and PA's.  I spent   45  minutes  in total providing critical care for the diagnoses, assessment and plan above.  These diagnoses are unrelated to the surgical procedure noted above.  Time involved in performance of separately billable procedures was not counted toward my critical care time.  There is no overlap.    Current Issues:  I62.9 Intracranial hemorrhage   - q1h neuro checks in SICU.  Plan for repeat CT tomorrow if remains stable.  Holding chemical DVT prophylaxis  D49.6 Brain tumor   - s/p resection  R56.9 Seizures  - continuing keppra, vimpat   I82.412 Acute deep vein thrombosis (DVT) of femoral vein of left lower extremity   - s/p IVC filter  E11.65 Type 2 diabetes mellitus with hyperglycemia, with long-term current use of insulin   - JAYLYN thorne  Z91.89 At risk for malnutrition   - on diet
Pt examined, chart reviewed, case presented on rounds, radiology reviewed. Agree with above Hx.  The patient is awake and alert but poorly insightful in terms of Hx.  On exam there is a dense L HHA, left hemisensory to double simultaneous stimulation and a left hemiparesis. This may be due to a post ictal Gage's paralysis. Agree with increase dose of Keppra to 1000 mg bid.  Suggest neurosurgical F/U in terms of GBM management. Agree with above disposition and plan.
Chauncey Neal  Attending Physician   Division of Infectious Disease  Pager #[phone]  After 5pm/weekend or no response, call #[phone]

## 2019-03-02 NOTE — OCCUPATIONAL THERAPY INITIAL EVALUATION ADULT - PLANNED THERAPY INTERVENTIONS, OT EVAL
cognitive, visual perceptual/fine motor coordination training/transfer training/ADL retraining/bed mobility training/strengthening/balance training/motor coordination training/neuromuscular re-education/parent/caregiver training...

## 2019-03-02 NOTE — PHYSICAL THERAPY INITIAL EVALUATION ADULT - ADDITIONAL COMMENTS
Unable to obtain full history, all information needs to be obtained. Per chart review, patient is from Nursing Home. Patient reports he ambulated independently prior to admission.    Patient was left semi-supine in bed as found, all lines/tubes intact and call jasso within reach, SREE belle

## 2019-03-02 NOTE — PHYSICAL THERAPY INITIAL EVALUATION ADULT - PERTINENT HX OF CURRENT PROBLEM, REHAB EVAL
Patient is a 74 year old male admitted to [facility] on 2/13 from nursing home for active seizure. PMH includes: HTN, NIDDM, CVA (2013 with left side weakness), Right glioma (s/p Right craniectomy and tumor resection 11/28/2018), BPH.

## 2019-03-02 NOTE — PROGRESS NOTE ADULT - ASSESSMENT
73 yo M HTN, DM2, CVA w/ residual L weakness, GBM s/p Rt craniotomy 2018, BPH presents with seizures likely from recurrence of GBM, course complicated by sepsis POA from viral URI, acute embolic CVA, PFO, LLE DVT s/p IVC. Today with concern for seizure activity, underwent CT scan concerning for post-op intracranial hemorrhage. Being transferred to SICU for further monitoring/management.     # Intracranial hemorrhage  - c/w steroids/anti-epileptic regimen as per neuro/neurosx/SICU  - plan to repeat head imaging to monitor for stability as per SICU  - hold anticoagulation/antiplatelet agents setting of intracranial hemorrhage    # Seizures - 2/2 GBM, intracranial hemorrhage  - seizure precautions, AED therapy as per neuro/SICU    # GBM  - post-op managemennt as per sx  - likely will need onc and rad/onc eval for further cancer management.      # Hyponatremia  - mild, likely SIADH in setting of recent intracranial surgery  - c/w fluid restriction, monitor Na    # type 2 diabetes mellitus with hyperglycemia, on insulin therapy  - fingersticks not well controlled in setting of surgery and decadron use  - c/w basal/bolus insulin with sliding scale coverage  - continue to monitor FS and adjust regimen as necessary    # DVT of LLE  - holding anticoagulation given intracranial hemorrhage  - s/p IVC filter    # CVA  - holding antiplatelet/anticoagulation at this time given intracranial hemorrhage    Dispo: to SICU, PT had recommended rehab once medically stable

## 2019-03-02 NOTE — PROGRESS NOTE ADULT - ASSESSMENT
74 year old male with HTN, NIDDM, CVA (2013 w/ left side weakness), R glioma (s/p R craniectomy and tumor resection 11/28/2018), BPH brought in by EMS from nursing home for active seizure. Per ED notes, pt was actively seizing for 30 mins prior to EMS arrival. Pt displayed left sided muscle jerking and continued with jerking of left face, arm, and leg in the ED. He received ativan 2mg x3 and keppra 1g and seizure broke after 20 minutes in the ED. Patient feels he had small twitching this AM .   Imaging  show new hemorrhaging the cause of new seizure this am      start VIMPAT  mg now the 100 BID      seizure precautions

## 2019-03-02 NOTE — CONSULT NOTE ADULT - ASSESSMENT
ASSESSMENT: 75 yo male history of craniotomy nov 2018, presenting with seizure, s/p resection of recurrent GBM, was readmitted to SICU due to hemorrhage found on CT scan.    PLAN:   Neurologic:  - Decadron 6mg IV q6h  - Keppra 1250mg IV q12h  - Pain control w/ Tylenol, Oxy,  - F/u MRI    Respiratory:  - Monitor oxygen saturation, RR    Cardiovascular:  - Lisinopril    Gastrointestinal/Nutrition:  - Diabetic mechanical soft w/ 1200 ml fluid restriction    Genitourinary/Renal:  - Monitor BUN/Cr, urine output    Hematologic:  - Start SQH in 24 hrs    Infectious Disease:  - Cont. Cefazolin  - Monitor WBC, temp    Endocrine:  - ISS ASSESSMENT: 73 yo male history of craniotomy nov 2018, presenting with seizure, s/p resection of recurrent GBM, was readmitted to SICU due to hemorrhage found on CT scan.    PLAN:   Neurologic:  - Decadron 4mg IV push q6h  - Keppra 1500mg IV q12h  - Pain control w/ Tylenol, Oxy  - Repeat CT head tonight or tomorrow AM    Respiratory:  - Monitor oxygen saturation, RR    Cardiovascular:  - Cont. Lisinopril 20 mg daily    Gastrointestinal/Nutrition:  - Diabetic mechanical soft w/ 1200 ml fluid restriction  - Cont. bowel regimen: senna, colace  - Cont. protonix    Genitourinary/Renal:  - Monitor BUN/Cr, urine output    Hematologic:  - Hold SQH  - Monitor H/h    Infectious Disease:  - Monitor WBC, temp    Endocrine:  - ISS, lantus    Dispo: SICU

## 2019-03-02 NOTE — PROGRESS NOTE ADULT - SUBJECTIVE AND OBJECTIVE BOX
Neurology Progress    HELGA [id number]    HPI:  74 year old male with HTN, NIDDM, CVA (2013 w/ left side weakness), R glioma (s/p R craniectomy and tumor resection 11/28/2018), BPH brought in by EMS from nursing home for active seizure. Per ED notes, pt was actively seizing for 30 mins prior to EMS arrival. Pt displayed left sided muscle jerking and continued with jerking of left face, arm, and leg in the ED. He received ativan 2mg x3 and keppra 1g and seizure broke after 20 minutes in the ED.     On interview, pt is responsive to commands but currently nonverbal, attempting to speak but not audible and with no active muscle jerking.   In the ED VS Tm100.7, Hr 101, /80, RR 19/100% on 3L NC. He also received vanc 1g, zosyn, dexamethasone 10mg. (13 Feb 2019 01:21)      Past Medical History  Oxygen dependent  Vitamin deficiency  Constipation  Pulmonary embolism  Glioblastoma  BPH (benign prostatic hyperplasia)  CVA (cerebral vascular accident)  Diabetes  HTN (hypertension)      Past Surgical History  S/P craniotomy  No significant past surgical history      MEDICATIONS    acetaminophen   Tablet .. 650 milliGRAM(s) Oral every 6 hours PRN  dexamethasone  Injectable 4 milliGRAM(s) IV Push every 6 hours  dextrose 40% Gel 15 Gram(s) Oral once PRN  dextrose 50% Injectable 12.5 Gram(s) IV Push once  dextrose 50% Injectable 25 Gram(s) IV Push once  dextrose 50% Injectable 25 Gram(s) IV Push once  docusate sodium 100 milliGRAM(s) Oral three times a day  glucagon  Injectable 1 milliGRAM(s) IntraMuscular once PRN  influenza   Vaccine 0.5 milliLiter(s) IntraMuscular once  insulin glargine Injectable (LANTUS) 28 Unit(s) SubCutaneous at bedtime  insulin lispro (HumaLOG) corrective regimen sliding scale   SubCutaneous three times a day before meals  insulin lispro (HumaLOG) corrective regimen sliding scale   SubCutaneous at bedtime  insulin lispro Injectable (HumaLOG) 8 Unit(s) SubCutaneous three times a day with meals  levETIRAcetam  IVPB 1500 milliGRAM(s) IV Intermittent every 12 hours  lisinopril 20 milliGRAM(s) Oral daily  oxyCODONE    IR 5 milliGRAM(s) Oral every 4 hours PRN  oxyCODONE    IR 10 milliGRAM(s) Oral every 4 hours PRN  pantoprazole    Tablet 40 milliGRAM(s) Oral before breakfast  senna 2 Tablet(s) Oral at bedtime         Family history: No history of dementia, strokes, or seizures   FAMILY HISTORY:  No pertinent family history in first degree relatives    SOCIAL HISTORY -- No history of tobacco or alcohol use     Allergies    No Known Allergies    Intolerances            Vital Signs Last 24 Hrs  T(C): 36.9 (02 Mar 2019 11:45), Max: 37.2 (02 Mar 2019 08:52)  T(F): 98.4 (02 Mar 2019 11:45), Max: 99 (02 Mar 2019 08:52)  HR: 68 (02 Mar 2019 11:45) (56 - 72)  BP: 119/85 (02 Mar 2019 11:45) (119/85 - 140/76)  BP(mean): 96 (01 Mar 2019 18:00) (95 - 96)  RR: 16 (02 Mar 2019 11:45) (13 - 18)  SpO2: 99% (02 Mar 2019 11:45) (97% - 100%)        On Neurological Examination:    Mental Status - Patient is alert, awake, oriented X3. .   Follows commands well and able to answer questions appropriately. Mood and affect  normal  Follow simple commands able to repeat  able to name.  Speech - Fluent no Dysarthria  no  Aphasia                              Cranial Nerves - Extraocular muscle intact  FRANKLIN Facial symmetry Tongue midline, CnV1to V3 intact gross hearing intact      Motor Exam -   Right upper  5/5 throughout  Left upper 5/5 throughtout  Right lower- 5/5 throughout  Left lower 5/5 throughout  Coordination -finger to nose intact  Muscle tone - is normal all over. No asymmetry is seen.      Sensory    Bilateral intact to light touch    Gait -  normal  no ataxia     GENERAL Exam:     Nontoxic , No Acute Distress   	  HEENT:  normocephalic, atraumatic  		  LUNGS:	Clear bilaterally  No Wheeze      VASCULAR: no carotid brui  	  HEART:	 Normal S1S2   No murmur RRR        	  MUSCULOSKELETAL: Normal Range of Motion  	   SKIN:      Normal   No Ecchymosis               LABS:  CBC Full  -  ( 02 Mar 2019 10:59 )  WBC Count : 17.39 K/uL  Hemoglobin : 11.3 g/dL  Hematocrit : 34.1 %  Platelet Count - Automated : 119 K/uL  Mean Cell Volume : 90.7 fL  Mean Cell Hemoglobin : 30.1 pg  Mean Cell Hemoglobin Concentration : 33.1 %  Auto Neutrophil # : x  Auto Lymphocyte # : x  Auto Monocyte # : x  Auto Eosinophil # : x  Auto Basophil # : x  Auto Neutrophil % : x  Auto Lymphocyte % : x  Auto Monocyte % : x  Auto Eosinophil % : x  Auto Basophil % : x      03-02    133<L>  |  95<L>  |  19  ----------------------------<  375<H>  3.8   |  25  |  0.65    Ca    8.3<L>      02 Mar 2019 10:59  Phos  1.8     03-01  Mg     1.9     03-01      Hemoglobin A1C:       Vitamin B12   PT/INR - ( 02 Mar 2019 10:59 )   PT: 12.4 SEC;   INR: 1.11          PTT - ( 02 Mar 2019 10:59 )  PTT:23.8 SEC      RADIOLOGY  < from: CT Head No Cont (03.02.19 @ 09:22) >  EXAM:  CT BRAIN        PROCEDURE DATE:  Mar  2 2019         INTERPRETATION:  CLINICAL INFORMATION: History of glioblastoma status   post right craniotomy for resection of recurrent GBM on 2/27/2019.   Seizure yesterday.    TECHNIQUE: Noncontrast axial CT images were acquired through the head.   Two-dimensional sagittal and coronal reformats were generated.    COMPARISON STUDY: Noncontrast head CT from 2/28/2019.    FINDINGS:   No midline shift. Slight increase in the mass effect on the right lateral   ventricle.    Redemonstrated postsurgical changes including right pterional craniotomy   with overlying mesh and hyperattenuation at the surgical site   representing hemorrhage versus hemostatic material. There is increased   hyperdensity within the operative bed as well as in the right sylvian   fissure worrisome for interval hemorrhage. There is increased overlying   soft tissue swelling and surgical skin staples. There is now trace   leftward midline shift is appreciated on coronal view.    Interval decrease in the intracranial foci of air both within the   surgical site as well as the bilateral frontal convexities.    There is no CT evidence of central herniation, or hydrocephalus.     The visualized paranasal sinuses are clear. The mastoid air cells and   middle ear cavities are clear.    IMPRESSION:   Interval hyperdensity within the right temporal operative bed,   subarachnoid hemorrhage and increased hyperdensity within the overlying   scalp consistent with interval hemorrhage.    These findings were discussed with LOVE Javed from neurosurgery at   3/2/2019 10:25 AM by Dr. Rosa.               ABILIO COOMBS M.D., RADIOLOGY RESIDENT  This document has been electronically signed.  GALILEO ROSA M.D., ATTENDING RADIOLOGIST  This document has been electronically signed. Mar  2 2019 10:26AM              < end of copied text >    EKG                schoenberg

## 2019-03-02 NOTE — PROGRESS NOTE ADULT - SUBJECTIVE AND OBJECTIVE BOX
[facility] Medicine  Pager [phone]    Patient is a 74y old  Male who presents with a chief complaint of Seizure (02 Mar 2019 13:56)      INTERVAL HPI/OVERNIGHT EVENTS:   Patient seen and examined this AM. No current complaints, denies fever/pain/constipation. Concern for possible seizure activity earlier, nursing reporting some twitching noticed.     MEDICATIONS  (STANDING):  dexamethasone  Injectable 4 milliGRAM(s) IV Push every 6 hours  dextrose 50% Injectable 12.5 Gram(s) IV Push once  dextrose 50% Injectable 25 Gram(s) IV Push once  dextrose 50% Injectable 25 Gram(s) IV Push once  docusate sodium 100 milliGRAM(s) Oral three times a day  influenza   Vaccine 0.5 milliLiter(s) IntraMuscular once  insulin glargine Injectable (LANTUS) 28 Unit(s) SubCutaneous at bedtime  insulin lispro (HumaLOG) corrective regimen sliding scale   SubCutaneous three times a day before meals  insulin lispro (HumaLOG) corrective regimen sliding scale   SubCutaneous at bedtime  insulin lispro Injectable (HumaLOG) 8 Unit(s) SubCutaneous three times a day with meals  levETIRAcetam  IVPB 1500 milliGRAM(s) IV Intermittent every 12 hours  lisinopril 20 milliGRAM(s) Oral daily  pantoprazole    Tablet 40 milliGRAM(s) Oral before breakfast  senna 2 Tablet(s) Oral at bedtime    MEDICATIONS  (PRN):  acetaminophen   Tablet .. 650 milliGRAM(s) Oral every 6 hours PRN Mild Pain (1 - 3)  dextrose 40% Gel 15 Gram(s) Oral once PRN Blood Glucose LESS THAN 70 milliGRAM(s)/deciliter  glucagon  Injectable 1 milliGRAM(s) IntraMuscular once PRN Glucose LESS THAN 70 milligrams/deciliter  oxyCODONE    IR 5 milliGRAM(s) Oral every 4 hours PRN Moderate Pain (4 - 6)  oxyCODONE    IR 10 milliGRAM(s) Oral every 4 hours PRN Severe Pain (7 - 10)    Allergies  No Known Allergies    Intolerances    REVIEW OF SYSTEMS:  Please see interval HPI:    Vital Signs Last 24 Hrs  T(C): 36.9 (02 Mar 2019 11:45), Max: 37.2 (02 Mar 2019 08:52)  T(F): 98.4 (02 Mar 2019 11:45), Max: 99 (02 Mar 2019 08:52)  HR: 68 (02 Mar 2019 11:45) (56 - 72)  BP: 119/85 (02 Mar 2019 11:45) (119/85 - 140/76)  BP(mean): 96 (01 Mar 2019 18:00) (96 - 96)  RR: 16 (02 Mar 2019 11:45) (16 - 18)  SpO2: 99% (02 Mar 2019 11:45) (97% - 100%)  I&O's Detail    01 Mar 2019 07:01  -  02 Mar 2019 07:00  --------------------------------------------------------  IN:    IV PiggyBack: 100 mL    Oral Fluid: 1080 mL  Total IN: 1180 mL    OUT:    Indwelling Catheter - Urethral: 1695 mL  Total OUT: 1695 mL    Total NET: -515 mL      02 Mar 2019 07:01  -  02 Mar 2019 15:23  --------------------------------------------------------  IN:  Total IN: 0 mL    OUT:    Indwelling Catheter - Urethral: 1000 mL  Total OUT: 1000 mL    Total NET: -1000 mL    PHYSICAL EXAM:  GENERAL: NAD, sitting in bed, awake/alert, responding appropriately to questions  HEAD:  R craniotomy scar   EYES: EOMI, clear sclera/conjunctiva  ENMT: MMM  NECK: R sided line removed by nursing  NERVOUS SYSTEM:  AOx3, some L sided weakness, moving R extremities SILT grossly  CHEST/LUNG: CTAB   HEART: S1S2 RRR  ABDOMEN: soft, non-tender  EXTREMITIES:  no c/c/e      LABS:                        11.3   17.39 )-----------( 119      ( 02 Mar 2019 10:59 )             34.1     02 Mar 2019 10:59    133    |  95     |  19     ----------------------------<  375    3.8     |  25     |  0.65     Ca    8.3        02 Mar 2019 10:59      PT/INR - ( 02 Mar 2019 10:59 )   PT: 12.4 SEC;   INR: 1.11          PTT - ( 02 Mar 2019 10:59 )  PTT:23.8 SEC  CAPILLARY BLOOD GLUCOSE      POCT Blood Glucose.: 310 mg/dL (02 Mar 2019 12:12)  POCT Blood Glucose.: 324 mg/dL (02 Mar 2019 08:26)  POCT Blood Glucose.: 198 mg/dL (01 Mar 2019 21:29)  POCT Blood Glucose.: 220 mg/dL (01 Mar 2019 15:56)    BLOOD CULTURE    RADIOLOGY & ADDITIONAL TESTS:    Imaging Personally Reviewed:  [ x] YES   CT head: concern for hemorrhage    IMPRESSION:   Interval hyperdensity within the right temporal operative bed, subarachnoid   hemorrhage and increased hyperdensity within the overlying scalp consistent   with interval hemorrhage.       Consultant(s) Notes Reviewed: Neuro, Nsx, SICU     Care Discussed with Consultants/Other Providers:

## 2019-03-02 NOTE — CONSULT NOTE ADULT - SUBJECTIVE AND OBJECTIVE BOX
[facility] General Surgery Consultation     Patient is a 74y old  Male who presents with a chief complaint of Seizure (02 Mar 2019 12:16)      HPI:  74 year old male with HTN, NIDDM, CVA (2013 w/ left side weakness), R glioma (s/p R craniectomy and tumor resection 11/28/2018), BPH brought in by EMS from nursing home for active seizure. Per ED notes, pt was actively seizing for 30 mins prior to EMS arrival. Pt displayed left sided muscle jerking and continued with jerking of left face, arm, and leg in the ED. He received ativan 2mg x3 and keppra 1g and seizure broke after 20 minutes in the ED.     On interview, pt is responsive to commands but currently nonverbal, attempting to speak but not audible and with no active muscle jerking.   In the ED VS Tm100.7, Hr 101, /80, RR 19/100% on 3L NC. He also received vanc 1g, zosyn, dexamethasone 10mg. (13 Feb 2019 01:21)      PAST MEDICAL & SURGICAL HISTORY:  Oxygen dependent  Vitamin deficiency  Constipation  Pulmonary embolism  Glioblastoma  BPH (benign prostatic hyperplasia)  CVA (cerebral vascular accident)  Diabetes  HTN (hypertension)  S/P craniotomy: ~ resection of R-pariental mass (11/28/2018)      ROS: 10-point review of systems   FAMILY HISTORY:  No pertinent family history in first degree relatives      SOCIAL HISTORY:    MEDICATIONS  (STANDING):  dexamethasone  Injectable 4 milliGRAM(s) IV Push every 6 hours  dextrose 50% Injectable 12.5 Gram(s) IV Push once  dextrose 50% Injectable 25 Gram(s) IV Push once  dextrose 50% Injectable 25 Gram(s) IV Push once  docusate sodium 100 milliGRAM(s) Oral three times a day  influenza   Vaccine 0.5 milliLiter(s) IntraMuscular once  insulin glargine Injectable (LANTUS) 28 Unit(s) SubCutaneous at bedtime  insulin lispro (HumaLOG) corrective regimen sliding scale   SubCutaneous three times a day before meals  insulin lispro (HumaLOG) corrective regimen sliding scale   SubCutaneous at bedtime  insulin lispro Injectable (HumaLOG) 8 Unit(s) SubCutaneous three times a day with meals  levETIRAcetam  IVPB 1500 milliGRAM(s) IV Intermittent every 12 hours  lisinopril 20 milliGRAM(s) Oral daily  pantoprazole    Tablet 40 milliGRAM(s) Oral before breakfast  senna 2 Tablet(s) Oral at bedtime    MEDICATIONS  (PRN):  acetaminophen   Tablet .. 650 milliGRAM(s) Oral every 6 hours PRN Mild Pain (1 - 3)  dextrose 40% Gel 15 Gram(s) Oral once PRN Blood Glucose LESS THAN 70 milliGRAM(s)/deciliter  glucagon  Injectable 1 milliGRAM(s) IntraMuscular once PRN Glucose LESS THAN 70 milligrams/deciliter  oxyCODONE    IR 5 milliGRAM(s) Oral every 4 hours PRN Moderate Pain (4 - 6)  oxyCODONE    IR 10 milliGRAM(s) Oral every 4 hours PRN Severe Pain (7 - 10)    Allergies    No Known Allergies    Intolerances        Vital Signs Last 24 Hrs  T(C): 36.9 (02 Mar 2019 11:45), Max: 37.2 (02 Mar 2019 08:52)  T(F): 98.4 (02 Mar 2019 11:45), Max: 99 (02 Mar 2019 08:52)  HR: 68 (02 Mar 2019 11:45) (56 - 72)  BP: 119/85 (02 Mar 2019 11:45) (119/85 - 140/76)  BP(mean): 96 (01 Mar 2019 18:00) (95 - 96)  RR: 16 (02 Mar 2019 11:45) (15 - 18)  SpO2: 99% (02 Mar 2019 11:45) (97% - 100%)  Daily     Daily                             11.3   17.39 )-----------( 119      ( 02 Mar 2019 10:59 )             34.1     03-02    133<L>  |  95<L>  |  19  ----------------------------<  375<H>  3.8   |  25  |  0.65    Ca    8.3<L>      02 Mar 2019 10:59  Phos  1.8     03-01  Mg     1.9     03-01      PT/INR - ( 02 Mar 2019 10:59 )   PT: 12.4 SEC;   INR: 1.11          PTT - ( 02 Mar 2019 10:59 )  PTT:23.8 SEC      Radiographic Findings:     < from: CT Head No Cont (03.02.19 @ 09:22) >  FINDINGS:   No midline shift. Slight increase in the mass effect on the right lateral   ventricle.    Redemonstrated postsurgical changes including right pterional craniotomy   with overlying mesh and hyperattenuation at the surgical site   representing hemorrhage versus hemostatic material. There is increased   hyperdensity within the operative bed as well as in the right sylvian   fissure worrisome for interval hemorrhage. There is increased overlying   soft tissue swelling and surgical skin staples. There is now trace   leftward midline shift is appreciated on coronal view.    Interval decrease in the intracranial foci of air both within the   surgical site as well as the bilateral frontal convexities.    There is no CT evidence of central herniation, or hydrocephalus.     The visualized paranasal sinuses are clear. The mastoid air cells and   middle ear cavities are clear.    IMPRESSION:   Interval hyperdensity within the right temporal operative bed,   subarachnoid hemorrhage and increased hyperdensity within the overlying   scalp consistent with interval hemorrhage.    < end of copied text > [facility] General Surgery Consultation     Patient is a 74y old  Male who presents with a chief complaint of Seizure (02 Mar 2019 12:16)      HPI:  74 year old male with HTN, BPH, NIDDM, CVA (2013 w/ left side weakness), R glioma (s/p R craniectomy and tumor resection 11/28/2018), s/p resection of recurrent GBM on 2/27/19.  SICU was consulted due     PAST MEDICAL & SURGICAL HISTORY:  Oxygen dependent  Vitamin deficiency  Constipation  Pulmonary embolism  Glioblastoma  BPH (benign prostatic hyperplasia)  CVA (cerebral vascular accident)  Diabetes  HTN (hypertension)  S/P craniotomy: ~ resection of R-pariental mass (11/28/2018)      ROS: 10-point review of systems   FAMILY HISTORY:  No pertinent family history in first degree relatives      SOCIAL HISTORY:    MEDICATIONS  (STANDING):  dexamethasone  Injectable 4 milliGRAM(s) IV Push every 6 hours  dextrose 50% Injectable 12.5 Gram(s) IV Push once  dextrose 50% Injectable 25 Gram(s) IV Push once  dextrose 50% Injectable 25 Gram(s) IV Push once  docusate sodium 100 milliGRAM(s) Oral three times a day  influenza   Vaccine 0.5 milliLiter(s) IntraMuscular once  insulin glargine Injectable (LANTUS) 28 Unit(s) SubCutaneous at bedtime  insulin lispro (HumaLOG) corrective regimen sliding scale   SubCutaneous three times a day before meals  insulin lispro (HumaLOG) corrective regimen sliding scale   SubCutaneous at bedtime  insulin lispro Injectable (HumaLOG) 8 Unit(s) SubCutaneous three times a day with meals  levETIRAcetam  IVPB 1500 milliGRAM(s) IV Intermittent every 12 hours  lisinopril 20 milliGRAM(s) Oral daily  pantoprazole    Tablet 40 milliGRAM(s) Oral before breakfast  senna 2 Tablet(s) Oral at bedtime    MEDICATIONS  (PRN):  acetaminophen   Tablet .. 650 milliGRAM(s) Oral every 6 hours PRN Mild Pain (1 - 3)  dextrose 40% Gel 15 Gram(s) Oral once PRN Blood Glucose LESS THAN 70 milliGRAM(s)/deciliter  glucagon  Injectable 1 milliGRAM(s) IntraMuscular once PRN Glucose LESS THAN 70 milligrams/deciliter  oxyCODONE    IR 5 milliGRAM(s) Oral every 4 hours PRN Moderate Pain (4 - 6)  oxyCODONE    IR 10 milliGRAM(s) Oral every 4 hours PRN Severe Pain (7 - 10)    Allergies    No Known Allergies    Intolerances        Vital Signs Last 24 Hrs  T(C): 36.9 (02 Mar 2019 11:45), Max: 37.2 (02 Mar 2019 08:52)  T(F): 98.4 (02 Mar 2019 11:45), Max: 99 (02 Mar 2019 08:52)  HR: 68 (02 Mar 2019 11:45) (56 - 72)  BP: 119/85 (02 Mar 2019 11:45) (119/85 - 140/76)  BP(mean): 96 (01 Mar 2019 18:00) (95 - 96)  RR: 16 (02 Mar 2019 11:45) (15 - 18)  SpO2: 99% (02 Mar 2019 11:45) (97% - 100%)  Daily     Daily                             11.3   17.39 )-----------( 119      ( 02 Mar 2019 10:59 )             34.1     03-02    133<L>  |  95<L>  |  19  ----------------------------<  375<H>  3.8   |  25  |  0.65    Ca    8.3<L>      02 Mar 2019 10:59  Phos  1.8     03-01  Mg     1.9     03-01      PT/INR - ( 02 Mar 2019 10:59 )   PT: 12.4 SEC;   INR: 1.11          PTT - ( 02 Mar 2019 10:59 )  PTT:23.8 SEC      Radiographic Findings:     < from: CT Head No Cont (03.02.19 @ 09:22) >  FINDINGS:   No midline shift. Slight increase in the mass effect on the right lateral   ventricle.    Redemonstrated postsurgical changes including right pterional craniotomy   with overlying mesh and hyperattenuation at the surgical site   representing hemorrhage versus hemostatic material. There is increased   hyperdensity within the operative bed as well as in the right sylvian   fissure worrisome for interval hemorrhage. There is increased overlying   soft tissue swelling and surgical skin staples. There is now trace   leftward midline shift is appreciated on coronal view.    Interval decrease in the intracranial foci of air both within the   surgical site as well as the bilateral frontal convexities.    There is no CT evidence of central herniation, or hydrocephalus.     The visualized paranasal sinuses are clear. The mastoid air cells and   middle ear cavities are clear.    IMPRESSION:   Interval hyperdensity within the right temporal operative bed,   subarachnoid hemorrhage and increased hyperdensity within the overlying   scalp consistent with interval hemorrhage.    < end of copied text > HISTORY OF PRESENT ILLNESS:  74 year old male with HTN, BPH, NIDDM, CVA (2013 w/ left side weakness), R glioma (s/p R craniectomy and tumor resection 11/28/2018), s/p resection of recurrent GBM on 2/27/19.  SICU was consulted due a hemorrhage found on repeat head CT.    PAST MEDICAL HISTORY: Oxygen dependent  Vitamin deficiency  Constipation  Pulmonary embolism  Glioblastoma  BPH (benign prostatic hyperplasia)  CVA (cerebral vascular accident)  Diabetes  HTN (hypertension)      PAST SURGICAL HISTORY: S/P craniotomy  No significant past surgical history      FAMILY HISTORY: No pertinent family history in first degree relatives      SOCIAL HISTORY:    CODE STATUS:     HOME MEDICATIONS:    ALLERGIES: No Known Allergies      VITAL SIGNS:  ICU Vital Signs Last 24 Hrs  T(C): 36.9 (02 Mar 2019 11:45), Max: 37.2 (02 Mar 2019 08:52)  T(F): 98.4 (02 Mar 2019 11:45), Max: 99 (02 Mar 2019 08:52)  HR: 68 (02 Mar 2019 11:45) (56 - 72)  BP: 119/85 (02 Mar 2019 11:45) (119/85 - 140/76)  BP(mean): 96 (01 Mar 2019 18:00) (96 - 96)  ABP: 138/60 (01 Mar 2019 18:00) (137/65 - 138/60)  ABP(mean): 84 (01 Mar 2019 18:00) (84 - 87)  RR: 16 (02 Mar 2019 11:45) (16 - 18)  SpO2: 99% (02 Mar 2019 11:45) (97% - 100%)      NEURO  Exam:  acetaminophen   Tablet .. 650 milliGRAM(s) Oral every 6 hours PRN Mild Pain (1 - 3)  levETIRAcetam  IVPB 1500 milliGRAM(s) IV Intermittent every 12 hours  oxyCODONE    IR 5 milliGRAM(s) Oral every 4 hours PRN Moderate Pain (4 - 6)  oxyCODONE    IR 10 milliGRAM(s) Oral every 4 hours PRN Severe Pain (7 - 10)      RESPIRATORY  Exam: no increased wob      CARDIOVASCULAR    Exam: rrr, no m/r/g  Cardiac Rhythm: nsr  lisinopril 20 milliGRAM(s) Oral daily      GI/NUTRITION  Exam: soft, NT, ND  Diet: reg diet  docusate sodium 100 milliGRAM(s) Oral three times a day  pantoprazole    Tablet 40 milliGRAM(s) Oral before breakfast  senna 2 Tablet(s) Oral at bedtime      GENITOURINARY/RENAL      03-01 @ 07:01  -  03-02 @ 07:00  --------------------------------------------------------  IN:    IV PiggyBack: 100 mL    Oral Fluid: 1080 mL  Total IN: 1180 mL    OUT:    Indwelling Catheter - Urethral: 1695 mL  Total OUT: 1695 mL    Total NET: -515 mL      03-02 @ 07:01  -  03-02 @ 14:11  --------------------------------------------------------  IN:  Total IN: 0 mL    OUT:    Indwelling Catheter - Urethral: 1000 mL  Total OUT: 1000 mL    Total NET: -1000 mL          03-02    133<L>  |  95<L>  |  19  ----------------------------<  375<H>  3.8   |  25  |  0.65    Ca    8.3<L>      02 Mar 2019 10:59  Phos  1.8     03-01  Mg     1.9     03-01      [ ] Roberson catheter, indication: urine output monitoring in critically ill patient    HEMATOLOGIC  [ ] VTE Prophylaxis:                          11.3   17.39 )-----------( 119      ( 02 Mar 2019 10:59 )             34.1     PT/INR - ( 02 Mar 2019 10:59 )   PT: 12.4 SEC;   INR: 1.11          PTT - ( 02 Mar 2019 10:59 )  PTT:23.8 SEC  Transfusion: [ ] PRBC	[ ] Platelets	[ ] FFP	[ ] Cryoprecipitate      INFECTIOUS DISEASES  influenza   Vaccine 0.5 milliLiter(s) IntraMuscular once    RECENT CULTURES:      ENDOCRINE  dexamethasone  Injectable 4 milliGRAM(s) IV Push every 6 hours  dextrose 40% Gel 15 Gram(s) Oral once PRN  dextrose 50% Injectable 12.5 Gram(s) IV Push once  dextrose 50% Injectable 25 Gram(s) IV Push once  dextrose 50% Injectable 25 Gram(s) IV Push once  glucagon  Injectable 1 milliGRAM(s) IntraMuscular once PRN  insulin glargine Injectable (LANTUS) 28 Unit(s) SubCutaneous at bedtime  insulin lispro (HumaLOG) corrective regimen sliding scale   SubCutaneous three times a day before meals  insulin lispro (HumaLOG) corrective regimen sliding scale   SubCutaneous at bedtime  insulin lispro Injectable (HumaLOG) 8 Unit(s) SubCutaneous three times a day with meals    CAPILLARY BLOOD GLUCOSE      POCT Blood Glucose.: 310 mg/dL (02 Mar 2019 12:12)  POCT Blood Glucose.: 324 mg/dL (02 Mar 2019 08:26)  POCT Blood Glucose.: 198 mg/dL (01 Mar 2019 21:29)  POCT Blood Glucose.: 220 mg/dL (01 Mar 2019 15:56)      PATIENT CARE ACCESS DEVICES:  [ ] Peripheral IV  [ ] Central Venous Line	[ ] R	[ ] L	[ ] IJ	[ ] Fem	[ ] SC	Placed:   [ ] Arterial Line		[ ] R	[ ] L	[ ] Fem	[ ] Rad	[ ] Ax	Placed:   [ ] PICC:					[ ] Mediport  [ ] Urinary Catheter, Date Placed:   [x] Necessity of urinary, arterial, and venous catheters discussed    OTHER MEDICATIONS:     IMAGING STUDIES:

## 2019-03-02 NOTE — PROGRESS NOTE ADULT - SUBJECTIVE AND OBJECTIVE BOX
No issues overnight  Vital Signs Last 24 Hrs  T(C): 36.6 (02 Mar 2019 00:09), Max: 36.8 (01 Mar 2019 07:00)  T(F): 97.9 (02 Mar 2019 00:09), Max: 98.2 (01 Mar 2019 07:00)  HR: 72 (02 Mar 2019 00:09) (56 - 89)  BP: 137/66 (02 Mar 2019 00:09) (109/74 - 144/86)  BP(mean): 96 (01 Mar 2019 18:00) (83 - 104)  RR: 18 (02 Mar 2019 00:09) (13 - 18)  SpO2: 97% (02 Mar 2019 00:09) (92% - 100%)    AAO X 2  PERRLA, EOMI  CN 2-12 grossly intact  ERWIN, Left UE 4-/5, LLE 4/5. RUE/RLE 5/5  SILT    MEDICATIONS  (STANDING):  dexamethasone  Injectable 6 milliGRAM(s) IV Push every 6 hours  dextrose 50% Injectable 12.5 Gram(s) IV Push once  dextrose 50% Injectable 25 Gram(s) IV Push once  dextrose 50% Injectable 25 Gram(s) IV Push once  docusate sodium 100 milliGRAM(s) Oral three times a day  enoxaparin Injectable 40 milliGRAM(s) SubCutaneous at bedtime  influenza   Vaccine 0.5 milliLiter(s) IntraMuscular once  insulin glargine Injectable (LANTUS) 28 Unit(s) SubCutaneous at bedtime  insulin lispro (HumaLOG) corrective regimen sliding scale   SubCutaneous three times a day before meals  insulin lispro (HumaLOG) corrective regimen sliding scale   SubCutaneous at bedtime  insulin lispro Injectable (HumaLOG) 8 Unit(s) SubCutaneous three times a day with meals  levETIRAcetam  IVPB 1500 milliGRAM(s) IV Intermittent every 12 hours  lisinopril 20 milliGRAM(s) Oral daily  pantoprazole    Tablet 40 milliGRAM(s) Oral before breakfast  senna 2 Tablet(s) Oral at bedtime    MEDICATIONS  (PRN):  acetaminophen   Tablet .. 650 milliGRAM(s) Oral every 6 hours PRN Mild Pain (1 - 3)  dextrose 40% Gel 15 Gram(s) Oral once PRN Blood Glucose LESS THAN 70 milliGRAM(s)/deciliter  glucagon  Injectable 1 milliGRAM(s) IntraMuscular once PRN Glucose LESS THAN 70 milligrams/deciliter  oxyCODONE    IR 5 milliGRAM(s) Oral every 4 hours PRN Moderate Pain (4 - 6)  oxyCODONE    IR 10 milliGRAM(s) Oral every 4 hours PRN Severe Pain (7 - 10)                            11.5   19.17 )-----------( 113      ( 01 Mar 2019 05:45 )             35.8     03-01    133<L>  |  97<L>  |  16  ----------------------------<  251<H>  4.0   |  23  |  0.64    Ca    8.3<L>      01 Mar 2019 05:45  Phos  1.8     03-01  Mg     1.9     03-01

## 2019-03-02 NOTE — CHART NOTE - NSCHARTNOTEFT_GEN_A_CORE
Bedside Exam    ICU Vital Signs Last 24 Hrs  T(C): 37 (02 Mar 2019 17:05), Max: 37.2 (02 Mar 2019 08:52)  T(F): 98.6 (02 Mar 2019 17:05), Max: 99 (02 Mar 2019 08:52)  HR: 72 (02 Mar 2019 19:00) (60 - 72)  BP: 113/82 (02 Mar 2019 18:00) (113/82 - 140/76)  BP(mean): 88 (02 Mar 2019 18:00) (88 - 91)  ABP: --  ABP(mean): --  RR: 20 (02 Mar 2019 19:00) (16 - 20)  SpO2: 98% (02 Mar 2019 19:00) (97% - 100%)      Non-contrast CTH this AM showed increased hemorrhage in surgical resection cavity when compared with 2/28/2019. Patient has been moved back to the SICU as of this afternoon for observation with Q1 hour neurochecks.    On exam:  awake, alert, oriented to self, place, year, and month  RUE 5/5, LUE 4/5  RLE and LLE 4/5    Patient at this time has a stable neurological exam. He is not hypertensive and lovenox is being held, and will be held for the next 7 days. Will repeat CTH in AM.

## 2019-03-02 NOTE — CHART NOTE - NSCHARTNOTEFT_GEN_A_CORE
CT head obtained earlier this am. Reveals increase in post operative bed hemorrhage with mild shift noted. Patient clinically better this am but patient has been receiving Lovenox for DVT prophylaxis which has been stopped. Due to the nature of the disease process, the increase in hemorrhage, PFO, seizure disorder (patient has noted some intermittent twitching) case was discussed with the surgical ICU for transfer for q1 hour neurological checks. SICU accepted transfer and patient will get a follow up CT head tomorrow am for further evaluation. Case and films electronically reviewed with Dr Gonzalez.

## 2019-03-02 NOTE — PHYSICAL THERAPY INITIAL EVALUATION ADULT - PATIENT PROFILE REVIEW, REHAB EVAL
yes/PT orders received: out of bed to chair. Consult with RN Amelia ACEVEDO, pt may participate in PT evaluation.

## 2019-03-02 NOTE — OCCUPATIONAL THERAPY INITIAL EVALUATION ADULT - PATIENT PROFILE REVIEW, REHAB EVAL
yes
Quality 226: Preventive Care And Screening: Tobacco Use: Screening And Cessation Intervention: Patient screened for tobacco and never smoked
Detail Level: Zone

## 2019-03-03 LAB
ANION GAP SERPL CALC-SCNC: 12 MMO/L — SIGNIFICANT CHANGE UP (ref 7–14)
APTT BLD: 22.6 SEC — LOW (ref 27.5–36.3)
BLD GP AB SCN SERPL QL: NEGATIVE — SIGNIFICANT CHANGE UP
BUN SERPL-MCNC: 18 MG/DL — SIGNIFICANT CHANGE UP (ref 7–23)
CA-I BLD-SCNC: 1.03 MMOL/L — SIGNIFICANT CHANGE UP (ref 1.03–1.23)
CALCIUM SERPL-MCNC: 8.4 MG/DL — SIGNIFICANT CHANGE UP (ref 8.4–10.5)
CHLORIDE SERPL-SCNC: 97 MMOL/L — LOW (ref 98–107)
CO2 SERPL-SCNC: 25 MMOL/L — SIGNIFICANT CHANGE UP (ref 22–31)
CREAT SERPL-MCNC: 0.57 MG/DL — SIGNIFICANT CHANGE UP (ref 0.5–1.3)
GLUCOSE SERPL-MCNC: 198 MG/DL — HIGH (ref 70–99)
HCT VFR BLD CALC: 34.9 % — LOW (ref 39–50)
HGB BLD-MCNC: 11.6 G/DL — LOW (ref 13–17)
INR BLD: 1.07 — SIGNIFICANT CHANGE UP (ref 0.88–1.17)
MAGNESIUM SERPL-MCNC: 1.8 MG/DL — SIGNIFICANT CHANGE UP (ref 1.6–2.6)
MCHC RBC-ENTMCNC: 30.4 PG — SIGNIFICANT CHANGE UP (ref 27–34)
MCHC RBC-ENTMCNC: 33.2 % — SIGNIFICANT CHANGE UP (ref 32–36)
MCV RBC AUTO: 91.6 FL — SIGNIFICANT CHANGE UP (ref 80–100)
NRBC # FLD: 0 K/UL — LOW (ref 25–125)
PHOSPHATE SERPL-MCNC: 1.6 MG/DL — LOW (ref 2.5–4.5)
PLATELET # BLD AUTO: 136 K/UL — LOW (ref 150–400)
PMV BLD: 10.9 FL — SIGNIFICANT CHANGE UP (ref 7–13)
POTASSIUM SERPL-MCNC: 3.6 MMOL/L — SIGNIFICANT CHANGE UP (ref 3.5–5.3)
POTASSIUM SERPL-SCNC: 3.6 MMOL/L — SIGNIFICANT CHANGE UP (ref 3.5–5.3)
PROTHROM AB SERPL-ACNC: 11.9 SEC — SIGNIFICANT CHANGE UP (ref 9.8–13.1)
RBC # BLD: 3.81 M/UL — LOW (ref 4.2–5.8)
RBC # FLD: 17.1 % — HIGH (ref 10.3–14.5)
RH IG SCN BLD-IMP: POSITIVE — SIGNIFICANT CHANGE UP
SODIUM SERPL-SCNC: 134 MMOL/L — LOW (ref 135–145)
WBC # BLD: 17.71 K/UL — HIGH (ref 3.8–10.5)
WBC # FLD AUTO: 17.71 K/UL — HIGH (ref 3.8–10.5)

## 2019-03-03 PROCEDURE — 70450 CT HEAD/BRAIN W/O DYE: CPT | Mod: 26

## 2019-03-03 PROCEDURE — 99233 SBSQ HOSP IP/OBS HIGH 50: CPT | Mod: GC

## 2019-03-03 RX ORDER — LEVETIRACETAM 250 MG/1
1500 TABLET, FILM COATED ORAL
Qty: 0 | Refills: 0 | Status: DISCONTINUED | OUTPATIENT
Start: 2019-03-03 | End: 2019-03-06

## 2019-03-03 RX ORDER — SODIUM CHLORIDE 9 MG/ML
1000 INJECTION, SOLUTION INTRAVENOUS
Qty: 0 | Refills: 0 | Status: DISCONTINUED | OUTPATIENT
Start: 2019-03-03 | End: 2019-03-03

## 2019-03-03 RX ADMIN — Medication 100 MILLIGRAM(S): at 06:29

## 2019-03-03 RX ADMIN — Medication 100 MILLIGRAM(S): at 15:28

## 2019-03-03 RX ADMIN — Medication 1: at 12:18

## 2019-03-03 RX ADMIN — LACOSAMIDE 100 MILLIGRAM(S): 50 TABLET ORAL at 17:37

## 2019-03-03 RX ADMIN — Medication 4 MILLIGRAM(S): at 06:29

## 2019-03-03 RX ADMIN — Medication 63.75 MILLIMOLE(S): at 08:24

## 2019-03-03 RX ADMIN — Medication 4 MILLIGRAM(S): at 00:25

## 2019-03-03 RX ADMIN — Medication 8 UNIT(S): at 16:30

## 2019-03-03 RX ADMIN — Medication 4 MILLIGRAM(S): at 12:22

## 2019-03-03 RX ADMIN — Medication 4 MILLIGRAM(S): at 23:18

## 2019-03-03 RX ADMIN — SODIUM CHLORIDE 75 MILLILITER(S): 9 INJECTION, SOLUTION INTRAVENOUS at 08:24

## 2019-03-03 RX ADMIN — Medication 2: at 16:30

## 2019-03-03 RX ADMIN — Medication 8 UNIT(S): at 12:17

## 2019-03-03 RX ADMIN — LEVETIRACETAM 400 MILLIGRAM(S): 250 TABLET, FILM COATED ORAL at 17:37

## 2019-03-03 RX ADMIN — LACOSAMIDE 100 MILLIGRAM(S): 50 TABLET ORAL at 06:29

## 2019-03-03 RX ADMIN — INSULIN GLARGINE 28 UNIT(S): 100 INJECTION, SOLUTION SUBCUTANEOUS at 21:18

## 2019-03-03 RX ADMIN — Medication 4 MILLIGRAM(S): at 17:37

## 2019-03-03 RX ADMIN — LISINOPRIL 20 MILLIGRAM(S): 2.5 TABLET ORAL at 06:29

## 2019-03-03 RX ADMIN — LEVETIRACETAM 400 MILLIGRAM(S): 250 TABLET, FILM COATED ORAL at 06:29

## 2019-03-03 RX ADMIN — Medication 1: at 07:25

## 2019-03-03 NOTE — PROGRESS NOTE ADULT - SUBJECTIVE AND OBJECTIVE BOX
HISTORY  74 year old male with HTN, BPH, NIDDM, CVA (2013 w/ left side weakness), R glioma (s/p R craniectomy and tumor resection 11/28/2018), s/p resection of recurrent GBM on 2/27/19.  SICU was consulted due a hemorrhage found on repeat head CT. CT head obtained earlier this am 3/2/19. Reveals increase in post operative bed hemorrhage with mild shift noted. Patient clinically better this am but patient has been receiving Lovenox for DVT prophylaxis which has been stopped. Due to the nature of the disease process, the increase in hemorrhage, PFO, seizure disorder (patient has noted some intermittent twitching) case was discussed with the surgical ICU for transfer for q1 hour neurological checks. SICU accepted transfer and patient will get a follow up CT head tomorrow am for further evaluation. Case and films electronically reviewed with Dr Gonzalez.    24 HOUR EVENTS: No acute events overnight. Patient scheduled for repeat CT this AM.    SUBJECTIVE/ROS:  [ x] A ten-point review of systems was otherwise negative except as noted.  [ ] Due to altered mental status/intubation, subjective information were not able to be obtained from the patient. History was obtained, to the extent possible, from review of the chart and collateral sources of information.      NEURO  Exam: alert, oriented x 2  Meds: acetaminophen   Tablet .. 650 milliGRAM(s) Oral every 6 hours PRN Mild Pain (1 - 3)  lacosamide 100 milliGRAM(s) Oral two times a day  levETIRAcetam  IVPB 1500 milliGRAM(s) IV Intermittent every 12 hours  oxyCODONE    IR 5 milliGRAM(s) Oral every 4 hours PRN Moderate Pain (4 - 6)  oxyCODONE    IR 10 milliGRAM(s) Oral every 4 hours PRN Severe Pain (7 - 10)    [x] Adequacy of sedation and pain control has been assessed and adjusted      RESPIRATORY  RR: 15 (03-03-19 @ 02:00) (13 - 21)  SpO2: 96% (03-03-19 @ 02:00) (95% - 100%)  Exam: unlabored, clear to auscultation bilaterally        CARDIOVASCULAR  HR: 65 (03-03-19 @ 02:00) (60 - 74)  BP: 125/94 (03-03-19 @ 02:00) (113/82 - 151/91)  BP(mean): 101 (03-03-19 @ 02:00) (88 - 104)        Exam: regular rate and rhythm  Cardiac Rhythm: sinus  Perfusion     [x]Adequate   [ ]Inadequate  Mentation   [x]Normal       [ ]Reduced  Extremities  [x]Warm         [ ]Cool  Volume Status [ ]Hypervolemic [x]Euvolemic [ ]Hypovolemic  Meds: lisinopril 20 milliGRAM(s) Oral daily        GI/NUTRITION  Exam: soft, nontender, nondistended  Diet: Regular, NPO after midnight for possible procedure  Meds: docusate sodium 100 milliGRAM(s) Oral three times a day  pantoprazole    Tablet 40 milliGRAM(s) Oral before breakfast  senna 2 Tablet(s) Oral at bedtime      GENITOURINARY  I&O's Detail    03-01 @ 07:01  -  03-02 @ 07:00  --------------------------------------------------------  IN:    IV PiggyBack: 100 mL    Oral Fluid: 1080 mL  Total IN: 1180 mL    OUT:    Indwelling Catheter - Urethral: 1695 mL  Total OUT: 1695 mL    Total NET: -515 mL      03-02 @ 07:01  -  03-03 @ 02:30  --------------------------------------------------------  IN:    dextrose 5% + lactated ringers.: 150 mL    Oral Fluid: 375 mL  Total IN: 525 mL    OUT:    Indwelling Catheter - Urethral: 1430 mL  Total OUT: 1430 mL    Total NET: -905 mL          03-02    133<L>  |  95<L>  |  19  ----------------------------<  375<H>  3.8   |  25  |  0.65    Ca    8.3<L>      02 Mar 2019 10:59  Phos  1.8     03-01  Mg     1.9     03-01      [ ] Roberson catheter, indication: N/A  Meds: dextrose 5% + lactated ringers. 1000 milliLiter(s) IV Continuous <Continuous>        HEMATOLOGIC  Meds:   [x] VTE Prophylaxis                        11.3   17.39 )-----------( 119      ( 02 Mar 2019 10:59 )             34.1     PT/INR - ( 02 Mar 2019 10:59 )   PT: 12.4 SEC;   INR: 1.11          PTT - ( 02 Mar 2019 10:59 )  PTT:23.8 SEC  Transfusion     [ ] PRBC   [ ] Platelets   [ ] FFP   [ ] Cryoprecipitate      INFECTIOUS DISEASES  WBC Count: 17.39 K/uL (03-02 @ 10:59)    RECENT CULTURES:    Meds: influenza   Vaccine 0.5 milliLiter(s) IntraMuscular once        ENDOCRINE  CAPILLARY BLOOD GLUCOSE      POCT Blood Glucose.: 162 mg/dL (03 Mar 2019 00:23)  POCT Blood Glucose.: 176 mg/dL (02 Mar 2019 17:15)  POCT Blood Glucose.: 310 mg/dL (02 Mar 2019 12:12)  POCT Blood Glucose.: 324 mg/dL (02 Mar 2019 08:26)    Meds: dexamethasone  Injectable 4 milliGRAM(s) IV Push every 6 hours  dextrose 40% Gel 15 Gram(s) Oral once PRN  dextrose 50% Injectable 12.5 Gram(s) IV Push once  dextrose 50% Injectable 25 Gram(s) IV Push once  dextrose 50% Injectable 25 Gram(s) IV Push once  glucagon  Injectable 1 milliGRAM(s) IntraMuscular once PRN  insulin glargine Injectable (LANTUS) 28 Unit(s) SubCutaneous at bedtime  insulin lispro (HumaLOG) corrective regimen sliding scale   SubCutaneous three times a day before meals  insulin lispro (HumaLOG) corrective regimen sliding scale   SubCutaneous at bedtime  insulin lispro Injectable (HumaLOG) 8 Unit(s) SubCutaneous three times a day with meals        ACCESS DEVICES:  [x] Peripheral IV  [ ] Central Venous Line	[ ] R	[ ] L	[ ] IJ	[ ] Fem	[ ] SC	Placed:   [] Arterial Line		[ ] R	[ ] L	[ ] Fem	[ ] Rad	[ ] Ax	Placed:   [ ] PICC:					[ ] Mediport  [ ] Urinary Catheter, Date Placed:   [x] Necessity of urinary, arterial, and venous catheters discussed    OTHER MEDICATIONS:

## 2019-03-03 NOTE — PROGRESS NOTE ADULT - ATTENDING COMMENTS
Current Issues:  I62.9 Intracranial hemorrhage   - repeat CT this AM improved.  Stable neuro exam.  neuro checks at q4 per neuro surg  D49.6 Brain tumor   - stable exam  R56.9 Seizures  - vimpat added yesterday as per neurosurg.  Plan for 24 hour EEG   I82.412 Acute deep vein thrombosis (DVT) of femoral vein of left lower extremity   - s/p IVC filter  E11.65 Type 2 diabetes mellitus with hyperglycemia, with long-term current use of insulin   - lantus/RISS  Z91.89 At risk for malnutrition   - on diet

## 2019-03-03 NOTE — PROGRESS NOTE ADULT - SUBJECTIVE AND OBJECTIVE BOX
Transferred to SICU yesterday for Q1H neurologic checks in light of increased hemorrhage in operative bed  patient is comfortable and without complaints  ICU Vital Signs Last 24 Hrs  T(C): 36.1 (03 Mar 2019 00:00), Max: 37.2 (02 Mar 2019 08:52)  T(F): 97 (03 Mar 2019 00:00), Max: 99 (02 Mar 2019 08:52)  HR: 65 (03 Mar 2019 02:00) (60 - 74)  BP: 125/94 (03 Mar 2019 02:00) (113/82 - 151/91)  BP(mean): 101 (03 Mar 2019 02:00) (88 - 104)  ABP: --  ABP(mean): --  RR: 15 (03 Mar 2019 02:00) (13 - 21)  SpO2: 96% (03 Mar 2019 02:00) (95% - 100%)    AAO X 2  Mild left facial weanness, otherwise CN 2-12 grossly intact  ERWIN, right UE/LE 5/5  Left UE 4-/5, LLE 4/5  SILT    MEDICATIONS  (STANDING):  dexamethasone  Injectable 4 milliGRAM(s) IV Push every 6 hours  dextrose 5% + lactated ringers. 1000 milliLiter(s) (75 mL/Hr) IV Continuous <Continuous>  dextrose 50% Injectable 12.5 Gram(s) IV Push once  dextrose 50% Injectable 25 Gram(s) IV Push once  dextrose 50% Injectable 25 Gram(s) IV Push once  docusate sodium 100 milliGRAM(s) Oral three times a day  influenza   Vaccine 0.5 milliLiter(s) IntraMuscular once  insulin glargine Injectable (LANTUS) 28 Unit(s) SubCutaneous at bedtime  insulin lispro (HumaLOG) corrective regimen sliding scale   SubCutaneous three times a day before meals  insulin lispro (HumaLOG) corrective regimen sliding scale   SubCutaneous at bedtime  insulin lispro Injectable (HumaLOG) 8 Unit(s) SubCutaneous three times a day with meals  lacosamide 100 milliGRAM(s) Oral two times a day  levETIRAcetam  IVPB 1500 milliGRAM(s) IV Intermittent every 12 hours  lisinopril 20 milliGRAM(s) Oral daily  pantoprazole    Tablet 40 milliGRAM(s) Oral before breakfast  senna 2 Tablet(s) Oral at bedtime    MEDICATIONS  (PRN):  acetaminophen   Tablet .. 650 milliGRAM(s) Oral every 6 hours PRN Mild Pain (1 - 3)  dextrose 40% Gel 15 Gram(s) Oral once PRN Blood Glucose LESS THAN 70 milliGRAM(s)/deciliter  glucagon  Injectable 1 milliGRAM(s) IntraMuscular once PRN Glucose LESS THAN 70 milligrams/deciliter  oxyCODONE    IR 5 milliGRAM(s) Oral every 4 hours PRN Moderate Pain (4 - 6)  oxyCODONE    IR 10 milliGRAM(s) Oral every 4 hours PRN Severe Pain (7 - 10)                          11.3   17.39 )-----------( 119      ( 02 Mar 2019 10:59 )             34.1   03-02    133<L>  |  95<L>  |  19  ----------------------------<  375<H>  3.8   |  25  |  0.65    Ca    8.3<L>      02 Mar 2019 10:59  Phos  1.8     03-01  Mg     1.9     03-01    PT/INR - ( 02 Mar 2019 10:59 )   PT: 12.4 SEC;   INR: 1.11          PTT - ( 02 Mar 2019 10:59 )  PTT:23.8 SEC    < from: CT Head No Cont (03.02.19 @ 09:22) >  COMPARISON STUDY: Noncontrast head CT from 2/28/2019.    FINDINGS:   No midline shift. Slight increase in the mass effect on the right lateral   ventricle.    Redemonstrated postsurgical changes including right pterional craniotomy   with overlying mesh and hyperattenuation at the surgical site   representing hemorrhage versus hemostatic material. There is increased   hyperdensity within the operative bed as well as in the right sylvian   fissure worrisome for interval hemorrhage. There is increased overlying   soft tissue swelling and surgical skin staples. There is now trace   leftward midline shift is appreciated on coronal view.    Interval decrease in the intracranial foci of air both within the   surgical site as well as the bilateral frontal convexities.    There is no CT evidence of central herniation, or hydrocephalus.     The visualized paranasal sinuses are clear. The mastoid air cells and   middle ear cavities are clear.    IMPRESSION:   Interval hyperdensity within the right temporal operative bed,   subarachnoid hemorrhage and increased hyperdensity within the overlying   scalp consistent with interval hemorrhage.    < end of copied text >

## 2019-03-03 NOTE — PROGRESS NOTE ADULT - ASSESSMENT
70 YO male s/p right craniotomy for recurrent GBM with postop seizures, interval development of hemorrhage in operative bed

## 2019-03-03 NOTE — PROGRESS NOTE ADULT - ASSESSMENT
75 yo male history of craniotomy nov 2018, presenting with seizure, s/p resection of recurrent GBM, was readmitted to SICU due to hemorrhage found on CT scan.    PLAN:   Neurologic:  - Decadron 4mg IV push q6h  - Keppra 1500mg IV q12h  - Pain control w/ Tylenol, Oxy  - Repeat CT head tonight or tomorrow AM    Respiratory:  - Monitor oxygen saturation, RR    Cardiovascular:  - Cont. Lisinopril 20 mg daily    Gastrointestinal/Nutrition:  - Diabetic mechanical soft w/ 1200 ml fluid restriction  - Cont. bowel regimen: senna, colace  - Cont. protonix    Genitourinary/Renal:  - Monitor BUN/Cr, urine output    Hematologic:  - Hold SQH  - Monitor H/h    Infectious Disease:  - Monitor WBC, temp    Endocrine:  - ISS, lantus    Dispo: SICU 73 yo male history of craniotomy nov 2018, presenting with seizure, s/p resection of recurrent GBM, was readmitted to SICU due to twicthing episode concern for seisuew loaded with vimpat.    PLAN:   Neurologic:  - Decadron 4mg IV push q6h  - Keppra 1500mg IV q12h  - Vimapt 100 BID  - q1 neuro checks   - Pain control w/ Tylenol, Oxy  - Repeat CT head- improved     Respiratory:  - Monitor oxygen saturation, RR    Cardiovascular:  - Cont. Lisinopril 20 mg daily    Gastrointestinal/Nutrition:  - Diabetic mechanical soft w/ 1200 ml fluid restriction  - Cont. bowel regimen: senna, colace  - Cont. protonix    Genitourinary/Renal:  - Monitor BUN/Cr, urine output    Hematologic:  - Hold SQH  - Monitor H/h    Infectious Disease:  - Monitor WBC, temp    Endocrine:  - ISS, lantus    Dispo: SICU

## 2019-03-04 LAB
ANION GAP SERPL CALC-SCNC: 11 MMO/L — SIGNIFICANT CHANGE UP (ref 7–14)
BUN SERPL-MCNC: 16 MG/DL — SIGNIFICANT CHANGE UP (ref 7–23)
CA-I BLD-SCNC: 1.07 MMOL/L — SIGNIFICANT CHANGE UP (ref 1.03–1.23)
CALCIUM SERPL-MCNC: 8.4 MG/DL — SIGNIFICANT CHANGE UP (ref 8.4–10.5)
CHLORIDE SERPL-SCNC: 98 MMOL/L — SIGNIFICANT CHANGE UP (ref 98–107)
CO2 SERPL-SCNC: 29 MMOL/L — SIGNIFICANT CHANGE UP (ref 22–31)
CREAT SERPL-MCNC: 0.69 MG/DL — SIGNIFICANT CHANGE UP (ref 0.5–1.3)
GLUCOSE SERPL-MCNC: 160 MG/DL — HIGH (ref 70–99)
HCT VFR BLD CALC: 34 % — LOW (ref 39–50)
HGB BLD-MCNC: 11.2 G/DL — LOW (ref 13–17)
MAGNESIUM SERPL-MCNC: 1.7 MG/DL — SIGNIFICANT CHANGE UP (ref 1.6–2.6)
MCHC RBC-ENTMCNC: 30.1 PG — SIGNIFICANT CHANGE UP (ref 27–34)
MCHC RBC-ENTMCNC: 32.9 % — SIGNIFICANT CHANGE UP (ref 32–36)
MCV RBC AUTO: 91.4 FL — SIGNIFICANT CHANGE UP (ref 80–100)
NRBC # FLD: 0 K/UL — LOW (ref 25–125)
PHOSPHATE SERPL-MCNC: 2 MG/DL — LOW (ref 2.5–4.5)
PLATELET # BLD AUTO: 124 K/UL — LOW (ref 150–400)
PMV BLD: 10.7 FL — SIGNIFICANT CHANGE UP (ref 7–13)
POTASSIUM SERPL-MCNC: 4 MMOL/L — SIGNIFICANT CHANGE UP (ref 3.5–5.3)
POTASSIUM SERPL-SCNC: 4 MMOL/L — SIGNIFICANT CHANGE UP (ref 3.5–5.3)
RBC # BLD: 3.72 M/UL — LOW (ref 4.2–5.8)
RBC # FLD: 16.8 % — HIGH (ref 10.3–14.5)
SODIUM SERPL-SCNC: 138 MMOL/L — SIGNIFICANT CHANGE UP (ref 135–145)
WBC # BLD: 16.82 K/UL — HIGH (ref 3.8–10.5)
WBC # FLD AUTO: 16.82 K/UL — HIGH (ref 3.8–10.5)

## 2019-03-04 PROCEDURE — 99233 SBSQ HOSP IP/OBS HIGH 50: CPT

## 2019-03-04 PROCEDURE — 99222 1ST HOSP IP/OBS MODERATE 55: CPT | Mod: GC

## 2019-03-04 RX ORDER — ENOXAPARIN SODIUM 100 MG/ML
40 INJECTION SUBCUTANEOUS DAILY
Qty: 0 | Refills: 0 | Status: DISCONTINUED | OUTPATIENT
Start: 2019-03-04 | End: 2019-03-05

## 2019-03-04 RX ORDER — CHLORHEXIDINE GLUCONATE 213 G/1000ML
1 SOLUTION TOPICAL
Qty: 0 | Refills: 0 | Status: DISCONTINUED | OUTPATIENT
Start: 2019-03-04 | End: 2019-03-06

## 2019-03-04 RX ADMIN — Medication 100 MILLIGRAM(S): at 13:47

## 2019-03-04 RX ADMIN — Medication 8 UNIT(S): at 08:33

## 2019-03-04 RX ADMIN — ENOXAPARIN SODIUM 40 MILLIGRAM(S): 100 INJECTION SUBCUTANEOUS at 12:00

## 2019-03-04 RX ADMIN — LISINOPRIL 20 MILLIGRAM(S): 2.5 TABLET ORAL at 05:18

## 2019-03-04 RX ADMIN — LEVETIRACETAM 1500 MILLIGRAM(S): 250 TABLET, FILM COATED ORAL at 05:18

## 2019-03-04 RX ADMIN — Medication 8 UNIT(S): at 16:32

## 2019-03-04 RX ADMIN — Medication 4 MILLIGRAM(S): at 05:18

## 2019-03-04 RX ADMIN — LEVETIRACETAM 1500 MILLIGRAM(S): 250 TABLET, FILM COATED ORAL at 18:59

## 2019-03-04 RX ADMIN — Medication 4 MILLIGRAM(S): at 23:34

## 2019-03-04 RX ADMIN — Medication 4 MILLIGRAM(S): at 11:58

## 2019-03-04 RX ADMIN — Medication 1: at 11:59

## 2019-03-04 RX ADMIN — INSULIN GLARGINE 28 UNIT(S): 100 INJECTION, SOLUTION SUBCUTANEOUS at 22:39

## 2019-03-04 RX ADMIN — LACOSAMIDE 100 MILLIGRAM(S): 50 TABLET ORAL at 05:18

## 2019-03-04 RX ADMIN — SENNA PLUS 2 TABLET(S): 8.6 TABLET ORAL at 22:40

## 2019-03-04 RX ADMIN — Medication 63.75 MILLIMOLE(S): at 05:35

## 2019-03-04 RX ADMIN — Medication 8 UNIT(S): at 11:59

## 2019-03-04 RX ADMIN — LACOSAMIDE 100 MILLIGRAM(S): 50 TABLET ORAL at 18:59

## 2019-03-04 RX ADMIN — Medication 100 MILLIGRAM(S): at 22:40

## 2019-03-04 RX ADMIN — CHLORHEXIDINE GLUCONATE 1 APPLICATION(S): 213 SOLUTION TOPICAL at 11:58

## 2019-03-04 RX ADMIN — PANTOPRAZOLE SODIUM 40 MILLIGRAM(S): 20 TABLET, DELAYED RELEASE ORAL at 08:36

## 2019-03-04 RX ADMIN — Medication 4 MILLIGRAM(S): at 18:59

## 2019-03-04 RX ADMIN — Medication 1: at 16:31

## 2019-03-04 NOTE — CONSULT NOTE ADULT - CONSULT REQUESTED DATE/TIME
01-Mar-2019 11:25
12-Feb-2019 23:06
12-Feb-2019 23:35
13-Feb-2019 13:14
13-Feb-2019 15:58
26-Feb-2019 14:30
27-Feb-2019 23:05
02-Mar-2019 13:57
04-Mar-2019 15:39

## 2019-03-04 NOTE — PROGRESS NOTE ADULT - SUBJECTIVE AND OBJECTIVE BOX
Neurology Follow up note    Name  HELGA WHITE        Subjective: Saw and examined patient at bedside. States he is doing much better and has had no seizures overnight.    MEDICATIONS  (STANDING):  chlorhexidine 4% Liquid 1 Application(s) Topical <User Schedule>  dexamethasone  Injectable 4 milliGRAM(s) IV Push every 6 hours  dextrose 50% Injectable 12.5 Gram(s) IV Push once  dextrose 50% Injectable 25 Gram(s) IV Push once  dextrose 50% Injectable 25 Gram(s) IV Push once  docusate sodium 100 milliGRAM(s) Oral three times a day  enoxaparin Injectable 40 milliGRAM(s) SubCutaneous daily  influenza   Vaccine 0.5 milliLiter(s) IntraMuscular once  insulin glargine Injectable (LANTUS) 28 Unit(s) SubCutaneous at bedtime  insulin lispro (HumaLOG) corrective regimen sliding scale   SubCutaneous three times a day before meals  insulin lispro (HumaLOG) corrective regimen sliding scale   SubCutaneous at bedtime  insulin lispro Injectable (HumaLOG) 8 Unit(s) SubCutaneous three times a day with meals  lacosamide 100 milliGRAM(s) Oral two times a day  levETIRAcetam 1500 milliGRAM(s) Oral two times a day  lisinopril 20 milliGRAM(s) Oral daily  pantoprazole    Tablet 40 milliGRAM(s) Oral before breakfast  senna 2 Tablet(s) Oral at bedtime    MEDICATIONS  (PRN):  acetaminophen   Tablet .. 650 milliGRAM(s) Oral every 6 hours PRN Mild Pain (1 - 3)  dextrose 40% Gel 15 Gram(s) Oral once PRN Blood Glucose LESS THAN 70 milliGRAM(s)/deciliter  glucagon  Injectable 1 milliGRAM(s) IntraMuscular once PRN Glucose LESS THAN 70 milligrams/deciliter  oxyCODONE    IR 5 milliGRAM(s) Oral every 4 hours PRN Moderate Pain (4 - 6)  oxyCODONE    IR 10 milliGRAM(s) Oral every 4 hours PRN Severe Pain (7 - 10)      Allergies    No Known Allergies    Intolerances        Objective:   Vital Signs Last 24 Hrs  T(C): 36.6 (04 Mar 2019 12:00), Max: 36.9 (04 Mar 2019 04:00)  T(F): 97.8 (04 Mar 2019 12:00), Max: 98.4 (04 Mar 2019 04:00)  HR: 74 (04 Mar 2019 12:00) (58 - 82)  BP: 119/90 (04 Mar 2019 12:00) (104/70 - 145/99)  BP(mean): 96 (04 Mar 2019 12:00) (79 - 109)  RR: 21 (04 Mar 2019 12:00) (10 - 25)  SpO2: 95% (04 Mar 2019 12:00) (95% - 98%)    General Exam:   General appearance: No acute distress                   Neurological Exam:  Mental Status: AAOx3, fluent speech, follows commands    Cranial Nerves: EOMI, PERRL, V1-V3 intact, facial symmetry intact, mild dysarthria    Motor: drift in LUE and LLE, unable to raise RLE against gravity. no drift in RUE    Sensation: Intact to LT throughout      Other:    03-04    138  |  98  |  16  ----------------------------<  160<H>  4.0   |  29  |  0.69    Ca    8.4      04 Mar 2019 02:47  Phos  2.0     03-04  Mg     1.7     03-04 03-04    138  |  98  |  16  ----------------------------<  160<H>  4.0   |  29  |  0.69    Ca    8.4      04 Mar 2019 02:47  Phos  2.0     03-04  Mg     1.7     03-04          Radiology    CTH: Impression: Decrease in size of the left lateral ventricle is seen with   decrease mass effect on the right lateral ventricle as well as decrease   in right to left shift.

## 2019-03-04 NOTE — CONSULT NOTE ADULT - CONSULT REASON
Leukocytosis
Neurovascular checks
Seizure
bradycardia  pre-op clearance
medical co-management
seizure
status epilepticus
Postop intracranial hemorrhage found on CT scan
rehab goals

## 2019-03-04 NOTE — CONSULT NOTE ADULT - SUBJECTIVE AND OBJECTIVE BOX
HPI:  74 year old male with HTN, NIDDM, CVA (2013 w/ left side weakness), R glioma (s/p R craniectomy and tumor resection 11/28/2018), BPH brought in by EMS from nursing home for active seizure. Per ED notes, pt was actively seizing for 30 mins prior to EMS arrival. Pt displayed left sided muscle jerking and continued with jerking of left face, arm, and leg in the ED. He received ativan 2mg x3 and keppra 1g and seizure broke after 20 minutes in the ED. MR 2/15 demonstrated right temp lobe mass unchanged size with 3 new foci of acute infarct involving right occipital, left corpus callosum, and left frontal lobes. Embolic CVAs thought 2/2 paradoxical emboli given PFO seen on ALBERTO and +LLE DVT; s/p IVC filter on 2/19.  Underwent right craniotomy resection recurrent GBM on 2/27.  Post operative course c/b seizures, Neurology following- on keppra, Vimpat, decadron.  CT 3/2 showed interval development of hemorrhage likely focus of seizure.  Transferred to SICU for neuro monitoring.  Interval CT 3/3 with decreasing mass effect and shift.      Patient seen and evaluated bedside.  He is feeling somewhat better.  He states that he was independent prior to this hospitalization, living alone in apartment.  He was able to walk without assistive device and was driving.  He would like to get better and return home to spend time with grandchildren.  He denies HA, vision changes, worsening weakness, numbness.  HE walked with PT a couple days ago and is motivated to do more therapy.        REVIEW OF SYSTEMS  Constitutional - No fever, No fatigue  HEENT - No visual disturbances, No difficulty hearing,  No neck pain  Respiratory - No cough, No wheezing, No shortness of breath  Cardiovascular - No chest pain, No palpitations  Gastrointestinal - No abdominal pain, No nausea, No vomiting, No diarrhea, No constipation  Genitourinary - No dysuria, No frequency, No hematuria, No incontinence  Neurological - No headaches, Mild chronic left sided weakness, No numbness  Skin - No itching, No rashes  Endocrine - No temperature intolerance  Musculoskeletal - No joint pain, No joint swelling, No muscle pain  Psychiatric - No depression, No anxiety  All other review of systems negative    PAST MEDICAL & SURGICAL HISTORY  Convulsions  No pertinent family history in first degree relatives  Handoff  MEWS Score  Oxygen dependent  Vitamin deficiency  Constipation  Pulmonary embolism  Glioblastoma  BPH (benign prostatic hyperplasia)  CVA (cerebral vascular accident)  Diabetes  HTN (hypertension)  Glioblastoma  Glioblastoma  Status epilepticus  Seizure  Seizures  S/P craniotomy  Hyponatremia  Acute deep vein thrombosis (DVT) of left lower extremity, unspecified vein  Glioblastoma multiforme of brain  Positive blood culture  Cerebrovascular accident (CVA), unspecified mechanism  Viral URI  Type 2 diabetes mellitus with complication, unspecified whether long term insulin use  Glioblastoma  Bacteremia  Coronavirus infection  SIRS (systemic inflammatory response syndrome)  Leukocytosis  Fever  Status epilepticus  Nutritional assessment  Prophylactic measure  Essential hypertension  Type 2 diabetes mellitus with complication, without long-term current use of insulin  Sepsis, due to unspecified organism  Seizure  Craniotomy  S/P craniotomy  No significant past surgical history  NOTIFICATION  RAD CDS  68  Sepsis, due to unspecified organism  Essential hypertension      SOCIAL HISTORY  Smoking - Denied  EtOH - Denied   Drugs - Denied    FUNCTIONAL HISTORY  Left hand dominant  Lives alone in apartment with 2 MARJAN + HR. Lives on 1st floor, elevator on apartment.    Independent in ambulation, ADL's, transfers prior to hospitalization, was driving.  Son lives nearby.  Retired 4 years ago.      CURRENT FUNCTIONAL STATUS  Bed mobility - min assist  Transfers - min assist  Gait - 5' RW min assist   ADL's - LB dressing mod assist     FAMILY HISTORY   Reviewed and non-contributory    ALLERGIES  No Known Allergies    VITALS  T(C): 36.6 (03-04-19 @ 12:00)  T(F): 97.8 (03-04-19 @ 12:00), Max: 98.4 (03-04-19 @ 04:00)  HR: 74 (03-04-19 @ 12:00) (58 - 82)  BP: 119/90 (03-04-19 @ 12:00) (104/70 - 145/99)  RR:  (10 - 25)  SpO2:  (95% - 98%)  Wt(kg): --    PHYSICAL EXAM  Constitutional - NAD, Comfortable  HEENT - Right crani incision +staples C/D/I, EOMI  Neck - Supple  Chest - CTA bilaterally  Cardiovascular - RRR, S1S2  Abdomen - BS+, Soft, NTND  Extremities - No C/C/E, No calf tenderness   Neurologic Exam -                    Cognitive - Awake, Alert, Oriented to self, place, year, situation     Communication - Fluent, No dysarthria - edentulous speech      Memory - able to recall 3/3 items after 3 minutes     Cranial Nerves - CN 2-12 grossly intact     Motor -  left spastic hemiparesis                     LEFT    UE - ShAB 4+/5, EF 4+/5, EE 4/5, WE 4/5,  4+/5                    RIGHT UE - ShAB 5/5, EF 5/5, EE 5/5, WE 5/5,  5/5                    LEFT    LE - HF 4/5, KE 4/5, DF 5/5, PF 5/5                    RIGHT LE - HF 5/5, KE 5/5, DF 5/5, PF 5/5          Sensory - Intact to light touch diffusely     Reflexes -Negative Gonzales's bilaterally, Negative Babinski's bilaterally      Coordination - Finger-to-nose impaired bilaterally, +dysmetria   Psychiatric - Affect WNL    RECENT LABS/IMAGING                        11.2   16.82 )-----------( 124      ( 04 Mar 2019 02:40 )             34.0     03-04    138  |  98  |  16  ----------------------------<  160<H>  4.0   |  29  |  0.69    Ca    8.4      04 Mar 2019 02:47  Phos  2.0     03-04  Mg     1.7     03-04      PT/INR - ( 03 Mar 2019 04:05 )   PT: 11.9 SEC;   INR: 1.07          PTT - ( 03 Mar 2019 04:05 )  PTT:22.6 SEC    MEDICATIONS   MEDICATIONS  (STANDING):  chlorhexidine 4% Liquid 1 Application(s) Topical <User Schedule>  dexamethasone  Injectable 4 milliGRAM(s) IV Push every 6 hours  dextrose 50% Injectable 12.5 Gram(s) IV Push once  dextrose 50% Injectable 25 Gram(s) IV Push once  dextrose 50% Injectable 25 Gram(s) IV Push once  docusate sodium 100 milliGRAM(s) Oral three times a day  enoxaparin Injectable 40 milliGRAM(s) SubCutaneous daily  influenza   Vaccine 0.5 milliLiter(s) IntraMuscular once  insulin glargine Injectable (LANTUS) 28 Unit(s) SubCutaneous at bedtime  insulin lispro (HumaLOG) corrective regimen sliding scale   SubCutaneous three times a day before meals  insulin lispro (HumaLOG) corrective regimen sliding scale   SubCutaneous at bedtime  insulin lispro Injectable (HumaLOG) 8 Unit(s) SubCutaneous three times a day with meals  lacosamide 100 milliGRAM(s) Oral two times a day  levETIRAcetam 1500 milliGRAM(s) Oral two times a day  lisinopril 20 milliGRAM(s) Oral daily  pantoprazole    Tablet 40 milliGRAM(s) Oral before breakfast  senna 2 Tablet(s) Oral at bedtime    MEDICATIONS  (PRN):  acetaminophen   Tablet .. 650 milliGRAM(s) Oral every 6 hours PRN Mild Pain (1 - 3)  dextrose 40% Gel 15 Gram(s) Oral once PRN Blood Glucose LESS THAN 70 milliGRAM(s)/deciliter  glucagon  Injectable 1 milliGRAM(s) IntraMuscular once PRN Glucose LESS THAN 70 milligrams/deciliter  oxyCODONE    IR 5 milliGRAM(s) Oral every 4 hours PRN Moderate Pain (4 - 6)  oxyCODONE    IR 10 milliGRAM(s) Oral every 4 hours PRN Severe Pain (7 - 10)      ASSESSMENT/PLAN  73 y/o LHD M with GBM admitted with seizures s/p resection of recurrent GBM now with functional, gait, ADL impairments.    Disposition - Recommend ACUTE inpatient rehabilitation. Patient can tolerate 3 hours/day x 5 days/week of PT/OT/SLP to address current deficits under the supervision of physiatrist and interdisciplinary team.   PT - ROM, Bed mobility, Transfers, Ambulation with assistive device  OT - ADLs, ROM  SLP - Modified diet as patient edentulous;  Recommend formal swallow evaluation   Precautions - Falls, Cardiac  DVT Prophylaxis - Lovenox  Weight bearing status - WBAT  Skin - Turn Q2hrs  Diet - Mechanical soft -

## 2019-03-04 NOTE — CONSULT NOTE ADULT - PROVIDER SPECIALTY LIST ADULT
Electrophysiology
Hospitalist
Infectious Disease
MICU
Neurology
Neurosurgery
Rehab Medicine
SICU
SICU

## 2019-03-04 NOTE — PROGRESS NOTE ADULT - ATTENDING COMMENTS
75 yo male history of craniotomy nov 2018, presenting with seizure, s/p resection of recurrent GBM, was readmitted to SICU due to twitching episode concern for seizure loaded with vimpat.    PLAN:   Neurologic:  - Decadron 4mg IV push q6h  - Keppra 1500mg IV q12h  - Vimapt 100 BID  - q4 neuro checks   - Pain control w/ Tylenol, Oxy  - Repeat CT head- improved     Respiratory:  - Monitor oxygen saturation, RR    Cardiovascular:  - Cont. Lisinopril 20 mg daily    Gastrointestinal/Nutrition:  - Diabetic mechanical soft w/ 1200 ml fluid restriction  - Cont. bowel regimen: senna, colace  - Cont. protonix    Genitourinary/Renal:  - Monitor BUN/Cr, urine output    Hematologic:  - lovenox restarted for VTE ppx  - Monitor H/h    Infectious Disease:  - Monitor WBC, temp    Endocrine:  - ISS, lantus    Dispo: Floor eligible  The patient is a critical care patient with life threatening hemodynamic and metabolic instability in SICU.  I have personally interviewed when possible and examined the patient, reviewed data and laboratory tests/x-rays and all pertinent electronic images.  I was physically present for the key portions of the evaluation and management (E/M) service provided.   The SICU team has a constant risk benefit analyzes discussion with the primary team, all consultants, House Staff and PA's on all decisions.  These diagnoses are unrelated to the surgical procedure noted above.  I meet with family if needed to get further history, discuss the case and make care decisions for this patient who might not be able to participate.  Time involved in performance of separately billable procedures was not counted toward my critical care time. There is no overlap.  I spent 55-75 minutes of critical care time for the diagnoses, assessment, plan and interventions.

## 2019-03-04 NOTE — PROGRESS NOTE ADULT - ASSESSMENT
74 year old male with HTN, NIDDM, CVA (2013 w/ left side weakness), R glioma (s/p R craniectomy and tumor resection 11/28/2018), BPH s/p resection of recurrent GBM, had episode twitching concerning for seizure. Patient is awake and following all commands, conversive. states he is doing better. Loaded and started on vimpat on 3/2. No seizure like episodes since then. CTH improving.    c/w Decadron 4mg IV push q6h  c/w Keppra 1500mg IV q12h  c/w Vimapt 100 BID  neurosurg following 74 year old male with HTN, NIDDM, CVA (2013 w/ left side weakness), R glioma (s/p R craniectomy and tumor resection 11/28/2018), BPH s/p resection of recurrent GBM, had episode twitching concerning for seizure. Patient is awake and following all commands, conversive. states he is doing better. Loaded and started on vimpat on 3/2. No seizure like episodes since then. CTH improving.    c/w Decadron 4mg IV push q6h  c/w Keppra 1500mg IV q12h  c/w Vimapt 100 BID  neurosurg following  Continue medical management and supportive care and seizure precautions    Case discussed in detail with neurology attending

## 2019-03-04 NOTE — PROGRESS NOTE ADULT - SUBJECTIVE AND OBJECTIVE BOX
SICU Daily Progress Note  =====================================================  HPI:  74 year old male with HTN, BPH, NIDDM, CVA (2013 w/ left side weakness), R glioma (s/p R craniectomy and tumor resection 11/28/2018), s/p resection of recurrent GBM on 2/27/19.  SICU was consulted due a hemorrhage found on repeat head CT. CT head obtained earlier this am 3/2/19. Reveals increase in post operative bed hemorrhage with mild shift noted. Patient clinically better this am but patient has been receiving Lovenox for DVT prophylaxis which has been stopped. Due to the nature of the disease process, the increase in hemorrhage, PFO, seizure disorder (patient has noted some intermittent twitching) case was discussed with the surgical ICU for transfer for q1 hour neurological checks. SICU accepted transfer and patient will get a follow up CT head tomorrow am for further evaluation. Case and films electronically reviewed with Dr Gonzalez.      Interval/Overnight Events:     CT head performed this morning showed improvement. Patient downgraded to q4 neuo checks. Roberson removed yesterday evening and patient passed trial to void. Hold SQH for 1 week.    HISTORY  Allergies: No Known Allergies    PAST MEDICAL & SURGICAL HISTORY:  Oxygen dependent  Vitamin deficiency  Constipation  Pulmonary embolism  Glioblastoma  BPH (benign prostatic hyperplasia)  CVA (cerebral vascular accident)  Diabetes  HTN (hypertension)  S/P craniotomy: ~ resection of R-pariental mass (11/28/2018)  Glioblastoma  Status epilepticus  Seizure  Seizures  S/P craniotomy  Hyponatremia  Acute deep vein thrombosis (DVT) of left lower extremity, unspecified vein  Glioblastoma multiforme of brain  Positive blood culture  Cerebrovascular accident (CVA), unspecified mechanism  Viral URI  Type 2 diabetes mellitus with complication, unspecified whether long term insulin use  Glioblastoma  Bacteremia  Coronavirus infection  SIRS (systemic inflammatory response syndrome)  Leukocytosis  Fever  Status epilepticus  Nutritional assessment  Prophylactic measure  Essential hypertension  Type 2 diabetes mellitus with complication, without long-term current use of insulin  Sepsis, due to unspecified organism  Seizure  Craniotomy      MEDICATIONS:   --------------------------------------------------------------------------------------  Neurologic Medications  acetaminophen   Tablet .. 650 milliGRAM(s) Oral every 6 hours PRN Mild Pain (1 - 3)  lacosamide 100 milliGRAM(s) Oral two times a day  levETIRAcetam 1500 milliGRAM(s) Oral two times a day  oxyCODONE    IR 5 milliGRAM(s) Oral every 4 hours PRN Moderate Pain (4 - 6)  oxyCODONE    IR 10 milliGRAM(s) Oral every 4 hours PRN Severe Pain (7 - 10)    Respiratory Medications    Cardiovascular Medications  lisinopril 20 milliGRAM(s) Oral daily    Gastrointestinal Medications  docusate sodium 100 milliGRAM(s) Oral three times a day  pantoprazole    Tablet 40 milliGRAM(s) Oral before breakfast  senna 2 Tablet(s) Oral at bedtime    Genitourinary Medications    Hematologic/Oncologic Medications  influenza   Vaccine 0.5 milliLiter(s) IntraMuscular once    Antimicrobial/Immunologic Medications    Endocrine/Metabolic Medications  dexamethasone  Injectable 4 milliGRAM(s) IV Push every 6 hours  dextrose 40% Gel 15 Gram(s) Oral once PRN Blood Glucose LESS THAN 70 milliGRAM(s)/deciliter  dextrose 50% Injectable 12.5 Gram(s) IV Push once  dextrose 50% Injectable 25 Gram(s) IV Push once  dextrose 50% Injectable 25 Gram(s) IV Push once  glucagon  Injectable 1 milliGRAM(s) IntraMuscular once PRN Glucose LESS THAN 70 milligrams/deciliter  insulin glargine Injectable (LANTUS) 28 Unit(s) SubCutaneous at bedtime  insulin lispro (HumaLOG) corrective regimen sliding scale   SubCutaneous three times a day before meals  insulin lispro (HumaLOG) corrective regimen sliding scale   SubCutaneous at bedtime  insulin lispro Injectable (HumaLOG) 8 Unit(s) SubCutaneous three times a day with meals    Topical/Other Medications    --------------------------------------------------------------------------------------    VITAL SIGNS, INS/OUTS (last 24 hours):  --------------------------------------------------------------------------------------  T(C): 36.4 (03-04-19 @ 00:00), Max: 36.4 (03-03-19 @ 08:00)  HR: 70 (03-04-19 @ 00:00) (55 - 82)  BP: 145/99 (03-04-19 @ 00:00) (104/70 - 152/97)  BP(mean): 109 (03-04-19 @ 00:00) (79 - 111)  RR: 18 (03-04-19 @ 00:00) (10 - 25)  SpO2: 98% (03-04-19 @ 00:00) (95% - 98%)  CAPILLARY BLOOD GLUCOSE      POCT Blood Glucose.: 188 mg/dL (03 Mar 2019 21:16)  POCT Blood Glucose.: 216 mg/dL (03 Mar 2019 16:10)  POCT Blood Glucose.: 189 mg/dL (03 Mar 2019 12:12)  POCT Blood Glucose.: 194 mg/dL (03 Mar 2019 07:21)   N/A      03-02 @ 07:01  -  03-03 @ 07:00  --------------------------------------------------------  IN:    dextrose 5% + lactated ringers.: 375 mL    IV PiggyBack: 100 mL    Oral Fluid: 375 mL  Total IN: 850 mL    OUT:    Indwelling Catheter - Urethral: 1740 mL  Total OUT: 1740 mL    Total NET: -890 mL      03-03 @ 07:01  -  03-04 @ 00:28  --------------------------------------------------------  IN:    dextrose 5% + lactated ringers.: 300 mL    IV PiggyBack: 100 mL    Oral Fluid: 286 mL  Total IN: 686 mL    OUT:    Indwelling Catheter - Urethral: 820 mL    Voided: 100 mL  Total OUT: 920 mL    Total NET: -234 mL        --------------------------------------------------------------------------------------    EXAM:  NEUROLOGY  Exam: Normal, NAD, alert, oriented x3, no focal deficits. PERRLA.   [x] Adequacy of sedation and pain control has been assessed and adjusted    RESPIRATORY  Exam: Lungs clear to auscultation, Normal expansion/effort.  Mechanical Ventilation:   [x] Extubation Readiness Assessed    CARDIOVASCULAR  Exam: S1, S2.  Regular rate and rhythm.  Peripheral edema   Cardiac Rhythm: Normal Sinus Rhythm      GI/NUTRITION  Exam: Abdomen soft, Non-tender, Non-distended.   Current Diet: Regular    METABOLIC/FLUIDS/ELECTROLYTES      HEMATOLOGIC  [x] DVT Prophylaxis:   Transfusions:	[] PRBC	[] Platelets		[] FFP	[] Cryoprecipitate    INFECTIOUS DISEASE  Antimicrobials/Immunologic Medications:  influenza   Vaccine 0.5 milliLiter(s) IntraMuscular once        Tubes/Lines/Drains  [x] Peripheral IV  [] Central Venous Line     	[] R	[] L	[] IJ	[] Fem	[] SC	Date Placed:   [] Arterial Line		[] R	[] L	[] Fem	[] Rad	[] Ax	Date Placed:   [] PICC		[] Midline		[] Mediport  [] Urinary Catheter		Date Placed:   [x] Necessity of urinary, arterial, and venous catheters discussed    VASCULAR  Exam: Extremities warm, pink, well-perfused.     MUSCULOSKELETAL  Exam: All extremities moving spontaneously without limitations.     SKIN  Exam: Good skin turgor, no skin breakdown.     LABS  --------------------------------------------------------------------------------------  CBC (03-03 @ 04:05)                              11.6<L>                         17.71<H>  )----------------(  136<L>     --    % Neutrophils, --    % Lymphocytes, ANC: --                                  34.9<L>    BMP (03-03 @ 04:05)             134<L>  |  97<L>   |  18    		Ca++ 1.03    Ca 8.4                ---------------------------------( 198<H>		Mg 1.8                3.6     |  25      |  0.57  			Ph 1.6<L>      Coags (03-03 @ 04:05)  aPTT 22.6<L> / INR 1.07 / PT 11.9              --------------------------------------------------------------------------------------    OTHER LABORATORY:     IMAGING STUDIES:

## 2019-03-04 NOTE — PROGRESS NOTE ADULT - ASSESSMENT
75 yo male history of craniotomy nov 2018, presenting with seizure, s/p resection of recurrent GBM, was readmitted to SICU due to twicthing episode concern for seisuew loaded with vimpat.    PLAN:   Neurologic:  - Decadron 4mg IV push q6h  - Keppra 1500mg IV q12h  - Vimapt 100 BID  - q4 neuro checks   - Pain control w/ Tylenol, Oxy  - Repeat CT head- improved     Respiratory:  - Monitor oxygen saturation, RR    Cardiovascular:  - Cont. Lisinopril 20 mg daily    Gastrointestinal/Nutrition:  - Diabetic mechanical soft w/ 1200 ml fluid restriction  - Cont. bowel regimen: senna, colace  - Cont. protonix    Genitourinary/Renal:  - Monitor BUN/Cr, urine output    Hematologic:  - Hold SQH x 1 week  - Monitor H/h    Infectious Disease:  - Monitor WBC, temp    Endocrine:  - ISS, lantus    Dispo: Floor eligible 75 yo male history of craniotomy nov 2018, presenting with seizure, s/p resection of recurrent GBM, was readmitted to SICU due to twicthing episode concern for seisuew loaded with vimpat.    PLAN:   Neurologic:  - Decadron 4mg IV push q6h  - Keppra 1500mg IV q12h  - Vimapt 100 BID  - q4 neuro checks   - Pain control w/ Tylenol, Oxy  - Repeat CT head- improved     Respiratory:  - Monitor oxygen saturation, RR    Cardiovascular:  - Cont. Lisinopril 20 mg daily    Gastrointestinal/Nutrition:  - Diabetic mechanical soft w/ 1200 ml fluid restriction  - Cont. bowel regimen: senna, colace  - Cont. protonix    Genitourinary/Renal:  - Monitor BUN/Cr, urine output    Hematologic:  - lovenox restarted for VTE ppx  - Monitor H/h    Infectious Disease:  - Monitor WBC, temp    Endocrine:  - ISS, lantus    Dispo: Floor eligible 75 yo male history of craniotomy nov 2018, presenting with seizure, s/p resection of recurrent GBM, was readmitted to SICU due to twitching episode concern for seizure loaded with vimpat.    PLAN:   Neurologic:  - Decadron 4mg IV push q6h  - Keppra 1500mg IV q12h  - Vimapt 100 BID  - q4 neuro checks   - Pain control w/ Tylenol, Oxy  - Repeat CT head- improved     Respiratory:  - Monitor oxygen saturation, RR    Cardiovascular:  - Cont. Lisinopril 20 mg daily    Gastrointestinal/Nutrition:  - Diabetic mechanical soft w/ 1200 ml fluid restriction  - Cont. bowel regimen: senna, colace  - Cont. protonix    Genitourinary/Renal:  - Monitor BUN/Cr, urine output    Hematologic:  - lovenox restarted for VTE ppx  - Monitor H/h    Infectious Disease:  - Monitor WBC, temp    Endocrine:  - ISS, lantus    Dispo: Floor eligible

## 2019-03-04 NOTE — PROGRESS NOTE ADULT - SUBJECTIVE AND OBJECTIVE BOX
No issues overnight  ICU Vital Signs Last 24 Hrs  T(C): 36.4 (04 Mar 2019 00:00), Max: 36.4 (03 Mar 2019 08:00)  T(F): 97.6 (04 Mar 2019 00:00), Max: 97.6 (03 Mar 2019 08:00)  HR: 70 (04 Mar 2019 00:00) (55 - 82)  BP: 145/99 (04 Mar 2019 00:00) (104/70 - 152/97)  BP(mean): 109 (04 Mar 2019 00:00) (79 - 111)  ABP: --  ABP(mean): --  RR: 18 (04 Mar 2019 00:00) (10 - 25)  SpO2: 98% (04 Mar 2019 00:00) (95% - 98%)    AAO X 2  Mild left facial weanness, otherwise CN 2-12 grossly intact  ERWIN, right UE/LE 5/5  Left UE 4-/5, LLE 4/5  SILT    MEDICATIONS  (STANDING):  dexamethasone  Injectable 4 milliGRAM(s) IV Push every 6 hours  dextrose 50% Injectable 12.5 Gram(s) IV Push once  dextrose 50% Injectable 25 Gram(s) IV Push once  dextrose 50% Injectable 25 Gram(s) IV Push once  docusate sodium 100 milliGRAM(s) Oral three times a day  influenza   Vaccine 0.5 milliLiter(s) IntraMuscular once  insulin glargine Injectable (LANTUS) 28 Unit(s) SubCutaneous at bedtime  insulin lispro (HumaLOG) corrective regimen sliding scale   SubCutaneous three times a day before meals  insulin lispro (HumaLOG) corrective regimen sliding scale   SubCutaneous at bedtime  insulin lispro Injectable (HumaLOG) 8 Unit(s) SubCutaneous three times a day with meals  lacosamide 100 milliGRAM(s) Oral two times a day  levETIRAcetam 1500 milliGRAM(s) Oral two times a day  lisinopril 20 milliGRAM(s) Oral daily  pantoprazole    Tablet 40 milliGRAM(s) Oral before breakfast  senna 2 Tablet(s) Oral at bedtime    MEDICATIONS  (PRN):  acetaminophen   Tablet .. 650 milliGRAM(s) Oral every 6 hours PRN Mild Pain (1 - 3)  dextrose 40% Gel 15 Gram(s) Oral once PRN Blood Glucose LESS THAN 70 milliGRAM(s)/deciliter  glucagon  Injectable 1 milliGRAM(s) IntraMuscular once PRN Glucose LESS THAN 70 milligrams/deciliter  oxyCODONE    IR 5 milliGRAM(s) Oral every 4 hours PRN Moderate Pain (4 - 6)  oxyCODONE    IR 10 milliGRAM(s) Oral every 4 hours PRN Severe Pain (7 - 10)                          11.6   17.71 )-----------( 136      ( 03 Mar 2019 04:05 )             34.9   03-03    134<L>  |  97<L>  |  18  ----------------------------<  198<H>  3.6   |  25  |  0.57    Ca    8.4      03 Mar 2019 04:05  Phos  1.6     03-03  Mg     1.8     03-03    < from: CT Head No Cont (03.03.19 @ 09:34) >  This exam is compared with prior noncontrast head CT performed on March 2, 2019.    Postop changes compatible with a right temporal frontal pterional   craniotomy is again identified.    Abnormal areas of air and high attenuation identified in the postop   region. This is related postop changes and appears unchanged when   compared prior exam. Mass effect on the right lateral ventricle is again   identified with decreased right to left shift seen. There is decrease in   size of the left lateral ventricle seen when compared prior exam. Acute   subdural hematoma involving the interhemispheric and bilateral tentorial   region is again identified. Right frontal subdural collection/chronic   subdural hematoma is again identified.    Air in the postop bed as well as right frontal region is again seen.     Subarachnoid hemorrhage involving the right frontal region is again seen.    Evaluation of the osseous structures with the appropriate window setting   postop changes:    Extracalvarial soft tissue swelling hematoma and staples are seen postop   region.    The visualized paranasal sinuses mastoid and middle ear regions appear   clear.    Impression: Decrease in size of the left lateral ventricle is seen with   decrease mass effect on the right lateral ventricle as well as decrease   in right to left shift.    < end of copied text >

## 2019-03-04 NOTE — PROGRESS NOTE ADULT - ASSESSMENT
70 YO male s/p right craniotomy for recurrent GBM with postop seizures, stable hemorrhage in operative bed

## 2019-03-05 LAB
ANION GAP SERPL CALC-SCNC: 11 MMO/L — SIGNIFICANT CHANGE UP (ref 7–14)
APPEARANCE UR: SIGNIFICANT CHANGE UP
BACTERIA # UR AUTO: HIGH
BILIRUB UR-MCNC: NEGATIVE — SIGNIFICANT CHANGE UP
BLOOD UR QL VISUAL: HIGH
BUN SERPL-MCNC: 17 MG/DL — SIGNIFICANT CHANGE UP (ref 7–23)
CALCIUM SERPL-MCNC: 8.4 MG/DL — SIGNIFICANT CHANGE UP (ref 8.4–10.5)
CHLORIDE SERPL-SCNC: 97 MMOL/L — LOW (ref 98–107)
CO2 SERPL-SCNC: 27 MMOL/L — SIGNIFICANT CHANGE UP (ref 22–31)
COLOR SPEC: SIGNIFICANT CHANGE UP
CREAT SERPL-MCNC: 0.54 MG/DL — SIGNIFICANT CHANGE UP (ref 0.5–1.3)
GLUCOSE SERPL-MCNC: 127 MG/DL — HIGH (ref 70–99)
GLUCOSE UR-MCNC: 200 — HIGH
HCT VFR BLD CALC: 36.5 % — LOW (ref 39–50)
HGB BLD-MCNC: 12.2 G/DL — LOW (ref 13–17)
HYALINE CASTS # UR AUTO: NEGATIVE — SIGNIFICANT CHANGE UP
KETONES UR-MCNC: NEGATIVE — SIGNIFICANT CHANGE UP
LEUKOCYTE ESTERASE UR-ACNC: SIGNIFICANT CHANGE UP
MAGNESIUM SERPL-MCNC: 1.6 MG/DL — SIGNIFICANT CHANGE UP (ref 1.6–2.6)
MCHC RBC-ENTMCNC: 30 PG — SIGNIFICANT CHANGE UP (ref 27–34)
MCHC RBC-ENTMCNC: 33.4 % — SIGNIFICANT CHANGE UP (ref 32–36)
MCV RBC AUTO: 89.7 FL — SIGNIFICANT CHANGE UP (ref 80–100)
NITRITE UR-MCNC: NEGATIVE — SIGNIFICANT CHANGE UP
NRBC # FLD: 0 K/UL — LOW (ref 25–125)
PH UR: 6 — SIGNIFICANT CHANGE UP (ref 5–8)
PHOSPHATE SERPL-MCNC: 1.9 MG/DL — LOW (ref 2.5–4.5)
PLATELET # BLD AUTO: 117 K/UL — LOW (ref 150–400)
PMV BLD: 10.4 FL — SIGNIFICANT CHANGE UP (ref 7–13)
POTASSIUM SERPL-MCNC: 3.5 MMOL/L — SIGNIFICANT CHANGE UP (ref 3.5–5.3)
POTASSIUM SERPL-SCNC: 3.5 MMOL/L — SIGNIFICANT CHANGE UP (ref 3.5–5.3)
PROT UR-MCNC: 50 — SIGNIFICANT CHANGE UP
RBC # BLD: 4.07 M/UL — LOW (ref 4.2–5.8)
RBC # FLD: 16.8 % — HIGH (ref 10.3–14.5)
RBC CASTS # UR COMP ASSIST: >50 — HIGH (ref 0–?)
SODIUM SERPL-SCNC: 135 MMOL/L — SIGNIFICANT CHANGE UP (ref 135–145)
SP GR SPEC: 1.02 — SIGNIFICANT CHANGE UP (ref 1–1.04)
SQUAMOUS # UR AUTO: SIGNIFICANT CHANGE UP
UROBILINOGEN FLD QL: HIGH
WBC # BLD: 20.11 K/UL — HIGH (ref 3.8–10.5)
WBC # FLD AUTO: 20.11 K/UL — HIGH (ref 3.8–10.5)
WBC UR QL: >50 — HIGH (ref 0–?)

## 2019-03-05 PROCEDURE — 77263 THER RADIOLOGY TX PLNG CPLX: CPT

## 2019-03-05 PROCEDURE — 99233 SBSQ HOSP IP/OBS HIGH 50: CPT

## 2019-03-05 RX ORDER — POTASSIUM PHOSPHATE, MONOBASIC POTASSIUM PHOSPHATE, DIBASIC 236; 224 MG/ML; MG/ML
15 INJECTION, SOLUTION INTRAVENOUS ONCE
Qty: 0 | Refills: 0 | Status: DISCONTINUED | OUTPATIENT
Start: 2019-03-05 | End: 2019-03-05

## 2019-03-05 RX ORDER — MAGNESIUM SULFATE 500 MG/ML
2 VIAL (ML) INJECTION ONCE
Qty: 0 | Refills: 0 | Status: COMPLETED | OUTPATIENT
Start: 2019-03-05 | End: 2019-03-05

## 2019-03-05 RX ORDER — SODIUM,POTASSIUM PHOSPHATES 278-250MG
1 POWDER IN PACKET (EA) ORAL
Qty: 0 | Refills: 0 | Status: COMPLETED | OUTPATIENT
Start: 2019-03-05 | End: 2019-03-05

## 2019-03-05 RX ORDER — SODIUM CHLORIDE 9 MG/ML
1000 INJECTION, SOLUTION INTRAVENOUS
Qty: 0 | Refills: 0 | Status: COMPLETED | OUTPATIENT
Start: 2019-03-05 | End: 2019-03-05

## 2019-03-05 RX ORDER — POTASSIUM CHLORIDE 20 MEQ
40 PACKET (EA) ORAL ONCE
Qty: 0 | Refills: 0 | Status: COMPLETED | OUTPATIENT
Start: 2019-03-05 | End: 2019-03-05

## 2019-03-05 RX ORDER — SODIUM CHLORIDE 9 MG/ML
1000 INJECTION, SOLUTION INTRAVENOUS
Qty: 0 | Refills: 0 | Status: DISCONTINUED | OUTPATIENT
Start: 2019-03-05 | End: 2019-03-05

## 2019-03-05 RX ADMIN — ENOXAPARIN SODIUM 40 MILLIGRAM(S): 100 INJECTION SUBCUTANEOUS at 11:46

## 2019-03-05 RX ADMIN — Medication 8 UNIT(S): at 08:30

## 2019-03-05 RX ADMIN — LACOSAMIDE 100 MILLIGRAM(S): 50 TABLET ORAL at 17:43

## 2019-03-05 RX ADMIN — SODIUM CHLORIDE 80 MILLILITER(S): 9 INJECTION, SOLUTION INTRAVENOUS at 15:05

## 2019-03-05 RX ADMIN — LEVETIRACETAM 1500 MILLIGRAM(S): 250 TABLET, FILM COATED ORAL at 17:37

## 2019-03-05 RX ADMIN — Medication 1: at 08:31

## 2019-03-05 RX ADMIN — PANTOPRAZOLE SODIUM 40 MILLIGRAM(S): 20 TABLET, DELAYED RELEASE ORAL at 07:44

## 2019-03-05 RX ADMIN — Medication 1 PACKET(S): at 11:47

## 2019-03-05 RX ADMIN — Medication 4 MILLIGRAM(S): at 05:28

## 2019-03-05 RX ADMIN — Medication 50 GRAM(S): at 05:27

## 2019-03-05 RX ADMIN — Medication 1: at 11:51

## 2019-03-05 RX ADMIN — Medication 8 UNIT(S): at 11:51

## 2019-03-05 RX ADMIN — Medication 1 PACKET(S): at 13:37

## 2019-03-05 RX ADMIN — Medication 4 MILLIGRAM(S): at 11:46

## 2019-03-05 RX ADMIN — SENNA PLUS 2 TABLET(S): 8.6 TABLET ORAL at 21:32

## 2019-03-05 RX ADMIN — Medication 40 MILLIEQUIVALENT(S): at 05:25

## 2019-03-05 RX ADMIN — Medication 100 MILLIGRAM(S): at 21:32

## 2019-03-05 RX ADMIN — Medication 1 PACKET(S): at 08:50

## 2019-03-05 RX ADMIN — LACOSAMIDE 100 MILLIGRAM(S): 50 TABLET ORAL at 05:25

## 2019-03-05 RX ADMIN — Medication 100 MILLIGRAM(S): at 13:37

## 2019-03-05 RX ADMIN — LEVETIRACETAM 1500 MILLIGRAM(S): 250 TABLET, FILM COATED ORAL at 05:26

## 2019-03-05 RX ADMIN — Medication 4 MILLIGRAM(S): at 17:37

## 2019-03-05 RX ADMIN — INSULIN GLARGINE 28 UNIT(S): 100 INJECTION, SOLUTION SUBCUTANEOUS at 22:01

## 2019-03-05 RX ADMIN — Medication 8 UNIT(S): at 17:38

## 2019-03-05 RX ADMIN — Medication 100 MILLIGRAM(S): at 05:28

## 2019-03-05 RX ADMIN — Medication 2: at 17:38

## 2019-03-05 NOTE — PROGRESS NOTE ADULT - ASSESSMENT
75 yo male history of craniotomy nov 2018, presenting with seizure, s/p resection of recurrent GBM, was readmitted to SICU due to twitching episode concern for seizure loaded with vimpat.    PLAN:   Neurologic:  - Decadron 4mg IV push q6h  - Keppra 1500mg IV q12h  - Vimapt 100 BID  - q4 neuro checks   - Pain control w/ Tylenol, Oxy  - Repeat CT head- improved     Respiratory:  - Monitor oxygen saturation, RR    Cardiovascular:  - Cont. Lisinopril 20 mg daily    Gastrointestinal/Nutrition:  - Diabetic mechanical soft w/ 1200 ml fluid restriction  - Cont. bowel regimen: senna, colace  - Cont. protonix    Genitourinary/Renal:  - Monitor BUN/Cr, urine output    Hematologic:  - lovenox restarted for VTE ppx  - Monitor H/h    Infectious Disease:  - Monitor WBC, temp    Endocrine:  - ISS, lantus    Dispo: listed for floor, waiting for bed

## 2019-03-05 NOTE — PROGRESS NOTE ADULT - SUBJECTIVE AND OBJECTIVE BOX
SICU AM PROGRESS NOTE     24 HOUR EVENTS: patient is listed for floor and waiting for bed, no overnight event.     SUBJECTIVE/ROS:  [x] A ten-point review of systems was otherwise negative except as noted.  [ ] Due to altered mental status/intubation, subjective information were not able to be obtained from the patient. History was obtained, to the extent possible, from review of the chart and collateral sources of information.    NEURO  Exam: no seizure activities, mildly reduced left side strength at baseline, pain well controlled   Meds: acetaminophen   Tablet .. 650 milliGRAM(s) Oral every 6 hours PRN Mild Pain (1 - 3)  lacosamide 100 milliGRAM(s) Oral two times a day  levETIRAcetam 1500 milliGRAM(s) Oral two times a day  oxyCODONE    IR 5 milliGRAM(s) Oral every 4 hours PRN Moderate Pain (4 - 6)  oxyCODONE    IR 10 milliGRAM(s) Oral every 4 hours PRN Severe Pain (7 - 10)  [x] Adequacy of sedation and pain control has been assessed and adjusted      RESPIRATORY  RR: 19 (03-05-19 @ 00:00) (13 - 21)  SpO2: 92% (03-05-19 @ 00:00) (92% - 98%)  Exam: unlabored, clear to auscultation bilaterally    CARDIOVASCULAR  HR: 85 (03-05-19 @ 00:00) (58 - 91)  BP: 107/70 (03-05-19 @ 00:00) (100/76 - 139/90)  BP(mean): 77 (03-05-19 @ 00:00) (77 - 101)  Exam:  Cardiac Rhythm: RRR  Perfusion     [x]Adequate   [ ]Inadequate  Mentation   [x]Normal       [ ]Reduced  Extremities  [x]Warm         [ ]Cool  Volume Status [ ]Hypervolemic [x]Euvolemic [ ]Hypovolemic  Meds: lisinopril 20 milliGRAM(s) Oral daily    GI/NUTRITION  Exam: soft, nondistended   Diet: regular   Meds: docusate sodium 100 milliGRAM(s) Oral three times a day  pantoprazole    Tablet 40 milliGRAM(s) Oral before breakfast  senna 2 Tablet(s) Oral at bedtime    GENITOURINARY  I&O's Detail    03-03 @ 07:01  -  03-04 @ 07:00  --------------------------------------------------------  IN:    dextrose 5% + lactated ringers.: 300 mL    IV PiggyBack: 100 mL    Oral Fluid: 286 mL  Total IN: 686 mL    OUT:    Indwelling Catheter - Urethral: 820 mL    Voided: 800 mL  Total OUT: 1620 mL    Total NET: -934 mL      03-04 @ 07:01  -  03-05 @ 01:55  --------------------------------------------------------  IN:    Oral Fluid: 350 mL  Total IN: 350 mL    OUT:    Voided: 200 mL  Total OUT: 200 mL    Total NET: 150 mL      03-04    138  |  98  |  16  ----------------------------<  160<H>  4.0   |  29  |  0.69    Ca    8.4      04 Mar 2019 02:47  Phos  2.0     03-04  Mg     1.7     03-04    HEMATOLOGIC  Meds: enoxaparin Injectable 40 milliGRAM(s) SubCutaneous daily    [x] VTE Prophylaxis                        11.2   16.82 )-----------( 124      ( 04 Mar 2019 02:40 )             34.0     PT/INR - ( 03 Mar 2019 04:05 )   PT: 11.9 SEC;   INR: 1.07       PTT - ( 03 Mar 2019 04:05 )  PTT:22.6 SEC    Transfusion     [ ] PRBC   [ ] Platelets   [ ] FFP   [ ] Cryoprecipitate      INFECTIOUS DISEASES  T(C): 36.9 (03-05-19 @ 00:00), Max: 36.9 (03-04-19 @ 04:00)  WBC Count: 16.82 K/uL (03-04 @ 02:40)    Meds: influenza   Vaccine 0.5 milliLiter(s) IntraMuscular once      ENDOCRINE  CAPILLARY BLOOD GLUCOSE  POCT Blood Glucose.: 81 mg/dL (04 Mar 2019 22:37)  POCT Blood Glucose.: 159 mg/dL (04 Mar 2019 16:25)  POCT Blood Glucose.: 174 mg/dL (04 Mar 2019 11:54)  POCT Blood Glucose.: 137 mg/dL (04 Mar 2019 07:55)    Meds: dexamethasone  Injectable 4 milliGRAM(s) IV Push every 6 hours  dextrose 40% Gel 15 Gram(s) Oral once PRN  dextrose 50% Injectable 12.5 Gram(s) IV Push once  dextrose 50% Injectable 25 Gram(s) IV Push once  dextrose 50% Injectable 25 Gram(s) IV Push once  glucagon  Injectable 1 milliGRAM(s) IntraMuscular once PRN  insulin glargine Injectable (LANTUS) 28 Unit(s) SubCutaneous at bedtime  insulin lispro (HumaLOG) corrective regimen sliding scale   SubCutaneous three times a day before meals  insulin lispro (HumaLOG) corrective regimen sliding scale   SubCutaneous at bedtime  insulin lispro Injectable (HumaLOG) 8 Unit(s) SubCutaneous three times a day with meals      ACCESS DEVICES:  [x] Peripheral IV  [ ] Central Venous Line	[ ] R	[ ] L	[ ] IJ	[ ] Fem	[ ] SC	Placed:   [ ] Arterial Line		[ ] R	[ ] L	[ ] Fem	[ ] Rad	[ ] Ax	Placed:   [ ] PICC:					[ ] Mediport  [ ] Urinary Catheter, Date Placed:   [ ] Necessity of urinary, arterial, and venous catheters discussed    OTHER MEDICATIONS:  chlorhexidine 4% Liquid 1 Application(s) Topical <User Schedule>    CODE STATUS: Full code

## 2019-03-05 NOTE — PROGRESS NOTE ADULT - ASSESSMENT
73 yo M HTN, DM2, CVA w/ residual L weakness, GBM s/p Rt craniotomy 2018, BPH presents with seizures likely from recurrence of GBM, course complicated by sepsis POA from viral URI, acute embolic CVA, PFO, LLE DVT s/p IVC. Today with concern for seizure activity, underwent CT scan concerning for post-op intracranial hemorrhage. Being transferred to SICU for further monitoring/management.     # Intracranial hemorrhage  - c/w steroids/anti-epileptic regimen as per neuro/neurosx/SICU  - on SC lovenox for DVT ppx    # Seizures - 2/2 GBM, intracranial hemorrhage  - seizure precautions, AED therapy as per neuro/SICU    # GBM  - post-op managemennt as per sx  - likely will need onc and rad/onc eval for further cancer management.      # Hyponatremia  - mild, likely SIADH in setting of recent intracranial surgery  - c/w fluid restriction, monitor Na    # type 2 diabetes mellitus with hyperglycemia, on insulin therapy  - fingersticks not well controlled in setting of surgery and decadron use  - c/w basal/bolus insulin with sliding scale coverage  - continue to monitor FS and adjust regimen as necessary    # DVT of LLE  - holding full dose anticoagulation given intracranial hemorrhage  - s/p IVC filter    # CVA  - holding antiplatelet/full dose anticoagulation at this time given intracranial hemorrhage

## 2019-03-05 NOTE — CHART NOTE - NSCHARTNOTEFT_GEN_A_CORE
Neurosurgery f/u    patient will not have to receive chemotherapy or radiation treatments while in rehab facility.

## 2019-03-05 NOTE — PROGRESS NOTE ADULT - SUBJECTIVE AND OBJECTIVE BOX
CHIEF COMPLAINT: Patient is a 74y old  male who presents with a chief complaint of Seizure (05 Mar 2019 02:22)      SUBJECTIVE / OVERNIGHT EVENTS:    No complaints. Denied pain. Denies SOB.    MEDICATIONS  (STANDING):  chlorhexidine 4% Liquid 1 Application(s) Topical <User Schedule>  dexamethasone  Injectable 4 milliGRAM(s) IV Push every 6 hours  dextrose 50% Injectable 12.5 Gram(s) IV Push once  dextrose 50% Injectable 25 Gram(s) IV Push once  dextrose 50% Injectable 25 Gram(s) IV Push once  docusate sodium 100 milliGRAM(s) Oral three times a day  enoxaparin Injectable 40 milliGRAM(s) SubCutaneous daily  influenza   Vaccine 0.5 milliLiter(s) IntraMuscular once  insulin glargine Injectable (LANTUS) 28 Unit(s) SubCutaneous at bedtime  insulin lispro (HumaLOG) corrective regimen sliding scale   SubCutaneous three times a day before meals  insulin lispro (HumaLOG) corrective regimen sliding scale   SubCutaneous at bedtime  insulin lispro Injectable (HumaLOG) 8 Unit(s) SubCutaneous three times a day with meals  lacosamide 100 milliGRAM(s) Oral two times a day  levETIRAcetam 1500 milliGRAM(s) Oral two times a day  lisinopril 20 milliGRAM(s) Oral daily  pantoprazole    Tablet 40 milliGRAM(s) Oral before breakfast  senna 2 Tablet(s) Oral at bedtime    MEDICATIONS  (PRN):  acetaminophen   Tablet .. 650 milliGRAM(s) Oral every 6 hours PRN Mild Pain (1 - 3)  dextrose 40% Gel 15 Gram(s) Oral once PRN Blood Glucose LESS THAN 70 milliGRAM(s)/deciliter  glucagon  Injectable 1 milliGRAM(s) IntraMuscular once PRN Glucose LESS THAN 70 milligrams/deciliter  oxyCODONE    IR 5 milliGRAM(s) Oral every 4 hours PRN Moderate Pain (4 - 6)  oxyCODONE    IR 10 milliGRAM(s) Oral every 4 hours PRN Severe Pain (7 - 10)      VITALS:  T(F): 98.6 (03-05-19 @ 13:28), Max: 98.7 (03-05-19 @ 04:00)  HR: 80 (03-05-19 @ 13:28) (71 - 91)  BP: 90/61 (03-05-19 @ 13:28) (90/61 - 168/103)  RR: 17 (03-05-19 @ 13:28) (14 - 22)  SpO2: 99% (03-05-19 @ 13:28)      CAPILLARY BLOOD GLUCOSE    Output     I&O's Summary  T(F): 98.6 (03-05-19 @ 13:28), Max: 98.7 (03-05-19 @ 04:00)  HR: 80 (03-05-19 @ 13:28) (71 - 91)  BP: 90/61 (03-05-19 @ 13:28) (90/61 - 168/103)  RR: 17 (03-05-19 @ 13:28) (14 - 22)  SpO2: 99% (03-05-19 @ 13:28)    PHYSICAL EXAM:  GENERAL: NAD, well-developed  HEAD:  R craniotomy wound C/D/I  EYES: EOMI  NECK: Supple, No JVD  CHEST/LUNG: nonlabored breathing  HEART: nl S1/S2  ABDOMEN: nondistended, soft  EXTREMITIES:  no LE edema  PSYCH: alert, answering questions appropriately  NEUROLOGY: non-focal  SKIN: No rashes noted    LABS:              12.2                 135  | 27   | 17           20.11 >-----------< 117     ------------------------< 127                   36.5                 3.5  | 97   | 0.54                                         Ca 8.4   Mg 1.6   Ph 1.9                        MICROBIOLOGY:        RADIOLOGY & ADDITIONAL TESTS:    Imaging Personally Reviewed:    < from: CT Head No Cont (03.03.19 @ 09:34) >  Impression: Decrease in size of the left lateral ventricle is seen with   decrease mass effect on the right lateral ventricle as well as decrease   in right to left shift.      < end of copied text >        [x] Care Discussed with Consultants/Other Providers:      Leroy Jones M.D.  Hospitalist  Pager [phone]

## 2019-03-05 NOTE — PROGRESS NOTE ADULT - SUBJECTIVE AND OBJECTIVE BOX
No issues overnight  ICU Vital Signs Last 24 Hrs  T(C): 36.9 (05 Mar 2019 00:00), Max: 36.9 (04 Mar 2019 04:00)  T(F): 98.5 (05 Mar 2019 00:00), Max: 98.5 (05 Mar 2019 00:00)  HR: 85 (05 Mar 2019 00:00) (58 - 91)  BP: 107/70 (05 Mar 2019 00:00) (100/76 - 139/90)  BP(mean): 77 (05 Mar 2019 00:00) (77 - 101)  ABP: --  ABP(mean): --  RR: 19 (05 Mar 2019 00:00) (13 - 21)  SpO2: 92% (05 Mar 2019 00:00) (92% - 98%)    AAO X 2  Mild left facial weanness, otherwise CN 2-12 grossly intact  ERWIN, right UE/LE 5/5  Left UE 4-/5, LLE 4/5  SILT    MEDICATIONS  (STANDING):  chlorhexidine 4% Liquid 1 Application(s) Topical <User Schedule>  dexamethasone  Injectable 4 milliGRAM(s) IV Push every 6 hours  dextrose 50% Injectable 12.5 Gram(s) IV Push once  dextrose 50% Injectable 25 Gram(s) IV Push once  dextrose 50% Injectable 25 Gram(s) IV Push once  docusate sodium 100 milliGRAM(s) Oral three times a day  enoxaparin Injectable 40 milliGRAM(s) SubCutaneous daily  influenza   Vaccine 0.5 milliLiter(s) IntraMuscular once  insulin glargine Injectable (LANTUS) 28 Unit(s) SubCutaneous at bedtime  insulin lispro (HumaLOG) corrective regimen sliding scale   SubCutaneous three times a day before meals  insulin lispro (HumaLOG) corrective regimen sliding scale   SubCutaneous at bedtime  insulin lispro Injectable (HumaLOG) 8 Unit(s) SubCutaneous three times a day with meals  lacosamide 100 milliGRAM(s) Oral two times a day  levETIRAcetam 1500 milliGRAM(s) Oral two times a day  lisinopril 20 milliGRAM(s) Oral daily  pantoprazole    Tablet 40 milliGRAM(s) Oral before breakfast  senna 2 Tablet(s) Oral at bedtime    MEDICATIONS  (PRN):  acetaminophen   Tablet .. 650 milliGRAM(s) Oral every 6 hours PRN Mild Pain (1 - 3)  dextrose 40% Gel 15 Gram(s) Oral once PRN Blood Glucose LESS THAN 70 milliGRAM(s)/deciliter  glucagon  Injectable 1 milliGRAM(s) IntraMuscular once PRN Glucose LESS THAN 70 milligrams/deciliter  oxyCODONE    IR 5 milliGRAM(s) Oral every 4 hours PRN Moderate Pain (4 - 6)  oxyCODONE    IR 10 milliGRAM(s) Oral every 4 hours PRN Severe Pain (7 - 10)                          11.2   16.82 )-----------( 124      ( 04 Mar 2019 02:40 )             34.0     03-04    138  |  98  |  16  ----------------------------<  160<H>  4.0   |  29  |  0.69    Ca    8.4      04 Mar 2019 02:47  Phos  2.0     03-04  Mg     1.7     03-04

## 2019-03-05 NOTE — CHART NOTE - NSCHARTNOTEFT_GEN_A_CORE
Source: Patient [x] Medical record reviewed. Patient seen for length of stay nutrition follow-up. Per chart review patient  s/p right craniotomy for recurrent GBM on 2/27. At time of visit patient eating lunch. Patient reports good appetite and PO intake. No GI distress (nausea/vomiting/diarrhea/constipation) noted at this time. Denies chewing/swallowing difficulty on present diet (mechanical soft). Encouraged PO intake. Per nursing documentation patient with stage 2 sacral pressure injury.     Diet : Mechanical Soft, Consistent Carbohydrate (no snacks), 1200mL Fluid Restriction  PO intake:  < 50% [ ] 50-75% [ ]   % [x]  other :    Current Weight: 84.2kg (3/05), 78kg (2/12)  Weight Change: noted weight fluctuation, ?true weight gain versus bed-scale discrepancies    Pertinent Medications: MEDICATIONS  (STANDING):  chlorhexidine 4% Liquid 1 Application(s) Topical <User Schedule>  dexamethasone  Injectable 4 milliGRAM(s) IV Push every 6 hours  dextrose 50% Injectable 12.5 Gram(s) IV Push once  dextrose 50% Injectable 25 Gram(s) IV Push once  dextrose 50% Injectable 25 Gram(s) IV Push once  docusate sodium 100 milliGRAM(s) Oral three times a day  enoxaparin Injectable 40 milliGRAM(s) SubCutaneous daily  influenza   Vaccine 0.5 milliLiter(s) IntraMuscular once  insulin glargine Injectable (LANTUS) 28 Unit(s) SubCutaneous at bedtime  insulin lispro (HumaLOG) corrective regimen sliding scale   SubCutaneous three times a day before meals  insulin lispro (HumaLOG) corrective regimen sliding scale   SubCutaneous at bedtime  insulin lispro Injectable (HumaLOG) 8 Unit(s) SubCutaneous three times a day with meals  lacosamide 100 milliGRAM(s) Oral two times a day  levETIRAcetam 1500 milliGRAM(s) Oral two times a day  lisinopril 20 milliGRAM(s) Oral daily  pantoprazole    Tablet 40 milliGRAM(s) Oral before breakfast  potassium phosphate / sodium phosphate powder 1 Packet(s) Oral every 2 hours  senna 2 Tablet(s) Oral at bedtime    MEDICATIONS  (PRN):  acetaminophen   Tablet .. 650 milliGRAM(s) Oral every 6 hours PRN Mild Pain (1 - 3)  dextrose 40% Gel 15 Gram(s) Oral once PRN Blood Glucose LESS THAN 70 milliGRAM(s)/deciliter  glucagon  Injectable 1 milliGRAM(s) IntraMuscular once PRN Glucose LESS THAN 70 milligrams/deciliter  oxyCODONE    IR 5 milliGRAM(s) Oral every 4 hours PRN Moderate Pain (4 - 6)  oxyCODONE    IR 10 milliGRAM(s) Oral every 4 hours PRN Severe Pain (7 - 10)    Pertinent Labs:  03-05 Na135 mmol/L Glu 127 mg/dL<H> K+ 3.5 mmol/L Cr  0.54 mg/dL BUN 17 mg/dL 03-05 Phos 1.9 mg/dL<L> 02-13 [id number] 7.7 %<H>  CAPILLARY BLOOD GLUCOSE  POCT Blood Glucose.: 193 mg/dL (05 Mar 2019 11:49)  POCT Blood Glucose.: 151 mg/dL (05 Mar 2019 07:59)  POCT Blood Glucose.: 81 mg/dL (04 Mar 2019 22:37)  POCT Blood Glucose.: 159 mg/dL (04 Mar 2019 16:25)    Skin: stage 2 sacral pressure injury per nursing documentation  1+ generalized edema    Estimated Needs:   [x] no change since previous assessment  [ ] recalculated:     Previous Nutrition Diagnosis:   [x] Unintended Weight Loss  Nutrition Diagnosis is [ ] ongoing  [ ] resolved [x] not applicable     New Nutrition Diagnosis:  [x] Increased Nutrient Needs (protein, calories) related to increased demand for nutrients As evidenced by pressure injury.    Interventions:   1. Continue Mechanical Soft, Consistent Carbohydrate diet. 1200mL Fluid Restriction per medical team discretion.  2. Recommend No Carb Prosource (1pkg = 15 gm protein) 2x to promote wound healing.   3. Consider multivitamin daily for micronutrient coverage.  4. Please Encourage po intake, assist with meals and menu selections, provide alternatives PRN.     Monitoring and Evaluation:   1. Monitor weights, labs, BM's, skin integrity, p.o. intake.   2. Patient to meet > 75% estimated pro/kcal needs.   3. Tolerance to diet.   RD to remain available, Bridget Santana RD, CDN Pager #[phone]

## 2019-03-06 ENCOUNTER — INPATIENT (INPATIENT)
Facility: HOSPITAL | Age: 74
LOS: 19 days | Discharge: NURSING, REHAB OR LONG-TERM CARE | DRG: 949 | End: 2019-03-26
Attending: STUDENT IN AN ORGANIZED HEALTH CARE EDUCATION/TRAINING PROGRAM | Admitting: STUDENT IN AN ORGANIZED HEALTH CARE EDUCATION/TRAINING PROGRAM
Payer: MEDICARE

## 2019-03-06 VITALS
OXYGEN SATURATION: 96 % | DIASTOLIC BLOOD PRESSURE: 83 MMHG | HEIGHT: 70 IN | RESPIRATION RATE: 15 BRPM | HEART RATE: 57 BPM | TEMPERATURE: 98 F | WEIGHT: 180.12 LBS | SYSTOLIC BLOOD PRESSURE: 138 MMHG

## 2019-03-06 VITALS
SYSTOLIC BLOOD PRESSURE: 108 MMHG | DIASTOLIC BLOOD PRESSURE: 67 MMHG | TEMPERATURE: 98 F | RESPIRATION RATE: 17 BRPM | HEART RATE: 92 BPM | OXYGEN SATURATION: 100 %

## 2019-03-06 DIAGNOSIS — Z98.890 OTHER SPECIFIED POSTPROCEDURAL STATES: Chronic | ICD-10-CM

## 2019-03-06 DIAGNOSIS — C71.9 MALIGNANT NEOPLASM OF BRAIN, UNSPECIFIED: ICD-10-CM

## 2019-03-06 LAB
GLUCOSE BLDC GLUCOMTR-MCNC: 224 MG/DL — HIGH (ref 70–99)
GLUCOSE BLDC GLUCOMTR-MCNC: 231 MG/DL — HIGH (ref 70–99)
GLUCOSE BLDC GLUCOMTR-MCNC: 246 MG/DL — HIGH (ref 70–99)

## 2019-03-06 PROCEDURE — 99223 1ST HOSP IP/OBS HIGH 75: CPT

## 2019-03-06 PROCEDURE — 99233 SBSQ HOSP IP/OBS HIGH 50: CPT

## 2019-03-06 RX ORDER — OXYCODONE HYDROCHLORIDE 5 MG/1
1 TABLET ORAL
Qty: 0 | Refills: 0 | COMMUNITY
Start: 2019-03-06

## 2019-03-06 RX ORDER — PANTOPRAZOLE SODIUM 20 MG/1
40 TABLET, DELAYED RELEASE ORAL
Qty: 0 | Refills: 0 | Status: DISCONTINUED | OUTPATIENT
Start: 2019-03-06 | End: 2019-03-26

## 2019-03-06 RX ORDER — LACOSAMIDE 50 MG/1
100 TABLET ORAL EVERY 12 HOURS
Qty: 0 | Refills: 0 | Status: DISCONTINUED | OUTPATIENT
Start: 2019-03-06 | End: 2019-03-12

## 2019-03-06 RX ORDER — LISINOPRIL 2.5 MG/1
1 TABLET ORAL
Qty: 0 | Refills: 0 | COMMUNITY
Start: 2019-03-06

## 2019-03-06 RX ORDER — DEXAMETHASONE 0.5 MG/5ML
ELIXIR ORAL
Qty: 0 | Refills: 0 | Status: DISCONTINUED | OUTPATIENT
Start: 2019-03-06 | End: 2019-03-26

## 2019-03-06 RX ORDER — SENNA PLUS 8.6 MG/1
2 TABLET ORAL
Qty: 0 | Refills: 0 | COMMUNITY
Start: 2019-03-06

## 2019-03-06 RX ORDER — NYSTATIN CREAM 100000 [USP'U]/G
1 CREAM TOPICAL THREE TIMES A DAY
Qty: 0 | Refills: 0 | Status: DISCONTINUED | OUTPATIENT
Start: 2019-03-06 | End: 2019-03-06

## 2019-03-06 RX ORDER — LEVETIRACETAM 250 MG/1
2 TABLET, FILM COATED ORAL
Qty: 0 | Refills: 0 | COMMUNITY
Start: 2019-03-06

## 2019-03-06 RX ORDER — DEXAMETHASONE 0.5 MG/5ML
2 ELIXIR ORAL EVERY 12 HOURS
Qty: 0 | Refills: 0 | Status: DISCONTINUED | OUTPATIENT
Start: 2019-03-11 | End: 2019-03-26

## 2019-03-06 RX ORDER — DEXAMETHASONE 0.5 MG/5ML
2 ELIXIR ORAL EVERY 8 HOURS
Qty: 0 | Refills: 0 | Status: COMPLETED | OUTPATIENT
Start: 2019-03-08 | End: 2019-03-10

## 2019-03-06 RX ORDER — NYSTATIN CREAM 100000 [USP'U]/G
1 CREAM TOPICAL
Qty: 0 | Refills: 0 | COMMUNITY
Start: 2019-03-06

## 2019-03-06 RX ORDER — LEVETIRACETAM 250 MG/1
1500 TABLET, FILM COATED ORAL EVERY 12 HOURS
Qty: 0 | Refills: 0 | Status: DISCONTINUED | OUTPATIENT
Start: 2019-03-06 | End: 2019-03-26

## 2019-03-06 RX ORDER — INSULIN LISPRO 100/ML
8 VIAL (ML) SUBCUTANEOUS
Qty: 0 | Refills: 0 | Status: DISCONTINUED | OUTPATIENT
Start: 2019-03-06 | End: 2019-03-11

## 2019-03-06 RX ORDER — SENNA PLUS 8.6 MG/1
2 TABLET ORAL AT BEDTIME
Qty: 0 | Refills: 0 | Status: DISCONTINUED | OUTPATIENT
Start: 2019-03-06 | End: 2019-03-26

## 2019-03-06 RX ORDER — ACETAMINOPHEN 500 MG
2 TABLET ORAL
Qty: 0 | Refills: 0 | COMMUNITY
Start: 2019-03-06

## 2019-03-06 RX ORDER — LISINOPRIL 2.5 MG/1
20 TABLET ORAL DAILY
Qty: 0 | Refills: 0 | Status: DISCONTINUED | OUTPATIENT
Start: 2019-03-06 | End: 2019-03-26

## 2019-03-06 RX ORDER — NYSTATIN CREAM 100000 [USP'U]/G
1 CREAM TOPICAL
Qty: 0 | Refills: 0 | Status: DISCONTINUED | OUTPATIENT
Start: 2019-03-06 | End: 2019-03-26

## 2019-03-06 RX ORDER — OXYCODONE HYDROCHLORIDE 5 MG/1
5 TABLET ORAL EVERY 4 HOURS
Qty: 0 | Refills: 0 | Status: DISCONTINUED | OUTPATIENT
Start: 2019-03-06 | End: 2019-03-07

## 2019-03-06 RX ORDER — INSULIN GLARGINE 100 [IU]/ML
0 INJECTION, SOLUTION SUBCUTANEOUS
Qty: 0 | Refills: 0 | COMMUNITY
Start: 2019-03-06

## 2019-03-06 RX ORDER — METFORMIN HYDROCHLORIDE 850 MG/1
500 TABLET ORAL
Qty: 0 | Refills: 0 | Status: DISCONTINUED | OUTPATIENT
Start: 2019-03-06 | End: 2019-03-26

## 2019-03-06 RX ORDER — LACOSAMIDE 50 MG/1
1 TABLET ORAL
Qty: 0 | Refills: 0 | COMMUNITY
Start: 2019-03-06

## 2019-03-06 RX ORDER — LEVETIRACETAM 250 MG/1
1 TABLET, FILM COATED ORAL
Qty: 0 | Refills: 0 | COMMUNITY

## 2019-03-06 RX ORDER — DEXAMETHASONE 0.5 MG/5ML
2 ELIXIR ORAL
Qty: 0 | Refills: 0 | COMMUNITY
Start: 2019-03-06

## 2019-03-06 RX ORDER — ACETAMINOPHEN 500 MG
650 TABLET ORAL EVERY 6 HOURS
Qty: 0 | Refills: 0 | Status: DISCONTINUED | OUTPATIENT
Start: 2019-03-06 | End: 2019-03-26

## 2019-03-06 RX ORDER — PANTOPRAZOLE SODIUM 20 MG/1
1 TABLET, DELAYED RELEASE ORAL
Qty: 0 | Refills: 0 | COMMUNITY
Start: 2019-03-06

## 2019-03-06 RX ORDER — DOCUSATE SODIUM 100 MG
100 CAPSULE ORAL THREE TIMES A DAY
Qty: 0 | Refills: 0 | Status: DISCONTINUED | OUTPATIENT
Start: 2019-03-06 | End: 2019-03-10

## 2019-03-06 RX ORDER — DEXAMETHASONE 0.5 MG/5ML
3 ELIXIR ORAL EVERY 8 HOURS
Qty: 0 | Refills: 0 | Status: COMPLETED | OUTPATIENT
Start: 2019-03-06 | End: 2019-03-08

## 2019-03-06 RX ORDER — INSULIN GLARGINE 100 [IU]/ML
28 INJECTION, SOLUTION SUBCUTANEOUS AT BEDTIME
Qty: 0 | Refills: 0 | Status: DISCONTINUED | OUTPATIENT
Start: 2019-03-06 | End: 2019-03-11

## 2019-03-06 RX ORDER — INSULIN LISPRO 100/ML
VIAL (ML) SUBCUTANEOUS
Qty: 0 | Refills: 0 | Status: DISCONTINUED | OUTPATIENT
Start: 2019-03-06 | End: 2019-03-26

## 2019-03-06 RX ORDER — FINASTERIDE 5 MG/1
5 TABLET, FILM COATED ORAL DAILY
Qty: 0 | Refills: 0 | Status: DISCONTINUED | OUTPATIENT
Start: 2019-03-06 | End: 2019-03-26

## 2019-03-06 RX ORDER — DOCUSATE SODIUM 100 MG
1 CAPSULE ORAL
Qty: 0 | Refills: 0 | COMMUNITY
Start: 2019-03-06

## 2019-03-06 RX ORDER — DEXAMETHASONE 0.5 MG/5ML
3 ELIXIR ORAL EVERY 8 HOURS
Qty: 0 | Refills: 0 | Status: DISCONTINUED | OUTPATIENT
Start: 2019-03-06 | End: 2019-03-06

## 2019-03-06 RX ADMIN — Medication 100 MILLIGRAM(S): at 05:50

## 2019-03-06 RX ADMIN — METFORMIN HYDROCHLORIDE 500 MILLIGRAM(S): 850 TABLET ORAL at 18:08

## 2019-03-06 RX ADMIN — LEVETIRACETAM 1500 MILLIGRAM(S): 250 TABLET, FILM COATED ORAL at 05:50

## 2019-03-06 RX ADMIN — Medication 100 MILLIGRAM(S): at 13:03

## 2019-03-06 RX ADMIN — PANTOPRAZOLE SODIUM 40 MILLIGRAM(S): 20 TABLET, DELAYED RELEASE ORAL at 06:03

## 2019-03-06 RX ADMIN — Medication 4 MILLIGRAM(S): at 01:02

## 2019-03-06 RX ADMIN — Medication 8 UNIT(S): at 08:23

## 2019-03-06 RX ADMIN — Medication 3 MILLIGRAM(S): at 21:08

## 2019-03-06 RX ADMIN — NYSTATIN CREAM 1 APPLICATION(S): 100000 CREAM TOPICAL at 18:08

## 2019-03-06 RX ADMIN — LACOSAMIDE 100 MILLIGRAM(S): 50 TABLET ORAL at 05:50

## 2019-03-06 RX ADMIN — NYSTATIN CREAM 1 APPLICATION(S): 100000 CREAM TOPICAL at 12:32

## 2019-03-06 RX ADMIN — Medication 4: at 12:27

## 2019-03-06 RX ADMIN — INSULIN GLARGINE 28 UNIT(S): 100 INJECTION, SOLUTION SUBCUTANEOUS at 21:26

## 2019-03-06 RX ADMIN — LEVETIRACETAM 1500 MILLIGRAM(S): 250 TABLET, FILM COATED ORAL at 18:08

## 2019-03-06 RX ADMIN — Medication 1: at 08:23

## 2019-03-06 RX ADMIN — Medication 2: at 21:06

## 2019-03-06 RX ADMIN — Medication 8 UNIT(S): at 12:27

## 2019-03-06 RX ADMIN — Medication 3 MILLIGRAM(S): at 05:50

## 2019-03-06 RX ADMIN — LISINOPRIL 20 MILLIGRAM(S): 2.5 TABLET ORAL at 05:50

## 2019-03-06 RX ADMIN — LACOSAMIDE 100 MILLIGRAM(S): 50 TABLET ORAL at 18:08

## 2019-03-06 RX ADMIN — Medication 8 UNIT(S): at 19:41

## 2019-03-06 RX ADMIN — Medication 3 MILLIGRAM(S): at 13:03

## 2019-03-06 NOTE — PROGRESS NOTE ADULT - REASON FOR ADMISSION
Seizure

## 2019-03-06 NOTE — H&P ADULT - NSHPREVIEWOFSYSTEMS_GEN_ALL_CORE
Constitutional - No fever, No fatigue  HEENT - +visual impairment  Respiratory - No cough, No shortness of breath  Cardiovascular - No chest pain, No palpitations  Gastrointestinal - No abdominal pain, No nausea, No vomiting, No diarrhea, No constipation  Genitourinary - +Condom catheter  Neurological - No headaches, +loss of strength, No numbness  Musculoskeletal - No joint pain, No muscle pain  All other review of systems negative Constitutional - No fever, No fatigue  HEENT - + visual impairment  Respiratory - No cough, No shortness of breath  Cardiovascular - No chest pain, No palpitations  Gastrointestinal - No abdominal pain, No nausea, No vomiting, No diarrhea, No constipation  Genitourinary - + Condom catheter  Neurological - No headaches, +loss of strength, No numbness  Musculoskeletal - No joint pain, No muscle pain  All other review of systems negative

## 2019-03-06 NOTE — PROGRESS NOTE ADULT - SUBJECTIVE AND OBJECTIVE BOX
CHIEF COMPLAINT: Patient is a 74y old  male who presents with a chief complaint of Seizure (06 Mar 2019 04:41)      SUBJECTIVE / OVERNIGHT EVENTS:    Denies any complaints. Denies constipation. Denies SOB.    MEDICATIONS  (STANDING):  chlorhexidine 4% Liquid 1 Application(s) Topical <User Schedule>  dexamethasone     Tablet 3 milliGRAM(s) Oral every 8 hours  dextrose 50% Injectable 12.5 Gram(s) IV Push once  dextrose 50% Injectable 25 Gram(s) IV Push once  dextrose 50% Injectable 25 Gram(s) IV Push once  docusate sodium 100 milliGRAM(s) Oral three times a day  influenza   Vaccine 0.5 milliLiter(s) IntraMuscular once  insulin glargine Injectable (LANTUS) 28 Unit(s) SubCutaneous at bedtime  insulin lispro (HumaLOG) corrective regimen sliding scale   SubCutaneous three times a day before meals  insulin lispro (HumaLOG) corrective regimen sliding scale   SubCutaneous at bedtime  insulin lispro Injectable (HumaLOG) 8 Unit(s) SubCutaneous three times a day with meals  lacosamide 100 milliGRAM(s) Oral two times a day  levETIRAcetam 1500 milliGRAM(s) Oral two times a day  lisinopril 20 milliGRAM(s) Oral daily  pantoprazole    Tablet 40 milliGRAM(s) Oral before breakfast  senna 2 Tablet(s) Oral at bedtime    MEDICATIONS  (PRN):  acetaminophen   Tablet .. 650 milliGRAM(s) Oral every 6 hours PRN Mild Pain (1 - 3)  dextrose 40% Gel 15 Gram(s) Oral once PRN Blood Glucose LESS THAN 70 milliGRAM(s)/deciliter  glucagon  Injectable 1 milliGRAM(s) IntraMuscular once PRN Glucose LESS THAN 70 milligrams/deciliter  nystatin Powder 1 Application(s) Topical three times a day PRN candida  oxyCODONE    IR 5 milliGRAM(s) Oral every 4 hours PRN Moderate Pain (4 - 6)  oxyCODONE    IR 10 milliGRAM(s) Oral every 4 hours PRN Severe Pain (7 - 10)      VITALS:  T(F): 97.5 (19 @ 10:10), Max: 98.6 (19 @ 13:28)  HR: 89 (19 @ 10:10) (60 - 96)  BP: 108/67 (19 @ 10:10) (86/60 - 138/83)  RR: 18 (19 @ 10:10) (16 - 18)  SpO2: 96% (19 @ 10:10)      CAPILLARY BLOOD GLUCOSE    Output     I&O's Summary  T(F): 97.5 (19 @ 10:10), Max: 98.6 (19 @ 13:28)  HR: 89 (19 @ 10:10) (60 - 96)  BP: 108/67 (19 @ 10:10) (86/60 - 138/83)  RR: 18 (19 @ 10:10) (16 - 18)  SpO2: 96% (19 @ 10:10)    PHYSICAL EXAM:  GENERAL: NAD, well-developed  HEAD:  R scalp incision C/D/I  EYES: EOMI  NECK: Supple, No JVD  CHEST/LUNG: nonlabored breathing  HEART: nl S1/S2  ABDOMEN: nondistended, soft  EXTREMITIES:  no LE edema  PSYCH: alert, answering questions appropriately  NEUROLOGY: non-focal  SKIN: No rashes noted    LABS:              12.2                 135  | 27   | 17           20.11 >-----------< 117     ------------------------< 127                   36.5                 3.5  | 97   | 0.54                                         Ca 8.4   Mg 1.6   Ph 1.9                Urinalysis Basic - ( 05 Mar 2019 22:20 )    Color: LIGHT ORANGE / Appearance: Lt TURBID / S.024 / pH: 6.0  Gluc: 200 / Ketone: NEGATIVE  / Bili: NEGATIVE / Urobili: LARGE   Blood: LARGE / Protein: 50 / Nitrite: NEGATIVE   Leuk Esterase: LARGE / RBC: >50 / WBC >50   Sq Epi: OCC / Non Sq Epi: x / Bacteria: MANY            MICROBIOLOGY:        RADIOLOGY & ADDITIONAL TESTS:    Imaging Personally Reviewed:    < from: MR Head w/wo IV Cont (19 @ 11:57) >  IMPRESSION:    Unchanged right pterional craniotomy with right temporal lobe resection   cavity with hemorrhagic fluid, right holohemispheric subdural extension   involving the right tentorial leaflet, bifrontal pneumocephalus, with   residual enhancement along the cavity margin, and adjacent restricted   diffusion, new ischemia not excluded. There is unchanged mild effacement   of the right cerebral hemisphere and lateral ventricle, with fluid fluid   levels in  [place] suggesting hemorrhagic/proteinaceous debris layering and   enhancement of the ependymal margins, there is no new midline shift.    < end of copied text >        [x] Care Discussed with Consultants/Other Providers:      Leroy Jones M.D.  Hospitalist  Pager [phone]

## 2019-03-06 NOTE — H&P ADULT - NSHPPHYSICALEXAM_GEN_ALL_CORE
Constitutional - NAD, Comfortable  HEENT - Right cranial incision with staples C/D/I, Limited eye movement to left  Chest - Normal chest wall expansion, No increased work of breathing  Cardiovascular - All extremities well perfused, S1S2  Abdomen - Soft, Non-distended, Non-tender  Extremities - No bilateral lower extremity edema, No calf tenderness   Neurologic Exam -                    Cognitive - Awake, Alert, Oriented to self, place, year, month, not date or situation     Communication - Fluent, No dysarthria, Naming intact     Attention - Impaired, unable to perform days of week backwards and serial 7's     Memory - Impaired, unable to recall 3/3 items after 3 minutes without cueing (only got 1/3 with cueing)     Cranial Nerves - Impaired left visual fields bilaterally, Limited leftward eye movements     Motor -                     LEFT    UE - ShAB 5/5, EF 5/5, EE 5/5, WE 5/5,  5/5                    RIGHT UE - ShAB 5/5, EF 5/5, EE 5/5, WE 5/5,  5/5                    LEFT    LE - HF 5/5, KE 5/5, KF 4/5, DF 5/5, PF 5/5                    RIGHT LE - HF 5/5, KE 5/5, KF 4/5, DF 5/5, PF 5/5        Sensory - Impaired on left sided extremities, Impaired extinction in left upper > lower extremity     Reflexes - DTR intact and symmetrical     Coordination - Finger-to-nose impaired when attempted on left of patient's body  Psychiatric - Flat affect  Skin - 2.5 x 3cm sacral stage II ulcer with surrounding moisture associated skin breakdown especially left medial thigh   - Condom catheter    CURRENT FUNCTIONAL STATUS:  Bed mobility - Min A x 2  Ambulation - Bed to chair with Min A x 2 using RW   ADL's - LE dressing Min A Constitutional - NAD, Comfortable  HEENT - Right cranial incision with staples C/D/I, Limited eye movement to left  Chest - Normal chest wall expansion, No increased work of breathing  Cardiovascular - All extremities well perfused, S1S2  Abdomen - Soft, Non-distended, Non-tender  Extremities - No bilateral lower extremity edema, No calf tenderness   Neurologic Exam -                    Cognitive - Awake, Alert, Oriented to self, place, year, month, not date or situation     Communication - Fluent, No dysarthria, Naming intact     Attention - Impaired, unable to perform days of week backwards and serial 7's     Memory - Impaired, unable to recall 3/3 items after 3 minutes without cueing (only got 1/3 with cueing)     Cranial Nerves - Right eye reactivity sluggish, Impaired left visual fields bilaterally, Limited leftward eye movements     Motor -                     LEFT    UE - ShAB 5/5, EF 5/5, EE 5/5, WE 5/5,  5/5                    RIGHT UE - ShAB 5/5, EF 5/5, EE 5/5, WE 5/5,  5/5                    LEFT    LE - HF 5/5, KE 5/5, KF 4/5, DF 5/5, PF 5/5                    RIGHT LE - HF 5/5, KE 5/5, KF 4/5, DF 5/5, PF 5/5        Sensory - Impaired on left sided extremities, Impaired extinction in left upper > lower extremity     Reflexes - DTR intact and symmetrical     Coordination - Finger-to-nose impaired when attempted on left of patient's body  Psychiatric - Flat affect  Skin - 2.5 x 3cm sacral stage II ulcer with surrounding moisture associated skin breakdown especially left medial thigh   - Condom catheter    CURRENT FUNCTIONAL STATUS:  Bed mobility - Min A x 2  Ambulation - Bed to chair with Min A x 2 using RW   ADL's - LE dressing Min A Constitutional - NAD, Comfortable  HEENT - Right cranial incision with staples C/D/I, Limited eye movement to left  Chest - Normal chest wall expansion, No increased work of breathing  Cardiovascular - All extremities well perfused, S1S2  Abdomen - Soft, Non-distended, Non-tender  Extremities - No bilateral lower extremity edema, No calf tenderness   Neurologic Exam -                    Cognitive - Awake, Alert, Oriented to self, place, year, month, not date or situation     Communication - Fluent, No dysarthria, Naming intact     Attention - Impaired, unable to perform days of week backwards and serial 7's     Memory - Impaired, unable to recall 3/3 items after 3 minutes without cueing (only got 1/3 with cueing)     Cranial Nerves - Right eye reactivity sluggish, Impaired left visual field, Left visual inattention     Motor -                     LEFT    UE - ShAB 5/5, EF 5/5, EE 5/5, WE 5/5,  5/5                    RIGHT UE - ShAB 5/5, EF 5/5, EE 5/5, WE 5/5,  5/5                    LEFT    LE - HF 5/5, KE 5/5, KF 4/5, DF 5/5, PF 5/5                    RIGHT LE - HF 5/5, KE 5/5, KF 4/5, DF 5/5, PF 5/5        Sensory - Impaired on left sided extremities,  extinguishes in left upper > lower extremity     Reflexes - DTR intact and symmetrical     Coordination -FTN accurate bilaterally when tested on pt's right.   Finger-to-nose impaired when attempted on left of patient's body--(due to visual deficit)  Psychiatric - Flat affect  Skin - 2.5 x 3cm stage sacral II ulcer with surrounding erythema, scant sanguinous drainage, no slough or pus. Also has moisture associated skin breakdown on left medial thigh   - Condom catheter    CURRENT FUNCTIONAL STATUS:  Bed mobility - Min A x 2  Ambulation - Bed to chair with Min A x 2 using RW   ADL's - LE dressing Min A

## 2019-03-06 NOTE — PROGRESS NOTE ADULT - ASSESSMENT
72 YO male s/p right craniotomy for recurrent GBM with postop seizures, stable hemorrhage in operative bed

## 2019-03-06 NOTE — PROGRESS NOTE ADULT - PROBLEM SELECTOR PLAN 1
Awaiting rehab placement  Will follow up Urine cultures  Neuro checks q 4  Decadron decreased to 3mg q 8  Patient to start chemo/RT after rehab Awaiting rehab placement  Will follow up Urine cultures  Neuro checks q 4  Decadron decreased to 3mg q 8  Patient to start chemo/RT after rehab  Will hold Lovenox until 3/10 (1 week post op) per Dr. Gonzalez given hemorrhage  in op bed- stable on last CT head

## 2019-03-06 NOTE — H&P ADULT - ASSESSMENT
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 He is now with functional, gait, ADL, transfer, balance, endurance, cognitive, swallow impairments.    ~Recurrent GBM s/p craniotomy and resection  -Start 3 hours of comprehensive therapy consisting of PT/OT/SLP/Neuropsychology  -Precautions: fall, cardiac, seizure, aspiration  -Decadron taper (3mg Q8hrs x 2 days, 2mg Q8hrs x 2 days, then 2mg Q12hrs)  -Seizure PPx with Keppra and Vimpat  -To follow up with neuro-oncology as outpatient    ~DM   -Monitor accuchecks  -Continue Lantus and premeal insulin  -Start Metformin 500mg BID  -Consistent carb diet    ~Pain control  -Tylenol and Oxycodone PRN    ~HTN  -Continue Lisinopril    ~Hyponatremia  -Improved  -Continue fluid restriction    ~Leukocytosis  -Afebrile, asymptomatic  -Check CBC in AM    ~Anemia  -Hgb stable, no obvious signs of bleeding  -Check CBC in AM    ~ regimen  -Continue condom catheter  -Start Finasteride    ~GI/Bowel regimen  -Continue Protonix  -Continue Colace and Senna  -Diet: Mechanical soft (consistent carb)  -Restorative dining  -Supervise meals    ~Skin/Stage II sacral ulcer  -Turn and position Q2hrs  -OOB with PT/OT  -Nystatin to groin    ~DVT PPx  -Mobilization  -To possibly start chemical prophylaxis on 3/10/19    Prescreen Comparison: I have reviewed the prescreen information and I found no relevant changes between the preadmission screening and my post admission evaluation.     Medical Prognosis - good	                                        Rehab potential - Good    Expected Therapy:   P.T.      1  hrs/day           O. T.   1   hrs/day           S.L.P.     1  hrs/day        Expected Frequency: 5 days/7 day period    Estimated Disposition:  home with home therapies                           ELOS:  14-21 days    Rationale For Inpatient Rehab Admission- Patient demonstrates the following:   [X] Medically appropriate for rehabilitation admission   [X] Has attainable rehab goals with an appropriate discharge plan  [X] Has rehabilitation potential (expected to make significant improvement within a reasonable period of time)  [X] Requires close medical management by a rehab physician, rehab nursing care and comprehensive interdisciplinary team (including PT, OT, SLP and/or prosthetics and orthotics)

## 2019-03-06 NOTE — PROGRESS NOTE ADULT - SUBJECTIVE AND OBJECTIVE BOX
No issues overnight    Vital Signs Last 24 Hrs  T(C): 36.9 (05 Mar 2019 21:29), Max: 37 (05 Mar 2019 13:28)  T(F): 98.4 (05 Mar 2019 21:29), Max: 98.6 (05 Mar 2019 13:28)  HR: 60 (06 Mar 2019 01:15) (60 - 96)  BP: 138/83 (06 Mar 2019 01:15) (86/60 - 168/103)  BP(mean): --  RR: 18 (06 Mar 2019 01:15) (16 - 20)  SpO2: 100% (06 Mar 2019 00:33) (98% - 100%)    AAO X 2  Mild left facial weakness, otherwise CN 2-12 grossly intact  ERWIN, right UE/LE 5/5  Left UE 4-/5 with hand slightly contracted LLE 4/5  SILT  Right temporal swelling unchanged    MEDICATIONS  (STANDING):  chlorhexidine 4% Liquid 1 Application(s) Topical <User Schedule>  dexamethasone  Injectable 4 milliGRAM(s) IV Push every 6 hours  dextrose 50% Injectable 12.5 Gram(s) IV Push once  dextrose 50% Injectable 25 Gram(s) IV Push once  dextrose 50% Injectable 25 Gram(s) IV Push once  docusate sodium 100 milliGRAM(s) Oral three times a day  enoxaparin Injectable 40 milliGRAM(s) SubCutaneous daily  influenza   Vaccine 0.5 milliLiter(s) IntraMuscular once  insulin glargine Injectable (LANTUS) 28 Unit(s) SubCutaneous at bedtime  insulin lispro (HumaLOG) corrective regimen sliding scale   SubCutaneous three times a day before meals  insulin lispro (HumaLOG) corrective regimen sliding scale   SubCutaneous at bedtime  insulin lispro Injectable (HumaLOG) 8 Unit(s) SubCutaneous three times a day with meals  lacosamide 100 milliGRAM(s) Oral two times a day  levETIRAcetam 1500 milliGRAM(s) Oral two times a day  lisinopril 20 milliGRAM(s) Oral daily  pantoprazole    Tablet 40 milliGRAM(s) Oral before breakfast  senna 2 Tablet(s) Oral at bedtime    MEDICATIONS  (PRN):  acetaminophen   Tablet .. 650 milliGRAM(s) Oral every 6 hours PRN Mild Pain (1 - 3)  dextrose 40% Gel 15 Gram(s) Oral once PRN Blood Glucose LESS THAN 70 milliGRAM(s)/deciliter  glucagon  Injectable 1 milliGRAM(s) IntraMuscular once PRN Glucose LESS THAN 70 milligrams/deciliter  oxyCODONE    IR 5 milliGRAM(s) Oral every 4 hours PRN Moderate Pain (4 - 6)  oxyCODONE    IR 10 milliGRAM(s) Oral every 4 hours PRN Severe Pain (7 - 10)                          11.2   16.82 )-----------( 124      ( 04 Mar 2019 02:40 )             34.0     03-04    138  |  98  |  16  ----------------------------<  160<H>  4.0   |  29  |  0.69    Ca    8.4      04 Mar 2019 02:47  Phos  2.0     03-04  Mg     1.7     03-04

## 2019-03-06 NOTE — H&P ADULT - ATTENDING COMMENTS
Pt. seen 3/6/19 on admission with fellow .  Agree with documentation above as per fellow with amendments made as appropriate. Patient medically stable.   --wound gel for sacral ulcer

## 2019-03-06 NOTE — PROGRESS NOTE ADULT - PROVIDER SPECIALTY LIST ADULT
Anesthesia
Hospitalist
Infectious Disease
Infectious Disease
Neurology
Neurosurgery
SICU
Hospitalist

## 2019-03-06 NOTE — H&P ADULT - NSHPLABSRESULTS_GEN_ALL_CORE
12.2   20.11 )-----------( 117      ( 05 Mar 2019 02:40 )             36.5     135  |  97<L>  |  17  ----------------------------<  127<H>  3.5   |  27  |  0.54    Ca    8.4      05 Mar 2019 02:40  Phos  1.9     03-05  Mg     1.6     03-05    U/A (3/5/19) - Nitrite: Negative, Leuk Esterase: Large, WBC >50, Bacteria: Many

## 2019-03-06 NOTE — H&P ADULT - HISTORY OF PRESENT ILLNESS
74 year-old man with PMH GBM s/p R craniotomy 2019, CVA w/ residual L sided weakness,  presents with seizures likely from recurrence of R temporal lobe GBM complicated by sepsis from viral URI, acute embolic CVA, PFO, LLE DVT s/p IVC, and SAH.     Pt was BIBEMS from nursing home for active seizure 2/12/19. Per ED he was actively seizing for 30 mins prior to EMS arrival, and displayed L sided face, arm, and leg jerking in the ED. Pt received Ativan and keppra and seizure broke after 20 mins in the ED. On admission, pt was found to be febrile with leukocytosis 31.56 meeting SIRS criteria and was found to have + coronavirus. Neurology and neurosurgery were consulted with the plan to have repeat craniotomy and GBM resection pending bacteremia clearance. On 2/15 rapid responses called for pt due to 3 repeat seizures and unresponsiveness with new L facial droop- given Ativan and keppra per neurosurgery reccomendations. MRI 2/15/29 showed 3 discrete foci of acute infarction within the right occipital lobe, left corpus callosum within the genu and left frontal lobe in the   region of the precentral gyrus. Pt placed on heparin gtt for anticoagulation during workup. Dopper US showed acute DVT of L common femoral, popliteal, posterior tibial, and peroneal vein, and ALBERTO 2/20/19 showed evidence of a PFO. Pt had placement of IVC filter on 2/19/19 and heparin gtt was discontinued in preparation for neurosurgical procedure which was scheduled for 2/25 but was delayed due to episodes of bradycardia in 40s. Pt had R craniotomy for resection of brain tumor on 2/27/19. Post/op course c/b seizures 2/2 SAH on CT 3/2 for which pt was given Keppra and vimpat, and transferred to SICU for further monitoring. Pt seizures and hemorrhage stabilized and plan for discharge to rehab. On the day of discharge he was medically and neurologically stable for discharge to rehab on 3/6/19. Mr. Duncan is a 74 year old male with a PMHx of GBM s/p right craniotomy in 2019, CVA with residual L sided weakness who presented to [facility] on 2/12/19 with seizures likely from recurrence of righ temporal lobe GBM complicated by sepsis from viral URI, acute embolic CVA, PFO, LLE DVT s/p IVC, and SAH.     Pt was BIBEMS from nursing home for active seizure 2/12/19. Per ED he was actively seizing for 30 mins prior to EMS arrival, and displayed left sided face, arm, and leg jerking in the ED. Pt received Ativan and Keppra and seizure broke after 20 mins in the ED. On admission, pt was found to be febrile with leukocytosis 31.56 meeting SIRS criteria and was found to have +coronavirus. Neurology and neurosurgery were consulted with the plan to have repeat craniotomy and GBM resection pending bacteremia clearance. On 2/15 rapid responses called for pt due to 3 repeat seizures and unresponsiveness with new left facial droop- given Ativan and Keppra per neurosurgery recommendations MRI 2/15/29 showed 3 discrete foci of acute infarction within the right occipital lobe, left corpus callosum within the genu and left frontal lobe in the region of the precentral gyrus. Pt placed on heparin gtt for anticoagulation during workup. Doppler U/S showed acute DVT of left common femoral, popliteal, posterior tibial, and peroneal vein, and ALBERTO 2/20/19 showed evidence of a PFO. Pt had placement of IVC filter on 2/19/19 and heparin gtt was discontinued in preparation for neurosurgical procedure which was scheduled for 2/25 but was delayed due to episodes of bradycardia in 40s. Pt had R craniotomy for resection of brain tumor on 2/27/19. Post/op course c/b seizures 2/2 SAH on CT 3/2 for which pt was given Keppra and Vimpat, and transferred to SICU for further monitoring. Pt seizures and hemorrhage stabilized and plan for discharge to rehab. On the day of discharge he was medically and neurologically stable for discharge to rehab on 3/6/19. Mr. Duncan is a 74 year old male with a PMHx of GBM s/p right craniotomy in 2019, CVA with residual L sided weakness who presented to [facility] on 2/12/19 with seizures likely from recurrence of righ temporal lobe GBM complicated by sepsis from viral URI, acute embolic CVA, PFO, LLE DVT s/p IVC, and SAH.     Pt was BIBEMS from nursing home for active seizure 2/12/19. Per ED he was actively seizing for 30 mins prior to EMS arrival, and displayed left sided face, arm, and leg jerking in the ED. Pt received Ativan and Keppra and seizure broke after 20 mins in the ED.   On admission, pt was found to be febrile with leukocytosis 31.56 meeting SIRS criteria and was found to have +coronavirus. Neurology and neurosurgery were consulted with the plan to have repeat craniotomy and GBM resection pending bacteremia clearance.   On 2/15 rapid responses called for pt due to 3 repeat seizures and unresponsiveness with new left facial droop- given Ativan and Keppra per neurosurgery recommendations MRI 2/15/29 showed 3 discrete foci of acute infarction within the right occipital lobe, left corpus callosum within the genu and left frontal lobe in the region of the precentral gyrus. Pt placed on heparin gtt for anticoagulation during workup. Doppler U/S showed acute DVT of left common femoral, popliteal, posterior tibial, and peroneal vein, and ALBERTO 2/20/19 showed evidence of a PFO. Pt had placement of IVC filter on 2/19/19 and heparin gtt was discontinued in preparation for neurosurgical procedure which was scheduled for 2/25 but was delayed due to episodes of bradycardia in 40s. Pt had R craniotomy for resection of brain tumor on 2/27/19. Post/op course c/b seizures 2/2 SAH on CT 3/2 for which pt was given Keppra and Vimpat, and transferred to SICU for further monitoring. Pt seizures and hemorrhage stabilized and plan for discharge to rehab. On the day of discharge he was medically and neurologically stable for discharge to rehab on 3/6/19.

## 2019-03-06 NOTE — PROGRESS NOTE ADULT - ASSESSMENT
73 yo M HTN, DM2, CVA w/ residual L weakness, GBM s/p Rt craniotomy 2018, BPH presents with seizures likely from recurrence of GBM, course complicated by sepsis POA from viral URI, acute embolic CVA, PFO, LLE DVT s/p IVC. Today with concern for seizure activity, underwent CT scan concerning for post-op intracranial hemorrhage. Being transferred to SICU for further monitoring/management.     # Intracranial hemorrhage  - c/w steroids/anti-epileptic regimen as per neuro/neurosx/SICU  - on SC lovenox for DVT ppx    # Seizures - 2/2 GBM, intracranial hemorrhage  - seizure precautions, AED therapy as per neuro/SICU    # GBM  - post-op managemennt as per sx  - likely will need onc and rad/onc eval for further cancer management.      # Hyponatremia  - mild, likely SIADH in setting of recent intracranial surgery  - c/w fluid restriction, monitor Na    # type 2 diabetes mellitus with hyperglycemia, on insulin therapy  - fingersticks not well controlled in setting of surgery and decadron use  - c/w basal/bolus insulin with sliding scale coverage  - continue to monitor FS and adjust regimen as necessary    # DVT of LLE  - holding full dose anticoagulation given intracranial hemorrhage  - s/p IVC filter  - once deemed safe neurosurgically, recommend full dose AC - likely a good candidate for NOAC    # CVA  - holding antiplatelet/full dose anticoagulation at this time given intracranial hemorrhage

## 2019-03-06 NOTE — CHART NOTE - NSCHARTNOTEFT_GEN_A_CORE
74year old male s/p Crani for resection of recurrent Glioblastoma. He is being discharged to [facility] today at 2pm. He will Follow up with Dr. Grace as an outpatient for rehab. His appointment is scheduled for 3/20/10 at 1:30pm @ [address] [place] [address]. Dr. Grace's phone number is [phone].

## 2019-03-06 NOTE — H&P ADULT - PMH
BPH (benign prostatic hyperplasia)    CVA (cerebral vascular accident)    Diabetes    Glioblastoma    HTN (hypertension)

## 2019-03-06 NOTE — PROGRESS NOTE ADULT - PROBLEM SELECTOR PROBLEM 1
Glioblastoma
Glioblastoma
Glioblastoma multiforme of brain
Glioblastoma multiforme of brain
Bacteremia
Glioblastoma
Glioblastoma multiforme of brain
S/P craniotomy
Seizure
Glioblastoma
Seizure
Seizure

## 2019-03-06 NOTE — H&P ADULT - NSHPSOCIALHISTORY_GEN_ALL_CORE
Left hand dominant  Patient lives alone in an apartment in [place]. Reports that his son (Demetrio) lives nearby  Denies previous tobacco or EtOH use  Previously working as   10th grade education Left hand dominant  Patient lives alone in an apartment in [place]. Reports that his son (Demetrio) lives nearby  Denies previous tobacco or EtOH use  Previously working as -Retired  10th grade education

## 2019-03-07 PROBLEM — K59.00 CONSTIPATION, UNSPECIFIED: Chronic | Status: INACTIVE | Noted: 2019-02-13 | Resolved: 2019-03-06

## 2019-03-07 PROBLEM — E56.9 VITAMIN DEFICIENCY, UNSPECIFIED: Chronic | Status: INACTIVE | Noted: 2019-02-13 | Resolved: 2019-03-06

## 2019-03-07 PROBLEM — I26.99 OTHER PULMONARY EMBOLISM WITHOUT ACUTE COR PULMONALE: Chronic | Status: INACTIVE | Noted: 2019-02-13 | Resolved: 2019-03-06

## 2019-03-07 PROBLEM — Z99.81 DEPENDENCE ON SUPPLEMENTAL OXYGEN: Chronic | Status: INACTIVE | Noted: 2019-02-13 | Resolved: 2019-03-06

## 2019-03-07 LAB
ALBUMIN SERPL ELPH-MCNC: 2.4 G/DL — LOW (ref 3.3–5)
ALP SERPL-CCNC: 93 U/L — SIGNIFICANT CHANGE UP (ref 40–120)
ALT FLD-CCNC: 31 U/L DA — SIGNIFICANT CHANGE UP (ref 10–45)
ANION GAP SERPL CALC-SCNC: 8 MMOL/L — SIGNIFICANT CHANGE UP (ref 5–17)
APPEARANCE UR: ABNORMAL
AST SERPL-CCNC: 20 U/L — SIGNIFICANT CHANGE UP (ref 10–40)
BASOPHILS # BLD AUTO: 0.1 K/UL — SIGNIFICANT CHANGE UP (ref 0–0.2)
BASOPHILS NFR BLD AUTO: 0.5 % — SIGNIFICANT CHANGE UP (ref 0–2)
BILIRUB SERPL-MCNC: 0.5 MG/DL — SIGNIFICANT CHANGE UP (ref 0.2–1.2)
BILIRUB UR-MCNC: NEGATIVE — SIGNIFICANT CHANGE UP
BUN SERPL-MCNC: 12 MG/DL — SIGNIFICANT CHANGE UP (ref 7–23)
CALCIUM SERPL-MCNC: 8.4 MG/DL — SIGNIFICANT CHANGE UP (ref 8.4–10.5)
CHLORIDE SERPL-SCNC: 99 MMOL/L — SIGNIFICANT CHANGE UP (ref 96–108)
CO2 SERPL-SCNC: 29 MMOL/L — SIGNIFICANT CHANGE UP (ref 22–31)
COLOR SPEC: ABNORMAL
CREAT SERPL-MCNC: 0.74 MG/DL — SIGNIFICANT CHANGE UP (ref 0.5–1.3)
DIFF PNL FLD: ABNORMAL
EOSINOPHIL # BLD AUTO: 0.1 K/UL — SIGNIFICANT CHANGE UP (ref 0–0.5)
EOSINOPHIL NFR BLD AUTO: 1 % — SIGNIFICANT CHANGE UP (ref 0–6)
GLUCOSE BLDC GLUCOMTR-MCNC: 155 MG/DL — HIGH (ref 70–99)
GLUCOSE BLDC GLUCOMTR-MCNC: 187 MG/DL — HIGH (ref 70–99)
GLUCOSE BLDC GLUCOMTR-MCNC: 192 MG/DL — HIGH (ref 70–99)
GLUCOSE BLDC GLUCOMTR-MCNC: 97 MG/DL — SIGNIFICANT CHANGE UP (ref 70–99)
GLUCOSE SERPL-MCNC: 154 MG/DL — HIGH (ref 70–99)
GLUCOSE UR QL: NEGATIVE — SIGNIFICANT CHANGE UP
HCT VFR BLD CALC: 37.3 % — LOW (ref 39–50)
HGB BLD-MCNC: 12.2 G/DL — LOW (ref 13–17)
KETONES UR-MCNC: NEGATIVE — SIGNIFICANT CHANGE UP
LEUKOCYTE ESTERASE UR-ACNC: ABNORMAL
LYMPHOCYTES # BLD AUTO: 1 K/UL — SIGNIFICANT CHANGE UP (ref 1–3.3)
LYMPHOCYTES # BLD AUTO: 3 % — LOW (ref 13–44)
MCHC RBC-ENTMCNC: 30.9 PG — SIGNIFICANT CHANGE UP (ref 27–34)
MCHC RBC-ENTMCNC: 32.7 GM/DL — SIGNIFICANT CHANGE UP (ref 32–36)
MCV RBC AUTO: 94.6 FL — SIGNIFICANT CHANGE UP (ref 80–100)
MONOCYTES # BLD AUTO: 0.9 K/UL — SIGNIFICANT CHANGE UP (ref 0–0.9)
MONOCYTES NFR BLD AUTO: 6 % — SIGNIFICANT CHANGE UP (ref 2–14)
NEUTROPHILS # BLD AUTO: 17 K/UL — HIGH (ref 1.8–7.4)
NEUTROPHILS NFR BLD AUTO: 90 % — HIGH (ref 43–77)
NITRITE UR-MCNC: NEGATIVE — SIGNIFICANT CHANGE UP
PH UR: 7 — SIGNIFICANT CHANGE UP (ref 5–8)
PLATELET # BLD AUTO: 122 K/UL — LOW (ref 150–400)
POTASSIUM SERPL-MCNC: 3.8 MMOL/L — SIGNIFICANT CHANGE UP (ref 3.5–5.3)
POTASSIUM SERPL-SCNC: 3.8 MMOL/L — SIGNIFICANT CHANGE UP (ref 3.5–5.3)
PROT SERPL-MCNC: 5.8 G/DL — LOW (ref 6–8.3)
PROT UR-MCNC: 100
RBC # BLD: 3.95 M/UL — LOW (ref 4.2–5.8)
RBC # FLD: 15.1 % — HIGH (ref 10.3–14.5)
SODIUM SERPL-SCNC: 136 MMOL/L — SIGNIFICANT CHANGE UP (ref 135–145)
SP GR SPEC: 1.01 — SIGNIFICANT CHANGE UP (ref 1.01–1.02)
SPECIMEN SOURCE: SIGNIFICANT CHANGE UP
UROBILINOGEN FLD QL: 4
WBC # BLD: 19.1 K/UL — HIGH (ref 3.8–10.5)
WBC # FLD AUTO: 19.1 K/UL — HIGH (ref 3.8–10.5)

## 2019-03-07 PROCEDURE — 99232 SBSQ HOSP IP/OBS MODERATE 35: CPT

## 2019-03-07 PROCEDURE — 99223 1ST HOSP IP/OBS HIGH 75: CPT

## 2019-03-07 PROCEDURE — 90791 PSYCH DIAGNOSTIC EVALUATION: CPT

## 2019-03-07 PROCEDURE — 90832 PSYTX W PT 30 MINUTES: CPT

## 2019-03-07 RX ORDER — ASCORBIC ACID 60 MG
500 TABLET,CHEWABLE ORAL DAILY
Qty: 0 | Refills: 0 | Status: DISCONTINUED | OUTPATIENT
Start: 2019-03-07 | End: 2019-03-26

## 2019-03-07 RX ADMIN — METFORMIN HYDROCHLORIDE 500 MILLIGRAM(S): 850 TABLET ORAL at 05:28

## 2019-03-07 RX ADMIN — Medication 8 UNIT(S): at 12:01

## 2019-03-07 RX ADMIN — NYSTATIN CREAM 1 APPLICATION(S): 100000 CREAM TOPICAL at 18:19

## 2019-03-07 RX ADMIN — Medication 3 MILLIGRAM(S): at 05:28

## 2019-03-07 RX ADMIN — Medication 1: at 07:40

## 2019-03-07 RX ADMIN — Medication 1: at 17:14

## 2019-03-07 RX ADMIN — Medication 3 MILLIGRAM(S): at 12:02

## 2019-03-07 RX ADMIN — LEVETIRACETAM 1500 MILLIGRAM(S): 250 TABLET, FILM COATED ORAL at 18:19

## 2019-03-07 RX ADMIN — NYSTATIN CREAM 1 APPLICATION(S): 100000 CREAM TOPICAL at 05:32

## 2019-03-07 RX ADMIN — Medication 8 UNIT(S): at 07:40

## 2019-03-07 RX ADMIN — LEVETIRACETAM 1500 MILLIGRAM(S): 250 TABLET, FILM COATED ORAL at 05:28

## 2019-03-07 RX ADMIN — INSULIN GLARGINE 28 UNIT(S): 100 INJECTION, SOLUTION SUBCUTANEOUS at 22:27

## 2019-03-07 RX ADMIN — METFORMIN HYDROCHLORIDE 500 MILLIGRAM(S): 850 TABLET ORAL at 18:19

## 2019-03-07 RX ADMIN — LACOSAMIDE 100 MILLIGRAM(S): 50 TABLET ORAL at 05:27

## 2019-03-07 RX ADMIN — FINASTERIDE 5 MILLIGRAM(S): 5 TABLET, FILM COATED ORAL at 12:03

## 2019-03-07 RX ADMIN — LISINOPRIL 20 MILLIGRAM(S): 2.5 TABLET ORAL at 05:28

## 2019-03-07 RX ADMIN — Medication 8 UNIT(S): at 17:14

## 2019-03-07 RX ADMIN — PANTOPRAZOLE SODIUM 40 MILLIGRAM(S): 20 TABLET, DELAYED RELEASE ORAL at 06:13

## 2019-03-07 RX ADMIN — Medication 1: at 12:00

## 2019-03-07 RX ADMIN — LACOSAMIDE 100 MILLIGRAM(S): 50 TABLET ORAL at 18:19

## 2019-03-07 RX ADMIN — Medication 3 MILLIGRAM(S): at 21:50

## 2019-03-07 NOTE — CONSULT NOTE ADULT - ASSESSMENT
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 He is now with functional, gait, ADL, transfer, balance, endurance, cognitive, swallow impairments.    #GBM s/p craniotomy and resection  c/w PT/OT/SLP  c/w Decadron taper.    #Seizure PPx  c/w Keppra and Vimpat    #HTN- controlled.   c/w Lisinopril    #DM   Fs not controlled, will calculate dose according to SSI requirement   c/w Lantus 28  and pre meal 8 U tID AC   c/w Metformin 500mg BID  FS monitoring   Hypoglycemia protocol.     #Leukocytosis  -Afebrile, asymptomatic  Likely reactive 2/2 steroids.     # Anemia  H/H stable,   Monitor.     # DVT PPx  SCD   No chemo px  in setting of NeuroSx intervention,

## 2019-03-07 NOTE — DIETITIAN INITIAL EVALUATION ADULT. - ENERGY NEEDS
Height: 5'10", Weight (3/6) 180.1lbs  Skin: stage II coccyx, Edema: none  IBW range: 149-183lbs   Last BM: 3/7

## 2019-03-07 NOTE — DIETITIAN INITIAL EVALUATION ADULT. - OTHER INFO
Pt is a 74 year old male with 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 Pt tolerating current diet. Noted 100% po intake per flow sheets. Pt was unable to stay awake during interview- unable to obtain diet hx at this time. Reported UBW 217lbs. Pt with no evidence ofr GI distress- last BM on 3/7. Pt noted with stage II pressure ulcer on coccyx.

## 2019-03-07 NOTE — DIETITIAN INITIAL EVALUATION ADULT. - PERTINENT MEDS FT
lantus (28 units), SSI, humalog (8 units), metformin, decadron, colace, vimpat, keppra, lisinopril, protonix, senna

## 2019-03-07 NOTE — CONSULT NOTE ADULT - SUBJECTIVE AND OBJECTIVE BOX
Patient is a 74y old  Male who presents with a chief complaint of Seizure, Functional deficits s/p GBM (06 Mar 2019 14:35)    HPI:  Mr. Duncan is a 74 year old male with a PMHx of GBM s/p right craniotomy in 2019, CVA with residual L sided weakness who presented to [facility] on 19 with seizures likely from recurrence of righ temporal lobe GBM complicated by sepsis from viral URI, acute embolic CVA, PFO, LLE DVT s/p IVC, and SAH.     Pt was BIBEMS from nursing home for active seizure 19. Per ED he was actively seizing for 30 mins prior to EMS arrival, and displayed left sided face, arm, and leg jerking in the ED. Pt received Ativan and Keppra and seizure broke after 20 mins in the ED.   On admission, pt was found to be febrile with leukocytosis 31.56 meeting SIRS criteria and was found to have +coronavirus. Neurology and neurosurgery were consulted with the plan to have repeat craniotomy and GBM resection pending bacteremia clearance.   On 2/15 rapid responses called for pt due to 3 repeat seizures and unresponsiveness with new left facial droop- given Ativan and Keppra per neurosurgery recommendations MRI 2/15/29 showed 3 discrete foci of acute infarction within the right occipital lobe, left corpus callosum within the genu and left frontal lobe in the region of the precentral gyrus. Pt placed on heparin gtt for anticoagulation during workup. Doppler U/S showed acute DVT of left common femoral, popliteal, posterior tibial, and peroneal vein, and LABERTO 19 showed evidence of a PFO. Pt had placement of IVC filter on 19 and heparin gtt was discontinued in preparation for neurosurgical procedure which was scheduled for  but was delayed due to episodes of bradycardia in 40s. Pt had R craniotomy for resection of brain tumor on 19. Post/op course c/b seizures /2 SAH on CT 3/2 for which pt was given Keppra and Vimpat, and transferred to SICU for further monitoring. Pt seizures and hemorrhage stabilized and plan for discharge to rehab. On the day of discharge he was medically and neurologically stable for discharge to rehab on 3/6/19. (06 Mar 2019 14:35)    PAST MEDICAL & SURGICAL HISTORY:  Glioblastoma  BPH (benign prostatic hyperplasia)  CVA (cerebral vascular accident)  Diabetes  HTN (hypertension)  S/P craniotomy: ~ resection of R-pariental mass (2018)    SOCIAL HISTORY:  Tobacco Usage:  ( x  ) never smoked   (   ) former smoker   (   ) current smoker  (     ) pack years    Tobacco Quit Date:  Substance Use (Street drugs): (x  ) never used  (  ) other:  Alcohol Usage: Denies     Family history reviewed and otherwise non-contributory  ALLERGIES:  No Known Allergies    MEDICATIONS:  nystatin Powder 1 Application(s) Topical two times a day    REVIEW OF SYSTEMS:  CONSTITUTIONAL: No fever, weight loss, or fatigue  EYES: No eye pain, visual disturbances, or discharge  ENMT:  No difficulty hearing, tinnitus, vertigo; No sinus or throat pain  NECK: No neck pain or neck stiffness  BREASTS: No pain, masses, or nipple discharge  RESPIRATORY: No cough, wheezing, chills or hemoptysis; No shortness of breath  CARDIOVASCULAR: No chest pain, palpitations, dizziness, or leg swelling  GASTROINTESTINAL: No abdominal pain, No nausea, vomiting, or hematemesis; No diarrhea or constipation  GENITOURINARY: No dysuria, frequency, hematuria, or incontinence  NEUROLOGICAL: No headaches, memory loss, loss of strength, numbness, or tremors  SKIN: No itching, burning, rashes, or lesions   LYMPH NODES: No enlarged glands  ENDOCRINE: No heat or cold intolerance; No hair loss  MUSCULOSKELETAL: No joint pain or swelling; No muscle, back, or extremity pain  PSYCHIATRIC: No depression, anxiety, mood swings, or difficulty sleeping  HEME/LYMPH: No easy bruising or bleeding  ALLERY AND IMMUNOLOGIC: No hives or eczema    All other ROS reviewed and negative except as otherwise stated    Vital Signs Last 24 Hrs  T(F): 98 (07 Mar 2019 07:49), Max: 98.6 (06 Mar 2019 19:34)  HR: 92 (07 Mar 2019 07:49) (57 - 92)  BP: 122/86 (07 Mar 2019 07:49) (108/67 - 149/82)  RR: 14 (07 Mar 2019 07:49) (14 - 17)  SpO2: 95% (07 Mar 2019 07:49) (95% - 100%)  I&O's Summary    PHYSICAL EXAM:  GENERAL: NAD, well-groomed, well-developed  HEAD:  Atraumatic, Normocephalic  EYES: EOMI, PERRLA, conjunctiva and sclera clear  ENMT: No tonsillar erythema, exudates, or enlargement; Moist mucous membranes, Good dentition  NECK: Supple, No JVD  CHEST/LUNG: Clear to auscultation bilaterally; No rales, rhonchi, wheezing, or rubs  HEART: Regular rate and rhythm; S1/S2, No murmurs, rubs, or gallops  ABDOMEN: Soft, Nontender, Nondistended; Bowel sounds present  VASCULAR: Normal pulses, Normal capillary refill  EXTREMITIES:  2+ Peripheral Pulses, No cyanosis, No edema  LYMPH: No lymphadenopathy noted  SKIN: Warm, Intact  PSYCH: Normal mood and affect  NERVOUS SYSTEM:  A/O x3, Good concentration; CN 2-12 intact, No focal deficits    LABS:                        12.2   19.1  )-----------( 122      ( 07 Mar 2019 06:20 )             37.3     -07    136  |  99  |  12  ----------------------------<  154  3.8   |  29  |  0.74    Ca    8.4      07 Mar 2019 06:20  Phos  1.9     03-05  Mg     1.6     03-05    TPro  5.8  /  Alb  2.4  /  TBili  0.5  /  DBili  x   /  AST  20  /  ALT  31  /  AlkPhos  93  -07    eGFR if Non African American: 89 mL/min/1.73M2 (19 @ 06:20)  eGFR if African American: 104 mL/min/1.73M2 (19 @ 06:20)                  CAPILLARY BLOOD GLUCOSE      POCT Blood Glucose.: 187 mg/dL (07 Mar 2019 07:37)  POCT Blood Glucose.: 246 mg/dL (06 Mar 2019 21:04)  POCT Blood Glucose.: 231 mg/dL (06 Mar 2019 19:40)  POCT Blood Glucose.: 224 mg/dL (06 Mar 2019 18:03)  POCT Blood Glucose.: 314 mg/dL (06 Mar 2019 11:57)    02-13 [id number] 7.7    Urinalysis Basic - ( 07 Mar 2019 00:40 )    Color: Li / Appearance: Slightly Turbid / S.010 / pH: x  Gluc: x / Ketone: Negative  / Bili: Negative / Urobili: 4   Blood: x / Protein: 100 / Nitrite: Negative   Leuk Esterase: Moderate / RBC: >50 /HPF / WBC 26-50 /HPF   Sq Epi: x / Non Sq Epi: Moderate / Bacteria: Negative /HPF        Culture - Urine (collected 06 Mar 2019 04:21)  Source: URINE MIDSTREAM  Preliminary Report (07 Mar 2019 09:57):    EC^Escherichia coli    COLONY COUNT: > = 100,000 CFU/ML        RADIOLOGY & ADDITIONAL TESTS:    Care Discussed with Consultants/Other Providers:

## 2019-03-07 NOTE — DIETITIAN INITIAL EVALUATION ADULT. - NS AS NUTRI INTERV MEDICAL AND FOOD SUPPLEMENTS
Commercial beverage/Ensure Enlive 8oz BID (700kcals, 40g protein) Commercial beverage/Glucerna Shake 8oz BID (440kcals, 20g protein)

## 2019-03-07 NOTE — CHART NOTE - NSCHARTNOTEFT_GEN_A_CORE
Upon Nutritional Assessment by the Registered Dietitian your patient was determined to meet criteria / has evidence of the following diagnosis/diagnoses:          [ ]  Mild Protein Calorie Malnutrition        [ ]  Moderate Protein Calorie Malnutrition        [X] Severe Protein Calorie Malnutrition        [ ] Unspecified Protein Calorie Malnutrition        [ ] Underweight / BMI <19        [ ] Morbid Obesity / BMI > 40      Findings as based on:  [ ] Comprehensive nutrition assessment   [X] Nutrition Focused Physical Exam  [X] Other: 17% weight loss, temporal wasting      Nutrition Plan/Recommendations:  Add Ensure Enlive BID. Add MVI and Vitamin C 500mg po daily.         PROVIDER Section:     By signing this assessment you are acknowledging and agree with the diagnosis/diagnoses assigned by the Registered Dietitian    Comments: Upon Nutritional Assessment by the Registered Dietitian your patient was determined to meet criteria / has evidence of the following diagnosis/diagnoses:          [ ]  Mild Protein Calorie Malnutrition        [ ]  Moderate Protein Calorie Malnutrition        [X] Severe Protein Calorie Malnutrition        [ ] Unspecified Protein Calorie Malnutrition        [ ] Underweight / BMI <19        [ ] Morbid Obesity / BMI > 40      Findings as based on:  [ ] Comprehensive nutrition assessment   [X] Nutrition Focused Physical Exam  [X] Other: 17% weight loss, temporal wasting      Nutrition Plan/Recommendations:  Add Glucerna Shake BID. Add MVI and Vitamin C 500mg po daily.         PROVIDER Section:     By signing this assessment you are acknowledging and agree with the diagnosis/diagnoses assigned by the Registered Dietitian    Comments:

## 2019-03-08 LAB
-  AMIKACIN: SIGNIFICANT CHANGE UP
-  AMPICILLIN/SULBACTAM: SIGNIFICANT CHANGE UP
-  AMPICILLIN: SIGNIFICANT CHANGE UP
-  AZTREONAM: SIGNIFICANT CHANGE UP
-  CEFAZOLIN: SIGNIFICANT CHANGE UP
-  CEFEPIME: SIGNIFICANT CHANGE UP
-  CEFOXITIN: SIGNIFICANT CHANGE UP
-  CEFTAZIDIME: SIGNIFICANT CHANGE UP
-  CEFTRIAXONE: SIGNIFICANT CHANGE UP
-  CIPROFLOXACIN: SIGNIFICANT CHANGE UP
-  ERTAPENEM: SIGNIFICANT CHANGE UP
-  GENTAMICIN: SIGNIFICANT CHANGE UP
-  IMIPENEM: SIGNIFICANT CHANGE UP
-  LEVOFLOXACIN: SIGNIFICANT CHANGE UP
-  MEROPENEM: SIGNIFICANT CHANGE UP
-  NITROFURANTOIN: SIGNIFICANT CHANGE UP
-  PIPERACILLIN/TAZOBACTAM: SIGNIFICANT CHANGE UP
-  TIGECYCLINE: SIGNIFICANT CHANGE UP
-  TOBRAMYCIN: SIGNIFICANT CHANGE UP
-  TRIMETHOPRIM/SULFAMETHOXAZOLE: SIGNIFICANT CHANGE UP
BACTERIA UR CULT: SIGNIFICANT CHANGE UP
GLUCOSE BLDC GLUCOMTR-MCNC: 125 MG/DL — HIGH (ref 70–99)
GLUCOSE BLDC GLUCOMTR-MCNC: 126 MG/DL — HIGH (ref 70–99)
GLUCOSE BLDC GLUCOMTR-MCNC: 140 MG/DL — HIGH (ref 70–99)
GLUCOSE BLDC GLUCOMTR-MCNC: 243 MG/DL — HIGH (ref 70–99)
METHOD TYPE: SIGNIFICANT CHANGE UP
ORGANISM # SPEC MICROSCOPIC CNT: SIGNIFICANT CHANGE UP
ORGANISM # SPEC MICROSCOPIC CNT: SIGNIFICANT CHANGE UP

## 2019-03-08 PROCEDURE — 99233 SBSQ HOSP IP/OBS HIGH 50: CPT

## 2019-03-08 PROCEDURE — 90832 PSYTX W PT 30 MINUTES: CPT

## 2019-03-08 PROCEDURE — 99232 SBSQ HOSP IP/OBS MODERATE 35: CPT

## 2019-03-08 RX ORDER — CIPROFLOXACIN LACTATE 400MG/40ML
500 VIAL (ML) INTRAVENOUS EVERY 12 HOURS
Qty: 0 | Refills: 0 | Status: COMPLETED | OUTPATIENT
Start: 2019-03-08 | End: 2019-03-13

## 2019-03-08 RX ORDER — CIPROFLOXACIN LACTATE 400MG/40ML
500 VIAL (ML) INTRAVENOUS EVERY 12 HOURS
Qty: 0 | Refills: 0 | Status: DISCONTINUED | OUTPATIENT
Start: 2019-03-08 | End: 2019-03-08

## 2019-03-08 RX ORDER — SACCHAROMYCES BOULARDII 250 MG
250 POWDER IN PACKET (EA) ORAL
Qty: 0 | Refills: 0 | Status: COMPLETED | OUTPATIENT
Start: 2019-03-08 | End: 2019-03-15

## 2019-03-08 RX ADMIN — LEVETIRACETAM 1500 MILLIGRAM(S): 250 TABLET, FILM COATED ORAL at 17:51

## 2019-03-08 RX ADMIN — Medication 8 UNIT(S): at 16:49

## 2019-03-08 RX ADMIN — Medication 500 MILLIGRAM(S): at 17:51

## 2019-03-08 RX ADMIN — Medication 2 MILLIGRAM(S): at 22:26

## 2019-03-08 RX ADMIN — Medication 250 MILLIGRAM(S): at 17:51

## 2019-03-08 RX ADMIN — FINASTERIDE 5 MILLIGRAM(S): 5 TABLET, FILM COATED ORAL at 12:39

## 2019-03-08 RX ADMIN — Medication 8 UNIT(S): at 07:58

## 2019-03-08 RX ADMIN — METFORMIN HYDROCHLORIDE 500 MILLIGRAM(S): 850 TABLET ORAL at 17:51

## 2019-03-08 RX ADMIN — LACOSAMIDE 100 MILLIGRAM(S): 50 TABLET ORAL at 17:51

## 2019-03-08 RX ADMIN — METFORMIN HYDROCHLORIDE 500 MILLIGRAM(S): 850 TABLET ORAL at 05:12

## 2019-03-08 RX ADMIN — Medication 3 MILLIGRAM(S): at 05:11

## 2019-03-08 RX ADMIN — Medication 100 MILLIGRAM(S): at 14:52

## 2019-03-08 RX ADMIN — NYSTATIN CREAM 1 APPLICATION(S): 100000 CREAM TOPICAL at 05:12

## 2019-03-08 RX ADMIN — INSULIN GLARGINE 28 UNIT(S): 100 INJECTION, SOLUTION SUBCUTANEOUS at 22:26

## 2019-03-08 RX ADMIN — Medication 8 UNIT(S): at 12:38

## 2019-03-08 RX ADMIN — Medication 2: at 12:39

## 2019-03-08 RX ADMIN — LEVETIRACETAM 1500 MILLIGRAM(S): 250 TABLET, FILM COATED ORAL at 05:11

## 2019-03-08 RX ADMIN — LACOSAMIDE 100 MILLIGRAM(S): 50 TABLET ORAL at 05:12

## 2019-03-08 RX ADMIN — PANTOPRAZOLE SODIUM 40 MILLIGRAM(S): 20 TABLET, DELAYED RELEASE ORAL at 06:07

## 2019-03-08 RX ADMIN — Medication 3 MILLIGRAM(S): at 14:52

## 2019-03-08 RX ADMIN — Medication 500 MILLIGRAM(S): at 12:39

## 2019-03-08 RX ADMIN — Medication 1 TABLET(S): at 12:39

## 2019-03-08 NOTE — PROGRESS NOTE ADULT - ASSESSMENT
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 He is now with functional, gait, ADL, transfer, balance, endurance, cognitive, swallow impairments.    ~Recurrent GBM s/p craniotomy and resection  -Start 3 hours of comprehensive therapy consisting of PT/OT/SLP/Neuropsychology  -Precautions: fall, cardiac, seizure, aspiration  -Decadron taper (3mg Q8hrs x 2 days, 2mg Q8hrs x 2 days, then 2mg Q12hrs)  -Seizure PPx with Keppra and Vimpat  -To follow up with neuro-oncology as outpatient    ~DM   -Monitor accuchecks  -Continue Lantus and premeal insulin  -Start Metformin 500mg BID  -Consistent carb diet    ~Pain control  -Tylenol and Oxycodone PRN    ~HTN  -Continue Lisinopril    ~Hyponatremia  -Improved  -Continue fluid restriction    ~Leukocytosis with positive UA, also on steroids, preliminary culture showing E. 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 He is now with functional, gait, ADL, transfer, balance, endurance, cognitive, swallow impairments.    ~Recurrent GBM s/p craniotomy and resection  -Start 3 hours of comprehensive therapy consisting of PT/OT/SLP/Neuropsychology  -Precautions: fall, cardiac, seizure, aspiration  -Decadron taper (3mg Q8hrs x 2 days, 2mg Q8hrs x 2 days, then 2mg Q12hrs)  -Seizure PPx with Keppra and Vimpat  -To follow up with neuro-oncology as outpatient    ~DM   -Monitor accuchecks  -Continue Lantus and premeal insulin  -Start Metformin 500mg BID  -Consistent carb diet    ~Pain control  -Tylenol and Oxycodone PRN    ~HTN  -Continue Lisinopril    ~Hyponatremia  -Improved  -Continue fluid restriction    ~Leukocytosis with positive UA, also on steroids, preliminary culture showing E. coli  -Afebrile, hematuria, some burning with urination  -Await sensitivities  -Check CBC tomorrow    ~Anemia  -Hgb stable, no obvious signs of bleeding    ~ regimen  -Continue condom catheter  -Finasteride    ~GI/Bowel regimen  -Continue Protonix  -Continue Colace and Senna  -Diet: Mechanical soft (consistent carb)  -Restorative dining  -Supervise meals    ~Skin/Stage II sacral ulcer  -Turn and position Q2hrs  -OOB with PT/OT  -Nystatin to groin  -Wound gel    ~DVT PPx  -Mobilization  -To possibly start chemical prophylaxis on 3/10/19

## 2019-03-08 NOTE — PROGRESS NOTE ADULT - SUBJECTIVE AND OBJECTIVE BOX
Patient is a 74y old  Male who presents with a chief complaint of Seizure, Functional deficits s/p GBM (07 Mar 2019 10:13)      HPI:  Mr. Duncan is a 74 year old male with a PMHx of GBM s/p right craniotomy in 2019, CVA with residual L sided weakness who presented to [facility] on 19 with seizures likely from recurrence of righ temporal lobe GBM complicated by sepsis from viral URI, acute embolic CVA, PFO, LLE DVT s/p IVC, and SAH.     Pt was BIBEMS from nursing home for active seizure 19. Per ED he was actively seizing for 30 mins prior to EMS arrival, and displayed left sided face, arm, and leg jerking in the ED. Pt received Ativan and Keppra and seizure broke after 20 mins in the ED.   On admission, pt was found to be febrile with leukocytosis 31.56 meeting SIRS criteria and was found to have +coronavirus. Neurology and neurosurgery were consulted with the plan to have repeat craniotomy and GBM resection pending bacteremia clearance.   On 2/15 rapid responses called for pt due to 3 repeat seizures and unresponsiveness with new left facial droop- given Ativan and Keppra per neurosurgery recommendations MRI 2/15/29 showed 3 discrete foci of acute infarction within the right occipital lobe, left corpus callosum within the genu and left frontal lobe in the region of the precentral gyrus. Pt placed on heparin gtt for anticoagulation during workup. Doppler U/S showed acute DVT of left common femoral, popliteal, posterior tibial, and peroneal vein, and ALBERTO 19 showed evidence of a PFO. Pt had placement of IVC filter on 19 and heparin gtt was discontinued in preparation for neurosurgical procedure which was scheduled for  but was delayed due to episodes of bradycardia in 40s. Pt had R craniotomy for resection of brain tumor on 19. Post/op course c/b seizures /2 SAH on CT 3/2 for which pt was given Keppra and Vimpat, and transferred to SICU for further monitoring. Pt seizures and hemorrhage stabilized and plan for discharge to rehab. On the day of discharge he was medically and neurologically stable for discharge to rehab on 3/6/19. (06 Mar 2019 14:35)      PAST MEDICAL & SURGICAL HISTORY:  Glioblastoma  BPH (benign prostatic hyperplasia)  CVA (cerebral vascular accident)  Diabetes  HTN (hypertension)  S/P craniotomy: ~ resection of R-pariental mass (2018)      MEDICATIONS  (STANDING):  ascorbic acid 500 milliGRAM(s) Oral daily  dexamethasone     Tablet 3 milliGRAM(s) Oral every 8 hours  dexamethasone     Tablet 2 milliGRAM(s) Oral every 8 hours  dexamethasone     Tablet   Oral   docusate sodium 100 milliGRAM(s) Oral three times a day  finasteride 5 milliGRAM(s) Oral daily  insulin glargine Injectable (LANTUS) 28 Unit(s) SubCutaneous at bedtime  insulin lispro (HumaLOG) corrective regimen sliding scale   SubCutaneous Before meals and at bedtime  insulin lispro Injectable (HumaLOG) 8 Unit(s) SubCutaneous three times a day before meals  lacosamide 100 milliGRAM(s) Oral every 12 hours  levETIRAcetam 1500 milliGRAM(s) Oral every 12 hours  lisinopril 20 milliGRAM(s) Oral daily  metFORMIN 500 milliGRAM(s) Oral two times a day  multivitamin 1 Tablet(s) Oral daily  nystatin Powder 1 Application(s) Topical two times a day  pantoprazole    Tablet 40 milliGRAM(s) Oral before breakfast    MEDICATIONS  (PRN):  acetaminophen   Tablet .. 650 milliGRAM(s) Oral every 6 hours PRN Mild Pain (1 - 3)  senna 2 Tablet(s) Oral at bedtime PRN Constipation      Allergies    No Known Allergies    Intolerances        TODAY'S SUBJECTIVE & REVIEW OF SYMPTOMS:  Patient was seen and examined today, no acute events overnight. Patient says he has some pain with bowel movements. No fever, vital signs stable    PHYSICAL EXAM  74y  Vital Signs Last 24 Hrs  T(C): 36.8 (08 Mar 2019 07:28), Max: 37.1 (07 Mar 2019 20:35)  T(F): 98.3 (08 Mar 2019 07:28), Max: 98.8 (07 Mar 2019 20:35)  HR: 76 (08 Mar 2019 07:28) (76 - 85)  BP: 138/80 (08 Mar 2019 07:28) (99/68 - 138/80)  BP(mean): --  RR: 15 (08 Mar 2019 07:28) (14 - 15)  SpO2: 97% (08 Mar 2019 07:28) (97% - 97%)  Daily     Daily Weight in k.6 (07 Mar 2019 15:55)    Constitutional - NAD, Comfortable  	HEENT - Right cranial incision with staples C/D/I, Limited eye movement to left  	Chest - Normal chest wall expansion, No increased work of breathing  	Cardiovascular - All extremities well perfused, S1S2  	Abdomen - Soft, Non-distended, Non-tender  	Extremities - No bilateral lower extremity edema, No calf tenderness   	Neurologic Exam -                 	   Cognitive - Awake, Alert, Oriented to self, place, year, month, not date or situation  	   Communication - Fluent, No dysarthria, Naming intact  	   Attention - Impaired, unable to perform days of week backwards and serial 7's  	   Memory - Impaired, unable to recall 3/3 items after 3 minutes without cueing (only got 1/3 with cueing)  	   Cranial Nerves - Right eye reactivity sluggish, Impaired left visual field, Left visual inattention  	   Motor -   	                  LEFT    UE - ShAB 5/5, EF 5/5, EE 5/5, WE 5/5,  5/5  	                  RIGHT UE - ShAB 5/5, EF 5/5, EE 5/5, WE 5/5,  5/5  	                  LEFT    LE - HF 5/5, KE 5/5, KF 4/5, DF 5/5, PF 5/5  	                  RIGHT LE - HF 5/5, KE 5/5, KF 4/5, DF 5/5, PF 5/5     	   Sensory - Impaired on left sided extremities,  extinguishes in left upper > lower extremity  	   Reflexes - DTR intact and symmetrical  	   Coordination -FTN accurate bilaterally when tested on pt's right.   Finger-to-nose impaired when attempted on left of patient's body--(due to visual deficit)  	Psychiatric - Flat affect  	Skin - 2.5 x 3cm stage sacral II ulcer with surrounding erythema, scant sanguinous drainage, no slough or pus. Also has moisture associated skin breakdown on left medial thigh  	 - Condom catheter      RECENT LABS:                          12.2   19.1  )-----------( 122      ( 07 Mar 2019 06:20 )             37.3     03-07    136  |  99  |  12  ----------------------------<  154<H>  3.8   |  29  |  0.74    Ca    8.4      07 Mar 2019 06:20    TPro  5.8<L>  /  Alb  2.4<L>  /  TBili  0.5  /  DBili  x   /  AST  20  /  ALT  31  /  AlkPhos  93  03-07    LIVER FUNCTIONS - ( 07 Mar 2019 06:20 )  Alb: 2.4 g/dL / Pro: 5.8 g/dL / ALK PHOS: 93 U/L / ALT: 31 U/L DA / AST: 20 U/L / GGT: x             Urinalysis Basic - ( 07 Mar 2019 00:40 )    Color: Li / Appearance: Slightly Turbid / S.010 / pH: x  Gluc: x / Ketone: Negative  / Bili: Negative / Urobili: 4   Blood: x / Protein: 100 / Nitrite: Negative   Leuk Esterase: Moderate / RBC: >50 /HPF / WBC 26-50 /HPF   Sq Epi: x / Non Sq Epi: Moderate / Bacteria: Negative /HPF        Culture - Urine (collected 19 @ 04:21)  Source: URINE MIDSTREAM  Preliminary Report (19 @ 09:57):    EC^Escherichia coli    COLONY COUNT: > = 100,000 CFU/ML        CAPILLARY BLOOD GLUCOSE      POCT Blood Glucose.: 125 mg/dL (08 Mar 2019 07:56)  POCT Blood Glucose.: 97 mg/dL (07 Mar 2019 21:49)  POCT Blood Glucose.: 155 mg/dL (07 Mar 2019 16:58)  POCT Blood Glucose.: 192 mg/dL (07 Mar 2019 11:59)    IMPRESSION AND PLAN:

## 2019-03-08 NOTE — PROGRESS NOTE ADULT - ASSESSMENT
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 He is now with functional, gait, ADL, transfer, balance, endurance, cognitive, swallow impairments.    #GBM s/p craniotomy and resection  c/w PT/OT/SLP  c/w Decadron taper.    #Seizure PPx  c/w Keppra and Vimpat    #HTN- controlled.   c/w Lisinopril    #UTI  UA + , E coli on U cx  C/o Dysuria , Afebrile, leucocytosis   Start Ciprofloxacin 500 Q12 x 5 days    #DM   Fs not controlled, will calculate dose according to SSI requirement   c/w Lantus 28  and pre meal 8 U tID AC   c/w Metformin 500mg BID  FS monitoring   Hypoglycemia protocol.     # Anemia  H/H stable,   Monitor.     # DVT PPx  SCD   No chemo px  in setting of NeuroSx intervention,

## 2019-03-08 NOTE — PROGRESS NOTE ADULT - SUBJECTIVE AND OBJECTIVE BOX
Patient is a 74y old  Male who presents with a chief complaint of Seizure, Functional deficits s/p GBM (08 Mar 2019 10:50)      Patient seen and examined at bedside. c/o pain during urination     ALLERGIES:  No Known Allergies    MEDICATIONS:  ascorbic acid 500 milliGRAM(s) Oral daily  ciprofloxacin     Tablet 500 milliGRAM(s) Oral every 12 hours  multivitamin 1 Tablet(s) Oral daily  nystatin Powder 1 Application(s) Topical two times a day    Vital Signs Last 24 Hrs  T(F): 98.3 (08 Mar 2019 07:28), Max: 98.8 (07 Mar 2019 20:35)  HR: 76 (08 Mar 2019 07:28) (76 - 85)  BP: 138/80 (08 Mar 2019 07:28) (99/68 - 138/80)  RR: 15 (08 Mar 2019 07:28) (14 - 15)  SpO2: 97% (08 Mar 2019 07:28) (97% - 97%)  I&O's Summary    07 Mar 2019 07:01  -  08 Mar 2019 07:00  --------------------------------------------------------  IN: 234 mL / OUT: 0 mL / NET: 234 mL        PHYSICAL EXAM:  General: NAD, comfortable   ENT: MMM  Neck: Supple, No JVD  Lungs: CTA, BLAE, No added sounds.   Cardio: RRR, S1/S2, No murmurs  Abdomen: Soft, NT/ND, BS+   Extremities: No edema  CNS: nonfocal , BLE weakness       LABS:                        12.2   19.1  )-----------( 122      ( 07 Mar 2019 06:20 )             37.3         136  |  99  |  12  ----------------------------<  154  3.8   |  29  |  0.74    Ca    8.4      07 Mar 2019 06:20    TPro  5.8  /  Alb  2.4  /  TBili  0.5  /  DBili  x   /  AST  20  /  ALT  31  /  AlkPhos  93      eGFR if Non African American: 89 mL/min/1.73M2 (19 @ 06:20)  eGFR if African American: 104 mL/min/1.73M2 (19 @ 06:20)                    CAPILLARY BLOOD GLUCOSE      POCT Blood Glucose.: 125 mg/dL (08 Mar 2019 07:56)  POCT Blood Glucose.: 97 mg/dL (07 Mar 2019 21:49)  POCT Blood Glucose.: 155 mg/dL (07 Mar 2019 16:58)    02-13 [id number] 7.7    Urinalysis Basic - ( 07 Mar 2019 00:40 )    Color: Li / Appearance: Slightly Turbid / S.010 / pH: x  Gluc: x / Ketone: Negative  / Bili: Negative / Urobili: 4   Blood: x / Protein: 100 / Nitrite: Negative   Leuk Esterase: Moderate / RBC: >50 /HPF / WBC 26-50 /HPF   Sq Epi: x / Non Sq Epi: Moderate / Bacteria: Negative /HPF        Culture - Urine (collected 06 Mar 2019 04:21)  Source: URINE MIDSTREAM  Final Report (08 Mar 2019 11:54):    COLONY COUNT: > = 100,000 CFU/ML  Organism: Escherichia coli (08 Mar 2019 11:54)  Organism: Escherichia coli (08 Mar 2019 11:54)      -  Amikacin: S <=8 SHAYY      -  Ampicillin: R >16 SHAYY      -  Ampicillin/Sulbactam: I 16/8 SHAYY      -  Aztreonam: S <=4 SHAYY      -  Cefazolin: S <=16 SHAYY      -  Cefepime: S <=2 SHAYY      -  Cefoxitin: S <=4 SHAYY      -  Ceftazidime: S <=1 SHAYY      -  Ceftriaxone: S <=1 SHAYY      -  Ciprofloxacin: S <=0.5 SHAYY      -  Ertapenem: S <=0.5 SHAYY      -  Gentamicin: S 2 SHAYY      -  Imipenem: S <=1 SHAYY      -  Levofloxacin: S <=1 SHAYY      -  Meropenem: S <=1 SHAYY      -  Nitrofurantoin: S <=32 SHAYY      -  Piperacillin/Tazobactam: S <=8 SHAYY      -  Tigecycline: S <=1 SHAYY      -  Tobramycin: S <=2 SHAYY      -  Trimethoprim/Sulfamethoxazole: S <=0.5/9.5 SHAYY      Method Type: NEGATIVE SHAYY 43        RADIOLOGY & ADDITIONAL TESTS:    Care Discussed with Consultants/Other Providers:

## 2019-03-09 DIAGNOSIS — E11.8 TYPE 2 DIABETES MELLITUS WITH UNSPECIFIED COMPLICATIONS: ICD-10-CM

## 2019-03-09 DIAGNOSIS — N39.0 URINARY TRACT INFECTION, SITE NOT SPECIFIED: ICD-10-CM

## 2019-03-09 DIAGNOSIS — D55.1 ANEMIA DUE TO OTHER DISORDERS OF GLUTATHIONE METABOLISM: ICD-10-CM

## 2019-03-09 DIAGNOSIS — C71.9 MALIGNANT NEOPLASM OF BRAIN, UNSPECIFIED: ICD-10-CM

## 2019-03-09 DIAGNOSIS — Z29.8 ENCOUNTER FOR OTHER SPECIFIED PROPHYLACTIC MEASURES: ICD-10-CM

## 2019-03-09 DIAGNOSIS — I10 ESSENTIAL (PRIMARY) HYPERTENSION: ICD-10-CM

## 2019-03-09 LAB
-  AMIKACIN: SIGNIFICANT CHANGE UP
-  AMPICILLIN/SULBACTAM: SIGNIFICANT CHANGE UP
-  AMPICILLIN: SIGNIFICANT CHANGE UP
-  AZTREONAM: SIGNIFICANT CHANGE UP
-  CEFAZOLIN: SIGNIFICANT CHANGE UP
-  CEFEPIME: SIGNIFICANT CHANGE UP
-  CEFOXITIN: SIGNIFICANT CHANGE UP
-  CEFTRIAXONE: SIGNIFICANT CHANGE UP
-  CIPROFLOXACIN: SIGNIFICANT CHANGE UP
-  ERTAPENEM: SIGNIFICANT CHANGE UP
-  GENTAMICIN: SIGNIFICANT CHANGE UP
-  IMIPENEM: SIGNIFICANT CHANGE UP
-  LEVOFLOXACIN: SIGNIFICANT CHANGE UP
-  MEROPENEM: SIGNIFICANT CHANGE UP
-  NITROFURANTOIN: SIGNIFICANT CHANGE UP
-  PIPERACILLIN/TAZOBACTAM: SIGNIFICANT CHANGE UP
-  TIGECYCLINE: SIGNIFICANT CHANGE UP
-  TOBRAMYCIN: SIGNIFICANT CHANGE UP
-  TRIMETHOPRIM/SULFAMETHOXAZOLE: SIGNIFICANT CHANGE UP
CULTURE RESULTS: SIGNIFICANT CHANGE UP
GLUCOSE BLDC GLUCOMTR-MCNC: 131 MG/DL — HIGH (ref 70–99)
GLUCOSE BLDC GLUCOMTR-MCNC: 144 MG/DL — HIGH (ref 70–99)
GLUCOSE BLDC GLUCOMTR-MCNC: 165 MG/DL — HIGH (ref 70–99)
GLUCOSE BLDC GLUCOMTR-MCNC: 173 MG/DL — HIGH (ref 70–99)
HCT VFR BLD CALC: 35.5 % — LOW (ref 39–50)
HGB BLD-MCNC: 11.8 G/DL — LOW (ref 13–17)
MCHC RBC-ENTMCNC: 31.7 PG — SIGNIFICANT CHANGE UP (ref 27–34)
MCHC RBC-ENTMCNC: 33.2 GM/DL — SIGNIFICANT CHANGE UP (ref 32–36)
MCV RBC AUTO: 95.5 FL — SIGNIFICANT CHANGE UP (ref 80–100)
METHOD TYPE: SIGNIFICANT CHANGE UP
ORGANISM # SPEC MICROSCOPIC CNT: SIGNIFICANT CHANGE UP
ORGANISM # SPEC MICROSCOPIC CNT: SIGNIFICANT CHANGE UP
PLATELET # BLD AUTO: 125 K/UL — LOW (ref 150–400)
RBC # BLD: 3.72 M/UL — LOW (ref 4.2–5.8)
RBC # FLD: 15.4 % — HIGH (ref 10.3–14.5)
SPECIMEN SOURCE: SIGNIFICANT CHANGE UP
WBC # BLD: 17.5 K/UL — HIGH (ref 3.8–10.5)
WBC # FLD AUTO: 17.5 K/UL — HIGH (ref 3.8–10.5)

## 2019-03-09 PROCEDURE — 99233 SBSQ HOSP IP/OBS HIGH 50: CPT

## 2019-03-09 RX ADMIN — NYSTATIN CREAM 1 APPLICATION(S): 100000 CREAM TOPICAL at 05:39

## 2019-03-09 RX ADMIN — FINASTERIDE 5 MILLIGRAM(S): 5 TABLET, FILM COATED ORAL at 12:25

## 2019-03-09 RX ADMIN — LACOSAMIDE 100 MILLIGRAM(S): 50 TABLET ORAL at 17:21

## 2019-03-09 RX ADMIN — METFORMIN HYDROCHLORIDE 500 MILLIGRAM(S): 850 TABLET ORAL at 05:39

## 2019-03-09 RX ADMIN — Medication 2 MILLIGRAM(S): at 14:51

## 2019-03-09 RX ADMIN — METFORMIN HYDROCHLORIDE 500 MILLIGRAM(S): 850 TABLET ORAL at 17:22

## 2019-03-09 RX ADMIN — Medication 8 UNIT(S): at 08:19

## 2019-03-09 RX ADMIN — Medication 1 TABLET(S): at 12:25

## 2019-03-09 RX ADMIN — Medication 1: at 12:24

## 2019-03-09 RX ADMIN — Medication 500 MILLIGRAM(S): at 05:38

## 2019-03-09 RX ADMIN — LEVETIRACETAM 1500 MILLIGRAM(S): 250 TABLET, FILM COATED ORAL at 05:38

## 2019-03-09 RX ADMIN — Medication 2 MILLIGRAM(S): at 22:49

## 2019-03-09 RX ADMIN — Medication 2 MILLIGRAM(S): at 05:38

## 2019-03-09 RX ADMIN — PANTOPRAZOLE SODIUM 40 MILLIGRAM(S): 20 TABLET, DELAYED RELEASE ORAL at 05:39

## 2019-03-09 RX ADMIN — NYSTATIN CREAM 1 APPLICATION(S): 100000 CREAM TOPICAL at 17:22

## 2019-03-09 RX ADMIN — Medication 250 MILLIGRAM(S): at 05:37

## 2019-03-09 RX ADMIN — Medication 8 UNIT(S): at 17:21

## 2019-03-09 RX ADMIN — Medication 250 MILLIGRAM(S): at 17:22

## 2019-03-09 RX ADMIN — Medication 100 MILLIGRAM(S): at 14:51

## 2019-03-09 RX ADMIN — LEVETIRACETAM 1500 MILLIGRAM(S): 250 TABLET, FILM COATED ORAL at 17:22

## 2019-03-09 RX ADMIN — Medication 8 UNIT(S): at 12:25

## 2019-03-09 RX ADMIN — LACOSAMIDE 100 MILLIGRAM(S): 50 TABLET ORAL at 05:38

## 2019-03-09 RX ADMIN — INSULIN GLARGINE 28 UNIT(S): 100 INJECTION, SOLUTION SUBCUTANEOUS at 22:49

## 2019-03-09 RX ADMIN — Medication 500 MILLIGRAM(S): at 12:25

## 2019-03-09 RX ADMIN — Medication 500 MILLIGRAM(S): at 17:22

## 2019-03-09 NOTE — PROGRESS NOTE ADULT - SUBJECTIVE AND OBJECTIVE BOX
No overnight events.  Patient nonsensical and inappropriate.  Able to follow commands.   Reports no pain.   Slept well. 	    REVIEW OF SYSTEMS  Constitutional - No fever,  No fatigue  HEENT - No vertigo, No neck pain  Neurological - No headaches, +loss of strength  Musculoskeletal - No joint pain, No joint swelling, No muscle pain    VITALS  T(C): 37.1 (03-09-19 @ 07:10), Max: 37.1 (03-08-19 @ 22:20)  HR: 70 (03-09-19 @ 07:10) (70 - 91)  BP: 123/79 (03-09-19 @ 07:10) (103/73 - 123/79)  RR: 14 (03-09-19 @ 07:10) (14 - 14)  SpO2: 98% (03-09-19 @ 07:10) (97% - 98%)  Wt(kg): --       MEDICATIONS   acetaminophen   Tablet .. 650 milliGRAM(s) every 6 hours PRN  ascorbic acid 500 milliGRAM(s) daily  ciprofloxacin     Tablet 500 milliGRAM(s) every 12 hours  dexamethasone     Tablet 2 milliGRAM(s) every 8 hours  dexamethasone     Tablet     docusate sodium 100 milliGRAM(s) three times a day  finasteride 5 milliGRAM(s) daily  insulin glargine Injectable (LANTUS) 28 Unit(s) at bedtime  insulin lispro (HumaLOG) corrective regimen sliding scale   Before meals and at bedtime  insulin lispro Injectable (HumaLOG) 8 Unit(s) three times a day before meals  lacosamide 100 milliGRAM(s) every 12 hours  levETIRAcetam 1500 milliGRAM(s) every 12 hours  lisinopril 20 milliGRAM(s) daily  metFORMIN 500 milliGRAM(s) two times a day  multivitamin 1 Tablet(s) daily  nystatin Powder 1 Application(s) two times a day  pantoprazole    Tablet 40 milliGRAM(s) before breakfast  saccharomyces boulardii 250 milliGRAM(s) two times a day  senna 2 Tablet(s) at bedtime PRN      RECENT LABS/IMAGING  CBC Full  -  ( 09 Mar 2019 05:34 )  WBC Count : 17.5 K/uL  Hemoglobin : 11.8 g/dL  Hematocrit : 35.5 %  Platelet Count - Automated : 125 K/uL  Mean Cell Volume : 95.5 fl  Mean Cell Hemoglobin : 31.7 pg  Mean Cell Hemoglobin Concentration : 33.2 gm/dL  Auto Neutrophil # : x  Auto Lymphocyte # : x  Auto Monocyte # : x  Auto Eosinophil # : x  Auto Basophil # : x  Auto Neutrophil % : x  Auto Lymphocyte % : x  Auto Monocyte % : x  Auto Eosinophil % : x  Auto Basophil % : x              POCT Blood Glucose.: 131 mg/dL (03-09-19 @ 08:18)  POCT Blood Glucose.: 126 mg/dL (03-08-19 @ 22:09)  POCT Blood Glucose.: 140 mg/dL (03-08-19 @ 16:49)  POCT Blood Glucose.: 243 mg/dL (03-08-19 @ 12:38)    ---------  PHYSICAL EXAM  Constitutional - NAD, Comfortable  Pulm - Breathing comfortably, No wheezing  Abd - Soft, NTND  Extremities - No edema, No calf tenderness  Neurologic Exam -                    Cognitive - Awake, Alert x self     Communication - Fluent     Motor - No focal deficits     Sensory - Intact to LT  Psychiatric - Mood WNL, Affect Flat    ASSESSMENT/PLAN  74y Male with functional deficits after 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   Leukocytosis - Decreased, no fever  UTI - UCx+ no S/S, Cipro  DM2 - Lantus, Metformin, ISS, Humalog  GBM + Seizures - Decadron, Keppra, Vimpat  HTN - Lisinopril  Pain - Tylenol PRN  DVT PPX - SCDs	    Continue 3hrs a day of comprehensive rehab program.

## 2019-03-09 NOTE — PROGRESS NOTE ADULT - ASSESSMENT
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 He is now with functional, gait, ADL, transfer, balance, endurance, cognitive, swallow impairments.

## 2019-03-09 NOTE — PROGRESS NOTE ADULT - SUBJECTIVE AND OBJECTIVE BOX
Patient is a 74y old  Male who presents with a chief complaint of Seizure, Functional deficits s/p GBM (09 Mar 2019 09:17)          Patient seen and examined at bedside.    ALLERGIES:  No Known Allergies        Vital Signs Last 24 Hrs  T(F): 98.7 (09 Mar 2019 07:10), Max: 98.8 (08 Mar 2019 22:20)  HR: 70 (09 Mar 2019 07:10) (70 - 91)  BP: 123/79 (09 Mar 2019 07:10) (103/73 - 123/79)  RR: 14 (09 Mar 2019 07:10) (14 - 14)  SpO2: 98% (09 Mar 2019 07:10) (97% - 98%)  I&O's Summary    09 Mar 2019 06:01  -  09 Mar 2019 14:12  --------------------------------------------------------  IN: 300 mL / OUT: 300 mL / NET: 0 mL      MEDICATIONS:  acetaminophen   Tablet .. 650 milliGRAM(s) Oral every 6 hours PRN  ascorbic acid 500 milliGRAM(s) Oral daily  ciprofloxacin     Tablet 500 milliGRAM(s) Oral every 12 hours  dexamethasone     Tablet 2 milliGRAM(s) Oral every 8 hours  dexamethasone     Tablet   Oral   docusate sodium 100 milliGRAM(s) Oral three times a day  finasteride 5 milliGRAM(s) Oral daily  insulin glargine Injectable (LANTUS) 28 Unit(s) SubCutaneous at bedtime  insulin lispro (HumaLOG) corrective regimen sliding scale   SubCutaneous Before meals and at bedtime  insulin lispro Injectable (HumaLOG) 8 Unit(s) SubCutaneous three times a day before meals  lacosamide 100 milliGRAM(s) Oral every 12 hours  levETIRAcetam 1500 milliGRAM(s) Oral every 12 hours  lisinopril 20 milliGRAM(s) Oral daily  metFORMIN 500 milliGRAM(s) Oral two times a day  multivitamin 1 Tablet(s) Oral daily  nystatin Powder 1 Application(s) Topical two times a day  pantoprazole    Tablet 40 milliGRAM(s) Oral before breakfast  saccharomyces boulardii 250 milliGRAM(s) Oral two times a day  senna 2 Tablet(s) Oral at bedtime PRN      PHYSICAL EXAM:  General: NAD  ENT: MMM  Neck: Supple, No JVD  Lungs: Clear to auscultation bilaterally  Cardio: RRR, S1/S2, No murmurs  Abdomen: Soft, Nontender, Nondistended; Bowel sounds present  Extremities: No cyanosis, No edema    LABS:                        11.8   17.5  )-----------( 125      ( 09 Mar 2019 05:34 )             35.5     03    136  |  99  |  12  ----------------------------<  154  3.8   |  29  |  0.74    Ca    8.4      07 Mar 2019 06:20    TPro  5.8  /  Alb  2.4  /  TBili  0.5  /  DBili  x   /  AST  20  /  ALT  31  /  AlkPhos  93      eGFR if Non African American: 89 mL/min/1.73M2 (19 @ 06:20)  eGFR if African American: 104 mL/min/1.73M2 (19 @ 06:20)                    CAPILLARY BLOOD GLUCOSE      POCT Blood Glucose.: 165 mg/dL (09 Mar 2019 12:23)  POCT Blood Glucose.: 131 mg/dL (09 Mar 2019 08:18)  POCT Blood Glucose.: 126 mg/dL (08 Mar 2019 22:09)  POCT Blood Glucose.: 140 mg/dL (08 Mar 2019 16:49)    02-13 [id number] 7.7    Urinalysis Basic - ( 07 Mar 2019 00:40 )    Color: Li / Appearance: Slightly Turbid / S.010 / pH: x  Gluc: x / Ketone: Negative  / Bili: Negative / Urobili: 4   Blood: x / Protein: 100 / Nitrite: Negative   Leuk Esterase: Moderate / RBC: >50 /HPF / WBC 26-50 /HPF   Sq Epi: x / Non Sq Epi: Moderate / Bacteria: Negative /HPF        Culture - Urine (collected 07 Mar 2019 00:40)  Source: .Urine Clean Catch (Midstream)  Preliminary Report (08 Mar 2019 16:29):    >100,000 CFU/ml Gram Negative Rods    Culture - Urine (collected 06 Mar 2019 04:21)  Source: URINE MIDSTREAM  Final Report (08 Mar 2019 11:54):    COLONY COUNT: > = 100,000 CFU/ML  Organism: Escherichia coli (08 Mar 2019 11:54)  Organism: Escherichia coli (08 Mar 2019 11:54)      -  Amikacin: S <=8 SHAYY      -  Ampicillin: R >16 SHAYY      -  Ampicillin/Sulbactam: I 16/8 SHAYY      -  Aztreonam: S <=4 SHAYY      -  Cefazolin: S <=16 SHAYY      -  Cefepime: S <=2 SHAYY      -  Cefoxitin: S <=4 SHAYY      -  Ceftazidime: S <=1 SHAYY      -  Ceftriaxone: S <=1 SHAYY      -  Ciprofloxacin: S <=0.5 SHAYY      -  Ertapenem: S <=0.5 SHAYY      -  Gentamicin: S 2 SHAYY      -  Imipenem: S <=1 SHAYY      -  Levofloxacin: S <=1 SHAYY      -  Meropenem: S <=1 SHAYY      -  Nitrofurantoin: S <=32 SHAYY      -  Piperacillin/Tazobactam: S <=8 SHAYY      -  Tigecycline: S <=1 SHAYY      -  Tobramycin: S <=2 SHAYY      -  Trimethoprim/Sulfamethoxazole: S <=0.5/9.5 SHAYY      Method Type: NEGATIVE SHAYY 43        RADIOLOGY & ADDITIONAL TESTS:    Care Discussed with Consultants/Other Providers:

## 2019-03-10 LAB
GLUCOSE BLDC GLUCOMTR-MCNC: 121 MG/DL — HIGH (ref 70–99)
GLUCOSE BLDC GLUCOMTR-MCNC: 173 MG/DL — HIGH (ref 70–99)
GLUCOSE BLDC GLUCOMTR-MCNC: 175 MG/DL — HIGH (ref 70–99)
GLUCOSE BLDC GLUCOMTR-MCNC: 95 MG/DL — SIGNIFICANT CHANGE UP (ref 70–99)

## 2019-03-10 PROCEDURE — 99232 SBSQ HOSP IP/OBS MODERATE 35: CPT

## 2019-03-10 RX ADMIN — Medication 2 MILLIGRAM(S): at 05:47

## 2019-03-10 RX ADMIN — Medication 8 UNIT(S): at 12:01

## 2019-03-10 RX ADMIN — METFORMIN HYDROCHLORIDE 500 MILLIGRAM(S): 850 TABLET ORAL at 05:47

## 2019-03-10 RX ADMIN — Medication 1 TABLET(S): at 12:00

## 2019-03-10 RX ADMIN — FINASTERIDE 5 MILLIGRAM(S): 5 TABLET, FILM COATED ORAL at 11:59

## 2019-03-10 RX ADMIN — Medication 500 MILLIGRAM(S): at 17:48

## 2019-03-10 RX ADMIN — LEVETIRACETAM 1500 MILLIGRAM(S): 250 TABLET, FILM COATED ORAL at 17:48

## 2019-03-10 RX ADMIN — PANTOPRAZOLE SODIUM 40 MILLIGRAM(S): 20 TABLET, DELAYED RELEASE ORAL at 05:46

## 2019-03-10 RX ADMIN — Medication 250 MILLIGRAM(S): at 05:46

## 2019-03-10 RX ADMIN — LACOSAMIDE 100 MILLIGRAM(S): 50 TABLET ORAL at 17:47

## 2019-03-10 RX ADMIN — Medication 500 MILLIGRAM(S): at 11:59

## 2019-03-10 RX ADMIN — Medication 500 MILLIGRAM(S): at 05:46

## 2019-03-10 RX ADMIN — LISINOPRIL 20 MILLIGRAM(S): 2.5 TABLET ORAL at 05:48

## 2019-03-10 RX ADMIN — NYSTATIN CREAM 1 APPLICATION(S): 100000 CREAM TOPICAL at 17:49

## 2019-03-10 RX ADMIN — METFORMIN HYDROCHLORIDE 500 MILLIGRAM(S): 850 TABLET ORAL at 17:48

## 2019-03-10 RX ADMIN — Medication 2 MILLIGRAM(S): at 12:02

## 2019-03-10 RX ADMIN — NYSTATIN CREAM 1 APPLICATION(S): 100000 CREAM TOPICAL at 05:48

## 2019-03-10 RX ADMIN — Medication 1: at 12:01

## 2019-03-10 RX ADMIN — Medication 1: at 21:04

## 2019-03-10 RX ADMIN — LACOSAMIDE 100 MILLIGRAM(S): 50 TABLET ORAL at 05:46

## 2019-03-10 RX ADMIN — Medication 8 UNIT(S): at 07:38

## 2019-03-10 RX ADMIN — INSULIN GLARGINE 28 UNIT(S): 100 INJECTION, SOLUTION SUBCUTANEOUS at 21:05

## 2019-03-10 RX ADMIN — LEVETIRACETAM 1500 MILLIGRAM(S): 250 TABLET, FILM COATED ORAL at 05:46

## 2019-03-10 RX ADMIN — Medication 250 MILLIGRAM(S): at 17:48

## 2019-03-10 NOTE — PROGRESS NOTE ADULT - SUBJECTIVE AND OBJECTIVE BOX
No overnight events.  Reports no pain.   Slept well. 	  Nonsensical in his communication.    REVIEW OF SYSTEMS  Constitutional - No fever,  No fatigue  HEENT - No vertigo, No neck pain  Neurological - No headaches, +loss of strength  Musculoskeletal - No joint pain, No joint swelling, No muscle pain          VITALS  T(C): 36.7 (03-09-19 @ 22:41), Max: 36.7 (03-09-19 @ 22:41)  HR: 82 (03-10-19 @ 04:49) (82 - 91)  BP: 128/79 (03-10-19 @ 04:49) (106/72 - 128/79)  RR: 14 (03-10-19 @ 04:49) (14 - 14)  SpO2: 99% (03-10-19 @ 04:49) (98% - 99%)  Wt(kg): --    121 mg/dL (03-10-19 @ 07:36)  173 mg/dL (03-09-19 @ 22:43)  144 mg/dL (03-09-19 @ 16:42)  165 mg/dL (03-09-19 @ 12:23)      MEDICATIONS   acetaminophen   Tablet .. 650 milliGRAM(s) every 6 hours PRN  ascorbic acid 500 milliGRAM(s) daily  ciprofloxacin     Tablet 500 milliGRAM(s) every 12 hours  dexamethasone     Tablet 2 milliGRAM(s) every 8 hours  dexamethasone     Tablet     docusate sodium 100 milliGRAM(s) three times a day  finasteride 5 milliGRAM(s) daily  insulin glargine Injectable (LANTUS) 28 Unit(s) at bedtime  insulin lispro (HumaLOG) corrective regimen sliding scale   Before meals and at bedtime  insulin lispro Injectable (HumaLOG) 8 Unit(s) three times a day before meals  lacosamide 100 milliGRAM(s) every 12 hours  levETIRAcetam 1500 milliGRAM(s) every 12 hours  lisinopril 20 milliGRAM(s) daily  metFORMIN 500 milliGRAM(s) two times a day  multivitamin 1 Tablet(s) daily  nystatin Powder 1 Application(s) two times a day  pantoprazole    Tablet 40 milliGRAM(s) before breakfast  saccharomyces boulardii 250 milliGRAM(s) two times a day  senna 2 Tablet(s) at bedtime PRN      RECENT LABS/IMAGING                        11.8   17.5  )-----------( 125      ( 09 Mar 2019 05:34 )             35.5                       ---------  PHYSICAL EXAM  Constitutional - NAD, Comfortable  Pulm - Breathing comfortably, No wheezing  Abd - Soft, NTND  Extremities - No edema, No calf tenderness  Neurologic Exam -                    Cognitive - Awake, Alert x self     Communication - Nonsensical      Motor - No focal deficits     Sensory - Intact to LT  Psychiatric - Mood WNL, Affect Flat	    ASSESSMENT/PLAN  74y Male with functional deficits after 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   Leukocytosis - Decreased, no fever  UTI - Cipro sensitive  DM2 - Lantus, Metformin, ISS, Humalog  GBM + Seizures - Decadron, Keppra, Vimpat  HTN - Lisinopril  Pain - Tylenol PRN  DVT PPX - SCDs	    Continue 3hrs a day of comprehensive rehab program.

## 2019-03-11 LAB
ANION GAP SERPL CALC-SCNC: 10 MMOL/L — SIGNIFICANT CHANGE UP (ref 5–17)
BUN SERPL-MCNC: 21 MG/DL — SIGNIFICANT CHANGE UP (ref 7–23)
CALCIUM SERPL-MCNC: 8.8 MG/DL — SIGNIFICANT CHANGE UP (ref 8.4–10.5)
CHLORIDE SERPL-SCNC: 100 MMOL/L — SIGNIFICANT CHANGE UP (ref 96–108)
CO2 SERPL-SCNC: 27 MMOL/L — SIGNIFICANT CHANGE UP (ref 22–31)
CREAT SERPL-MCNC: 0.81 MG/DL — SIGNIFICANT CHANGE UP (ref 0.5–1.3)
GLUCOSE BLDC GLUCOMTR-MCNC: 140 MG/DL — HIGH (ref 70–99)
GLUCOSE BLDC GLUCOMTR-MCNC: 152 MG/DL — HIGH (ref 70–99)
GLUCOSE BLDC GLUCOMTR-MCNC: 292 MG/DL — HIGH (ref 70–99)
GLUCOSE BLDC GLUCOMTR-MCNC: 71 MG/DL — SIGNIFICANT CHANGE UP (ref 70–99)
GLUCOSE BLDC GLUCOMTR-MCNC: 85 MG/DL — SIGNIFICANT CHANGE UP (ref 70–99)
GLUCOSE SERPL-MCNC: 71 MG/DL — SIGNIFICANT CHANGE UP (ref 70–99)
HCT VFR BLD CALC: 36.8 % — LOW (ref 39–50)
HGB BLD-MCNC: 12.1 G/DL — LOW (ref 13–17)
MCHC RBC-ENTMCNC: 31 PG — SIGNIFICANT CHANGE UP (ref 27–34)
MCHC RBC-ENTMCNC: 32.9 GM/DL — SIGNIFICANT CHANGE UP (ref 32–36)
MCV RBC AUTO: 94.2 FL — SIGNIFICANT CHANGE UP (ref 80–100)
PLATELET # BLD AUTO: 136 K/UL — LOW (ref 150–400)
POTASSIUM SERPL-MCNC: 3.4 MMOL/L — LOW (ref 3.5–5.3)
POTASSIUM SERPL-SCNC: 3.4 MMOL/L — LOW (ref 3.5–5.3)
RBC # BLD: 3.91 M/UL — LOW (ref 4.2–5.8)
RBC # FLD: 14.8 % — HIGH (ref 10.3–14.5)
SODIUM SERPL-SCNC: 137 MMOL/L — SIGNIFICANT CHANGE UP (ref 135–145)
WBC # BLD: 14.6 K/UL — HIGH (ref 3.8–10.5)
WBC # FLD AUTO: 14.6 K/UL — HIGH (ref 3.8–10.5)

## 2019-03-11 PROCEDURE — 99233 SBSQ HOSP IP/OBS HIGH 50: CPT

## 2019-03-11 PROCEDURE — 99232 SBSQ HOSP IP/OBS MODERATE 35: CPT

## 2019-03-11 RX ORDER — ENOXAPARIN SODIUM 100 MG/ML
40 INJECTION SUBCUTANEOUS DAILY
Qty: 0 | Refills: 0 | Status: DISCONTINUED | OUTPATIENT
Start: 2019-03-11 | End: 2019-03-26

## 2019-03-11 RX ORDER — INSULIN LISPRO 100/ML
8 VIAL (ML) SUBCUTANEOUS
Qty: 0 | Refills: 0 | Status: DISCONTINUED | OUTPATIENT
Start: 2019-03-11 | End: 2019-03-12

## 2019-03-11 RX ORDER — INSULIN GLARGINE 100 [IU]/ML
28 INJECTION, SOLUTION SUBCUTANEOUS AT BEDTIME
Qty: 0 | Refills: 0 | Status: DISCONTINUED | OUTPATIENT
Start: 2019-03-11 | End: 2019-03-12

## 2019-03-11 RX ADMIN — Medication 500 MILLIGRAM(S): at 12:28

## 2019-03-11 RX ADMIN — Medication 8 UNIT(S): at 16:26

## 2019-03-11 RX ADMIN — Medication 1 TABLET(S): at 12:28

## 2019-03-11 RX ADMIN — Medication 2 MILLIGRAM(S): at 05:07

## 2019-03-11 RX ADMIN — Medication 3: at 12:27

## 2019-03-11 RX ADMIN — LACOSAMIDE 100 MILLIGRAM(S): 50 TABLET ORAL at 05:06

## 2019-03-11 RX ADMIN — Medication 250 MILLIGRAM(S): at 17:40

## 2019-03-11 RX ADMIN — LACOSAMIDE 100 MILLIGRAM(S): 50 TABLET ORAL at 17:40

## 2019-03-11 RX ADMIN — LISINOPRIL 20 MILLIGRAM(S): 2.5 TABLET ORAL at 05:07

## 2019-03-11 RX ADMIN — Medication 2 MILLIGRAM(S): at 17:40

## 2019-03-11 RX ADMIN — NYSTATIN CREAM 1 APPLICATION(S): 100000 CREAM TOPICAL at 17:40

## 2019-03-11 RX ADMIN — Medication 500 MILLIGRAM(S): at 17:40

## 2019-03-11 RX ADMIN — ENOXAPARIN SODIUM 40 MILLIGRAM(S): 100 INJECTION SUBCUTANEOUS at 20:33

## 2019-03-11 RX ADMIN — LEVETIRACETAM 1500 MILLIGRAM(S): 250 TABLET, FILM COATED ORAL at 05:06

## 2019-03-11 RX ADMIN — FINASTERIDE 5 MILLIGRAM(S): 5 TABLET, FILM COATED ORAL at 12:28

## 2019-03-11 RX ADMIN — Medication 500 MILLIGRAM(S): at 05:08

## 2019-03-11 RX ADMIN — METFORMIN HYDROCHLORIDE 500 MILLIGRAM(S): 850 TABLET ORAL at 05:07

## 2019-03-11 RX ADMIN — PANTOPRAZOLE SODIUM 40 MILLIGRAM(S): 20 TABLET, DELAYED RELEASE ORAL at 06:43

## 2019-03-11 RX ADMIN — METFORMIN HYDROCHLORIDE 500 MILLIGRAM(S): 850 TABLET ORAL at 17:40

## 2019-03-11 RX ADMIN — Medication 250 MILLIGRAM(S): at 05:06

## 2019-03-11 RX ADMIN — LEVETIRACETAM 1500 MILLIGRAM(S): 250 TABLET, FILM COATED ORAL at 17:40

## 2019-03-11 RX ADMIN — Medication 8 UNIT(S): at 12:23

## 2019-03-11 RX ADMIN — Medication 1: at 21:28

## 2019-03-11 RX ADMIN — NYSTATIN CREAM 1 APPLICATION(S): 100000 CREAM TOPICAL at 05:08

## 2019-03-11 RX ADMIN — INSULIN GLARGINE 28 UNIT(S): 100 INJECTION, SOLUTION SUBCUTANEOUS at 21:27

## 2019-03-11 NOTE — PROGRESS NOTE ADULT - ASSESSMENT
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 He is now with functional, gait, ADL, transfer, balance, endurance, cognitive, swallow impairments.    ~Recurrent GBM s/p craniotomy and resection  -cont 3 hours of comprehensive therapy consisting of PT/OT/SLP/Neuropsychology  -Precautions: fall, cardiac, seizure, aspiration  -Decadron taper   -Seizure PPx with Keppra and Vimpat  -To follow up with neuro-oncology as outpatient    ~DM   --FS low this AM--Premeal humalog held--Added holding parameter  d/w Hospitalist--  -Continue Lantus and premeal insulin  -cont Metformin 500mg BID  Monitor FS  -Consistent carb diet    ~Pain control  -Tylenol  PRN    ~HTN  -Continue Lisinopril    ~Hyponatremia  -Improved  -Continue fluid restriction    ~Leukocytosis with positive UA, also on steroids,   UTI--Urine Cx + E. Coli--Sens. to Cipro  -Afebrile,   --Cont. 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 He is now with functional, gait, ADL, transfer, balance, endurance, cognitive, swallow impairments.    ~Recurrent GBM s/p craniotomy and resection  -cont 3 hours of comprehensive therapy consisting of PT/OT/SLP/Neuropsychology  -Precautions: fall, cardiac, seizure, aspiration  -Decadron taper   -Seizure PPx with Keppra and Vimpat  -To follow up with neuro-oncology as outpatient    ~DM   --FS low this AM--Premeal humalog held--Added holding parameter  d/w Hospitalist--  -Continue Lantus and premeal insulin  -cont Metformin 500mg BID  Monitor FS  -Consistent carb diet    ~Pain control  -Tylenol  PRN    ~HTN  -Continue Lisinopril    ~Hyponatremia  -Improved  -Continue fluid restriction    ~Leukocytosis with positive UA, also on steroids,   UTI--Urine Cx + E. Coli--Sens. to Cipro  -Afebrile,   --Cont. cipro  -Monitor CBC tomorrow    ~Anemia  -Hgb stable, no obvious signs of bleeding    ~ regimen  -incontinent  -Finasteride    ~GI/Bowel regimen  -Continue Protonix  -Continue Colace and Senna  -Diet: Mechanical soft (consistent carb)  -Restorative dining  -Supervise meals    ~Skin/Stage II sacral ulcer  -Turn and position Q2hrs  -OOB with PT/OT  -Nystatin to groin  -Wound gel    ~DVT PPx  -Pt. was on Lovenox at [facility]  will restart ppx lovenox here

## 2019-03-11 NOTE — PROGRESS NOTE ADULT - SUBJECTIVE AND OBJECTIVE BOX
Patient is a 74y old  Male who presents with a chief complaint of Seizure, Functional deficits s/p GBM (10 Mar 2019 09:20)      Patient seen and examined at bedside.     ALLERGIES:  No Known Allergies    MEDICATIONS:  ascorbic acid 500 milliGRAM(s) Oral daily  ciprofloxacin     Tablet 500 milliGRAM(s) Oral every 12 hours  insulin glargine Injectable (LANTUS) 28 Unit(s) SubCutaneous at bedtime  insulin lispro Injectable (HumaLOG) 8 Unit(s) SubCutaneous three times a day before meals  multivitamin 1 Tablet(s) Oral daily  nystatin Powder 1 Application(s) Topical two times a day  saccharomyces boulardii 250 milliGRAM(s) Oral two times a day    Vital Signs Last 24 Hrs  T(F): 98.8 (11 Mar 2019 07:18), Max: 98.8 (11 Mar 2019 07:18)  HR: 84 (11 Mar 2019 07:18) (77 - 94)  BP: 153/88 (11 Mar 2019 07:18) (113/74 - 153/88)  RR: 14 (11 Mar 2019 07:18) (14 - 14)  SpO2: 96% (11 Mar 2019 07:18) (96% - 97%)  I&O's Summary    10 Mar 2019 07:01  -  11 Mar 2019 07:00  --------------------------------------------------------  IN: 800 mL / OUT: 0 mL / NET: 800 mL        PHYSICAL EXAM:  General: NAD, comfortable   ENT: MMM  Neck: Supple, No JVD  Lungs: CTA, BLAE, No added sounds.   Cardio: RRR, S1/S2, No murmurs  Abdomen: Soft, NT/ND, BS+   Extremities: No edema  CNS: no new deficits     LABS:                        12.1   14.6  )-----------( 136      ( 11 Mar 2019 05:59 )             36.8     03-11    137  |  100  |  21  ----------------------------<  71  3.4   |  27  |  0.81    Ca    8.8      11 Mar 2019 05:59      eGFR if Non African American: 86 mL/min/1.73M2 (03-11-19 @ 05:59)  eGFR if African American: 100 mL/min/1.73M2 (03-11-19 @ 05:59)                    CAPILLARY BLOOD GLUCOSE      POCT Blood Glucose.: 85 mg/dL (11 Mar 2019 08:41)  POCT Blood Glucose.: 71 mg/dL (11 Mar 2019 08:02)  POCT Blood Glucose.: 175 mg/dL (10 Mar 2019 21:03)  POCT Blood Glucose.: 95 mg/dL (10 Mar 2019 16:43)  POCT Blood Glucose.: 173 mg/dL (10 Mar 2019 11:55)    02-13 [id number] 7.7        Culture - Urine (collected 07 Mar 2019 00:40)  Source: .Urine Clean Catch (Midstream)  Final Report (09 Mar 2019 18:00):    >100,000 CFU/ml Escherichia coli  Organism: Escherichia coli (09 Mar 2019 18:00)  Organism: Escherichia coli (09 Mar 2019 18:00)      -  Amikacin: S <=16      -  Ampicillin: R >16 These ampicillin results predict results for amoxicillin      -  Ampicillin/Sulbactam: R >16/8      -  Aztreonam: S <=4      -  Cefazolin: S <=8 For uncomplicated UTI with K. pneumoniae, E. coli, or P. mirablis: SHAYY <=16 is sensitive and SHAYY >=32 is resistant. This also predicts results for oral agents cefaclor, cefdinir, cefpodoxime, cefprozil, cefuroxime axetil, cephalexin and locarbef for uncomplicated UTI. Note that some isolates may be susceptible to these agents while testing resistant to cefazolin.      -  Cefepime: S <=4      -  Cefoxitin: S <=8      -  Ceftriaxone: S <=1 Enterobacter, Citrobacter, and Serratia may develop resistance during prolonged therapy      -  Ciprofloxacin: S <=1      -  Ertapenem: S <=1      -  Gentamicin: S <=4      -  Imipenem: S <=1      -  Levofloxacin: S <=2      -  Meropenem: S <=1      -  Nitrofurantoin: S <=32 Should not be used to treat pyelonephritis      -  Piperacillin/Tazobactam: S <=16      -  Tigecycline: S <=2      -  Tobramycin: S <=4      -  Trimethoprim/Sulfamethoxazole: S <=2/38      Method Type: SHAYY        RADIOLOGY & ADDITIONAL TESTS:    Care Discussed with Consultants/Other Providers:

## 2019-03-11 NOTE — PROGRESS NOTE ADULT - ASSESSMENT
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 He is now with functional, gait, ADL, transfer, balance, endurance, cognitive, swallow impairments.    #GBM s/p craniotomy and resection  c/w PT/OT/SLP  c/w Decadron taper.    #Seizure PPx  c/w Keppra and Vimpat    #HTN- controlled.   c/w Lisinopril    #UTI  UA + , E coli  Leucocytosis improving , monitor;  c/w  Ciprofloxacin -  5 days    #DM   Fs acceptable.   c/w Lantus 28  and pre meal 8 U tID AC   c/w Metformin 500mg BID  FS monitoring   Hypoglycemia protocol.     # Anemia  H/H stable,   Monitor.     # DVT PPx  SCD   No chemo px  in setting of NeuroSx intervention,

## 2019-03-11 NOTE — PROGRESS NOTE ADULT - SUBJECTIVE AND OBJECTIVE BOX
HPI:  Mr. Duncan is a 74 year old male with a PMHx of GBM s/p right craniotomy in 2019, CVA with residual L sided weakness who presented to [facility] on [facility]12/19 with seizures likely from recurrence of righ temporal lobe GBM complicated by sepsis from viral URI, acute embolic CVA, PFO, LLE DVT s/p IVC, and SAH.     Pt was BIBEMS from nursing home for active seizure 2/12/19. Per ED he was actively seizing for 30 mins prior to EMS arrival, and displayed left sided face, arm, and leg jerking in the ED. Pt received Ativan and Keppra and seizure broke after 20 mins in the ED.   On admission, pt was found to be febrile with leukocytosis 31.56 meeting SIRS criteria and was found to have +coronavirus. Neurology and neurosurgery were consulted with the plan to have repeat craniotomy and GBM resection pending bacteremia clearance.   On 2/15 rapid responses called for pt due to 3 repeat seizures and unresponsiveness with new left facial droop- given Ativan and Keppra per neurosurgery recommendations MRI 2/15/29 showed 3 discrete foci of acute infarction within the right occipital lobe, left corpus callosum within the genu and left frontal lobe in the region of the precentral gyrus. Pt placed on heparin gtt for anticoagulation during workup. Doppler U/S showed acute DVT of left common femoral, popliteal, posterior tibial, and peroneal vein, and ALBERTO 2/20/19 showed evidence of a PFO. Pt had placement of IVC filter on 2/19/19 and heparin gtt was discontinued in preparation for neurosurgical procedure which was scheduled for 2/25 but was delayed due to episodes of bradycardia in 40s. Pt had R craniotomy for resection of brain tumor on 2/27/19. Post/op course c/b seizures 2/2 SAH on CT 3/2 for which pt was given Keppra and Vimpat, and transferred to SICU for further monitoring. Pt seizures and hemorrhage stabilized and plan for discharge to rehab. On the day of discharge he was medically and neurologically stable for discharge to rehab on 3/6/19. (06 Mar 2019 14:35)      PAST MEDICAL & SURGICAL HISTORY:  Glioblastoma  BPH (benign prostatic hyperplasia)  CVA (cerebral vascular accident)  Diabetes  HTN (hypertension)  S/P craniotomy: ~ resection of R-pariental mass (11/28/2018)      Subjective:  No new complaints or overnight issues. sleeping at night as per sleep log    REVIEW OF SYMPTOMS  Neurological deficits--cognitive deficit, left visual field deficit      VITALS  Vital Signs Last 24 Hrs  T(C): 37.1 (11 Mar 2019 07:18), Max: 37.1 (11 Mar 2019 07:18)  T(F): 98.8 (11 Mar 2019 07:18), Max: 98.8 (11 Mar 2019 07:18)  HR: 84 (11 Mar 2019 07:18) (77 - 94)  BP: 153/88 (11 Mar 2019 07:18) (113/74 - 153/88)  BP(mean): --  RR: 14 (11 Mar 2019 07:18) (14 - 14)  SpO2: 96% (11 Mar 2019 07:18) (96% - 97%)      PHYSICAL EXAM  Constitutional - NAD, Comfortable  	HEENT - Right cranial incision with staples C/D/I, Limited eye movement to left  	Chest - CTA  	Cardiovascular - All extremities well perfused, S1S2  	Abdomen - Soft, Non-distended, Non-tender  	Extremities - No bilateral lower extremity edema, No calf tenderness   	Neurologic Exam -                 	   Cognitive - Drowsy, Oriented to self, & place,  not date or situation  	   Communication - Fluent, No dysarthria, Naming intact  	   Attention - Impaired, unable to perform days of week backwards and serial 7's  	   Memory - Impaired, unable to recall 3/3 items after 3 minutes without cueing (only got 1/3 with cueing)  	   Cranial Nerves - Right eye reactivity sluggish, Impaired left visual field, Left visual inattention  	   Motor -   	                  LEFT    UE - ShAB 5/5, EF 5/5, EE 5/5, WE 5/5,  5/5  	                  RIGHT UE - ShAB 5/5, EF 5/5, EE 5/5, WE 5/5,  5/5  	                  LEFT    LE - HF 5/5, KE 5/5, KF 4/5, DF 5/5, PF 5/5  	                  RIGHT LE - HF 5/5, KE 5/5, KF 4/5, DF 5/5, PF 5/5     	   Sensory - Impaired on left sided extremities,  extinguishes in left upper > lower extremity  	   Reflexes - DTR intact and symmetrical  	   Coordination -FTN accurate bilaterally when tested on pt's right.   Finger-to-nose impaired when attempted on left of patient's body--(due to visual deficit)  	    RECENT LABS                        12.1   14.6  )-----------( 136      ( 11 Mar 2019 05:59 )             36.8     Culture - Urine (03.07.19 @ 00:40)    -  Ampicillin/Sulbactam: R >16/8    -  Aztreonam: S <=4    -  Cefazolin: S <=8 For uncomplicated UTI with K. pneumoniae, E. coli, or P. mirablis: SHAYY <=16 is sensitive and SHAYY >=32 is resistant. This also predicts results for oral agents cefaclor, cefdinir, cefpodoxime, cefprozil, cefuroxime axetil, cephalexin and locarbef for uncomplicated UTI. Note that some isolates may be susceptible to these agents while testing resistant to cefazolin.    -  Cefepime: S <=4    -  Cefoxitin: S <=8    -  Ceftriaxone: S <=1 Enterobacter, Citrobacter, and Serratia may develop resistance during prolonged therapy    -  Ciprofloxacin: S <=1    -  Ertapenem: S <=1    -  Gentamicin: S <=4    -  Imipenem: S <=1    -  Levofloxacin: S <=2    -  Meropenem: S <=1    -  Nitrofurantoin: S <=32 Should not be used to treat pyelonephritis    -  Piperacillin/Tazobactam: S <=16    -  Tigecycline: S <=2    -  Amikacin: S <=16    -  Ampicillin: R >16 These ampicillin results predict results for amoxicillin    -  Tobramycin: S <=4    -  Trimethoprim/Sulfamethoxazole: S <=2/38    Specimen Source: .Urine Clean Catch (Midstream)    Culture Results:   >100,000 CFU/ml Escherichia coli    Organism Identification: Escherichia coli    Organism: Escherichia coli    Method Type: SHAYY    03-11    137  |  100  |  21  ----------------------------<  71  3.4<L>   |  27  |  0.81    Ca    8.8      11 Mar 2019 05:59              RADIOLOGY/OTHER RESULTS      MEDICATIONS  (STANDING):  ascorbic acid 500 milliGRAM(s) Oral daily  ciprofloxacin     Tablet 500 milliGRAM(s) Oral every 12 hours  dexamethasone     Tablet 2 milliGRAM(s) Oral every 12 hours  dexamethasone     Tablet   Oral   finasteride 5 milliGRAM(s) Oral daily  insulin glargine Injectable (LANTUS) 28 Unit(s) SubCutaneous at bedtime  insulin lispro (HumaLOG) corrective regimen sliding scale   SubCutaneous Before meals and at bedtime  insulin lispro Injectable (HumaLOG) 8 Unit(s) SubCutaneous three times a day before meals  lacosamide 100 milliGRAM(s) Oral every 12 hours  levETIRAcetam 1500 milliGRAM(s) Oral every 12 hours  lisinopril 20 milliGRAM(s) Oral daily  metFORMIN 500 milliGRAM(s) Oral two times a day  multivitamin 1 Tablet(s) Oral daily  nystatin Powder 1 Application(s) Topical two times a day  pantoprazole    Tablet 40 milliGRAM(s) Oral before breakfast  saccharomyces boulardii 250 milliGRAM(s) Oral two times a day    MEDICATIONS  (PRN):  acetaminophen   Tablet .. 650 milliGRAM(s) Oral every 6 hours PRN Mild Pain (1 - 3)  senna 2 Tablet(s) Oral at bedtime PRN Constipation

## 2019-03-12 LAB
ANION GAP SERPL CALC-SCNC: 12 MMOL/L — SIGNIFICANT CHANGE UP (ref 5–17)
BUN SERPL-MCNC: 21 MG/DL — SIGNIFICANT CHANGE UP (ref 7–23)
CALCIUM SERPL-MCNC: 8.9 MG/DL — SIGNIFICANT CHANGE UP (ref 8.4–10.5)
CHLORIDE SERPL-SCNC: 97 MMOL/L — SIGNIFICANT CHANGE UP (ref 96–108)
CO2 SERPL-SCNC: 26 MMOL/L — SIGNIFICANT CHANGE UP (ref 22–31)
CREAT SERPL-MCNC: 0.97 MG/DL — SIGNIFICANT CHANGE UP (ref 0.5–1.3)
GLUCOSE BLDC GLUCOMTR-MCNC: 110 MG/DL — HIGH (ref 70–99)
GLUCOSE BLDC GLUCOMTR-MCNC: 115 MG/DL — HIGH (ref 70–99)
GLUCOSE BLDC GLUCOMTR-MCNC: 270 MG/DL — HIGH (ref 70–99)
GLUCOSE BLDC GLUCOMTR-MCNC: 75 MG/DL — SIGNIFICANT CHANGE UP (ref 70–99)
GLUCOSE SERPL-MCNC: 289 MG/DL — HIGH (ref 70–99)
HCT VFR BLD CALC: 39.3 % — SIGNIFICANT CHANGE UP (ref 39–50)
HGB BLD-MCNC: 12.7 G/DL — LOW (ref 13–17)
MCHC RBC-ENTMCNC: 31 PG — SIGNIFICANT CHANGE UP (ref 27–34)
MCHC RBC-ENTMCNC: 32.2 GM/DL — SIGNIFICANT CHANGE UP (ref 32–36)
MCV RBC AUTO: 96.2 FL — SIGNIFICANT CHANGE UP (ref 80–100)
PLATELET # BLD AUTO: 173 K/UL — SIGNIFICANT CHANGE UP (ref 150–400)
POTASSIUM SERPL-MCNC: 4.5 MMOL/L — SIGNIFICANT CHANGE UP (ref 3.5–5.3)
POTASSIUM SERPL-SCNC: 4.5 MMOL/L — SIGNIFICANT CHANGE UP (ref 3.5–5.3)
RBC # BLD: 4.08 M/UL — LOW (ref 4.2–5.8)
RBC # FLD: 15.7 % — HIGH (ref 10.3–14.5)
SODIUM SERPL-SCNC: 135 MMOL/L — SIGNIFICANT CHANGE UP (ref 135–145)
WBC # BLD: 14.7 K/UL — HIGH (ref 3.8–10.5)
WBC # FLD AUTO: 14.7 K/UL — HIGH (ref 3.8–10.5)

## 2019-03-12 PROCEDURE — 99232 SBSQ HOSP IP/OBS MODERATE 35: CPT

## 2019-03-12 PROCEDURE — 99233 SBSQ HOSP IP/OBS HIGH 50: CPT

## 2019-03-12 RX ORDER — LACOSAMIDE 50 MG/1
100 TABLET ORAL EVERY 12 HOURS
Qty: 0 | Refills: 0 | Status: DISCONTINUED | OUTPATIENT
Start: 2019-03-12 | End: 2019-03-19

## 2019-03-12 RX ORDER — ZINC SULFATE TAB 220 MG (50 MG ZINC EQUIVALENT) 220 (50 ZN) MG
220 TAB ORAL DAILY
Qty: 0 | Refills: 0 | Status: DISCONTINUED | OUTPATIENT
Start: 2019-03-12 | End: 2019-03-26

## 2019-03-12 RX ORDER — INSULIN LISPRO 100/ML
10 VIAL (ML) SUBCUTANEOUS
Qty: 0 | Refills: 0 | Status: DISCONTINUED | OUTPATIENT
Start: 2019-03-12 | End: 2019-03-15

## 2019-03-12 RX ORDER — INSULIN LISPRO 100/ML
6 VIAL (ML) SUBCUTANEOUS
Qty: 0 | Refills: 0 | Status: DISCONTINUED | OUTPATIENT
Start: 2019-03-12 | End: 2019-03-15

## 2019-03-12 RX ORDER — INSULIN GLARGINE 100 [IU]/ML
26 INJECTION, SOLUTION SUBCUTANEOUS AT BEDTIME
Qty: 0 | Refills: 0 | Status: DISCONTINUED | OUTPATIENT
Start: 2019-03-12 | End: 2019-03-15

## 2019-03-12 RX ORDER — INSULIN LISPRO 100/ML
8 VIAL (ML) SUBCUTANEOUS
Qty: 0 | Refills: 0 | Status: DISCONTINUED | OUTPATIENT
Start: 2019-03-12 | End: 2019-03-15

## 2019-03-12 RX ADMIN — NYSTATIN CREAM 1 APPLICATION(S): 100000 CREAM TOPICAL at 17:43

## 2019-03-12 RX ADMIN — LEVETIRACETAM 1500 MILLIGRAM(S): 250 TABLET, FILM COATED ORAL at 05:04

## 2019-03-12 RX ADMIN — Medication 500 MILLIGRAM(S): at 17:42

## 2019-03-12 RX ADMIN — Medication 250 MILLIGRAM(S): at 17:42

## 2019-03-12 RX ADMIN — FINASTERIDE 5 MILLIGRAM(S): 5 TABLET, FILM COATED ORAL at 12:18

## 2019-03-12 RX ADMIN — METFORMIN HYDROCHLORIDE 500 MILLIGRAM(S): 850 TABLET ORAL at 17:43

## 2019-03-12 RX ADMIN — Medication 2 MILLIGRAM(S): at 05:03

## 2019-03-12 RX ADMIN — LACOSAMIDE 100 MILLIGRAM(S): 50 TABLET ORAL at 18:19

## 2019-03-12 RX ADMIN — Medication 8 UNIT(S): at 12:17

## 2019-03-12 RX ADMIN — Medication 500 MILLIGRAM(S): at 12:17

## 2019-03-12 RX ADMIN — Medication 250 MILLIGRAM(S): at 05:03

## 2019-03-12 RX ADMIN — Medication 2 MILLIGRAM(S): at 17:42

## 2019-03-12 RX ADMIN — LEVETIRACETAM 1500 MILLIGRAM(S): 250 TABLET, FILM COATED ORAL at 17:42

## 2019-03-12 RX ADMIN — Medication 500 MILLIGRAM(S): at 05:03

## 2019-03-12 RX ADMIN — Medication 3: at 12:17

## 2019-03-12 RX ADMIN — Medication 6 UNIT(S): at 18:17

## 2019-03-12 RX ADMIN — ENOXAPARIN SODIUM 40 MILLIGRAM(S): 100 INJECTION SUBCUTANEOUS at 12:17

## 2019-03-12 RX ADMIN — PANTOPRAZOLE SODIUM 40 MILLIGRAM(S): 20 TABLET, DELAYED RELEASE ORAL at 06:06

## 2019-03-12 RX ADMIN — INSULIN GLARGINE 26 UNIT(S): 100 INJECTION, SOLUTION SUBCUTANEOUS at 21:59

## 2019-03-12 RX ADMIN — Medication 1 TABLET(S): at 12:17

## 2019-03-12 RX ADMIN — LACOSAMIDE 100 MILLIGRAM(S): 50 TABLET ORAL at 05:02

## 2019-03-12 RX ADMIN — METFORMIN HYDROCHLORIDE 500 MILLIGRAM(S): 850 TABLET ORAL at 05:03

## 2019-03-12 RX ADMIN — NYSTATIN CREAM 1 APPLICATION(S): 100000 CREAM TOPICAL at 05:05

## 2019-03-12 NOTE — PROGRESS NOTE ADULT - ASSESSMENT
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 He is now with functional, gait, ADL, transfer, balance, endurance, cognitive, swallow impairments.    #GBM s/p craniotomy and resection  c/w PT/OT/SLP  c/w Decadron taper.    #Seizure PPx  c/w Keppra and Vimpat    #HTN- controlled.   c/w Lisinopril    #UTI  UA + , E coli, Afebrile.   c/w  Ciprofloxacin -  5 days    #DM   FS trending low, will decrease insulin   decreased  Lantus to 26 and   pre meal 10-8-6 AC   c/w Metformin 500mg BID  FS monitoring   Hypoglycemia protocol.     # Anemia  H/H stable,   Monitor.     # DVT PPx  SCD   No chemo px  in setting of NeuroSx intervention,

## 2019-03-12 NOTE — PROGRESS NOTE ADULT - SUBJECTIVE AND OBJECTIVE BOX
Patient is a 74y old  Male who presents with a chief complaint of Seizure, Functional deficits s/p GBM (11 Mar 2019 12:28)      Patient seen and examined at bedside.     ALLERGIES:  No Known Allergies    MEDICATIONS:  ascorbic acid 500 milliGRAM(s) Oral daily  ciprofloxacin     Tablet 500 milliGRAM(s) Oral every 12 hours  enoxaparin Injectable 40 milliGRAM(s) SubCutaneous daily  insulin glargine Injectable (LANTUS) 28 Unit(s) SubCutaneous at bedtime  insulin lispro Injectable (HumaLOG) 8 Unit(s) SubCutaneous three times a day before meals  lacosamide 100 milliGRAM(s) Oral every 12 hours  multivitamin 1 Tablet(s) Oral daily  nystatin Powder 1 Application(s) Topical two times a day  saccharomyces boulardii 250 milliGRAM(s) Oral two times a day    Vital Signs Last 24 Hrs  T(F): 97.9 (12 Mar 2019 08:21), Max: 98.8 (11 Mar 2019 19:32)  HR: 77 (12 Mar 2019 08:21) (77 - 87)  BP: 117/86 (12 Mar 2019 08:21) (117/79 - 117/86)  RR: 16 (12 Mar 2019 08:21) (14 - 16)  SpO2: 95% (12 Mar 2019 08:21) (95% - 96%)  I&O's Summary      PHYSICAL EXAM:  General: NAD, comfortable   ENT: MMM  Neck: Supple, No JVD  Lungs: CTA, BLAE, No added sounds.   Cardio: RRR, S1/S2, No murmurs  Abdomen: Soft, NT/ND, BS+   Extremities: No edema  CNS: no new deficits     LABS:                        12.1   14.6  )-----------( 136      ( 11 Mar 2019 05:59 )             36.8     03-11    137  |  100  |  21  ----------------------------<  71  3.4   |  27  |  0.81    Ca    8.8      11 Mar 2019 05:59      eGFR if Non African American: 86 mL/min/1.73M2 (03-11-19 @ 05:59)  eGFR if African American: 100 mL/min/1.73M2 (03-11-19 @ 05:59)                    CAPILLARY BLOOD GLUCOSE      POCT Blood Glucose.: 75 mg/dL (12 Mar 2019 08:42)  POCT Blood Glucose.: 152 mg/dL (11 Mar 2019 21:26)  POCT Blood Glucose.: 140 mg/dL (11 Mar 2019 16:25)  POCT Blood Glucose.: 292 mg/dL (11 Mar 2019 12:22)    02-13 [id number] 7.7          RADIOLOGY & ADDITIONAL TESTS:    Care Discussed with Consultants/Other Providers:

## 2019-03-12 NOTE — PROGRESS NOTE ADULT - SUBJECTIVE AND OBJECTIVE BOX
HPI:  Mr. Duncan is a 74 year old male with a PMHx of GBM s/p right craniotomy in 2019, CVA with residual L sided weakness who presented to [facility] on [facility]/12/19 with seizures likely from recurrence of righ temporal lobe GBM complicated by sepsis from viral URI, acute embolic CVA, PFO, LLE DVT s/p IVC, and SAH.     Pt was BIBEMS from nursing home for active seizure 2/12/19. Per ED he was actively seizing for 30 mins prior to EMS arrival, and displayed left sided face, arm, and leg jerking in the ED. Pt received Ativan and Keppra and seizure broke after 20 mins in the ED.   On admission, pt was found to be febrile with leukocytosis 31.56 meeting SIRS criteria and was found to have +coronavirus. Neurology and neurosurgery were consulted with the plan to have repeat craniotomy and GBM resection pending bacteremia clearance.   On 2/15 rapid responses called for pt due to 3 repeat seizures and unresponsiveness with new left facial droop- given Ativan and Keppra per neurosurgery recommendations MRI 2/15/29 showed 3 discrete foci of acute infarction within the right occipital lobe, left corpus callosum within the genu and left frontal lobe in the region of the precentral gyrus. Pt placed on heparin gtt for anticoagulation during workup. Doppler U/S showed acute DVT of left common femoral, popliteal, posterior tibial, and peroneal vein, and ALBERTO 2/20/19 showed evidence of a PFO. Pt had placement of IVC filter on 2/19/19 and heparin gtt was discontinued in preparation for neurosurgical procedure which was scheduled for 2/25 but was delayed due to episodes of bradycardia in 40s. Pt had R craniotomy for resection of brain tumor on 2/27/19. Post/op course c/b seizures 2/2 SAH on CT 3/2 for which pt was given Keppra and Vimpat, and transferred to SICU for further monitoring. Pt seizures and hemorrhage stabilized and plan for discharge to rehab. On the day of discharge he was medically and neurologically stable for discharge to rehab on 3/6/19. (06 Mar 2019 14:35)      PAST MEDICAL & SURGICAL HISTORY:  Glioblastoma  BPH (benign prostatic hyperplasia)  CVA (cerebral vascular accident)  Diabetes  HTN (hypertension)  S/P craniotomy: ~ resection of R-pariental mass (11/28/2018)      Subjective:  Patient seen and examined today, no acute events overnight, alert to self and hospital but not date. Vital signs stable. Still states he has some burning with urination, will encourage PO fluids.    REVIEW OF SYMPTOMS  Neurological deficits--cognitive deficit, left visual field deficit      VITALS  Vital Signs Last 24 Hrs  T(C): 36.6 (12 Mar 2019 08:21), Max: 37.1 (11 Mar 2019 19:32)  T(F): 97.9 (12 Mar 2019 08:21), Max: 98.8 (11 Mar 2019 19:32)  HR: 77 (12 Mar 2019 08:21) (77 - 87)  BP: 117/86 (12 Mar 2019 08:21) (117/79 - 117/86)  BP(mean): --  RR: 16 (12 Mar 2019 08:21) (14 - 16)  SpO2: 95% (12 Mar 2019 08:21) (95% - 96%)      PHYSICAL EXAM  Constitutional - NAD, Comfortable  	HEENT - Right cranial incision with staples C/D/I, Limited eye movement to left  	Chest - CTA  	Cardiovascular - All extremities well perfused, S1S2  	Abdomen - Soft, Non-distended, Non-tender  	Extremities - No bilateral lower extremity edema, No calf tenderness   	Neurologic Exam -                 	   Cognitive - Drowsy, Oriented to self, & place,  not date or situation  	   Communication - Fluent, No dysarthria, Naming intact  	   Attention - Impaired, unable to perform days of week backwards and serial 7's  	   Memory - Impaired, unable to recall 3/3 items after 3 minutes without cueing (only got 1/3 with cueing)  	   Cranial Nerves - Right eye reactivity sluggish, Impaired left visual field, Left visual inattention  	   Motor -   	                  LEFT    UE - ShAB 5/5, EF 5/5, EE 5/5, WE 5/5,  5/5  	                  RIGHT UE - ShAB 5/5, EF 5/5, EE 5/5, WE 5/5,  5/5  	                  LEFT    LE - HF 5/5, KE 5/5, KF 4/5, DF 5/5, PF 5/5  	                  RIGHT LE - HF 5/5, KE 5/5, KF 4/5, DF 5/5, PF 5/5     	   Sensory - Impaired on left sided extremities,  extinguishes in left upper > lower extremity  	   Reflexes - DTR intact and symmetrical  	   Coordination -FTN accurate bilaterally when tested on pt's right.   Finger-to-nose impaired when attempted on left of patient's body--(due to visual deficit)  	    RECENT LABS                        12.7   14.7  )-----------( 173      ( 12 Mar 2019 11:20 )             39.3   03-12    135  |  97  |  21  ----------------------------<  289<H>  4.5   |  26  |  0.97    Ca    8.9      12 Mar 2019 11:20        Culture - Urine (03.07.19 @ 00:40)    -  Ampicillin/Sulbactam: R >16/8    -  Aztreonam: S <=4    -  Cefazolin: S <=8 For uncomplicated UTI with K. pneumoniae, E. coli, or P. mirablis: SHAYY <=16 is sensitive and SHAYY >=32 is resistant. This also predicts results for oral agents cefaclor, cefdinir, cefpodoxime, cefprozil, cefuroxime axetil, cephalexin and locarbef for uncomplicated UTI. Note that some isolates may be susceptible to these agents while testing resistant to cefazolin.    -  Cefepime: S <=4    -  Cefoxitin: S <=8    -  Ceftriaxone: S <=1 Enterobacter, Citrobacter, and Serratia may develop resistance during prolonged therapy    -  Ciprofloxacin: S <=1    -  Ertapenem: S <=1    -  Gentamicin: S <=4    -  Imipenem: S <=1    -  Levofloxacin: S <=2    -  Meropenem: S <=1    -  Nitrofurantoin: S <=32 Should not be used to treat pyelonephritis    -  Piperacillin/Tazobactam: S <=16    -  Tigecycline: S <=2    -  Amikacin: S <=16    -  Ampicillin: R >16 These ampicillin results predict results for amoxicillin    -  Tobramycin: S <=4    -  Trimethoprim/Sulfamethoxazole: S <=2/38    Specimen Source: .Urine Clean Catch (Midstream)    Culture Results:   >100,000 CFU/ml Escherichia coli    Organism Identification: Escherichia coli    Organism: Escherichia coli    Method Type: SHAYY    CAPILLARY BLOOD GLUCOSE      POCT Blood Glucose.: 270 mg/dL (12 Mar 2019 12:13)  POCT Blood Glucose.: 75 mg/dL (12 Mar 2019 08:42)  POCT Blood Glucose.: 152 mg/dL (11 Mar 2019 21:26)  POCT Blood Glucose.: 140 mg/dL (11 Mar 2019 16:25)                RADIOLOGY/OTHER RESULTS  MEDICATIONS  (STANDING):  ascorbic acid 500 milliGRAM(s) Oral daily  ciprofloxacin     Tablet 500 milliGRAM(s) Oral every 12 hours  dexamethasone     Tablet 2 milliGRAM(s) Oral every 12 hours  dexamethasone     Tablet   Oral   enoxaparin Injectable 40 milliGRAM(s) SubCutaneous daily  finasteride 5 milliGRAM(s) Oral daily  insulin glargine Injectable (LANTUS) 26 Unit(s) SubCutaneous at bedtime  insulin lispro (HumaLOG) corrective regimen sliding scale   SubCutaneous Before meals and at bedtime  insulin lispro Injectable (HumaLOG) 6 Unit(s) SubCutaneous before dinner  insulin lispro Injectable (HumaLOG) 10 Unit(s) SubCutaneous with breakfast  insulin lispro Injectable (HumaLOG) 8 Unit(s) SubCutaneous before lunch  lacosamide 100 milliGRAM(s) Oral every 12 hours  levETIRAcetam 1500 milliGRAM(s) Oral every 12 hours  lisinopril 20 milliGRAM(s) Oral daily  metFORMIN 500 milliGRAM(s) Oral two times a day  multivitamin 1 Tablet(s) Oral daily  nystatin Powder 1 Application(s) Topical two times a day  pantoprazole    Tablet 40 milliGRAM(s) Oral before breakfast  saccharomyces boulardii 250 milliGRAM(s) Oral two times a day    MEDICATIONS  (PRN):  acetaminophen   Tablet .. 650 milliGRAM(s) Oral every 6 hours PRN Mild Pain (1 - 3)  senna 2 Tablet(s) Oral at bedtime PRN Constipation

## 2019-03-12 NOTE — PROGRESS NOTE ADULT - ASSESSMENT
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 He is now with functional, gait, ADL, transfer, balance, endurance, cognitive, swallow impairments.    ~Recurrent GBM s/p craniotomy and resection  -cont 3 hours of comprehensive therapy consisting of PT/OT/SLP/Neuropsychology  -Precautions: fall, cardiac, seizure, aspiration  -Decadron taper   -Seizure PPx with Keppra and Vimpat  -To follow up with neuro-oncology as outpatient    ~DM   - Premeal lispro regimen adjusted  -Continue Lantus and premeal insulin  -cont Metformin 500mg BID  Monitor FS  -Consistent carb diet    ~Pain control  -Tylenol  PRN    ~HTN  -Continue Lisinopril    ~Hyponatremia  -Improved    ~Leukocytosis with positive UA, also on steroids,   UTI--Urine Cx + E. Coli--Sens. to Cipro  -Afebrile,   --Cont. cipro  -WBC decreased today, 14.6 (3/12)    ~Anemia  -Hgb stable, no obvious signs of bleeding    ~ regimen  -incontinent  -Finasteride    ~GI/Bowel regimen  -Continue Protonix  -Continue Colace and Senna  -Diet: Mechanical soft (consistent carb)  -Restorative dining  -Supervise meals    ~Skin/Stage II sacral ulcer  -Turn and position Q2hrs  -OOB with PT/OT  -Nystatin to groin  -Wound gel    ~DVT PPx  -Pt. was on Lovenox at [facility]  will restart ppx lovenox here    ~IDT Rounds  -Case discussed 3/12  OT: mod to max assist; apraxic, min awareness of left side; goals are for min assist 2 weeks  PT: Mod assist for transfers and gait; max assist for bed mobility. Did 2 steps with mod assist  SLP: significant cognitive issues- mainly attention, and focus  Plan for discharge 3/20. 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 He is now with functional, gait, ADL, transfer, balance, endurance, cognitive, swallow impairments.    ~Recurrent GBM s/p craniotomy and resection  -cont 3 hours of comprehensive therapy consisting of PT/OT/SLP/Neuropsychology  -Precautions: fall, cardiac, seizure, aspiration  -Decadron taper   -Seizure PPx with Keppra and Vimpat  -To follow up with neuro-oncology as outpatient    ~DM   - -Continue Lantus and premeal insulin-- regimen adjusted by hospitalist as FS running low  -cont Metformin 500mg BID  Monitor FS  -Consistent carb diet    ~Pain control  -Tylenol  PRN    ~HTN  -Continue Lisinopril    ~Hyponatremia  -Improved    ~Leukocytosis with positive UA, also on steroids,   UTI--Urine Cx + E. Coli--Sens. to Cipro  -Afebrile,   --Cont. cipro  -WBC decreased today, 14.6 (3/12)    ~Anemia  -Hgb stable, no obvious signs of bleeding    ~ regimen  -incontinent  -Finasteride    ~GI/Bowel regimen  -Continue Protonix  -Continue Colace and Senna  -Diet: Mechanical soft (consistent carb)  -Restorative dining  -Supervise meals    ~Skin/Stage II sacral ulcer  -Turn and position Q2hrs  -OOB with PT/OT  -Nystatin to groin  -Wound gel    ~DVT PPx  -ppx lovenox     ~IDT Rounds  -Case discussed 3/12  OT: mod to max assist; apraxic, min awareness of left side; goals are for min assist 2 weeks  PT: Mod assist for transfers and ambulated 45 Ft RW mod A. Did 2 steps with mod assist  SLP: significant cognitive issues- mainly attention, and focus.  Poor carryover  Will discuss pt's needs for discharge with his son Catarino--Pt. will need physical assistance and adequate supervision at home. Plan for discharge 4/2/19 if discharge home.

## 2019-03-12 NOTE — CHART NOTE - NSCHARTNOTEFT_GEN_A_CORE
NUTRITION FOLLOW UP    SOURCE: Patient [X)     Medical Record (X)    DIET :Consistent Carbohydrate Mechanical Soft with 1200FR, Glucerna BID    Patient noted tolerating his diet , consuming 100% of meals as per flow sheet.(+) BM (3/12).       CURRENT WEIGHT: No recent follow up since admission, no edema noted.    PERTINENT MEDS:   Pertinent Medications: MEDICATIONS  (STANDING):  ascorbic acid 500 milliGRAM(s) Oral daily  ciprofloxacin     Tablet 500 milliGRAM(s) Oral every 12 hours  dexamethasone     Tablet 2 milliGRAM(s) Oral every 12 hours  dexamethasone     Tablet   Oral   enoxaparin Injectable 40 milliGRAM(s) SubCutaneous daily  finasteride 5 milliGRAM(s) Oral daily  insulin glargine Injectable (LANTUS) 26 Unit(s) SubCutaneous at bedtime  insulin lispro (HumaLOG) corrective regimen sliding scale   SubCutaneous Before meals and at bedtime  insulin lispro Injectable (HumaLOG) 6 Unit(s) SubCutaneous before dinner  insulin lispro Injectable (HumaLOG) 10 Unit(s) SubCutaneous with breakfast  insulin lispro Injectable (HumaLOG) 8 Unit(s) SubCutaneous before lunch  lacosamide 100 milliGRAM(s) Oral every 12 hours  levETIRAcetam 1500 milliGRAM(s) Oral every 12 hours  lisinopril 20 milliGRAM(s) Oral daily  metFORMIN 500 milliGRAM(s) Oral two times a day  multivitamin 1 Tablet(s) Oral daily  nystatin Powder 1 Application(s) Topical two times a day  pantoprazole    Tablet 40 milliGRAM(s) Oral before breakfast  saccharomyces boulardii 250 milliGRAM(s) Oral two times a day    MEDICATIONS  (PRN):  acetaminophen   Tablet .. 650 milliGRAM(s) Oral every 6 hours PRN Mild Pain (1 - 3)  senna 2 Tablet(s) Oral at bedtime PRN Constipation      PERTINENT LABS:  03-12 Na135 mmol/L Glu 289 mg/dL<H> K+ 4.5 mmol/L Cr  0.97 mg/dL BUN 21 mg/dL 03-07 Alb 2.4 g/dL<L> 02-13 [id number] 7.7 %<H>        SKIN:  intact (X  )        ESTIMATED NEEDS:   [X] no change since previous assessment  [ ] recalculated:     PREVIOUS NUTRITION DIAGNOSIS: Severe malnutrition      NUTRITION DIAGNOSIS is :  ( x  )  Ongoing , patient receiving Glucerna supplement 2/2 same.        MONITORING AND EVALUATION:   Current diet order is appropriate and is well tolerated, but will monitor for any changes that may be needed.    NUTRITION RECOMMENDATIONS: Request current weight from nursing NUTRITION FOLLOW UP    SOURCE: Patient [X)     Medical Record (X)    DIET :Consistent Carbohydrate Mechanical Soft with 1200FR, Glucerna BID    Patient noted tolerating his diet , consuming 100% of meals as per flow sheet.(+) BM (3/12). Na+ noted , may consider discontinuing fluid restriction.    CURRENT WEIGHT: No recent follow up since admission, no edema noted.    PERTINENT MEDS:   Pertinent Medications: MEDICATIONS  (STANDING):  ascorbic acid 500 milliGRAM(s) Oral daily  ciprofloxacin     Tablet 500 milliGRAM(s) Oral every 12 hours  dexamethasone     Tablet 2 milliGRAM(s) Oral every 12 hours  dexamethasone     Tablet   Oral   enoxaparin Injectable 40 milliGRAM(s) SubCutaneous daily  finasteride 5 milliGRAM(s) Oral daily  insulin glargine Injectable (LANTUS) 26 Unit(s) SubCutaneous at bedtime  insulin lispro (HumaLOG) corrective regimen sliding scale   SubCutaneous Before meals and at bedtime  insulin lispro Injectable (HumaLOG) 6 Unit(s) SubCutaneous before dinner  insulin lispro Injectable (HumaLOG) 10 Unit(s) SubCutaneous with breakfast  insulin lispro Injectable (HumaLOG) 8 Unit(s) SubCutaneous before lunch  lacosamide 100 milliGRAM(s) Oral every 12 hours  levETIRAcetam 1500 milliGRAM(s) Oral every 12 hours  lisinopril 20 milliGRAM(s) Oral daily  metFORMIN 500 milliGRAM(s) Oral two times a day  multivitamin 1 Tablet(s) Oral daily  nystatin Powder 1 Application(s) Topical two times a day  pantoprazole    Tablet 40 milliGRAM(s) Oral before breakfast  saccharomyces boulardii 250 milliGRAM(s) Oral two times a day    MEDICATIONS  (PRN):  acetaminophen   Tablet .. 650 milliGRAM(s) Oral every 6 hours PRN Mild Pain (1 - 3)  senna 2 Tablet(s) Oral at bedtime PRN Constipation      PERTINENT LABS:  03-12 Na135 mmol/L Glu 289 mg/dL<H> K+ 4.5 mmol/L Cr  0.97 mg/dL BUN 21 mg/dL 03-07 Alb 2.4 g/dL<L> 02-13 [id number] 7.7 %<H>        SKIN:  intact (X  )        ESTIMATED NEEDS:   [X] no change since previous assessment  [ ] recalculated:     PREVIOUS NUTRITION DIAGNOSIS: Severe malnutrition      NUTRITION DIAGNOSIS is :  ( x  )  Ongoing , patient receiving Glucerna supplement 2/2 same.        MONITORING AND EVALUATION:   Current diet order is appropriate and is well tolerated, but will monitor for any changes that may be needed.    NUTRITION RECOMMENDATIONS: Request current weight from nursing

## 2019-03-13 LAB
ANION GAP SERPL CALC-SCNC: 7 MMOL/L — SIGNIFICANT CHANGE UP (ref 5–17)
BUN SERPL-MCNC: 21 MG/DL — SIGNIFICANT CHANGE UP (ref 7–23)
CALCIUM SERPL-MCNC: 8.9 MG/DL — SIGNIFICANT CHANGE UP (ref 8.4–10.5)
CHLORIDE SERPL-SCNC: 100 MMOL/L — SIGNIFICANT CHANGE UP (ref 96–108)
CO2 SERPL-SCNC: 30 MMOL/L — SIGNIFICANT CHANGE UP (ref 22–31)
CREAT SERPL-MCNC: 0.83 MG/DL — SIGNIFICANT CHANGE UP (ref 0.5–1.3)
GLUCOSE BLDC GLUCOMTR-MCNC: 111 MG/DL — HIGH (ref 70–99)
GLUCOSE BLDC GLUCOMTR-MCNC: 118 MG/DL — HIGH (ref 70–99)
GLUCOSE BLDC GLUCOMTR-MCNC: 176 MG/DL — HIGH (ref 70–99)
GLUCOSE BLDC GLUCOMTR-MCNC: 91 MG/DL — SIGNIFICANT CHANGE UP (ref 70–99)
GLUCOSE SERPL-MCNC: 129 MG/DL — HIGH (ref 70–99)
POTASSIUM SERPL-MCNC: 3.9 MMOL/L — SIGNIFICANT CHANGE UP (ref 3.5–5.3)
POTASSIUM SERPL-SCNC: 3.9 MMOL/L — SIGNIFICANT CHANGE UP (ref 3.5–5.3)
SODIUM SERPL-SCNC: 137 MMOL/L — SIGNIFICANT CHANGE UP (ref 135–145)

## 2019-03-13 PROCEDURE — 96116 NUBHVL XM PHYS/QHP 1ST HR: CPT

## 2019-03-13 PROCEDURE — 99232 SBSQ HOSP IP/OBS MODERATE 35: CPT

## 2019-03-13 PROCEDURE — 99233 SBSQ HOSP IP/OBS HIGH 50: CPT

## 2019-03-13 RX ORDER — CIPROFLOXACIN LACTATE 400MG/40ML
500 VIAL (ML) INTRAVENOUS EVERY 12 HOURS
Qty: 0 | Refills: 0 | Status: COMPLETED | OUTPATIENT
Start: 2019-03-13 | End: 2019-03-15

## 2019-03-13 RX ADMIN — Medication 2 MILLIGRAM(S): at 18:08

## 2019-03-13 RX ADMIN — Medication 500 MILLIGRAM(S): at 18:08

## 2019-03-13 RX ADMIN — Medication 10 UNIT(S): at 08:42

## 2019-03-13 RX ADMIN — ENOXAPARIN SODIUM 40 MILLIGRAM(S): 100 INJECTION SUBCUTANEOUS at 12:05

## 2019-03-13 RX ADMIN — Medication 1 TABLET(S): at 12:05

## 2019-03-13 RX ADMIN — Medication 500 MILLIGRAM(S): at 05:05

## 2019-03-13 RX ADMIN — Medication 2 MILLIGRAM(S): at 05:05

## 2019-03-13 RX ADMIN — FINASTERIDE 5 MILLIGRAM(S): 5 TABLET, FILM COATED ORAL at 12:05

## 2019-03-13 RX ADMIN — NYSTATIN CREAM 1 APPLICATION(S): 100000 CREAM TOPICAL at 05:06

## 2019-03-13 RX ADMIN — LEVETIRACETAM 1500 MILLIGRAM(S): 250 TABLET, FILM COATED ORAL at 18:08

## 2019-03-13 RX ADMIN — LACOSAMIDE 100 MILLIGRAM(S): 50 TABLET ORAL at 18:08

## 2019-03-13 RX ADMIN — Medication 250 MILLIGRAM(S): at 18:08

## 2019-03-13 RX ADMIN — Medication 500 MILLIGRAM(S): at 12:05

## 2019-03-13 RX ADMIN — NYSTATIN CREAM 1 APPLICATION(S): 100000 CREAM TOPICAL at 18:09

## 2019-03-13 RX ADMIN — INSULIN GLARGINE 26 UNIT(S): 100 INJECTION, SOLUTION SUBCUTANEOUS at 21:03

## 2019-03-13 RX ADMIN — ZINC SULFATE TAB 220 MG (50 MG ZINC EQUIVALENT) 220 MILLIGRAM(S): 220 (50 ZN) TAB at 12:05

## 2019-03-13 RX ADMIN — Medication 1: at 12:02

## 2019-03-13 RX ADMIN — PANTOPRAZOLE SODIUM 40 MILLIGRAM(S): 20 TABLET, DELAYED RELEASE ORAL at 06:20

## 2019-03-13 RX ADMIN — METFORMIN HYDROCHLORIDE 500 MILLIGRAM(S): 850 TABLET ORAL at 18:08

## 2019-03-13 RX ADMIN — Medication 8 UNIT(S): at 12:03

## 2019-03-13 RX ADMIN — LEVETIRACETAM 1500 MILLIGRAM(S): 250 TABLET, FILM COATED ORAL at 05:05

## 2019-03-13 RX ADMIN — METFORMIN HYDROCHLORIDE 500 MILLIGRAM(S): 850 TABLET ORAL at 06:20

## 2019-03-13 RX ADMIN — Medication 250 MILLIGRAM(S): at 05:05

## 2019-03-13 RX ADMIN — LACOSAMIDE 100 MILLIGRAM(S): 50 TABLET ORAL at 05:05

## 2019-03-13 NOTE — PROGRESS NOTE ADULT - ASSESSMENT
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 He is now with functional, gait, ADL, transfer, balance, endurance, cognitive, swallow impairments.    #GBM s/p craniotomy and resection  c/w PT/OT/SLP  c/w Decadron taper.    #Seizure PPx  c/w Keppra and Vimpat.     #HTN- controlled.   c/w Lisinopril.     #UTI  UA + , E coli, Afebrile.   Leucocytosis probably 2/2 Steroids  c/w  Ciprofloxacin for  7 days      #DM   Sugars uncontrolled due to steroids.   c/w Lantus to 26 and   c/w pre meal 10-8-6 AC   c/w Metformin 500mg BID  FS monitoring   Hypoglycemia protocol.     # Anemia  H/H stable,   Monitor.     # DVT PPx  SCD   No chemo px  in setting of NeuroSx intervention,

## 2019-03-13 NOTE — PROGRESS NOTE ADULT - SUBJECTIVE AND OBJECTIVE BOX
HPI:  Mr. Duncan is a 74 year old male with a PMHx of GBM s/p right craniotomy in 2019, CVA with residual L sided weakness who presented to [facility] on 2/12/19 with seizures likely from recurrence of righ temporal lobe GBM complicated by sepsis from viral URI, acute embolic CVA, PFO, LLE DVT s/p IVC, and SAH.     Pt was BIBEMS from nursing home for active seizure 2/12/19. Per ED he was actively seizing for 30 mins prior to EMS arrival, and displayed left sided face, arm, and leg jerking in the ED. Pt received Ativan and Keppra and seizure broke after 20 mins in the ED.   On admission, pt was found to be febrile with leukocytosis 31.56 meeting SIRS criteria and was found to have +coronavirus. Neurology and neurosurgery were consulted with the plan to have repeat craniotomy and GBM resection pending bacteremia clearance.   On 2/15 rapid responses called for pt due to 3 repeat seizures and unresponsiveness with new left facial droop- given Ativan and Keppra per neurosurgery recommendations MRI 2/15/29 showed 3 discrete foci of acute infarction within the right occipital lobe, left corpus callosum within the genu and left frontal lobe in the region of the precentral gyrus. Pt placed on heparin gtt for anticoagulation during workup. Doppler U/S showed acute DVT of left common femoral, popliteal, posterior tibial, and peroneal vein, and ALBERTO 2/20/19 showed evidence of a PFO. Pt had placement of IVC filter on 2/19/19 and heparin gtt was discontinued in preparation for neurosurgical procedure which was scheduled for 2/25 but was delayed due to episodes of bradycardia in 40s. Pt had R craniotomy for resection of brain tumor on 2/27/19. Post/op course c/b seizures 2/2 SAH on CT 3/2 for which pt was given Keppra and Vimpat, and transferred to SICU for further monitoring. Pt seizures and hemorrhage stabilized and plan for discharge to rehab. On the day of discharge he was medically and neurologically stable for discharge to rehab on 3/6/19. (06 Mar 2019 14:35)      PAST MEDICAL & SURGICAL HISTORY:  Glioblastoma  BPH (benign prostatic hyperplasia)  CVA (cerebral vascular accident)  Diabetes  HTN (hypertension)  S/P craniotomy: ~ resection of R-pariental mass (11/28/2018)      Subjective:  c/o of some burning with urination still.  Otherwise no complaints.     REVIEW OF SYMPTOMS  Neurological deficits--cognitive deficit, left visual field deficit      VITALS  Vital Signs Last 24 Hrs  T(C): 36.3 (13 Mar 2019 07:45), Max: 37 (12 Mar 2019 20:52)  T(F): 97.4 (13 Mar 2019 07:45), Max: 98.6 (12 Mar 2019 20:52)  HR: 74 (13 Mar 2019 07:45) (74 - 93)  BP: 121/83 (13 Mar 2019 07:45) (105/68 - 121/83)  BP(mean): --  RR: 14 (13 Mar 2019 07:45) (14 - 14)  SpO2: 98% (13 Mar 2019 07:45) (95% - 98%)      PHYSICAL EXAM  Constitutional - NAD, Comfortable  	HEENT - Right cranial incision with staples C/D/I, Limited eye movement to left  	Chest - CTA  	Cardiovascular - All extremities well perfused, S1S2  	Abdomen - Soft, Non-distended, Non-tender  	Extremities - No bilateral lower extremity edema, No calf tenderness   	Neurologic Exam -                 	   Cognitive - Drowsy, Oriented to self, & place,  not date or situation  	   Communication - Fluent, No dysarthria, Naming intact  	   Attention - Impaired, unable to perform days of week backwards and serial 7's  	   Memory - Impaired, unable to recall 3/3 items after 3 minutes without cueing (only got 1/3 with cueing)  	   Cranial Nerves - Right eye reactivity sluggish, Impaired left visual field, Left visual inattention  	   Motor -   	                  LEFT    UE - ShAB 5/5, EF 5/5, EE 5/5, WE 5/5,  5/5  	                  RIGHT UE - ShAB 5/5, EF 5/5, EE 5/5, WE 5/5,  5/5  	                  LEFT    LE - HF 5/5, KE 5/5, KF 4/5, DF 5/5, PF 5/5  	                  RIGHT LE - HF 5/5, KE 5/5, KF 4/5, DF 5/5, PF 5/5     	   Sensory - Impaired on left sided extremities,  extinguishes in left upper > lower extremity  	   Reflexes - DTR intact and symmetrical  	   Coordination -FTN accurate bilaterally when tested on pt's right.   Finger-to-nose impaired when attempted on left of patient's body--(due to visual deficit)    RECENT LABS                        12.7   14.7  )-----------( 173      ( 12 Mar 2019 11:20 )             39.3     03-13    137  |  100  |  21  ----------------------------<  129<H>  3.9   |  30  |  0.83    Ca    8.9      13 Mar 2019 06:43              RADIOLOGY/OTHER RESULTS      MEDICATIONS  (STANDING):  ascorbic acid 500 milliGRAM(s) Oral daily  ciprofloxacin     Tablet 500 milliGRAM(s) Oral every 12 hours  dexamethasone     Tablet 2 milliGRAM(s) Oral every 12 hours  dexamethasone     Tablet   Oral   enoxaparin Injectable 40 milliGRAM(s) SubCutaneous daily  finasteride 5 milliGRAM(s) Oral daily  insulin glargine Injectable (LANTUS) 26 Unit(s) SubCutaneous at bedtime  insulin lispro (HumaLOG) corrective regimen sliding scale   SubCutaneous Before meals and at bedtime  insulin lispro Injectable (HumaLOG) 6 Unit(s) SubCutaneous before dinner  insulin lispro Injectable (HumaLOG) 10 Unit(s) SubCutaneous with breakfast  insulin lispro Injectable (HumaLOG) 8 Unit(s) SubCutaneous before lunch  lacosamide 100 milliGRAM(s) Oral every 12 hours  levETIRAcetam 1500 milliGRAM(s) Oral every 12 hours  lisinopril 20 milliGRAM(s) Oral daily  metFORMIN 500 milliGRAM(s) Oral two times a day  multivitamin 1 Tablet(s) Oral daily  nystatin Powder 1 Application(s) Topical two times a day  pantoprazole    Tablet 40 milliGRAM(s) Oral before breakfast  saccharomyces boulardii 250 milliGRAM(s) Oral two times a day  zinc sulfate 220 milliGRAM(s) Oral daily    MEDICATIONS  (PRN):  acetaminophen   Tablet .. 650 milliGRAM(s) Oral every 6 hours PRN Mild Pain (1 - 3)  senna 2 Tablet(s) Oral at bedtime PRN Constipation

## 2019-03-13 NOTE — PROGRESS NOTE ADULT - ASSESSMENT
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 He is now with functional, gait, ADL, transfer, balance, endurance, cognitive, swallow impairments.    ~Recurrent GBM s/p craniotomy and resection  -cont 3 hours of comprehensive therapy consisting of PT/OT/SLP/Neuropsychology  -Precautions: fall, cardiac, seizure, aspiration  -Decadron taper   -Seizure PPx with Keppra and Vimpat  -To follow up with neuro-oncology as outpatient    ~DM   - -Continue Lantus and premeal insulin-- FS controlled  -cont Metformin 500mg BID  -Consistent carb diet    ~Pain control  -Tylenol  PRN    ~HTN  -Continue Lisinopril    ~Hyponatremia  -resolved      UTI--Urine Cx + E. Coli--Sens. to Cipro  -Afebrile,   --Cont. cipro  ~Leukocytosis with positive UA, also on steroids,     ~Anemia  -Hgb stable, no obvious signs of bleeding    ~ regimen  -incontinent  -Finasteride    ~GI/Bowel regimen  -Continue Protonix  -stopped stool softner due to frequent BMs  -Diet: Mechanical soft (consistent carb)  -Restorative dining  -Supervise meals    ~Skin/Stage II sacral ulcer  -Turn and position Q2hrs  -OOB with PT/OT  -Nystatin to groin  -Wound gel to open ulcer---cover surrounding skin with cavilon to prevent breakdown from moisture    ~DVT PPx  -ppx lovenox     ~IDT Rounds  -Case discussed 3/12  OT: mod to max assist; apraxic, min awareness of left side; goals are for min assist 2 weeks  PT: Mod assist for transfers and ambulated 45 Ft RW mod A. Did 2 steps with mod assist  SLP: significant cognitive issues- mainly attention, and focus.  Poor carryover  Will discuss pt's needs for discharge with his son--Pt. will need physical assistance and adequate supervision at home. Plan for discharge 4/2/19 if discharge home.  Pt. with 2 sons that are arguing as per who is his HCP.  Pt. expressed that he wishes for Catarino to be his HCP. Son Chirag said he has a written HCP with his .  SW contacted  and awaiting call back.

## 2019-03-13 NOTE — PROGRESS NOTE ADULT - SUBJECTIVE AND OBJECTIVE BOX
Patient is a 74y old  Male who presents with a chief complaint of Seizure, Functional deficits s/p GBM (12 Mar 2019 13:12)      Patient seen and examined at bedside.     ALLERGIES:  No Known Allergies    MEDICATIONS:  ascorbic acid 500 milliGRAM(s) Oral daily  enoxaparin Injectable 40 milliGRAM(s) SubCutaneous daily  insulin glargine Injectable (LANTUS) 26 Unit(s) SubCutaneous at bedtime  insulin lispro Injectable (HumaLOG) 6 Unit(s) SubCutaneous before dinner  insulin lispro Injectable (HumaLOG) 10 Unit(s) SubCutaneous with breakfast  insulin lispro Injectable (HumaLOG) 8 Unit(s) SubCutaneous before lunch  lacosamide 100 milliGRAM(s) Oral every 12 hours  multivitamin 1 Tablet(s) Oral daily  nystatin Powder 1 Application(s) Topical two times a day  saccharomyces boulardii 250 milliGRAM(s) Oral two times a day  zinc sulfate 220 milliGRAM(s) Oral daily    Vital Signs Last 24 Hrs  T(F): 97.4 (13 Mar 2019 07:45), Max: 98.6 (12 Mar 2019 20:52)  HR: 74 (13 Mar 2019 07:45) (74 - 93)  BP: 121/83 (13 Mar 2019 07:45) (105/68 - 121/83)  RR: 14 (13 Mar 2019 07:45) (14 - 14)  SpO2: 98% (13 Mar 2019 07:45) (95% - 98%)  I&O's Summary      PHYSICAL EXAM:  General: NAD, comfortable   ENT: MMM  Neck: Supple, No JVD  Lungs: CTA, BLAE, No added sounds.   Cardio: RRR, S1/S2, No murmurs  Abdomen: Soft, NT/ND, BS+   Extremities: No edema  CNS: no new deficits     LABS:                        12.7   14.7  )-----------( 173      ( 12 Mar 2019 11:20 )             39.3     03-13    137  |  100  |  21  ----------------------------<  129  3.9   |  30  |  0.83    Ca    8.9      13 Mar 2019 06:43      eGFR if Non African American: 86 mL/min/1.73M2 (03-13-19 @ 06:43)  eGFR if : 99 mL/min/1.73M2 (03-13-19 @ 06:43)                    CAPILLARY BLOOD GLUCOSE      POCT Blood Glucose.: 111 mg/dL (13 Mar 2019 07:51)  POCT Blood Glucose.: 115 mg/dL (12 Mar 2019 21:47)  POCT Blood Glucose.: 110 mg/dL (12 Mar 2019 16:50)  POCT Blood Glucose.: 270 mg/dL (12 Mar 2019 12:13)    02-13 [id number] 7.7          RADIOLOGY & ADDITIONAL TESTS:    Care Discussed with Consultants/Other Providers:

## 2019-03-14 LAB
ANION GAP SERPL CALC-SCNC: 11 MMOL/L — SIGNIFICANT CHANGE UP (ref 5–17)
BUN SERPL-MCNC: 22 MG/DL — SIGNIFICANT CHANGE UP (ref 7–23)
CALCIUM SERPL-MCNC: 8.9 MG/DL — SIGNIFICANT CHANGE UP (ref 8.4–10.5)
CHLORIDE SERPL-SCNC: 99 MMOL/L — SIGNIFICANT CHANGE UP (ref 96–108)
CO2 SERPL-SCNC: 24 MMOL/L — SIGNIFICANT CHANGE UP (ref 22–31)
CREAT SERPL-MCNC: 0.8 MG/DL — SIGNIFICANT CHANGE UP (ref 0.5–1.3)
GLUCOSE BLDC GLUCOMTR-MCNC: 104 MG/DL — HIGH (ref 70–99)
GLUCOSE BLDC GLUCOMTR-MCNC: 132 MG/DL — HIGH (ref 70–99)
GLUCOSE BLDC GLUCOMTR-MCNC: 168 MG/DL — HIGH (ref 70–99)
GLUCOSE BLDC GLUCOMTR-MCNC: 61 MG/DL — LOW (ref 70–99)
GLUCOSE BLDC GLUCOMTR-MCNC: 69 MG/DL — LOW (ref 70–99)
GLUCOSE BLDC GLUCOMTR-MCNC: 81 MG/DL — SIGNIFICANT CHANGE UP (ref 70–99)
GLUCOSE SERPL-MCNC: 152 MG/DL — HIGH (ref 70–99)
HCT VFR BLD CALC: 36.3 % — LOW (ref 39–50)
HGB BLD-MCNC: 11.7 G/DL — LOW (ref 13–17)
MCHC RBC-ENTMCNC: 31.2 PG — SIGNIFICANT CHANGE UP (ref 27–34)
MCHC RBC-ENTMCNC: 32.3 GM/DL — SIGNIFICANT CHANGE UP (ref 32–36)
MCV RBC AUTO: 96.8 FL — SIGNIFICANT CHANGE UP (ref 80–100)
PLATELET # BLD AUTO: 209 K/UL — SIGNIFICANT CHANGE UP (ref 150–400)
POTASSIUM SERPL-MCNC: 4.2 MMOL/L — SIGNIFICANT CHANGE UP (ref 3.5–5.3)
POTASSIUM SERPL-SCNC: 4.2 MMOL/L — SIGNIFICANT CHANGE UP (ref 3.5–5.3)
RBC # BLD: 3.75 M/UL — LOW (ref 4.2–5.8)
RBC # FLD: 15.2 % — HIGH (ref 10.3–14.5)
SODIUM SERPL-SCNC: 134 MMOL/L — LOW (ref 135–145)
WBC # BLD: 12.2 K/UL — HIGH (ref 3.8–10.5)
WBC # FLD AUTO: 12.2 K/UL — HIGH (ref 3.8–10.5)

## 2019-03-14 PROCEDURE — 99232 SBSQ HOSP IP/OBS MODERATE 35: CPT

## 2019-03-14 PROCEDURE — 99233 SBSQ HOSP IP/OBS HIGH 50: CPT

## 2019-03-14 RX ADMIN — LEVETIRACETAM 1500 MILLIGRAM(S): 250 TABLET, FILM COATED ORAL at 18:11

## 2019-03-14 RX ADMIN — Medication 2 MILLIGRAM(S): at 05:00

## 2019-03-14 RX ADMIN — Medication 500 MILLIGRAM(S): at 12:08

## 2019-03-14 RX ADMIN — LACOSAMIDE 100 MILLIGRAM(S): 50 TABLET ORAL at 18:11

## 2019-03-14 RX ADMIN — ENOXAPARIN SODIUM 40 MILLIGRAM(S): 100 INJECTION SUBCUTANEOUS at 12:08

## 2019-03-14 RX ADMIN — METFORMIN HYDROCHLORIDE 500 MILLIGRAM(S): 850 TABLET ORAL at 18:11

## 2019-03-14 RX ADMIN — Medication 500 MILLIGRAM(S): at 18:11

## 2019-03-14 RX ADMIN — Medication 250 MILLIGRAM(S): at 05:01

## 2019-03-14 RX ADMIN — NYSTATIN CREAM 1 APPLICATION(S): 100000 CREAM TOPICAL at 18:12

## 2019-03-14 RX ADMIN — INSULIN GLARGINE 26 UNIT(S): 100 INJECTION, SOLUTION SUBCUTANEOUS at 21:02

## 2019-03-14 RX ADMIN — PANTOPRAZOLE SODIUM 40 MILLIGRAM(S): 20 TABLET, DELAYED RELEASE ORAL at 06:40

## 2019-03-14 RX ADMIN — Medication 1 TABLET(S): at 12:08

## 2019-03-14 RX ADMIN — METFORMIN HYDROCHLORIDE 500 MILLIGRAM(S): 850 TABLET ORAL at 05:00

## 2019-03-14 RX ADMIN — Medication 10 UNIT(S): at 08:18

## 2019-03-14 RX ADMIN — LACOSAMIDE 100 MILLIGRAM(S): 50 TABLET ORAL at 05:00

## 2019-03-14 RX ADMIN — ZINC SULFATE TAB 220 MG (50 MG ZINC EQUIVALENT) 220 MILLIGRAM(S): 220 (50 ZN) TAB at 12:08

## 2019-03-14 RX ADMIN — Medication 500 MILLIGRAM(S): at 05:00

## 2019-03-14 RX ADMIN — NYSTATIN CREAM 1 APPLICATION(S): 100000 CREAM TOPICAL at 05:01

## 2019-03-14 RX ADMIN — Medication 8 UNIT(S): at 12:09

## 2019-03-14 RX ADMIN — LEVETIRACETAM 1500 MILLIGRAM(S): 250 TABLET, FILM COATED ORAL at 05:01

## 2019-03-14 RX ADMIN — Medication 250 MILLIGRAM(S): at 18:12

## 2019-03-14 RX ADMIN — Medication 1: at 08:18

## 2019-03-14 RX ADMIN — Medication 2 MILLIGRAM(S): at 18:12

## 2019-03-14 RX ADMIN — LISINOPRIL 20 MILLIGRAM(S): 2.5 TABLET ORAL at 05:04

## 2019-03-14 RX ADMIN — FINASTERIDE 5 MILLIGRAM(S): 5 TABLET, FILM COATED ORAL at 12:08

## 2019-03-14 NOTE — PROGRESS NOTE ADULT - SUBJECTIVE AND OBJECTIVE BOX
HPI:  Mr. Duncan is a 74 year old male with a PMHx of GBM s/p right craniotomy in 2019, CVA with residual L sided weakness who presented to [facility] on 2/12/19 with seizures likely from recurrence of righ temporal lobe GBM complicated by sepsis from viral URI, acute embolic CVA, PFO, LLE DVT s/p IVC, and SAH.     Pt was BIBEMS from nursing home for active seizure 2/12/19. Per ED he was actively seizing for 30 mins prior to EMS arrival, and displayed left sided face, arm, and leg jerking in the ED. Pt received Ativan and Keppra and seizure broke after 20 mins in the ED.   On admission, pt was found to be febrile with leukocytosis 31.56 meeting SIRS criteria and was found to have +coronavirus. Neurology and neurosurgery were consulted with the plan to have repeat craniotomy and GBM resection pending bacteremia clearance.   On 2/15 rapid responses called for pt due to 3 repeat seizures and unresponsiveness with new left facial droop- given Ativan and Keppra per neurosurgery recommendations MRI 2/15/29 showed 3 discrete foci of acute infarction within the right occipital lobe, left corpus callosum within the genu and left frontal lobe in the region of the precentral gyrus. Pt placed on heparin gtt for anticoagulation during workup. Doppler U/S showed acute DVT of left common femoral, popliteal, posterior tibial, and peroneal vein, and ALBERTO 2/20/19 showed evidence of a PFO. Pt had placement of IVC filter on 2/19/19 and heparin gtt was discontinued in preparation for neurosurgical procedure which was scheduled for 2/25 but was delayed due to episodes of bradycardia in 40s. Pt had R craniotomy for resection of brain tumor on 2/27/19. Post/op course c/b seizures 2/2 SAH on CT 3/2 for which pt was given Keppra and Vimpat, and transferred to SICU for further monitoring. Pt seizures and hemorrhage stabilized and plan for discharge to rehab. On the day of discharge he was medically and neurologically stable for discharge to rehab on 3/6/19. (06 Mar 2019 14:35)      PAST MEDICAL & SURGICAL HISTORY:  Glioblastoma  BPH (benign prostatic hyperplasia)  CVA (cerebral vascular accident)  Diabetes  HTN (hypertension)  S/P craniotomy: ~ resection of R-pariental mass (11/28/2018)      Subjective:  Patient seen and examined today, no acute events overnight, no complaints, states that burning with urination has resolved. Vital signs stable.     REVIEW OF SYMPTOMS  Neurological deficits--cognitive deficit, left visual field deficit      Vital Signs Last 24 Hrs  T(C): 36.6 (14 Mar 2019 07:36), Max: 36.6 (14 Mar 2019 07:36)  T(F): 97.8 (14 Mar 2019 07:36), Max: 97.8 (14 Mar 2019 07:36)  HR: 78 (14 Mar 2019 07:36) (74 - 95)  BP: 116/78 (14 Mar 2019 07:36) (107/69 - 163/96)  BP(mean): --  RR: 14 (14 Mar 2019 07:36) (14 - 14)  SpO2: 96% (14 Mar 2019 07:36) (96% - 96%)      PHYSICAL EXAM  Constitutional - NAD, Comfortable  	HEENT - Right cranial incision with staples C/D/I, Limited eye movement to left  	Chest - CTA  	Cardiovascular - All extremities well perfused, S1S2  	Abdomen - Soft, Non-distended, Non-tender  	Extremities - No bilateral lower extremity edema, No calf tenderness   	Neurologic Exam -                 	   Cognitive - Drowsy, Oriented to self, & place,  not date or situation  	   Communication - Fluent, No dysarthria, Naming intact  	   Attention - Impaired, unable to perform days of week backwards and serial 7's  	   Memory - Impaired, unable to recall 3/3 items after 3 minutes without cueing (only got 1/3 with cueing)  	   Cranial Nerves - Right eye reactivity sluggish, Impaired left visual field, Left visual inattention  	   Motor -   	                  LEFT    UE - ShAB 5/5, EF 5/5, EE 5/5, WE 5/5,  5/5  	                  RIGHT UE - ShAB 5/5, EF 5/5, EE 5/5, WE 5/5,  5/5  	                  LEFT    LE - HF 5/5, KE 5/5, KF 4/5, DF 5/5, PF 5/5  	                  RIGHT LE - HF 5/5, KE 5/5, KF 4/5, DF 5/5, PF 5/5     	   Sensory - Impaired on left sided extremities,  extinguishes in left upper > lower extremity  	   Reflexes - DTR intact and symmetrical  	   Coordination -FTN accurate bilaterally when tested on pt's right.   Finger-to-nose impaired when attempted on left of patient's body--(due to visual deficit)    RECENT LABS                        11.7   12.2  )-----------( 209      ( 14 Mar 2019 05:28 )             36.3   03-14    134<L>  |  99  |  22  ----------------------------<  152<H>  4.2   |  24  |  0.80    Ca    8.9      14 Mar 2019 05:28                  RADIOLOGY/OTHER RESULTS    MEDICATIONS  (STANDING):  ascorbic acid 500 milliGRAM(s) Oral daily  ciprofloxacin     Tablet 500 milliGRAM(s) Oral every 12 hours  dexamethasone     Tablet 2 milliGRAM(s) Oral every 12 hours  dexamethasone     Tablet   Oral   enoxaparin Injectable 40 milliGRAM(s) SubCutaneous daily  finasteride 5 milliGRAM(s) Oral daily  insulin glargine Injectable (LANTUS) 26 Unit(s) SubCutaneous at bedtime  insulin lispro (HumaLOG) corrective regimen sliding scale   SubCutaneous Before meals and at bedtime  insulin lispro Injectable (HumaLOG) 6 Unit(s) SubCutaneous before dinner  insulin lispro Injectable (HumaLOG) 10 Unit(s) SubCutaneous with breakfast  insulin lispro Injectable (HumaLOG) 8 Unit(s) SubCutaneous before lunch  lacosamide 100 milliGRAM(s) Oral every 12 hours  levETIRAcetam 1500 milliGRAM(s) Oral every 12 hours  lisinopril 20 milliGRAM(s) Oral daily  metFORMIN 500 milliGRAM(s) Oral two times a day  multivitamin 1 Tablet(s) Oral daily  nystatin Powder 1 Application(s) Topical two times a day  pantoprazole    Tablet 40 milliGRAM(s) Oral before breakfast  saccharomyces boulardii 250 milliGRAM(s) Oral two times a day  zinc sulfate 220 milliGRAM(s) Oral daily    MEDICATIONS  (PRN):  acetaminophen   Tablet .. 650 milliGRAM(s) Oral every 6 hours PRN Mild Pain (1 - 3)  senna 2 Tablet(s) Oral at bedtime PRN Constipation

## 2019-03-14 NOTE — PROGRESS NOTE ADULT - ASSESSMENT
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 He is now with functional, gait, ADL, transfer, balance, endurance, cognitive, swallow impairments.    #GBM s/p craniotomy and resection  c/w PT/OT/SLP  c/w Decadron taper.    #Seizure PPx  c/w Keppra and Vimpat.     #HTN- controlled.   c/w Lisinopril.     #UTI- stable.   UA + , E coli, Afebrile.   Leucocytosis probably 2/2 Steroids  c/w  Ciprofloxacin for  7 days      #DM   Fs controlled   c/w Lantus to 26 and   c/w pre meal 10-8-6 AC   c/w Metformin 500mg BID  Hypoglycemia protocol.     # Anemia  H/H stable,   Monitor.     # DVT PPx  SCD   No chemo px  in setting of NeuroSx intervention,

## 2019-03-14 NOTE — PROGRESS NOTE ADULT - SUBJECTIVE AND OBJECTIVE BOX
Patient is a 74y old  Male who presents with a chief complaint of Seizure, Functional deficits s/p GBM (13 Mar 2019 12:28)      Patient seen and examined at bedside.     ALLERGIES:  No Known Allergies    MEDICATIONS:  ascorbic acid 500 milliGRAM(s) Oral daily  ciprofloxacin     Tablet 500 milliGRAM(s) Oral every 12 hours  enoxaparin Injectable 40 milliGRAM(s) SubCutaneous daily  insulin glargine Injectable (LANTUS) 26 Unit(s) SubCutaneous at bedtime  insulin lispro Injectable (HumaLOG) 6 Unit(s) SubCutaneous before dinner  insulin lispro Injectable (HumaLOG) 10 Unit(s) SubCutaneous with breakfast  insulin lispro Injectable (HumaLOG) 8 Unit(s) SubCutaneous before lunch  lacosamide 100 milliGRAM(s) Oral every 12 hours  multivitamin 1 Tablet(s) Oral daily  nystatin Powder 1 Application(s) Topical two times a day  saccharomyces boulardii 250 milliGRAM(s) Oral two times a day  zinc sulfate 220 milliGRAM(s) Oral daily    Vital Signs Last 24 Hrs  T(F): 97.8 (14 Mar 2019 07:36), Max: 97.8 (14 Mar 2019 07:36)  HR: 78 (14 Mar 2019 07:36) (74 - 95)  BP: 116/78 (14 Mar 2019 07:36) (107/69 - 163/96)  RR: 14 (14 Mar 2019 07:36) (14 - 14)  SpO2: 96% (14 Mar 2019 07:36) (96% - 96%)  I&O's Summary    13 Mar 2019 07:01  -  14 Mar 2019 07:00  --------------------------------------------------------  IN: 0 mL / OUT: 3 mL / NET: -3 mL        PHYSICAL EXAM:  General: NAD, comfortable   ENT: MMM  Neck: Supple, No JVD  Lungs: CTA, BLAE, No added sounds.   Cardio: RRR, S1/S2, No murmurs  Abdomen: Soft, NT/ND, BS+   Extremities: No edema  CNS: no new deficits     LABS:                        11.7   12.2  )-----------( 209      ( 14 Mar 2019 05:28 )             36.3     03-14    134  |  99  |  22  ----------------------------<  152  4.2   |  24  |  0.80    Ca    8.9      14 Mar 2019 05:28      eGFR if Non African American: 87 mL/min/1.73M2 (03-14-19 @ 05:28)  eGFR if African American: 100 mL/min/1.73M2 (03-14-19 @ 05:28)                    CAPILLARY BLOOD GLUCOSE      POCT Blood Glucose.: 132 mg/dL (14 Mar 2019 12:05)  POCT Blood Glucose.: 168 mg/dL (14 Mar 2019 07:34)  POCT Blood Glucose.: 118 mg/dL (13 Mar 2019 20:55)  POCT Blood Glucose.: 91 mg/dL (13 Mar 2019 16:30)    02-13 [id number] 7.7          RADIOLOGY & ADDITIONAL TESTS:    Care Discussed with Consultants/Other Providers:

## 2019-03-14 NOTE — PROGRESS NOTE ADULT - ASSESSMENT
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 He is now with functional, gait, ADL, transfer, balance, endurance, cognitive, swallow impairments.    ~Recurrent GBM s/p craniotomy and resection  -cont 3 hours of comprehensive therapy consisting of PT/OT/SLP/Neuropsychology  -Precautions: fall, cardiac, seizure, aspiration  -Decadron taper   -Seizure PPx with Keppra and Vimpat  -To follow up with neuro-oncology as outpatient    ~DM   - -Continue Lantus and premeal insulin-- FS controlled  -cont Metformin 500mg BID  -Consistent carb diet    ~Pain control  -Tylenol  PRN    ~HTN  -Continue Lisinopril    ~Hyponatremia  -resolved      UTI--Urine Cx + E. Coli--Sens. to Cipro  -Afebrile,   --Cont. cipro (3/13-3/19)  ~Leukocytosis with positive UA, also on steroids,     ~Anemia  -Hgb stable, no obvious signs of bleeding    ~ regimen  -incontinent  -Finasteride    ~GI/Bowel regimen  -Continue Protonix  -stopped stool softner due to frequent BMs  -Diet: Mechanical soft (consistent carb)  -Restorative dining  -Supervise meals    ~Skin/Stage II sacral ulcer  -Turn and position Q2hrs  -OOB with PT/OT  -Nystatin to groin  -Wound gel to open ulcer---cover surrounding skin with cavilon to prevent breakdown from moisture    ~DVT PPx  -ppx lovenox     ~IDT Rounds  -Case discussed 3/12  OT: mod to max assist; apraxic, min awareness of left side; goals are for min assist 2 weeks  PT: Mod assist for transfers and ambulated 45 Ft RW mod A. Did 2 steps with mod assist  SLP: significant cognitive issues- mainly attention, and focus.  Poor carryover  Will discuss pt's needs for discharge with his son--Pt. will need physical assistance and adequate supervision at home. Plan for discharge 4/2/19 if discharge home.  Pt. with 2 sons that are arguing as per who is his HCP.  Pt. expressed that he wishes for Catarino to be his HCP. Son Chirag said he has a written HCP with his .  SW contacted  and awaiting call back.   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 He is now with functional, gait, ADL, transfer, balance, endurance, cognitive, swallow impairments.    ~Recurrent GBM s/p craniotomy and resection  -cont 3 hours of comprehensive therapy consisting of PT/OT/SLP/Neuropsychology  -Precautions: fall, cardiac, seizure, aspiration  -Decadron taper   -Seizure PPx with Keppra and Vimpat  -To follow up with neuro-oncology as outpatient    ~DM   - -Continue Lantus and premeal insulin-- FS controlled  -cont Metformin 500mg BID  -Consistent carb diet    ~Pain control  -Tylenol  PRN    ~HTN  -Continue Lisinopril    ~Hyponatremia  -mild---stable      UTI--Urine Cx + E. Coli--Sens. to Cipro  -Afebrile,   --Cont. cipro (3/13-3/19)  ~Leukocytosis with positive UA, also on steroids,   Leukocytosis improved    ~Anemia  -Hgb stable, no obvious signs of bleeding    ~ regimen  -incontinent  -Finasteride    ~GI/Bowel regimen  -Continue Protonix  -stopped stool softner due to frequent BMs  -Diet: Mechanical soft (consistent carb)  -Restorative dining  -Supervise meals    ~Skin/Stage II sacral ulcer  -Turn and position Q2hrs  -OOB with PT/OT  -Nystatin to groin  -Wound gel to open ulcer---cover surrounding skin with cavilon to prevent breakdown from moisture    ~DVT PPx  -ppx lovenox     ~IDT Rounds  -Case discussed 3/12  OT: mod to max assist; apraxic, min awareness of left side; goals are for min assist 2 weeks  PT: Mod assist for transfers and ambulated 45 Ft RW mod A. Did 2 steps with mod assist  SLP: significant cognitive issues- mainly attention, and focus.  Poor carryover  Will discuss pt's needs for discharge with his son--Pt. will need physical assistance and adequate supervision at home. Plan for discharge 4/2/19 if discharge home.  Pt. with 2 sons that are arguing as per who is his HCP.  Pt. expressed that he wishes for Catarino to be his HCP. Son Chirag said he has a written HCP with his .  SW contacted  and awaiting call back.   -Psych consult to determine if patient has capacity to choose his HCP

## 2019-03-15 LAB
GLUCOSE BLDC GLUCOMTR-MCNC: 133 MG/DL — HIGH (ref 70–99)
GLUCOSE BLDC GLUCOMTR-MCNC: 137 MG/DL — HIGH (ref 70–99)
GLUCOSE BLDC GLUCOMTR-MCNC: 258 MG/DL — HIGH (ref 70–99)
GLUCOSE BLDC GLUCOMTR-MCNC: 83 MG/DL — SIGNIFICANT CHANGE UP (ref 70–99)
GLUCOSE BLDC GLUCOMTR-MCNC: 99 MG/DL — SIGNIFICANT CHANGE UP (ref 70–99)

## 2019-03-15 PROCEDURE — 90791 PSYCH DIAGNOSTIC EVALUATION: CPT

## 2019-03-15 PROCEDURE — 99233 SBSQ HOSP IP/OBS HIGH 50: CPT

## 2019-03-15 PROCEDURE — 99232 SBSQ HOSP IP/OBS MODERATE 35: CPT

## 2019-03-15 RX ORDER — INSULIN GLARGINE 100 [IU]/ML
26 INJECTION, SOLUTION SUBCUTANEOUS AT BEDTIME
Qty: 0 | Refills: 0 | Status: DISCONTINUED | OUTPATIENT
Start: 2019-03-16 | End: 2019-03-23

## 2019-03-15 RX ORDER — INSULIN LISPRO 100/ML
6 VIAL (ML) SUBCUTANEOUS
Qty: 0 | Refills: 0 | Status: DISCONTINUED | OUTPATIENT
Start: 2019-03-15 | End: 2019-03-26

## 2019-03-15 RX ORDER — INSULIN LISPRO 100/ML
6 VIAL (ML) SUBCUTANEOUS
Qty: 0 | Refills: 0 | Status: DISCONTINUED | OUTPATIENT
Start: 2019-03-15 | End: 2019-03-15

## 2019-03-15 RX ORDER — INSULIN GLARGINE 100 [IU]/ML
13 INJECTION, SOLUTION SUBCUTANEOUS ONCE
Qty: 0 | Refills: 0 | Status: COMPLETED | OUTPATIENT
Start: 2019-03-15 | End: 2019-03-15

## 2019-03-15 RX ADMIN — METFORMIN HYDROCHLORIDE 500 MILLIGRAM(S): 850 TABLET ORAL at 18:38

## 2019-03-15 RX ADMIN — ZINC SULFATE TAB 220 MG (50 MG ZINC EQUIVALENT) 220 MILLIGRAM(S): 220 (50 ZN) TAB at 12:56

## 2019-03-15 RX ADMIN — LEVETIRACETAM 1500 MILLIGRAM(S): 250 TABLET, FILM COATED ORAL at 05:54

## 2019-03-15 RX ADMIN — Medication 1 TABLET(S): at 12:56

## 2019-03-15 RX ADMIN — Medication 500 MILLIGRAM(S): at 05:54

## 2019-03-15 RX ADMIN — LACOSAMIDE 100 MILLIGRAM(S): 50 TABLET ORAL at 18:38

## 2019-03-15 RX ADMIN — Medication 3: at 12:15

## 2019-03-15 RX ADMIN — METFORMIN HYDROCHLORIDE 500 MILLIGRAM(S): 850 TABLET ORAL at 05:54

## 2019-03-15 RX ADMIN — ENOXAPARIN SODIUM 40 MILLIGRAM(S): 100 INJECTION SUBCUTANEOUS at 12:17

## 2019-03-15 RX ADMIN — Medication 6 UNIT(S): at 17:09

## 2019-03-15 RX ADMIN — FINASTERIDE 5 MILLIGRAM(S): 5 TABLET, FILM COATED ORAL at 12:56

## 2019-03-15 RX ADMIN — INSULIN GLARGINE 13 UNIT(S): 100 INJECTION, SOLUTION SUBCUTANEOUS at 23:34

## 2019-03-15 RX ADMIN — Medication 2 MILLIGRAM(S): at 05:54

## 2019-03-15 RX ADMIN — Medication 2 MILLIGRAM(S): at 23:36

## 2019-03-15 RX ADMIN — PANTOPRAZOLE SODIUM 40 MILLIGRAM(S): 20 TABLET, DELAYED RELEASE ORAL at 05:54

## 2019-03-15 RX ADMIN — Medication 6 UNIT(S): at 12:15

## 2019-03-15 RX ADMIN — LEVETIRACETAM 1500 MILLIGRAM(S): 250 TABLET, FILM COATED ORAL at 18:38

## 2019-03-15 RX ADMIN — Medication 500 MILLIGRAM(S): at 12:56

## 2019-03-15 RX ADMIN — NYSTATIN CREAM 1 APPLICATION(S): 100000 CREAM TOPICAL at 05:53

## 2019-03-15 RX ADMIN — NYSTATIN CREAM 1 APPLICATION(S): 100000 CREAM TOPICAL at 18:38

## 2019-03-15 RX ADMIN — LACOSAMIDE 100 MILLIGRAM(S): 50 TABLET ORAL at 05:54

## 2019-03-15 RX ADMIN — Medication 250 MILLIGRAM(S): at 05:54

## 2019-03-15 NOTE — PROGRESS NOTE ADULT - SUBJECTIVE AND OBJECTIVE BOX
HPI:  Mr. Duncan is a 74 year old male with a PMHx of GBM s/p right craniotomy in 2019, CVA with residual L sided weakness who presented to [facility] on 2/12/19 with seizures likely from recurrence of righ temporal lobe GBM complicated by sepsis from viral URI, acute embolic CVA, PFO, LLE DVT s/p IVC, and SAH.     Pt was BIBEMS from nursing home for active seizure 2/12/19. Per ED he was actively seizing for 30 mins prior to EMS arrival, and displayed left sided face, arm, and leg jerking in the ED. Pt received Ativan and Keppra and seizure broke after 20 mins in the ED.   On admission, pt was found to be febrile with leukocytosis 31.56 meeting SIRS criteria and was found to have +coronavirus. Neurology and neurosurgery were consulted with the plan to have repeat craniotomy and GBM resection pending bacteremia clearance.   On 2/15 rapid responses called for pt due to 3 repeat seizures and unresponsiveness with new left facial droop- given Ativan and Keppra per neurosurgery recommendations MRI 2/15/29 showed 3 discrete foci of acute infarction within the right occipital lobe, left corpus callosum within the genu and left frontal lobe in the region of the precentral gyrus. Pt placed on heparin gtt for anticoagulation during workup. Doppler U/S showed acute DVT of left common femoral, popliteal, posterior tibial, and peroneal vein, and ALBERTO 2/20/19 showed evidence of a PFO. Pt had placement of IVC filter on 2/19/19 and heparin gtt was discontinued in preparation for neurosurgical procedure which was scheduled for 2/25 but was delayed due to episodes of bradycardia in 40s. Pt had R craniotomy for resection of brain tumor on 2/27/19. Post/op course c/b seizures 2/2 SAH on CT 3/2 for which pt was given Keppra and Vimpat, and transferred to SICU for further monitoring. Pt seizures and hemorrhage stabilized and plan for discharge to rehab. On the day of discharge he was medically and neurologically stable for discharge to rehab on 3/6/19. (06 Mar 2019 14:35)      PAST MEDICAL & SURGICAL HISTORY:  Glioblastoma  BPH (benign prostatic hyperplasia)  CVA (cerebral vascular accident)  Diabetes  HTN (hypertension)  S/P craniotomy: ~ resection of R-pariental mass (11/28/2018)      Subjective:  Patient seen and examined today, no acute events overnight, no complaints, vital signs stable, completed course of Ciprofloxacin for UTI, no symptoms with urination.     REVIEW OF SYMPTOMS  Neurological deficits--cognitive deficit, left visual field deficit    Vital Signs Last 24 Hrs  T(C): 36.8 (15 Mar 2019 07:38), Max: 36.8 (14 Mar 2019 20:40)  T(F): 98.2 (15 Mar 2019 07:38), Max: 98.3 (14 Mar 2019 20:40)  HR: 84 (15 Mar 2019 07:38) (78 - 84)  BP: 121/86 (15 Mar 2019 07:38) (106/73 - 121/86)  BP(mean): --  RR: 14 (15 Mar 2019 07:38) (14 - 14)  SpO2: 95% (15 Mar 2019 07:38) (95% - 98%)      PHYSICAL EXAM  Constitutional - NAD, Comfortable  	HEENT - Right cranial incision with staples C/D/I, Limited eye movement to left  	Chest - CTA  	Cardiovascular - All extremities well perfused, S1S2  	Abdomen - Soft, Non-distended, Non-tender  	Extremities - No bilateral lower extremity edema, No calf tenderness   	Neurologic Exam -                 	   Cognitive - Drowsy, Oriented to self, & place,  not date or situation  	   Communication - Fluent, No dysarthria, Naming intact  	   Attention - Impaired, unable to perform days of week backwards and serial 7's  	   Memory - Impaired, unable to recall 3/3 items after 3 minutes without cueing (only got 1/3 with cueing)  	   Cranial Nerves - Right eye reactivity sluggish, Impaired left visual field, Left visual inattention  	   Motor -   	                  LEFT    UE - ShAB 5/5, EF 5/5, EE 5/5, WE 5/5,  5/5  	                  RIGHT UE - ShAB 5/5, EF 5/5, EE 5/5, WE 5/5,  5/5  	                  LEFT    LE - HF 5/5, KE 5/5, KF 4/5, DF 5/5, PF 5/5  	                  RIGHT LE - HF 5/5, KE 5/5, KF 4/5, DF 5/5, PF 5/5     	   Sensory - Impaired on left sided extremities,  extinguishes in left upper > lower extremity  	   Reflexes - DTR intact and symmetrical  	   Coordination -FTN accurate bilaterally when tested on pt's right.   Finger-to-nose impaired when attempted on left of patient's body--(due to visual deficit)    RECENT LABS                        11.7   12.2  )-----------( 209      ( 14 Mar 2019 05:28 )             36.3   03-14    134<L>  |  99  |  22  ----------------------------<  152<H>  4.2   |  24  |  0.80    Ca    8.9      14 Mar 2019 05:28                  RADIOLOGY/OTHER RESULTS    MEDICATIONS  (STANDING):  ascorbic acid 500 milliGRAM(s) Oral daily  dexamethasone     Tablet 2 milliGRAM(s) Oral every 12 hours  dexamethasone     Tablet   Oral   enoxaparin Injectable 40 milliGRAM(s) SubCutaneous daily  finasteride 5 milliGRAM(s) Oral daily  insulin glargine Injectable (LANTUS) 26 Unit(s) SubCutaneous at bedtime  insulin lispro (HumaLOG) corrective regimen sliding scale   SubCutaneous Before meals and at bedtime  insulin lispro Injectable (HumaLOG) 6 Unit(s) SubCutaneous three times a day before meals  lacosamide 100 milliGRAM(s) Oral every 12 hours  levETIRAcetam 1500 milliGRAM(s) Oral every 12 hours  lisinopril 20 milliGRAM(s) Oral daily  metFORMIN 500 milliGRAM(s) Oral two times a day  multivitamin 1 Tablet(s) Oral daily  nystatin Powder 1 Application(s) Topical two times a day  pantoprazole    Tablet 40 milliGRAM(s) Oral before breakfast  zinc sulfate 220 milliGRAM(s) Oral daily    MEDICATIONS  (PRN):  acetaminophen   Tablet .. 650 milliGRAM(s) Oral every 6 hours PRN Mild Pain (1 - 3)  senna 2 Tablet(s) Oral at bedtime PRN Constipation

## 2019-03-15 NOTE — PROGRESS NOTE ADULT - ASSESSMENT
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 He is now with functional, gait, ADL, transfer, balance, endurance, cognitive, swallow impairments.    ~Recurrent GBM s/p craniotomy and resection  -cont 3 hours of comprehensive therapy consisting of PT/OT/SLP/Neuropsychology  -Precautions: fall, cardiac, seizure, aspiration  -Decadron taper   -Seizure PPx with Keppra and Vimpat  -To follow up with neuro-oncology as outpatient    ~DM   - -Continue Lantus and premeal insulin- FS controlled  -Premeal insulin adjusted  -cont Metformin 500mg BID  -Consistent carb diet    ~Pain control  -Tylenol  PRN    ~HTN  -Continue Lisinopril    ~Hyponatremia  -mild---stable      UTI--Urine Cx + E. Coli--Sens. to Cipro  -Afebrile,   --Completed 7 day course of Cipro 3/15  ~Leukocytosis with positive UA, also on steroids,   Leukocytosis improved    ~Anemia  -Hgb stable, no obvious signs of bleeding    ~ regimen  -incontinent  -Finasteride    ~GI/Bowel regimen  -Continue Protonix  -stopped stool softner due to frequent BMs  -Diet: Mechanical soft (consistent carb)  -Restorative dining  -Supervise meals    ~Skin/Stage II sacral ulcer  -Turn and position Q2hrs  -OOB with PT/OT  -Nystatin to groin  -Wound gel to open ulcer---cover surrounding skin with cavilon to prevent breakdown from moisture    ~DVT PPx  -ppx lovenox     ~IDT Rounds  -Case discussed 3/12  OT: mod to max assist; apraxic, min awareness of left side; goals are for min assist 2 weeks  PT: Mod assist for transfers and ambulated 45 Ft RW mod A. Did 2 steps with mod assist  SLP: significant cognitive issues- mainly attention, and focus.  Poor carryover  Will discuss pt's needs for discharge with his son--Pt. will need physical assistance and adequate supervision at home. Plan for discharge 4/2/19 if discharge home.  Pt. with 2 sons that are arguing as per who is his HCP.  Pt. expressed that he wishes for [facility] to be his HCP. Son Chirag said he has a written HCP with his .  SW contacted  and awaiting call back.   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 He is now with functional, gait, ADL, transfer, balance, endurance, cognitive, swallow impairments.    ~Recurrent GBM s/p craniotomy and resection  -cont 3 hours of comprehensive therapy consisting of PT/OT/SLP/Neuropsychology  -Precautions: fall, cardiac, seizure, aspiration  -Decadron taper   -Seizure PPx with Keppra and Vimpat  -To follow up with neuro-oncology as outpatient    ~DM   - -Continue Lantus and premeal insulin- FS controlled  -Premeal insulin adjusted  -cont Metformin 500mg BID  -Consistent carb diet    ~Pain control  -Tylenol  PRN    ~HTN  -Continue Lisinopril    ~Hyponatremia  -mild---stable    UTI--Urine Cx + E. Coli--Sens. to Cipro  -Afebrile,   --Completed 7 day course of Cipro 3/15  ~Leukocytosis with positive UA, also on steroids,   Leukocytosis improved    ~Anemia  -Hgb stable, no obvious signs of bleeding    ~ regimen  -incontinent  -Finasteride    ~GI/Bowel regimen  -Continue Protonix  -stopped stool softner due to frequent BMs  -Diet: Mechanical soft (consistent carb)  -Restorative dining  -Supervise meals    ~Skin/Stage II sacral ulcer  -Turn and position Q2hrs  -OOB with PT/OT  -Nystatin to groin  -Wound gel to open ulcer---cover surrounding skin with cavilon to prevent breakdown from moisture    ~DVT PPx  -ppx lovenox     ~IDT Rounds  -Case discussed 3/12  OT: mod to max assist; apraxic, min awareness of left side; goals are for min assist 2 weeks  PT: Mod assist for transfers and ambulated 45 Ft RW mod A. Did 2 steps with mod assist  SLP: significant cognitive issues- mainly attention, and focus.  Poor carryover  Will discuss pt's needs for discharge with his son--Pt. will need physical assistance and adequate supervision at home. Plan for discharge 4/2/19 if discharge home.  Pt. with 2 sons that are arguing as per who is his HCP.  Pt. expressed that he wishes for [facility] to be his HCP. Son Chirag said he has a written HCP with his .  SW contacted  and awaiting call back.   -Psych consult to determine if patient has capacity to choose his HCP

## 2019-03-15 NOTE — PROGRESS NOTE ADULT - ASSESSMENT
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 He is now with functional, gait, ADL, transfer, balance, endurance, cognitive, swallow impairments.    #GBM s/p craniotomy and resection  c/w PT/OT/SLP  c/w Decadron taper.    #Seizure PPx  c/w Keppra and Vimpat.     #HTN- controlled.   c/w Lisinopril.     #UTI- stable.   UA + , E coli, Afebrile.   Leucocytosis resolving probably 2/2 Steroids  c/w  Ciprofloxacin for  7 days      #DM   Fs controlled   c/w Lantus to 26 and   c/w pre meal 6-6-6  c/w Metformin 500mg BID  Hypoglycemia protocol.     # Anemia  H/H stable,   Monitor.     # DVT PPx  Lovenox

## 2019-03-15 NOTE — CHART NOTE - NSCHARTNOTEFT_GEN_A_CORE
Assessment:   Patient seen for: F/U    Source: [x] medical review, [x] patient    Factors impacting intake: [x] none     Intake: Pt reported having good appetite. Consumes 100% of his meals including the supplements. Pt denies N/V/D/C at the moment.     Diet Prescription: Diet, Dysphagia 2 Mechanical Soft-Thin Liquids: Consistent Carbohydrate {No Snacks}  1200mL Fluid Restriction ([id number])  Supplement Feeding Modality:  Oral Glucerna Shake Cans or Servings Per Day:  1       Frequency:  Two Times a day (03-07-19 @ 11:25)    Current Weight: 79.3 Kg (174 lbs)  % Weight Change: loss 3%, (6 lbs) since admission wt on 03/06 (1 Week)    Last BM: 03/14    Pertinent Medications: MEDICATIONS  (STANDING):  ascorbic acid 500 milliGRAM(s) Oral daily  dexamethasone     Tablet 2 milliGRAM(s) Oral every 12 hours  dexamethasone     Tablet   Oral   enoxaparin Injectable 40 milliGRAM(s) SubCutaneous daily  finasteride 5 milliGRAM(s) Oral daily  insulin glargine Injectable (LANTUS) 26 Unit(s) SubCutaneous at bedtime  insulin lispro (HumaLOG) corrective regimen sliding scale   SubCutaneous Before meals and at bedtime  insulin lispro Injectable (HumaLOG) 6 Unit(s) SubCutaneous three times a day before meals  lacosamide 100 milliGRAM(s) Oral every 12 hours  levETIRAcetam 1500 milliGRAM(s) Oral every 12 hours  lisinopril 20 milliGRAM(s) Oral daily  metFORMIN 500 milliGRAM(s) Oral two times a day  multivitamin 1 Tablet(s) Oral daily  nystatin Powder 1 Application(s) Topical two times a day  pantoprazole    Tablet 40 milliGRAM(s) Oral before breakfast  zinc sulfate 220 milliGRAM(s) Oral daily    MEDICATIONS  (PRN):  acetaminophen   Tablet .. 650 milliGRAM(s) Oral every 6 hours PRN Mild Pain (1 - 3)  senna 2 Tablet(s) Oral at bedtime PRN Constipation    Pertinent Labs: 03-14 Na134 mmol/L<L> Glu 152 mg/dL<H> K+ 4.2 mmol/L Cr  0.80 mg/dL BUN 22 mg/dL     CAPILLARY BLOOD GLUCOSE    POCT Blood Glucose.: 99 mg/dL (15 Mar 2019 07:33)  POCT Blood Glucose.: 104 mg/dL (14 Mar 2019 20:51)  POCT Blood Glucose.: 81 mg/dL (14 Mar 2019 17:17)  POCT Blood Glucose.: 69 mg/dL (14 Mar 2019 17:16)  POCT Blood Glucose.: 61 mg/dL (14 Mar 2019 17:15)  POCT Blood Glucose.: 132 mg/dL (14 Mar 2019 12:05)      Skin: Pressure injury: coccyx sacrum stage II    Edema: none    Estimated Needs:   [x] no change since previous assessment    Previous Nutrition Diagnosis:   [x] Malnutrition Severe    Nutrition Diagnosis is [x] ongoing       Interventions:   Recommend  [x] Continue with current diet:     Monitoring and Evaluation:   Continue to monitor Nutritional intake, Tolerance to diet prescription, weights, labs, skin integrity.  other:  RD remains available upon request and will follow up per protocol.

## 2019-03-15 NOTE — CHART NOTE - NSCHARTNOTEFT_GEN_A_CORE
REHABILITATION DIAGNOSIS/IMPAIRMENT GROUP CODE:  HEALTH ISSUES - PROBLEM Dx:  Glioblastoma  CVA with left hemiparesis  Seizures  Anemia  Type II DM  Gait and ADL impairments  BPH    Based upon consideration of the patient's impairments, functional status, complicating conditions and any other contributing factors and after information garnered from the assessments of all therapy disciplines involved in treating the patient and other pertinent clinicians:    INTERDISCIPLINARY REHABILITATION INTERVENTIONS  [ X ] Transfer Training  [ X ] Bed Mobility  [ X ] Therapeutic Exercise  [ X ] Balance/Coordination Exercises  [ X ] Locomotion retraining  [ X ] Stairs  [ X ] Functional Transfer Training  [ X  ] Bowel/Bladder program  [   ] Pain Management  [ X  ] Skin/Wound Care  [   ] Visual/Perceptual Training  [ X ] Therapeutic Recreation Activities  [ X ] Neuromuscular Re-education  [ X ] Activities of Daily Living  [ X ] Speech Exercise  [ X ] Swallowing Exercises  [   ] Vital Stim  [ X  ] Dietary Supplements  [   ] Calorie Count  [ X ] Cognitive Exercises  [ X ] Cognitive/Linguistic Treatment  [ X ] Behavior Program  [ X ] Neuropsych Therapy  [ X ] Patient/Family Counseling  [ X ] Family Training  [ X ] Community Re-entry  [   ] Orthotic Evaluation  [   ] Prosthetic Eval/Training    MEDICAL PROGNOSIS:  Fair    REHAB POTENTIAL:  Good    EXPECTED DAILY THERAPY:         PT: 1 hour / day       OT: 1 hour / day       ST: 1 hour / day       P&O: Unnecessary    EXPECTED INTENSITY OF PROGRAM:  3 hours a day consisting of PT/OT/SLP    EXPECTED FREQUENCY OF PROGRAM:  5 of 7 days of the week    ESTIMATED LOS:  14-21 days    ESTIMATED DISPOSITION:  Home with home care    INTERDISCIPLINARY FUNCTIONAL OUTCOMES/GOALS:         Gait/Mobility: Supervision/CG       Transfers: Supervision/CG       ADLs: Supervision/CG        Functional Transfers: Supervision/CG       Medication Management: Contact guard       Communication: Independent       Cognitive: Supervision       Dysphagia: Mod I       Bladder: Supervision/CG       Bowel: Supervision/CG

## 2019-03-15 NOTE — PROGRESS NOTE ADULT - SUBJECTIVE AND OBJECTIVE BOX
Patient is a 74y old  Male who presents with a chief complaint of Seizure, Functional deficits s/p GBM (14 Mar 2019 13:15)      Patient seen and examined at bedside.     ALLERGIES:  No Known Allergies    MEDICATIONS:  ascorbic acid 500 milliGRAM(s) Oral daily  enoxaparin Injectable 40 milliGRAM(s) SubCutaneous daily  insulin glargine Injectable (LANTUS) 26 Unit(s) SubCutaneous at bedtime  insulin lispro Injectable (HumaLOG) 6 Unit(s) SubCutaneous three times a day before meals  lacosamide 100 milliGRAM(s) Oral every 12 hours  multivitamin 1 Tablet(s) Oral daily  nystatin Powder 1 Application(s) Topical two times a day  zinc sulfate 220 milliGRAM(s) Oral daily    Vital Signs Last 24 Hrs  T(F): 98.2 (15 Mar 2019 07:38), Max: 98.3 (14 Mar 2019 20:40)  HR: 84 (15 Mar 2019 07:38) (78 - 84)  BP: 121/86 (15 Mar 2019 07:38) (106/73 - 121/86)  RR: 14 (15 Mar 2019 07:38) (14 - 14)  SpO2: 95% (15 Mar 2019 07:38) (95% - 98%)  I&O's Summary      PHYSICAL EXAM:  General: NAD, comfortable   ENT: MMM, Staple sin scalp, incision clean, healing well.   Neck: Supple, No JVD  Lungs: CTA, BLAE, No added sounds.   Cardio: RRR, S1/S2, No murmurs  Abdomen: Soft, NT/ND, BS+   Extremities: No edema  CNS: no new deficits      LABS:                        11.7   12.2  )-----------( 209      ( 14 Mar 2019 05:28 )             36.3     03-14    134  |  99  |  22  ----------------------------<  152  4.2   |  24  |  0.80    Ca    8.9      14 Mar 2019 05:28      eGFR if Non African American: 87 mL/min/1.73M2 (03-14-19 @ 05:28)  eGFR if African American: 100 mL/min/1.73M2 (03-14-19 @ 05:28)                    CAPILLARY BLOOD GLUCOSE      POCT Blood Glucose.: 99 mg/dL (15 Mar 2019 07:33)  POCT Blood Glucose.: 104 mg/dL (14 Mar 2019 20:51)  POCT Blood Glucose.: 81 mg/dL (14 Mar 2019 17:17)  POCT Blood Glucose.: 69 mg/dL (14 Mar 2019 17:16)  POCT Blood Glucose.: 61 mg/dL (14 Mar 2019 17:15)  POCT Blood Glucose.: 132 mg/dL (14 Mar 2019 12:05)    02-13 [id number] 7.7          RADIOLOGY & ADDITIONAL TESTS:    Care Discussed with Consultants/Other Providers:

## 2019-03-16 LAB
GLUCOSE BLDC GLUCOMTR-MCNC: 103 MG/DL — HIGH (ref 70–99)
GLUCOSE BLDC GLUCOMTR-MCNC: 135 MG/DL — HIGH (ref 70–99)
GLUCOSE BLDC GLUCOMTR-MCNC: 155 MG/DL — HIGH (ref 70–99)
GLUCOSE BLDC GLUCOMTR-MCNC: 199 MG/DL — HIGH (ref 70–99)

## 2019-03-16 PROCEDURE — 99232 SBSQ HOSP IP/OBS MODERATE 35: CPT

## 2019-03-16 RX ADMIN — Medication 2 MILLIGRAM(S): at 11:37

## 2019-03-16 RX ADMIN — Medication 6 UNIT(S): at 11:38

## 2019-03-16 RX ADMIN — Medication 1 TABLET(S): at 11:37

## 2019-03-16 RX ADMIN — LACOSAMIDE 100 MILLIGRAM(S): 50 TABLET ORAL at 17:57

## 2019-03-16 RX ADMIN — NYSTATIN CREAM 1 APPLICATION(S): 100000 CREAM TOPICAL at 17:56

## 2019-03-16 RX ADMIN — Medication 500 MILLIGRAM(S): at 11:37

## 2019-03-16 RX ADMIN — Medication 1: at 11:38

## 2019-03-16 RX ADMIN — ENOXAPARIN SODIUM 40 MILLIGRAM(S): 100 INJECTION SUBCUTANEOUS at 11:38

## 2019-03-16 RX ADMIN — LEVETIRACETAM 1500 MILLIGRAM(S): 250 TABLET, FILM COATED ORAL at 17:56

## 2019-03-16 RX ADMIN — ZINC SULFATE TAB 220 MG (50 MG ZINC EQUIVALENT) 220 MILLIGRAM(S): 220 (50 ZN) TAB at 11:37

## 2019-03-16 RX ADMIN — NYSTATIN CREAM 1 APPLICATION(S): 100000 CREAM TOPICAL at 05:35

## 2019-03-16 RX ADMIN — METFORMIN HYDROCHLORIDE 500 MILLIGRAM(S): 850 TABLET ORAL at 17:56

## 2019-03-16 RX ADMIN — Medication 1: at 21:57

## 2019-03-16 RX ADMIN — LACOSAMIDE 100 MILLIGRAM(S): 50 TABLET ORAL at 05:35

## 2019-03-16 RX ADMIN — LEVETIRACETAM 1500 MILLIGRAM(S): 250 TABLET, FILM COATED ORAL at 05:35

## 2019-03-16 RX ADMIN — PANTOPRAZOLE SODIUM 40 MILLIGRAM(S): 20 TABLET, DELAYED RELEASE ORAL at 05:36

## 2019-03-16 RX ADMIN — METFORMIN HYDROCHLORIDE 500 MILLIGRAM(S): 850 TABLET ORAL at 05:35

## 2019-03-16 RX ADMIN — Medication 6 UNIT(S): at 08:12

## 2019-03-16 RX ADMIN — Medication 6 UNIT(S): at 16:33

## 2019-03-16 RX ADMIN — LISINOPRIL 20 MILLIGRAM(S): 2.5 TABLET ORAL at 05:35

## 2019-03-16 RX ADMIN — FINASTERIDE 5 MILLIGRAM(S): 5 TABLET, FILM COATED ORAL at 11:38

## 2019-03-16 RX ADMIN — Medication 2 MILLIGRAM(S): at 17:56

## 2019-03-16 RX ADMIN — INSULIN GLARGINE 26 UNIT(S): 100 INJECTION, SOLUTION SUBCUTANEOUS at 21:58

## 2019-03-16 NOTE — PROGRESS NOTE ADULT - SUBJECTIVE AND OBJECTIVE BOX
Cc: Gait and Cogntive dysfunction    HPI: Patient with no new medical issues today.  Pain controlled, no chest pain, no N/V, no Fevers/Chills. No other new ROS  Has been tolerating rehabilitation program.    acetaminophen   Tablet .. 650 milliGRAM(s) Oral every 6 hours PRN  ascorbic acid 500 milliGRAM(s) Oral daily  dexamethasone     Tablet 2 milliGRAM(s) Oral every 12 hours  dexamethasone     Tablet   Oral   enoxaparin Injectable 40 milliGRAM(s) SubCutaneous daily  finasteride 5 milliGRAM(s) Oral daily  insulin glargine Injectable (LANTUS) 26 Unit(s) SubCutaneous at bedtime  insulin lispro (HumaLOG) corrective regimen sliding scale   SubCutaneous Before meals and at bedtime  insulin lispro Injectable (HumaLOG) 6 Unit(s) SubCutaneous three times a day before meals  lacosamide 100 milliGRAM(s) Oral every 12 hours  levETIRAcetam 1500 milliGRAM(s) Oral every 12 hours  lisinopril 20 milliGRAM(s) Oral daily  metFORMIN 500 milliGRAM(s) Oral two times a day  multivitamin 1 Tablet(s) Oral daily  nystatin Powder 1 Application(s) Topical two times a day  pantoprazole    Tablet 40 milliGRAM(s) Oral before breakfast  senna 2 Tablet(s) Oral at bedtime PRN  zinc sulfate 220 milliGRAM(s) Oral daily      T(C): 37 (03-15-19 @ 20:30), Max: 37 (03-15-19 @ 20:30)  HR: 86 (03-16-19 @ 05:40) (86 - 90)  BP: 128/85 (03-16-19 @ 05:40) (95/61 - 128/85)  RR: 14 (03-16-19 @ 05:40) (14 - 14)  SpO2: 97% (03-16-19 @ 05:40) (97% - 97%)    In NAD  HEENT- EOMI  Heart- RRR, S1S2  Lungs- CTA bl.  Abd- + BS, NT  Ext- No calf pain  Neuro- Exam unchanged    Imp: Patient with diagnosis of GBM admitted for comprehensive acute rehabilitation.    Plan:  - Continue PT/OT/SLP  - DVT prophylaxis- Lovenox  - Skin- Turn q2h, check skin daily  - Continue current medications; patient medically stable.   - Patient is stable to continue current rehabilitation program.

## 2019-03-17 LAB
GLUCOSE BLDC GLUCOMTR-MCNC: 134 MG/DL — HIGH (ref 70–99)
GLUCOSE BLDC GLUCOMTR-MCNC: 151 MG/DL — HIGH (ref 70–99)
GLUCOSE BLDC GLUCOMTR-MCNC: 160 MG/DL — HIGH (ref 70–99)
GLUCOSE BLDC GLUCOMTR-MCNC: 99 MG/DL — SIGNIFICANT CHANGE UP (ref 70–99)

## 2019-03-17 PROCEDURE — 99232 SBSQ HOSP IP/OBS MODERATE 35: CPT

## 2019-03-17 RX ADMIN — INSULIN GLARGINE 26 UNIT(S): 100 INJECTION, SOLUTION SUBCUTANEOUS at 22:15

## 2019-03-17 RX ADMIN — Medication 1: at 12:20

## 2019-03-17 RX ADMIN — Medication 6 UNIT(S): at 17:02

## 2019-03-17 RX ADMIN — METFORMIN HYDROCHLORIDE 500 MILLIGRAM(S): 850 TABLET ORAL at 18:47

## 2019-03-17 RX ADMIN — LACOSAMIDE 100 MILLIGRAM(S): 50 TABLET ORAL at 06:08

## 2019-03-17 RX ADMIN — Medication 2 MILLIGRAM(S): at 06:08

## 2019-03-17 RX ADMIN — Medication 1 TABLET(S): at 11:12

## 2019-03-17 RX ADMIN — NYSTATIN CREAM 1 APPLICATION(S): 100000 CREAM TOPICAL at 06:07

## 2019-03-17 RX ADMIN — Medication 1: at 17:01

## 2019-03-17 RX ADMIN — METFORMIN HYDROCHLORIDE 500 MILLIGRAM(S): 850 TABLET ORAL at 06:08

## 2019-03-17 RX ADMIN — Medication 6 UNIT(S): at 12:21

## 2019-03-17 RX ADMIN — Medication 2 MILLIGRAM(S): at 18:47

## 2019-03-17 RX ADMIN — ZINC SULFATE TAB 220 MG (50 MG ZINC EQUIVALENT) 220 MILLIGRAM(S): 220 (50 ZN) TAB at 11:12

## 2019-03-17 RX ADMIN — Medication 500 MILLIGRAM(S): at 11:12

## 2019-03-17 RX ADMIN — ENOXAPARIN SODIUM 40 MILLIGRAM(S): 100 INJECTION SUBCUTANEOUS at 11:12

## 2019-03-17 RX ADMIN — LEVETIRACETAM 1500 MILLIGRAM(S): 250 TABLET, FILM COATED ORAL at 06:08

## 2019-03-17 RX ADMIN — LEVETIRACETAM 1500 MILLIGRAM(S): 250 TABLET, FILM COATED ORAL at 18:47

## 2019-03-17 RX ADMIN — PANTOPRAZOLE SODIUM 40 MILLIGRAM(S): 20 TABLET, DELAYED RELEASE ORAL at 06:08

## 2019-03-17 RX ADMIN — FINASTERIDE 5 MILLIGRAM(S): 5 TABLET, FILM COATED ORAL at 11:11

## 2019-03-17 RX ADMIN — LACOSAMIDE 100 MILLIGRAM(S): 50 TABLET ORAL at 18:47

## 2019-03-17 RX ADMIN — NYSTATIN CREAM 1 APPLICATION(S): 100000 CREAM TOPICAL at 18:47

## 2019-03-17 NOTE — PROGRESS NOTE ADULT - SUBJECTIVE AND OBJECTIVE BOX
Cc: Gait and Cogntive dysfunction    HPI: Patient with no new medical issues today.  Pain controlled, no chest pain, no N/V, no Fevers/Chills. No other new ROS  Has been tolerating rehabilitation program.    MEDICATIONS  (STANDING):  ascorbic acid 500 milliGRAM(s) Oral daily  dexamethasone     Tablet 2 milliGRAM(s) Oral every 12 hours  dexamethasone     Tablet   Oral   enoxaparin Injectable 40 milliGRAM(s) SubCutaneous daily  finasteride 5 milliGRAM(s) Oral daily  insulin glargine Injectable (LANTUS) 26 Unit(s) SubCutaneous at bedtime  insulin lispro (HumaLOG) corrective regimen sliding scale   SubCutaneous Before meals and at bedtime  insulin lispro Injectable (HumaLOG) 6 Unit(s) SubCutaneous three times a day before meals  lacosamide 100 milliGRAM(s) Oral every 12 hours  levETIRAcetam 1500 milliGRAM(s) Oral every 12 hours  lisinopril 20 milliGRAM(s) Oral daily  metFORMIN 500 milliGRAM(s) Oral two times a day  multivitamin 1 Tablet(s) Oral daily  nystatin Powder 1 Application(s) Topical two times a day  pantoprazole    Tablet 40 milliGRAM(s) Oral before breakfast  zinc sulfate 220 milliGRAM(s) Oral daily    MEDICATIONS  (PRN):  acetaminophen   Tablet .. 650 milliGRAM(s) Oral every 6 hours PRN Mild Pain (1 - 3)  senna 2 Tablet(s) Oral at bedtime PRN Constipation    Vital Signs Last 24 Hrs  T(C): 37 (16 Mar 2019 21:52), Max: 37 (16 Mar 2019 21:52)  T(F): 98.6 (16 Mar 2019 21:52), Max: 98.6 (16 Mar 2019 21:52)  HR: 76 (17 Mar 2019 06:13) (76 - 90)  BP: 113/78 (17 Mar 2019 06:13) (100/72 - 113/78)  BP(mean): --  RR: 14 (16 Mar 2019 21:52) (14 - 14)  SpO2: 97% (16 Mar 2019 21:52) (97% - 97%)    CAPILLARY BLOOD GLUCOSE      POCT Blood Glucose.: 99 mg/dL (17 Mar 2019 07:49)  POCT Blood Glucose.: 155 mg/dL (16 Mar 2019 21:55)  POCT Blood Glucose.: 103 mg/dL (16 Mar 2019 16:26)  POCT Blood Glucose.: 199 mg/dL (16 Mar 2019 11:36)    In NAD  HEENT- EOMI  Heart- RRR, S1S2  Lungs- CTA bl.  Abd- + BS, NT  Ext- No calf pain  Neuro- Exam unchanged      Imp: Patient with diagnosis of GBM admitted for comprehensive acute rehabilitation.    Plan:  - Continue PT/OT/SLP  - DVT prophylaxis- Lovenox  - Monitor blood sugars- fair control- parameters and coverage.   - Skin- Turn q2h, check skin daily  - Continue current medications; patient medically stable.   - Patient is stable to continue current rehabilitation program.

## 2019-03-18 LAB
ANION GAP SERPL CALC-SCNC: 10 MMOL/L — SIGNIFICANT CHANGE UP (ref 5–17)
BUN SERPL-MCNC: 20 MG/DL — SIGNIFICANT CHANGE UP (ref 7–23)
CALCIUM SERPL-MCNC: 9 MG/DL — SIGNIFICANT CHANGE UP (ref 8.4–10.5)
CHLORIDE SERPL-SCNC: 101 MMOL/L — SIGNIFICANT CHANGE UP (ref 96–108)
CO2 SERPL-SCNC: 26 MMOL/L — SIGNIFICANT CHANGE UP (ref 22–31)
CREAT SERPL-MCNC: 0.63 MG/DL — SIGNIFICANT CHANGE UP (ref 0.5–1.3)
GLUCOSE BLDC GLUCOMTR-MCNC: 109 MG/DL — HIGH (ref 70–99)
GLUCOSE BLDC GLUCOMTR-MCNC: 183 MG/DL — HIGH (ref 70–99)
GLUCOSE BLDC GLUCOMTR-MCNC: 195 MG/DL — HIGH (ref 70–99)
GLUCOSE BLDC GLUCOMTR-MCNC: 84 MG/DL — SIGNIFICANT CHANGE UP (ref 70–99)
GLUCOSE SERPL-MCNC: 101 MG/DL — HIGH (ref 70–99)
HCT VFR BLD CALC: 33.6 % — LOW (ref 39–50)
HGB BLD-MCNC: 11.2 G/DL — LOW (ref 13–17)
MCHC RBC-ENTMCNC: 31.9 PG — SIGNIFICANT CHANGE UP (ref 27–34)
MCHC RBC-ENTMCNC: 33.3 GM/DL — SIGNIFICANT CHANGE UP (ref 32–36)
MCV RBC AUTO: 95.9 FL — SIGNIFICANT CHANGE UP (ref 80–100)
PLATELET # BLD AUTO: 365 K/UL — SIGNIFICANT CHANGE UP (ref 150–400)
POTASSIUM SERPL-MCNC: 3.7 MMOL/L — SIGNIFICANT CHANGE UP (ref 3.5–5.3)
POTASSIUM SERPL-SCNC: 3.7 MMOL/L — SIGNIFICANT CHANGE UP (ref 3.5–5.3)
RBC # BLD: 3.5 M/UL — LOW (ref 4.2–5.8)
RBC # FLD: 15 % — HIGH (ref 10.3–14.5)
SODIUM SERPL-SCNC: 137 MMOL/L — SIGNIFICANT CHANGE UP (ref 135–145)
WBC # BLD: 10.2 K/UL — SIGNIFICANT CHANGE UP (ref 3.8–10.5)
WBC # FLD AUTO: 10.2 K/UL — SIGNIFICANT CHANGE UP (ref 3.8–10.5)

## 2019-03-18 PROCEDURE — 99232 SBSQ HOSP IP/OBS MODERATE 35: CPT

## 2019-03-18 RX ORDER — CHLORHEXIDINE GLUCONATE 213 G/1000ML
15 SOLUTION TOPICAL
Qty: 0 | Refills: 0 | Status: DISCONTINUED | OUTPATIENT
Start: 2019-03-18 | End: 2019-03-18

## 2019-03-18 RX ORDER — NYSTATIN 500MM UNIT
500000 POWDER (EA) MISCELLANEOUS THREE TIMES A DAY
Qty: 0 | Refills: 0 | Status: DISCONTINUED | OUTPATIENT
Start: 2019-03-18 | End: 2019-03-18

## 2019-03-18 RX ADMIN — LEVETIRACETAM 1500 MILLIGRAM(S): 250 TABLET, FILM COATED ORAL at 05:48

## 2019-03-18 RX ADMIN — Medication 2 MILLIGRAM(S): at 16:44

## 2019-03-18 RX ADMIN — Medication 1: at 21:03

## 2019-03-18 RX ADMIN — METFORMIN HYDROCHLORIDE 500 MILLIGRAM(S): 850 TABLET ORAL at 05:48

## 2019-03-18 RX ADMIN — INSULIN GLARGINE 26 UNIT(S): 100 INJECTION, SOLUTION SUBCUTANEOUS at 21:03

## 2019-03-18 RX ADMIN — ENOXAPARIN SODIUM 40 MILLIGRAM(S): 100 INJECTION SUBCUTANEOUS at 12:08

## 2019-03-18 RX ADMIN — Medication 1: at 12:08

## 2019-03-18 RX ADMIN — LACOSAMIDE 100 MILLIGRAM(S): 50 TABLET ORAL at 16:44

## 2019-03-18 RX ADMIN — Medication 1 TABLET(S): at 12:09

## 2019-03-18 RX ADMIN — FINASTERIDE 5 MILLIGRAM(S): 5 TABLET, FILM COATED ORAL at 12:08

## 2019-03-18 RX ADMIN — Medication 500 MILLIGRAM(S): at 12:08

## 2019-03-18 RX ADMIN — METFORMIN HYDROCHLORIDE 500 MILLIGRAM(S): 850 TABLET ORAL at 16:44

## 2019-03-18 RX ADMIN — NYSTATIN CREAM 1 APPLICATION(S): 100000 CREAM TOPICAL at 16:45

## 2019-03-18 RX ADMIN — PANTOPRAZOLE SODIUM 40 MILLIGRAM(S): 20 TABLET, DELAYED RELEASE ORAL at 05:48

## 2019-03-18 RX ADMIN — LISINOPRIL 20 MILLIGRAM(S): 2.5 TABLET ORAL at 05:48

## 2019-03-18 RX ADMIN — NYSTATIN CREAM 1 APPLICATION(S): 100000 CREAM TOPICAL at 05:48

## 2019-03-18 RX ADMIN — LACOSAMIDE 100 MILLIGRAM(S): 50 TABLET ORAL at 05:48

## 2019-03-18 RX ADMIN — Medication 2 MILLIGRAM(S): at 05:48

## 2019-03-18 RX ADMIN — LEVETIRACETAM 1500 MILLIGRAM(S): 250 TABLET, FILM COATED ORAL at 16:45

## 2019-03-18 RX ADMIN — Medication 6 UNIT(S): at 16:45

## 2019-03-18 RX ADMIN — Medication 6 UNIT(S): at 12:09

## 2019-03-18 NOTE — PROGRESS NOTE ADULT - SUBJECTIVE AND OBJECTIVE BOX
Patient is a 74y old  Male who presents with a chief complaint of Seizure, Functional deficits s/p GBM (17 Mar 2019 08:48)      HPI:  Mr. Duncan is a 74 year old male with a PMHx of GBM s/p right craniotomy in 2019, CVA with residual L sided weakness who presented to [facility] on 2/12/19 with seizures likely from recurrence of righ temporal lobe GBM complicated by sepsis from viral URI, acute embolic CVA, PFO, LLE DVT s/p IVC, and SAH.     Pt was BIBEMS from nursing home for active seizure 2/12/19. Per ED he was actively seizing for 30 mins prior to EMS arrival, and displayed left sided face, arm, and leg jerking in the ED. Pt received Ativan and Keppra and seizure broke after 20 mins in the ED.   On admission, pt was found to be febrile with leukocytosis 31.56 meeting SIRS criteria and was found to have +coronavirus. Neurology and neurosurgery were consulted with the plan to have repeat craniotomy and GBM resection pending bacteremia clearance.   On 2/15 rapid responses called for pt due to 3 repeat seizures and unresponsiveness with new left facial droop- given Ativan and Keppra per neurosurgery recommendations MRI 2/15/29 showed 3 discrete foci of acute infarction within the right occipital lobe, left corpus callosum within the genu and left frontal lobe in the region of the precentral gyrus. Pt placed on heparin gtt for anticoagulation during workup. Doppler U/S showed acute DVT of left common femoral, popliteal, posterior tibial, and peroneal vein, and ALBERTO 2/20/19 showed evidence of a PFO. Pt had placement of IVC filter on 2/19/19 and heparin gtt was discontinued in preparation for neurosurgical procedure which was scheduled for 2/25 but was delayed due to episodes of bradycardia in 40s. Pt had R craniotomy for resection of brain tumor on 2/27/19. Post/op course c/b seizures 2/2 SAH on CT 3/2 for which pt was given Keppra and Vimpat, and transferred to SICU for further monitoring. Pt seizures and hemorrhage stabilized and plan for discharge to rehab. On the day of discharge he was medically and neurologically stable for discharge to rehab on 3/6/19. (06 Mar 2019 14:35)      PAST MEDICAL & SURGICAL HISTORY:  Glioblastoma  BPH (benign prostatic hyperplasia)  CVA (cerebral vascular accident)  Diabetes  HTN (hypertension)  S/P craniotomy: ~ resection of R-pariental mass (11/28/2018)      MEDICATIONS  (STANDING):  ascorbic acid 500 milliGRAM(s) Oral daily  dexamethasone     Tablet 2 milliGRAM(s) Oral every 12 hours  dexamethasone     Tablet   Oral   enoxaparin Injectable 40 milliGRAM(s) SubCutaneous daily  finasteride 5 milliGRAM(s) Oral daily  insulin glargine Injectable (LANTUS) 26 Unit(s) SubCutaneous at bedtime  insulin lispro (HumaLOG) corrective regimen sliding scale   SubCutaneous Before meals and at bedtime  insulin lispro Injectable (HumaLOG) 6 Unit(s) SubCutaneous three times a day before meals  lacosamide 100 milliGRAM(s) Oral every 12 hours  levETIRAcetam 1500 milliGRAM(s) Oral every 12 hours  lisinopril 20 milliGRAM(s) Oral daily  metFORMIN 500 milliGRAM(s) Oral two times a day  multivitamin 1 Tablet(s) Oral daily  nystatin Powder 1 Application(s) Topical two times a day  pantoprazole    Tablet 40 milliGRAM(s) Oral before breakfast  zinc sulfate 220 milliGRAM(s) Oral daily    MEDICATIONS  (PRN):  acetaminophen   Tablet .. 650 milliGRAM(s) Oral every 6 hours PRN Mild Pain (1 - 3)  senna 2 Tablet(s) Oral at bedtime PRN Constipation      Allergies    No Known Allergies    Intolerances        TODAY'S SUBJECTIVE & REVIEW OF SYMPTOMS:  Patient was seen and examined today, no acute events overnight, vital signs stable. Patient stated to team that he prefers [facility]ino to watch over him and make medical decisions but he lives far away. Discussed with Psych who will follow up with patient    PHYSICAL EXAM  74y  Vital Signs Last 24 Hrs  T(C): 36.7 (18 Mar 2019 08:13), Max: 36.8 (17 Mar 2019 22:00)  T(F): 98 (18 Mar 2019 08:13), Max: 98.2 (17 Mar 2019 22:00)  HR: 70 (18 Mar 2019 08:13) (70 - 85)  BP: 132/79 (18 Mar 2019 08:13) (125/82 - 132/79)  BP(mean): --  RR: 14 (18 Mar 2019 08:13) (14 - 14)  SpO2: 96% (18 Mar 2019 08:13) (96% - 97%)  Daily     Daily     PHYSICAL EXAM  Constitutional - NAD, Comfortable  	HEENT - Right cranial incision with staples C/D/I, Limited eye movement to left  	Chest - CTA  	Cardiovascular - All extremities well perfused, S1S2  	Abdomen - Soft, Non-distended, Non-tender  	Extremities - No bilateral lower extremity edema, No calf tenderness   	Neurologic Exam -                 	   Cognitive - Drowsy, Oriented to self, & place,  not date or situation  	   Communication - Fluent, No dysarthria, Naming intact  	   Attention - Impaired, unable to perform days of week backwards and serial 7's  	   Memory - Impaired, unable to recall 3/3 items after 3 minutes without cueing (only got 1/3 with cueing)  	   Cranial Nerves - Right eye reactivity sluggish, Impaired left visual field, Left visual inattention  	   Motor -   	                  LEFT    UE - ShAB 5/5, EF 5/5, EE 5/5, WE 5/5,  5/5  	                  RIGHT UE - ShAB 5/5, EF 5/5, EE 5/5, WE 5/5,  5/5  	                  LEFT    LE - HF 5/5, KE 5/5, KF 4/5, DF 5/5, PF 5/5  	                  RIGHT LE - HF 5/5, KE 5/5, KF 4/5, DF 5/5, PF 5/5     	   Sensory - Impaired on left sided extremities,  extinguishes in left upper > lower extremity  	   Reflexes - DTR intact and symmetrical  	   Coordination -FTN accurate bilaterally when tested on pt's right.   Finger-to-nose impaired when attempted on left of patient's body--(due to visual deficit)      RECENT LABS:                          11.2   10.2  )-----------( 365      ( 18 Mar 2019 05:55 )             33.6     03-18    137  |  101  |  20  ----------------------------<  101<H>  3.7   |  26  |  0.63    Ca    9.0      18 Mar 2019 05:55                CAPILLARY BLOOD GLUCOSE      POCT Blood Glucose.: 195 mg/dL (18 Mar 2019 12:07)  POCT Blood Glucose.: 84 mg/dL (18 Mar 2019 07:28)  POCT Blood Glucose.: 134 mg/dL (17 Mar 2019 22:14)  POCT Blood Glucose.: 160 mg/dL (17 Mar 2019 16:58)  POCT Blood Glucose.: 151 mg/dL (17 Mar 2019 12:18)    IMPRESSION AND PLAN:

## 2019-03-18 NOTE — PROGRESS NOTE ADULT - ASSESSMENT
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 He is now with functional, gait, ADL, transfer, balance, endurance, cognitive, swallow impairments.    ~Recurrent GBM s/p craniotomy and resection  -cont 3 hours of comprehensive therapy consisting of PT/OT/SLP/Neuropsychology  -Precautions: fall, cardiac, seizure, aspiration  -Decadron taper   -Seizure PPx with Keppra and Vimpat  -To follow up with neuro-oncology as outpatient  -staples removed 3/15    ~DM   - -Continue Lantus and premeal insulin- FS controlled  -Premeal insulin adjusted 6 units tid  -cont Metformin 500mg BID  -Consistent carb diet    ~Pain control  -Tylenol  PRN    ~HTN  -Continue Lisinopril    ~Hyponatremia resolved    UTI--Urine Cx + E. Coli--Sens. to Cipro  -Afebrile,   --Completed 7 day course of Cipro 3/15  ~Leukocytosis with positive UA, also on steroids,   Leukocytosis improved    ~Anemia  -Hgb stable, no obvious signs of bleeding    ~ regimen  -incontinent  -Finasteride    ~GI/Bowel regimen  -Continue Protonix  -stopped stool softner due to frequent BMs  -Diet: Mechanical soft (consistent carb)  -Restorative dining  -Supervise meals    ~Skin/Stage II sacral ulcer  -Turn and position Q2hrs  -OOB with PT/OT  -Nystatin to groin  -Wound gel to open ulcer---cover surrounding skin with cavilon to prevent breakdown from moisture    ~DVT PPx  -ppx lovenox     ~IDT Rounds  -Case discussed 3/12  OT: mod to max assist; apraxic, min awareness of left side; goals are for min assist 2 weeks  PT: Mod assist for transfers and ambulated 45 Ft RW mod A. Did 2 steps with mod assist  SLP: significant cognitive issues- mainly attention, and focus.  Poor carryover  Will discuss pt's needs for discharge with his son--Pt. will need physical assistance and adequate supervision at home. Plan for discharge 4/2/19 if discharge home.  Pt. with 2 sons that are arguing as per who is his HCP.  Pt. expressed that he wishes for Catarino to be his HCP. Son Chirag said he has a written HCP with his .  SW contacted  and awaiting call back.   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 He is now with functional, gait, ADL, transfer, balance, endurance, cognitive, swallow impairments.    ~Recurrent GBM s/p craniotomy and resection  -cont 3 hours of comprehensive therapy consisting of PT/OT/SLP/Neuropsychology  -Precautions: fall, cardiac, seizure, aspiration  -Decadron taper   -Seizure PPx with Keppra and Vimpat  -To follow up with neuro-oncology as outpatient  -staples removed 3/15    ~DM   - -Continue Lantus and premeal insulin- FS controlled  -Premeal insulin adjusted 6 units tid  -cont Metformin 500mg BID  -Consistent carb diet    ~Pain control  -Tylenol  PRN    ~HTN  -Continue Lisinopril    ~Hyponatremia resolved    UTI--Urine Cx + E. Coli--Sens. to Cipro  -Afebrile,   --Completed 7 day course of Cipro 3/15  ~Leukocytosis with positive UA, also on steroids,   Leukocytosis improved    ~Anemia  -Hgb stable, no obvious signs of bleeding    ~ regimen  -incontinent  -Finasteride    ~GI/Bowel regimen  -Continue Protonix  -stopped stool softner due to frequent BMs  -Diet: Mechanical soft (consistent carb)  -Restorative dining  -Supervise meals    ~Skin/Stage II sacral ulcer  -Turn and position Q2hrs  -OOB with PT/OT  -Nystatin to groin  -Wound gel to open ulcer---cover surrounding skin with cavilon to prevent breakdown from moisture    ~DVT PPx  -ppx lovenox     ~IDT Rounds  -Case discussed 3/12  OT: mod to max assist; apraxic, min awareness of left side; goals are for min assist 2 weeks  PT: Mod assist for transfers and ambulated 45 Ft RW mod A. Did 2 steps with mod assist  SLP: significant cognitive issues- mainly attention, and focus.  Poor carryover  Will discuss pt's needs for discharge with his son--Pt. will need physical assistance and adequate supervision at home. Plan for discharge 4/2/19 if discharge home.  Pt. with 2 sons that are arguing as per who is his HCP.  Pt. expressed that he wishes for Catarino to be his HCP. Son Chirag said he has a written HCP with his .  SW contacted  and awaiting call back.     -Psych consult reviewed and appreciated. Pt. does have capacity to choose his medical proxy and minor decisions regarding his care.

## 2019-03-19 LAB
GLUCOSE BLDC GLUCOMTR-MCNC: 139 MG/DL — HIGH (ref 70–99)
GLUCOSE BLDC GLUCOMTR-MCNC: 143 MG/DL — HIGH (ref 70–99)
GLUCOSE BLDC GLUCOMTR-MCNC: 165 MG/DL — HIGH (ref 70–99)
GLUCOSE BLDC GLUCOMTR-MCNC: 96 MG/DL — SIGNIFICANT CHANGE UP (ref 70–99)

## 2019-03-19 PROCEDURE — 99232 SBSQ HOSP IP/OBS MODERATE 35: CPT

## 2019-03-19 RX ORDER — LACOSAMIDE 50 MG/1
100 TABLET ORAL EVERY 12 HOURS
Qty: 0 | Refills: 0 | Status: DISCONTINUED | OUTPATIENT
Start: 2019-03-19 | End: 2019-03-25

## 2019-03-19 RX ADMIN — Medication 1: at 21:17

## 2019-03-19 RX ADMIN — INSULIN GLARGINE 26 UNIT(S): 100 INJECTION, SOLUTION SUBCUTANEOUS at 21:18

## 2019-03-19 RX ADMIN — METFORMIN HYDROCHLORIDE 500 MILLIGRAM(S): 850 TABLET ORAL at 18:24

## 2019-03-19 RX ADMIN — ENOXAPARIN SODIUM 40 MILLIGRAM(S): 100 INJECTION SUBCUTANEOUS at 11:11

## 2019-03-19 RX ADMIN — Medication 6 UNIT(S): at 11:11

## 2019-03-19 RX ADMIN — NYSTATIN CREAM 1 APPLICATION(S): 100000 CREAM TOPICAL at 05:00

## 2019-03-19 RX ADMIN — LACOSAMIDE 100 MILLIGRAM(S): 50 TABLET ORAL at 18:27

## 2019-03-19 RX ADMIN — PANTOPRAZOLE SODIUM 40 MILLIGRAM(S): 20 TABLET, DELAYED RELEASE ORAL at 06:37

## 2019-03-19 RX ADMIN — METFORMIN HYDROCHLORIDE 500 MILLIGRAM(S): 850 TABLET ORAL at 05:00

## 2019-03-19 RX ADMIN — Medication 2 MILLIGRAM(S): at 05:00

## 2019-03-19 RX ADMIN — LACOSAMIDE 100 MILLIGRAM(S): 50 TABLET ORAL at 05:01

## 2019-03-19 RX ADMIN — Medication 6 UNIT(S): at 16:19

## 2019-03-19 RX ADMIN — Medication 2 MILLIGRAM(S): at 18:24

## 2019-03-19 RX ADMIN — ZINC SULFATE TAB 220 MG (50 MG ZINC EQUIVALENT) 220 MILLIGRAM(S): 220 (50 ZN) TAB at 13:29

## 2019-03-19 RX ADMIN — LISINOPRIL 20 MILLIGRAM(S): 2.5 TABLET ORAL at 05:00

## 2019-03-19 RX ADMIN — Medication 1 TABLET(S): at 11:11

## 2019-03-19 RX ADMIN — FINASTERIDE 5 MILLIGRAM(S): 5 TABLET, FILM COATED ORAL at 11:11

## 2019-03-19 RX ADMIN — Medication 500 MILLIGRAM(S): at 11:11

## 2019-03-19 RX ADMIN — LEVETIRACETAM 1500 MILLIGRAM(S): 250 TABLET, FILM COATED ORAL at 18:24

## 2019-03-19 RX ADMIN — NYSTATIN CREAM 1 APPLICATION(S): 100000 CREAM TOPICAL at 18:23

## 2019-03-19 RX ADMIN — LEVETIRACETAM 1500 MILLIGRAM(S): 250 TABLET, FILM COATED ORAL at 05:01

## 2019-03-19 NOTE — PROGRESS NOTE ADULT - ASSESSMENT
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 He is now with functional, gait, ADL, transfer, balance, endurance, cognitive, swallow impairments.    ~Recurrent GBM s/p craniotomy and resection  -cont 3 hours of comprehensive therapy consisting of PT/OT/SLP/Neuropsychology  -Precautions: fall, cardiac, seizure, aspiration  -Decadron taper   -Seizure PPx with Keppra and Vimpat  -To follow up with neuro-oncology as outpatient  -staples removed 3/15    ~DM   - -Continue Lantus and premeal insulin- FS controlled  -Premeal insulin adjusted 6 units tid  -cont Metformin 500mg BID  -Consistent carb diet    ~Pain control  -Tylenol  PRN    ~HTN  -Continue Lisinopril    ~Hyponatremia resolved    UTI--Urine Cx + E. Coli--Sens. to Cipro  -Afebrile,   --Completed 7 day course of Cipro 3/15  ~Leukocytosis with positive UA, also on steroids,   Leukocytosis improved    ~Anemia  -Hgb stable, no obvious signs of bleeding    ~ regimen  -incontinent  -Finasteride    ~GI/Bowel regimen  -Continue Protonix  -stopped stool softner due to frequent BMs  -Diet: Mechanical soft (consistent carb)  -Restorative dining  -Supervise meals    ~Skin/Stage II sacral ulcer  -Turn and position Q2hrs  -OOB with PT/OT  -Nystatin to groin  -Wound gel to open ulcer---cover surrounding skin with cavilon to prevent breakdown from moisture    ~DVT PPx  -ppx lovenox     ~IDT Rounds  -Case discussed 3/19  OT: mod to max assist; apraxic, min awareness of left side; requires constant cueing for attention; goals are for min assist 2 weeks  PT: Min to Mod assist for transfers and ambulated 75 Ft RW min A. Did 4 steps with mod assist. Barriers are cognition, endurance, impaired LUE, requires constant cueing. Goals are for supervision level for transfers and min assist for ambulation  SLP: significant cognitive issues- mainly attention, and focus.  Poor carryover  Will discuss pt's needs for discharge with his son--Pt. will need physical assistance and adequate supervision at home. Plan for discharge 4/2/19 if discharge home.  Pt. with 2 sons that are arguing as per who is his HCP.  Pt. expressed that he wishes for Catarino to be his HCP. Son Chirag said he has a written HCP with his .  SW contacted  and awaiting call back.     -Psych consult reviewed and appreciated. Pt. does have capacity to choose his medical proxy and minor decisions regarding his care.

## 2019-03-19 NOTE — PROGRESS NOTE ADULT - SUBJECTIVE AND OBJECTIVE BOX
Patient is a 74y old  Male who presents with a chief complaint of Seizure, Functional deficits s/p GBM (17 Mar 2019 08:48)      HPI:  Mr. Duncan is a 74 year old male with a PMHx of GBM s/p right craniotomy in 2019, CVA with residual L sided weakness who presented to [facility] on 2/12/19 with seizures likely from recurrence of righ temporal lobe GBM complicated by sepsis from viral URI, acute embolic CVA, PFO, LLE DVT s/p IVC, and SAH.     Pt was BIBEMS from nursing home for active seizure 2/12/19. Per ED he was actively seizing for 30 mins prior to EMS arrival, and displayed left sided face, arm, and leg jerking in the ED. Pt received Ativan and Keppra and seizure broke after 20 mins in the ED.   On admission, pt was found to be febrile with leukocytosis 31.56 meeting SIRS criteria and was found to have +coronavirus. Neurology and neurosurgery were consulted with the plan to have repeat craniotomy and GBM resection pending bacteremia clearance.   On 2/15 rapid responses called for pt due to 3 repeat seizures and unresponsiveness with new left facial droop- given Ativan and Keppra per neurosurgery recommendations MRI 2/15/29 showed 3 discrete foci of acute infarction within the right occipital lobe, left corpus callosum within the genu and left frontal lobe in the region of the precentral gyrus. Pt placed on heparin gtt for anticoagulation during workup. Doppler U/S showed acute DVT of left common femoral, popliteal, posterior tibial, and peroneal vein, and ALBERTO 2/20/19 showed evidence of a PFO. Pt had placement of IVC filter on 2/19/19 and heparin gtt was discontinued in preparation for neurosurgical procedure which was scheduled for 2/25 but was delayed due to episodes of bradycardia in 40s. Pt had R craniotomy for resection of brain tumor on 2/27/19. Post/op course c/b seizures 2/2 SAH on CT 3/2 for which pt was given Keppra and Vimpat, and transferred to SICU for further monitoring. Pt seizures and hemorrhage stabilized and plan for discharge to rehab. On the day of discharge he was medically and neurologically stable for discharge to rehab on 3/6/19. (06 Mar 2019 14:35)      PAST MEDICAL & SURGICAL HISTORY:  Glioblastoma  BPH (benign prostatic hyperplasia)  CVA (cerebral vascular accident)  Diabetes  HTN (hypertension)  S/P craniotomy: ~ resection of R-pariental mass (11/28/2018)      MEDICATIONS  (STANDING):  ascorbic acid 500 milliGRAM(s) Oral daily  dexamethasone     Tablet 2 milliGRAM(s) Oral every 12 hours  dexamethasone     Tablet   Oral   enoxaparin Injectable 40 milliGRAM(s) SubCutaneous daily  finasteride 5 milliGRAM(s) Oral daily  insulin glargine Injectable (LANTUS) 26 Unit(s) SubCutaneous at bedtime  insulin lispro (HumaLOG) corrective regimen sliding scale   SubCutaneous Before meals and at bedtime  insulin lispro Injectable (HumaLOG) 6 Unit(s) SubCutaneous three times a day before meals  lacosamide 100 milliGRAM(s) Oral every 12 hours  levETIRAcetam 1500 milliGRAM(s) Oral every 12 hours  lisinopril 20 milliGRAM(s) Oral daily  metFORMIN 500 milliGRAM(s) Oral two times a day  multivitamin 1 Tablet(s) Oral daily  nystatin Powder 1 Application(s) Topical two times a day  pantoprazole    Tablet 40 milliGRAM(s) Oral before breakfast  zinc sulfate 220 milliGRAM(s) Oral daily    MEDICATIONS  (PRN):  acetaminophen   Tablet .. 650 milliGRAM(s) Oral every 6 hours PRN Mild Pain (1 - 3)  senna 2 Tablet(s) Oral at bedtime PRN Constipation      Allergies    No Known Allergies    Intolerances        TODAY'S SUBJECTIVE & REVIEW OF SYMPTOMS:  Patient was seen and examined today, no acute events overnight. Reported to have low BP and elevated HR when starting therapy today that resolved after lying down, will check orthostatics.    PHYSICAL EXAM  Vital Signs Last 24 Hrs  T(C): 36.8 (19 Mar 2019 08:19), Max: 36.8 (19 Mar 2019 08:19)  T(F): 98.2 (19 Mar 2019 08:19), Max: 98.2 (19 Mar 2019 08:19)  HR: 94 (19 Mar 2019 08:19) (84 - 100)  BP: 137/95 (19 Mar 2019 08:19) (98/68 - 137/95)  BP(mean): --  RR: 14 (19 Mar 2019 08:19) (14 - 14)  SpO2: 95% (19 Mar 2019 08:19) (94% - 95%)    PHYSICAL EXAM  Constitutional - NAD, Comfortable  	HEENT - Right cranial incision with staples C/D/I, Limited eye movement to left  	Chest - CTA  	Cardiovascular - All extremities well perfused, S1S2  	Abdomen - Soft, Non-distended, Non-tender  	Extremities - No bilateral lower extremity edema, No calf tenderness   	Neurologic Exam -                 	   Cognitive - Drowsy, Oriented to self, & place,  not date or situation  	   Communication - Fluent, No dysarthria, Naming intact  	   Attention - Impaired, unable to perform days of week backwards and serial 7's  	   Memory - Impaired, unable to recall 3/3 items after 3 minutes without cueing (only got 1/3 with cueing)  	   Cranial Nerves - Right eye reactivity sluggish, Impaired left visual field, Left visual inattention  	   Motor -   	                  LEFT    UE - ShAB 5/5, EF 5/5, EE 5/5, WE 5/5,  5/5  	                  RIGHT UE - ShAB 5/5, EF 5/5, EE 5/5, WE 5/5,  5/5  	                  LEFT    LE - HF 5/5, KE 5/5, KF 4/5, DF 5/5, PF 5/5  	                  RIGHT LE - HF 5/5, KE 5/5, KF 4/5, DF 5/5, PF 5/5     	   Sensory - Impaired on left sided extremities,  extinguishes in left upper > lower extremity  	   Reflexes - DTR intact and symmetrical  	   Coordination -FTN accurate bilaterally when tested on pt's right.   Finger-to-nose impaired when attempted on left of patient's body--(due to visual deficit)      RECENT LABS:                          11.2   10.2  )-----------( 365      ( 18 Mar 2019 05:55 )             33.6     03-18    137  |  101  |  20  ----------------------------<  101<H>  3.7   |  26  |  0.63    Ca    9.0      18 Mar 2019 05:55          CAPILLARY BLOOD GLUCOSE      POCT Blood Glucose.: 143 mg/dL (19 Mar 2019 11:09)  POCT Blood Glucose.: 96 mg/dL (19 Mar 2019 08:15)  POCT Blood Glucose.: 183 mg/dL (18 Mar 2019 20:56)  POCT Blood Glucose.: 109 mg/dL (18 Mar 2019 16:42) Patient is a 74y old  Male who presents with a chief complaint of Seizure, Functional deficits s/p GBM (17 Mar 2019 08:48)      HPI:  Mr. Duncan is a 74 year old male with a PMHx of GBM s/p right craniotomy in 2019, CVA with residual L sided weakness who presented to [facility] on 2/12/19 with seizures likely from recurrence of righ temporal lobe GBM complicated by sepsis from viral URI, acute embolic CVA, PFO, LLE DVT s/p IVC, and SAH.     Pt was BIBEMS from nursing home for active seizure 2/12/19. Per ED he was actively seizing for 30 mins prior to EMS arrival, and displayed left sided face, arm, and leg jerking in the ED. Pt received Ativan and Keppra and seizure broke after 20 mins in the ED.   On admission, pt was found to be febrile with leukocytosis 31.56 meeting SIRS criteria and was found to have +coronavirus. Neurology and neurosurgery were consulted with the plan to have repeat craniotomy and GBM resection pending bacteremia clearance.   On 2/15 rapid responses called for pt due to 3 repeat seizures and unresponsiveness with new left facial droop- given Ativan and Keppra per neurosurgery recommendations MRI 2/15/29 showed 3 discrete foci of acute infarction within the right occipital lobe, left corpus callosum within the genu and left frontal lobe in the region of the precentral gyrus. Pt placed on heparin gtt for anticoagulation during workup. Doppler U/S showed acute DVT of left common femoral, popliteal, posterior tibial, and peroneal vein, and ALBERTO 2/20/19 showed evidence of a PFO. Pt had placement of IVC filter on 2/19/19 and heparin gtt was discontinued in preparation for neurosurgical procedure which was scheduled for 2/25 but was delayed due to episodes of bradycardia in 40s. Pt had R craniotomy for resection of brain tumor on 2/27/19. Post/op course c/b seizures 2/2 SAH on CT 3/2 for which pt was given Keppra and Vimpat, and transferred to SICU for further monitoring. Pt seizures and hemorrhage stabilized and plan for discharge to rehab. On the day of discharge he was medically and neurologically stable for discharge to rehab on 3/6/19. (06 Mar 2019 14:35)      PAST MEDICAL & SURGICAL HISTORY:  Glioblastoma  BPH (benign prostatic hyperplasia)  CVA (cerebral vascular accident)  Diabetes  HTN (hypertension)  S/P craniotomy: ~ resection of R-pariental mass (11/28/2018)      MEDICATIONS  (STANDING):  ascorbic acid 500 milliGRAM(s) Oral daily  dexamethasone     Tablet 2 milliGRAM(s) Oral every 12 hours  dexamethasone     Tablet   Oral   enoxaparin Injectable 40 milliGRAM(s) SubCutaneous daily  finasteride 5 milliGRAM(s) Oral daily  insulin glargine Injectable (LANTUS) 26 Unit(s) SubCutaneous at bedtime  insulin lispro (HumaLOG) corrective regimen sliding scale   SubCutaneous Before meals and at bedtime  insulin lispro Injectable (HumaLOG) 6 Unit(s) SubCutaneous three times a day before meals  lacosamide 100 milliGRAM(s) Oral every 12 hours  levETIRAcetam 1500 milliGRAM(s) Oral every 12 hours  lisinopril 20 milliGRAM(s) Oral daily  metFORMIN 500 milliGRAM(s) Oral two times a day  multivitamin 1 Tablet(s) Oral daily  nystatin Powder 1 Application(s) Topical two times a day  pantoprazole    Tablet 40 milliGRAM(s) Oral before breakfast  zinc sulfate 220 milliGRAM(s) Oral daily    MEDICATIONS  (PRN):  acetaminophen   Tablet .. 650 milliGRAM(s) Oral every 6 hours PRN Mild Pain (1 - 3)  senna 2 Tablet(s) Oral at bedtime PRN Constipation        Allergies    No Known Allergies    Intolerances        TODAY'S SUBJECTIVE & REVIEW OF SYMPTOMS:  Patient was seen and examined today, no acute events overnight. Reported to have low BP and elevated HR when starting therapy today that resolved after lying down, will check orthostatics.    PHYSICAL EXAM  Vital Signs Last 24 Hrs  T(C): 36.8 (19 Mar 2019 08:19), Max: 36.8 (19 Mar 2019 08:19)  T(F): 98.2 (19 Mar 2019 08:19), Max: 98.2 (19 Mar 2019 08:19)  HR: 94 (19 Mar 2019 08:19) (84 - 100)  BP: 137/95 (19 Mar 2019 08:19) (98/68 - 137/95)  BP(mean): --  RR: 14 (19 Mar 2019 08:19) (14 - 14)  SpO2: 95% (19 Mar 2019 08:19) (94% - 95%)    PHYSICAL EXAM  Constitutional - NAD, Comfortable  	HEENT - Right cranial incision with staples C/D/I, Limited eye movement to left  	Chest - CTA  	Cardiovascular - All extremities well perfused, S1S2  	Abdomen - Soft, Non-distended, Non-tender  	Extremities - No bilateral lower extremity edema, No calf tenderness   	Neurologic Exam -                 	   Cognitive - Drowsy, Oriented to self, & place,  not date or situation  	   Communication - Fluent, No dysarthria, Naming intact  	   Attention - Impaired, unable to perform days of week backwards and serial 7's  	   Memory - Impaired, unable to recall 3/3 items after 3 minutes without cueing (only got 1/3 with cueing)  	   Cranial Nerves - Right eye reactivity sluggish, Impaired left visual field, Left visual inattention  	   Motor -   	                  LEFT    UE - ShAB 5/5, EF 5/5, EE 5/5, WE 5/5,  5/5  	                  RIGHT UE - ShAB 5/5, EF 5/5, EE 5/5, WE 5/5,  5/5  	                  LEFT    LE - HF 5/5, KE 5/5, KF 4/5, DF 5/5, PF 5/5  	                  RIGHT LE - HF 5/5, KE 5/5, KF 4/5, DF 5/5, PF 5/5     	   Sensory - Impaired on left sided extremities,  extinguishes in left upper > lower extremity  	   Reflexes - DTR intact and symmetrical  	   Coordination -FTN accurate bilaterally when tested on pt's right.   Finger-to-nose impaired when attempted on left of patient's body--(due to visual deficit)      RECENT LABS:                          11.2   10.2  )-----------( 365      ( 18 Mar 2019 05:55 )             33.6     03-18    137  |  101  |  20  ----------------------------<  101<H>  3.7   |  26  |  0.63    Ca    9.0      18 Mar 2019 05:55          CAPILLARY BLOOD GLUCOSE      POCT Blood Glucose.: 143 mg/dL (19 Mar 2019 11:09)  POCT Blood Glucose.: 96 mg/dL (19 Mar 2019 08:15)  POCT Blood Glucose.: 183 mg/dL (18 Mar 2019 20:56)  POCT Blood Glucose.: 109 mg/dL (18 Mar 2019 16:42)

## 2019-03-20 ENCOUNTER — APPOINTMENT (OUTPATIENT)
Dept: RADIATION ONCOLOGY | Facility: CLINIC | Age: 74
End: 2019-03-20

## 2019-03-20 LAB
GLUCOSE BLDC GLUCOMTR-MCNC: 116 MG/DL — HIGH (ref 70–99)
GLUCOSE BLDC GLUCOMTR-MCNC: 131 MG/DL — HIGH (ref 70–99)
GLUCOSE BLDC GLUCOMTR-MCNC: 137 MG/DL — HIGH (ref 70–99)
GLUCOSE BLDC GLUCOMTR-MCNC: 83 MG/DL — SIGNIFICANT CHANGE UP (ref 70–99)

## 2019-03-20 PROCEDURE — 99232 SBSQ HOSP IP/OBS MODERATE 35: CPT

## 2019-03-20 RX ADMIN — ZINC SULFATE TAB 220 MG (50 MG ZINC EQUIVALENT) 220 MILLIGRAM(S): 220 (50 ZN) TAB at 12:08

## 2019-03-20 RX ADMIN — LACOSAMIDE 100 MILLIGRAM(S): 50 TABLET ORAL at 05:21

## 2019-03-20 RX ADMIN — Medication 1 TABLET(S): at 12:07

## 2019-03-20 RX ADMIN — Medication 500 MILLIGRAM(S): at 12:08

## 2019-03-20 RX ADMIN — Medication 2 MILLIGRAM(S): at 05:21

## 2019-03-20 RX ADMIN — Medication 6 UNIT(S): at 08:33

## 2019-03-20 RX ADMIN — LEVETIRACETAM 1500 MILLIGRAM(S): 250 TABLET, FILM COATED ORAL at 05:21

## 2019-03-20 RX ADMIN — Medication 2 MILLIGRAM(S): at 17:16

## 2019-03-20 RX ADMIN — METFORMIN HYDROCHLORIDE 500 MILLIGRAM(S): 850 TABLET ORAL at 17:16

## 2019-03-20 RX ADMIN — FINASTERIDE 5 MILLIGRAM(S): 5 TABLET, FILM COATED ORAL at 12:08

## 2019-03-20 RX ADMIN — NYSTATIN CREAM 1 APPLICATION(S): 100000 CREAM TOPICAL at 17:16

## 2019-03-20 RX ADMIN — PANTOPRAZOLE SODIUM 40 MILLIGRAM(S): 20 TABLET, DELAYED RELEASE ORAL at 06:32

## 2019-03-20 RX ADMIN — Medication 6 UNIT(S): at 12:06

## 2019-03-20 RX ADMIN — LACOSAMIDE 100 MILLIGRAM(S): 50 TABLET ORAL at 17:16

## 2019-03-20 RX ADMIN — LEVETIRACETAM 1500 MILLIGRAM(S): 250 TABLET, FILM COATED ORAL at 17:16

## 2019-03-20 RX ADMIN — METFORMIN HYDROCHLORIDE 500 MILLIGRAM(S): 850 TABLET ORAL at 05:21

## 2019-03-20 RX ADMIN — ENOXAPARIN SODIUM 40 MILLIGRAM(S): 100 INJECTION SUBCUTANEOUS at 12:07

## 2019-03-20 RX ADMIN — LISINOPRIL 20 MILLIGRAM(S): 2.5 TABLET ORAL at 05:21

## 2019-03-20 RX ADMIN — INSULIN GLARGINE 26 UNIT(S): 100 INJECTION, SOLUTION SUBCUTANEOUS at 20:52

## 2019-03-20 RX ADMIN — NYSTATIN CREAM 1 APPLICATION(S): 100000 CREAM TOPICAL at 05:22

## 2019-03-20 NOTE — PROGRESS NOTE ADULT - SUBJECTIVE AND OBJECTIVE BOX
HISTORY OF PRESENT ILLNESS  Mr. Duncan is a 74 year old male with a PMHx of GBM s/p right craniotomy in 2019, CVA with residual L sided weakness who presented to [facility] on 2/12/19 with seizures likely from recurrence of righ temporal lobe GBM complicated by sepsis from viral URI, acute embolic CVA, PFO, LLE DVT s/p IVC, and SAH.     Pt was BIBEMS from nursing home for active seizure 2/12/19. Per ED he was actively seizing for 30 mins prior to EMS arrival, and displayed left sided face, arm, and leg jerking in the ED. Pt received Ativan and Keppra and seizure broke after 20 mins in the ED. On admission, pt was found to be febrile with leukocytosis 31.56 meeting SIRS criteria and was found to have +coronavirus. Neurology and neurosurgery were consulted with the plan to have repeat craniotomy and GBM resection pending bacteremia clearance. On 2/15 rapid responses called for pt due to 3 repeat seizures and unresponsiveness with new left facial droop- given Ativan and Keppra per neurosurgery recommendations MRI 2/15/29 showed 3 discrete foci of acute infarction within the right occipital lobe, left corpus callosum within the genu and left frontal lobe in the region of the precentral gyrus. Pt placed on heparin gtt for anticoagulation during workup. Doppler U/S showed acute DVT of left common femoral, popliteal, posterior tibial, and peroneal vein, and ALBERTO 2/20/19 showed evidence of a PFO. Pt had placement of IVC filter on 2/19/19 and heparin gtt was discontinued in preparation for neurosurgical procedure which was scheduled for 2/25 but was delayed due to episodes of bradycardia in 40s. Pt had R craniotomy for resection of brain tumor on 2/27/19. Post/op course c/b seizures 2/2 SAH on CT 3/2 for which pt was given Keppra and Vimpat, and transferred to SICU for further monitoring. Pt seizures and hemorrhage stabilized and plan for discharge to rehab. On the day of discharge he was medically and neurologically stable for discharge to rehab on 3/6/19.    TODAY'S REVIEW OF SYMPTOMS  [  ] Constitutional WNL           [X] Cardio WNL               [X] Resp WNL  [X] GI WNL                            [  ]  WNL                    [X] Heme WNL  [X] Endo WNL                       [  ] Skin WNL                   [  ] MSK WNL  [  ] Neuro WNL                     [X] Cognitive WNL           [X] Psych WNL    SUBJECTIVE  Patient seen and examined. No acute events overnight. Reports that every time he has to urinate, he also has a bowel movements. Understands that he is in the hospital and is here for rehabilitation. No new medical complaints.     VITALS  T(C): 36.6 (03-20-19 @ 07:18)  T(F): 97.9 (03-20-19 @ 07:18), Max: 98.3 (03-19-19 @ 19:50)  HR: 93 (03-20-19 @ 07:18) (93 - 99)  BP: 134/97 (03-20-19 @ 07:18) (134/97 - 134/97)  RR:  (14 - 15)  SpO2:  (96% - 97%)  Wt(kg): --    PHYSICAL EXAM  Constitutional - NAD, Comfortable  HEENT - Right cranial incision healing C/D/I  Chest - CTA b/L, No W/R/R  Cardiovascular - S1S2, RRR  Abdomen - Soft, Non-distended, Non-tender, +BS  Extremities - No bilateral lower extremity edema  Neurologic Exam -                 	   Cognitive - Drowsy but arousable, Oriented to self and hospital, not date, year, situation  	   Communication - Fluent, No dysarthria  	   Attention - Impaired, poor attention span  	   Memory - Impaired, poor carryover  	   Cranial Nerves - Right eye reactivity sluggish, Impaired left visual field, Left visual inattention  	   Motor -   	                  LEFT    UE - ShAB 5/5, EF 5/5, EE 5/5, WE 5/5,  5/5  	                  RIGHT UE - ShAB 5/5, EF 5/5, EE 5/5, WE 5/5,  5/5  	                  LEFT    LE - HF 5/5, KE 5/5, KF 4/5, DF 5/5, PF 5/5  	                  RIGHT LE - HF 5/5, KE 5/5, KF 4/5, DF 5/5, PF 5/5     Mood - Flat affect  Skin - Superior sacral stage II - 2.5 x 0.5cm, Inferior sacral stage II - 2.5 x 3.5cm, two left gluteal wounds 1 x 0.5cm    RECENT LABS/IMAGING  --------    MEDICATIONS   MEDICATIONS  (STANDING):  ascorbic acid 500 milliGRAM(s) Oral daily  dexamethasone     Tablet 2 milliGRAM(s) Oral every 12 hours  enoxaparin Injectable 40 milliGRAM(s) SubCutaneous daily  finasteride 5 milliGRAM(s) Oral daily  insulin glargine Injectable (LANTUS) 26 Unit(s) SubCutaneous at bedtime  insulin lispro (HumaLOG) corrective regimen sliding scale   SubCutaneous Before meals and at bedtime  insulin lispro Injectable (HumaLOG) 6 Unit(s) SubCutaneous three times a day before meals  lacosamide 100 milliGRAM(s) Oral every 12 hours  levETIRAcetam 1500 milliGRAM(s) Oral every 12 hours  lisinopril 20 milliGRAM(s) Oral daily  metFORMIN 500 milliGRAM(s) Oral two times a day  multivitamin 1 Tablet(s) Oral daily  nystatin Powder 1 Application(s) Topical two times a day  pantoprazole    Tablet 40 milliGRAM(s) Oral before breakfast  zinc sulfate 220 milliGRAM(s) Oral daily    MEDICATIONS  (PRN):  acetaminophen   Tablet .. 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 He is now with functional, gait, ADL, transfer, balance, endurance, cognitive, swallow impairments.    ~Recurrent GBM s/p craniotomy and resection  -Continue 3 hours of comprehensive therapy consisting of PT/OT/SLP/Neuropsychology  -Precautions: fall, cardiac, aspiration  -Decadron taper completed - to remain on 2mg [id number] until follow up with neuro-oncology  -Seizure PPx with Keppra and Vimpat  -Staples removed 3/15    ~DM   -Controlled Lantus, premeal insulin and Metformin  -Consistent carb diet    ~Pain control  -Denies pain at this time, continue Tylenol PRN    ~HTN  -Continue Lisinopril    ~Hyponatremia resolved  -Continue fluid restriction  -Monitor BMP    ~E. coli UTI  -Completed 7 day course of Cipro on3/15    ~Anemia  -Hgb stable, no obvious signs of bleeding  -Continue MVI     ~ regimen  -Incontinent   -Continue Finasteride    ~GI/Bowel regimen  -Continue Protonix  -Continue Senna PRN  -Diet: Mechanical soft (consistent carb and fluid restriction)  -Restorative dining  -Supervise meals    ~Skin/Stage II sacral ulcer  -Turn and position Q2hrs  -OOB with PT/OT  -Nystatin to groin  -Wound gel to open ulcer---cover surrounding skin with Cavilon to prevent breakdown from moisture  -Continue Vit C and Zinc sulfate to promote wound healing    ~DVT PPx  -Continue lovenox     ~IDT Rounds  -Case discussed 3/19  OT: mod to max assist; apraxic, min awareness of left side; requires constant cueing for attention; goals are for min assist 2 weeks  PT: Min to Mod assist for transfers and ambulated 75 Ft RW min A. Did 4 steps with mod assist. Barriers are cognition, endurance, impaired LUE, requires constant cueing. Goals are for supervision level for transfers and min assist for ambulation  SLP: significant cognitive issues- mainly attention, and focus.  Poor carryover  Will discuss pt's needs for discharge with his son--Pt. will need physical assistance and adequate supervision at home. Plan for discharge 4/2/19 if discharge home.  Pt. with 2 sons that are arguing as per who is his HCP.  Pt. expressed that he wishes for Catarino to be his HCP. Son Chirag said he has a written HCP with his .  SW contacted  and awaiting call back.     -Psych consult reviewed and appreciated. Pt. does have capacity to choose his medical proxy and minor decisions regarding his care. HISTORY OF PRESENT ILLNESS  Mr. Duncan is a 74 year old male with a PMHx of GBM s/p right craniotomy in 2019, CVA with residual L sided weakness who presented to [facility] on 2/12/19 with seizures likely from recurrence of righ temporal lobe GBM complicated by sepsis from viral URI, acute embolic CVA, PFO, LLE DVT s/p IVC, and SAH.     Pt was BIBEMS from nursing home for active seizure 2/12/19. Per ED he was actively seizing for 30 mins prior to EMS arrival, and displayed left sided face, arm, and leg jerking in the ED. Pt received Ativan and Keppra and seizure broke after 20 mins in the ED. On admission, pt was found to be febrile with leukocytosis 31.56 meeting SIRS criteria and was found to have +coronavirus. Neurology and neurosurgery were consulted with the plan to have repeat craniotomy and GBM resection pending bacteremia clearance. On 2/15 rapid responses called for pt due to 3 repeat seizures and unresponsiveness with new left facial droop- given Ativan and Keppra per neurosurgery recommendations MRI 2/15/29 showed 3 discrete foci of acute infarction within the right occipital lobe, left corpus callosum within the genu and left frontal lobe in the region of the precentral gyrus. Pt placed on heparin gtt for anticoagulation during workup. Doppler U/S showed acute DVT of left common femoral, popliteal, posterior tibial, and peroneal vein, and ALBERTO 2/20/19 showed evidence of a PFO. Pt had placement of IVC filter on 2/19/19 and heparin gtt was discontinued in preparation for neurosurgical procedure which was scheduled for 2/25 but was delayed due to episodes of bradycardia in 40s. Pt had R craniotomy for resection of brain tumor on 2/27/19. Post/op course c/b seizures 2/2 SAH on CT 3/2 for which pt was given Keppra and Vimpat, and transferred to SICU for further monitoring. Pt seizures and hemorrhage stabilized and plan for discharge to rehab. On the day of discharge he was medically and neurologically stable for discharge to rehab on 3/6/19.    TODAY'S REVIEW OF SYMPTOMS  [  ] Constitutional WNL           [X] Cardio WNL               [X] Resp WNL  [X] GI WNL                            [  ]  WNL                    [X] Heme WNL  [X] Endo WNL                       [  ] Skin WNL                   [  ] MSK WNL  [  ] Neuro WNL                     [X] Cognitive WNL           [X] Psych WNL    SUBJECTIVE  Patient seen and examined. No acute events overnight. Reports that every time he has to urinate, he also has a bowel movements. Understands that he is in the hospital and is here for rehabilitation. No new medical complaints.     VITALS  T(C): 36.6 (03-20-19 @ 07:18)  T(F): 97.9 (03-20-19 @ 07:18), Max: 98.3 (03-19-19 @ 19:50)  HR: 93 (03-20-19 @ 07:18) (93 - 99)  BP: 134/97 (03-20-19 @ 07:18) (134/97 - 134/97)  RR:  (14 - 15)  SpO2:  (96% - 97%)  Wt(kg): --    PHYSICAL EXAM  Constitutional - NAD, Comfortable  HEENT - Right cranial incision healing C/D/I  Chest - CTA b/L, No W/R/R  Cardiovascular - S1S2, RRR  Abdomen - Soft, Non-distended, Non-tender, +BS  Extremities - No bilateral lower extremity edema  Neurologic Exam -                 	   Cognitive - Drowsy but arousable, Oriented to self and hospital, not date, year, situation  	   Communication - Fluent, No dysarthria  	   Attention - Impaired, poor attention span  	   Memory - Impaired, poor carryover  	   Cranial Nerves - Right eye reactivity sluggish, Impaired left visual field, Left visual inattention  	   Motor -   	                  LEFT    UE - ShAB 5/5, EF 5/5, EE 5/5, WE 5/5,  5/5  	                  RIGHT UE - ShAB 5/5, EF 5/5, EE 5/5, WE 5/5,  5/5  	                  LEFT    LE - HF 5/5, KE 5/5, KF 4/5, DF 5/5, PF 5/5  	                  RIGHT LE - HF 5/5, KE 5/5, KF 4/5, DF 5/5, PF 5/5     Mood - Flat affect  Skin - Superior sacral stage II - 2.5 x 0.5cm, Inferior sacral stage II - 2.5 x 3.5cm, two left gluteal wounds 1 x 0.5cm    RECENT LABS/IMAGING  --------    MEDICATIONS   MEDICATIONS  (STANDING):  ascorbic acid 500 milliGRAM(s) Oral daily  dexamethasone     Tablet 2 milliGRAM(s) Oral every 12 hours  enoxaparin Injectable 40 milliGRAM(s) SubCutaneous daily  finasteride 5 milliGRAM(s) Oral daily  insulin glargine Injectable (LANTUS) 26 Unit(s) SubCutaneous at bedtime  insulin lispro (HumaLOG) corrective regimen sliding scale   SubCutaneous Before meals and at bedtime  insulin lispro Injectable (HumaLOG) 6 Unit(s) SubCutaneous three times a day before meals  lacosamide 100 milliGRAM(s) Oral every 12 hours  levETIRAcetam 1500 milliGRAM(s) Oral every 12 hours  lisinopril 20 milliGRAM(s) Oral daily  metFORMIN 500 milliGRAM(s) Oral two times a day  multivitamin 1 Tablet(s) Oral daily  nystatin Powder 1 Application(s) Topical two times a day  pantoprazole    Tablet 40 milliGRAM(s) Oral before breakfast  zinc sulfate 220 milliGRAM(s) Oral daily    MEDICATIONS  (PRN):  acetaminophen   Tablet .. 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 He is now with functional, gait, ADL, transfer, balance, endurance, cognitive, swallow impairments.    ~Recurrent GBM s/p craniotomy and resection  -Continue 3 hours of comprehensive therapy consisting of PT/OT/SLP/Neuropsychology  -Precautions: fall, cardiac, aspiration  -Decadron taper completed - to remain on 2mg [id number] until follow up with neuro-oncology  -Seizure PPx with Keppra and Vimpat  -Staples removed 3/15    ~DM   -Controlled Lantus, premeal insulin and Metformin  -Consistent carb diet    ~Pain control  -Denies pain at this time, continue Tylenol PRN    ~HTN  -Continue Lisinopril    ~Hyponatremia resolved  -Continue fluid restriction  -Monitor BMP    ~E. coli UTI  -Completed 7 day course of Cipro on3/15    ~Anemia  -Hgb stable, no obvious signs of bleeding  -Continue MVI     ~ regimen  -Incontinent   -Continue Finasteride    ~GI/Bowel regimen  -Continue Protonix  -Continue Senna PRN  -Diet: Mechanical soft (consistent carb and fluid restriction)  -Restorative dining  -Supervise meals    ~Skin/Stage II sacral and left gluteal pressure ulcers  -Turn and position Q2hrs  -OOB with PT/OT  -Nystatin to groin  -Wound gel to open ulcer---cover surrounding skin with Cavilon to prevent breakdown from moisture  -Continue Vit C and Zinc sulfate to promote wound healing    ~DVT PPx  -Continue lovenox     ~IDT Rounds  -Case discussed 3/19  OT: mod to max assist; apraxic, min awareness of left side; requires constant cueing for attention; goals are for min assist 2 weeks  PT: Min to Mod assist for transfers and ambulated 75 Ft RW min A. Did 4 steps with mod assist. Barriers are cognition, endurance, impaired LUE, requires constant cueing. Goals are for supervision level for transfers and min assist for ambulation  SLP: significant cognitive issues- mainly attention, and focus.  Poor carryover  Will discuss pt's needs for discharge with his son--Pt. will need physical assistance and adequate supervision at home. Plan for discharge 4/2/19 if discharge home.  Pt. with 2 sons that are arguing as per who is his HCP.  Pt. expressed that he wishes for Catarino to be his HCP. Son Chirag said he has a written HCP with his .  SW contacted  and awaiting call back.     -Psych consult reviewed and appreciated. Pt. does have capacity to choose his medical proxy and minor decisions regarding his care.

## 2019-03-21 LAB
ANION GAP SERPL CALC-SCNC: 8 MMOL/L — SIGNIFICANT CHANGE UP (ref 5–17)
BUN SERPL-MCNC: 21 MG/DL — SIGNIFICANT CHANGE UP (ref 7–23)
CALCIUM SERPL-MCNC: 9.4 MG/DL — SIGNIFICANT CHANGE UP (ref 8.4–10.5)
CHLORIDE SERPL-SCNC: 99 MMOL/L — SIGNIFICANT CHANGE UP (ref 96–108)
CO2 SERPL-SCNC: 29 MMOL/L — SIGNIFICANT CHANGE UP (ref 22–31)
CREAT SERPL-MCNC: 0.84 MG/DL — SIGNIFICANT CHANGE UP (ref 0.5–1.3)
GLUCOSE BLDC GLUCOMTR-MCNC: 109 MG/DL — HIGH (ref 70–99)
GLUCOSE BLDC GLUCOMTR-MCNC: 133 MG/DL — HIGH (ref 70–99)
GLUCOSE BLDC GLUCOMTR-MCNC: 170 MG/DL — HIGH (ref 70–99)
GLUCOSE BLDC GLUCOMTR-MCNC: 75 MG/DL — SIGNIFICANT CHANGE UP (ref 70–99)
GLUCOSE SERPL-MCNC: 111 MG/DL — HIGH (ref 70–99)
HCT VFR BLD CALC: 35.2 % — LOW (ref 39–50)
HGB BLD-MCNC: 11.8 G/DL — LOW (ref 13–17)
MCHC RBC-ENTMCNC: 31.7 PG — SIGNIFICANT CHANGE UP (ref 27–34)
MCHC RBC-ENTMCNC: 33.5 GM/DL — SIGNIFICANT CHANGE UP (ref 32–36)
MCV RBC AUTO: 94.6 FL — SIGNIFICANT CHANGE UP (ref 80–100)
PLATELET # BLD AUTO: 458 K/UL — HIGH (ref 150–400)
POTASSIUM SERPL-MCNC: 4.2 MMOL/L — SIGNIFICANT CHANGE UP (ref 3.5–5.3)
POTASSIUM SERPL-SCNC: 4.2 MMOL/L — SIGNIFICANT CHANGE UP (ref 3.5–5.3)
RBC # BLD: 3.72 M/UL — LOW (ref 4.2–5.8)
RBC # FLD: 15.1 % — HIGH (ref 10.3–14.5)
SODIUM SERPL-SCNC: 136 MMOL/L — SIGNIFICANT CHANGE UP (ref 135–145)
WBC # BLD: 12.2 K/UL — HIGH (ref 3.8–10.5)
WBC # FLD AUTO: 12.2 K/UL — HIGH (ref 3.8–10.5)

## 2019-03-21 PROCEDURE — 99232 SBSQ HOSP IP/OBS MODERATE 35: CPT

## 2019-03-21 RX ADMIN — ZINC SULFATE TAB 220 MG (50 MG ZINC EQUIVALENT) 220 MILLIGRAM(S): 220 (50 ZN) TAB at 12:18

## 2019-03-21 RX ADMIN — Medication 1: at 12:17

## 2019-03-21 RX ADMIN — LEVETIRACETAM 1500 MILLIGRAM(S): 250 TABLET, FILM COATED ORAL at 17:19

## 2019-03-21 RX ADMIN — Medication 6 UNIT(S): at 07:42

## 2019-03-21 RX ADMIN — LEVETIRACETAM 1500 MILLIGRAM(S): 250 TABLET, FILM COATED ORAL at 05:05

## 2019-03-21 RX ADMIN — PANTOPRAZOLE SODIUM 40 MILLIGRAM(S): 20 TABLET, DELAYED RELEASE ORAL at 05:05

## 2019-03-21 RX ADMIN — Medication 500 MILLIGRAM(S): at 12:18

## 2019-03-21 RX ADMIN — Medication 6 UNIT(S): at 12:17

## 2019-03-21 RX ADMIN — LACOSAMIDE 100 MILLIGRAM(S): 50 TABLET ORAL at 17:19

## 2019-03-21 RX ADMIN — ENOXAPARIN SODIUM 40 MILLIGRAM(S): 100 INJECTION SUBCUTANEOUS at 12:18

## 2019-03-21 RX ADMIN — NYSTATIN CREAM 1 APPLICATION(S): 100000 CREAM TOPICAL at 05:06

## 2019-03-21 RX ADMIN — Medication 2 MILLIGRAM(S): at 05:05

## 2019-03-21 RX ADMIN — METFORMIN HYDROCHLORIDE 500 MILLIGRAM(S): 850 TABLET ORAL at 17:19

## 2019-03-21 RX ADMIN — Medication 1 TABLET(S): at 12:18

## 2019-03-21 RX ADMIN — METFORMIN HYDROCHLORIDE 500 MILLIGRAM(S): 850 TABLET ORAL at 05:05

## 2019-03-21 RX ADMIN — LISINOPRIL 20 MILLIGRAM(S): 2.5 TABLET ORAL at 05:05

## 2019-03-21 RX ADMIN — Medication 2 MILLIGRAM(S): at 17:19

## 2019-03-21 RX ADMIN — FINASTERIDE 5 MILLIGRAM(S): 5 TABLET, FILM COATED ORAL at 12:18

## 2019-03-21 RX ADMIN — LACOSAMIDE 100 MILLIGRAM(S): 50 TABLET ORAL at 05:05

## 2019-03-21 RX ADMIN — INSULIN GLARGINE 26 UNIT(S): 100 INJECTION, SOLUTION SUBCUTANEOUS at 21:13

## 2019-03-21 RX ADMIN — NYSTATIN CREAM 1 APPLICATION(S): 100000 CREAM TOPICAL at 17:20

## 2019-03-21 NOTE — CHART NOTE - NSCHARTNOTEFT_GEN_A_CORE
Nutrition Follow Up:     Source: [x] medical review, [x] patient    Factors impacting intake: [x] none     Intake: Pt reported having good appetite. Consumes 100% of his meals including the supplements. Pt denies N/V/D/C at the moment. Hyponatremia no resolved, but clinical plan is to continue fluid restriction.     Diet Prescription: Diet, Dysphagia 2 Mechanical Soft-Thin Liquids: Consistent Carbohydrate {No Snacks}  1200mL Fluid Restriction ([id number])  Supplement Feeding Modality:  Oral Glucerna Shake Cans or Servings Per Day:  1       Frequency:  Two Times a day (provides 440kcals, 20g protein)    Current Weight:   (3/14) 174.8lbs   (3/18) 175.7lbs   % Weight Change: Stable weights    PERTINENT MEDS:   Pertinent Medications: MEDICATIONS  (STANDING):  ascorbic acid 500 milliGRAM(s) Oral daily  dexamethasone     Tablet 2 milliGRAM(s) Oral every 12 hours  dexamethasone     Tablet   Oral   enoxaparin Injectable 40 milliGRAM(s) SubCutaneous daily  finasteride 5 milliGRAM(s) Oral daily  insulin glargine Injectable (LANTUS) 26 Unit(s) SubCutaneous at bedtime  insulin lispro (HumaLOG) corrective regimen sliding scale   SubCutaneous Before meals and at bedtime  insulin lispro Injectable (HumaLOG) 6 Unit(s) SubCutaneous three times a day before meals  lacosamide 100 milliGRAM(s) Oral every 12 hours  levETIRAcetam 1500 milliGRAM(s) Oral every 12 hours  lisinopril 20 milliGRAM(s) Oral daily  metFORMIN 500 milliGRAM(s) Oral two times a day  multivitamin 1 Tablet(s) Oral daily  nystatin Powder 1 Application(s) Topical two times a day  pantoprazole    Tablet 40 milliGRAM(s) Oral before breakfast  zinc sulfate 220 milliGRAM(s) Oral daily    MEDICATIONS  (PRN):  acetaminophen   Tablet .. 650 milliGRAM(s) Oral every 6 hours PRN Mild Pain (1 - 3)  senna 2 Tablet(s) Oral at bedtime PRN Constipation      PERTINENT LABS:  (3/21) glucose 111, POCT 109, (2/13) hgbA1c 7.7%      Skin: Pressure injury: coccyx sacrum stage II  Edema: none  Last BM: 3/21    Estimated Needs:   [x] no change since previous assessment    Previous Nutrition Diagnosis:   [x] Malnutrition Severe    Nutrition Diagnosis is [x] ongoing     Addressed with: Amie CABEZAS    Recommend  1. Continue current nutrition plan of care  2. Diet consistency as per speech therapy     Monitoring and Evaluation:   1. Tolerance to diet prescription  2. PO intake  3. Weights  4. Labs  5. Speech therapy recommendations    RD to remain available   Catrachita Roman RDN Nutrition Follow Up:     Source: [x] medical review, [x] patient    Factors impacting intake: [x] none     Intake: Pt reported having good appetite. Consumes 100% of his meals including the supplements. Pt denies N/V/D/C at the moment. Hyponatremia no resolved, but clinical plan is to continue fluid restriction.     Diet Prescription: Diet, Dysphagia 2 Mechanical Soft-Thin Liquids: Consistent Carbohydrate {No Snacks}  1200mL Fluid Restriction ([id number])  Supplement Feeding Modality:  Oral Glucerna Shake Cans or Servings Per Day:  1       Frequency:  Two Times a day (provides 440kcals, 20g protein)    Current Weight:   (3/14) 174.8lbs   (3/18) 175.7lbs   % Weight Change: Stable weights    PERTINENT MEDS:   Pertinent Medications: MEDICATIONS  (STANDING):  ascorbic acid 500 milliGRAM(s) Oral daily  dexamethasone     Tablet 2 milliGRAM(s) Oral every 12 hours  dexamethasone     Tablet   Oral   enoxaparin Injectable 40 milliGRAM(s) SubCutaneous daily  finasteride 5 milliGRAM(s) Oral daily  insulin glargine Injectable (LANTUS) 26 Unit(s) SubCutaneous at bedtime  insulin lispro (HumaLOG) corrective regimen sliding scale   SubCutaneous Before meals and at bedtime  insulin lispro Injectable (HumaLOG) 6 Unit(s) SubCutaneous three times a day before meals  lacosamide 100 milliGRAM(s) Oral every 12 hours  levETIRAcetam 1500 milliGRAM(s) Oral every 12 hours  lisinopril 20 milliGRAM(s) Oral daily  metFORMIN 500 milliGRAM(s) Oral two times a day  multivitamin 1 Tablet(s) Oral daily  nystatin Powder 1 Application(s) Topical two times a day  pantoprazole    Tablet 40 milliGRAM(s) Oral before breakfast  zinc sulfate 220 milliGRAM(s) Oral daily    MEDICATIONS  (PRN):  acetaminophen   Tablet .. 650 milliGRAM(s) Oral every 6 hours PRN Mild Pain (1 - 3)  senna 2 Tablet(s) Oral at bedtime PRN Constipation      PERTINENT LABS:  (3/21) glucose 111, POCT 109, (2/13) hgbA1c 7.7%      Skin: Pressure injury: coccyx sacrum stage II - pt receiving MVI, Vitamin C, Zinc sulfate for wound healing   Edema: none  Last BM: 3/21    Estimated Needs:   [x] no change since previous assessment    Previous Nutrition Diagnosis:   [x] Malnutrition Severe    Nutrition Diagnosis is [x] ongoing     Addressed with: Amie CABEZAS    Recommend  1. Continue current nutrition plan of care  2. Diet consistency as per speech therapy     Monitoring and Evaluation:   1. Tolerance to diet prescription  2. PO intake  3. Weights  4. Labs  5. Speech therapy recommendations    RD to remain available   Catrachita Roman RDN

## 2019-03-21 NOTE — PROGRESS NOTE ADULT - SUBJECTIVE AND OBJECTIVE BOX
HISTORY OF PRESENT ILLNESS  Mr. Duncan is a 74 year old male with a PMHx of GBM s/p right craniotomy in 2019, CVA with residual L sided weakness who presented to [facility] on 2/12/19 with seizures likely from recurrence of righ temporal lobe GBM complicated by sepsis from viral URI, acute embolic CVA, PFO, LLE DVT s/p IVC, and SAH.     Pt was BIBEMS from nursing home for active seizure 2/12/19. Per ED he was actively seizing for 30 mins prior to EMS arrival, and displayed left sided face, arm, and leg jerking in the ED. Pt received Ativan and Keppra and seizure broke after 20 mins in the ED. On admission, pt was found to be febrile with leukocytosis 31.56 meeting SIRS criteria and was found to have +coronavirus. Neurology and neurosurgery were consulted with the plan to have repeat craniotomy and GBM resection pending bacteremia clearance. On 2/15 rapid responses called for pt due to 3 repeat seizures and unresponsiveness with new left facial droop- given Ativan and Keppra per neurosurgery recommendations MRI 2/15/29 showed 3 discrete foci of acute infarction within the right occipital lobe, left corpus callosum within the genu and left frontal lobe in the region of the precentral gyrus. Pt placed on heparin gtt for anticoagulation during workup. Doppler U/S showed acute DVT of left common femoral, popliteal, posterior tibial, and peroneal vein, and ALBERTO 2/20/19 showed evidence of a PFO. Pt had placement of IVC filter on 2/19/19 and heparin gtt was discontinued in preparation for neurosurgical procedure which was scheduled for 2/25 but was delayed due to episodes of bradycardia in 40s. Pt had R craniotomy for resection of brain tumor on 2/27/19. Post/op course c/b seizures 2/2 SAH on CT 3/2 for which pt was given Keppra and Vimpat, and transferred to SICU for further monitoring. Pt seizures and hemorrhage stabilized and plan for discharge to rehab. On the day of discharge he was medically and neurologically stable for discharge to rehab on 3/6/19.    TODAY'S REVIEW OF SYMPTOMS  [  ] Constitutional WNL           [X] Cardio WNL               [X] Resp WNL  [X] GI WNL                            [  ]  WNL                    [X] Heme WNL  [X] Endo WNL                       [  ] Skin WNL                   [  ] MSK WNL  [  ] Neuro WNL                     [X] Cognitive WNL           [X] Psych WNL    SUBJECTIVE  Patient seen and examined. No acute events overnight. Reported by therapist that he always has soft bowel movements after he eats. Does not complain of any nausea or vomiting.    Vital Signs Last 24 Hrs  T(C): 36.7 (21 Mar 2019 07:15), Max: 36.8 (20 Mar 2019 20:56)  T(F): 98 (21 Mar 2019 07:15), Max: 98.2 (20 Mar 2019 20:56)  HR: 76 (21 Mar 2019 07:15) (76 - 92)  BP: 137/88 (21 Mar 2019 07:15) (108/78 - 137/88)  BP(mean): --  RR: 14 (21 Mar 2019 07:15) (14 - 15)  SpO2: 97% (21 Mar 2019 07:15) (96% - 97%)    PHYSICAL EXAM  Constitutional - NAD, Comfortable  HEENT - Right cranial incision healing C/D/I  Chest - CTA b/L, No W/R/R  Cardiovascular - S1S2, RRR  Abdomen - Soft, Non-distended, Non-tender, +BS  Extremities - No bilateral lower extremity edema  Neurologic Exam -                 	   Cognitive - Drowsy but arousable, Oriented to self and hospital, not date, year, situation  	   Communication - Fluent, No dysarthria  	   Attention - Impaired, poor attention span  	   Memory - Impaired, poor carryover  	   Cranial Nerves - Right eye reactivity sluggish, Impaired left visual field, Left visual inattention  	   Motor -   	                  LEFT    UE - ShAB 5/5, EF 5/5, EE 5/5, WE 5/5,  5/5  	                  RIGHT UE - ShAB 5/5, EF 5/5, EE 5/5, WE 5/5,  5/5  	                  LEFT    LE - HF 5/5, KE 5/5, KF 4/5, DF 5/5, PF 5/5  	                  RIGHT LE - HF 5/5, KE 5/5, KF 4/5, DF 5/5, PF 5/5     Mood - Flat affect  Skin - Superior sacral stage II - 2.5 x 0.5cm, Inferior sacral stage II - 2.5 x 3.5cm, two left gluteal wounds 1 x 0.5cm    RECENT LABS/IMAGING                        11.8   12.2  )-----------( 458      ( 21 Mar 2019 05:40 )             35.2   03-21    136  |  99  |  21  ----------------------------<  111<H>  4.2   |  29  |  0.84    Ca    9.4      21 Mar 2019 05:40        MEDICATIONS  (STANDING):  ascorbic acid 500 milliGRAM(s) Oral daily  dexamethasone     Tablet 2 milliGRAM(s) Oral every 12 hours  dexamethasone     Tablet   Oral   enoxaparin Injectable 40 milliGRAM(s) SubCutaneous daily  finasteride 5 milliGRAM(s) Oral daily  insulin glargine Injectable (LANTUS) 26 Unit(s) SubCutaneous at bedtime  insulin lispro (HumaLOG) corrective regimen sliding scale   SubCutaneous Before meals and at bedtime  insulin lispro Injectable (HumaLOG) 6 Unit(s) SubCutaneous three times a day before meals  lacosamide 100 milliGRAM(s) Oral every 12 hours  levETIRAcetam 1500 milliGRAM(s) Oral every 12 hours  lisinopril 20 milliGRAM(s) Oral daily  metFORMIN 500 milliGRAM(s) Oral two times a day  multivitamin 1 Tablet(s) Oral daily  nystatin Powder 1 Application(s) Topical two times a day  pantoprazole    Tablet 40 milliGRAM(s) Oral before breakfast  zinc sulfate 220 milliGRAM(s) Oral daily    MEDICATIONS  (PRN):  acetaminophen   Tablet .. 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 He is now with functional, gait, ADL, transfer, balance, endurance, cognitive, swallow impairments.    ~Recurrent GBM s/p craniotomy and resection  -Continue 3 hours of comprehensive therapy consisting of PT/OT/SLP/Neuropsychology  -Precautions: fall, cardiac, aspiration  -Decadron taper completed - to remain on 2mg [id number] until follow up with neuro-oncology  -Seizure PPx with Keppra and Vimpat  -Staples removed 3/15    ~DM   -Controlled Lantus, premeal insulin and Metformin  -Consistent carb diet    ~Pain control  -Denies pain at this time, continue Tylenol PRN    ~HTN  -Continue Lisinopril    ~Hyponatremia resolved  -Continue fluid restriction  -Monitor BMP    ~E. coli UTI  -Completed 7 day course of Cipro on3/15    ~Anemia  -Hgb stable, no obvious signs of bleeding  -Continue MVI     ~ regimen  -Incontinent   -Continue Finasteride    ~GI/Bowel regimen  -Continue Protonix  -Continue Senna PRN  -WIll try low fiber diet for continuous soft stools  -Diet: Mechanical soft (consistent carb and fluid restriction)  -Restorative dining  -Supervise meals    ~Skin/Stage II sacral and left gluteal pressure ulcers  -Turn and position Q2hrs  -OOB with PT/OT  -Nystatin to groin  -Wound gel to open ulcer---cover surrounding skin with Cavilon to prevent breakdown from moisture  -Continue Vit C and Zinc sulfate to promote wound healing    ~DVT PPx  -Continue lovenox     ~IDT Rounds  -Case discussed 3/19  OT: mod to max assist; apraxic, min awareness of left side; requires constant cueing for attention; goals are for min assist 2 weeks  PT: Min to Mod assist for transfers and ambulated 75 Ft RW min A. Did 4 steps with mod assist. Barriers are cognition, endurance, impaired LUE, requires constant cueing. Goals are for supervision level for transfers and min assist for ambulation  SLP: significant cognitive issues- mainly attention, and focus.  Poor carryover  Will discuss pt's needs for discharge with his son--Pt. will need physical assistance and adequate supervision at home. Plan for discharge 4/2/19 if discharge home.  Pt. with 2 sons that are arguing as per who is his HCP.  Pt. expressed that he wishes for Catarino to be his HCP. Son Chirag said he has a written HCP with his .  SW contacted  and awaiting call back.     -Psych consult reviewed and appreciated. Pt. does have capacity to choose his medical proxy and minor decisions regarding his care.

## 2019-03-22 LAB
GLUCOSE BLDC GLUCOMTR-MCNC: 109 MG/DL — HIGH (ref 70–99)
GLUCOSE BLDC GLUCOMTR-MCNC: 132 MG/DL — HIGH (ref 70–99)
GLUCOSE BLDC GLUCOMTR-MCNC: 149 MG/DL — HIGH (ref 70–99)
GLUCOSE BLDC GLUCOMTR-MCNC: 83 MG/DL — SIGNIFICANT CHANGE UP (ref 70–99)

## 2019-03-22 PROCEDURE — 99232 SBSQ HOSP IP/OBS MODERATE 35: CPT

## 2019-03-22 PROCEDURE — 99222 1ST HOSP IP/OBS MODERATE 55: CPT

## 2019-03-22 RX ADMIN — PANTOPRAZOLE SODIUM 40 MILLIGRAM(S): 20 TABLET, DELAYED RELEASE ORAL at 05:32

## 2019-03-22 RX ADMIN — FINASTERIDE 5 MILLIGRAM(S): 5 TABLET, FILM COATED ORAL at 11:56

## 2019-03-22 RX ADMIN — LEVETIRACETAM 1500 MILLIGRAM(S): 250 TABLET, FILM COATED ORAL at 17:58

## 2019-03-22 RX ADMIN — Medication 2 MILLIGRAM(S): at 17:58

## 2019-03-22 RX ADMIN — LACOSAMIDE 100 MILLIGRAM(S): 50 TABLET ORAL at 05:31

## 2019-03-22 RX ADMIN — Medication 6 UNIT(S): at 11:57

## 2019-03-22 RX ADMIN — NYSTATIN CREAM 1 APPLICATION(S): 100000 CREAM TOPICAL at 17:58

## 2019-03-22 RX ADMIN — LACOSAMIDE 100 MILLIGRAM(S): 50 TABLET ORAL at 17:56

## 2019-03-22 RX ADMIN — METFORMIN HYDROCHLORIDE 500 MILLIGRAM(S): 850 TABLET ORAL at 07:41

## 2019-03-22 RX ADMIN — Medication 500 MILLIGRAM(S): at 11:56

## 2019-03-22 RX ADMIN — METFORMIN HYDROCHLORIDE 500 MILLIGRAM(S): 850 TABLET ORAL at 17:58

## 2019-03-22 RX ADMIN — Medication 2 MILLIGRAM(S): at 05:31

## 2019-03-22 RX ADMIN — Medication 6 UNIT(S): at 16:47

## 2019-03-22 RX ADMIN — INSULIN GLARGINE 26 UNIT(S): 100 INJECTION, SOLUTION SUBCUTANEOUS at 22:24

## 2019-03-22 RX ADMIN — Medication 1 TABLET(S): at 11:56

## 2019-03-22 RX ADMIN — NYSTATIN CREAM 1 APPLICATION(S): 100000 CREAM TOPICAL at 05:33

## 2019-03-22 RX ADMIN — ZINC SULFATE TAB 220 MG (50 MG ZINC EQUIVALENT) 220 MILLIGRAM(S): 220 (50 ZN) TAB at 11:56

## 2019-03-22 RX ADMIN — LEVETIRACETAM 1500 MILLIGRAM(S): 250 TABLET, FILM COATED ORAL at 05:32

## 2019-03-22 RX ADMIN — ENOXAPARIN SODIUM 40 MILLIGRAM(S): 100 INJECTION SUBCUTANEOUS at 11:56

## 2019-03-22 NOTE — PROGRESS NOTE ADULT - SUBJECTIVE AND OBJECTIVE BOX
HISTORY OF PRESENT ILLNESS  Mr. Duncan is a 74 year old male with a PMHx of GBM s/p right craniotomy in 2019, CVA with residual L sided weakness who presented to [facility] on 2/12/19 with seizures likely from recurrence of righ temporal lobe GBM complicated by sepsis from viral URI, acute embolic CVA, PFO, LLE DVT s/p IVC, and SAH.     Pt was BIBEMS from nursing home for active seizure 2/12/19. Per ED he was actively seizing for 30 mins prior to EMS arrival, and displayed left sided face, arm, and leg jerking in the ED. Pt received Ativan and Keppra and seizure broke after 20 mins in the ED. On admission, pt was found to be febrile with leukocytosis 31.56 meeting SIRS criteria and was found to have +coronavirus. Neurology and neurosurgery were consulted with the plan to have repeat craniotomy and GBM resection pending bacteremia clearance. On 2/15 rapid responses called for pt due to 3 repeat seizures and unresponsiveness with new left facial droop- given Ativan and Keppra per neurosurgery recommendations MRI 2/15/29 showed 3 discrete foci of acute infarction within the right occipital lobe, left corpus callosum within the genu and left frontal lobe in the region of the precentral gyrus. Pt placed on heparin gtt for anticoagulation during workup. Doppler U/S showed acute DVT of left common femoral, popliteal, posterior tibial, and peroneal vein, and ALBERTO 2/20/19 showed evidence of a PFO. Pt had placement of IVC filter on 2/19/19 and heparin gtt was discontinued in preparation for neurosurgical procedure which was scheduled for 2/25 but was delayed due to episodes of bradycardia in 40s. Pt had R craniotomy for resection of brain tumor on 2/27/19. Post/op course c/b seizures 2/2 SAH on CT 3/2 for which pt was given Keppra and Vimpat, and transferred to SICU for further monitoring. Pt seizures and hemorrhage stabilized and plan for discharge to rehab. On the day of discharge he was medically and neurologically stable for discharge to rehab on 3/6/19.    TODAY'S REVIEW OF SYMPTOMS  [  ] Constitutional WNL           [X] Cardio WNL               [X] Resp WNL  [X] GI WNL                            [  ]  WNL                    [X] Heme WNL  [X] Endo WNL                       [  ] Skin WNL                   [  ] MSK WNL  [  ] Neuro WNL                     [X] Cognitive WNL           [X] Psych WNL    SUBJECTIVE  Patient seen and examined. No acute events overnight. Patient states that he is not having loose bowel movements, no discomfort, no pain or burning with urination.     Vital Signs Last 24 Hrs  T(C): 36.4 (22 Mar 2019 07:51), Max: 36.4 (21 Mar 2019 21:08)  T(F): 97.5 (22 Mar 2019 07:51), Max: 97.6 (21 Mar 2019 21:08)  HR: 77 (22 Mar 2019 07:51) (77 - 93)  BP: 116/82 (22 Mar 2019 07:51) (111/69 - 117/86)  BP(mean): --  RR: 15 (22 Mar 2019 07:51) (14 - 15)  SpO2: 94% (22 Mar 2019 07:51) (94% - 95%)    PHYSICAL EXAM  Constitutional - NAD, Comfortable  HEENT - Right cranial incision healing C/D/I  Chest - CTA b/L, No W/R/R  Cardiovascular - S1S2, RRR  Abdomen - Soft, Non-distended, Non-tender, +BS  Extremities - No bilateral lower extremity edema  Neurologic Exam -                 	   Cognitive - Drowsy but arousable, Oriented to self and hospital, not date, year, situation  	   Communication - Fluent, No dysarthria  	   Attention - Impaired, poor attention span  	   Memory - Impaired, poor carryover  	   Cranial Nerves - Right eye reactivity sluggish, Impaired left visual field, Left visual inattention  	   Motor -   	                  LEFT    UE - ShAB 5/5, EF 5/5, EE 5/5, WE 5/5,  5/5  	                  RIGHT UE - ShAB 5/5, EF 5/5, EE 5/5, WE 5/5,  5/5  	                  LEFT    LE - HF 5/5, KE 5/5, KF 4/5, DF 5/5, PF 5/5  	                  RIGHT LE - HF 5/5, KE 5/5, KF 4/5, DF 5/5, PF 5/5     Mood - Flat affect  Skin - Superior sacral stage II - 2.5 x 0.5cm, Inferior sacral stage II - 2.5 x 3.5cm, two left gluteal wounds 1 x 0.5cm    RECENT LABS/IMAGING                        11.8   12.2  )-----------( 458      ( 21 Mar 2019 05:40 )             35.2   03-21    136  |  99  |  21  ----------------------------<  111<H>  4.2   |  29  |  0.84    Ca    9.4      21 Mar 2019 05:40      MEDICATIONS  (STANDING):  ascorbic acid 500 milliGRAM(s) Oral daily  dexamethasone     Tablet 2 milliGRAM(s) Oral every 12 hours  dexamethasone     Tablet   Oral   enoxaparin Injectable 40 milliGRAM(s) SubCutaneous daily  finasteride 5 milliGRAM(s) Oral daily  insulin glargine Injectable (LANTUS) 26 Unit(s) SubCutaneous at bedtime  insulin lispro (HumaLOG) corrective regimen sliding scale   SubCutaneous Before meals and at bedtime  insulin lispro Injectable (HumaLOG) 6 Unit(s) SubCutaneous three times a day before meals  lacosamide 100 milliGRAM(s) Oral every 12 hours  levETIRAcetam 1500 milliGRAM(s) Oral every 12 hours  lisinopril 20 milliGRAM(s) Oral daily  metFORMIN 500 milliGRAM(s) Oral two times a day  multivitamin 1 Tablet(s) Oral daily  nystatin Powder 1 Application(s) Topical two times a day  pantoprazole    Tablet 40 milliGRAM(s) Oral before breakfast  zinc sulfate 220 milliGRAM(s) Oral daily    MEDICATIONS  (PRN):  acetaminophen   Tablet .. 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 He is now with functional, gait, ADL, transfer, balance, endurance, cognitive, swallow impairments.    ~Recurrent GBM s/p craniotomy and resection  -Continue 3 hours of comprehensive therapy consisting of PT/OT/SLP/Neuropsychology  -Precautions: fall, cardiac, aspiration  -Decadron taper completed - to remain on 2mg [id number] until follow up with neuro-oncology  -Seizure PPx with Keppra and Vimpat  -Staples removed 3/15  -Message left with Nurse for Rad/Onc regarding f/u appointment    ~DM   -Controlled Lantus, premeal insulin and Metformin  -Consistent carb diet    ~Pain control  -Denies pain at this time, continue Tylenol PRN    ~HTN  -Continue Lisinopril    ~Hyponatremia resolved  -Continue fluid restriction  -Monitor BMP    ~E. coli UTI  -Completed 7 day course of Cipro on3/15    ~Anemia  -Hgb stable, no obvious signs of bleeding  -Continue MVI     ~ regimen  -Incontinent   -Continue Finasteride    ~GI/Bowel regimen  -Continue Protonix  -Continue Senna PRN  -WIll try low fiber diet for continuous soft stools  -Diet: Mechanical soft (consistent carb and fluid restriction)  -Restorative dining  -Supervise meals    ~Skin/Stage II sacral and left gluteal pressure ulcers  -Turn and position Q2hrs  -OOB with PT/OT  -Nystatin to groin  -Wound gel to open ulcer---cover surrounding skin with Cavilon to prevent breakdown from moisture  -Continue Vit C and Zinc sulfate to promote wound healing    ~DVT PPx  -Continue lovenox     ~IDT Rounds  -Case discussed 3/19  OT: mod to max assist; apraxic, min awareness of left side; requires constant cueing for attention; goals are for min assist 2 weeks  PT: Min to Mod assist for transfers and ambulated 75 Ft RW min A. Did 4 steps with mod assist. Barriers are cognition, endurance, impaired LUE, requires constant cueing. Goals are for supervision level for transfers and min assist for ambulation  SLP: significant cognitive issues- mainly attention, and focus.  Poor carryover  Will discuss pt's needs for discharge with his son--Pt. will need physical assistance and adequate supervision at home. Plan for discharge 4/2/19 if discharge home.  Pt. with 2 sons that are arguing as per who is his HCP.  Pt. expressed that he wishes for Catarino to be his HCP. Son Chirag said he has a written HCP with his .  SW contacted  and awaiting call back.     -Psych consult reviewed and appreciated. Pt. does have capacity to choose his medical proxy and minor decisions regarding his care.

## 2019-03-23 LAB
GLUCOSE BLDC GLUCOMTR-MCNC: 106 MG/DL — HIGH (ref 70–99)
GLUCOSE BLDC GLUCOMTR-MCNC: 160 MG/DL — HIGH (ref 70–99)
GLUCOSE BLDC GLUCOMTR-MCNC: 188 MG/DL — HIGH (ref 70–99)
GLUCOSE BLDC GLUCOMTR-MCNC: 50 MG/DL — LOW (ref 70–99)
GLUCOSE BLDC GLUCOMTR-MCNC: 52 MG/DL — LOW (ref 70–99)
GLUCOSE BLDC GLUCOMTR-MCNC: 52 MG/DL — LOW (ref 70–99)
GLUCOSE BLDC GLUCOMTR-MCNC: 59 MG/DL — LOW (ref 70–99)
GLUCOSE BLDC GLUCOMTR-MCNC: 90 MG/DL — SIGNIFICANT CHANGE UP (ref 70–99)

## 2019-03-23 PROCEDURE — 99232 SBSQ HOSP IP/OBS MODERATE 35: CPT

## 2019-03-23 PROCEDURE — 99233 SBSQ HOSP IP/OBS HIGH 50: CPT

## 2019-03-23 RX ORDER — INSULIN GLARGINE 100 [IU]/ML
20 INJECTION, SOLUTION SUBCUTANEOUS AT BEDTIME
Qty: 0 | Refills: 0 | Status: DISCONTINUED | OUTPATIENT
Start: 2019-03-23 | End: 2019-03-23

## 2019-03-23 RX ORDER — INSULIN GLARGINE 100 [IU]/ML
18 INJECTION, SOLUTION SUBCUTANEOUS AT BEDTIME
Qty: 0 | Refills: 0 | Status: DISCONTINUED | OUTPATIENT
Start: 2019-03-23 | End: 2019-03-26

## 2019-03-23 RX ADMIN — LEVETIRACETAM 1500 MILLIGRAM(S): 250 TABLET, FILM COATED ORAL at 06:01

## 2019-03-23 RX ADMIN — PANTOPRAZOLE SODIUM 40 MILLIGRAM(S): 20 TABLET, DELAYED RELEASE ORAL at 06:01

## 2019-03-23 RX ADMIN — LACOSAMIDE 100 MILLIGRAM(S): 50 TABLET ORAL at 06:01

## 2019-03-23 RX ADMIN — INSULIN GLARGINE 18 UNIT(S): 100 INJECTION, SOLUTION SUBCUTANEOUS at 21:05

## 2019-03-23 RX ADMIN — Medication 500 MILLIGRAM(S): at 12:08

## 2019-03-23 RX ADMIN — LEVETIRACETAM 1500 MILLIGRAM(S): 250 TABLET, FILM COATED ORAL at 17:36

## 2019-03-23 RX ADMIN — NYSTATIN CREAM 1 APPLICATION(S): 100000 CREAM TOPICAL at 06:02

## 2019-03-23 RX ADMIN — Medication 1 TABLET(S): at 12:08

## 2019-03-23 RX ADMIN — METFORMIN HYDROCHLORIDE 500 MILLIGRAM(S): 850 TABLET ORAL at 06:01

## 2019-03-23 RX ADMIN — Medication 2 MILLIGRAM(S): at 17:36

## 2019-03-23 RX ADMIN — Medication 2 MILLIGRAM(S): at 06:01

## 2019-03-23 RX ADMIN — Medication 1: at 12:05

## 2019-03-23 RX ADMIN — LACOSAMIDE 100 MILLIGRAM(S): 50 TABLET ORAL at 17:37

## 2019-03-23 RX ADMIN — METFORMIN HYDROCHLORIDE 500 MILLIGRAM(S): 850 TABLET ORAL at 17:37

## 2019-03-23 RX ADMIN — NYSTATIN CREAM 1 APPLICATION(S): 100000 CREAM TOPICAL at 17:37

## 2019-03-23 RX ADMIN — FINASTERIDE 5 MILLIGRAM(S): 5 TABLET, FILM COATED ORAL at 12:08

## 2019-03-23 RX ADMIN — ZINC SULFATE TAB 220 MG (50 MG ZINC EQUIVALENT) 220 MILLIGRAM(S): 220 (50 ZN) TAB at 12:07

## 2019-03-23 RX ADMIN — LISINOPRIL 20 MILLIGRAM(S): 2.5 TABLET ORAL at 06:01

## 2019-03-23 RX ADMIN — Medication 6 UNIT(S): at 12:06

## 2019-03-23 RX ADMIN — ENOXAPARIN SODIUM 40 MILLIGRAM(S): 100 INJECTION SUBCUTANEOUS at 12:08

## 2019-03-23 NOTE — PROGRESS NOTE ADULT - SUBJECTIVE AND OBJECTIVE BOX
HISTORY OF PRESENT ILLNESS  Mr. Duncan is a 74 year old male with a PMHx of GBM s/p right craniotomy in 2019, CVA with residual L sided weakness who presented to [facility] on 2/12/19 with seizures likely from recurrence of righ temporal lobe GBM complicated by sepsis from viral URI, acute embolic CVA, PFO, LLE DVT s/p IVC, and SAH.     Pt was BIBEMS from nursing home for active seizure 2/12/19. Per ED he was actively seizing for 30 mins prior to EMS arrival, and displayed left sided face, arm, and leg jerking in the ED. Pt received Ativan and Keppra and seizure broke after 20 mins in the ED. On admission, pt was found to be febrile with leukocytosis 31.56 meeting SIRS criteria and was found to have +coronavirus. Neurology and neurosurgery were consulted with the plan to have repeat craniotomy and GBM resection pending bacteremia clearance. On 2/15 rapid responses called for pt due to 3 repeat seizures and unresponsiveness with new left facial droop- given Ativan and Keppra per neurosurgery recommendations MRI 2/15/29 showed 3 discrete foci of acute infarction within the right occipital lobe, left corpus callosum within the genu and left frontal lobe in the region of the precentral gyrus. Pt placed on heparin gtt for anticoagulation during workup. Doppler U/S showed acute DVT of left common femoral, popliteal, posterior tibial, and peroneal vein, and ALBERTO 2/20/19 showed evidence of a PFO. Pt had placement of IVC filter on 2/19/19 and heparin gtt was discontinued in preparation for neurosurgical procedure which was scheduled for 2/25 but was delayed due to episodes of bradycardia in 40s. Pt had R craniotomy for resection of brain tumor on 2/27/19. Post/op course c/b seizures 2/2 SAH on CT 3/2 for which pt was given Keppra and Vimpat, and transferred to SICU for further monitoring. Pt seizures and hemorrhage stabilized and plan for discharge to rehab. On the day of discharge he was medically and neurologically stable for discharge to rehab on 3/6/19.    TODAY'S REVIEW OF SYMPTOMS  [  ] Constitutional WNL           [X] Cardio WNL               [X] Resp WNL  [X] GI WNL                            [  ]  WNL                    [X] Heme WNL  [ ] Endo WNL                       [  ] Skin WNL                   [  ] MSK WNL  [  ] Neuro WNL                     [X] Cognitive WNL           [X] Psych WNL    SUBJECTIVE  Patient seen and examined. No acute events overnight. No SOB, no n/v, no pain    Vital Signs Last 24 Hrs  T(C): 36.4 (22 Mar 2019 20:16), Max: 36.4 (22 Mar 2019 20:16)  T(F): 97.5 (22 Mar 2019 20:16), Max: 97.5 (22 Mar 2019 20:16)  HR: 80 (23 Mar 2019 05:58) (80 - 95)  BP: 110/72 (23 Mar 2019 05:58) (105/71 - 110/72)  BP(mean): --  RR: 14 (22 Mar 2019 20:16) (14 - 14)  SpO2: 96% (22 Mar 2019 20:16) (96% - 96%)      PHYSICAL EXAM  Constitutional - NAD, Comfortable  HEENT - Right cranial incision healing C/D/I  Chest - CTA b/L  Cardiovascular - S1S2, RRR  Abdomen - Soft, Non-distended, Non-tender, +BS  Extremities - No bilateral lower extremity edema  Neurologic Exam -                 	   Cognitive - Oriented to self and hospital  	   Communication - Fluent, No dysarthria  	  	     	   Motor -   	                  LEFT    UE - ShAB 5/5, EF 5/5, EE 5/5, WE 5/5,  5/5  	                  RIGHT UE - ShAB 5/5, EF 5/5, EE 5/5, WE 5/5,  5/5  	                  LEFT    LE - HF 5/5, KE 5/5, KF 4/5, DF 5/5, PF 5/5  	                  RIGHT LE - HF 5/5, KE 5/5, KF 4/5, DF 5/5, PF 5/5     Mood - Flat affect      RECENT LABS/IMAGING                        11.8   12.2  )-----------( 458      ( 21 Mar 2019 05:40 )             35.2   03-21    136  |  99  |  21  ----------------------------<  111<H>  4.2   |  29  |  0.84    Ca    9.4      21 Mar 2019 05:40      MEDICATIONS  (STANDING):  ascorbic acid 500 milliGRAM(s) Oral daily  dexamethasone     Tablet 2 milliGRAM(s) Oral every 12 hours  dexamethasone     Tablet   Oral   enoxaparin Injectable 40 milliGRAM(s) SubCutaneous daily  finasteride 5 milliGRAM(s) Oral daily  insulin glargine Injectable (LANTUS) 26 Unit(s) SubCutaneous at bedtime  insulin lispro (HumaLOG) corrective regimen sliding scale   SubCutaneous Before meals and at bedtime  insulin lispro Injectable (HumaLOG) 6 Unit(s) SubCutaneous three times a day before meals  lacosamide 100 milliGRAM(s) Oral every 12 hours  levETIRAcetam 1500 milliGRAM(s) Oral every 12 hours  lisinopril 20 milliGRAM(s) Oral daily  metFORMIN 500 milliGRAM(s) Oral two times a day  multivitamin 1 Tablet(s) Oral daily  nystatin Powder 1 Application(s) Topical two times a day  pantoprazole    Tablet 40 milliGRAM(s) Oral before breakfast  zinc sulfate 220 milliGRAM(s) Oral daily    MEDICATIONS  (PRN):  acetaminophen   Tablet .. 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 He is now with functional, gait, ADL, transfer, balance, endurance, cognitive, swallow impairments.    ~Recurrent GBM s/p craniotomy and resection  -Continue 3 hours of comprehensive therapy consisting of PT/OT/SLP/Neuropsychology  -Precautions: fall, cardiac, aspiration  -Decadron taper completed - to remain on 2mg [id number] until follow up with neuro-oncology  -Seizure PPx with Keppra and Vimpat  -Staples removed 3/15  - Rad/Onc  f/u appointment    ~DM   -Controlled Lantus, premeal insulin and Metformin  -Consistent carb diet    ~Pain control  -Denies pain at this time, continue Tylenol PRN    ~HTN  -Continue Lisinopril    ~Hyponatremia resolved  -Continue fluid restriction  -Monitor BMP    ~E. coli UTI  -Completed 7 day course of Cipro on3/15    ~Anemia  -Hgb stable, no obvious signs of bleeding  -Continue MVI     ~ regimen  -Incontinent   -Continue Finasteride    ~GI/Bowel regimen  -Continue Protonix  -Continue Senna PRN  -WIll try low fiber diet for continuous soft stools  -Diet: Mechanical soft (consistent carb and fluid restriction)  -Restorative dining  -Supervise meals    ~Skin/Stage II sacral and left gluteal pressure ulcers  -Turn and position Q2hrs  -OOB with PT/OT  -Nystatin to groin  -Wound gel to open ulcer---cover surrounding skin with Cavilon to prevent breakdown from moisture  -Continue Vit C and Zinc sulfate to promote wound healing    ~DVT PPx  -Continue lovenox     -Psych consult - Pt. does have capacity to choose his medical proxy and minor decisions regarding his care.

## 2019-03-23 NOTE — PROGRESS NOTE ADULT - SUBJECTIVE AND OBJECTIVE BOX
Patient is a 74y old  Male who presents with a chief complaint of Seizure, Functional deficits s/p GBM (23 Mar 2019 07:54)      Patient seen and examined at bedside.     ALLERGIES:  No Known Allergies    MEDICATIONS:  ascorbic acid 500 milliGRAM(s) Oral daily  enoxaparin Injectable 40 milliGRAM(s) SubCutaneous daily  insulin glargine Injectable (LANTUS) 26 Unit(s) SubCutaneous at bedtime  insulin lispro Injectable (HumaLOG) 6 Unit(s) SubCutaneous three times a day before meals  lacosamide 100 milliGRAM(s) Oral every 12 hours  multivitamin 1 Tablet(s) Oral daily  nystatin Powder 1 Application(s) Topical two times a day  zinc sulfate 220 milliGRAM(s) Oral daily    Vital Signs Last 24 Hrs  T(F): 98 (23 Mar 2019 09:32), Max: 98 (23 Mar 2019 09:32)  HR: 109 (23 Mar 2019 09:32) (80 - 109)  BP: 106/79 (23 Mar 2019 09:32) (105/71 - 110/72)  RR: 15 (23 Mar 2019 09:32) (14 - 15)  SpO2: 93% (23 Mar 2019 09:32) (93% - 96%)  I&O's Summary    22 Mar 2019 07:01  -  23 Mar 2019 07:00  --------------------------------------------------------  IN: 300 mL / OUT: 0 mL / NET: 300 mL        PHYSICAL EXAM:  General: NAD, comfortable   ENT: MMM  Neck: Supple, No JVD  Lungs: CTA, BLAE, No added sounds.   Cardio: RRR, S1/S2, No murmurs  Abdomen: Soft, NT/ND, BS+   Extremities: No edema  CNS: nonfocal     LABS:                        11.8   12.2  )-----------( 458      ( 21 Mar 2019 05:40 )             35.2     03-21    136  |  99  |  21  ----------------------------<  111  4.2   |  29  |  0.84    Ca    9.4      21 Mar 2019 05:40      eGFR if Non African American: 85 mL/min/1.73M2 (03-21-19 @ 05:40)  eGFR if : 99 mL/min/1.73M2 (03-21-19 @ 05:40)                    CAPILLARY BLOOD GLUCOSE      POCT Blood Glucose.: 106 mg/dL (23 Mar 2019 09:25)  POCT Blood Glucose.: 59 mg/dL (23 Mar 2019 08:44)  POCT Blood Glucose.: 52 mg/dL (23 Mar 2019 08:17)  POCT Blood Glucose.: 52 mg/dL (23 Mar 2019 07:49)  POCT Blood Glucose.: 50 mg/dL (23 Mar 2019 07:47)  POCT Blood Glucose.: 132 mg/dL (22 Mar 2019 22:15)  POCT Blood Glucose.: 109 mg/dL (22 Mar 2019 16:20)  POCT Blood Glucose.: 149 mg/dL (22 Mar 2019 11:53)    02-13 [id number] 7.7          RADIOLOGY & ADDITIONAL TESTS:    Care Discussed with Consultants/Other Providers:

## 2019-03-23 NOTE — PROGRESS NOTE ADULT - ASSESSMENT
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 He is now with functional, gait, ADL, transfer, balance, endurance, cognitive, swallow impairments.    #GBM s/p craniotomy and resection  c/w PT/OT/SLP  c/w Decadron taper.    #Seizure PPx  c/w Keppra and Vimpat.     #HTN- controlled.   c/w Lisinopril.     #UTI- resolved  s/p ABx     #DM   Hypoglycemic episodes in am , decrease Lantus to 18 .   c/w pre meal 6-6-6  c/w Metformin 500mg BID  Hypoglycemia protocol.     # Anemia  H/H stable,   Monitor.     # DVT PPx  Lovenox

## 2019-03-23 NOTE — CHART NOTE - NSCHARTNOTEFT_GEN_A_CORE
Called by RN, informed that patient's family called the unit stating that patient told family he was having a seizure. Patient was not exhibiting any signs of seizure activity.  T 98.5  HR 91  /76  RR 15  O2 95% on RA  Went to assess patient, not in post-ictal state, patient states that he had some burning when he urinated but was not shaking, not currently in pain or discomfort, alert and oriented to self and place but not to date; consistent with exams throughout this past week. He has been saying throughout the week that the burning with urination had resolved. Patient currently on keppra and lacosamide. He also completed a course of Ciprofloxacin on 3/15 for UTI.   Seizure activity not suspected, will send UA for burning with urination.

## 2019-03-24 LAB
APPEARANCE UR: CLEAR — SIGNIFICANT CHANGE UP
BACTERIA # UR AUTO: ABNORMAL /HPF
BILIRUB UR-MCNC: NEGATIVE — SIGNIFICANT CHANGE UP
COLOR SPEC: YELLOW — SIGNIFICANT CHANGE UP
COMMENT - URINE: SIGNIFICANT CHANGE UP
DIFF PNL FLD: ABNORMAL
EPI CELLS # UR: SIGNIFICANT CHANGE UP
GLUCOSE BLDC GLUCOMTR-MCNC: 100 MG/DL — HIGH (ref 70–99)
GLUCOSE BLDC GLUCOMTR-MCNC: 105 MG/DL — HIGH (ref 70–99)
GLUCOSE BLDC GLUCOMTR-MCNC: 157 MG/DL — HIGH (ref 70–99)
GLUCOSE BLDC GLUCOMTR-MCNC: 206 MG/DL — HIGH (ref 70–99)
GLUCOSE UR QL: NEGATIVE — SIGNIFICANT CHANGE UP
KETONES UR-MCNC: NEGATIVE — SIGNIFICANT CHANGE UP
LEUKOCYTE ESTERASE UR-ACNC: ABNORMAL
NITRITE UR-MCNC: NEGATIVE — SIGNIFICANT CHANGE UP
PH UR: 5 — SIGNIFICANT CHANGE UP (ref 5–8)
PROT UR-MCNC: NEGATIVE — SIGNIFICANT CHANGE UP
RBC CASTS # UR COMP ASSIST: ABNORMAL /HPF (ref 0–4)
SP GR SPEC: 1.01 — SIGNIFICANT CHANGE UP (ref 1.01–1.02)
UROBILINOGEN FLD QL: NEGATIVE — SIGNIFICANT CHANGE UP
WBC UR QL: ABNORMAL /HPF (ref 0–5)

## 2019-03-24 PROCEDURE — 99233 SBSQ HOSP IP/OBS HIGH 50: CPT

## 2019-03-24 PROCEDURE — 99232 SBSQ HOSP IP/OBS MODERATE 35: CPT

## 2019-03-24 RX ADMIN — METFORMIN HYDROCHLORIDE 500 MILLIGRAM(S): 850 TABLET ORAL at 05:30

## 2019-03-24 RX ADMIN — METFORMIN HYDROCHLORIDE 500 MILLIGRAM(S): 850 TABLET ORAL at 17:03

## 2019-03-24 RX ADMIN — LACOSAMIDE 100 MILLIGRAM(S): 50 TABLET ORAL at 17:02

## 2019-03-24 RX ADMIN — LEVETIRACETAM 1500 MILLIGRAM(S): 250 TABLET, FILM COATED ORAL at 05:30

## 2019-03-24 RX ADMIN — INSULIN GLARGINE 18 UNIT(S): 100 INJECTION, SOLUTION SUBCUTANEOUS at 21:08

## 2019-03-24 RX ADMIN — Medication 1 TABLET(S): at 12:13

## 2019-03-24 RX ADMIN — Medication 6 UNIT(S): at 17:02

## 2019-03-24 RX ADMIN — Medication 500 MILLIGRAM(S): at 12:13

## 2019-03-24 RX ADMIN — PANTOPRAZOLE SODIUM 40 MILLIGRAM(S): 20 TABLET, DELAYED RELEASE ORAL at 05:30

## 2019-03-24 RX ADMIN — LACOSAMIDE 100 MILLIGRAM(S): 50 TABLET ORAL at 05:30

## 2019-03-24 RX ADMIN — FINASTERIDE 5 MILLIGRAM(S): 5 TABLET, FILM COATED ORAL at 12:13

## 2019-03-24 RX ADMIN — LEVETIRACETAM 1500 MILLIGRAM(S): 250 TABLET, FILM COATED ORAL at 17:03

## 2019-03-24 RX ADMIN — Medication 2 MILLIGRAM(S): at 05:30

## 2019-03-24 RX ADMIN — Medication 2 MILLIGRAM(S): at 17:03

## 2019-03-24 RX ADMIN — Medication 2: at 12:10

## 2019-03-24 RX ADMIN — NYSTATIN CREAM 1 APPLICATION(S): 100000 CREAM TOPICAL at 05:30

## 2019-03-24 RX ADMIN — Medication 1: at 21:10

## 2019-03-24 RX ADMIN — LISINOPRIL 20 MILLIGRAM(S): 2.5 TABLET ORAL at 05:30

## 2019-03-24 RX ADMIN — NYSTATIN CREAM 1 APPLICATION(S): 100000 CREAM TOPICAL at 17:03

## 2019-03-24 RX ADMIN — ZINC SULFATE TAB 220 MG (50 MG ZINC EQUIVALENT) 220 MILLIGRAM(S): 220 (50 ZN) TAB at 12:13

## 2019-03-24 RX ADMIN — ENOXAPARIN SODIUM 40 MILLIGRAM(S): 100 INJECTION SUBCUTANEOUS at 12:12

## 2019-03-24 RX ADMIN — Medication 6 UNIT(S): at 12:10

## 2019-03-24 NOTE — PROGRESS NOTE ADULT - ASSESSMENT
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 He is now with functional, gait, ADL, transfer, balance, endurance, cognitive, swallow impairments.    #GBM s/p craniotomy and resection  c/w PT/OT/SLP  c/w Decadron taper.    #Seizure PPx  c/w Keppra and Vimpat.     #HTN- controlled.   c/w Lisinopril.     #UTI- resolved  s/p ABx     #DM   FS more acceptable today, held pre meal breakfast insulin.   c/w  Lantus 18  c/w pre meal 6-6-6  c/w Metformin 500mg BID  Hypoglycemia protocol.     # Anemia  H/H stable,   Monitor.     # DVT PPx  Lovenox

## 2019-03-24 NOTE — PROGRESS NOTE ADULT - SUBJECTIVE AND OBJECTIVE BOX
HISTORY OF PRESENT ILLNESS  Mr. Duncan is a 74 year old male with a PMHx of GBM s/p right craniotomy in 2019, CVA with residual L sided weakness who presented to [facility] on 2/12/19 with seizures likely from recurrence of righ temporal lobe GBM complicated by sepsis from viral URI, acute embolic CVA, PFO, LLE DVT s/p IVC, and SAH.     Pt was BIBEMS from nursing home for active seizure 2/12/19. Per ED he was actively seizing for 30 mins prior to EMS arrival, and displayed left sided face, arm, and leg jerking in the ED. Pt received Ativan and Keppra and seizure broke after 20 mins in the ED. On admission, pt was found to be febrile with leukocytosis 31.56 meeting SIRS criteria and was found to have +coronavirus. Neurology and neurosurgery were consulted with the plan to have repeat craniotomy and GBM resection pending bacteremia clearance. On 2/15 rapid responses called for pt due to 3 repeat seizures and unresponsiveness with new left facial droop- given Ativan and Keppra per neurosurgery recommendations MRI 2/15/29 showed 3 discrete foci of acute infarction within the right occipital lobe, left corpus callosum within the genu and left frontal lobe in the region of the precentral gyrus. Pt placed on heparin gtt for anticoagulation during workup. Doppler U/S showed acute DVT of left common femoral, popliteal, posterior tibial, and peroneal vein, and ALBERTO 2/20/19 showed evidence of a PFO. Pt had placement of IVC filter on 2/19/19 and heparin gtt was discontinued in preparation for neurosurgical procedure which was scheduled for 2/25 but was delayed due to episodes of bradycardia in 40s. Pt had R craniotomy for resection of brain tumor on 2/27/19. Post/op course c/b seizures 2/2 SAH on CT 3/2 for which pt was given Keppra and Vimpat, and transferred to SICU for further monitoring. Pt seizures and hemorrhage stabilized and plan for discharge to rehab. On the day of discharge he was medically and neurologically stable for discharge to rehab on 3/6/19.    TODAY'S REVIEW OF SYMPTOMS  [  ] Constitutional WNL           [X] Cardio WNL               [X] Resp WNL  [X] GI WNL                            [  ]  WNL                    [X] Heme WNL  [ ] Endo WNL                       [  ] Skin WNL                   [  ] MSK WNL  [  ] Neuro WNL                     [X] Cognitive WNL           [X] Psych WNL    SUBJECTIVE  Patient seen and examined. No acute events overnight. No SOB, no n/v, no pain    Vital Signs Last 24 Hrs  T(C): 36.4 (24 Mar 2019 07:52), Max: 36.9 (23 Mar 2019 19:47)  T(F): 97.6 (24 Mar 2019 07:52), Max: 98.5 (23 Mar 2019 19:47)  HR: 80 (24 Mar 2019 07:52) (73 - 109)  BP: 119/83 (24 Mar 2019 07:52) (106/79 - 151/80)  BP(mean): --  RR: 14 (24 Mar 2019 07:52) (14 - 15)  SpO2: 98% (24 Mar 2019 07:52) (93% - 98%)      PHYSICAL EXAM  Constitutional - NAD, Comfortable  HEENT - Right cranial incision healing C/D/I  Chest - CTA b/L  Cardiovascular - S1S2, RRR  Abdomen - Soft, Non-distended, Non-tender, +BS  Extremities - No bilateral lower extremity edema  Neurologic Exam -                 	   Cognitive - Oriented to self and hospital  	   Communication - Fluent, No dysarthria  	  	     	   Motor -   	                  LEFT    UE - ShAB 5/5, EF 5/5, EE 5/5, WE 5/5,  5/5  	                  RIGHT UE - ShAB 5/5, EF 5/5, EE 5/5, WE 5/5,  5/5  	                  LEFT    LE - HF 5/5, KE 5/5, DF 5/5, PF 5/5  	                  RIGHT LE - HF 5/5, KE 5/5, DF 5/5, PF 5/5     Mood - Flat affect      RECENT LABS/IMAGING                        11.8   12.2  )-----------( 458      ( 21 Mar 2019 05:40 )             35.2   03-21    136  |  99  |  21  ----------------------------<  111<H>  4.2   |  29  |  0.84    Ca    9.4      21 Mar 2019 05:40      MEDICATIONS  (STANDING):  ascorbic acid 500 milliGRAM(s) Oral daily  dexamethasone     Tablet 2 milliGRAM(s) Oral every 12 hours  dexamethasone     Tablet   Oral   enoxaparin Injectable 40 milliGRAM(s) SubCutaneous daily  finasteride 5 milliGRAM(s) Oral daily  insulin glargine Injectable (LANTUS) 26 Unit(s) SubCutaneous at bedtime  insulin lispro (HumaLOG) corrective regimen sliding scale   SubCutaneous Before meals and at bedtime  insulin lispro Injectable (HumaLOG) 6 Unit(s) SubCutaneous three times a day before meals  lacosamide 100 milliGRAM(s) Oral every 12 hours  levETIRAcetam 1500 milliGRAM(s) Oral every 12 hours  lisinopril 20 milliGRAM(s) Oral daily  metFORMIN 500 milliGRAM(s) Oral two times a day  multivitamin 1 Tablet(s) Oral daily  nystatin Powder 1 Application(s) Topical two times a day  pantoprazole    Tablet 40 milliGRAM(s) Oral before breakfast  zinc sulfate 220 milliGRAM(s) Oral daily    MEDICATIONS  (PRN):  acetaminophen   Tablet .. 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 He is now with functional, gait, ADL, transfer, balance, endurance, cognitive, swallow impairments.    ~Recurrent GBM s/p craniotomy and resection  -Continue 3 hours of comprehensive therapy consisting of PT/OT/SLP/Neuropsychology  -Precautions: fall, cardiac, aspiration  -Decadron taper completed - to remain on 2mg [id number] until follow up with neuro-oncology  -Seizure PPx with Keppra and Vimpat  -Staples removed 3/15  - Rad/Onc  f/u appointment    ~DM   -Controlled Lantus, premeal insulin and Metformin  -Consistent carb diet    ~Pain control  -Denies pain at this time, continue Tylenol PRN    ~HTN  -Continue Lisinopril  -Hospitalist note appreciated    ~Hyponatremia resolved  -Continue fluid restriction  -Monitor BMP    ~E. coli UTI  -Completed 7 day course of Cipro on3/15  -U/A noted, follow up urine C&S, asympotmatic  -CBC Monday    ~Anemia  -Hgb stable, no obvious signs of bleeding  -Continue MVI     ~ regimen  -Incontinent   -Continue Finasteride    ~GI/Bowel regimen  -Continue Protonix  -Continue Senna PRN  -WIll try low fiber diet for continuous soft stools  -Diet: Mechanical soft (consistent carb and fluid restriction)  -Restorative dining  -Supervise meals    ~Skin/Stage II sacral and left gluteal pressure ulcers  -Turn and position Q2hrs  -OOB with PT/OT  -Nystatin to groin  -Wound gel to open ulcer---cover surrounding skin with Cavilon to prevent breakdown from moisture  -Continue Vit C and Zinc sulfate to promote wound healing    ~DVT PPx  -Continue lovenox     -Psych consult - Pt. does have capacity to choose his medical proxy and minor decisions regarding his care.

## 2019-03-24 NOTE — PROGRESS NOTE ADULT - SUBJECTIVE AND OBJECTIVE BOX
Patient is a 74y old  Male who presents with a chief complaint of Seizure, Functional deficits s/p GBM (24 Mar 2019 08:48)      Patient seen and examined at bedside.     ALLERGIES:  No Known Allergies    MEDICATIONS:  ascorbic acid 500 milliGRAM(s) Oral daily  enoxaparin Injectable 40 milliGRAM(s) SubCutaneous daily  insulin glargine Injectable (LANTUS) 18 Unit(s) SubCutaneous at bedtime  insulin lispro Injectable (HumaLOG) 6 Unit(s) SubCutaneous three times a day before meals  lacosamide 100 milliGRAM(s) Oral every 12 hours  multivitamin 1 Tablet(s) Oral daily  nystatin Powder 1 Application(s) Topical two times a day  zinc sulfate 220 milliGRAM(s) Oral daily    Vital Signs Last 24 Hrs  T(F): 97.6 (24 Mar 2019 07:52), Max: 98.5 (23 Mar 2019 19:47)  HR: 80 (24 Mar 2019 07:52) (73 - 91)  BP: 119/83 (24 Mar 2019 07:52) (112/76 - 151/80)  RR: 14 (24 Mar 2019 07:52) (14 - 15)  SpO2: 98% (24 Mar 2019 07:52) (95% - 98%)  I&O's Summary      PHYSICAL EXAM:  General: NAD, comfortable   ENT: MMM  Neck: Supple, No JVD  Lungs: CTA, BLAE, No added sounds.   Cardio: RRR, S1/S2, No murmurs  Abdomen: Soft, NT/ND, BS+   Extremities: No edema  CNS: no new changes.     LABS:                            CAPILLARY BLOOD GLUCOSE      POCT Blood Glucose.: 100 mg/dL (24 Mar 2019 07:17)  POCT Blood Glucose.: 160 mg/dL (23 Mar 2019 21:04)  POCT Blood Glucose.: 90 mg/dL (23 Mar 2019 16:35)  POCT Blood Glucose.: 188 mg/dL (23 Mar 2019 11:56)    02-13 [id number] 7.7    Urinalysis Basic - ( 24 Mar 2019 03:36 )    Color: Yellow / Appearance: Clear / S.010 / pH: x  Gluc: x / Ketone: Negative  / Bili: Negative / Urobili: Negative   Blood: x / Protein: Negative / Nitrite: Negative   Leuk Esterase: Moderate / RBC: 11-25 /HPF / WBC 6-10 /HPF   Sq Epi: x / Non Sq Epi: Neg.-Few / Bacteria: Trace /HPF          RADIOLOGY & ADDITIONAL TESTS:    Care Discussed with Consultants/Other Providers:

## 2019-03-25 LAB
ANION GAP SERPL CALC-SCNC: 11 MMOL/L — SIGNIFICANT CHANGE UP (ref 5–17)
BUN SERPL-MCNC: 27 MG/DL — HIGH (ref 7–23)
CALCIUM SERPL-MCNC: 9.3 MG/DL — SIGNIFICANT CHANGE UP (ref 8.4–10.5)
CHLORIDE SERPL-SCNC: 102 MMOL/L — SIGNIFICANT CHANGE UP (ref 96–108)
CO2 SERPL-SCNC: 27 MMOL/L — SIGNIFICANT CHANGE UP (ref 22–31)
CREAT SERPL-MCNC: 0.81 MG/DL — SIGNIFICANT CHANGE UP (ref 0.5–1.3)
GLUCOSE BLDC GLUCOMTR-MCNC: 100 MG/DL — HIGH (ref 70–99)
GLUCOSE BLDC GLUCOMTR-MCNC: 111 MG/DL — HIGH (ref 70–99)
GLUCOSE BLDC GLUCOMTR-MCNC: 135 MG/DL — HIGH (ref 70–99)
GLUCOSE BLDC GLUCOMTR-MCNC: 171 MG/DL — HIGH (ref 70–99)
GLUCOSE SERPL-MCNC: 116 MG/DL — HIGH (ref 70–99)
HCT VFR BLD CALC: 35.1 % — LOW (ref 39–50)
HGB BLD-MCNC: 11.6 G/DL — LOW (ref 13–17)
MCHC RBC-ENTMCNC: 31.6 PG — SIGNIFICANT CHANGE UP (ref 27–34)
MCHC RBC-ENTMCNC: 33.1 GM/DL — SIGNIFICANT CHANGE UP (ref 32–36)
MCV RBC AUTO: 95.6 FL — SIGNIFICANT CHANGE UP (ref 80–100)
PLATELET # BLD AUTO: 401 K/UL — HIGH (ref 150–400)
POTASSIUM SERPL-MCNC: 3.9 MMOL/L — SIGNIFICANT CHANGE UP (ref 3.5–5.3)
POTASSIUM SERPL-SCNC: 3.9 MMOL/L — SIGNIFICANT CHANGE UP (ref 3.5–5.3)
RBC # BLD: 3.67 M/UL — LOW (ref 4.2–5.8)
RBC # FLD: 14.9 % — HIGH (ref 10.3–14.5)
SODIUM SERPL-SCNC: 140 MMOL/L — SIGNIFICANT CHANGE UP (ref 135–145)
WBC # BLD: 14.6 K/UL — HIGH (ref 3.8–10.5)
WBC # FLD AUTO: 14.6 K/UL — HIGH (ref 3.8–10.5)

## 2019-03-25 PROCEDURE — 99233 SBSQ HOSP IP/OBS HIGH 50: CPT

## 2019-03-25 PROCEDURE — 99232 SBSQ HOSP IP/OBS MODERATE 35: CPT

## 2019-03-25 RX ORDER — LACOSAMIDE 50 MG/1
100 TABLET ORAL EVERY 12 HOURS
Qty: 0 | Refills: 0 | Status: DISCONTINUED | OUTPATIENT
Start: 2019-03-25 | End: 2019-03-26

## 2019-03-25 RX ADMIN — NYSTATIN CREAM 1 APPLICATION(S): 100000 CREAM TOPICAL at 05:27

## 2019-03-25 RX ADMIN — METFORMIN HYDROCHLORIDE 500 MILLIGRAM(S): 850 TABLET ORAL at 17:37

## 2019-03-25 RX ADMIN — LISINOPRIL 20 MILLIGRAM(S): 2.5 TABLET ORAL at 05:26

## 2019-03-25 RX ADMIN — ZINC SULFATE TAB 220 MG (50 MG ZINC EQUIVALENT) 220 MILLIGRAM(S): 220 (50 ZN) TAB at 11:19

## 2019-03-25 RX ADMIN — Medication 2 MILLIGRAM(S): at 05:26

## 2019-03-25 RX ADMIN — LACOSAMIDE 100 MILLIGRAM(S): 50 TABLET ORAL at 05:26

## 2019-03-25 RX ADMIN — METFORMIN HYDROCHLORIDE 500 MILLIGRAM(S): 850 TABLET ORAL at 05:26

## 2019-03-25 RX ADMIN — Medication 2 MILLIGRAM(S): at 17:37

## 2019-03-25 RX ADMIN — Medication 6 UNIT(S): at 11:15

## 2019-03-25 RX ADMIN — Medication 500 MILLIGRAM(S): at 11:15

## 2019-03-25 RX ADMIN — Medication 6 UNIT(S): at 07:48

## 2019-03-25 RX ADMIN — Medication 1: at 11:15

## 2019-03-25 RX ADMIN — LACOSAMIDE 100 MILLIGRAM(S): 50 TABLET ORAL at 17:37

## 2019-03-25 RX ADMIN — Medication 6 UNIT(S): at 17:35

## 2019-03-25 RX ADMIN — FINASTERIDE 5 MILLIGRAM(S): 5 TABLET, FILM COATED ORAL at 11:15

## 2019-03-25 RX ADMIN — PANTOPRAZOLE SODIUM 40 MILLIGRAM(S): 20 TABLET, DELAYED RELEASE ORAL at 08:38

## 2019-03-25 RX ADMIN — Medication 1 TABLET(S): at 11:19

## 2019-03-25 RX ADMIN — INSULIN GLARGINE 18 UNIT(S): 100 INJECTION, SOLUTION SUBCUTANEOUS at 21:01

## 2019-03-25 RX ADMIN — LEVETIRACETAM 1500 MILLIGRAM(S): 250 TABLET, FILM COATED ORAL at 17:37

## 2019-03-25 RX ADMIN — NYSTATIN CREAM 1 APPLICATION(S): 100000 CREAM TOPICAL at 17:37

## 2019-03-25 RX ADMIN — ENOXAPARIN SODIUM 40 MILLIGRAM(S): 100 INJECTION SUBCUTANEOUS at 11:14

## 2019-03-25 RX ADMIN — LEVETIRACETAM 1500 MILLIGRAM(S): 250 TABLET, FILM COATED ORAL at 05:26

## 2019-03-25 NOTE — PROGRESS NOTE ADULT - SUBJECTIVE AND OBJECTIVE BOX
HISTORY OF PRESENT ILLNESS  Mr. Duncan is a 74 year old male with a PMHx of GBM s/p right craniotomy in 2019, CVA with residual L sided weakness who presented to [facility] on 2/12/19 with seizures likely from recurrence of righ temporal lobe GBM complicated by sepsis from viral URI, acute embolic CVA, PFO, LLE DVT s/p IVC, and SAH.     Pt was BIBEMS from nursing home for active seizure 2/12/19. Per ED he was actively seizing for 30 mins prior to EMS arrival, and displayed left sided face, arm, and leg jerking in the ED. Pt received Ativan and Keppra and seizure broke after 20 mins in the ED. On admission, pt was found to be febrile with leukocytosis 31.56 meeting SIRS criteria and was found to have +coronavirus. Neurology and neurosurgery were consulted with the plan to have repeat craniotomy and GBM resection pending bacteremia clearance. On 2/15 rapid responses called for pt due to 3 repeat seizures and unresponsiveness with new left facial droop- given Ativan and Keppra per neurosurgery recommendations MRI 2/15/29 showed 3 discrete foci of acute infarction within the right occipital lobe, left corpus callosum within the genu and left frontal lobe in the region of the precentral gyrus. Pt placed on heparin gtt for anticoagulation during workup. Doppler U/S showed acute DVT of left common femoral, popliteal, posterior tibial, and peroneal vein, and ALBERTO 2/20/19 showed evidence of a PFO. Pt had placement of IVC filter on 2/19/19 and heparin gtt was discontinued in preparation for neurosurgical procedure which was scheduled for 2/25 but was delayed due to episodes of bradycardia in 40s. Pt had R craniotomy for resection of brain tumor on 2/27/19. Post/op course c/b seizures 2/2 SAH on CT 3/2 for which pt was given Keppra and Vimpat, and transferred to SICU for further monitoring. Pt seizures and hemorrhage stabilized and plan for discharge to rehab. On the day of discharge he was medically and neurologically stable for discharge to rehab on 3/6/19.    TODAY'S REVIEW OF SYMPTOMS  [  ] Constitutional WNL           [X] Cardio WNL               [X] Resp WNL  [X] GI WNL                            [X]  WNL                    [X] Heme WNL  [X] Endo WNL                       [X] Skin WNL                   [  ] MSK WNL  [  ] Neuro WNL                     [X] Cognitive WNL           [X] Psych WNL    SUBJECTIVE  Patient seen and examined. No acute events overnight. Reports he slept well last night. Tired during therapy but arousable and participating. Spoke with neuro-oncology and recommending patient to come in for mapping for plans for chemo/radiation as soon as possible, latest 3/28/19. Working with family and  to plan.     VITALS  T(C): 36.8 (03-24-19 @ 19:55)  T(F): 98.2 (03-24-19 @ 19:55), Max: 98.2 (03-24-19 @ 19:55)  HR: 80 (03-25-19 @ 05:24) (80 - 99)  BP: 145/90 (03-25-19 @ 05:24) (108/76 - 145/90)  RR:  (14 - 14)  SpO2:  (95% - 97%)  Wt(kg): --    PHYSICAL EXAM  Constitutional - NAD, Comfortable  HEENT - Right cranial incision healing C/D/I  Chest - CTA b/L, No W/R/R  Cardiovascular - S1S2, RRR  Abdomen - Soft, Non-distended, Non-tender, +BS  Extremities - No bilateral lower extremity edema  Neurologic Exam -                 	   Cognitive - Drowsy but arousable, Oriented to self and hospital, not date, year, situation  	   Communication - Fluent, No dysarthria  	   Attention - Impaired, poor attention span  	   Memory - Impaired, poor carryover  	   Cranial Nerves - Right eye reactivity sluggish, Impaired left visual field, Left visual inattention  	   Motor -   	                  LEFT    UE - ShAB 5/5, EF 5/5, EE 5/5, WE 5/5,  5/5  	                  RIGHT UE - ShAB 5/5, EF 5/5, EE 5/5, WE 5/5,  5/5  	                  LEFT    LE - HF 5/5, KE 5/5, KF 4/5, DF 5/5, PF 5/5  	                  RIGHT LE - HF 5/5, KE 5/5, KF 4/5, DF 5/5, PF 5/5     Mood - Flat affect  Skin - Superior sacral stage II - 2.5 x 0.5cm, Inferior sacral stage II - 2.5 x 3.5cm, two left gluteal wounds 1 x 0.5cm    RECENT LABS/IMAGING             11.6   14.6  )-----------( 401      ( 25 Mar 2019 05:45 )             35.1     140  |  102  |  27<H>  ----------------------------<  116<H>  3.9   |  27  |  0.81    Ca    9.3      25 Mar 2019 05:45    U/A (3/24/19) - Mod LE, Neg nitrite, WBC 6-10, Trace bacteria   Urine culture pending    MEDICATIONS   MEDICATIONS  (STANDING):  ascorbic acid 500 milliGRAM(s) Oral daily  dexamethasone     Tablet 2 milliGRAM(s) Oral every 12 hours  enoxaparin Injectable 40 milliGRAM(s) SubCutaneous daily  finasteride 5 milliGRAM(s) Oral daily  insulin glargine Injectable (LANTUS) 18 Unit(s) SubCutaneous at bedtime  insulin lispro (HumaLOG) corrective regimen sliding scale   SubCutaneous Before meals and at bedtime  insulin lispro Injectable (HumaLOG) 6 Unit(s) SubCutaneous three times a day before meals  lacosamide 100 milliGRAM(s) Oral every 12 hours  levETIRAcetam 1500 milliGRAM(s) Oral every 12 hours  lisinopril 20 milliGRAM(s) Oral daily  metFORMIN 500 milliGRAM(s) Oral two times a day  multivitamin 1 Tablet(s) Oral daily  nystatin Powder 1 Application(s) Topical two times a day  pantoprazole    Tablet 40 milliGRAM(s) Oral before breakfast  zinc sulfate 220 milliGRAM(s) Oral daily    MEDICATIONS  (PRN):  acetaminophen   Tablet .. 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 He is now with functional, gait, ADL, transfer, balance, endurance, cognitive, swallow impairments.    ~Recurrent GBM s/p craniotomy and resection  -Continue 3 hours of comprehensive therapy consisting of PT/OT/SLP/Neuropsychology  -Precautions: fall, cardiac, aspiration  -Seizure PPx with Keppra and Vimpat  -Staples removed on  3/15  -Decadron taper completed - to remain on 2mg [id number] until follow up with neuro-oncology  -Spoke with nurse practitioner for neuro-oncology, patient to be seen no later than 3/28/19 to map for chemo/radiation. To speak with family about follow up appointment and transition to [facility], will coordinate so patient is able to follow up within this week.     ~DM   -Continue Lantus, premeal insulin and Metformin  -Consistent carb diet    ~Pain control  -Denies pain at this time, continue Tylenol PRN    ~HTN  -Continue Lisinopril    ~E. coli UTI  -Completed 7 day course of Cipro on 3/15  -Pyuria improved, urine culture pending     ~Anemia  -Hgb stable, no obvious signs of bleeding  -Continue MVI     ~ regimen  -Incontinent   -Continue Finasteride    ~GI/Bowel regimen  -Continue Protonix  -Continue Senna PRN  -Low fiber diet for continuous soft stools  -Diet: Mechanical soft (consistent carb and fluid restriction)  -Restorative dining  -Supervise meals    ~Skin/Stage II sacral and left gluteal pressure ulcers  -Turn and position Q2hrs  -OOB with PT/OT  -Nystatin to groin  -Wound gel to open ulcer - cover surrounding skin with Cavilon to prevent breakdown from moisture  -Continue Vit C and Zinc sulfate to promote wound healing    ~DVT PPx  -Continue lovenox     ~IDT Rounds  -Case discussed 3/19  OT: mod to max assist; apraxic, min awareness of left side; requires constant cueing for attention; goals are for min assist 2 weeks  PT: Min to Mod assist for transfers and ambulated 75 Ft RW min A. Did 4 steps with mod assist. Barriers are cognition, endurance, impaired LUE, requires constant cueing. Goals are for supervision level for transfers and min assist for ambulation  SLP: significant cognitive issues- mainly attention, and focus.  Poor carryover  Will discuss pt's needs for discharge with his son--Pt. will need physical assistance and adequate supervision at home. Plan for discharge 4/2/19 if discharge home.  Pt. with 2 sons that are arguing as per who is his HCP.  Pt. expressed that he wishes for Catarino to be his HCP. Son Chirag said he has a written HCP with his .  SW contacted  and awaiting call back.     -Psych consult reviewed and appreciated. Pt. does have capacity to choose his medical proxy and minor decisions regarding his care.    -To speak with family about transitioning to [facility] earlier so patient would be able to make follow up appointment with neuro-oncology for mapping for chemo/radiation. HISTORY OF PRESENT ILLNESS  Mr. Duncan is a 74 year old male with a PMHx of GBM s/p right craniotomy in 2019, CVA with residual L sided weakness who presented to [facility] on 2/12/19 with seizures likely from recurrence of righ temporal lobe GBM complicated by sepsis from viral URI, acute embolic CVA, PFO, LLE DVT s/p IVC, and SAH.     Pt was BIBEMS from nursing home for active seizure 2/12/19. Per ED he was actively seizing for 30 mins prior to EMS arrival, and displayed left sided face, arm, and leg jerking in the ED. Pt received Ativan and Keppra and seizure broke after 20 mins in the ED. On admission, pt was found to be febrile with leukocytosis 31.56 meeting SIRS criteria and was found to have +coronavirus. Neurology and neurosurgery were consulted with the plan to have repeat craniotomy and GBM resection pending bacteremia clearance. On 2/15 rapid responses called for pt due to 3 repeat seizures and unresponsiveness with new left facial droop- given Ativan and Keppra per neurosurgery recommendations MRI 2/15/29 showed 3 discrete foci of acute infarction within the right occipital lobe, left corpus callosum within the genu and left frontal lobe in the region of the precentral gyrus. Pt placed on heparin gtt for anticoagulation during workup. Doppler U/S showed acute DVT of left common femoral, popliteal, posterior tibial, and peroneal vein, and ALBERTO 2/20/19 showed evidence of a PFO. Pt had placement of IVC filter on 2/19/19 and heparin gtt was discontinued in preparation for neurosurgical procedure which was scheduled for 2/25 but was delayed due to episodes of bradycardia in 40s. Pt had R craniotomy for resection of brain tumor on 2/27/19. Post/op course c/b seizures 2/2 SAH on CT 3/2 for which pt was given Keppra and Vimpat, and transferred to SICU for further monitoring. Pt seizures and hemorrhage stabilized and plan for discharge to rehab. On the day of discharge he was medically and neurologically stable for discharge to rehab on 3/6/19.    TODAY'S REVIEW OF SYMPTOMS  [  ] Constitutional WNL           [X] Cardio WNL               [X] Resp WNL  [X] GI WNL                            [X]  WNL                    [X] Heme WNL  [X] Endo WNL                       [X] Skin WNL                   [  ] MSK WNL  [  ] Neuro WNL                     [X] Cognitive WNL           [X] Psych WNL    SUBJECTIVE  Patient seen and examined. No acute events overnight. Reports he slept well last night. Tired during therapy but arousable and participating. Spoke with neuro-oncology and recommending patient to come in for mapping for plans for chemo/radiation as soon as possible, latest 3/28/19. Working with family and  to plan.     VITALS  T(C): 36.8 (03-24-19 @ 19:55)  T(F): 98.2 (03-24-19 @ 19:55), Max: 98.2 (03-24-19 @ 19:55)  HR: 80 (03-25-19 @ 05:24) (80 - 99)  BP: 145/90 (03-25-19 @ 05:24) (108/76 - 145/90)  RR:  (14 - 14)  SpO2:  (95% - 97%)  Wt(kg): --    PHYSICAL EXAM  Constitutional - NAD, Comfortable  HEENT - Right cranial incision healing C/D/I  Chest - CTA b/L, No W/R/R  Cardiovascular - S1S2, RRR  Abdomen - Soft, Non-distended, Non-tender, +BS  Extremities - No bilateral lower extremity edema  Neurologic Exam -                 	   Cognitive - Drowsy but arousable, Oriented to self and hospital, not date, year, situation  	   Communication - Fluent, No dysarthria  	   Attention - Impaired, poor attention span  	   Memory - Impaired, poor carryover  	   Cranial Nerves - Right eye reactivity sluggish, Impaired left visual field, Left visual inattention  	   Motor -   	                  LEFT    UE - ShAB 5/5, EF 5/5, EE 5/5, WE 5/5,  5/5  	                  RIGHT UE - ShAB 5/5, EF 5/5, EE 5/5, WE 5/5,  5/5  	                  LEFT    LE - HF 5/5, KE 5/5, KF 4/5, DF 5/5, PF 5/5  	                  RIGHT LE - HF 5/5, KE 5/5, KF 4/5, DF 5/5, PF 5/5     Mood - Flat affect  Skin - Superior sacral stage II - 2.5 x 0.5cm, Inferior sacral stage II - 2.5 x 3.5cm, two left gluteal wounds 1 x 0.5cm    RECENT LABS/IMAGING             11.6   14.6  )-----------( 401      ( 25 Mar 2019 05:45 )             35.1     140  |  102  |  27<H>  ----------------------------<  116<H>  3.9   |  27  |  0.81    Ca    9.3      25 Mar 2019 05:45    U/A (3/24/19) - Mod LE, Neg nitrite, WBC 6-10, Trace bacteria   Urine culture pending    MEDICATIONS   MEDICATIONS  (STANDING):  ascorbic acid 500 milliGRAM(s) Oral daily  dexamethasone     Tablet 2 milliGRAM(s) Oral every 12 hours  enoxaparin Injectable 40 milliGRAM(s) SubCutaneous daily  finasteride 5 milliGRAM(s) Oral daily  insulin glargine Injectable (LANTUS) 18 Unit(s) SubCutaneous at bedtime  insulin lispro (HumaLOG) corrective regimen sliding scale   SubCutaneous Before meals and at bedtime  insulin lispro Injectable (HumaLOG) 6 Unit(s) SubCutaneous three times a day before meals  lacosamide 100 milliGRAM(s) Oral every 12 hours  levETIRAcetam 1500 milliGRAM(s) Oral every 12 hours  lisinopril 20 milliGRAM(s) Oral daily  metFORMIN 500 milliGRAM(s) Oral two times a day  multivitamin 1 Tablet(s) Oral daily  nystatin Powder 1 Application(s) Topical two times a day  pantoprazole    Tablet 40 milliGRAM(s) Oral before breakfast  zinc sulfate 220 milliGRAM(s) Oral daily    MEDICATIONS  (PRN):  acetaminophen   Tablet .. 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 He is now with functional, gait, ADL, transfer, balance, endurance, cognitive, swallow impairments.    ~Recurrent GBM s/p craniotomy and resection  -Continue 3 hours of comprehensive therapy consisting of PT/OT/SLP/Neuropsychology  -Precautions: fall, cardiac, aspiration  -Seizure PPx with Keppra and Vimpat  -Staples removed on  3/15  -Decadron taper completed - to remain on 2mg [id number] until follow up with neuro-oncology  -Spoke with nurse practitioner for neuro-oncology, patient to be seen no later than 3/28/19 to map for chemo/radiation. To speak with family about follow up appointment and transition to [facility], will coordinate so patient is able to follow up within this week.     ~DM   -Continue Lantus, premeal insulin and Metformin  -Consistent carb diet    ~Pain control  -Denies pain at this time, continue Tylenol PRN    ~HTN  -Continue Lisinopril    ~E. coli UTI/Leukocytosis  -Completed 7 day course of Cipro on 3/15  -Pyuria improved, urine culture pending   -Elevated WBC likely from steroids    ~Anemia  -Hgb stable, no obvious signs of bleeding  -Continue MVI     ~ regimen  -Incontinent   -Continue Finasteride    ~GI/Bowel regimen  -Continue Protonix  -Continue Senna PRN  -Low fiber diet for continuous soft stools  -Diet: Mechanical soft (consistent carb and fluid restriction)  -Restorative dining  -Supervise meals    ~Skin/Stage II sacral and left gluteal pressure ulcers  -Turn and position Q2hrs  -OOB with PT/OT  -Nystatin to groin  -Wound gel to open ulcer - cover surrounding skin with Cavilon to prevent breakdown from moisture  -Continue Vit C and Zinc sulfate to promote wound healing    ~DVT PPx  -Continue lovenox     ~IDT Rounds  -Case discussed 3/19  OT: mod to max assist; apraxic, min awareness of left side; requires constant cueing for attention; goals are for min assist 2 weeks  PT: Min to Mod assist for transfers and ambulated 75 Ft RW min A. Did 4 steps with mod assist. Barriers are cognition, endurance, impaired LUE, requires constant cueing. Goals are for supervision level for transfers and min assist for ambulation  SLP: significant cognitive issues- mainly attention, and focus.  Poor carryover  Will discuss pt's needs for discharge with his son--Pt. will need physical assistance and adequate supervision at home. Plan for discharge 4/2/19 if discharge home.  Pt. with 2 sons that are arguing as per who is his HCP.  Pt. expressed that he wishes for Catarino to be his HCP. Son Chirag said he has a written HCP with his .  SW contacted  and awaiting call back.     -Psych consult reviewed and appreciated. Pt. does have capacity to choose his medical proxy and minor decisions regarding his care.    -To speak with family about transitioning to [facility] earlier so patient would be able to make follow up appointment with neuro-oncology for mapping for chemo/radiation. HISTORY OF PRESENT ILLNESS  Mr. Duncan is a 74 year old male with a PMHx of GBM s/p right craniotomy in 2019, CVA with residual L sided weakness who presented to [facility] on 2/12/19 with seizures likely from recurrence of righ temporal lobe GBM complicated by sepsis from viral URI, acute embolic CVA, PFO, LLE DVT s/p IVC, and SAH.     Pt was BIBEMS from nursing home for active seizure 2/12/19. Per ED he was actively seizing for 30 mins prior to EMS arrival, and displayed left sided face, arm, and leg jerking in the ED. Pt received Ativan and Keppra and seizure broke after 20 mins in the ED. On admission, pt was found to be febrile with leukocytosis 31.56 meeting SIRS criteria and was found to have +coronavirus. Neurology and neurosurgery were consulted with the plan to have repeat craniotomy and GBM resection pending bacteremia clearance. On 2/15 rapid responses called for pt due to 3 repeat seizures and unresponsiveness with new left facial droop- given Ativan and Keppra per neurosurgery recommendations MRI 2/15/29 showed 3 discrete foci of acute infarction within the right occipital lobe, left corpus callosum within the genu and left frontal lobe in the region of the precentral gyrus. Pt placed on heparin gtt for anticoagulation during workup. Doppler U/S showed acute DVT of left common femoral, popliteal, posterior tibial, and peroneal vein, and ALBERTO 2/20/19 showed evidence of a PFO. Pt had placement of IVC filter on 2/19/19 and heparin gtt was discontinued in preparation for neurosurgical procedure which was scheduled for 2/25 but was delayed due to episodes of bradycardia in 40s. Pt had R craniotomy for resection of brain tumor on 2/27/19. Post/op course c/b seizures 2/2 SAH on CT 3/2 for which pt was given Keppra and Vimpat, and transferred to SICU for further monitoring. Pt seizures and hemorrhage stabilized and plan for discharge to rehab. On the day of discharge he was medically and neurologically stable for discharge to rehab on 3/6/19.    TODAY'S REVIEW OF SYMPTOMS  [  ] Constitutional WNL           [X] Cardio WNL               [X] Resp WNL  [X] GI WNL                            [X]  WNL                    [X] Heme WNL  [X] Endo WNL                       [X] Skin WNL                   [  ] MSK WNL  [  ] Neuro WNL                     [X] Cognitive WNL           [X] Psych WNL    SUBJECTIVE  Patient seen and examined. No acute events overnight. Reports he slept well last night. Tired during therapy but arousable and participating. Spoke with neuro-oncology and recommending patient to come in for mapping for plans for chemo/radiation as soon as possible, latest 3/28/19. Working with family and  to plan.     VITALS  T(C): 36.8 (03-24-19 @ 19:55)  T(F): 98.2 (03-24-19 @ 19:55), Max: 98.2 (03-24-19 @ 19:55)  HR: 80 (03-25-19 @ 05:24) (80 - 99)  BP: 145/90 (03-25-19 @ 05:24) (108/76 - 145/90)  RR:  (14 - 14)  SpO2:  (95% - 97%)  Wt(kg): --    PHYSICAL EXAM  Constitutional - NAD, Comfortable  HEENT - Right cranial incision healing C/D/I  Chest - CTA b/L, No W/R/R  Cardiovascular - S1S2, RRR  Abdomen - Soft, Non-distended, Non-tender, +BS  Extremities - No bilateral lower extremity edema  Neurologic Exam -                 	   Cognitive - Drowsy but arousable, Oriented to self and hospital, not date, year, situation  	   Communication - Fluent, No dysarthria  	   Attention - Impaired, poor attention span  	   Memory - Impaired, poor carryover  	   Cranial Nerves - Right eye reactivity sluggish, Impaired left visual field, Left visual inattention  	   Motor -   	                  LEFT    UE - ShAB 5/5, EF 5/5, EE 5/5, WE 5/5,  5/5  	                  RIGHT UE - ShAB 5/5, EF 5/5, EE 5/5, WE 5/5,  5/5  	                  LEFT    LE - HF 5/5, KE 5/5, KF 4/5, DF 5/5, PF 5/5  	                  RIGHT LE - HF 5/5, KE 5/5, KF 4/5, DF 5/5, PF 5/5     Mood - Flat affect  Skin - Superior sacral stage II - 2.5 x 0.5cm, Inferior sacral stage II - 2.5 x 3.5cm, two left gluteal wounds 1 x 0.5cm    RECENT LABS/IMAGING             11.6   14.6  )-----------( 401      ( 25 Mar 2019 05:45 )             35.1     140  |  102  |  27<H>  ----------------------------<  116<H>  3.9   |  27  |  0.81    Ca    9.3      25 Mar 2019 05:45    U/A (3/24/19) - Mod LE, Neg nitrite, WBC 6-10, Trace bacteria   Urine culture pending    MEDICATIONS   MEDICATIONS  (STANDING):  ascorbic acid 500 milliGRAM(s) Oral daily  dexamethasone     Tablet 2 milliGRAM(s) Oral every 12 hours  enoxaparin Injectable 40 milliGRAM(s) SubCutaneous daily  finasteride 5 milliGRAM(s) Oral daily  insulin glargine Injectable (LANTUS) 18 Unit(s) SubCutaneous at bedtime  insulin lispro (HumaLOG) corrective regimen sliding scale   SubCutaneous Before meals and at bedtime  insulin lispro Injectable (HumaLOG) 6 Unit(s) SubCutaneous three times a day before meals  lacosamide 100 milliGRAM(s) Oral every 12 hours  levETIRAcetam 1500 milliGRAM(s) Oral every 12 hours  lisinopril 20 milliGRAM(s) Oral daily  metFORMIN 500 milliGRAM(s) Oral two times a day  multivitamin 1 Tablet(s) Oral daily  nystatin Powder 1 Application(s) Topical two times a day  pantoprazole    Tablet 40 milliGRAM(s) Oral before breakfast  zinc sulfate 220 milliGRAM(s) Oral daily    MEDICATIONS  (PRN):  acetaminophen   Tablet .. 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 He is now with functional, gait, ADL, transfer, balance, endurance, cognitive, swallow impairments.    ~Recurrent GBM s/p craniotomy and resection  -Continue 3 hours of comprehensive therapy consisting of PT/OT/SLP/Neuropsychology  -Precautions: fall, cardiac, aspiration  -Seizure PPx with Keppra and Vimpat  -Staples removed on  3/15  -Decadron taper completed - to remain on 2mg [id number] until follow up with neuro-oncology  -Spoke with nurse practitioner for neuro-oncology, patient to be seen no later than 3/28/19 to map for chemo/radiation. To speak with family about follow up appointment and transition to [facility], will coordinate so patient is able to follow up within this week.     ~DM   -Continue Lantus, premeal insulin and Metformin  -Consistent carb diet    ~Pain control  -Denies pain at this time, continue Tylenol PRN    ~HTN  -Continue Lisinopril    ~E. coli UTI/Leukocytosis  -Completed 7 day course of Cipro on 3/15  -Pyuria improved, urine culture pending   -Elevated WBC likely from steroids    ~Anemia  -Hgb stable, no obvious signs of bleeding  -Continue MVI     ~ regimen  -Incontinent   -Continue Finasteride    ~GI/Bowel regimen  -Continue Protonix  -Continue Senna PRN  -Low fiber diet for continuous soft stools  -Diet: Mechanical soft (consistent carb and fluid restriction)  -Restorative dining  -Supervise meals    ~Skin/Stage II sacral and left gluteal pressure ulcers  -Turn and position Q2hrs  -OOB with PT/OT  -Nystatin to groin  -Wound gel to open ulcer - cover surrounding skin with Cavilon to prevent breakdown from moisture  -Continue Vit C and Zinc sulfate to promote wound healing    ~DVT PPx  -Continue lovenox     ~IDT Rounds  -Case discussed 3/19  OT: mod to max assist; apraxic, min awareness of left side; requires constant cueing for attention; goals are for min assist 2 weeks  PT: Min to Mod assist for transfers and ambulated 75 Ft RW min A. Did 4 steps with mod assist. Barriers are cognition, endurance, impaired LUE, requires constant cueing. Goals are for supervision level for transfers and min assist for ambulation  SLP: significant cognitive issues- mainly attention, and focus.  Poor carryover  Will discuss pt's needs for discharge with his son--Pt. will need physical assistance and adequate supervision at home. Son is looking into [facility]--will need discharge this week as pt. will need to f/u with radiation oncology for appointment for radiation mapping to initiate treatment.     -Psych consult reviewed and appreciated. Pt. does have capacity to choose his medical proxy and minor decisions regarding his care.    -SW To speak with family about transitioning to [facility] earlier so patient would be able to make follow up appointment with neuro-oncology for mapping for chemo/radiation.

## 2019-03-25 NOTE — PROGRESS NOTE ADULT - ASSESSMENT
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 He is now with functional, gait, ADL, transfer, balance, endurance, cognitive, swallow impairments.    #GBM s/p craniotomy and resection  c/w PT/OT/SLP  c/w Decadron taper.    #Seizure PPx  c/w Keppra and Vimpat.     #HTN- controlled.   c/w Lisinopril.     #UTI- resolved  s/p ABx     #DM   c/w  Lantus 18  c/w pre meal 6-6-6  c/w Metformin 500mg BID  Hypoglycemia protocol.     # Anemia  H/H stable,   Monitor.     # DVT PPx  Lovenox

## 2019-03-25 NOTE — PROGRESS NOTE ADULT - SUBJECTIVE AND OBJECTIVE BOX
Patient is a 74y old  Male who presents with a chief complaint of Seizure, Functional deficits s/p GBM (24 Mar 2019 09:53)      Patient seen and examined at bedside.     ALLERGIES:  No Known Allergies    MEDICATIONS:  ascorbic acid 500 milliGRAM(s) Oral daily  enoxaparin Injectable 40 milliGRAM(s) SubCutaneous daily  insulin glargine Injectable (LANTUS) 18 Unit(s) SubCutaneous at bedtime  insulin lispro Injectable (HumaLOG) 6 Unit(s) SubCutaneous three times a day before meals  lacosamide 100 milliGRAM(s) Oral every 12 hours  multivitamin 1 Tablet(s) Oral daily  nystatin Powder 1 Application(s) Topical two times a day  zinc sulfate 220 milliGRAM(s) Oral daily    Vital Signs Last 24 Hrs  T(F): 98.2 (24 Mar 2019 19:55), Max: 98.2 (24 Mar 2019 19:55)  HR: 80 (25 Mar 2019 05:24) (80 - 99)  BP: 145/90 (25 Mar 2019 05:24) (108/76 - 145/90)  RR: 14 (25 Mar 2019 05:24) (14 - 14)  SpO2: 97% (25 Mar 2019 05:24) (95% - 97%)  I&O's Summary    24 Mar 2019 07:01  -  25 Mar 2019 07:00  --------------------------------------------------------  IN: 0 mL / OUT: 1 mL / NET: -1 mL        PHYSICAL EXAM:  General: NAD, comfortable   ENT: MMM  Neck: Supple, No JVD  Lungs: CTA, BLAE, No added sounds.   Cardio: RRR, S1/S2, No murmurs  Abdomen: Soft, NT/ND, BS+   Extremities: No edema  CNS: nonfocal     LABS:                        11.6   14.6  )-----------( 401      ( 25 Mar 2019 05:45 )             35.1         140  |  102  |  27  ----------------------------<  116  3.9   |  27  |  0.81    Ca    9.3      25 Mar 2019 05:45      eGFR if Non African American: 86 mL/min/1.73M2 (19 @ 05:45)  eGFR if African American: 100 mL/min/1.73M2 (19 @ 05:45)                    CAPILLARY BLOOD GLUCOSE      POCT Blood Glucose.: 111 mg/dL (25 Mar 2019 07:38)  POCT Blood Glucose.: 157 mg/dL (24 Mar 2019 21:08)  POCT Blood Glucose.: 105 mg/dL (24 Mar 2019 16:35)  POCT Blood Glucose.: 206 mg/dL (24 Mar 2019 11:25)    02- [id number] 7.7    Urinalysis Basic - ( 24 Mar 2019 03:36 )    Color: Yellow / Appearance: Clear / S.010 / pH: x  Gluc: x / Ketone: Negative  / Bili: Negative / Urobili: Negative   Blood: x / Protein: Negative / Nitrite: Negative   Leuk Esterase: Moderate / RBC: 11-25 /HPF / WBC 6-10 /HPF   Sq Epi: x / Non Sq Epi: Neg.-Few / Bacteria: Trace /HPF          RADIOLOGY & ADDITIONAL TESTS:    Care Discussed with Consultants/Other Providers:

## 2019-03-26 ENCOUNTER — TRANSCRIPTION ENCOUNTER (OUTPATIENT)
Age: 74
End: 2019-03-26

## 2019-03-26 VITALS
TEMPERATURE: 99 F | DIASTOLIC BLOOD PRESSURE: 90 MMHG | SYSTOLIC BLOOD PRESSURE: 145 MMHG | HEART RATE: 96 BPM | RESPIRATION RATE: 13 BRPM | OXYGEN SATURATION: 97 %

## 2019-03-26 LAB
GLUCOSE BLDC GLUCOMTR-MCNC: 186 MG/DL — HIGH (ref 70–99)
GLUCOSE BLDC GLUCOMTR-MCNC: 83 MG/DL — SIGNIFICANT CHANGE UP (ref 70–99)

## 2019-03-26 PROCEDURE — 99233 SBSQ HOSP IP/OBS HIGH 50: CPT

## 2019-03-26 PROCEDURE — 99232 SBSQ HOSP IP/OBS MODERATE 35: CPT

## 2019-03-26 RX ORDER — LEVETIRACETAM 250 MG/1
2 TABLET, FILM COATED ORAL
Qty: 0 | Refills: 0 | COMMUNITY
Start: 2019-03-26

## 2019-03-26 RX ORDER — INSULIN GLARGINE 100 [IU]/ML
18 INJECTION, SOLUTION SUBCUTANEOUS
Qty: 0 | Refills: 0 | COMMUNITY
Start: 2019-03-26

## 2019-03-26 RX ORDER — ACETAMINOPHEN 500 MG
2 TABLET ORAL
Qty: 0 | Refills: 0 | COMMUNITY
Start: 2019-03-26

## 2019-03-26 RX ORDER — SENNA PLUS 8.6 MG/1
2 TABLET ORAL
Qty: 0 | Refills: 0 | COMMUNITY
Start: 2019-03-26

## 2019-03-26 RX ORDER — NYSTATIN CREAM 100000 [USP'U]/G
1 CREAM TOPICAL
Qty: 0 | Refills: 0 | COMMUNITY
Start: 2019-03-26

## 2019-03-26 RX ORDER — INSULIN LISPRO 100/ML
0 VIAL (ML) SUBCUTANEOUS
Qty: 0 | Refills: 0 | COMMUNITY
Start: 2019-03-26

## 2019-03-26 RX ORDER — DEXAMETHASONE 0.5 MG/5ML
2 ELIXIR ORAL
Qty: 0 | Refills: 0 | COMMUNITY
Start: 2019-03-26

## 2019-03-26 RX ORDER — ZINC SULFATE TAB 220 MG (50 MG ZINC EQUIVALENT) 220 (50 ZN) MG
1 TAB ORAL
Qty: 0 | Refills: 0 | COMMUNITY
Start: 2019-03-26

## 2019-03-26 RX ORDER — LACOSAMIDE 50 MG/1
1 TABLET ORAL
Qty: 0 | Refills: 0 | COMMUNITY
Start: 2019-03-26

## 2019-03-26 RX ORDER — PANTOPRAZOLE SODIUM 20 MG/1
1 TABLET, DELAYED RELEASE ORAL
Qty: 0 | Refills: 0 | COMMUNITY
Start: 2019-03-26

## 2019-03-26 RX ORDER — ENOXAPARIN SODIUM 100 MG/ML
40 INJECTION SUBCUTANEOUS
Qty: 0 | Refills: 0 | COMMUNITY
Start: 2019-03-26

## 2019-03-26 RX ORDER — FINASTERIDE 5 MG/1
1 TABLET, FILM COATED ORAL
Qty: 0 | Refills: 0 | COMMUNITY
Start: 2019-03-26

## 2019-03-26 RX ORDER — METFORMIN HYDROCHLORIDE 850 MG/1
500 TABLET ORAL
Qty: 0 | Refills: 0 | COMMUNITY

## 2019-03-26 RX ORDER — INSULIN LISPRO 100/ML
6 VIAL (ML) SUBCUTANEOUS
Qty: 0 | Refills: 0 | COMMUNITY
Start: 2019-03-26

## 2019-03-26 RX ORDER — FINASTERIDE 5 MG/1
1 TABLET, FILM COATED ORAL
Qty: 0 | Refills: 0 | COMMUNITY

## 2019-03-26 RX ORDER — METFORMIN HYDROCHLORIDE 850 MG/1
1 TABLET ORAL
Qty: 0 | Refills: 0 | COMMUNITY
Start: 2019-03-26

## 2019-03-26 RX ORDER — LISINOPRIL 2.5 MG/1
1 TABLET ORAL
Qty: 0 | Refills: 0 | COMMUNITY
Start: 2019-03-26

## 2019-03-26 RX ORDER — ASCORBIC ACID 60 MG
1 TABLET,CHEWABLE ORAL
Qty: 0 | Refills: 0 | COMMUNITY
Start: 2019-03-26

## 2019-03-26 RX ADMIN — LACOSAMIDE 100 MILLIGRAM(S): 50 TABLET ORAL at 05:25

## 2019-03-26 RX ADMIN — PANTOPRAZOLE SODIUM 40 MILLIGRAM(S): 20 TABLET, DELAYED RELEASE ORAL at 05:25

## 2019-03-26 RX ADMIN — LISINOPRIL 20 MILLIGRAM(S): 2.5 TABLET ORAL at 05:25

## 2019-03-26 RX ADMIN — ENOXAPARIN SODIUM 40 MILLIGRAM(S): 100 INJECTION SUBCUTANEOUS at 11:57

## 2019-03-26 RX ADMIN — Medication 1 TABLET(S): at 12:22

## 2019-03-26 RX ADMIN — Medication 2 MILLIGRAM(S): at 05:25

## 2019-03-26 RX ADMIN — Medication 1: at 11:57

## 2019-03-26 RX ADMIN — Medication 6 UNIT(S): at 11:57

## 2019-03-26 RX ADMIN — LEVETIRACETAM 1500 MILLIGRAM(S): 250 TABLET, FILM COATED ORAL at 05:25

## 2019-03-26 RX ADMIN — NYSTATIN CREAM 1 APPLICATION(S): 100000 CREAM TOPICAL at 05:25

## 2019-03-26 RX ADMIN — ZINC SULFATE TAB 220 MG (50 MG ZINC EQUIVALENT) 220 MILLIGRAM(S): 220 (50 ZN) TAB at 11:57

## 2019-03-26 RX ADMIN — Medication 500 MILLIGRAM(S): at 11:57

## 2019-03-26 RX ADMIN — METFORMIN HYDROCHLORIDE 500 MILLIGRAM(S): 850 TABLET ORAL at 08:00

## 2019-03-26 RX ADMIN — FINASTERIDE 5 MILLIGRAM(S): 5 TABLET, FILM COATED ORAL at 12:22

## 2019-03-26 NOTE — PROGRESS NOTE ADULT - SUBJECTIVE AND OBJECTIVE BOX
HISTORY OF PRESENT ILLNESS  Mr. Duncan is a 74 year old male with a PMHx of GBM s/p right craniotomy in 2019, CVA with residual L sided weakness who presented to [facility] on 2/12/19 with seizures likely from recurrence of righ temporal lobe GBM complicated by sepsis from viral URI, acute embolic CVA, PFO, LLE DVT s/p IVC, and SAH.     Pt was BIBEMS from nursing home for active seizure 2/12/19. Per ED he was actively seizing for 30 mins prior to EMS arrival, and displayed left sided face, arm, and leg jerking in the ED. Pt received Ativan and Keppra and seizure broke after 20 mins in the ED. On admission, pt was found to be febrile with leukocytosis 31.56 meeting SIRS criteria and was found to have +coronavirus. Neurology and neurosurgery were consulted with the plan to have repeat craniotomy and GBM resection pending bacteremia clearance. On 2/15 rapid responses called for pt due to 3 repeat seizures and unresponsiveness with new left facial droop- given Ativan and Keppra per neurosurgery recommendations MRI 2/15/29 showed 3 discrete foci of acute infarction within the right occipital lobe, left corpus callosum within the genu and left frontal lobe in the region of the precentral gyrus. Pt placed on heparin gtt for anticoagulation during workup. Doppler U/S showed acute DVT of left common femoral, popliteal, posterior tibial, and peroneal vein, and ALBERTO 2/20/19 showed evidence of a PFO. Pt had placement of IVC filter on 2/19/19 and heparin gtt was discontinued in preparation for neurosurgical procedure which was scheduled for 2/25 but was delayed due to episodes of bradycardia in 40s. Pt had R craniotomy for resection of brain tumor on 2/27/19. Post/op course c/b seizures 2/2 SAH on CT 3/2 for which pt was given Keppra and Vimpat, and transferred to SICU for further monitoring. Pt seizures and hemorrhage stabilized and plan for discharge to rehab. On the day of discharge he was medically and neurologically stable for discharge to rehab on 3/6/19.    TODAY'S REVIEW OF SYMPTOMS  [  ] Constitutional WNL           [X] Cardio WNL               [X] Resp WNL  [X] GI WNL                            [X]  WNL                    [X] Heme WNL  [X] Endo WNL                       [X] Skin WNL                   [  ] MSK WNL  [  ] Neuro WNL                     [X] Cognitive WNL           [X] Psych WNL    SUBJECTIVE  Patient seen and examined. No acute events overnight. Reports he slept well last night. Spoke with neuro-oncology and recommending patient to come in for mapping for plans for chemo/radiation as soon as possible, latest 3/28/19, not cleared for air travel, going to [place] for treatment would delay treatment for too long with increased risk to patient. Working with family and  to plan.     Vital Signs Last 24 Hrs  T(C): 37.2 (26 Mar 2019 07:55), Max: 37.2 (26 Mar 2019 07:55)  T(F): 98.9 (26 Mar 2019 07:55), Max: 98.9 (26 Mar 2019 07:55)  HR: 96 (26 Mar 2019 07:55) (62 - 96)  BP: 145/90 (26 Mar 2019 07:55) (106/80 - 145/90)  BP(mean): --  RR: 13 (26 Mar 2019 07:55) (13 - 15)  SpO2: 97% (26 Mar 2019 07:55) (96% - 98%)    PHYSICAL EXAM  Constitutional - NAD, Comfortable  HEENT - Right cranial incision healing C/D/I  Chest - CTA b/L, No W/R/R  Cardiovascular - S1S2, RRR  Abdomen - Soft, Non-distended, Non-tender, +BS  Extremities - No bilateral lower extremity edema  Neurologic Exam -                 	   Cognitive - Drowsy but arousable, Oriented to self and hospital, not date, year, situation  	   Communication - Fluent, No dysarthria  	   Attention - Impaired, poor attention span  	   Memory - Impaired, poor carryover  	   Cranial Nerves - Right eye reactivity sluggish, Impaired left visual field, Left visual inattention  	   Motor -   	                  LEFT    UE - ShAB 5/5, EF 5/5, EE 5/5, WE 5/5,  5/5  	                  RIGHT UE - ShAB 5/5, EF 5/5, EE 5/5, WE 5/5,  5/5  	                  LEFT    LE - HF 5/5, KE 5/5, KF 4/5, DF 5/5, PF 5/5  	                  RIGHT LE - HF 5/5, KE 5/5, KF 4/5, DF 5/5, PF 5/5     Mood - Flat affect  Skin - Superior sacral stage II - 2.5 x 0.5cm, Inferior sacral stage II - 2.5 x 3.5cm, two left gluteal wounds 1 x 0.5cm    RECENT LABS/IMAGING             11.6   14.6  )-----------( 401      ( 25 Mar 2019 05:45 )             35.1     140  |  102  |  27<H>  ----------------------------<  116<H>  3.9   |  27  |  0.81    Ca    9.3      25 Mar 2019 05:45    U/A (3/24/19) - Mod LE, Neg nitrite, WBC 6-10, Trace bacteria   Urine culture pending    MEDICATIONS   MEDICATIONS  (STANDING):  ascorbic acid 500 milliGRAM(s) Oral daily  dexamethasone     Tablet 2 milliGRAM(s) Oral every 12 hours  enoxaparin Injectable 40 milliGRAM(s) SubCutaneous daily  finasteride 5 milliGRAM(s) Oral daily  insulin glargine Injectable (LANTUS) 18 Unit(s) SubCutaneous at bedtime  insulin lispro (HumaLOG) corrective regimen sliding scale   SubCutaneous Before meals and at bedtime  insulin lispro Injectable (HumaLOG) 6 Unit(s) SubCutaneous three times a day before meals  lacosamide 100 milliGRAM(s) Oral every 12 hours  levETIRAcetam 1500 milliGRAM(s) Oral every 12 hours  lisinopril 20 milliGRAM(s) Oral daily  metFORMIN 500 milliGRAM(s) Oral two times a day  multivitamin 1 Tablet(s) Oral daily  nystatin Powder 1 Application(s) Topical two times a day  pantoprazole    Tablet 40 milliGRAM(s) Oral before breakfast  zinc sulfate 220 milliGRAM(s) Oral daily    MEDICATIONS  (PRN):  acetaminophen   Tablet .. 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 He is now with functional, gait, ADL, transfer, balance, endurance, cognitive, swallow impairments.    ~Recurrent GBM s/p craniotomy and resection  -Continue 3 hours of comprehensive therapy consisting of PT/OT/SLP/Neuropsychology  -Precautions: fall, cardiac, aspiration  -Seizure PPx with Keppra and Vimpat  -Staples removed on  3/15  -Decadron taper completed - to remain on 2mg [id number] until follow up with neuro-oncology  -Spoke with nurse practitioner for neuro-oncology, patient to be seen no later than 3/28/19 to map for chemo/radiation. To speak with family about follow up appointment and transition to [facility], will coordinate so patient is able to follow up within this week.     ~DM   -Continue Lantus, premeal insulin and Metformin  -Consistent carb diet    ~Pain control  -Denies pain at this time, continue Tylenol PRN    ~HTN  -Continue Lisinopril    ~E. coli UTI/Leukocytosis  -Completed 7 day course of Cipro on 3/15  -Pyuria improved, urine culture pending   -Elevated WBC likely from steroids    ~Anemia  -Hgb stable, no obvious signs of bleeding  -Continue MVI     ~ regimen  -Incontinent   -Continue Finasteride    ~GI/Bowel regimen  -Continue Protonix  -Continue Senna PRN  -Low fiber diet for continuous soft stools  -Diet: Mechanical soft (consistent carb and fluid restriction)  -Restorative dining  -Supervise meals    ~Skin/Stage II sacral and left gluteal pressure ulcers  -Turn and position Q2hrs  -OOB with PT/OT  -Nystatin to groin  -Wound gel to open ulcer - cover surrounding skin with Cavilon to prevent breakdown from moisture  -Continue Vit C and Zinc sulfate to promote wound healing    ~DVT PPx  -Continue lovenox     ~IDT Rounds  -Case discussed 3/19  OT: mod to max assist; apraxic, min awareness of left side; requires constant cueing for attention; goals are for min assist 2 weeks  PT: Min to Mod assist for transfers and ambulated 75 Ft RW min A. Did 4 steps with mod assist. Barriers are cognition, endurance, impaired LUE, requires constant cueing. Goals are for supervision level for transfers and min assist for ambulation  SLP: significant cognitive issues- mainly attention, and focus.  Poor carryover  Will discuss pt's needs for discharge with his son--Pt. will need physical assistance and adequate supervision at home. Son is looking into [facility]--will need discharge this week as pt. will need to f/u with radiation oncology for appointment for radiation mapping to initiate treatment.     -Psych consult reviewed and appreciated. Pt. does have capacity to choose his medical proxy and minor decisions regarding his care.    -SW To speak with family about transitioning to [facility] earlier so patient would be able to make follow up appointment with neuro-oncology for mapping for chemo/radiation. HISTORY OF PRESENT ILLNESS  Mr. Duncan is a 74 year old male with a PMHx of GBM s/p right craniotomy in 2019, CVA with residual L sided weakness who presented to [facility] on 2/12/19 with seizures likely from recurrence of righ temporal lobe GBM complicated by sepsis from viral URI, acute embolic CVA, PFO, LLE DVT s/p IVC, and SAH.     Pt was BIBEMS from nursing home for active seizure 2/12/19. Per ED he was actively seizing for 30 mins prior to EMS arrival, and displayed left sided face, arm, and leg jerking in the ED. Pt received Ativan and Keppra and seizure broke after 20 mins in the ED. On admission, pt was found to be febrile with leukocytosis 31.56 meeting SIRS criteria and was found to have +coronavirus. Neurology and neurosurgery were consulted with the plan to have repeat craniotomy and GBM resection pending bacteremia clearance. On 2/15 rapid responses called for pt due to 3 repeat seizures and unresponsiveness with new left facial droop- given Ativan and Keppra per neurosurgery recommendations MRI 2/15/29 showed 3 discrete foci of acute infarction within the right occipital lobe, left corpus callosum within the genu and left frontal lobe in the region of the precentral gyrus. Pt placed on heparin gtt for anticoagulation during workup. Doppler U/S showed acute DVT of left common femoral, popliteal, posterior tibial, and peroneal vein, and ALBERTO 2/20/19 showed evidence of a PFO. Pt had placement of IVC filter on 2/19/19 and heparin gtt was discontinued in preparation for neurosurgical procedure which was scheduled for 2/25 but was delayed due to episodes of bradycardia in 40s. Pt had R craniotomy for resection of brain tumor on 2/27/19. Post/op course c/b seizures 2/2 SAH on CT 3/2 for which pt was given Keppra and Vimpat, and transferred to SICU for further monitoring. Pt seizures and hemorrhage stabilized and plan for discharge to rehab. On the day of discharge he was medically and neurologically stable for discharge to rehab on 3/6/19.    TODAY'S REVIEW OF SYMPTOMS  [  ] Constitutional WNL           [X] Cardio WNL               [X] Resp WNL  [X] GI WNL                            [X]  WNL                    [X] Heme WNL  [X] Endo WNL                       [X] Skin WNL                   [  ] MSK WNL  [  ] Neuro WNL                     [X] Cognitive WNL           [X] Psych WNL    SUBJECTIVE  Patient seen and examined. No acute events overnight. Reports he slept well last night. Spoke with radiation-oncology and pt's neurosurgeon, Dr. Gonzalez-- recommending patient to come in for mapping for plans for chemo/radiation as soon as possible, latest 3/28/19, not cleared for air travel, going to [place] for treatment would delay treatment for too long with increased risk to patient.     Vital Signs Last 24 Hrs  T(C): 37.2 (26 Mar 2019 07:55), Max: 37.2 (26 Mar 2019 07:55)  T(F): 98.9 (26 Mar 2019 07:55), Max: 98.9 (26 Mar 2019 07:55)  HR: 96 (26 Mar 2019 07:55) (62 - 96)  BP: 145/90 (26 Mar 2019 07:55) (106/80 - 145/90)  BP(mean): --  RR: 13 (26 Mar 2019 07:55) (13 - 15)  SpO2: 97% (26 Mar 2019 07:55) (96% - 98%)    PHYSICAL EXAM  Constitutional - NAD, Comfortable  HEENT - Right cranial incision healing C/D/I  Chest - CTA b/L, No W/R/R  Cardiovascular - S1S2, RRR  Abdomen - Soft, Non-distended, Non-tender, +BS  Extremities - No bilateral lower extremity edema  Neurologic Exam -                 	   Cognitive - Drowsy but arousable, Oriented to self and hospital, not date, year, situation  	   Communication - Fluent, No dysarthria  	   Attention - Impaired, poor attention span  	   Memory - Impaired, poor carryover  	   Cranial Nerves - Right eye reactivity sluggish, Impaired left visual field, Left visual inattention  	   Motor -   	                  LEFT    UE - ShAB 5/5, EF 5/5, EE 5/5, WE 5/5,  5/5  	                  RIGHT UE - ShAB 5/5, EF 5/5, EE 5/5, WE 5/5,  5/5  	                  LEFT    LE - HF 5/5, KE 5/5, KF 4/5, DF 5/5, PF 5/5  	                  RIGHT LE - HF 5/5, KE 5/5, KF 4/5, DF 5/5, PF 5/5     Mood - Flat affect  Skin - Superior sacral stage II - 2.5 x 0.5cm, Inferior sacral stage II - 2.5 x 3.5cm, two left gluteal wounds 1 x 0.5cm    RECENT LABS/IMAGING             11.6   14.6  )-----------( 401      ( 25 Mar 2019 05:45 )             35.1     140  |  102  |  27<H>  ----------------------------<  116<H>  3.9   |  27  |  0.81    Ca    9.3      25 Mar 2019 05:45    U/A (3/24/19) - Mod LE, Neg nitrite, WBC 6-10, Trace bacteria   Urine culture pending    MEDICATIONS   MEDICATIONS  (STANDING):  ascorbic acid 500 milliGRAM(s) Oral daily  dexamethasone     Tablet 2 milliGRAM(s) Oral every 12 hours  enoxaparin Injectable 40 milliGRAM(s) SubCutaneous daily  finasteride 5 milliGRAM(s) Oral daily  insulin glargine Injectable (LANTUS) 18 Unit(s) SubCutaneous at bedtime  insulin lispro (HumaLOG) corrective regimen sliding scale   SubCutaneous Before meals and at bedtime  insulin lispro Injectable (HumaLOG) 6 Unit(s) SubCutaneous three times a day before meals  lacosamide 100 milliGRAM(s) Oral every 12 hours  levETIRAcetam 1500 milliGRAM(s) Oral every 12 hours  lisinopril 20 milliGRAM(s) Oral daily  metFORMIN 500 milliGRAM(s) Oral two times a day  multivitamin 1 Tablet(s) Oral daily  nystatin Powder 1 Application(s) Topical two times a day  pantoprazole    Tablet 40 milliGRAM(s) Oral before breakfast  zinc sulfate 220 milliGRAM(s) Oral daily    MEDICATIONS  (PRN):  acetaminophen   Tablet .. 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 He is now with functional, gait, ADL, transfer, balance, endurance, cognitive, swallow impairments.    ~Recurrent GBM s/p craniotomy and resection  -Continue 3 hours of comprehensive therapy consisting of PT/OT/SLP/Neuropsychology  -Precautions: fall, cardiac, aspiration  -Seizure PPx with Keppra and Vimpat  -Staples removed on  3/15  -Decadron taper completed - to remain on 2mg [id number] until follow up with neuro-oncology  -Spoke with nurse practitioner for neuro-oncology, patient to be seen no later than 3/28/19 to map for chemo/radiation. To speak with family about follow up appointment and transition to [facility], will coordinate so patient is able to follow up within this week.     ~DM   -Continue Lantus, premeal insulin and Metformin  -Consistent carb diet    ~Pain control  -Denies pain at this time, continue Tylenol PRN    ~HTN  -Continue Lisinopril    ~E. coli UTI/Leukocytosis  -Completed 7 day course of Cipro on 3/15  -Pyuria improved, urine culture pending   -Elevated WBC likely from steroids    ~Anemia  -Hgb stable, no obvious signs of bleeding  -Continue MVI     ~ regimen  -Incontinent   -Continue Finasteride    ~GI/Bowel regimen  -Continue Protonix  -Continue Senna PRN  -Low fiber diet for continuous soft stools  -Diet: Mechanical soft (consistent carb and fluid restriction)  -Restorative dining  -Supervise meals    ~Skin/Stage II sacral and left gluteal pressure ulcers  -Turn and position Q2hrs  -OOB with PT/OT  -Nystatin to groin  -Wound gel to open ulcer - cover surrounding skin with Cavilon to prevent breakdown from moisture  -Continue Vit C and Zinc sulfate to promote wound healing    ~DVT PPx  -Continue lovenox     ~IDT Rounds  -Case discussed 3/19  OT: mod to max assist; apraxic, min awareness of left side; requires constant cueing for attention; goals are for min assist 2 weeks  PT: Min to Mod assist for transfers and ambulated 75 Ft RW min A. Did 4 steps with mod assist. Barriers are cognition, endurance, impaired LUE, requires constant cueing. Goals are for supervision level for transfers and min assist for ambulation  SLP: significant cognitive issues- mainly attention, and focus.  Poor carryover  Will discuss pt's needs for discharge with his son--Pt. will need physical assistance and adequate supervision at home. Son is looking into [facility]--will need discharge this week as pt. will need to f/u with radiation oncology for appointment for radiation mapping to initiate treatment.     -Psych consult reviewed and appreciated. Pt. does have capacity to choose his medical proxy and minor decisions regarding his care.

## 2019-03-26 NOTE — PROGRESS NOTE ADULT - ATTENDING COMMENTS
Pt. seen with resident.  Agree with documentation above as per resident with amendments made as appropriate. Patient medically stable. Making progress towards rehab goals.
Pt. seen with resident.  Agree with documentation above as per resident. Patient medically stable. Making progress towards rehab goals.     Loose stools ---off bowel meds.   change to low fiber diet.   will d/w hospitalist
Pt. seen with  fellow.  Agree with documentation above as per fellow with amendments made as appropriate. Patient medically stable. Making progress towards rehab goals.
Pt. seen with resident & fellow.  Agree with documentation above as per resident with amendments made as appropriate. Patient medically stable. Making progress towards rehab goals.     Pt's son, Catarino, wanted to take pt. to [place] where he lives and get his radiation and oncology treatment there.  I spoke with Dr. Gonzalez regarding clearance for pt. air travel.  Pt. is not cleared to fly and strongly advised to start get his mapping and start treatment as soon as possible given the concern for rapid decline if pt. delays treatment.  I spoke with pt's Son with [facility] and explained importance of pt. staying in [place] and following up with radiation oncology and neurooncology ASAP.  Best option is for pt to be discharge to [facility] locally as he still needs assistance and so he can get appropriate medical care. Son agrees.   Working with family and  to plan d/c to [facility].
Pt. seen with fellow.  Agree with documentation above as per fellow with amendments made as appropriate. Patient medically stable. Making gradual progress towards rehab goals.     Son looking into [facility] in [place], but as per radiation oncology, pt. needs to get mapping to initiate radiation therapy this week.  Will d/w family obtaining [facility] locally for now as pt. needs treatment and then transfer to [place] afterward.
Pt. seen with resident & fellow.  Agree with documentation above as per resident with amendments made as appropriate. Patient medically stable. Making progress towards rehab goals.     FS decreased--insulin adjusted by hospitalist
Pt. seen with resident & fellow.  Agree with documentation above as per resident with amendments made as appropriate. Patient medically stable. Making progress towards rehab goals.     Spoke with SW.  As of yet, we have not received any written HCP from son's .  Spoke with pt. regarding his care and who he wishes to help him when he is discharged.  Initially he said Chirag "because he is closer" and Catarino would have to come here from [place] so it would be difficult for him.  He mentions though that Chirag can be rude.    As per SW, Catarino has said that he would be able to take his father home to live with him in [place] and has looked into arranging for his oncology and medical follow up there.  When spoke with pt. regarding this and that he can live with either son and if decision is between going home with Catarino in [place] or Chirag in [place], he stated that he would prefer to live with Catarino.      will f/u with SW and sons regarding disposition as discharge approaches
Pt. seen with resident & fellow.  Agree with documentation above as per resident with amendments made as appropriate. Patient medically stable. Making progress towards rehab goals.    Leukocytosis improved
Pt. seen with resident & fellow.  Agree with documentation above as per resident. Patient medically stable. Making progress towards rehab goals.
Pt. seen with resident & fellow.  Agree with documentation above as per resident. Patient medically stable. Making progress towards rehab goals.
Pt. seen with resident & fellow.  Agree with documentation above as per resident. Patient medically stable. Making progress towards rehab goals.    pt. with UTI--started on cipro.  Awaiting urine culture sensitivities    Neuropsychology evaluation--pt. with partial capacity--has capacity to determine who he wishes to be involved with his care---  Patient states that he wants his son, Catarino, who lives in [place] to be his main person in charge with medical decisions involving his care.

## 2019-03-26 NOTE — DISCHARGE NOTE PROVIDER - HOSPITAL COURSE
Mr. Duncan is a 74 year old male with a PMHx of GBM s/p right craniotomy in 2019, CVA with residual L sided weakness who presented to [facility] on 2/12/19 with seizures likely from recurrence of right temporal lobe GBM complicated by sepsis from viral URI, acute embolic CVA, PFO, LLE DVT s/p IVC, and SAH.         Pt was BIBEMS from nursing home for active seizure 2/12/19. Per ED he was actively seizing for 30 mins prior to EMS arrival, and displayed left sided face, arm, and leg jerking in the ED. Pt received Ativan and Keppra and seizure broke after 20 mins in the ED.     On admission, pt was found to be febrile with leukocytosis 31.56 meeting SIRS criteria and was found to have +coronavirus. Neurology and neurosurgery were consulted with the plan to have repeat craniotomy and GBM resection pending bacteremia clearance.     On 2/15 rapid responses called for pt due to 3 repeat seizures and unresponsiveness with new left facial droop- given Ativan and Keppra per neurosurgery recommendations MRI 2/15/29 showed 3 discrete foci of acute infarction within the right occipital lobe, left corpus callosum within the genu and left frontal lobe in the region of the precentral gyrus. Pt placed on heparin gtt for anticoagulation during workup. Doppler U/S showed acute DVT of left common femoral, popliteal, posterior tibial, and peroneal vein, and ALBERTO 2/20/19 showed evidence of a PFO. Pt had placement of IVC filter on 2/19/19 and heparin gtt was discontinued in preparation for neurosurgical procedure which was scheduled for 2/25 but was delayed due to episodes of bradycardia in 40s. Pt had R craniotomy for resection of brain tumor on 2/27/19. Post/op course c/b seizures 2/2 SAH on CT 3/2 for which pt was given Keppra and Vimpat, and transferred to SICU for further monitoring. Pt seizures and hemorrhage stabilized and plan for discharge to rehab. On the day of discharge he was medically and neurologically stable for discharge to rehab on 3/6/19.         Rehab Hospital Course: Mr. Duncan is a 74 year old male with a PMHx of GBM s/p right craniotomy in 2019, CVA with residual L sided weakness who presented to [facility] on 2/12/19 with seizures likely from recurrence of right temporal lobe GBM complicated by sepsis from viral URI, acute embolic CVA, PFO, LLE DVT s/p IVC, and SAH.         Pt was BIBEMS from nursing home for active seizure 2/12/19. Per ED he was actively seizing for 30 mins prior to EMS arrival, and displayed left sided face, arm, and leg jerking in the ED. Pt received Ativan and Keppra and seizure broke after 20 mins in the ED.     On admission, pt was found to be febrile with leukocytosis 31.56 meeting SIRS criteria and was found to have +coronavirus. Neurology and neurosurgery were consulted with the plan to have repeat craniotomy and GBM resection pending bacteremia clearance.     On 2/15 rapid responses called for pt due to 3 repeat seizures and unresponsiveness with new left facial droop- given Ativan and Keppra per neurosurgery recommendations MRI 2/15/29 showed 3 discrete foci of acute infarction within the right occipital lobe, left corpus callosum within the genu and left frontal lobe in the region of the precentral gyrus. Pt placed on heparin gtt for anticoagulation during workup. Doppler U/S showed acute DVT of left common femoral, popliteal, posterior tibial, and peroneal vein, and ALBERTO 2/20/19 showed evidence of a PFO. Pt had placement of IVC filter on 2/19/19 and heparin gtt was discontinued in preparation for neurosurgical procedure which was scheduled for 2/25 but was delayed due to episodes of bradycardia in 40s. Pt had R craniotomy for resection of brain tumor on 2/27/19. Post/op course c/b seizures 2/2 SAH on CT 3/2 for which pt was given Keppra and Vimpat, and transferred to SICU for further monitoring. Pt seizures and hemorrhage stabilized and plan for discharge to rehab. On the day of discharge he was medically and neurologically stable for discharge to rehab on 3/6/19.         Rehab Hospital Course:    Patient was continued on steroid taper, on admission he had leukocytosis and noted to have some hematuria with a positive UA, he was started on Ciprofloxacin for a 7 day course for UTI. Staples from craniotomy site were removed on 3/15 and wound was noted to be healing nicely. Insulin was adjusted for low blood sugars. Burning with urination was noted to resolve. Patient participated in daily course of PT/OT/ST. He was noted to have decreased safety awareness, cognitive deficits and was not able to make much progress in each modality of therapy. Remained at a min assist for PT, needing constant cueing. Noted to have persistent confabulation, perseveration, and difficulty staying awake, did not show any functional gains.    In discussion with his neurosurgeon he was not cleared to travel back to [place] with family and was needed to urgently continue therapy for GBM, he was ready for discharge to subacute rehab so he could resume radiation and chemo and to attend his appointment this Thursday 3/28.        Appointments with Radiation-Oncology    Thursday 3/28/19 12PM at [address] followed by                               1 PM Simulation appointment at same place

## 2019-03-26 NOTE — DISCHARGE NOTE NURSING/CASE MANAGEMENT/SOCIAL WORK - NSDCFUADDAPPT_GEN_ALL_CORE_FT
Follow up with Dr. Mil Grace located at [address] [place] [address] on Thursday, 3/28/19 at 12 noon.

## 2019-03-26 NOTE — DISCHARGE NOTE PROVIDER - CARE PROVIDERS DIRECT ADDRESSES
[email or web address]_Unknown,[email or web address],DirectAddress_Unknown shortly

## 2019-03-26 NOTE — DISCHARGE NOTE NURSING/CASE MANAGEMENT/SOCIAL WORK - NSDCPEPTSTRK_GEN_ALL_CORE
Stroke support groups for patients, families, and friends/Need for follow up after discharge/Prescribed medications/Risk factors for stroke/Stroke education booklet/Stroke warning signs and symptoms/Signs and symptoms of stroke/Call 911 for stroke

## 2019-03-26 NOTE — PROGRESS NOTE ADULT - SUBJECTIVE AND OBJECTIVE BOX
Patient is a 74y old  Male who presents with a chief complaint of Seizure, Functional deficits s/p GBM (25 Mar 2019 11:16)      Patient seen and examined at bedside.     ALLERGIES:  No Known Allergies    MEDICATIONS:  ascorbic acid 500 milliGRAM(s) Oral daily  enoxaparin Injectable 40 milliGRAM(s) SubCutaneous daily  insulin glargine Injectable (LANTUS) 18 Unit(s) SubCutaneous at bedtime  insulin lispro Injectable (HumaLOG) 6 Unit(s) SubCutaneous three times a day before meals  lacosamide 100 milliGRAM(s) Oral every 12 hours  multivitamin 1 Tablet(s) Oral daily  nystatin Powder 1 Application(s) Topical two times a day  zinc sulfate 220 milliGRAM(s) Oral daily    Vital Signs Last 24 Hrs  T(F): 98.9 (26 Mar 2019 07:55), Max: 98.9 (26 Mar 2019 07:55)  HR: 96 (26 Mar 2019 07:55) (62 - 96)  BP: 145/90 (26 Mar 2019 07:55) (106/80 - 145/90)  RR: 13 (26 Mar 2019 07:55) (13 - 15)  SpO2: 97% (26 Mar 2019 07:55) (96% - 98%)  I&O's Summary    25 Mar 2019 07:01  -  26 Mar 2019 07:00  --------------------------------------------------------  IN: 600 mL / OUT: 0 mL / NET: 600 mL        PHYSICAL EXAM:  General: NAD, comfortable   ENT: MMM  Neck: Supple, No JVD  Lungs: CTA, BLAE, No added sounds.   Cardio: RRR, S1/S2, No murmurs  Abdomen: Soft, NT/ND, BS+   Extremities: No edema  CNS: no new deficits     LABS:                        11.6   14.6  )-----------( 401      ( 25 Mar 2019 05:45 )             35.1         140  |  102  |  27  ----------------------------<  116  3.9   |  27  |  0.81    Ca    9.3      25 Mar 2019 05:45      eGFR if Non African American: 86 mL/min/1.73M2 (19 @ 05:45)  eGFR if African American: 100 mL/min/1.73M2 (19 @ 05:45)                    CAPILLARY BLOOD GLUCOSE      POCT Blood Glucose.: 83 mg/dL (26 Mar 2019 07:51)  POCT Blood Glucose.: 135 mg/dL (25 Mar 2019 20:02)  POCT Blood Glucose.: 100 mg/dL (25 Mar 2019 16:49)  POCT Blood Glucose.: 171 mg/dL (25 Mar 2019 11:13)    - [id number] 7.7    Urinalysis Basic - ( 24 Mar 2019 03:36 )    Color: Yellow / Appearance: Clear / S.010 / pH: x  Gluc: x / Ketone: Negative  / Bili: Negative / Urobili: Negative   Blood: x / Protein: Negative / Nitrite: Negative   Leuk Esterase: Moderate / RBC: 11-25 /HPF / WBC 6-10 /HPF   Sq Epi: x / Non Sq Epi: Neg.-Few / Bacteria: Trace /HPF          RADIOLOGY & ADDITIONAL TESTS:    Care Discussed with Consultants/Other Providers:

## 2019-03-26 NOTE — DISCHARGE NOTE PROVIDER - PROVIDER TOKENS
PROVIDER:[TOKEN:[[id number]],FOLLOWUP:[2 weeks]],PROVIDER:[TOKEN:[[id number]],FOLLOWUP:[2 weeks]],PROVIDER:[TOKEN:[[id number]],FOLLOWUP:[2 weeks]],PROVIDER:[TOKEN:[[id number]]],FREE:[LAST:[Primary Care],PHONE:[(   )    -],FAX:[(   )    -],ADDRESS:[Primary Care Doctor]]

## 2019-03-26 NOTE — DISCHARGE NOTE PROVIDER - NSDCCPCAREPLAN_GEN_ALL_CORE_FT
PRINCIPAL DISCHARGE DIAGNOSIS  Diagnosis: Glioblastoma  Assessment and Plan of Treatment: Patient has Rad Onc appointment to start chemotherapy and radiation- Thursday 12 PM [address] followed by Simulation at 1 PM      SECONDARY DISCHARGE DIAGNOSES  Diagnosis: Type 2 diabetes mellitus with complication, unspecified whether long term insulin use  Assessment and Plan of Treatment: Type 2 diabetes mellitus with complication, unspecified whether long term insulin use- continue insulin as prescribed    Diagnosis: Essential hypertension  Assessment and Plan of Treatment: Continue medications as prescribed, please follow up with primaryc are doctor    Diagnosis: Seizure prophylaxis  Assessment and Plan of Treatment: Continue medications as prescribed

## 2019-03-26 NOTE — DISCHARGE NOTE NURSING/CASE MANAGEMENT/SOCIAL WORK - NSDCDPATPORTLINK_GEN_ALL_CORE
You can access the [facility] Patient Portal, offered by [facility], by registering with the following website: [email or web address]

## 2019-03-26 NOTE — PROGRESS NOTE ADULT - ASSESSMENT
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 He is now with functional, gait, ADL, transfer, balance, endurance, cognitive, swallow impairments.    #GBM s/p craniotomy and resection  c/w PT/OT/SLP  c/w Decadron taper.    #Seizure PPx  c/w Keppra and Vimpat.     #HTN- controlled.   c/w Lisinopril.     #UTI- resolved  s/p ABx   Leucocytosis likely reactive  secondary to Steroids     #DM   c/w  Lantus 18  c/w pre meal 6-6-6  c/w Metformin 500mg BID  Hypoglycemia protocol.     # Anemia  H/H stable,   Monitor.     # DVT PPx  Lovenox

## 2019-03-26 NOTE — PROGRESS NOTE ADULT - REASON FOR ADMISSION
Seizure, Functional deficits s/p GBM

## 2019-03-28 ENCOUNTER — APPOINTMENT (OUTPATIENT)
Dept: PHYSICAL MEDICINE AND REHAB | Facility: CLINIC | Age: 74
End: 2019-03-28

## 2019-03-28 ENCOUNTER — APPOINTMENT (OUTPATIENT)
Dept: NEUROLOGY | Facility: CLINIC | Age: 74
End: 2019-03-28

## 2019-03-28 ENCOUNTER — APPOINTMENT (OUTPATIENT)
Dept: RADIATION ONCOLOGY | Facility: CLINIC | Age: 74
End: 2019-03-28

## 2019-03-28 ENCOUNTER — INPATIENT (INPATIENT)
Facility: HOSPITAL | Age: 74
LOS: 5 days | Discharge: ANOTHER HOSPITAL | DRG: 56 | End: 2019-04-03
Attending: INTERNAL MEDICINE | Admitting: INTERNAL MEDICINE
Payer: MEDICARE

## 2019-03-28 VITALS
RESPIRATION RATE: 18 BRPM | SYSTOLIC BLOOD PRESSURE: 111 MMHG | HEART RATE: 92 BPM | WEIGHT: 179.9 LBS | TEMPERATURE: 99 F | DIASTOLIC BLOOD PRESSURE: 71 MMHG | OXYGEN SATURATION: 97 %

## 2019-03-28 DIAGNOSIS — Z98.890 OTHER SPECIFIED POSTPROCEDURAL STATES: Chronic | ICD-10-CM

## 2019-03-28 LAB
ALBUMIN SERPL ELPH-MCNC: 2.4 G/DL — LOW (ref 3.3–5)
ALP SERPL-CCNC: 65 U/L — SIGNIFICANT CHANGE UP (ref 40–120)
ALT FLD-CCNC: 24 U/L DA — SIGNIFICANT CHANGE UP (ref 10–45)
ANION GAP SERPL CALC-SCNC: 8 MMOL/L — SIGNIFICANT CHANGE UP (ref 5–17)
AST SERPL-CCNC: 17 U/L — SIGNIFICANT CHANGE UP (ref 10–40)
BASOPHILS # BLD AUTO: 0.1 K/UL — SIGNIFICANT CHANGE UP (ref 0–0.2)
BASOPHILS NFR BLD AUTO: 0.7 % — SIGNIFICANT CHANGE UP (ref 0–2)
BILIRUB SERPL-MCNC: 0.2 MG/DL — SIGNIFICANT CHANGE UP (ref 0.2–1.2)
BUN SERPL-MCNC: 19 MG/DL — SIGNIFICANT CHANGE UP (ref 7–23)
CALCIUM SERPL-MCNC: 8.4 MG/DL — SIGNIFICANT CHANGE UP (ref 8.4–10.5)
CHLORIDE SERPL-SCNC: 103 MMOL/L — SIGNIFICANT CHANGE UP (ref 96–108)
CO2 SERPL-SCNC: 29 MMOL/L — SIGNIFICANT CHANGE UP (ref 22–31)
CREAT SERPL-MCNC: 0.8 MG/DL — SIGNIFICANT CHANGE UP (ref 0.5–1.3)
EOSINOPHIL # BLD AUTO: 0 K/UL — SIGNIFICANT CHANGE UP (ref 0–0.5)
EOSINOPHIL NFR BLD AUTO: 0.2 % — SIGNIFICANT CHANGE UP (ref 0–6)
GLUCOSE SERPL-MCNC: 186 MG/DL — HIGH (ref 70–99)
HCT VFR BLD CALC: 35.5 % — LOW (ref 39–50)
HGB BLD-MCNC: 11.4 G/DL — LOW (ref 13–17)
LYMPHOCYTES # BLD AUTO: 1.6 K/UL — SIGNIFICANT CHANGE UP (ref 1–3.3)
LYMPHOCYTES # BLD AUTO: 10.8 % — LOW (ref 13–44)
MCHC RBC-ENTMCNC: 30.4 PG — SIGNIFICANT CHANGE UP (ref 27–34)
MCHC RBC-ENTMCNC: 32.2 GM/DL — SIGNIFICANT CHANGE UP (ref 32–36)
MCV RBC AUTO: 94.2 FL — SIGNIFICANT CHANGE UP (ref 80–100)
MONOCYTES # BLD AUTO: 1.3 K/UL — HIGH (ref 0–0.9)
MONOCYTES NFR BLD AUTO: 8.7 % — SIGNIFICANT CHANGE UP (ref 1–9)
NEUTROPHILS # BLD AUTO: 12.1 K/UL — HIGH (ref 1.8–7.4)
NEUTROPHILS NFR BLD AUTO: 79.5 % — HIGH (ref 43–77)
PLATELET # BLD AUTO: 286 K/UL — SIGNIFICANT CHANGE UP (ref 150–400)
POTASSIUM SERPL-MCNC: 4.3 MMOL/L — SIGNIFICANT CHANGE UP (ref 3.5–5.3)
POTASSIUM SERPL-SCNC: 4.3 MMOL/L — SIGNIFICANT CHANGE UP (ref 3.5–5.3)
PROT SERPL-MCNC: 6 G/DL — SIGNIFICANT CHANGE UP (ref 6–8.3)
RBC # BLD: 3.77 M/UL — LOW (ref 4.2–5.8)
RBC # FLD: 14.2 % — SIGNIFICANT CHANGE UP (ref 10.3–14.5)
SODIUM SERPL-SCNC: 140 MMOL/L — SIGNIFICANT CHANGE UP (ref 135–145)
WBC # BLD: 15.2 K/UL — HIGH (ref 3.8–10.5)
WBC # FLD AUTO: 15.2 K/UL — HIGH (ref 3.8–10.5)

## 2019-03-28 PROCEDURE — 70450 CT HEAD/BRAIN W/O DYE: CPT | Mod: 26

## 2019-03-28 PROCEDURE — 99285 EMERGENCY DEPT VISIT HI MDM: CPT

## 2019-03-28 NOTE — ED PROVIDER NOTE - ATTENDING CONTRIBUTION TO CARE
I have personally seen and examined this patient. I have fully participated in the care of this patient. I have reviewed all pertinent clinical information, including history physical exam, plan and the PA's note and agree except as noted  75 y/o male with h/o right side GBM, was admitted to [facility] and was discharged to NH, pt was taken to [facility] yesterday for seizure, was admitted for obs, and discharged [place], pt again had seizure today so. pt was brought to [place].  Pt is unable to give detail history.   on exam pt has left side weakness,    will repeat CT scan and see if there is any changes.

## 2019-03-28 NOTE — ED PROVIDER NOTE - CLINICAL SUMMARY MEDICAL DECISION MAKING FREE TEXT BOX
pt here for persistent seizures at rehab, no active seizure or complaints  -cbc, cmp, keppra level, ct head; reassess

## 2019-03-28 NOTE — ED PROVIDER NOTE - OBJECTIVE STATEMENT
75 y/o M PMH GBM s/p right craniotomy in 2019, CVA with residual L sided weakness presents from rehab facility today d/t persistent break through seizures at rehab facility. Pt has no complaints at this time. Denies fever, chills, CP, SOB, abdominal pain, n/v, worsening weakness, numbness/tingling.

## 2019-03-28 NOTE — ED ADULT NURSE NOTE - NSIMPLEMENTINTERV_GEN_ALL_ED
Implemented All Universal Safety Interventions:  [place] to call system. Call bell, personal items and telephone within reach. Instruct patient to call for assistance. Room bathroom lighting operational. Non-slip footwear when patient is off stretcher. Physically safe environment: no spills, clutter or unnecessary equipment. Stretcher in lowest position, wheels locked, appropriate side rails in place.

## 2019-03-28 NOTE — ED PROVIDER NOTE - PSYCHIATRIC, MLM
Alert and oriented to person, place only. normal mood and affect. no apparent risk to self or others.

## 2019-03-28 NOTE — ED ADULT NURSE NOTE - OBJECTIVE STATEMENT
pt sent from rehab for medical evaluation seizures. pt is currently on seizure medications.  pt Aox3 able to answer questions. pt on CCM. pt w/ left sided weakness. pt denies pain. pt denies sob, fevers/ chills.

## 2019-03-29 DIAGNOSIS — C71.9 MALIGNANT NEOPLASM OF BRAIN, UNSPECIFIED: ICD-10-CM

## 2019-03-29 DIAGNOSIS — I10 ESSENTIAL (PRIMARY) HYPERTENSION: ICD-10-CM

## 2019-03-29 DIAGNOSIS — Z98.890 OTHER SPECIFIED POSTPROCEDURAL STATES: Chronic | ICD-10-CM

## 2019-03-29 DIAGNOSIS — N40.1 BENIGN PROSTATIC HYPERPLASIA WITH LOWER URINARY TRACT SYMPTOMS: ICD-10-CM

## 2019-03-29 DIAGNOSIS — R56.9 UNSPECIFIED CONVULSIONS: ICD-10-CM

## 2019-03-29 DIAGNOSIS — I63.213 CEREBRAL INFARCTION DUE TO UNSPECIFIED OCCLUSION OR STENOSIS OF BILATERAL VERTEBRAL ARTERIES: ICD-10-CM

## 2019-03-29 DIAGNOSIS — E11.9 TYPE 2 DIABETES MELLITUS WITHOUT COMPLICATIONS: ICD-10-CM

## 2019-03-29 LAB
ALBUMIN SERPL ELPH-MCNC: 2.7 G/DL — LOW (ref 3.3–5)
ALP SERPL-CCNC: 70 U/L — SIGNIFICANT CHANGE UP (ref 40–120)
ALT FLD-CCNC: 25 U/L DA — SIGNIFICANT CHANGE UP (ref 10–45)
ANION GAP SERPL CALC-SCNC: 20 MMOL/L — HIGH (ref 5–17)
AST SERPL-CCNC: 18 U/L — SIGNIFICANT CHANGE UP (ref 10–40)
BILIRUB SERPL-MCNC: 0.3 MG/DL — SIGNIFICANT CHANGE UP (ref 0.2–1.2)
BUN SERPL-MCNC: 16 MG/DL — SIGNIFICANT CHANGE UP (ref 7–23)
CALCIUM SERPL-MCNC: 9.1 MG/DL — SIGNIFICANT CHANGE UP (ref 8.4–10.5)
CHLORIDE SERPL-SCNC: 99 MMOL/L — SIGNIFICANT CHANGE UP (ref 96–108)
CO2 BLDA-SCNC: 19 MMOL/L — LOW (ref 22–30)
CO2 SERPL-SCNC: 20 MMOL/L — LOW (ref 22–31)
CREAT SERPL-MCNC: 1.08 MG/DL — SIGNIFICANT CHANGE UP (ref 0.5–1.3)
GAS PNL BLDA: SIGNIFICANT CHANGE UP
GLUCOSE BLDC GLUCOMTR-MCNC: 133 MG/DL — HIGH (ref 70–99)
GLUCOSE BLDC GLUCOMTR-MCNC: 141 MG/DL — HIGH (ref 70–99)
GLUCOSE BLDC GLUCOMTR-MCNC: 144 MG/DL — HIGH (ref 70–99)
GLUCOSE BLDC GLUCOMTR-MCNC: 151 MG/DL — HIGH (ref 70–99)
GLUCOSE BLDC GLUCOMTR-MCNC: 160 MG/DL — HIGH (ref 70–99)
GLUCOSE SERPL-MCNC: 150 MG/DL — HIGH (ref 70–99)
HCT VFR BLD CALC: 38.9 % — LOW (ref 39–50)
HGB BLD-MCNC: 12.7 G/DL — LOW (ref 13–17)
HOROWITZ INDEX BLDA+IHG-RTO: SIGNIFICANT CHANGE UP
LACTATE SERPL-SCNC: 11.6 MMOL/L — CRITICAL HIGH (ref 0.7–2)
LACTATE SERPL-SCNC: 3.3 MMOL/L — HIGH (ref 0.7–2)
MCHC RBC-ENTMCNC: 31.2 PG — SIGNIFICANT CHANGE UP (ref 27–34)
MCHC RBC-ENTMCNC: 32.7 GM/DL — SIGNIFICANT CHANGE UP (ref 32–36)
MCV RBC AUTO: 95.3 FL — SIGNIFICANT CHANGE UP (ref 80–100)
PCO2 BLDA: 29 MMHG — LOW (ref 32–46)
PH BLDA: 7.4 — SIGNIFICANT CHANGE UP (ref 7.35–7.45)
PLATELET # BLD AUTO: 300 K/UL — SIGNIFICANT CHANGE UP (ref 150–400)
PO2 BLDA: 356 MMHG — HIGH (ref 74–108)
POTASSIUM SERPL-MCNC: 4 MMOL/L — SIGNIFICANT CHANGE UP (ref 3.5–5.3)
POTASSIUM SERPL-SCNC: 4 MMOL/L — SIGNIFICANT CHANGE UP (ref 3.5–5.3)
PROLACTIN SERPL-MCNC: 33.2 NG/ML — HIGH (ref 4.1–18.4)
PROT SERPL-MCNC: 6.5 G/DL — SIGNIFICANT CHANGE UP (ref 6–8.3)
RBC # BLD: 4.08 M/UL — LOW (ref 4.2–5.8)
RBC # FLD: 14.3 % — SIGNIFICANT CHANGE UP (ref 10.3–14.5)
SAO2 % BLDA: >99 % — HIGH (ref 92–96)
SODIUM SERPL-SCNC: 139 MMOL/L — SIGNIFICANT CHANGE UP (ref 135–145)
TROPONIN I SERPL-MCNC: 0.23 NG/ML — HIGH (ref 0.02–0.06)
WBC # BLD: 21.2 K/UL — HIGH (ref 3.8–10.5)
WBC # FLD AUTO: 21.2 K/UL — HIGH (ref 3.8–10.5)

## 2019-03-29 PROCEDURE — 99223 1ST HOSP IP/OBS HIGH 75: CPT

## 2019-03-29 PROCEDURE — 70450 CT HEAD/BRAIN W/O DYE: CPT | Mod: 26

## 2019-03-29 PROCEDURE — 93010 ELECTROCARDIOGRAM REPORT: CPT

## 2019-03-29 PROCEDURE — 71045 X-RAY EXAM CHEST 1 VIEW: CPT | Mod: 26

## 2019-03-29 PROCEDURE — 99223 1ST HOSP IP/OBS HIGH 75: CPT | Mod: GC

## 2019-03-29 RX ORDER — DEXTROSE 50 % IN WATER 50 %
25 SYRINGE (ML) INTRAVENOUS ONCE
Qty: 0 | Refills: 0 | Status: DISCONTINUED | OUTPATIENT
Start: 2019-03-29 | End: 2019-04-03

## 2019-03-29 RX ORDER — GLUCAGON INJECTION, SOLUTION 0.5 MG/.1ML
1 INJECTION, SOLUTION SUBCUTANEOUS ONCE
Qty: 0 | Refills: 0 | Status: DISCONTINUED | OUTPATIENT
Start: 2019-03-29 | End: 2019-04-03

## 2019-03-29 RX ORDER — ZINC SULFATE TAB 220 MG (50 MG ZINC EQUIVALENT) 220 (50 ZN) MG
220 TAB ORAL DAILY
Qty: 0 | Refills: 0 | Status: DISCONTINUED | OUTPATIENT
Start: 2019-03-29 | End: 2019-04-03

## 2019-03-29 RX ORDER — LACOSAMIDE 50 MG/1
150 TABLET ORAL
Qty: 0 | Refills: 0 | Status: DISCONTINUED | OUTPATIENT
Start: 2019-03-29 | End: 2019-03-29

## 2019-03-29 RX ORDER — INSULIN GLARGINE 100 [IU]/ML
18 INJECTION, SOLUTION SUBCUTANEOUS AT BEDTIME
Qty: 0 | Refills: 0 | Status: DISCONTINUED | OUTPATIENT
Start: 2019-03-29 | End: 2019-04-03

## 2019-03-29 RX ORDER — INSULIN LISPRO 100/ML
VIAL (ML) SUBCUTANEOUS
Qty: 0 | Refills: 0 | Status: DISCONTINUED | OUTPATIENT
Start: 2019-03-29 | End: 2019-04-03

## 2019-03-29 RX ORDER — ENOXAPARIN SODIUM 100 MG/ML
40 INJECTION SUBCUTANEOUS DAILY
Qty: 0 | Refills: 0 | Status: DISCONTINUED | OUTPATIENT
Start: 2019-03-29 | End: 2019-04-03

## 2019-03-29 RX ORDER — METFORMIN HYDROCHLORIDE 850 MG/1
500 TABLET ORAL
Qty: 0 | Refills: 0 | Status: DISCONTINUED | OUTPATIENT
Start: 2019-03-29 | End: 2019-03-30

## 2019-03-29 RX ORDER — LISINOPRIL 2.5 MG/1
20 TABLET ORAL DAILY
Qty: 0 | Refills: 0 | Status: DISCONTINUED | OUTPATIENT
Start: 2019-03-29 | End: 2019-04-03

## 2019-03-29 RX ORDER — NYSTATIN CREAM 100000 [USP'U]/G
1 CREAM TOPICAL
Qty: 0 | Refills: 0 | Status: DISCONTINUED | OUTPATIENT
Start: 2019-03-29 | End: 2019-04-03

## 2019-03-29 RX ORDER — DEXAMETHASONE 0.5 MG/5ML
2 ELIXIR ORAL
Qty: 0 | Refills: 0 | Status: DISCONTINUED | OUTPATIENT
Start: 2019-03-29 | End: 2019-04-03

## 2019-03-29 RX ORDER — DEXTROSE 50 % IN WATER 50 %
15 SYRINGE (ML) INTRAVENOUS ONCE
Qty: 0 | Refills: 0 | Status: DISCONTINUED | OUTPATIENT
Start: 2019-03-29 | End: 2019-04-03

## 2019-03-29 RX ORDER — FINASTERIDE 5 MG/1
5 TABLET, FILM COATED ORAL DAILY
Qty: 0 | Refills: 0 | Status: DISCONTINUED | OUTPATIENT
Start: 2019-03-29 | End: 2019-04-03

## 2019-03-29 RX ORDER — LACOSAMIDE 50 MG/1
50 TABLET ORAL ONCE
Qty: 0 | Refills: 0 | Status: DISCONTINUED | OUTPATIENT
Start: 2019-03-29 | End: 2019-03-29

## 2019-03-29 RX ORDER — LEVETIRACETAM 250 MG/1
1000 TABLET, FILM COATED ORAL
Qty: 0 | Refills: 0 | Status: DISCONTINUED | OUTPATIENT
Start: 2019-03-29 | End: 2019-03-30

## 2019-03-29 RX ORDER — ASCORBIC ACID 60 MG
500 TABLET,CHEWABLE ORAL DAILY
Qty: 0 | Refills: 0 | Status: DISCONTINUED | OUTPATIENT
Start: 2019-03-29 | End: 2019-04-03

## 2019-03-29 RX ORDER — SODIUM CHLORIDE 9 MG/ML
1000 INJECTION, SOLUTION INTRAVENOUS
Qty: 0 | Refills: 0 | Status: DISCONTINUED | OUTPATIENT
Start: 2019-03-29 | End: 2019-04-03

## 2019-03-29 RX ORDER — DEXTROSE 50 % IN WATER 50 %
12.5 SYRINGE (ML) INTRAVENOUS ONCE
Qty: 0 | Refills: 0 | Status: DISCONTINUED | OUTPATIENT
Start: 2019-03-29 | End: 2019-04-03

## 2019-03-29 RX ORDER — LACOSAMIDE 50 MG/1
100 TABLET ORAL
Qty: 0 | Refills: 0 | Status: DISCONTINUED | OUTPATIENT
Start: 2019-03-29 | End: 2019-03-29

## 2019-03-29 RX ORDER — LACOSAMIDE 50 MG/1
150 TABLET ORAL
Qty: 0 | Refills: 0 | Status: DISCONTINUED | OUTPATIENT
Start: 2019-03-30 | End: 2019-04-03

## 2019-03-29 RX ORDER — PANTOPRAZOLE SODIUM 20 MG/1
40 TABLET, DELAYED RELEASE ORAL
Qty: 0 | Refills: 0 | Status: DISCONTINUED | OUTPATIENT
Start: 2019-03-29 | End: 2019-04-03

## 2019-03-29 RX ORDER — SENNA PLUS 8.6 MG/1
2 TABLET ORAL AT BEDTIME
Qty: 0 | Refills: 0 | Status: DISCONTINUED | OUTPATIENT
Start: 2019-03-29 | End: 2019-04-03

## 2019-03-29 RX ORDER — ACETAMINOPHEN 500 MG
650 TABLET ORAL EVERY 6 HOURS
Qty: 0 | Refills: 0 | Status: DISCONTINUED | OUTPATIENT
Start: 2019-03-29 | End: 2019-04-03

## 2019-03-29 RX ADMIN — ZINC SULFATE TAB 220 MG (50 MG ZINC EQUIVALENT) 220 MILLIGRAM(S): 220 (50 ZN) TAB at 11:54

## 2019-03-29 RX ADMIN — LEVETIRACETAM 1000 MILLIGRAM(S): 250 TABLET, FILM COATED ORAL at 20:34

## 2019-03-29 RX ADMIN — PANTOPRAZOLE SODIUM 40 MILLIGRAM(S): 20 TABLET, DELAYED RELEASE ORAL at 07:01

## 2019-03-29 RX ADMIN — INSULIN GLARGINE 18 UNIT(S): 100 INJECTION, SOLUTION SUBCUTANEOUS at 21:56

## 2019-03-29 RX ADMIN — METFORMIN HYDROCHLORIDE 500 MILLIGRAM(S): 850 TABLET ORAL at 20:27

## 2019-03-29 RX ADMIN — Medication 2: at 11:55

## 2019-03-29 RX ADMIN — Medication 2 MILLIGRAM(S): at 20:27

## 2019-03-29 RX ADMIN — LACOSAMIDE 100 MILLIGRAM(S): 50 TABLET ORAL at 07:01

## 2019-03-29 RX ADMIN — Medication 1 TABLET(S): at 11:54

## 2019-03-29 RX ADMIN — LACOSAMIDE 50 MILLIGRAM(S): 50 TABLET ORAL at 21:55

## 2019-03-29 RX ADMIN — ENOXAPARIN SODIUM 40 MILLIGRAM(S): 100 INJECTION SUBCUTANEOUS at 11:54

## 2019-03-29 RX ADMIN — LEVETIRACETAM 1000 MILLIGRAM(S): 250 TABLET, FILM COATED ORAL at 07:01

## 2019-03-29 RX ADMIN — Medication 2 MILLIGRAM(S): at 07:01

## 2019-03-29 RX ADMIN — NYSTATIN CREAM 1 APPLICATION(S): 100000 CREAM TOPICAL at 20:30

## 2019-03-29 RX ADMIN — FINASTERIDE 5 MILLIGRAM(S): 5 TABLET, FILM COATED ORAL at 11:54

## 2019-03-29 RX ADMIN — NYSTATIN CREAM 1 APPLICATION(S): 100000 CREAM TOPICAL at 07:01

## 2019-03-29 RX ADMIN — LACOSAMIDE 100 MILLIGRAM(S): 50 TABLET ORAL at 20:26

## 2019-03-29 RX ADMIN — METFORMIN HYDROCHLORIDE 500 MILLIGRAM(S): 850 TABLET ORAL at 07:01

## 2019-03-29 RX ADMIN — LISINOPRIL 20 MILLIGRAM(S): 2.5 TABLET ORAL at 07:01

## 2019-03-29 RX ADMIN — Medication 500 MILLIGRAM(S): at 11:54

## 2019-03-29 RX ADMIN — SENNA PLUS 2 TABLET(S): 8.6 TABLET ORAL at 20:26

## 2019-03-29 NOTE — DIETITIAN INITIAL EVALUATION ADULT. - PERTINENT MEDS FT
lantus, SSI, metformin, Vitamin C, decadron, vimpat, keppra, lisinopril, MVI, protonix, senna, zinc sulfate

## 2019-03-29 NOTE — CHART NOTE - NSCHARTNOTEFT_GEN_A_CORE
discussed with Dr. Perales (Neuro)  Will increase Vimpat to 150 mg twice a day  Cont Keppra at 1000 mg twice daily.

## 2019-03-29 NOTE — DIETITIAN INITIAL EVALUATION ADULT. - OTHER INFO
73 y/o male with hx of recurrent GBM, CVA, SAH, s/p craniotomy, post op with seizures, and still had breakthrough seizures. Pt currently on soft diet, but previously noted to be receiving mechanical soft texture. Pt confirms history of weight loss throughout hospital course, but unsure how much. Now with improved appetite/po intake. PO 75% per flow sheets. Pt denies GI distress- noted last BM on 3/29. Noted with stage II pressure ulcer- receiving MVI, Vitamin C and Zinc sulfate. Pt familiar with menu ordering procedures.

## 2019-03-29 NOTE — CONSULT NOTE ADULT - SUBJECTIVE AND OBJECTIVE BOX
75yo M w/PMH glioblastoma s/p craniotomy x2, PE, BPH, CVA, DM, HTN patient was discharged from [facility] on 3/28 after admission from [facility] for seizures - attempt made to transfer patient to [facility] (most recent craniotomy 1/2019) transfer denied - patient transferred back to [facility] and in less than 24hrs patient sent to [facility] ED for seizures - meds were adjusted to Keppra 1000mg q12, vimpat 100mg daily and dexamethasone continuing to have seizure - RRT called for patient on 2S - decision made to transfer to telemetry for closer monitoring.  Awaiting Neurology input.      LABS:                        12.7   21.2  )-----------( 300      ( 29 Mar 2019 16:40 )             38.9     03-29    139  |  99  |  16  ----------------------------<  150<H>  4.0   |  20<L>  |  1.08    Ca    9.1      29 Mar 2019 16:40    TPro  6.5  /  Alb  2.7<L>  /  TBili  0.3  /  DBili  x   /  AST  18  /  ALT  25  /  AlkPhos  70  03-29      CARDIAC MARKERS ( 29 Mar 2019 16:40 )  .225 ng/mL / x     / x     / x     / x                        CULTURES: (if applicable)        ABG - ( 29 Mar 2019 16:30 )  pH, Arterial: 7.40  pH, Blood: x     /  pCO2: 29    /  pO2: 356   / HCO3: x     / Base Excess: x     /  SaO2: >99           VITALS:  T(C): 37.4 (03-29-19 @ 17:45), Max: 37.4 (03-29-19 @ 17:45)  T(F): 99.3 (03-29-19 @ 17:45), Max: 99.3 (03-29-19 @ 17:45)  HR: 114 (03-29-19 @ 17:45) (76 - 114)  BP: 110/82 (03-29-19 @ 17:45) (110/82 - 139/86)  BP(mean): --  ABP: --  ABP(mean): --  RR: 16 (03-29-19 @ 17:45) (15 - 18)  SpO2: 96% (03-29-19 @ 17:45) (95% - 98%)  CVP(mm Hg): --  CVP(cm H2O): --    Ins and Outs     03-29-19 @ 07:01  -  03-29-19 @ 18:40  --------------------------------------------------------  IN: 720 mL / OUT: 0 mL / NET: 720 mL        Height (cm): 177.8 (03-29-19 @ 13:35)  Weight (kg): 85.3 (03-29-19 @ 04:47)  BMI (kg/m2): 27 (03-29-19 @ 13:35) 73yo M w/PMH glioblastoma s/p craniotomy x2, PE, BPH, CVA, DM, HTN patient was discharged from [facility] on 3/28 after admission from [facility] [place] [facility] - attempt made to transfer patient to [facility] (most recent craniotomy 1/2019) transfer denied - patient transferred back to [facility] and in less than 24hrs patient sent to [facility] ED for seizures - meds were adjusted to Keppra 1000mg q12, vimpat 100mg daily and dexamethasone continuing to have seizure - RRT called for patient on 2S - decision made to transfer to telemetry for closer monitoring.  Awaiting Neurology input.      LABS:                        12.7   21.2  )-----------( 300      ( 29 Mar 2019 16:40 )             38.9     03-29    139  |  99  |  16  ----------------------------<  150<H>  4.0   |  20<L>  |  1.08    Ca    9.1      29 Mar 2019 16:40    TPro  6.5  /  Alb  2.7<L>  /  TBili  0.3  /  DBili  x   /  AST  18  /  ALT  25  /  AlkPhos  70  03-29      CARDIAC MARKERS ( 29 Mar 2019 16:40 )  .225 ng/mL / x     / x     / x     / x       Lactate 11    CULTURES: (if applicable)        ABG - ( 29 Mar 2019 16:30 )  pH, Arterial: 7.40  pH, Blood: x     /  pCO2: 29    /  pO2: 356   / HCO3: x     / Base Excess: x     /  SaO2: >99           VITALS:  T(C): 37.4 (03-29-19 @ 17:45), Max: 37.4 (03-29-19 @ 17:45)  T(F): 99.3 (03-29-19 @ 17:45), Max: 99.3 (03-29-19 @ 17:45)  HR: 114 (03-29-19 @ 17:45) (76 - 114)  BP: 110/82 (03-29-19 @ 17:45) (110/82 - 139/86)  BP(mean): --  ABP: --  ABP(mean): --  RR: 16 (03-29-19 @ 17:45) (15 - 18)  SpO2: 96% (03-29-19 @ 17:45) (95% - 98%)  CVP(mm Hg): --  CVP(cm H2O): --    Ins and Outs     03-29-19 @ 07:01  -  03-29-19 @ 18:40  --------------------------------------------------------  IN: 720 mL / OUT: 0 mL / NET: 720 mL        Height (cm): 177.8 (03-29-19 @ 13:35)  Weight (kg): 85.3 (03-29-19 @ 04:47)  BMI (kg/m2): 27 (03-29-19 @ 13:35) 73yo M w/PMH glioblastoma s/p craniotomy x2, PE, BPH, CVA, DM, HTN patient was discharged from [facility] on 3/28 after admission from [facility] for seizures - attempt made to transfer patient to [facility] (most recent craniotomy 1/2019) transfer denied - patient transferred back to [facility] and in less than 24hrs patient sent to [facility] ED for seizures - meds were adjusted to Keppra 1000mg q12, vimpat 100mg daily and dexamethasone continuing to have seizure - RRT called for patient on 2S - decision made to transfer to telemetry for closer monitoring.  Awaiting Neurology input - will notify eICU of transfer.    LABS:                        12.7   21.2  )-----------( 300      ( 29 Mar 2019 16:40 )             38.9     03-29    139  |  99  |  16  ----------------------------<  150<H>  4.0   |  20<L>  |  1.08    Ca    9.1      29 Mar 2019 16:40    TPro  6.5  /  Alb  2.7<L>  /  TBili  0.3  /  DBili  x   /  AST  18  /  ALT  25  /  AlkPhos  70  03-29      CARDIAC MARKERS ( 29 Mar 2019 16:40 )  .225 ng/mL / x     / x     / x     / x       Lactate 11    CULTURES: (if applicable)      ABG - ( 29 Mar 2019 16:30 )  pH, Arterial: 7.40  pH, Blood: x     /  pCO2: 29    /  pO2: 356   / HCO3: x     / Base Excess: x     /  SaO2: >99       PHYSICAL EXAM:  General: Patient Alert - post ictal -   Eyes: PERRL  Respiratory:cTA b/l  Cardiovascular:RRR  Gastrointestinal:soft NT/ND +BS  Genitourinary: incontinent to urine  Extremities: 2+pulses b/l  Vascular:good cap refill  Neurological:intact  Skin:intact        VITALS:  T(C): 37.4 (03-29-19 @ 17:45), Max: 37.4 (03-29-19 @ 17:45)  T(F): 99.3 (03-29-19 @ 17:45), Max: 99.3 (03-29-19 @ 17:45)  HR: 114 (03-29-19 @ 17:45) (76 - 114)  BP: 110/82 (03-29-19 @ 17:45) (110/82 - 139/86)  BP(mean): --  ABP: --  ABP(mean): --  RR: 16 (03-29-19 @ 17:45) (15 - 18)  SpO2: 96% (03-29-19 @ 17:45) (95% - 98%)  CVP(mm Hg): --  CVP(cm H2O): --    Ins and Outs     03-29-19 @ 07:01  -  03-29-19 @ 18:40  --------------------------------------------------------  IN: 720 mL / OUT: 0 mL / NET: 720 mL        Height (cm): 177.8 (03-29-19 @ 13:35)  Weight (kg): 85.3 (03-29-19 @ 04:47)  BMI (kg/m2): 27 (03-29-19 @ 13:35)

## 2019-03-29 NOTE — H&P ADULT - NSHPPHYSICALEXAM_GEN_ALL_CORE
Vital Signs (24 Hrs):  T(C): 37.2 (03-28-19 @ 21:21), Max: 37.2 (03-28-19 @ 21:21)  HR: 92 (03-28-19 @ 21:21) (92 - 92)  BP: 111/71 (03-28-19 @ 21:21) (111/71 - 111/71)  RR: 18 (03-28-19 @ 21:21) (18 - 18)  SpO2: 97% (03-28-19 @ 21:21) (97% - 97%)

## 2019-03-29 NOTE — CHART NOTE - NSCHARTNOTEFT_GEN_A_CORE
RRT NOTE  Pt. is a 73 y/o male, with PMH of GBM, s/p craniotomy 2/2019, complicated with SAH and seizures, started on keppra and vimpat.   Pt. with seizure episodes at [facility], admitted to [facility] early AM for seizures.     RRT called as pt. was found seizing and unresponsive by SREE Brennan. When NP arrived, pt. post-ictal,   confused, responding to pain stimuli.  BP: 181/105, , RR 22, Temp 98.2, O2 Sat 100%, .   Plan: Oxygen via NC, stat EKG, CT head, CBC, CMP, Prolactin, ABGs, Blood cultures, move to a high level of care: ICU vs. Tele  Awaiting for neuro consult - Dr. Perales    NP called and informed pt's son: Chirag Duncan: #[phone]

## 2019-03-29 NOTE — CONSULT NOTE ADULT - ASSESSMENT
PHYSICAL EXAM:  Constitutional:    Eyes:    ENMT:    Neck:    Breasts:    Back:    Respiratory:    Cardiovascular:    Gastrointestinal:    Genitourinary:    Rectal:    Extremities:    Vascular:    Neurological:    Skin:    Lymph Nodes:    Musculoskeletal:    Psychiatric: PHYSICAL EXAM:  General: Patient Alert - post ictal -   Eyes:    ENMT:    Neck:    Breasts:    Back:    Respiratory:    Cardiovascular:    Gastrointestinal:    Genitourinary:    Rectal:    Extremities:    Vascular:    Neurological:    Skin:    Lymph Nodes:    Musculoskeletal:    Psychiatric: 75yo M w/PMH glioblastoma, PE, BPH, CVA, DM, HTN, s/p craniotomy x2 in last 2 months with recurrent seizures depsite recent adjustment of Keppra, Vimpat and dexamethasone.  RR called andpatient initially transferred to telemetry for closer monitoring - hospitalist called consult for Lactate of 11 requesting transfer to ICU. 73yo M w/PMH glioblastoma, PE, BPH, CVA, DM, HTN, s/p craniotomy x2 in last 2 months with recurrent seizures depsite recent adjustment of Keppra, Vimpat and dexamethasone.  RR called andpatient initially transferred to telemetry for closer monitoring - hospitalist called consult for Lactate of 11 requesting transfer to ICU.  Patient alert sitting upright in bed w/good cap refill with stable vital signs saturation 99% on room air blood pressure 110/80 - blood cultures ordered - lactate level drawn while patient actively seizing.

## 2019-03-29 NOTE — H&P ADULT - NSICDXPASTMEDICALHX_GEN_ALL_CORE_FT
PAST MEDICAL HISTORY:  BPH (benign prostatic hyperplasia)     CVA (cerebral vascular accident)     Diabetes     Glioblastoma     HTN (hypertension)

## 2019-03-29 NOTE — H&P ADULT - ASSESSMENT
73 y/o male with hx of recurrent GBM, CVA, SAH, s/p craniotomy, post op with seizures, and still had breakthrough seizures till last night.  +DM  hx of DVT, +IVC filter    Admit  Seizure precaution  Cont Vimpat 100 mg twice daily  Keppra 1000 mg twice daily  Cont dexamethasone  Monitor FS, SSI   Neuro eval  PT.  Prognosis poor.  Will get palliative eval  DVT Prophylaxis      IMPROVE VTE Individual Risk Assessment  RISK                                                                Points  [ x ] Previous VTE                                                  3  [  ] Thrombophilia                                               2  [  ] Lower limb paralysis                                      2        (unable to hold up >15 seconds)    [x  ] Current Cancer                                              2         (within 6 months)  [ x ] Immobilization > 24 hrs                                1  [  ] ICU/CCU stay > 24 hours                              1  [ x ] Age > 60                                                      1  IMPROVE VTE Score __7___  IMPROVE Score 0-1: Low Risk, No VTE prophylaxis required for most patients, encourage ambulation.   IMPROVE Score 2-3: At risk, pharmacologic VTE prophylaxis is indicated for most patients (in the absence of a contraindication)  IMPROVE Score > or = 4: High Risk, pharmacologic VTE prophylaxis is indicated for most patients (in the absence of a contraindication)

## 2019-03-29 NOTE — H&P ADULT - NSICDXPASTSURGICALHX_GEN_ALL_CORE_FT
PAST SURGICAL HISTORY:  S/P craniotomy ~ resection of R-pariental mass (11/28/2018) PAST SURGICAL HISTORY:  S/P craniotomy ~ resection of R-pariental mass (11/28/2018)    S/P craniotomy 02/2019

## 2019-03-29 NOTE — CHART NOTE - NSCHARTNOTEFT_GEN_A_CORE
Upon Nutritional Assessment by the Registered Dietitian your patient was determined to meet criteria / has evidence of the following diagnosis/diagnoses:          [ ]  Mild Protein Calorie Malnutrition        [ ]  Moderate Protein Calorie Malnutrition        [X] Severe Protein Calorie Malnutrition        [ ] Unspecified Protein Calorie Malnutrition        [ ] Underweight / BMI <19        [ ] Morbid Obesity / BMI > 40      Findings as based on:  [X] Comprehensive nutrition assessment   [X] Nutrition Focused Physical Exam  [X] Other: 13% weight loss within 6 months, moderate temporal wasting       Nutrition Plan/Recommendations:  Change diet to mechanical soft, consistent carbohydrate + Glucerna Shake BID         PROVIDER Section:     By signing this assessment you are acknowledging and agree with the diagnosis/diagnoses assigned by the Registered Dietitian    Comments:

## 2019-03-29 NOTE — PROGRESS NOTE ADULT - ASSESSMENT
75 y/o male with hx of T2DM, HTN, recurrent GBM, CVA, SAH, s/p craniotomy, post op complicated with seizures.   Pt. with on/off seizures, no seizure activity noticed during this admission.       # # Seizures 2/2 SAH as post op complication after craniotomy on 2/2019   - no seizure activity during this admission  - seizure precautions   - pt. with reported seizures and NH, sent to [facility]   where no seizure activity was reported, keppra dose was incresed  - cont. keppra and vimpat  - neuro consult - Dr. Perales - pending     GBM - progressive   s/p craniotomy for brain tumor 2/2019, post-op complicated with seizures 2/2 SAH  poor prognosis - palliative care     # SAH s/p craniotomy 2/2019 complicated with seizures  cont. keppra and vimpat   seizure precautions      # Acute DVT s/p filter     # T2DM - cont. current regimen 73 y/o male with hx of T2DM, HTN, recurrent GBM, CVA, SAH, s/p craniotomy, post op complicated with seizures.   Pt. with on/off seizures, no seizure activity noticed during this admission.       Seizures 2/2 SAH as post op complication after craniotomy on 2/2019   - no seizure activity during this admission  - seizure precautions   - pt. with reported seizures and NH, sent to [facility]   where no seizure activity was reported, keppra dose was incresed  - cont. keppra and vimpat  - neuro consult - Dr. Perales - pending     GBM - progressive   s/p craniotomy for brain tumor 2/2019, post-op complicated with seizures 2/2 SAH  poor prognosis - palliative care     # SAH s/p craniotomy 2/2019 complicated with seizures  cont. keppra and vimpat   seizure precautions      # Acute DVT s/p filter     # T2DM - cont. current regimen

## 2019-03-29 NOTE — DIETITIAN INITIAL EVALUATION ADULT. - ENERGY NEEDS
Height: 5'10", Weight (3/29) 188lbs  Skin: stage II pressure ulcer on sacrum, Edema: none  IBW range: 149-183lbs  Last BM: 3/29

## 2019-03-29 NOTE — CONSULT NOTE ADULT - SUBJECTIVE AND OBJECTIVE BOX
HPI:  75 y/o M PMH DM, GBM, CVA with residual L sided weakness, sent to the ED from rehab facilty for seizures.  Pt had apparent recurrence of GBM, +SAH and had right craniotomy 2/27/19,  and post/op had seizures for which pt was given Keppra and Vimpat.  Pt was d/cherelle to rehab facility recently, and then had seizures again so was apparently sent to [facility] yesterday, for seizures, was placed on observation.  [facility] was called to have pt transferred but did not accept pt.  He was already on Keppra 750 twice daily, w/c was increased to 1000 mg twice a day then pt d/cherelle  to rehab facility yesterday. He is also on Vimpat 100 mg twice a day.  Pt has also been on dexamethasone po.  Pt was just in rehab facility for a few hours, then noted to have /seizures again, as per nursing staff and pt sent here.  When pt got to ER, he was seizure free and had no complaints  but does not recall what happened.  Denies fever, chills, CP, SOB, abdominal pain, n/v, worsening weakness, numbness/tingling.    CT head done with post op changes, unchanged from recent priors.       PAST MEDICAL & SURGICAL HISTORY:  Glioblastoma  BPH (benign prostatic hyperplasia)  CVA (cerebral vascular accident)  Diabetes  HTN (hypertension)  S/P craniotomy: 02/2019  S/P craniotomy: ~ resection of R-pariental mass (11/28/2018)      SOCIAL HISTORY:    Admitted from:   [facility]   Substance abuse history:              Tobacco hx:                  Alcohol hx:              Home Opioid hx:  Druze:                                    Preferred Language:    HCP: tona Duncan            Phone#:  [phone]    FAMILY HISTORY:  No pertinent family history in first degree relatives    Baseline ADLs (prior to admission):    Allergies    No Known Allergies    Intolerances      Present Symptoms:   Dyspnea: no  Nausea/Vomiting: no  Anxiety: no  Depressed no  Fatigue:yes  Loss of appetite: no  Pain:               no                 location:          Review of Systems:  Unable to obtain due to poor mentation]    MEDICATIONS  (STANDING):  ascorbic acid 500 milliGRAM(s) Oral daily  dexamethasone     Tablet 2 milliGRAM(s) Oral two times a day  dextrose 5%. 1000 milliLiter(s) (50 mL/Hr) IV Continuous <Continuous>  dextrose 50% Injectable 12.5 Gram(s) IV Push once  dextrose 50% Injectable 25 Gram(s) IV Push once  dextrose 50% Injectable 25 Gram(s) IV Push once  enoxaparin Injectable 40 milliGRAM(s) SubCutaneous daily  finasteride 5 milliGRAM(s) Oral daily  insulin glargine Injectable (LANTUS) 18 Unit(s) SubCutaneous at bedtime  insulin lispro (HumaLOG) corrective regimen sliding scale   SubCutaneous three times a day before meals  lacosamide 100 milliGRAM(s) Oral two times a day  levETIRAcetam 1000 milliGRAM(s) Oral two times a day  lisinopril 20 milliGRAM(s) Oral daily  metFORMIN 500 milliGRAM(s) Oral two times a day  multivitamin 1 Tablet(s) Oral daily  nystatin Powder 1 Application(s) Topical two times a day  pantoprazole    Tablet 40 milliGRAM(s) Oral before breakfast  zinc sulfate 220 milliGRAM(s) Oral daily    MEDICATIONS  (PRN):  acetaminophen   Tablet .. 650 milliGRAM(s) Oral every 6 hours PRN Temp greater or equal to 38C (100.4F), Mild Pain (1 - 3)  dextrose 40% Gel 15 Gram(s) Oral once PRN Blood Glucose LESS THAN 70 milliGRAM(s)/deciliter  glucagon  Injectable 1 milliGRAM(s) IntraMuscular once PRN Glucose LESS THAN 70 milligrams/deciliter  senna 2 Tablet(s) Oral at bedtime PRN Constipation      PHYSICAL EXAM:    Vital Signs Last 24 Hrs  T(C): 36.7 (29 Mar 2019 04:47), Max: 37.2 (28 Mar 2019 21:21)  T(F): 98 (29 Mar 2019 04:47), Max: 98.9 (28 Mar 2019 21:21)  HR: 76 (29 Mar 2019 04:47) (76 - 92)  BP: 139/86 (29 Mar 2019 04:47) (111/71 - 139/86)  BP(mean): --  RR: 16 (29 Mar 2019 04:47) (16 - 18)  SpO2: 97% (29 Mar 2019 04:47) (97% - 98%)    General: alert  oriented x _2___   sl lethargic   PPSV: 40%  HEENT: n/c ,a/t   Lungs: clear, dim to bases   CV: s1s2  GI: soft, n/t + bs               : normal   Musculoskeletal: L sided  weakness  edema to L ankle/pedal             Skin: warm, dry   Neuro: alert, converses, oriented to self, place, not year   Oral intake ability: moderate   Diet: diabetic    LABS:                        11.4   15.2  )-----------( 286      ( 28 Mar 2019 21:45 )             35.5     03-28    140  |  103  |  19  ----------------------------<  186<H>  4.3   |  29  |  0.80    Ca    8.4      28 Mar 2019 21:45    TPro  6.0  /  Alb  2.4<L>  /  TBili  0.2  /  DBili  x   /  AST  17  /  ALT  24  /  AlkPhos  65  03-28        RADIOLOGY & ADDITIONAL STUDIES:< from: CT Head No Cont (03.28.19 @ 22:29) >  EXAM:  CT BRAIN      PROCEDURE DATE:  03/28/2019        INTERPRETATION:  CLINICAL STATEMENT: Pain. History of brain tumor    TECHNIQUE: CT of the head was performed without IV contrast.    COMPARISON: CT head 3/3/2019    FINDINGS:  Status post right pterional craniotomy with postsurgical changes. Tiny   adjacent pneumocephalus noted significantly improved compared to prior   exam. There is significant improvement of previously seen acute subdural   and intraparenchymal hematomas. Residual smallsubdural   collection/postoperative changes noted measuring 2 mm in maximum width.   There is heterogeneous appearance of the postoperative bed with   hypodensity and curvilinear hyperdensities compatible with residual   hemorrhages. Underlying lesion not excluded without IV contrast.   Significant improvement of mass effect on right lateral ventricle. There   is no midline shift.    Chronic infarct right cerebellum and right thalamus unchanged.    There is mild diffuse parenchymal volume loss. There are areas of low   attenuation in the periventricular white matter likely related to mild   chronic microvascular ischemic changes.    There is no hydrocephalus.    IMPRESSION:  Right pterional craniotomy with evolving postoperative changes with   significant improvement of the subdural and intraparenchymal hematomas as   described above            ADVANCE DIRECTIVES:   Advanced Care Planning discussion total time spent:

## 2019-03-29 NOTE — CHART NOTE - NSCHARTNOTEFT_GEN_A_CORE
Hospitalist Attending RRT Note    CC: RRT called for pt with   HPI: Patient is a 74y old Male admitted for      . RRT called for pt with     . Pt seen and examined at bedside.    REVIEW OF SYSTEMS:  Respiratory: Denies cough, wheezing, chills, hemoptysis, shortness of breath, difficulty breathing  Cardiovascular: Denies chest pain, palpitations, dizziness, leg swelling  Gastrointestinal: Denies abdominal pain, nausea, vomiting, hematemesis, diarrhea, constipation, melena, hematochezia  Genitourinary: Denies dysuria, frequency, hematuria, retention, incontinence  Neurological: Denies headaches, memory loss, loss of strength, numbness, tremors    VITALS:  BP: 181/105, , RR 22, Temp 98.2, O2 Sat 100%, .     PHYSICAL EXAM:  General: lethargic poor arousal to sternal rub  Eyes:  reactive to light b/l  Lungs: coarse breath sounds diffuse b/l  Heart: RRR. Normal S1/S2. No murmurs appreciated.  Abdomen: Soft, NT/ND. Normoactive BS x 4 quadrants.  Neurological:  AAOxO.  extremities. No pronator drift.       LABS:  CAPILLARY BLOOD GLUCOSE      POCT Blood Glucose.: 141 mg/dL (29 Mar 2019 16:19)  POCT Blood Glucose.: 160 mg/dL (29 Mar 2019 11:53)  POCT Blood Glucose.: 144 mg/dL (29 Mar 2019 08:09)  POCT Blood Glucose.: 133 mg/dL (29 Mar 2019 05:01)                          12.7   21.2  )-----------( 300      ( 29 Mar 2019 16:40 )             38.9     03-29    139  |  99  |  16  ----------------------------<  150<H>  4.0   |  20<L>  |  1.08    Ca    9.1      29 Mar 2019 16:40    TPro  6.5  /  Alb  2.7<L>  /  TBili  0.3  /  DBili  x   /  AST  18  /  ALT  25  /  AlkPhos  70  03-29    CARDIAC MARKERS ( 29 Mar 2019 16:40 )  .225 ng/mL / x     / x     / x     / x            LIVER FUNCTIONS - ( 29 Mar 2019 16:40 )  Alb: 2.7 g/dL / Pro: 6.5 g/dL / ALK PHOS: 70 U/L / ALT: 25 U/L DA / AST: 18 U/L / GGT: x                                     ASSESSMENT:  Patient is a 74y with Convulsions  Oxygen dependent  Vitamin deficiency  Constipation  Pulmonary embolism  Glioblastoma  BPH (benign prostatic hyperplasia)  CVA (cerebral vascular accident)  Diabetes  HTN (hypertension)  S/P craniotomy  S/P craniotomy  No significant past surgical history      PLAN:  -  73 yo m admitted 3-29-19 found unresponsive pmh t2dm, gbm, cva with residual left sided weakness presents with seizures,  will repeat ct head ck lactate, prolactin, cbc, tni, cmp  ekg.  elevated lactate 11.6 s/w ICU will send to icu and ck cxr r/o aspiration will ck bcx  - Continue current treatment  - Follow up labs  - D/w ICU PA    aware and agree with the plan  - Will continue to follow up

## 2019-03-29 NOTE — CONSULT NOTE ADULT - ASSESSMENT
A/p:  73 y/o male with hx of T2DM, HTN, recurrent GBM, CVA, SAH, s/p craniotomy, post op complicated with seizures.   Pt. with on/off seizures, no seizure activity noticed during this admission, on keppra and vimpat    GBM - progressive   s/p craniotomy for brain tumor 2/2019, post-op complicated with seizures 2/2 SAH  poor prognosis, seizures -   neuro consult pending.    pt today in bed, alert, converses, denies any pain , discomfort or sob.  I called and spoke to pts Son Chirag Duncan, [phone], he confirmed he is pts HCP. and said he will bring in paperwork. He will be here to visit over weekend.  plan: goc discussion w/ son Chirag .  Further palliative recommendations pending clinical course.

## 2019-03-29 NOTE — PROVIDER CONTACT NOTE (EICU) - SITUATION
73 yo male with glioblastoma and seizures had seizure on the floor . Consult was called because lactic acid drawn during seizure was 11.3

## 2019-03-29 NOTE — DIETITIAN INITIAL EVALUATION ADULT. - ORAL INTAKE PTA
good/Pt familiar to RD from rehab admission. Pt was eating well and consuming Glucerna supplements. Pt was receiving mechanical soft diet. Reports NKFA.

## 2019-03-29 NOTE — PROGRESS NOTE ADULT - ATTENDING COMMENTS
I have personally seen and examined patient on the above date.  I discussed the case with June Moran NP and I agree with findings and plan as detailed per note above, which I have amended where appropriate.      Seizures in patient with history of GBM  - neurology consulted  - continue meds for now  - patient needs palliative care input

## 2019-03-29 NOTE — PHYSICAL THERAPY INITIAL EVALUATION ADULT - IMPAIRED TRANSFERS: SIT/STAND, REHAB EVAL
impaired motor control/impaired balance/cognition/decreased strength/impaired coordination/impaired postural control

## 2019-03-29 NOTE — H&P ADULT - HISTORY OF PRESENT ILLNESS
73 y/o M PMH GBM s/p right craniotomy in 2019, CVA with residual L sided weakness presents from rehab facility today d/t persistent break through seizures at rehab facility. Pt has no complaints at this time. Denies fever, chills, CP, SOB, abdominal pain, n/v, worsening weakness, numbness/tingling. 75 y/o M PMH GBM s/p right craniotomy in 2019, CVA with residual L sided weakness presents from rehab facility today d/t persistent break through seizures at rehab facility. Pt has no complaints at this time. Denies fever, chills, CP, SOB, abdominal pain, n/v, worsening weakness, numbness/tingling.  Hospital Course: Mr. Duncan is a 74 year old male with a PMHx of GBM s/p right craniotomy in 2019, CVA with residual L sided weakness who presented to [facility] on [facility]/12/19 with seizures likely from recurrence of right temporal lobe GBM complicated by sepsis from viral URI, acute embolic CVA, PFO, LLE DVT s/p IVC, and SAH.   Pt was BIBEMS from nursing home for active seizure 2/12/19. Per ED he was actively seizing for 30 mins prior to EMS arrival, and displayed left sided face, arm, and leg jerking in the ED. Pt received Ativan and Keppra and seizure broke after 20 mins in the ED.   On admission, pt was found to be febrile with leukocytosis 31.56 meeting SIRS criteria and was found to have +coronavirus. Neurology and neurosurgery were consulted with the plan to have repeat craniotomy and GBM resection pending bacteremia clearance.   On 2/15 rapid responses called for pt due to 3 repeat seizures and unresponsiveness with new left facial droop- given Ativan and Keppra per neurosurgery recommendations MRI 2/15/29 showed 3 discrete foci of acute infarction within the right occipital lobe, left corpus callosum within the genu and left frontal lobe in the region of the precentral gyrus. Pt placed on heparin gtt for anticoagulation during workup. Doppler U/S showed acute DVT of left common femoral, popliteal, posterior tibial, and peroneal vein, and ALBERTO 2/20/19 showed evidence of a PFO. Pt had placement of IVC filter on 2/19/19 and heparin gtt was discontinued in preparation for neurosurgical procedure which was scheduled for 2/25 but was delayed due to episodes of bradycardia in 40s. Pt had R craniotomy for resection of brain tumor on 2/27/19. Post/op course c/b seizures 2/2 SAH on CT 3/2 for which pt was given Keppra and Vimpat, and transferred to SICU for further monitoring. Pt seizures and hemorrhage stabilized and plan for discharge to rehab. On the day of discharge he was medically and neurologically stable for discharge to rehab on 3/6/19.   Rehab Hospital Course:  Patient was continued on steroid taper, on admission he had leukocytosis and noted to have some hematuria with a positive UA, he was started on Ciprofloxacin for a 7 day course for UTI. Staples from craniotomy site were removed on 3/15 and wound was noted to be healing nicely. Insulin was adjusted for low blood sugars. Burning with urination was noted to resolve. Patient participated in daily course of PT/OT/ST. He was noted to have decreased safety awareness, cognitive deficits and was not able to make much progress in each modality of therapy. Remained at a min assist for PT, needing constant cueing. Noted to have persistent confabulation, perseveration, and difficulty staying awake, did not show any functional gains.  In discussion with his neurosurgeon he was not cleared to travel back to [place] with family and was needed to urgently continue therapy for GBM, he was ready for discharge to subacute rehab so he could resume radiation and chemo and to attend his appointment this Thursday 3/28.  Appointments with Radiation-Oncology  Thursday 3/28/19 12PM at [address] followed by  1 PM Simulation appointment at same place 73 y/o M PMH DM, GBM, CVA with residual L sided weakness, sent to the ED from rehab facilty for seizures.  Pt had apparent recurrence of GBM, +SAH and had right craniotomy 2/27/19,  and post/op had seizures for which pt was given Keppra and Vimpat.  Pt was d/cherelle to rehab facility recently, and then had seizures again so was apparently sent to [facility] yesterday, for seizures, was placed on observation.  [facility] was called to have pt transferred but did not accept pt.  He was already on Keppra 750 twice daily, w/c was increased to 1000 mg twice a day then pt d/cherelle  to rehab facility yesterday. He is also on Vimpat 100 mg twice a day.  Pt has also been on dexamethasone po.  Pt was just in rehab facility for a few hours, then noted to have /seizures again, as per nursing staff and pt sent here.  When pt got to ER, he was seizure free and had no complaints  but does not recall what happened.  Denies fever, chills, CP, SOB, abdominal pain, n/v, worsening weakness, numbness/tingling.    CT head done with post op changes, unchanged from recent priors.

## 2019-03-30 DIAGNOSIS — G40.909 EPILEPSY, UNSPECIFIED, NOT INTRACTABLE, WITHOUT STATUS EPILEPTICUS: ICD-10-CM

## 2019-03-30 LAB
ALBUMIN SERPL ELPH-MCNC: 2.6 G/DL — LOW (ref 3.3–5)
ALP SERPL-CCNC: 65 U/L — SIGNIFICANT CHANGE UP (ref 40–120)
ALT FLD-CCNC: 25 U/L DA — SIGNIFICANT CHANGE UP (ref 10–45)
ANION GAP SERPL CALC-SCNC: 9 MMOL/L — SIGNIFICANT CHANGE UP (ref 5–17)
AST SERPL-CCNC: 19 U/L — SIGNIFICANT CHANGE UP (ref 10–40)
BILIRUB SERPL-MCNC: 0.3 MG/DL — SIGNIFICANT CHANGE UP (ref 0.2–1.2)
BUN SERPL-MCNC: 18 MG/DL — SIGNIFICANT CHANGE UP (ref 7–23)
CALCIUM SERPL-MCNC: 9.3 MG/DL — SIGNIFICANT CHANGE UP (ref 8.4–10.5)
CHLORIDE SERPL-SCNC: 101 MMOL/L — SIGNIFICANT CHANGE UP (ref 96–108)
CO2 SERPL-SCNC: 28 MMOL/L — SIGNIFICANT CHANGE UP (ref 22–31)
CREAT SERPL-MCNC: 0.88 MG/DL — SIGNIFICANT CHANGE UP (ref 0.5–1.3)
GLUCOSE BLDC GLUCOMTR-MCNC: 132 MG/DL — HIGH (ref 70–99)
GLUCOSE BLDC GLUCOMTR-MCNC: 161 MG/DL — HIGH (ref 70–99)
GLUCOSE BLDC GLUCOMTR-MCNC: 165 MG/DL — HIGH (ref 70–99)
GLUCOSE BLDC GLUCOMTR-MCNC: 189 MG/DL — HIGH (ref 70–99)
GLUCOSE SERPL-MCNC: 154 MG/DL — HIGH (ref 70–99)
HCT VFR BLD CALC: 35.3 % — LOW (ref 39–50)
HGB BLD-MCNC: 12 G/DL — LOW (ref 13–17)
LACTATE SERPL-SCNC: 4.4 MMOL/L — CRITICAL HIGH (ref 0.7–2)
MCHC RBC-ENTMCNC: 31.8 PG — SIGNIFICANT CHANGE UP (ref 27–34)
MCHC RBC-ENTMCNC: 33.9 GM/DL — SIGNIFICANT CHANGE UP (ref 32–36)
MCV RBC AUTO: 93.8 FL — SIGNIFICANT CHANGE UP (ref 80–100)
PLATELET # BLD AUTO: 261 K/UL — SIGNIFICANT CHANGE UP (ref 150–400)
POTASSIUM SERPL-MCNC: 4.3 MMOL/L — SIGNIFICANT CHANGE UP (ref 3.5–5.3)
POTASSIUM SERPL-SCNC: 4.3 MMOL/L — SIGNIFICANT CHANGE UP (ref 3.5–5.3)
PROT SERPL-MCNC: 6.4 G/DL — SIGNIFICANT CHANGE UP (ref 6–8.3)
RBC # BLD: 3.76 M/UL — LOW (ref 4.2–5.8)
RBC # FLD: 14.4 % — SIGNIFICANT CHANGE UP (ref 10.3–14.5)
SODIUM SERPL-SCNC: 138 MMOL/L — SIGNIFICANT CHANGE UP (ref 135–145)
WBC # BLD: 15.7 K/UL — HIGH (ref 3.8–10.5)
WBC # FLD AUTO: 15.7 K/UL — HIGH (ref 3.8–10.5)

## 2019-03-30 PROCEDURE — 99223 1ST HOSP IP/OBS HIGH 75: CPT

## 2019-03-30 PROCEDURE — 99233 SBSQ HOSP IP/OBS HIGH 50: CPT

## 2019-03-30 RX ORDER — LEVETIRACETAM 250 MG/1
1500 TABLET, FILM COATED ORAL
Qty: 0 | Refills: 0 | Status: DISCONTINUED | OUTPATIENT
Start: 2019-03-30 | End: 2019-04-03

## 2019-03-30 RX ADMIN — FINASTERIDE 5 MILLIGRAM(S): 5 TABLET, FILM COATED ORAL at 12:11

## 2019-03-30 RX ADMIN — PANTOPRAZOLE SODIUM 40 MILLIGRAM(S): 20 TABLET, DELAYED RELEASE ORAL at 06:05

## 2019-03-30 RX ADMIN — Medication 2: at 12:10

## 2019-03-30 RX ADMIN — LACOSAMIDE 150 MILLIGRAM(S): 50 TABLET ORAL at 06:41

## 2019-03-30 RX ADMIN — ZINC SULFATE TAB 220 MG (50 MG ZINC EQUIVALENT) 220 MILLIGRAM(S): 220 (50 ZN) TAB at 12:12

## 2019-03-30 RX ADMIN — ENOXAPARIN SODIUM 40 MILLIGRAM(S): 100 INJECTION SUBCUTANEOUS at 13:57

## 2019-03-30 RX ADMIN — NYSTATIN CREAM 1 APPLICATION(S): 100000 CREAM TOPICAL at 06:06

## 2019-03-30 RX ADMIN — LEVETIRACETAM 1500 MILLIGRAM(S): 250 TABLET, FILM COATED ORAL at 18:52

## 2019-03-30 RX ADMIN — LEVETIRACETAM 1000 MILLIGRAM(S): 250 TABLET, FILM COATED ORAL at 06:05

## 2019-03-30 RX ADMIN — Medication 2 MILLIGRAM(S): at 06:05

## 2019-03-30 RX ADMIN — LACOSAMIDE 150 MILLIGRAM(S): 50 TABLET ORAL at 17:36

## 2019-03-30 RX ADMIN — Medication 500 MILLIGRAM(S): at 12:11

## 2019-03-30 RX ADMIN — LISINOPRIL 20 MILLIGRAM(S): 2.5 TABLET ORAL at 06:05

## 2019-03-30 RX ADMIN — Medication 1 TABLET(S): at 12:11

## 2019-03-30 RX ADMIN — INSULIN GLARGINE 18 UNIT(S): 100 INJECTION, SOLUTION SUBCUTANEOUS at 22:03

## 2019-03-30 RX ADMIN — NYSTATIN CREAM 1 APPLICATION(S): 100000 CREAM TOPICAL at 17:35

## 2019-03-30 RX ADMIN — Medication 2 MILLIGRAM(S): at 17:37

## 2019-03-30 RX ADMIN — Medication 2: at 16:54

## 2019-03-30 NOTE — PROGRESS NOTE ADULT - ASSESSMENT
75 y/o male with hx of T2DM, HTN, recurrent GBM, CVA, SAH, s/p craniotomy, post op complicated with seizures.   Pt. with on/off seizures, no seizure activity noticed during this admission.   Palliative:  Stage IV glioblastoma. Her prognosis is poor. Family report becoming in today. We will attempt to reach family and review advanced directives.    Seizures 2/2 SAH as post op complication after craniotomy on 2/2019   - no seizure activity during this admission  - seizure precautions   - pt. with reported seizures and NH, sent to [facility]   where no seizure activity was reported, keppra dose was incresed  - cont. keppra and vimpat  - neuro consult - Dr. Perales - pending     GBM - progressive   s/p craniotomy for brain tumor 2/2019, post-op complicated with seizures 2/2 SAH  poor prognosis - palliative care     # SAH s/p craniotomy 2/2019 complicated with seizures  cont. keppra and vimpat   seizure precautions      # Acute DVT s/p filter     # T2DM - cont. current regimen

## 2019-03-30 NOTE — CONSULT NOTE ADULT - PROBLEM SELECTOR RECOMMENDATION 9
Continue lacosamide 150 mg bid and levetiracetam 1500 mg bid. If seizure recurs, increase lacosamide to 200 mg bid.
continue Keppra  Continue Vimpat  continue dexamethasone  awaiting input from neurology

## 2019-03-30 NOTE — PROVIDER CONTACT NOTE (CRITICAL VALUE NOTIFICATION) - BACKGROUND
patient came in with history of DM2, and  had seizure episdodes patient came in with history of DM2, and  had seizure episodes

## 2019-03-30 NOTE — PROGRESS NOTE ADULT - ASSESSMENT
Physical Examination:  GENERAL:               Alert, Oriented, No acute distress.    HEENT:                  No JVD, Moist MM  PULM:                     Bilateral air entry, Clear to auscultation bilaterally, no significant sputum production, bibasilar Rales, No Rhonchi, No Wheezing  CVS:                         S1, S2,  +  Murmur  ABD:                        Soft, nondistended, nontender, normoactive bowel sounds,   EXT:                        trace edema, nontender, No Cyanosis or Clubbing   Vascular:                Warm Extremities, Normal Capillary refill, Normal Distal Pulses  SKIN:                       Warm and well perfused, no rashes noted.   NEURO:                  Alert, oriented 2-3 , interactive, , follows commands  PSYC:                      Calm, some Insight.    Assessment  Recurrent Seizure  Glioblastoma Multiforme with SAH , h/o CVA s/p Craniotomy 2/2019 and 11/2018 on Dexamethasone  Lactic acidosis multifactorial from Acute seizure vs Metformin use, Doubt from Sepsis at this time.   Abnormal CXR, bibasilar Left > Right Infiltrate Compared to cxr 2/12 remains poor inspiratory effort  Leukocytosis ? Steroid induced   Underlying BPH, h/o DVT s/p IVC Filter, DM2    Plan  Vimpat and Keppra dose increased  LOVE Beebe d/w Case with Dr. Perales Neuro - will come see patient today  Continue decadron  Monitor off abx, start incentive spirometry, will repeat CXR in am  EEG  Seizure precautions    Palliative care eval     PMD:	unsure 			                   Notified(Date):  Family Updated: 	Jelani Atkinson [phone]	                                 Date: 3/30/19       - Unsure who is PMD  ? Dr. Gonzalez will come in the afternoon     Sedation & Analgesia:	n/a  Diet/Nutrition:		oral diet  GI PPx:			Protonix    DVT Ppx:		Lovenox   	RISK                                                          Points  	[x ] Previous VTE                                           	3  	[ ] Thrombophilia                                        	2  	[ ] Lower limb paralysis                              	2   	[ ] Current Cancer                                       	2   	[ ] Immobilization > 24 hrs                        	1  	[x ] ICU-CCU stay > 24 hours                       	1  	[x ] Age > 60                                                   	1  		Total:[5 ]      Activity:		           OOB Chair  Head of Bed:               35-45deg  Glycemic Control:        n/a    Lines: PIV    Restraints were deemed necessary to prevent removal of life-sustaining devices [  ] YES   [    x]  NO    Disposition: Transfer to Medical floor , case d/w Dr. Pike who will accept pt upon transfer    Goals of Care: Full

## 2019-03-30 NOTE — CONSULT NOTE ADULT - SUBJECTIVE AND OBJECTIVE BOX
Called last evening by Dr. Madrid regarding this 74 yr-old man with hx of DM2,HTN with CVA 6 years ago (?right cerebellum and right thalamus as per review of imaging) and found to have right temporal mass after being found on floor at home 4 months ago. Resection of tumor 11/28/18  by Dr. Gonzalez and pathology was grade 4 GBM. He had recurrence of tumor and underwent 2nd right craniotomy one month ago. There was post-op right frontal SAH and some SDH. He was on Keppra 1500 mg bid and Vimpat 100 mg bid for seizure prophylaxis and transferred to Rehab. He had seizure at rehab center in [facility]) and sent to [facility], observed and sent back to rehab center where he had recurrent seizures and transferred here. Had seizure yesterday and brought to ICU. I spoke with Dr. Madrid last night and advised increasing dose of Vimpat to 150 mg bid. He has been seizure free since. He was seen last week by [facility] radiation oncologist, Dr Mil Grace (tel [phone]) and plan for starting RT was interrupted by his seizure recurrence.    On examination he is sleeping in chair and easily aroused. Alert and oriented to self and place "hospital", not to month or year. Follows simple commands. Has dense left homonymous hemianopia and left hemiparesis with left upper limb plegic. Has DSS sensory loss to touch on left side of body.       Recommendations d/w ICU staff.

## 2019-03-30 NOTE — CONSULT NOTE ADULT - PROBLEM SELECTOR RECOMMENDATION 2
Contact Dr. Grace (radiation oncologist re starting RT (tel [phone]
consider palliative consult due to poor prognosis per surgical team at [facility]

## 2019-03-31 LAB
GLUCOSE BLDC GLUCOMTR-MCNC: 108 MG/DL — HIGH (ref 70–99)
GLUCOSE BLDC GLUCOMTR-MCNC: 122 MG/DL — HIGH (ref 70–99)
GLUCOSE BLDC GLUCOMTR-MCNC: 203 MG/DL — HIGH (ref 70–99)
GLUCOSE BLDC GLUCOMTR-MCNC: 205 MG/DL — HIGH (ref 70–99)
HCT VFR BLD CALC: 30.7 % — LOW (ref 39–50)
HGB BLD-MCNC: 10.4 G/DL — LOW (ref 13–17)
LACTATE SERPL-SCNC: 1.8 MMOL/L — SIGNIFICANT CHANGE UP (ref 0.7–2)
MAGNESIUM SERPL-MCNC: 1.5 MG/DL — LOW (ref 1.6–2.6)
MCHC RBC-ENTMCNC: 32.1 PG — SIGNIFICANT CHANGE UP (ref 27–34)
MCHC RBC-ENTMCNC: 33.9 GM/DL — SIGNIFICANT CHANGE UP (ref 32–36)
MCV RBC AUTO: 94.7 FL — SIGNIFICANT CHANGE UP (ref 80–100)
PHOSPHATE SERPL-MCNC: 3.1 MG/DL — SIGNIFICANT CHANGE UP (ref 2.5–4.5)
PLATELET # BLD AUTO: 224 K/UL — SIGNIFICANT CHANGE UP (ref 150–400)
PROCALCITONIN SERPL-MCNC: 0.05 NG/ML — SIGNIFICANT CHANGE UP
RBC # BLD: 3.24 M/UL — LOW (ref 4.2–5.8)
RBC # FLD: 13.6 % — SIGNIFICANT CHANGE UP (ref 10.3–14.5)
WBC # BLD: 13.3 K/UL — HIGH (ref 3.8–10.5)
WBC # FLD AUTO: 13.3 K/UL — HIGH (ref 3.8–10.5)

## 2019-03-31 PROCEDURE — 99233 SBSQ HOSP IP/OBS HIGH 50: CPT

## 2019-03-31 PROCEDURE — 71045 X-RAY EXAM CHEST 1 VIEW: CPT | Mod: 26

## 2019-03-31 RX ORDER — MAGNESIUM SULFATE 500 MG/ML
1 VIAL (ML) INJECTION
Qty: 0 | Refills: 0 | Status: COMPLETED | OUTPATIENT
Start: 2019-03-31 | End: 2019-03-31

## 2019-03-31 RX ADMIN — PANTOPRAZOLE SODIUM 40 MILLIGRAM(S): 20 TABLET, DELAYED RELEASE ORAL at 05:33

## 2019-03-31 RX ADMIN — FINASTERIDE 5 MILLIGRAM(S): 5 TABLET, FILM COATED ORAL at 11:12

## 2019-03-31 RX ADMIN — Medication 1 TABLET(S): at 11:12

## 2019-03-31 RX ADMIN — LISINOPRIL 20 MILLIGRAM(S): 2.5 TABLET ORAL at 05:34

## 2019-03-31 RX ADMIN — LEVETIRACETAM 1500 MILLIGRAM(S): 250 TABLET, FILM COATED ORAL at 17:11

## 2019-03-31 RX ADMIN — NYSTATIN CREAM 1 APPLICATION(S): 100000 CREAM TOPICAL at 17:12

## 2019-03-31 RX ADMIN — ZINC SULFATE TAB 220 MG (50 MG ZINC EQUIVALENT) 220 MILLIGRAM(S): 220 (50 ZN) TAB at 11:12

## 2019-03-31 RX ADMIN — INSULIN GLARGINE 18 UNIT(S): 100 INJECTION, SOLUTION SUBCUTANEOUS at 22:50

## 2019-03-31 RX ADMIN — Medication 100 GRAM(S): at 09:01

## 2019-03-31 RX ADMIN — LACOSAMIDE 150 MILLIGRAM(S): 50 TABLET ORAL at 17:11

## 2019-03-31 RX ADMIN — Medication 100 GRAM(S): at 10:09

## 2019-03-31 RX ADMIN — LACOSAMIDE 150 MILLIGRAM(S): 50 TABLET ORAL at 05:32

## 2019-03-31 RX ADMIN — Medication 500 MILLIGRAM(S): at 11:12

## 2019-03-31 RX ADMIN — NYSTATIN CREAM 1 APPLICATION(S): 100000 CREAM TOPICAL at 05:33

## 2019-03-31 RX ADMIN — ENOXAPARIN SODIUM 40 MILLIGRAM(S): 100 INJECTION SUBCUTANEOUS at 11:12

## 2019-03-31 RX ADMIN — Medication 2 MILLIGRAM(S): at 17:12

## 2019-03-31 RX ADMIN — Medication 4: at 12:04

## 2019-03-31 RX ADMIN — Medication 2 MILLIGRAM(S): at 05:33

## 2019-03-31 RX ADMIN — LEVETIRACETAM 1500 MILLIGRAM(S): 250 TABLET, FILM COATED ORAL at 05:33

## 2019-03-31 NOTE — PROGRESS NOTE ADULT - ASSESSMENT
73 y/o male with hx of T2DM, HTN, recurrent GBM, CVA, SAH, s/p craniotomy, post op complicated with seizures.  Pt. with on/off seizures and had seizure two days ago, transferred to ICU for monitoring.   Patient stable and transferred to medical floor yesterday.      Seizures 2/2 SAH as post op complication after craniotomy on 2/2019  - seizure precautions   - pt. with reported seizures and NH, sent to [facility]   where no seizure activity was reported, keppra dose was increased  - cont. keppra and vimpat  - neuro following     GBM - progressive and with poor prognosis - will need to discuss with family   s/p craniotomy for brain tumor 2/2019, post-op complicated with seizures 2/2 SAH  poor prognosis - palliative care     SAH s/p craniotomy 2/2019 complicated with seizures  cont. keppra and vimpat   seizure precautions      Acute DVT s/p filter     T2DM - cont. current regimen

## 2019-03-31 NOTE — PROGRESS NOTE ADULT - ASSESSMENT
Physical Examination:  GENERAL:               Alert, Oriented, No acute distress.    HEENT:                  No JVD, Moist MM  PULM:                     Bilateral air entry, Clear to auscultation bilaterally, no significant sputum production, bibasilar Rales, No Rhonchi, No Wheezing  CVS:                         S1, S2,  +  Murmur  ABD:                        Soft, nondistended, nontender, normoactive bowel sounds,   EXT:                        trace edema, nontender, No Cyanosis or Clubbing   Vascular:                Warm Extremities, Normal Capillary refill, Normal Distal Pulses  SKIN:                       Warm and well perfused, no rashes noted.   NEURO:                  Alert, oriented 2-3 , interactive, , follows commands  PSYC:                      Calm, some Insight.    Assessment  Recurrent Seizure  Glioblastoma Multiforme with SAH , h/o CVA s/p Craniotomy 2/2019 and 11/2018 on Dexamethasone  Lactic acidosis multifactorial from Acute seizure vs Metformin use, Doubt from Sepsis at this time.   Abnormal CXR, bibasilar Left > Right Infiltrate Compared to cxr 2/12 remains poor inspiratory effort - CXR now clear 3/31  Leukocytosis ? Steroid induced   Underlying BPH, h/o DVT s/p IVC Filter, DM2    Plan  Vimpat and Keppra dose increased  Neuro f/u   Continue decadron  Monitor off abx, doubt PNA  Seizure precautions    Appreciate neuro input transfer for ? RT, will need to discuss with RT when available.  Palliative care eval       Lines: PIV    Disposition: continue care on Anamaria    Goals of Care: Full

## 2019-04-01 ENCOUNTER — OUTPATIENT (OUTPATIENT)
Dept: OUTPATIENT SERVICES | Facility: HOSPITAL | Age: 74
LOS: 1 days | End: 2019-04-01
Payer: MEDICARE

## 2019-04-01 DIAGNOSIS — Z98.890 OTHER SPECIFIED POSTPROCEDURAL STATES: Chronic | ICD-10-CM

## 2019-04-01 LAB
ANION GAP SERPL CALC-SCNC: 7 MMOL/L — SIGNIFICANT CHANGE UP (ref 5–17)
BUN SERPL-MCNC: 23 MG/DL — SIGNIFICANT CHANGE UP (ref 7–23)
CALCIUM SERPL-MCNC: 8.7 MG/DL — SIGNIFICANT CHANGE UP (ref 8.4–10.5)
CHLORIDE SERPL-SCNC: 102 MMOL/L — SIGNIFICANT CHANGE UP (ref 96–108)
CO2 SERPL-SCNC: 31 MMOL/L — SIGNIFICANT CHANGE UP (ref 22–31)
CREAT SERPL-MCNC: 0.79 MG/DL — SIGNIFICANT CHANGE UP (ref 0.5–1.3)
GLUCOSE BLDC GLUCOMTR-MCNC: 124 MG/DL — HIGH (ref 70–99)
GLUCOSE BLDC GLUCOMTR-MCNC: 128 MG/DL — HIGH (ref 70–99)
GLUCOSE BLDC GLUCOMTR-MCNC: 132 MG/DL — HIGH (ref 70–99)
GLUCOSE BLDC GLUCOMTR-MCNC: 147 MG/DL — HIGH (ref 70–99)
GLUCOSE BLDC GLUCOMTR-MCNC: 197 MG/DL — HIGH (ref 70–99)
GLUCOSE SERPL-MCNC: 109 MG/DL — HIGH (ref 70–99)
HCT VFR BLD CALC: 34.8 % — LOW (ref 39–50)
HGB BLD-MCNC: 11.3 G/DL — LOW (ref 13–17)
MCHC RBC-ENTMCNC: 30.7 PG — SIGNIFICANT CHANGE UP (ref 27–34)
MCHC RBC-ENTMCNC: 32.4 GM/DL — SIGNIFICANT CHANGE UP (ref 32–36)
MCV RBC AUTO: 94.9 FL — SIGNIFICANT CHANGE UP (ref 80–100)
PLATELET # BLD AUTO: 238 K/UL — SIGNIFICANT CHANGE UP (ref 150–400)
POTASSIUM SERPL-MCNC: 3.5 MMOL/L — SIGNIFICANT CHANGE UP (ref 3.5–5.3)
POTASSIUM SERPL-SCNC: 3.5 MMOL/L — SIGNIFICANT CHANGE UP (ref 3.5–5.3)
RBC # BLD: 3.67 M/UL — LOW (ref 4.2–5.8)
RBC # FLD: 14.5 % — SIGNIFICANT CHANGE UP (ref 10.3–14.5)
SODIUM SERPL-SCNC: 140 MMOL/L — SIGNIFICANT CHANGE UP (ref 135–145)
WBC # BLD: 13.7 K/UL — HIGH (ref 3.8–10.5)
WBC # FLD AUTO: 13.7 K/UL — HIGH (ref 3.8–10.5)

## 2019-04-01 PROCEDURE — 99233 SBSQ HOSP IP/OBS HIGH 50: CPT

## 2019-04-01 PROCEDURE — G9001: CPT

## 2019-04-01 RX ADMIN — Medication 2 MILLIGRAM(S): at 06:56

## 2019-04-01 RX ADMIN — Medication 500 MILLIGRAM(S): at 11:33

## 2019-04-01 RX ADMIN — Medication 2 MILLIGRAM(S): at 17:09

## 2019-04-01 RX ADMIN — LACOSAMIDE 150 MILLIGRAM(S): 50 TABLET ORAL at 17:09

## 2019-04-01 RX ADMIN — Medication 1 TABLET(S): at 11:33

## 2019-04-01 RX ADMIN — NYSTATIN CREAM 1 APPLICATION(S): 100000 CREAM TOPICAL at 17:10

## 2019-04-01 RX ADMIN — Medication 2: at 11:36

## 2019-04-01 RX ADMIN — ENOXAPARIN SODIUM 40 MILLIGRAM(S): 100 INJECTION SUBCUTANEOUS at 11:33

## 2019-04-01 RX ADMIN — PANTOPRAZOLE SODIUM 40 MILLIGRAM(S): 20 TABLET, DELAYED RELEASE ORAL at 06:56

## 2019-04-01 RX ADMIN — INSULIN GLARGINE 18 UNIT(S): 100 INJECTION, SOLUTION SUBCUTANEOUS at 22:10

## 2019-04-01 RX ADMIN — NYSTATIN CREAM 1 APPLICATION(S): 100000 CREAM TOPICAL at 06:56

## 2019-04-01 RX ADMIN — LEVETIRACETAM 1500 MILLIGRAM(S): 250 TABLET, FILM COATED ORAL at 17:10

## 2019-04-01 RX ADMIN — LISINOPRIL 20 MILLIGRAM(S): 2.5 TABLET ORAL at 06:56

## 2019-04-01 RX ADMIN — LEVETIRACETAM 1500 MILLIGRAM(S): 250 TABLET, FILM COATED ORAL at 06:56

## 2019-04-01 RX ADMIN — LACOSAMIDE 150 MILLIGRAM(S): 50 TABLET ORAL at 06:55

## 2019-04-01 RX ADMIN — FINASTERIDE 5 MILLIGRAM(S): 5 TABLET, FILM COATED ORAL at 11:33

## 2019-04-01 RX ADMIN — ZINC SULFATE TAB 220 MG (50 MG ZINC EQUIVALENT) 220 MILLIGRAM(S): 220 (50 ZN) TAB at 11:33

## 2019-04-01 NOTE — PROGRESS NOTE ADULT - ASSESSMENT
73 y/o male with hx of T2DM, HTN, recurrent GBM, CVA, SAH, s/p craniotomy, post op complicated with seizures.  Pt. with on/off seizures and had seizure two days ago, transferred to ICU for monitoring.   Patient stable and transferred to medical floor yesterday.      Seizures 2/2 SAH as post op complication after craniotomy on 2/2019  - seizure precautions   - pt. with reported seizures and NH, sent to [facility]   - last known seizure Friday evening - transferred to ICU for close monitoring  - cont keppra and vimpat  - neuro following   - BELN[facility] planning    GBM - progressive and with poor prognosis - will need to discuss with family   s/p craniotomy for brain tumor 2/2019, post-op complicated with seizures 2/2 SAH  poor prognosis - palliative care     SAH s/p craniotomy 2/2019 complicated with seizures  cont. keppra and vimpat   seizure precautions      Acute DVT s/p filter     T2DM - cont. current regimen

## 2019-04-01 NOTE — PROGRESS NOTE ADULT - ASSESSMENT
Physical Examination:  GENERAL:               Alert, Oriented, No acute distress.    HEENT:                  No JVD, Moist MM  PULM:                     Bilateral air entry, Clear to auscultation bilaterally, no Rales, No Rhonchi, No Wheezing  CVS:                         S1, S2,  +  Murmur  ABD:                        Soft, nondistended, nontender, normoactive bowel sounds,   EXT:                        trace edema, nontender, No Cyanosis or Clubbing   Vascular:                Warm Extremities, Normal Capillary refill, Normal Distal Pulses  SKIN:                       Warm and well perfused, no rashes noted.   NEURO:                  Alert, oriented 2-3 , interactive, , follows commands. Left UE weakness 3/4, right UE strength 4/5   PSYC:                      Calm, some Insight.    Assessment  Recurrent Seizure  Glioblastoma Multiforme with SAH , h/o CVA s/p Craniotomy 2/2019 and 11/2018 on Dexamethasone  Lactic acidosis multifactorial from Acute seizure vs Metformin use, Doubt from Sepsis at this time.   Abnormal CXR, bibasilar Left > Right Infiltrate Compared to cxr 2/12 remains poor inspiratory effort - CXR now clear 3/31  Leukocytosis ? Steroid induced   Underlying BPH, h/o DVT s/p IVC Filter, DM2    Plan  Vimpat and Keppra   Neuro f/u   Continue decadron  Stable off abx, doubt PNA  Seizure precautions  Appreciate neuro input transfer for ? RT, will need to discuss with RT when available.  Palliative care eval     Disposition: continue care on Anamaria.   Reconsult if need [facility]   Goals of Care: Full

## 2019-04-02 ENCOUNTER — APPOINTMENT (OUTPATIENT)
Dept: NEUROSURGERY | Facility: CLINIC | Age: 74
End: 2019-04-02

## 2019-04-02 ENCOUNTER — APPOINTMENT (OUTPATIENT)
Dept: NEUROLOGY | Facility: CLINIC | Age: 74
End: 2019-04-02

## 2019-04-02 LAB
ANION GAP SERPL CALC-SCNC: 5 MMOL/L — SIGNIFICANT CHANGE UP (ref 5–17)
BUN SERPL-MCNC: 18 MG/DL — SIGNIFICANT CHANGE UP (ref 7–23)
CALCIUM SERPL-MCNC: 9.4 MG/DL — SIGNIFICANT CHANGE UP (ref 8.4–10.5)
CHLORIDE SERPL-SCNC: 102 MMOL/L — SIGNIFICANT CHANGE UP (ref 96–108)
CO2 SERPL-SCNC: 32 MMOL/L — HIGH (ref 22–31)
CREAT SERPL-MCNC: 0.76 MG/DL — SIGNIFICANT CHANGE UP (ref 0.5–1.3)
GLUCOSE BLDC GLUCOMTR-MCNC: 117 MG/DL — HIGH (ref 70–99)
GLUCOSE BLDC GLUCOMTR-MCNC: 140 MG/DL — HIGH (ref 70–99)
GLUCOSE BLDC GLUCOMTR-MCNC: 172 MG/DL — HIGH (ref 70–99)
GLUCOSE BLDC GLUCOMTR-MCNC: 94 MG/DL — SIGNIFICANT CHANGE UP (ref 70–99)
GLUCOSE SERPL-MCNC: 115 MG/DL — HIGH (ref 70–99)
HCT VFR BLD CALC: 38.3 % — LOW (ref 39–50)
HGB BLD-MCNC: 12.6 G/DL — LOW (ref 13–17)
LEVETIRACETAM SERPL-MCNC: 13.5 MCG/ML — SIGNIFICANT CHANGE UP (ref 12–46)
MCHC RBC-ENTMCNC: 31.3 PG — SIGNIFICANT CHANGE UP (ref 27–34)
MCHC RBC-ENTMCNC: 33 GM/DL — SIGNIFICANT CHANGE UP (ref 32–36)
MCV RBC AUTO: 94.7 FL — SIGNIFICANT CHANGE UP (ref 80–100)
PLATELET # BLD AUTO: 250 K/UL — SIGNIFICANT CHANGE UP (ref 150–400)
POTASSIUM SERPL-MCNC: 3.9 MMOL/L — SIGNIFICANT CHANGE UP (ref 3.5–5.3)
POTASSIUM SERPL-SCNC: 3.9 MMOL/L — SIGNIFICANT CHANGE UP (ref 3.5–5.3)
RBC # BLD: 4.04 M/UL — LOW (ref 4.2–5.8)
RBC # FLD: 14.4 % — SIGNIFICANT CHANGE UP (ref 10.3–14.5)
SODIUM SERPL-SCNC: 139 MMOL/L — SIGNIFICANT CHANGE UP (ref 135–145)
WBC # BLD: 14.4 K/UL — HIGH (ref 3.8–10.5)
WBC # FLD AUTO: 14.4 K/UL — HIGH (ref 3.8–10.5)

## 2019-04-02 PROCEDURE — 99233 SBSQ HOSP IP/OBS HIGH 50: CPT

## 2019-04-02 RX ADMIN — LISINOPRIL 20 MILLIGRAM(S): 2.5 TABLET ORAL at 05:46

## 2019-04-02 RX ADMIN — Medication 2: at 12:15

## 2019-04-02 RX ADMIN — LEVETIRACETAM 1500 MILLIGRAM(S): 250 TABLET, FILM COATED ORAL at 17:05

## 2019-04-02 RX ADMIN — NYSTATIN CREAM 1 APPLICATION(S): 100000 CREAM TOPICAL at 05:46

## 2019-04-02 RX ADMIN — INSULIN GLARGINE 18 UNIT(S): 100 INJECTION, SOLUTION SUBCUTANEOUS at 22:03

## 2019-04-02 RX ADMIN — PANTOPRAZOLE SODIUM 40 MILLIGRAM(S): 20 TABLET, DELAYED RELEASE ORAL at 05:46

## 2019-04-02 RX ADMIN — LEVETIRACETAM 1500 MILLIGRAM(S): 250 TABLET, FILM COATED ORAL at 05:45

## 2019-04-02 RX ADMIN — FINASTERIDE 5 MILLIGRAM(S): 5 TABLET, FILM COATED ORAL at 11:45

## 2019-04-02 RX ADMIN — NYSTATIN CREAM 1 APPLICATION(S): 100000 CREAM TOPICAL at 17:05

## 2019-04-02 RX ADMIN — LACOSAMIDE 150 MILLIGRAM(S): 50 TABLET ORAL at 17:05

## 2019-04-02 RX ADMIN — ZINC SULFATE TAB 220 MG (50 MG ZINC EQUIVALENT) 220 MILLIGRAM(S): 220 (50 ZN) TAB at 11:45

## 2019-04-02 RX ADMIN — ENOXAPARIN SODIUM 40 MILLIGRAM(S): 100 INJECTION SUBCUTANEOUS at 11:45

## 2019-04-02 RX ADMIN — Medication 2 MILLIGRAM(S): at 17:05

## 2019-04-02 RX ADMIN — Medication 2 MILLIGRAM(S): at 05:46

## 2019-04-02 RX ADMIN — Medication 1 TABLET(S): at 11:45

## 2019-04-02 RX ADMIN — Medication 500 MILLIGRAM(S): at 11:45

## 2019-04-02 RX ADMIN — LACOSAMIDE 150 MILLIGRAM(S): 50 TABLET ORAL at 05:51

## 2019-04-02 NOTE — PROGRESS NOTE ADULT - ASSESSMENT
75 y/o male with hx of T2DM, HTN, recurrent GBM, CVA, SAH, s/p craniotomy, post op complicated with seizures.  Pt. with on/off seizures and had seizure two days ago, transferred to ICU for monitoring.       Seizures 2/2 SAH as post op complication after craniotomy on 2/2019  - seizure precautions   - pt. with reported seizures and NH, sent to [facility]   - last known seizure Friday evening - transferred to ICU for close monitoring  - cont keppra and vimpat  - neuro following   - BEL[facility]N planning    GBM - progressive and with poor prognosis - will need to discuss with family   s/p craniotomy for brain tumor 2/2019, post-op complicated with seizures 2/2 SAH  poor prognosis - palliative care     SAH s/p craniotomy 2/2019 complicated with seizures  cont. keppra and vimpat   seizure precautions      Acute DVT s/p filter     T2DM - cont. current regimen

## 2019-04-03 ENCOUNTER — INPATIENT (INPATIENT)
Facility: HOSPITAL | Age: 74
LOS: 15 days | Discharge: NURSING, REHAB OR LONG-TERM CARE | End: 2019-04-19
Attending: HOSPITALIST | Admitting: HOSPITALIST
Payer: MEDICARE

## 2019-04-03 ENCOUNTER — TRANSCRIPTION ENCOUNTER (OUTPATIENT)
Age: 74
End: 2019-04-03

## 2019-04-03 VITALS
HEART RATE: 84 BPM | TEMPERATURE: 98 F | RESPIRATION RATE: 16 BRPM | DIASTOLIC BLOOD PRESSURE: 71 MMHG | SYSTOLIC BLOOD PRESSURE: 106 MMHG | OXYGEN SATURATION: 96 %

## 2019-04-03 VITALS
RESPIRATION RATE: 17 BRPM | OXYGEN SATURATION: 99 % | HEIGHT: 69 IN | WEIGHT: 166.89 LBS | SYSTOLIC BLOOD PRESSURE: 104 MMHG | TEMPERATURE: 97 F | DIASTOLIC BLOOD PRESSURE: 78 MMHG | HEART RATE: 79 BPM

## 2019-04-03 DIAGNOSIS — Z98.890 OTHER SPECIFIED POSTPROCEDURAL STATES: Chronic | ICD-10-CM

## 2019-04-03 DIAGNOSIS — C71.9 MALIGNANT NEOPLASM OF BRAIN, UNSPECIFIED: ICD-10-CM

## 2019-04-03 LAB
ANION GAP SERPL CALC-SCNC: 6 MMOL/L — SIGNIFICANT CHANGE UP (ref 5–17)
BUN SERPL-MCNC: 22 MG/DL — SIGNIFICANT CHANGE UP (ref 7–23)
CALCIUM SERPL-MCNC: 9.1 MG/DL — SIGNIFICANT CHANGE UP (ref 8.4–10.5)
CHLORIDE SERPL-SCNC: 104 MMOL/L — SIGNIFICANT CHANGE UP (ref 96–108)
CO2 SERPL-SCNC: 31 MMOL/L — SIGNIFICANT CHANGE UP (ref 22–31)
CREAT SERPL-MCNC: 0.84 MG/DL — SIGNIFICANT CHANGE UP (ref 0.5–1.3)
GLUCOSE BLDC GLUCOMTR-MCNC: 113 MG/DL — HIGH (ref 70–99)
GLUCOSE BLDC GLUCOMTR-MCNC: 174 MG/DL — HIGH (ref 70–99)
GLUCOSE BLDC GLUCOMTR-MCNC: 196 MG/DL — HIGH (ref 70–99)
GLUCOSE SERPL-MCNC: 112 MG/DL — HIGH (ref 70–99)
HCT VFR BLD CALC: 36 % — LOW (ref 39–50)
HGB BLD-MCNC: 11.8 G/DL — LOW (ref 13–17)
MCHC RBC-ENTMCNC: 30.9 PG — SIGNIFICANT CHANGE UP (ref 27–34)
MCHC RBC-ENTMCNC: 32.9 GM/DL — SIGNIFICANT CHANGE UP (ref 32–36)
MCV RBC AUTO: 94.1 FL — SIGNIFICANT CHANGE UP (ref 80–100)
PLATELET # BLD AUTO: 242 K/UL — SIGNIFICANT CHANGE UP (ref 150–400)
POTASSIUM SERPL-MCNC: 3.7 MMOL/L — SIGNIFICANT CHANGE UP (ref 3.5–5.3)
POTASSIUM SERPL-SCNC: 3.7 MMOL/L — SIGNIFICANT CHANGE UP (ref 3.5–5.3)
RBC # BLD: 3.82 M/UL — LOW (ref 4.2–5.8)
RBC # FLD: 14.4 % — SIGNIFICANT CHANGE UP (ref 10.3–14.5)
SODIUM SERPL-SCNC: 141 MMOL/L — SIGNIFICANT CHANGE UP (ref 135–145)
WBC # BLD: 14 K/UL — HIGH (ref 3.8–10.5)
WBC # FLD AUTO: 14 K/UL — HIGH (ref 3.8–10.5)

## 2019-04-03 PROCEDURE — 80177 DRUG SCRN QUAN LEVETIRACETAM: CPT

## 2019-04-03 PROCEDURE — 70450 CT HEAD/BRAIN W/O DYE: CPT

## 2019-04-03 PROCEDURE — 71045 X-RAY EXAM CHEST 1 VIEW: CPT

## 2019-04-03 PROCEDURE — 36415 COLL VENOUS BLD VENIPUNCTURE: CPT

## 2019-04-03 PROCEDURE — 80053 COMPREHEN METABOLIC PANEL: CPT

## 2019-04-03 PROCEDURE — 36000 PLACE NEEDLE IN VEIN: CPT

## 2019-04-03 PROCEDURE — 84145 PROCALCITONIN (PCT): CPT

## 2019-04-03 PROCEDURE — 83605 ASSAY OF LACTIC ACID: CPT

## 2019-04-03 PROCEDURE — 84484 ASSAY OF TROPONIN QUANT: CPT

## 2019-04-03 PROCEDURE — 99223 1ST HOSP IP/OBS HIGH 75: CPT

## 2019-04-03 PROCEDURE — 99239 HOSP IP/OBS DSCHRG MGMT >30: CPT

## 2019-04-03 PROCEDURE — 99285 EMERGENCY DEPT VISIT HI MDM: CPT | Mod: 25

## 2019-04-03 PROCEDURE — 85027 COMPLETE CBC AUTOMATED: CPT

## 2019-04-03 PROCEDURE — 82803 BLOOD GASES ANY COMBINATION: CPT

## 2019-04-03 PROCEDURE — 97161 PT EVAL LOW COMPLEX 20 MIN: CPT

## 2019-04-03 PROCEDURE — 80048 BASIC METABOLIC PNL TOTAL CA: CPT

## 2019-04-03 PROCEDURE — 93005 ELECTROCARDIOGRAM TRACING: CPT

## 2019-04-03 PROCEDURE — 83735 ASSAY OF MAGNESIUM: CPT

## 2019-04-03 PROCEDURE — 87040 BLOOD CULTURE FOR BACTERIA: CPT

## 2019-04-03 PROCEDURE — 84146 ASSAY OF PROLACTIN: CPT

## 2019-04-03 PROCEDURE — 97116 GAIT TRAINING THERAPY: CPT

## 2019-04-03 PROCEDURE — 84100 ASSAY OF PHOSPHORUS: CPT

## 2019-04-03 PROCEDURE — 82962 GLUCOSE BLOOD TEST: CPT

## 2019-04-03 RX ORDER — LACOSAMIDE 50 MG/1
1 TABLET ORAL
Qty: 0 | Refills: 0 | COMMUNITY
Start: 2019-04-03

## 2019-04-03 RX ORDER — LEVETIRACETAM 250 MG/1
2 TABLET, FILM COATED ORAL
Qty: 0 | Refills: 0 | COMMUNITY
Start: 2019-04-03

## 2019-04-03 RX ADMIN — Medication 500 MILLIGRAM(S): at 12:49

## 2019-04-03 RX ADMIN — Medication 2: at 17:27

## 2019-04-03 RX ADMIN — Medication 2: at 12:48

## 2019-04-03 RX ADMIN — LEVETIRACETAM 1500 MILLIGRAM(S): 250 TABLET, FILM COATED ORAL at 17:27

## 2019-04-03 RX ADMIN — LEVETIRACETAM 1500 MILLIGRAM(S): 250 TABLET, FILM COATED ORAL at 06:33

## 2019-04-03 RX ADMIN — Medication 2 MILLIGRAM(S): at 06:32

## 2019-04-03 RX ADMIN — Medication 1 TABLET(S): at 12:49

## 2019-04-03 RX ADMIN — NYSTATIN CREAM 1 APPLICATION(S): 100000 CREAM TOPICAL at 06:34

## 2019-04-03 RX ADMIN — Medication 2 MILLIGRAM(S): at 17:28

## 2019-04-03 RX ADMIN — LACOSAMIDE 150 MILLIGRAM(S): 50 TABLET ORAL at 17:40

## 2019-04-03 RX ADMIN — FINASTERIDE 5 MILLIGRAM(S): 5 TABLET, FILM COATED ORAL at 12:49

## 2019-04-03 RX ADMIN — NYSTATIN CREAM 1 APPLICATION(S): 100000 CREAM TOPICAL at 17:28

## 2019-04-03 RX ADMIN — ZINC SULFATE TAB 220 MG (50 MG ZINC EQUIVALENT) 220 MILLIGRAM(S): 220 (50 ZN) TAB at 12:49

## 2019-04-03 RX ADMIN — LACOSAMIDE 150 MILLIGRAM(S): 50 TABLET ORAL at 06:34

## 2019-04-03 RX ADMIN — ENOXAPARIN SODIUM 40 MILLIGRAM(S): 100 INJECTION SUBCUTANEOUS at 12:49

## 2019-04-03 RX ADMIN — PANTOPRAZOLE SODIUM 40 MILLIGRAM(S): 20 TABLET, DELAYED RELEASE ORAL at 06:33

## 2019-04-03 RX ADMIN — LISINOPRIL 20 MILLIGRAM(S): 2.5 TABLET ORAL at 06:33

## 2019-04-03 NOTE — CHART NOTE - NSCHARTNOTEFT_GEN_A_CORE
NUTRITION FOLLOW UP    SOURCE: Patient [)   Family [ ]     other [ X] MEDICAL RECORD    DIET: DYSPHAGIA 2 CONS. CHO GLUCERNA SHAKES BID  Tolerating sips of glucerna shakes as reported    PATIENT REPORT[ ] nausea  [ ] vomiting [ ] diarrhea [ ] constipation  [ ]chewing problems [X ] swallowing issues  [ ] other: no GI distress    PO INTAKE:  < 50% [ ]   50-75%  [ X]   %  []  other :    SOURCE: for PO intake [] Patient [ ] family [ X] chart [ ] staff [ ] other    ENTERAL/PARENTERAL NUTRITION: n/a    CURRENT WEIGHT: Weight (kg): 85.2 (04-03 @ 05:13)     PERTINENT LABS:  Date: 02 Apr 2019 06:50  Hemoglobin 12.6   Hematocrit 38.3     Date: 04-02  Sodium 139  Potassium 3.9  Glucose Serum 115<H>  BUN 18      Creatinine    ACCUCHECK  POCT Blood Glucose.: 113 mg/dL (03 Apr 2019 08:00)  POCT Blood Glucose.: 140 mg/dL (02 Apr 2019 22:01)  POCT Blood Glucose.: 117 mg/dL (02 Apr 2019 16:33)  POCT Blood Glucose.: 172 mg/dL (02 Apr 2019 11:44)      SKIN: STAGE 2 SACRUM, LAST BM was 4/3, severe malnutrition noted.    ESTIMATED NEEDS:   [X] no change since previous assessment  [ ] recalculated:     PREVIOUS NUTRITION DIAGNOSIS: addressed    NUTRITION DIAGNOSIS is   [X] ongoing    NEW NUTRITION DIAGNOSIS: [X] not applicable    MONITORING AND EVALUATION:   Current diet order is appropriate and is well tolerated, but will monitor for any changes that may be needed    [X] PO intake [X] Tolerance to diet prescription [X] weights [X] follow up per protocol    NUTRITION RECOMMENDATIONS:  Continue to monitor tolerance to diet/ po intake.  RD remains available RASHEL Johnson RD CDE

## 2019-04-03 NOTE — H&P ADULT - NSHPPHYSICALEXAM_GEN_ALL_CORE
PHYSICAL EXAM:      Constitutional: NAD, well-groomed, well-developed  HEENT: PERRLA, EOMI, Normal Hearing  Neck: No LAD, No JVD  Back: Normal spine flexure, No CVA tenderness  Respiratory: CTAB  Cardiovascular: S1 and S2, RRR, no M/G/R  Gastrointestinal: BS+, soft, NT/ND  Extremities: No peripheral edema  Vascular: 2+ peripheral pulses  Neurological: A/O x 3, no focal deficits  Psychiatric: Normal mood, normal affect  Musculoskeletal: 5/5 strength b/l upper and lower extremities  Skin: No rashes

## 2019-04-03 NOTE — DISCHARGE NOTE PROVIDER - HOSPITAL COURSE
75 y/o male with hx of T2DM, HTN, recurrent GBM, CVA, SAH, s/p craniotomy, post op complicated with seizures.  Pt. with on/off seizures and had seizure two days ago, transferred to ICU for monitoring.           Seizures 2/2 SAH as post op complication after craniotomy on 2/2019    - seizure precautions     - pt. with reported seizures and NH, sent to [facility]     - last known seizure Friday evening - transferred to ICU for close monitoring    - cont keppra and vimpat    - neuro following     - BELÉN planning        GBM - progressive and with poor prognosis - will need to discuss with family     s/p craniotomy for brain tumor 2/2019, post-op complicated with seizures 2/2 SAH    poor prognosis - palliative care         SAH s/p craniotomy 2/2019 complicated with seizures    cont. keppra and vimpat     seizure precautions          Acute DVT s/p filter         T2DM - cont. current regimen

## 2019-04-03 NOTE — PROGRESS NOTE ADULT - ASSESSMENT
75 y/o male with hx of T2DM, HTN, recurrent GBM, CVA, SAH, s/p craniotomy, post op complicated with seizures.  Pt. with on/off seizures and had seizure two days ago, transferred to ICU for monitoring.       Seizures 2/2 SAH as post op complication after craniotomy on 2/2019  - seizure precautions   - pt. with reported seizures and NH, sent to [facility]   - last known seizure Friday evening - transferred to ICU for close monitoring  - cont keppra and vimpat  - neuro following   - BELÉN planning    GBM - progressive and with poor prognosis - will need to discuss with family   s/p craniotomy for brain tumor 2/2019, post-op complicated with seizures 2/2 SAH  poor prognosis - palliative care     SAH s/p craniotomy 2/2019 complicated with seizures  cont. keppra and vimpat   seizure precautions      Acute DVT s/p filter     T2DM - cont. current regimen     I spoke with Dr. Grace's team for RT recs, they would like patient to be transferred to [facility] to start RT Planning. discussed with patient's son - Catarino, agreeable with plan. He also states that he is the Health care proxy, though he doesn't mind if we inform his brother Chirag regarding care plan.

## 2019-04-03 NOTE — H&P ADULT - PROBLEM SELECTOR PLAN 2
progressive and with poor prognosis   radiation for better  symptom control  c/w steroids for at least 7 more days per OSH neurosurgery reccs

## 2019-04-03 NOTE — PROGRESS NOTE ADULT - PROVIDER SPECIALTY LIST ADULT
Critical Care
Hospitalist
Pulmonology
Palliative Care
Hospitalist

## 2019-04-03 NOTE — H&P ADULT - NSICDXPASTSURGICALHX_GEN_ALL_CORE_FT
PAST SURGICAL HISTORY:  S/P craniotomy ~ resection of R-pariental mass (11/28/2018)    S/P craniotomy 02/2019

## 2019-04-03 NOTE — H&P ADULT - NSHPREVIEWOFSYSTEMS_GEN_ALL_CORE
Review of Systems:   CONSTITUTIONAL: No fever, weight loss, or fatigue  EYES: No eye pain, visual disturbances, or discharge  ENMT:  No difficulty hearing, tinnitus, vertigo; No sinus or throat pain  NECK: No pain or stiffness  RESPIRATORY: No cough, wheezing, chills or hemoptysis; No shortness of breath  CARDIOVASCULAR: No chest pain, palpitations, dizziness, or leg swelling  GASTROINTESTINAL: No abdominal or epigastric pain. No nausea, vomiting, or hematemesis; No diarrhea or constipation. No melena or hematochezia.  GENITOURINARY: No dysuria, frequency, hematuria, or incontinence  NEUROLOGICAL: No headaches, memory loss, loss of strength, numbness, or tremors  SKIN: No itching, burning, rashes, or lesions   LYMPH NODES: No enlarged glands  ENDOCRINE: No heat or cold intolerance; No hair loss  MUSCULOSKELETAL: No joint pain or swelling; No muscle, back, or extremity pain

## 2019-04-03 NOTE — H&P ADULT - PROBLEM SELECTOR PLAN 5
pt with acute left dvt, s/p IVC filter  pt recently on lovenox 40 mg/d; would confirm with neurosurgery given recent SAH/SDH

## 2019-04-03 NOTE — H&P ADULT - NSHPLABSRESULTS_GEN_ALL_CORE
Vital Signs Last 24 Hrs  T(C): 36.3 (03 Apr 2019 22:02), Max: 36.7 (03 Apr 2019 15:52)  T(F): 97.4 (03 Apr 2019 22:02), Max: 98 (03 Apr 2019 15:52)  HR: 79 (03 Apr 2019 22:02) (53 - 98)  BP: 104/78 (03 Apr 2019 22:02) (101/61 - 143/75)  BP(mean): --  RR: 17 (03 Apr 2019 22:02) (15 - 17)  SpO2: 99% (03 Apr 2019 22:02) (95% - 99%)                            11.8   14.0  )-----------( 242      ( 03 Apr 2019 07:40 )             36.0     04-03    141  |  104  |  22  ----------------------------<  112<H>  3.7   |  31  |  0.84    Ca    9.1      03 Apr 2019 07:40      CAPILLARY BLOOD GLUCOSE      POCT Blood Glucose.: 162 mg/dL (03 Apr 2019 22:49)  POCT Blood Glucose.: 174 mg/dL (03 Apr 2019 16:53)  POCT Blood Glucose.: 196 mg/dL (03 Apr 2019 11:59)  POCT Blood Glucose.: 113 mg/dL (03 Apr 2019 08:00)

## 2019-04-03 NOTE — PROGRESS NOTE ADULT - SUBJECTIVE AND OBJECTIVE BOX
Patient is a 74y old  Male who presents with a chief complaint of seizures (01 Apr 2019 15:34)      Patient seen and examined at bedside.    ALLERGIES:  No Known Allergies    MEDICATIONS:  acetaminophen   Tablet .. 650 milliGRAM(s) Oral every 6 hours PRN  ascorbic acid 500 milliGRAM(s) Oral daily  dexamethasone     Tablet 2 milliGRAM(s) Oral two times a day  dextrose 40% Gel 15 Gram(s) Oral once PRN  dextrose 5%. 1000 milliLiter(s) IV Continuous <Continuous>  dextrose 50% Injectable 12.5 Gram(s) IV Push once  dextrose 50% Injectable 25 Gram(s) IV Push once  dextrose 50% Injectable 25 Gram(s) IV Push once  enoxaparin Injectable 40 milliGRAM(s) SubCutaneous daily  finasteride 5 milliGRAM(s) Oral daily  glucagon  Injectable 1 milliGRAM(s) IntraMuscular once PRN  insulin glargine Injectable (LANTUS) 18 Unit(s) SubCutaneous at bedtime  insulin lispro (HumaLOG) corrective regimen sliding scale   SubCutaneous three times a day before meals  lacosamide 150 milliGRAM(s) Oral two times a day  levETIRAcetam 1500 milliGRAM(s) Oral two times a day  lisinopril 20 milliGRAM(s) Oral daily  multivitamin 1 Tablet(s) Oral daily  nystatin Powder 1 Application(s) Topical two times a day  pantoprazole    Tablet 40 milliGRAM(s) Oral before breakfast  senna 2 Tablet(s) Oral at bedtime PRN  zinc sulfate 220 milliGRAM(s) Oral daily    Vital Signs Last 24 Hrs  T(F): 97.2 (02 Apr 2019 05:37), Max: 97.5 (01 Apr 2019 21:58)  HR: 80 (02 Apr 2019 05:37) (70 - 80)  BP: 150/95 (02 Apr 2019 05:37) (122/85 - 150/95)  RR: 14 (02 Apr 2019 05:37) (14 - 15)  SpO2: 98% (02 Apr 2019 05:37) (96% - 98%)  I&O's Summary    01 Apr 2019 07:01  -  02 Apr 2019 07:00  --------------------------------------------------------  IN: 900 mL / OUT: 0 mL / NET: 900 mL        PHYSICAL EXAM:  General: NAD, alert oriented to self, not to time or place.   Neck: Supple, No JVD  Lungs: Clear to auscultation bilaterally  Cardio: RRR, S1/S2, No murmurs  Abdomen: Soft, Nontender, Nondistended; Bowel sounds present  Extremities: No clubbing, cyanosis, or edema      LABS:                        12.6   14.4  )-----------( 250      ( 02 Apr 2019 06:50 )             38.3     04-02    139  |  102  |  18  ----------------------------<  115  3.9   |  32  |  0.76    Ca    9.4      02 Apr 2019 06:50  Phos  3.1     03-31  Mg     1.5     03-31      eGFR if Non African American: 88 mL/min/1.73M2 (04-02-19 @ 06:50)  eGFR if African American: 102 mL/min/1.73M2 (04-02-19 @ 06:50)      Lactate, Blood: 1.8 mmol/L (03-31 @ 06:10)    CAPILLARY BLOOD GLUCOSE    POCT Blood Glucose.: 94 mg/dL (02 Apr 2019 07:43)  POCT Blood Glucose.: 132 mg/dL (01 Apr 2019 22:06)  POCT Blood Glucose.: 124 mg/dL (01 Apr 2019 17:02)  POCT Blood Glucose.: 147 mg/dL (01 Apr 2019 16:42)  POCT Blood Glucose.: 197 mg/dL (01 Apr 2019 11:34)    02-13 [id number] 7.7    Culture - Blood (collected 30 Mar 2019 04:40)  Source: .Blood Blood  Preliminary Report (31 Mar 2019 11:01):    No growth to date.        RADIOLOGY & ADDITIONAL TESTS:    Care Discussed with Consultants/Other Providers:
74M hx HTN DM DVT with IVCF  recurrent GBM s/p recent craniotomy, CVA, SAH with recurrent sz.    Admit here to ICU yesterday afternoon due to SZ and a lactate level of 11 which cleared to 3, and now this am is 4.4.  No other sz noticed.  Vimpat was increased and is on Keppra a franko q12.      His feet are warm and cap refill is < 3 sec.  He has been hypertensive.    At this point it seems to b type B lactic acidosis and at leat partially attributable to the metformin which is no d/c'd.  The earlier elevation to 11 was due to sz as prolactin was high.       His WBC is elevated on steroids and he does have a LLL infiltrate, but is afebrile and has no resp issues cough etc.  Cultures have been sent.    Not on ABX , Will defer,      D/W E-ICU and Dr Madrid.
Follow-up Critical Care Progress Note  Chief Complaint : Convulsions      Patient transferred out of ICU yesterday  doing well today  no seizure noted  pt at baseline mental status.        Allergies :No Known Allergies      PAST MEDICAL & SURGICAL HISTORY:  Glioblastoma  BPH (benign prostatic hyperplasia)  CVA (cerebral vascular accident)  Diabetes  HTN (hypertension)  S/P craniotomy: 02/2019  S/P craniotomy: ~ resection of R-pariental mass (11/28/2018)      Medications:  MEDICATIONS  (STANDING):  ascorbic acid 500 milliGRAM(s) Oral daily  dexamethasone     Tablet 2 milliGRAM(s) Oral two times a day  dextrose 5%. 1000 milliLiter(s) (50 mL/Hr) IV Continuous <Continuous>  dextrose 50% Injectable 12.5 Gram(s) IV Push once  dextrose 50% Injectable 25 Gram(s) IV Push once  dextrose 50% Injectable 25 Gram(s) IV Push once  enoxaparin Injectable 40 milliGRAM(s) SubCutaneous daily  finasteride 5 milliGRAM(s) Oral daily  insulin glargine Injectable (LANTUS) 18 Unit(s) SubCutaneous at bedtime  insulin lispro (HumaLOG) corrective regimen sliding scale   SubCutaneous three times a day before meals  lacosamide 150 milliGRAM(s) Oral two times a day  levETIRAcetam 1500 milliGRAM(s) Oral two times a day  lisinopril 20 milliGRAM(s) Oral daily  multivitamin 1 Tablet(s) Oral daily  nystatin Powder 1 Application(s) Topical two times a day  pantoprazole    Tablet 40 milliGRAM(s) Oral before breakfast  zinc sulfate 220 milliGRAM(s) Oral daily    MEDICATIONS  (PRN):  acetaminophen   Tablet .. 650 milliGRAM(s) Oral every 6 hours PRN Temp greater or equal to 38C (100.4F), Mild Pain (1 - 3)  dextrose 40% Gel 15 Gram(s) Oral once PRN Blood Glucose LESS THAN 70 milliGRAM(s)/deciliter  glucagon  Injectable 1 milliGRAM(s) IntraMuscular once PRN Glucose LESS THAN 70 milligrams/deciliter  senna 2 Tablet(s) Oral at bedtime PRN Constipation      LABS:                        10.4   13.3  )-----------( 224      ( 31 Mar 2019 06:10 )             30.7     03-30    138  |  101  |  18  ----------------------------<  154<H>  4.3   |  28  |  0.88    Ca    9.3      30 Mar 2019 04:40  Phos  3.1     03-31  Mg     1.5     03-31    TPro  6.4  /  Alb  2.6<L>  /  TBili  0.3  /  DBili  x   /  AST  19  /  ALT  25  /  AlkPhos  65  03-30      CARDIAC MARKERS ( 29 Mar 2019 16:40 )  .225 ng/mL / x     / x     / x     / x          Lactic Acid Trend  03-31-19 @ 06:10   -   1.8  03-30-19 @ 04:40   -   4.4<HH>  03-29-19 @ 19:40   -   3.3<H>  03-29-19 @ 16:40   -   11.6<HH>        Procalcitonin, Serum: 0.05 ng/mL (03-31-19 @ 06:10)          CULTURES: (if applicable)    Culture - Blood (collected 03-30-19 @ 04:40)  Source: .Blood Blood  Preliminary Report (03-31-19 @ 11:01):    No growth to date.          ABG - ( 29 Mar 2019 16:30 )  pH, Arterial: 7.40  pH, Blood: x     /  pCO2: 29    /  pO2: 356   / HCO3: x     / Base Excess: x     /  SaO2: >99               CAPILLARY BLOOD GLUCOSE      POCT Blood Glucose.: 203 mg/dL (31 Mar 2019 12:01)      RADIOLOGY  Abnormal EEG in the awake and drowsy states.  -Lateralized periodic discharges, Regional, Right centro-parietal region (1 hz)  -Continuous Slowing, Lateralized, Right hemisphere  -Intermittent diffuse theta and delta polymorphic slowing.  _____________________________________________________________  EEG IMPRESSION/CLINICAL CORRELATE    Abnormal EEG study.  1. Acute/subacute structural lesion in the right centro-parietal region, that is potentially epileptogenic.  2. Structural abnormality in the right hemisphere.  3. Mild nonspecific diffuse cerebral dysfunction.     < from: Xray Chest 1 View- PORTABLE-Routine (03.31.19 @ 09:30) >    Portable chest x-ray is compared to a previous examination dated   3/29/2019.    Impression: Left lung apex is obscured by patient's chin. No gross   pulmonary consolidation, pleural effusion or pneumothorax. Skinfold on   the right.    Small left basilar atelectasis.    The trachea is midline.    Stable cardiac silhouette.    < end of copied text >    VITALS:  T(C): 36.9 (03-31-19 @ 05:26), Max: 36.9 (03-31-19 @ 05:26)  T(F): 98.5 (03-31-19 @ 05:26), Max: 98.5 (03-31-19 @ 05:26)  HR: 81 (03-31-19 @ 05:26) (81 - 88)  BP: 113/91 (03-31-19 @ 05:26) (111/72 - 129/82)  BP(mean): --  ABP: --  ABP(mean): --  RR: 15 (03-31-19 @ 05:26) (14 - 15)  SpO2: 98% (03-31-19 @ 05:26) (97% - 98%)  CVP(mm Hg): --  CVP(cm H2O): --    Ins and Outs     03-30-19 @ 07:01  -  03-31-19 @ 07:00  --------------------------------------------------------  IN: 1680 mL / OUT: 0 mL / NET: 1680 mL    03-31-19 @ 07:01  -  03-31-19 @ 15:31  --------------------------------------------------------  IN: 1400 mL / OUT: 0 mL / NET: 1400 mL        Height (cm): 177.8 (03-29-19 @ 13:35)  Weight (kg): 85.6 (03-31-19 @ 05:26)  BMI (kg/m2): 27.1 (03-31-19 @ 05:26)
Follow-up Critical Care Progress Note  Chief Complaint : Convulsions    Awake and alert. Not in distress. Appears comfortable.     Allergies :No Known Allergies      PAST MEDICAL & SURGICAL HISTORY:  Glioblastoma  BPH (benign prostatic hyperplasia)  CVA (cerebral vascular accident)  Diabetes  HTN (hypertension)  S/P craniotomy: 02/2019  S/P craniotomy: ~ resection of R-pariental mass (11/28/2018)      Medications:  MEDICATIONS  (STANDING):  ascorbic acid 500 milliGRAM(s) Oral daily  dexamethasone     Tablet 2 milliGRAM(s) Oral two times a day  dextrose 5%. 1000 milliLiter(s) (50 mL/Hr) IV Continuous <Continuous>  dextrose 50% Injectable 12.5 Gram(s) IV Push once  dextrose 50% Injectable 25 Gram(s) IV Push once  dextrose 50% Injectable 25 Gram(s) IV Push once  enoxaparin Injectable 40 milliGRAM(s) SubCutaneous daily  finasteride 5 milliGRAM(s) Oral daily  insulin glargine Injectable (LANTUS) 18 Unit(s) SubCutaneous at bedtime  insulin lispro (HumaLOG) corrective regimen sliding scale   SubCutaneous three times a day before meals  lacosamide 150 milliGRAM(s) Oral two times a day  levETIRAcetam 1500 milliGRAM(s) Oral two times a day  lisinopril 20 milliGRAM(s) Oral daily  multivitamin 1 Tablet(s) Oral daily  nystatin Powder 1 Application(s) Topical two times a day  pantoprazole    Tablet 40 milliGRAM(s) Oral before breakfast  zinc sulfate 220 milliGRAM(s) Oral daily    MEDICATIONS  (PRN):  acetaminophen   Tablet .. 650 milliGRAM(s) Oral every 6 hours PRN Temp greater or equal to 38C (100.4F), Mild Pain (1 - 3)  dextrose 40% Gel 15 Gram(s) Oral once PRN Blood Glucose LESS THAN 70 milliGRAM(s)/deciliter  glucagon  Injectable 1 milliGRAM(s) IntraMuscular once PRN Glucose LESS THAN 70 milligrams/deciliter  senna 2 Tablet(s) Oral at bedtime PRN Constipation      LABS:                        11.3   13.7  )-----------( 238      ( 01 Apr 2019 09:02 )             34.8     04-01    140  |  102  |  23  ----------------------------<  109<H>  3.5   |  31  |  0.79    Ca    8.7      01 Apr 2019 09:02  Phos  3.1     03-31  Mg     1.5     03-31                  Procalcitonin, Serum: 0.05 ng/mL (03-31-19 @ 06:10)          CULTURES: (if applicable)    Culture - Blood (collected 03-30-19 @ 04:40)  Source: .Blood Blood  Preliminary Report (03-31-19 @ 11:01):    No growth to date.            CAPILLARY BLOOD GLUCOSE      POCT Blood Glucose.: 197 mg/dL (01 Apr 2019 11:34)      RADIOLOGY  CXR:      CT:    ECHO:      VITALS:  T(C): 36.6 (04-01-19 @ 04:55), Max: 36.8 (03-31-19 @ 22:45)  T(F): 97.9 (04-01-19 @ 04:55), Max: 98.2 (03-31-19 @ 22:45)  HR: 78 (04-01-19 @ 04:55) (72 - 84)  BP: 111/71 (04-01-19 @ 04:55) (111/71 - 130/84)  BP(mean): --  ABP: --  ABP(mean): --  RR: 16 (04-01-19 @ 04:55) (15 - 16)  SpO2: 95% (04-01-19 @ 04:55) (95% - 96%)  CVP(mm Hg): --  CVP(cm H2O): --    Ins and Outs     03-31-19 @ 07:01  -  04-01-19 @ 07:00  --------------------------------------------------------  IN: 1400 mL / OUT: 0 mL / NET: 1400 mL    04-01-19 @ 07:01  -  04-01-19 @ 15:35  --------------------------------------------------------  IN: 900 mL / OUT: 0 mL / NET: 900 mL          Weight (kg): 85.2 (04-01-19 @ 04:55)
ICU CONSULT  Location of Patient :  [facility] [id number] ( [facility])  Attending requesting Consult:Landry Payne  Chief Complaint : Seizures  Reason For consult : Seizures      Initial HPI on admission:  HPI:  74 year old male with h/o GBM s/p Resection, h/o CVA with Left sided weakness, DM2 on metformin, h/o DVT s/p IVC filter who recently had recurrence of GBM with SAH with Right craniotomy 2/27 with post op complications of seizure. Patient went to rehab and had seizure and brought to ICU for AMS and seizures.    BRIEF HOSPITAL COURSE:   at this time pt alert, responsive at his baseline  has no complaints of cp, sob, palp, n/v, cough ,secretions, fever, chills.        PAST MEDICAL & SURGICAL HISTORY:  Glioblastoma  BPH (benign prostatic hyperplasia)  CVA (cerebral vascular accident)  Diabetes  HTN (hypertension)  S/P craniotomy: 02/2019  S/P craniotomy: ~ resection of R-pariental mass (11/28/2018)    Allergies    No Known Allergies    Intolerances      FAMILY HISTORY: No pertinent family history in first degree relatives    Social history:  denies active smoking, drinking, drug use    Review of Systems:  CONSTITUTIONAL: No fever, No chills, + fatigue  EYES: No eye pain, No visual disturbances, No discharge  ENMT:  No difficulty hearing, No tinnitus, No vertigo; No sinus or throat pain  NECK: No pain or stiffness  RESPIRATORY: Per above  CARDIOVASCULAR: No chest pain, No palpitations, No dizziness, or No leg swelling  GASTROINTESTINAL: No abdominal or epigastric pain. No nausea, No vomiting, No hematemesis; No diarrhea or constipation. No melena, No hematochezia.  GENITOURINARY: No dysuria, No frequency, No hematuria, No incontinence  NEUROLOGICAL: +headaches, No memory loss, No loss of strength, No numbness, No tremors  SKIN: No itching, No burning, No rashes, No lesions   MUSCULOSKELETAL: No joint pain or swelling; No muscle, back, No extremity pain  PSYCHIATRIC: No depression, No anxiety    Medications:  MEDICATIONS  (STANDING):  ascorbic acid 500 milliGRAM(s) Oral daily  dexamethasone     Tablet 2 milliGRAM(s) Oral two times a day  dextrose 5%. 1000 milliLiter(s) (50 mL/Hr) IV Continuous <Continuous>  dextrose 50% Injectable 12.5 Gram(s) IV Push once  dextrose 50% Injectable 25 Gram(s) IV Push once  dextrose 50% Injectable 25 Gram(s) IV Push once  enoxaparin Injectable 40 milliGRAM(s) SubCutaneous daily  finasteride 5 milliGRAM(s) Oral daily  insulin glargine Injectable (LANTUS) 18 Unit(s) SubCutaneous at bedtime  insulin lispro (HumaLOG) corrective regimen sliding scale   SubCutaneous three times a day before meals  lacosamide 150 milliGRAM(s) Oral two times a day  levETIRAcetam 1500 milliGRAM(s) Oral two times a day  lisinopril 20 milliGRAM(s) Oral daily  multivitamin 1 Tablet(s) Oral daily  nystatin Powder 1 Application(s) Topical two times a day  pantoprazole    Tablet 40 milliGRAM(s) Oral before breakfast  zinc sulfate 220 milliGRAM(s) Oral daily    MEDICATIONS  (PRN):  acetaminophen   Tablet .. 650 milliGRAM(s) Oral every 6 hours PRN Temp greater or equal to 38C (100.4F), Mild Pain (1 - 3)  dextrose 40% Gel 15 Gram(s) Oral once PRN Blood Glucose LESS THAN 70 milliGRAM(s)/deciliter  glucagon  Injectable 1 milliGRAM(s) IntraMuscular once PRN Glucose LESS THAN 70 milligrams/deciliter  senna 2 Tablet(s) Oral at bedtime PRN Constipation      Home Medications:  Last Order Reconciliation Date: 03-29-19 @ 02:30 (Admission Reconciliation)  acetaminophen 325 mg oral tablet: 2 tab(s) orally every 6 hours, As needed, Mild Pain (1 - 3) (03-26-19 @ 13:54)  acetaminophen 325 mg oral tablet: 2 tab(s) orally every 6 hours, As needed, Mild Pain (1 - 3) (03-26-19 @ 13:53)  ascorbic acid 500 mg oral tablet: 1 tab(s) orally once a day (03-26-19 @ 13:54)  aspirin: 81 milligram(s) orally once a day (12-06-18 @ 16:04)  dexamethasone: 2 milligram(s) orally 2 times a day until April 10, 2012 or further directed by Neurosurgery (03-26-19 @ 13:53)  dexamethasone 1.5 mg oral tablet: 3mg q8H x 2 days  2mg q8H x 2 days  2mg q12H x 30 days (03-26-19 @ 13:54)  dexamethasone 2 mg oral tablet: take 2 tabs every 8 hours for 2 days, then take 2 tabs every 12 hours for 30 days (02-13-19 @ 02:02)  dexamethasone 2 mg oral tablet: 2 tab(s) orally once a day (03-06-19 @ 07:18)  docusate sodium 100 mg oral capsule: 1 cap(s) orally 3 times a day (03-26-19 @ 13:54)  enoxaparin: 40 milligram(s) subcutaneous once a day (03-26-19 @ 13:53)  finasteride 5 mg oral tablet: 1 tab(s) orally once a day (03-26-19 @ 13:53)  finasteride 5 mg oral tablet: 1 tab(s) orally once a day (03-26-19 @ 13:54)  HumaLOG KwikPen 200 units/mL (Concentrated) subcutaneous solution: 6 unit(s) subcutaneous 3 times a day (before meals). Hold for FSG &lt; 100 (03-26-19 @ 13:54)  HumaLOG KwikPen 200 units/mL (Concentrated) subcutaneous solution: unit(s) subcutaneous 4 times a day (before meals and at bedtime)  1 unit if glucose 151-200  2 unitsif glucose 201-250  3 units if glucose 251-300  4 units if glucose 301-350  5 units if glucose 351-400  6 units if glucose &gt; 400   (03-26-19 @ 13:54)  insulin glargine: 18 unit(s) subcutaneous once a day (at bedtime) (03-26-19 @ 13:54)  insulin glargine:  (03-26-19 @ 13:54)  lacosamide 100 mg oral tablet: 1 tab(s) orally 2 times a day (03-26-19 @ 13:54)  lacosamide 100 mg oral tablet: 1 tab(s) orally every 12 hours (03-26-19 @ 13:53)  levETIRAcetam 1000 mg oral tablet: 1 tab(s) orally 2 times a day  (02-13-19 @ 02:02)  levETIRAcetam 750 mg oral tablet: 1 tab(s) orally 2 times a day (03-06-19 @ 07:18)  levETIRAcetam 750 mg oral tablet: 2 tab(s) orally 2 times a day (03-26-19 @ 13:54)  levETIRAcetam 750 mg oral tablet: 2 tab(s) orally every 12 hours (03-26-19 @ 13:53)  lisinopril 20 mg oral tablet: 1 tab(s) orally once a day (03-26-19 @ 13:53)  lisinopril 20 mg oral tablet: 1 tab(s) orally once a day (03-26-19 @ 13:54)  lisinopril-hydroCHLOROthiazide 10 mg-12.5 mg oral tablet: 1 tab(s) orally once a day (03-26-19 @ 13:54)  metFORMIN: 500 milligram(s) orally 2 times a day (03-26-19 @ 13:54)  metFORMIN 500 mg oral tablet: 1 tab(s) orally 2 times a day (03-26-19 @ 13:53)  Multiple Vitamins oral tablet: 1 tab(s) orally once a day (03-26-19 @ 13:54)  nystatin 100,000 units/g topical powder: 1 application topically 2 times a day (03-26-19 @ 13:54)  nystatin 100,000 units/g topical powder: 1 application topically 3 times a day, As needed, candida (03-26-19 @ 13:54)  Oxaydo 5 mg oral tablet: 1 tab(s) orally every 4 hours, As needed, Moderate Pain (4 - 6) (03-26-19 @ 13:54)  pantoprazole 40 mg oral delayed release tablet: 1 tab(s) orally once a day (before a meal) (03-26-19 @ 13:54)  pantoprazole 40 mg oral delayed release tablet: 1 tab(s) orally once a day (before a meal) (03-26-19 @ 13:54)  senna oral tablet: 2 tab(s) orally once a day (at bedtime), As needed, Constipation (03-26-19 @ 13:54)  senna oral tablet: 2 tab(s) orally once a day (at bedtime) (03-26-19 @ 13:54)  zinc sulfate 220 mg oral capsule: 1 cap(s) orally once a day (03-26-19 @ 13:54)      LABS:                        12.0   15.7  )-----------( 261      ( 30 Mar 2019 04:40 )             35.3     03-30    138  |  101  |  18  ----------------------------<  154<H>  4.3   |  28  |  0.88    Ca    9.3      30 Mar 2019 04:40    TPro  6.4  /  Alb  2.6<L>  /  TBili  0.3  /  DBili  x   /  AST  19  /  ALT  25  /  AlkPhos  65  03-30      CARDIAC MARKERS ( 29 Mar 2019 16:40 )  .225 ng/mL / x     / x     / x     / x                        CULTURES: (if applicable)        ABG - ( 29 Mar 2019 16:30 )  pH, Arterial: 7.40  pH, Blood: x     /  pCO2: 29    /  pO2: 356   / HCO3: x     / Base Excess: x     /  SaO2: >99               CAPILLARY BLOOD GLUCOSE      POCT Blood Glucose.: 151 mg/dL (29 Mar 2019 21:27)      RADIOLOGY  CXR:  < from: Xray Chest 1 View-PORTABLE IMMEDIATE (03.29.19 @ 18:13) >    INTERPRETATION:  AP chest on March 29, 2019 at 5:52 PM. Concern is   aspiration. Patient has a seizure disorder.    Diaphragm positions are elevated crowding the chest.    Heart is magnified by technique.    There are some degree of infiltrate at left base showing increase from   February 12.    IMPRESSION: Increasing left base infiltrate.    < end of copied text >      CT:  < from: CT Head No Cont (03.29.19 @ 16:54) >  IMPRESSION:      Stable right temporal lobe hemorrhages. No new   hemorrhage. No herniation pattern.    < end of copied text >    ECHO:  < from: Transesophageal Echocardiogram w/o TTE (02.20.19 @ 17:14) >  CONCLUSIONS:  1. Normal mitral valve. Minimal mitral regurgitation.  2. Calcified trileaflet aortic valve with normal opening.  3. Normal left ventricular systolic function. No segmental wall motion abnormalities.  4. Normal right ventricular size and function.  5. Agitated saline injection demonstrates evidence of a patent foramen ovale.    < end of copied text >      VITALS:  T(C): 36.8 (03-30-19 @ 06:00), Max: 37.8 (03-29-19 @ 21:00)  T(F): 98.3 (03-30-19 @ 06:00), Max: 100 (03-29-19 @ 21:00)  HR: 86 (03-30-19 @ 06:00) (82 - 114)  BP: 129/90 (03-30-19 @ 06:00) (104/79 - 153/95)  BP(mean): 100 (03-30-19 @ 06:00) (78 - 112)  ABP: --  ABP(mean): --  RR: 15 (03-30-19 @ 06:00) (14 - 21)  SpO2: 98% (03-30-19 @ 06:00) (95% - 100%)  CVP(mm Hg): --  CVP(cm H2O): --    Ins and Outs     03-29-19 @ 07:01  -  03-30-19 @ 07:00  --------------------------------------------------------  IN: 720 mL / OUT: 0 mL / NET: 720 mL        Height (cm): 177.8 (03-29-19 @ 13:35)  Weight (kg): 85.3 (03-29-19 @ 04:47)  BMI (kg/m2): 27 (03-29-19 @ 13:35)
Patient is a 74y old  Male who presents with a chief complaint of seizures (02 Apr 2019 08:39)      Patient seen and examined at bedside. feels well, no complaints     ALLERGIES:  No Known Allergies    MEDICATIONS:  acetaminophen   Tablet .. 650 milliGRAM(s) Oral every 6 hours PRN  ascorbic acid 500 milliGRAM(s) Oral daily  dexamethasone     Tablet 2 milliGRAM(s) Oral two times a day  dextrose 40% Gel 15 Gram(s) Oral once PRN  dextrose 5%. 1000 milliLiter(s) IV Continuous <Continuous>  dextrose 50% Injectable 12.5 Gram(s) IV Push once  dextrose 50% Injectable 25 Gram(s) IV Push once  dextrose 50% Injectable 25 Gram(s) IV Push once  enoxaparin Injectable 40 milliGRAM(s) SubCutaneous daily  finasteride 5 milliGRAM(s) Oral daily  glucagon  Injectable 1 milliGRAM(s) IntraMuscular once PRN  insulin glargine Injectable (LANTUS) 18 Unit(s) SubCutaneous at bedtime  insulin lispro (HumaLOG) corrective regimen sliding scale   SubCutaneous three times a day before meals  lacosamide 150 milliGRAM(s) Oral two times a day  levETIRAcetam 1500 milliGRAM(s) Oral two times a day  lisinopril 20 milliGRAM(s) Oral daily  multivitamin 1 Tablet(s) Oral daily  nystatin Powder 1 Application(s) Topical two times a day  pantoprazole    Tablet 40 milliGRAM(s) Oral before breakfast  senna 2 Tablet(s) Oral at bedtime PRN  zinc sulfate 220 milliGRAM(s) Oral daily    Vital Signs Last 24 Hrs  T(F): 97.6 (03 Apr 2019 05:13), Max: 98.1 (02 Apr 2019 20:46)  HR: 53 (03 Apr 2019 05:13) (53 - 89)  BP: 143/75 (03 Apr 2019 05:13) (125/97 - 143/75)  RR: 15 (03 Apr 2019 05:13) (15 - 15)  SpO2: 95% (03 Apr 2019 05:13) (95% - 98%)  I&O's Summary    02 Apr 2019 07:01  -  03 Apr 2019 07:00  --------------------------------------------------------  IN: 840 mL / OUT: 0 mL / NET: 840 mL        PHYSICAL EXAM:  General: NAD, alert oriented x 1  Neck: Supple, No JVD  Lungs: Clear to auscultation bilaterally  Cardio: RRR, S1/S2, No murmurs  Abdomen: Soft, Nontender, Nondistended; Bowel sounds present  Extremities: No clubbing, cyanosis, or edema      LABS:                        11.8   14.0  )-----------( 242      ( 03 Apr 2019 07:40 )             36.0     04-03    141  |  104  |  22  ----------------------------<  112  3.7   |  31  |  0.84    Ca    9.1      03 Apr 2019 07:40      eGFR if Non African American: 85 mL/min/1.73M2 (04-03-19 @ 07:40)  eGFR if : 99 mL/min/1.73M2 (04-03-19 @ 07:40)    CAPILLARY BLOOD GLUCOSE    POCT Blood Glucose.: 113 mg/dL (03 Apr 2019 08:00)  POCT Blood Glucose.: 140 mg/dL (02 Apr 2019 22:01)  POCT Blood Glucose.: 117 mg/dL (02 Apr 2019 16:33)  POCT Blood Glucose.: 172 mg/dL (02 Apr 2019 11:44)    02-13 [id number] 7.7    Culture - Blood (collected 30 Mar 2019 04:40)  Source: .Blood Blood  Preliminary Report (31 Mar 2019 11:01):    No growth to date.    RADIOLOGY & ADDITIONAL TESTS:    Care Discussed with Consultants/Other Providers:
Patient is a 74y old  Male who presents with a chief complaint of seizures (29 Mar 2019 00:47)      Patient seen and examined at bedside, no event overnight, in no acute distress.    ALLERGIES:  No Known Allergies    MEDICATIONS:  acetaminophen   Tablet .. 650 milliGRAM(s) Oral every 6 hours PRN  ascorbic acid 500 milliGRAM(s) Oral daily  dexamethasone     Tablet 2 milliGRAM(s) Oral two times a day  dextrose 40% Gel 15 Gram(s) Oral once PRN  dextrose 5%. 1000 milliLiter(s) IV Continuous <Continuous>  dextrose 50% Injectable 12.5 Gram(s) IV Push once  dextrose 50% Injectable 25 Gram(s) IV Push once  dextrose 50% Injectable 25 Gram(s) IV Push once  enoxaparin Injectable 40 milliGRAM(s) SubCutaneous daily  finasteride 5 milliGRAM(s) Oral daily  glucagon  Injectable 1 milliGRAM(s) IntraMuscular once PRN  insulin glargine Injectable (LANTUS) 18 Unit(s) SubCutaneous at bedtime  insulin lispro (HumaLOG) corrective regimen sliding scale   SubCutaneous three times a day before meals  lacosamide 100 milliGRAM(s) Oral two times a day  levETIRAcetam 1000 milliGRAM(s) Oral two times a day  lisinopril 20 milliGRAM(s) Oral daily  metFORMIN 500 milliGRAM(s) Oral two times a day  multivitamin 1 Tablet(s) Oral daily  nystatin Powder 1 Application(s) Topical two times a day  pantoprazole    Tablet 40 milliGRAM(s) Oral before breakfast  senna 2 Tablet(s) Oral at bedtime PRN  zinc sulfate 220 milliGRAM(s) Oral daily    Vital Signs Last 24 Hrs  T(C): 36.7 (29 Mar 2019 04:47), Max: 37.2 (28 Mar 2019 21:21)  T(F): 98 (29 Mar 2019 04:47), Max: 98.9 (28 Mar 2019 21:21)  HR: 76 (29 Mar 2019 04:47) (76 - 92)  BP: 139/86 (29 Mar 2019 04:47) (111/71 - 139/86)  BP(mean): --  RR: 16 (29 Mar 2019 04:47) (16 - 18)  SpO2: 97% (29 Mar 2019 04:47) (97% - 98%)  I&O's Summary    29 Mar 2019 07:01  -  29 Mar 2019 13:51  --------------------------------------------------------  IN: 600 mL / OUT: 0 mL / NET: 600 mL          PAST MEDICAL & SURGICAL HISTORY:  Glioblastoma  BPH (benign prostatic hyperplasia)  CVA (cerebral vascular accident)  Diabetes  HTN (hypertension)  S/P craniotomy: 02/2019  S/P craniotomy: ~ resection of R-pariental mass (11/28/2018)      Home Medications:  acetaminophen 325 mg oral tablet: 2 tab(s) orally every 6 hours, As needed, Mild Pain (1 - 3) (26 Mar 2019 13:53)  ascorbic acid 500 mg oral tablet: 1 tab(s) orally once a day (26 Mar 2019 13:54)  dexamethasone: 2 milligram(s) orally 2 times a day until April 10, 2012 or further directed by Neurosurgery (26 Mar 2019 13:53)  enoxaparin: 40 milligram(s) subcutaneous once a day (26 Mar 2019 13:53)  finasteride 5 mg oral tablet: 1 tab(s) orally once a day (26 Mar 2019 13:53)  HumaLOG KwikPen 200 units/mL (Concentrated) subcutaneous solution: 6 unit(s) subcutaneous 3 times a day (before meals). Hold for FSG &lt; 100 (26 Mar 2019 13:54)  HumaLOG KwikPen 200 units/mL (Concentrated) subcutaneous solution: unit(s) subcutaneous 4 times a day (before meals and at bedtime)  1 unit if glucose 151-200  2 unitsif glucose 201-250  3 units if glucose 251-300  4 units if glucose 301-350  5 units if glucose 351-400  6 units if glucose &gt; 400   (26 Mar 2019 13:54)  insulin glargine: 18 unit(s) subcutaneous once a day (at bedtime) (26 Mar 2019 13:54)  lacosamide 100 mg oral tablet: 1 tab(s) orally every 12 hours (26 Mar 2019 13:53)  levETIRAcetam 750 mg oral tablet: 2 tab(s) orally every 12 hours (26 Mar 2019 13:53)  lisinopril 20 mg oral tablet: 1 tab(s) orally once a day (26 Mar 2019 13:53)  metFORMIN 500 mg oral tablet: 1 tab(s) orally 2 times a day (26 Mar 2019 13:53)  Multiple Vitamins oral tablet: 1 tab(s) orally once a day (26 Mar 2019 13:54)  nystatin 100,000 units/g topical powder: 1 application topically 2 times a day (26 Mar 2019 13:54)  pantoprazole 40 mg oral delayed release tablet: 1 tab(s) orally once a day (before a meal) (26 Mar 2019 13:54)  senna oral tablet: 2 tab(s) orally once a day (at bedtime), As needed, Constipation (26 Mar 2019 13:54)  zinc sulfate 220 mg oral capsule: 1 cap(s) orally once a day (26 Mar 2019 13:54)        PHYSICAL EXAM:  General: NAD, A/O x3  ENT: MMM  Neck: Supple, No JVD  Lungs: Clear to percussion bilaterally  Cardio: RRR, S1/S2, No murmurs  Abdomen: Soft, Nontender, Nondistended; Bowel sounds present  Neuro: no new deficits   Extremities: Left sided weakness No clubbing, cyanosis, or edema    LABS:                        11.4   15.2  )-----------( 286      ( 28 Mar 2019 21:45 )             35.5     03-28    140  |  103  |  19  ----------------------------<  186  4.3   |  29  |  0.80    Ca    8.4      28 Mar 2019 21:45    TPro  6.0  /  Alb  2.4  /  TBili  0.2  /  DBili  x   /  AST  17  /  ALT  24  /  AlkPhos  65  03-28    POCT Blood Glucose.: 160 mg/dL (29 Mar 2019 11:53)  POCT Blood Glucose.: 144 mg/dL (29 Mar 2019 08:09)  POCT Blood Glucose.: 133 mg/dL (29 Mar 2019 05:01)    02-13 [id number] 7.7          RADIOLOGY & ADDITIONAL TESTS:    Care Discussed with Consultants/Other Providers:
Patient is a 74y old  Male who presents with a chief complaint of seizures (30 Mar 2019 12:41)    patient feels okay.  Tired but alert.     Patient seen and examined at bedside.    ALLERGIES:  No Known Allergies    MEDICATIONS  (STANDING):  ascorbic acid 500 milliGRAM(s) Oral daily  dexamethasone     Tablet 2 milliGRAM(s) Oral two times a day  dextrose 5%. 1000 milliLiter(s) (50 mL/Hr) IV Continuous <Continuous>  dextrose 50% Injectable 12.5 Gram(s) IV Push once  dextrose 50% Injectable 25 Gram(s) IV Push once  dextrose 50% Injectable 25 Gram(s) IV Push once  enoxaparin Injectable 40 milliGRAM(s) SubCutaneous daily  finasteride 5 milliGRAM(s) Oral daily  insulin glargine Injectable (LANTUS) 18 Unit(s) SubCutaneous at bedtime  insulin lispro (HumaLOG) corrective regimen sliding scale   SubCutaneous three times a day before meals  lacosamide 150 milliGRAM(s) Oral two times a day  levETIRAcetam 1500 milliGRAM(s) Oral two times a day  lisinopril 20 milliGRAM(s) Oral daily  multivitamin 1 Tablet(s) Oral daily  nystatin Powder 1 Application(s) Topical two times a day  pantoprazole    Tablet 40 milliGRAM(s) Oral before breakfast  zinc sulfate 220 milliGRAM(s) Oral daily    MEDICATIONS  (PRN):  acetaminophen   Tablet .. 650 milliGRAM(s) Oral every 6 hours PRN Temp greater or equal to 38C (100.4F), Mild Pain (1 - 3)  dextrose 40% Gel 15 Gram(s) Oral once PRN Blood Glucose LESS THAN 70 milliGRAM(s)/deciliter  glucagon  Injectable 1 milliGRAM(s) IntraMuscular once PRN Glucose LESS THAN 70 milligrams/deciliter  senna 2 Tablet(s) Oral at bedtime PRN Constipation    Vital Signs Last 24 Hrs  T(F): 98.5 (31 Mar 2019 05:26), Max: 98.5 (31 Mar 2019 05:26)  HR: 81 (31 Mar 2019 05:26) (77 - 92)  BP: 113/91 (31 Mar 2019 05:26) (111/72 - 129/82)  RR: 15 (31 Mar 2019 05:26) (12 - 15)  SpO2: 98% (31 Mar 2019 05:26) (97% - 99%)  I&O's Summary    30 Mar 2019 07:01  -  31 Mar 2019 07:00  --------------------------------------------------------  IN: 1680 mL / OUT: 0 mL / NET: 1680 mL    BMI (kg/m2): 27.1 (03-31-19 @ 05:26)  PHYSICAL EXAM:  General: NAD, A/O x 3  ENT: MMM  Neck: Supple, No JVD  Lungs: Clear to auscultation bilaterally  Cardio: RRR, S1/S2, No murmurs  Abdomen: Soft, Nontender, Nondistended; Bowel sounds present  Extremities: left sided hemiparesis    LABS:                        10.4   13.3  )-----------( 224      ( 31 Mar 2019 06:10 )             30.7       03-30    138  |  101  |  18  ----------------------------<  154  4.3   |  28  |  0.88    Ca    9.3      30 Mar 2019 04:40  Phos  3.1     03-31  Mg     1.5     03-31    TPro  6.4  /  Alb  2.6  /  TBili  0.3  /  DBili  x   /  AST  19  /  ALT  25  /  AlkPhos  65  03-30     eGFR if Non African American: 83 mL/min/1.73M2 (03-30-19 @ 04:40)  eGFR if African American: 97 mL/min/1.73M2 (03-30-19 @ 04:40)     Lactate, Blood: 1.8 mmol/L (03-31 @ 06:10)  Lactate, Blood: 4.4 mmol/L (03-30 @ 04:40)  Lactate, Blood: 3.3 mmol/L (03-29 @ 19:40)  Lactate, Blood: 11.6 mmol/L (03-29 @ 16:40)    CARDIAC MARKERS ( 29 Mar 2019 16:40 )  .225 ng/mL / x     / x     / x     / x        ABG - ( 29 Mar 2019 16:30 )  pH, Arterial: 7.40  pH, Blood: x     /  pCO2: 29    /  pO2: 356   / HCO3: x     / Base Excess: x     /  SaO2: >99       CAPILLARY BLOOD GLUCOSE    POCT Blood Glucose.: 122 mg/dL (31 Mar 2019 07:37)  POCT Blood Glucose.: 161 mg/dL (30 Mar 2019 21:19)  POCT Blood Glucose.: 165 mg/dL (30 Mar 2019 16:48)  POCT Blood Glucose.: 189 mg/dL (30 Mar 2019 12:01)  POCT Blood Glucose.: 132 mg/dL (30 Mar 2019 08:55)    02-13 [id number] 7.7          RADIOLOGY & ADDITIONAL TESTS:    Care Discussed with Consultants/Other Providers:
Patient is a 74y old  Male who presents with a chief complaint of seizures (31 Mar 2019 15:30)    Patient feels okay. Tired but without issue.     Patient seen and examined at bedside.    ALLERGIES:  No Known Allergies    MEDICATIONS  (STANDING):  ascorbic acid 500 milliGRAM(s) Oral daily  dexamethasone     Tablet 2 milliGRAM(s) Oral two times a day  dextrose 5%. 1000 milliLiter(s) (50 mL/Hr) IV Continuous <Continuous>  dextrose 50% Injectable 12.5 Gram(s) IV Push once  dextrose 50% Injectable 25 Gram(s) IV Push once  dextrose 50% Injectable 25 Gram(s) IV Push once  enoxaparin Injectable 40 milliGRAM(s) SubCutaneous daily  finasteride 5 milliGRAM(s) Oral daily  insulin glargine Injectable (LANTUS) 18 Unit(s) SubCutaneous at bedtime  insulin lispro (HumaLOG) corrective regimen sliding scale   SubCutaneous three times a day before meals  lacosamide 150 milliGRAM(s) Oral two times a day  levETIRAcetam 1500 milliGRAM(s) Oral two times a day  lisinopril 20 milliGRAM(s) Oral daily  multivitamin 1 Tablet(s) Oral daily  nystatin Powder 1 Application(s) Topical two times a day  pantoprazole    Tablet 40 milliGRAM(s) Oral before breakfast  zinc sulfate 220 milliGRAM(s) Oral daily    MEDICATIONS  (PRN):  acetaminophen   Tablet .. 650 milliGRAM(s) Oral every 6 hours PRN Temp greater or equal to 38C (100.4F), Mild Pain (1 - 3)  dextrose 40% Gel 15 Gram(s) Oral once PRN Blood Glucose LESS THAN 70 milliGRAM(s)/deciliter  glucagon  Injectable 1 milliGRAM(s) IntraMuscular once PRN Glucose LESS THAN 70 milligrams/deciliter  senna 2 Tablet(s) Oral at bedtime PRN Constipation    Vital Signs Last 24 Hrs  T(F): 97.9 (01 Apr 2019 04:55), Max: 98.2 (31 Mar 2019 22:45)  HR: 78 (01 Apr 2019 04:55) (72 - 84)  BP: 111/71 (01 Apr 2019 04:55) (111/71 - 130/84)  RR: 16 (01 Apr 2019 04:55) (15 - 16)  SpO2: 95% (01 Apr 2019 04:55) (95% - 96%)  I&O's Summary    31 Mar 2019 07:01  -  01 Apr 2019 07:00  --------------------------------------------------------  IN: 1400 mL / OUT: 0 mL / NET: 1400 mL      BMI (kg/m2): 27 (04-01-19 @ 04:55)  PHYSICAL EXAM:  General: NAD, A/O x 3  ENT: MMM  Neck: Supple, No JVD  Lungs: Clear to auscultation bilaterally  Cardio: RRR, S1/S2, No murmurs  Abdomen: Soft, Nontender, Nondistended; Bowel sounds present  Extremities: No calf tenderness, No pitting edema  Neuro: left hemiparesis    LABS:                        10.4   13.3  )-----------( 224      ( 31 Mar 2019 06:10 )             30.7       03-30    138  |  101  |  18  ----------------------------<  154  4.3   |  28  |  0.88    Ca    9.3      30 Mar 2019 04:40  Phos  3.1     03-31  Mg     1.5     03-31    TPro  6.4  /  Alb  2.6  /  TBili  0.3  /  DBili  x   /  AST  19  /  ALT  25  /  AlkPhos  65  03-30     eGFR if Non African American: 83 mL/min/1.73M2 (03-30-19 @ 04:40)  eGFR if African American: 97 mL/min/1.73M2 (03-30-19 @ 04:40)     Lactate, Blood: 1.8 mmol/L (03-31 @ 06:10)  Lactate, Blood: 4.4 mmol/L (03-30 @ 04:40)  Lactate, Blood: 3.3 mmol/L (03-29 @ 19:40)  Lactate, Blood: 11.6 mmol/L (03-29 @ 16:40)    CARDIAC MARKERS ( 29 Mar 2019 16:40 )  .225 ng/mL / x     / x     / x     / x        ABG - ( 29 Mar 2019 16:30 )  pH, Arterial: 7.40  pH, Blood: x     /  pCO2: 29    /  pO2: 356   / HCO3: x     / Base Excess: x     /  SaO2: >99       CAPILLARY BLOOD GLUCOSE    POCT Blood Glucose.: 128 mg/dL (01 Apr 2019 07:23)  POCT Blood Glucose.: 205 mg/dL (31 Mar 2019 22:40)  POCT Blood Glucose.: 108 mg/dL (31 Mar 2019 17:00)  POCT Blood Glucose.: 203 mg/dL (31 Mar 2019 12:01)    02-13 [id number] 7.7    Culture - Blood (collected 30 Mar 2019 04:40)  Source: .Blood Blood  Preliminary Report (31 Mar 2019 11:01):    No growth to date.    RADIOLOGY & ADDITIONAL TESTS:    Care Discussed with Consultants/Other Providers: YES
Follow-up Pall Progress Note    Chalo Johnson MD  [phone]    No new events overnight.  Denies SOB/CP. seizures resolved    Medications:  Vital Signs Last 24 Hrs  T(C): 36.8 (30 Mar 2019 06:00), Max: 37.8 (29 Mar 2019 21:00)  T(F): 98.3 (30 Mar 2019 06:00), Max: 100 (29 Mar 2019 21:00)  HR: 92 (30 Mar 2019 10:00) (77 - 114)  BP: 120/83 (30 Mar 2019 09:00) (104/79 - 153/95)  BP(mean): 96 (30 Mar 2019 09:00) (78 - 112)  RR: 12 (30 Mar 2019 08:00) (12 - 21)  SpO2: 99% (30 Mar 2019 08:00) (95% - 100%)    ABG - ( 29 Mar 2019 16:30 )  pH, Arterial: 7.40  pH, Blood: x     /  pCO2: 29    /  pO2: 356   / HCO3: x     / Base Excess: x     /  SaO2: >99                   03-29 @ 07:01  -  03-30 @ 07:00  --------------------------------------------------------  IN: 720 mL / OUT: 0 mL / NET: 720 mL        LABS:                        12.0   15.7  )-----------( 261      ( 30 Mar 2019 04:40 )             35.3     03-30    138  |  101  |  18  ----------------------------<  154<H>  4.3   |  28  |  0.88    Ca    9.3      30 Mar 2019 04:40    TPro  6.4  /  Alb  2.6<L>  /  TBili  0.3  /  DBili  x   /  AST  19  /  ALT  25  /  AlkPhos  65  03-30              CULTURES:        Physical Examination:  PULM: Clear to auscultation bilaterally, no significant sputum production  CVS: Regular rate and rhythm, no murmurs, rubs, or gallops  ABD: Soft, non-tender  EXT:  No clubbing, cyanosis, or edema    RADIOLOGY REVIEWED  CXR:    CT chest:    TTE:

## 2019-04-03 NOTE — H&P ADULT - HISTORY OF PRESENT ILLNESS
74 yr-old man with hx of DM2,HTN with CVA 6 years ago;  found to have right temporal mass after being found on floor at home 4 months ago. Resection of tumor 11/28/18  by Dr. Gonzalez and pathology was grade 4 GBM. He had recurrence of tumor and underwent 2nd right craniotomy one month ago. There was post-op right frontal SAH and some SDH. He was on Keppra 1500 mg bid and Vimpat 100 mg bid for seizure prophylaxis and transferred to Rehab. He had seizure at rehab center in [facility]) and sent to [facility], observed and sent back to rehab center where he had recurrent seizures and transferred to [facility]. Since increasing dose of Vimpat to 150 mg bid, he has been seizure free since. He was seen last week by [facility] radiation oncologist, Dr Mil Grace (tel [phone]) and plan for starting RT was interrupted by his seizure recurrence. Now transferred to [facility] for radiation

## 2019-04-04 DIAGNOSIS — Z51.89 ENCOUNTER FOR OTHER SPECIFIED AFTERCARE: ICD-10-CM

## 2019-04-04 DIAGNOSIS — R56.9 UNSPECIFIED CONVULSIONS: ICD-10-CM

## 2019-04-04 DIAGNOSIS — D72.829 ELEVATED WHITE BLOOD CELL COUNT, UNSPECIFIED: ICD-10-CM

## 2019-04-04 DIAGNOSIS — Z95.828 PRESENCE OF OTHER VASCULAR IMPLANTS AND GRAFTS: ICD-10-CM

## 2019-04-04 DIAGNOSIS — E87.1 HYPO-OSMOLALITY AND HYPONATREMIA: ICD-10-CM

## 2019-04-04 DIAGNOSIS — N40.0 BENIGN PROSTATIC HYPERPLASIA WITHOUT LOWER URINARY TRACT SYMPTOMS: ICD-10-CM

## 2019-04-04 DIAGNOSIS — R32 UNSPECIFIED URINARY INCONTINENCE: ICD-10-CM

## 2019-04-04 DIAGNOSIS — Z79.52 LONG TERM (CURRENT) USE OF SYSTEMIC STEROIDS: ICD-10-CM

## 2019-04-04 DIAGNOSIS — E11.8 TYPE 2 DIABETES MELLITUS WITH UNSPECIFIED COMPLICATIONS: ICD-10-CM

## 2019-04-04 DIAGNOSIS — C71.9 MALIGNANT NEOPLASM OF BRAIN, UNSPECIFIED: ICD-10-CM

## 2019-04-04 DIAGNOSIS — Z79.4 LONG TERM (CURRENT) USE OF INSULIN: ICD-10-CM

## 2019-04-04 DIAGNOSIS — E11.65 TYPE 2 DIABETES MELLITUS WITH HYPERGLYCEMIA: ICD-10-CM

## 2019-04-04 DIAGNOSIS — R27.8 OTHER LACK OF COORDINATION: ICD-10-CM

## 2019-04-04 DIAGNOSIS — Z29.9 ENCOUNTER FOR PROPHYLACTIC MEASURES, UNSPECIFIED: ICD-10-CM

## 2019-04-04 DIAGNOSIS — I69.311 MEMORY DEFICIT FOLLOWING CEREBRAL INFARCTION: ICD-10-CM

## 2019-04-04 DIAGNOSIS — N39.0 URINARY TRACT INFECTION, SITE NOT SPECIFIED: ICD-10-CM

## 2019-04-04 DIAGNOSIS — Z86.718 PERSONAL HISTORY OF OTHER VENOUS THROMBOSIS AND EMBOLISM: ICD-10-CM

## 2019-04-04 DIAGNOSIS — I69.354 HEMIPLEGIA AND HEMIPARESIS FOLLOWING CEREBRAL INFARCTION AFFECTING LEFT NON-DOMINANT SIDE: ICD-10-CM

## 2019-04-04 DIAGNOSIS — D64.9 ANEMIA, UNSPECIFIED: ICD-10-CM

## 2019-04-04 DIAGNOSIS — H53.462 HOMONYMOUS BILATERAL FIELD DEFECTS, LEFT SIDE: ICD-10-CM

## 2019-04-04 DIAGNOSIS — N40.1 BENIGN PROSTATIC HYPERPLASIA WITH LOWER URINARY TRACT SYMPTOMS: ICD-10-CM
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
## 2019-04-04 DIAGNOSIS — B96.20 UNSPECIFIED ESCHERICHIA COLI [E. COLI] AS THE CAUSE OF DISEASES CLASSIFIED ELSEWHERE: ICD-10-CM

## 2019-04-04 DIAGNOSIS — T38.0X5D ADVERSE EFFECT OF GLUCOCORTICOIDS AND SYNTHETIC ANALOGUES, SUBSEQUENT ENCOUNTER: ICD-10-CM

## 2019-04-04 DIAGNOSIS — Z79.899 OTHER LONG TERM (CURRENT) DRUG THERAPY: ICD-10-CM

## 2019-04-04 DIAGNOSIS — Z48.3 AFTERCARE FOLLOWING SURGERY FOR NEOPLASM: ICD-10-CM

## 2019-04-04 DIAGNOSIS — R30.0 DYSURIA: ICD-10-CM

## 2019-04-04 DIAGNOSIS — I69.310 ATTENTION AND CONCENTRATION DEFICIT FOLLOWING CEREBRAL INFARCTION: ICD-10-CM

## 2019-04-04 DIAGNOSIS — L89.152 PRESSURE ULCER OF SACRAL REGION, STAGE 2: ICD-10-CM

## 2019-04-04 DIAGNOSIS — Q21.1 ATRIAL SEPTAL DEFECT: ICD-10-CM

## 2019-04-04 DIAGNOSIS — R26.9 UNSPECIFIED ABNORMALITIES OF GAIT AND MOBILITY: ICD-10-CM

## 2019-04-04 DIAGNOSIS — E11.649 TYPE 2 DIABETES MELLITUS WITH HYPOGLYCEMIA WITHOUT COMA: ICD-10-CM

## 2019-04-04 LAB
ANION GAP SERPL CALC-SCNC: 11 MMO/L — SIGNIFICANT CHANGE UP (ref 7–14)
APTT BLD: 23.6 SEC — LOW (ref 27.5–36.3)
BUN SERPL-MCNC: 22 MG/DL — SIGNIFICANT CHANGE UP (ref 7–23)
CALCIUM SERPL-MCNC: 9 MG/DL — SIGNIFICANT CHANGE UP (ref 8.4–10.5)
CHLORIDE SERPL-SCNC: 100 MMOL/L — SIGNIFICANT CHANGE UP (ref 98–107)
CO2 SERPL-SCNC: 26 MMOL/L — SIGNIFICANT CHANGE UP (ref 22–31)
CREAT SERPL-MCNC: 0.87 MG/DL — SIGNIFICANT CHANGE UP (ref 0.5–1.3)
CULTURE RESULTS: SIGNIFICANT CHANGE UP
GLUCOSE SERPL-MCNC: 157 MG/DL — HIGH (ref 70–99)
HCT VFR BLD CALC: 34.2 % — LOW (ref 39–50)
HGB BLD-MCNC: 11 G/DL — LOW (ref 13–17)
INR BLD: 1.1 — SIGNIFICANT CHANGE UP (ref 0.88–1.17)
MCHC RBC-ENTMCNC: 30.3 PG — SIGNIFICANT CHANGE UP (ref 27–34)
MCHC RBC-ENTMCNC: 32.2 % — SIGNIFICANT CHANGE UP (ref 32–36)
MCV RBC AUTO: 94.2 FL — SIGNIFICANT CHANGE UP (ref 80–100)
NRBC # FLD: 0 K/UL — SIGNIFICANT CHANGE UP (ref 0–0)
PLATELET # BLD AUTO: 241 K/UL — SIGNIFICANT CHANGE UP (ref 150–400)
PMV BLD: 10.4 FL — SIGNIFICANT CHANGE UP (ref 7–13)
POTASSIUM SERPL-MCNC: 4 MMOL/L — SIGNIFICANT CHANGE UP (ref 3.5–5.3)
POTASSIUM SERPL-SCNC: 4 MMOL/L — SIGNIFICANT CHANGE UP (ref 3.5–5.3)
PROTHROM AB SERPL-ACNC: 12.2 SEC — SIGNIFICANT CHANGE UP (ref 9.8–13.1)
RBC # BLD: 3.63 M/UL — LOW (ref 4.2–5.8)
RBC # FLD: 15.9 % — HIGH (ref 10.3–14.5)
SODIUM SERPL-SCNC: 137 MMOL/L — SIGNIFICANT CHANGE UP (ref 135–145)
SPECIMEN SOURCE: SIGNIFICANT CHANGE UP
WBC # BLD: 15.03 K/UL — HIGH (ref 3.8–10.5)
WBC # FLD AUTO: 15.03 K/UL — HIGH (ref 3.8–10.5)

## 2019-04-04 PROCEDURE — 97116 GAIT TRAINING THERAPY: CPT

## 2019-04-04 PROCEDURE — 97530 THERAPEUTIC ACTIVITIES: CPT

## 2019-04-04 PROCEDURE — 87086 URINE CULTURE/COLONY COUNT: CPT

## 2019-04-04 PROCEDURE — 97110 THERAPEUTIC EXERCISES: CPT

## 2019-04-04 PROCEDURE — 36415 COLL VENOUS BLD VENIPUNCTURE: CPT

## 2019-04-04 PROCEDURE — 97112 NEUROMUSCULAR REEDUCATION: CPT

## 2019-04-04 PROCEDURE — 92523 SPEECH SOUND LANG COMPREHEN: CPT

## 2019-04-04 PROCEDURE — 99222 1ST HOSP IP/OBS MODERATE 55: CPT

## 2019-04-04 PROCEDURE — 99233 SBSQ HOSP IP/OBS HIGH 50: CPT | Mod: 24

## 2019-04-04 PROCEDURE — 87186 SC STD MICRODIL/AGAR DIL: CPT

## 2019-04-04 PROCEDURE — 99233 SBSQ HOSP IP/OBS HIGH 50: CPT | Mod: GC

## 2019-04-04 PROCEDURE — 85027 COMPLETE CBC AUTOMATED: CPT

## 2019-04-04 PROCEDURE — 92507 TX SP LANG VOICE COMM INDIV: CPT

## 2019-04-04 PROCEDURE — 97167 OT EVAL HIGH COMPLEX 60 MIN: CPT

## 2019-04-04 PROCEDURE — 82962 GLUCOSE BLOOD TEST: CPT

## 2019-04-04 PROCEDURE — 80053 COMPREHEN METABOLIC PANEL: CPT

## 2019-04-04 PROCEDURE — 80048 BASIC METABOLIC PNL TOTAL CA: CPT

## 2019-04-04 PROCEDURE — 81001 URINALYSIS AUTO W/SCOPE: CPT

## 2019-04-04 PROCEDURE — 92610 EVALUATE SWALLOWING FUNCTION: CPT

## 2019-04-04 PROCEDURE — 97535 SELF CARE MNGMENT TRAINING: CPT

## 2019-04-04 RX ORDER — DEXTROSE 50 % IN WATER 50 %
12.5 SYRINGE (ML) INTRAVENOUS ONCE
Qty: 0 | Refills: 0 | Status: DISCONTINUED | OUTPATIENT
Start: 2019-04-04 | End: 2019-04-19

## 2019-04-04 RX ORDER — NYSTATIN CREAM 100000 [USP'U]/G
1 CREAM TOPICAL
Qty: 0 | Refills: 0 | Status: DISCONTINUED | OUTPATIENT
Start: 2019-04-04 | End: 2019-04-19

## 2019-04-04 RX ORDER — ACETAMINOPHEN 500 MG
650 TABLET ORAL EVERY 6 HOURS
Qty: 0 | Refills: 0 | Status: DISCONTINUED | OUTPATIENT
Start: 2019-04-04 | End: 2019-04-19

## 2019-04-04 RX ORDER — LACOSAMIDE 50 MG/1
150 TABLET ORAL
Qty: 0 | Refills: 0 | Status: DISCONTINUED | OUTPATIENT
Start: 2019-04-04 | End: 2019-04-10

## 2019-04-04 RX ORDER — INSULIN GLARGINE 100 [IU]/ML
18 INJECTION, SOLUTION SUBCUTANEOUS AT BEDTIME
Qty: 0 | Refills: 0 | Status: DISCONTINUED | OUTPATIENT
Start: 2019-04-04 | End: 2019-04-19

## 2019-04-04 RX ORDER — SODIUM CHLORIDE 9 MG/ML
1000 INJECTION, SOLUTION INTRAVENOUS
Qty: 0 | Refills: 0 | Status: DISCONTINUED | OUTPATIENT
Start: 2019-04-04 | End: 2019-04-19

## 2019-04-04 RX ORDER — INSULIN LISPRO 100/ML
VIAL (ML) SUBCUTANEOUS
Qty: 0 | Refills: 0 | Status: DISCONTINUED | OUTPATIENT
Start: 2019-04-04 | End: 2019-04-19

## 2019-04-04 RX ORDER — ENOXAPARIN SODIUM 100 MG/ML
40 INJECTION SUBCUTANEOUS DAILY
Qty: 0 | Refills: 0 | Status: DISCONTINUED | OUTPATIENT
Start: 2019-04-04 | End: 2019-04-19

## 2019-04-04 RX ORDER — FINASTERIDE 5 MG/1
5 TABLET, FILM COATED ORAL DAILY
Qty: 0 | Refills: 0 | Status: DISCONTINUED | OUTPATIENT
Start: 2019-04-04 | End: 2019-04-19

## 2019-04-04 RX ORDER — SENNA PLUS 8.6 MG/1
2 TABLET ORAL AT BEDTIME
Qty: 0 | Refills: 0 | Status: DISCONTINUED | OUTPATIENT
Start: 2019-04-04 | End: 2019-04-19

## 2019-04-04 RX ORDER — LISINOPRIL 2.5 MG/1
20 TABLET ORAL DAILY
Qty: 0 | Refills: 0 | Status: DISCONTINUED | OUTPATIENT
Start: 2019-04-04 | End: 2019-04-19

## 2019-04-04 RX ORDER — ZINC SULFATE TAB 220 MG (50 MG ZINC EQUIVALENT) 220 (50 ZN) MG
220 TAB ORAL DAILY
Qty: 0 | Refills: 0 | Status: DISCONTINUED | OUTPATIENT
Start: 2019-04-04 | End: 2019-04-19

## 2019-04-04 RX ORDER — GLUCAGON INJECTION, SOLUTION 0.5 MG/.1ML
1 INJECTION, SOLUTION SUBCUTANEOUS ONCE
Qty: 0 | Refills: 0 | Status: DISCONTINUED | OUTPATIENT
Start: 2019-04-04 | End: 2019-04-19

## 2019-04-04 RX ORDER — DEXAMETHASONE 0.5 MG/5ML
2 ELIXIR ORAL
Qty: 0 | Refills: 0 | Status: DISCONTINUED | OUTPATIENT
Start: 2019-04-04 | End: 2019-04-09

## 2019-04-04 RX ORDER — DEXTROSE 50 % IN WATER 50 %
25 SYRINGE (ML) INTRAVENOUS ONCE
Qty: 0 | Refills: 0 | Status: DISCONTINUED | OUTPATIENT
Start: 2019-04-04 | End: 2019-04-19

## 2019-04-04 RX ORDER — DEXTROSE 50 % IN WATER 50 %
15 SYRINGE (ML) INTRAVENOUS ONCE
Qty: 0 | Refills: 0 | Status: DISCONTINUED | OUTPATIENT
Start: 2019-04-04 | End: 2019-04-19

## 2019-04-04 RX ORDER — PANTOPRAZOLE SODIUM 20 MG/1
40 TABLET, DELAYED RELEASE ORAL
Qty: 0 | Refills: 0 | Status: DISCONTINUED | OUTPATIENT
Start: 2019-04-04 | End: 2019-04-19

## 2019-04-04 RX ORDER — INSULIN LISPRO 100/ML
VIAL (ML) SUBCUTANEOUS AT BEDTIME
Qty: 0 | Refills: 0 | Status: DISCONTINUED | OUTPATIENT
Start: 2019-04-04 | End: 2019-04-19

## 2019-04-04 RX ORDER — LEVETIRACETAM 250 MG/1
1500 TABLET, FILM COATED ORAL
Qty: 0 | Refills: 0 | Status: DISCONTINUED | OUTPATIENT
Start: 2019-04-04 | End: 2019-04-19

## 2019-04-04 RX ORDER — ASCORBIC ACID 60 MG
500 TABLET,CHEWABLE ORAL DAILY
Qty: 0 | Refills: 0 | Status: DISCONTINUED | OUTPATIENT
Start: 2019-04-04 | End: 2019-04-19

## 2019-04-04 RX ADMIN — Medication 500 MILLIGRAM(S): at 13:21

## 2019-04-04 RX ADMIN — NYSTATIN CREAM 1 APPLICATION(S): 100000 CREAM TOPICAL at 18:18

## 2019-04-04 RX ADMIN — INSULIN GLARGINE 18 UNIT(S): 100 INJECTION, SOLUTION SUBCUTANEOUS at 22:27

## 2019-04-04 RX ADMIN — LEVETIRACETAM 1500 MILLIGRAM(S): 250 TABLET, FILM COATED ORAL at 05:50

## 2019-04-04 RX ADMIN — ZINC SULFATE TAB 220 MG (50 MG ZINC EQUIVALENT) 220 MILLIGRAM(S): 220 (50 ZN) TAB at 14:30

## 2019-04-04 RX ADMIN — FINASTERIDE 5 MILLIGRAM(S): 5 TABLET, FILM COATED ORAL at 13:21

## 2019-04-04 RX ADMIN — LISINOPRIL 20 MILLIGRAM(S): 2.5 TABLET ORAL at 05:50

## 2019-04-04 RX ADMIN — Medication 1: at 09:15

## 2019-04-04 RX ADMIN — NYSTATIN CREAM 1 APPLICATION(S): 100000 CREAM TOPICAL at 05:49

## 2019-04-04 RX ADMIN — LACOSAMIDE 150 MILLIGRAM(S): 50 TABLET ORAL at 18:18

## 2019-04-04 RX ADMIN — Medication 2 MILLIGRAM(S): at 05:50

## 2019-04-04 RX ADMIN — LEVETIRACETAM 1500 MILLIGRAM(S): 250 TABLET, FILM COATED ORAL at 18:18

## 2019-04-04 RX ADMIN — Medication 1 TABLET(S): at 13:21

## 2019-04-04 RX ADMIN — ENOXAPARIN SODIUM 40 MILLIGRAM(S): 100 INJECTION SUBCUTANEOUS at 09:15

## 2019-04-04 RX ADMIN — LACOSAMIDE 150 MILLIGRAM(S): 50 TABLET ORAL at 05:50

## 2019-04-04 RX ADMIN — Medication 2 MILLIGRAM(S): at 18:18

## 2019-04-04 RX ADMIN — Medication 1: at 18:18

## 2019-04-04 RX ADMIN — PANTOPRAZOLE SODIUM 40 MILLIGRAM(S): 20 TABLET, DELAYED RELEASE ORAL at 06:24

## 2019-04-04 RX ADMIN — Medication 2: at 13:10

## 2019-04-04 NOTE — PHYSICAL THERAPY INITIAL EVALUATION ADULT - LEVEL OF INDEPENDENCE: SIT/STAND, REHAB EVAL
unable to perform/d/t confusion. Patient having difficulty understanding use of rolling walker and transfers in general

## 2019-04-04 NOTE — PHYSICAL THERAPY INITIAL EVALUATION ADULT - ADDITIONAL COMMENTS
Patient is a poor historian; reports he came in from home where he lives alone but son "stays with him". This writer found skilled nursing facility bracelet on patient and found out he has been "in and out" of [facility]

## 2019-04-04 NOTE — CONSULT NOTE ADULT - SUBJECTIVE AND OBJECTIVE BOX
HPI:  74 yr-old man with hx of DM2,HTN with CVA 6 years ago;  found to have right temporal mass after being found on floor at home 4 months ago. Resection of tumor 11/28/18  by Dr. Gonzalez and pathology was grade 4 GBM. He had recurrence of tumor and underwent 2nd right craniotomy one month ago. There was post-op right frontal SAH and some SDH. He was on Keppra 1500 mg bid and Vimpat 100 mg bid for seizure prophylaxis and transferred to Rehab. He had seizure at rehab center in [facility]) and sent to [facility], observed and sent back to rehab center where he had recurrent seizures and transferred to [facility]. Since increasing dose of Vimpat to 150 mg bid, he has been seizure free since. He was seen last week by [facility] radiation oncologist, Dr Mil Grace (tel [phone]) and plan for starting RT was interrupted by his seizure recurrence. Now transferred to [facility] for radiation (03 Apr 2019 23:47)    PAST MEDICAL & SURGICAL HISTORY:  Glioblastoma  BPH (benign prostatic hyperplasia)  CVA (cerebral vascular accident)  Diabetes  HTN (hypertension)  S/P craniotomy: 02/2019  S/P craniotomy: ~ resection of R-pariental mass (11/28/2018)    Allergies    No Known Allergies      acetaminophen   Tablet .. 650 milliGRAM(s) Oral every 6 hours PRN  ascorbic acid 500 milliGRAM(s) Oral daily  dexamethasone     Tablet 2 milliGRAM(s) Oral two times a day  dextrose 40% Gel 15 Gram(s) Oral once PRN  dextrose 5%. 1000 milliLiter(s) IV Continuous <Continuous>  dextrose 50% Injectable 12.5 Gram(s) IV Push once  dextrose 50% Injectable 25 Gram(s) IV Push once  dextrose 50% Injectable 25 Gram(s) IV Push once  enoxaparin Injectable 40 milliGRAM(s) SubCutaneous daily  finasteride 5 milliGRAM(s) Oral daily  glucagon  Injectable 1 milliGRAM(s) IntraMuscular once PRN  insulin glargine Injectable (LANTUS) 18 Unit(s) SubCutaneous at bedtime  insulin lispro (HumaLOG) corrective regimen sliding scale   SubCutaneous three times a day before meals  insulin lispro (HumaLOG) corrective regimen sliding scale   SubCutaneous at bedtime  lacosamide 150 milliGRAM(s) Oral two times a day  levETIRAcetam 1500 milliGRAM(s) Oral two times a day  lisinopril 20 milliGRAM(s) Oral daily  multivitamin 1 Tablet(s) Oral daily  nystatin Powder 1 Application(s) Topical two times a day  pantoprazole    Tablet 40 milliGRAM(s) Oral before breakfast  senna 2 Tablet(s) Oral at bedtime PRN  zinc sulfate 220 milliGRAM(s) Oral daily    SOCIAL HISTORY:  FAMILY HISTORY:  No pertinent family history in first degree relatives    Vital Signs Last 24 Hrs  T(C): 36.9 (04 Apr 2019 05:48), Max: 36.9 (04 Apr 2019 05:48)  T(F): 98.4 (04 Apr 2019 05:48), Max: 98.4 (04 Apr 2019 05:48)  HR: 69 (04 Apr 2019 05:48) (69 - 98)  BP: 101/71 (04 Apr 2019 05:48) (101/61 - 106/71)  BP(mean): --  RR: 18 (04 Apr 2019 05:48) (15 - 18)  SpO2: 97% (04 Apr 2019 05:48) (95% - 99%)    PHYSICAL EXAM:  Awake Alert Attentive Affect appropriate  speech clear  Pupils: PERRL  Motor- Moving all extremities well        LABS:                          11.0   15.03 )-----------( 241      ( 04 Apr 2019 05:40 )             34.2     04-04    137  |  100  |  22  ----------------------------<  157<H>  4.0   |  26  |  0.87    Ca    9.0      04 Apr 2019 05:40      PT/INR - ( 04 Apr 2019 05:40 )   PT: 12.2 SEC;   INR: 1.10          PTT - ( 04 Apr 2019 05:40 )  PTT:23.6 SEC      RADIOLOGY & ADDITIONAL STUDIES:

## 2019-04-04 NOTE — PROGRESS NOTE ADULT - PROBLEM SELECTOR PLAN 1
-Due to underlying grade 4 GBM  -Continue Keppra  -Continue Vimpat  -Seizure precautions  -Regular neuro checks

## 2019-04-04 NOTE — PROGRESS NOTE ADULT - PROBLEM SELECTOR PLAN 2
-Now s/p 2 resections within 4 months after recurrence about a month ago. Poor prognosis. Currently resides in rehab.  -Will consult radiation oncology for radiation while at hospital. Last seen last week by Dr. Mil Grace ([phone]) and was planned for resuming radiation, but was postponed due to seizures.  -C/w steroids for at least 7 more days per OSH neurosurgery recs -Now s/p 2 resections within 4 months after recurrence about a month ago. Poor prognosis. Currently resides in rehab.  -Consulted radiation oncology. Since pt was no-show last week, has to have new inpatient consult. Will f/u recs.  -C/w steroids for at least 7 more days per OSH neurosurgery recs

## 2019-04-04 NOTE — CONSULT NOTE ADULT - SUBJECTIVE AND OBJECTIVE BOX
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  He is s/p Resection of tumor 11/28/18  by Dr. Gonzalez and due to recurrence has a second resection done on 2/27/19.  He has yet to have radiation treatment but has met with Dr. Grace at [facility] and was planned for CT sim and treatment he just never showed up for unknown reasons.  He was being scheduled for 15 fractions, partial brain RT as an outpatient.     He had seizures at rehab center in [facility] and sent to [facility], observed and sent back to rehab center where he had recurrent seizures and transferred to [facility]. Since increasing dose of Vimpat to 150 mg bid, he has been seizure free since.     imaging:   < from: MR Head w/wo IV Cont (02.28.19 @ 11:57) >  IMPRESSION:    Unchanged right pterional craniotomy with right temporal lobe resection   cavity with hemorrhagic fluid, right holohemispheric subdural extension   involving the right tentorial leaflet, bifrontal pneumocephalus, with   residual enhancement along the cavity margin, and adjacent restricted   diffusion, new ischemia not excluded. There is unchanged mild effacement   of the right cerebral hemisphere and lateral ventricle, with fluid   levels in Horns suggesting hemorrhagic/proteinaceous debris layering and   enhancement of the ependymal margins, there is no new midline shift.    Prior to surgery on 2/27- MRI below showed progression:     < from: MR Head w/wo IV Cont (01.30.19 @ 15:12) >  Impression:    Increasing enhancement enhancement and nonenhancingFLAIR/T2 signal   abnormality within the right temporal lobe. Differential diagnosis   includes progressive a neoplasm versus pseudoprogression. Further imaging   with spectroscopy and MR perfusion may be helpful for further evaluation.    PRIOR TO 11/28 initial surgery: see MRI below:     < from: MR Head w/ IV Cont (11.22.18 @ 06:52) >  IMPRESSION:     Right 5.9 x 4 x 2.7 cm AP, TR, CC rim-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    Volume loss with encephalomalacia throughout the right inferior medial   cerebellar hemisphere, smaller foci of ischemia in the left cerebellar   hemisphere and supratentorially.              Allergies    No Known Allergies    Intolerances        ROS: [  ] Fever  [  ] Chills  [  ]Chest Pain [  ] SOB  [  ]Cough [  ] N/V  [  ] Diarrhea [  ]Constipation [  ]Other ROS:  [  ] ROS otherwise negative    PAST MEDICAL & SURGICAL HISTORY:  Oxygen dependent  Vitamin deficiency  Constipation  Pulmonary embolism  Glioblastoma  BPH (benign prostatic hyperplasia)  CVA (cerebral vascular accident)  Diabetes  HTN (hypertension)  S/P craniotomy: 02/2019  S/P craniotomy: ~ resection of R-pariental mass (11/28/2018)  No significant past surgical history      FAMILY HISTORY:  No pertinent family history in first degree relatives      MEDICATIONS  (STANDING):  ascorbic acid 500 milliGRAM(s) Oral daily  dexamethasone     Tablet 2 milliGRAM(s) Oral two times a day  dextrose 5%. 1000 milliLiter(s) (50 mL/Hr) IV Continuous <Continuous>  dextrose 50% Injectable 12.5 Gram(s) IV Push once  dextrose 50% Injectable 25 Gram(s) IV Push once  dextrose 50% Injectable 25 Gram(s) IV Push once  enoxaparin Injectable 40 milliGRAM(s) SubCutaneous daily  finasteride 5 milliGRAM(s) Oral daily  insulin glargine Injectable (LANTUS) 18 Unit(s) SubCutaneous at bedtime  insulin lispro (HumaLOG) corrective regimen sliding scale   SubCutaneous three times a day before meals  insulin lispro (HumaLOG) corrective regimen sliding scale   SubCutaneous at bedtime  lacosamide 150 milliGRAM(s) Oral two times a day  levETIRAcetam 1500 milliGRAM(s) Oral two times a day  lisinopril 20 milliGRAM(s) Oral daily  multivitamin 1 Tablet(s) Oral daily  nystatin Powder 1 Application(s) Topical two times a day  pantoprazole    Tablet 40 milliGRAM(s) Oral before breakfast  zinc sulfate 220 milliGRAM(s) Oral daily    MEDICATIONS  (PRN):  acetaminophen   Tablet .. 650 milliGRAM(s) Oral every 6 hours PRN Mild Pain (1 - 3)  dextrose 40% Gel 15 Gram(s) Oral once PRN Blood Glucose LESS THAN 70 milliGRAM(s)/deciliter  glucagon  Injectable 1 milliGRAM(s) IntraMuscular once PRN Glucose LESS THAN 70 milligrams/deciliter  senna 2 Tablet(s) Oral at bedtime PRN Constipation      PHYSICAL EXAM  Vital Signs Last 24 Hrs  T(C): 36.9 (04 Apr 2019 05:48), Max: 36.9 (04 Apr 2019 05:48)  T(F): 98.4 (04 Apr 2019 05:48), Max: 98.4 (04 Apr 2019 05:48)  HR: 69 (04 Apr 2019 05:48) (69 - 98)  BP: 101/71 (04 Apr 2019 05:48) (101/61 - 106/71)  BP(mean): --  RR: 18 (04 Apr 2019 05:48) (15 - 18)  SpO2: 97% (04 Apr 2019 05:48) (95% - 99%)    General: Well nourished, well developed, no acute distress  HEENT: NC/AT; EOMI, PERRL, sclera nonicteric; external ears normal; no rhinorrhea or epistaxis; mucous membranes moist; oropharynx clear and without erythema  CV: NR, RR; no appreciable r/m/g  Lungs: CTAB, no increased work of breathing  Abodmen: Bowel sounds present; soft, NTND  MSK: Vertebral spine non-tender to palpation  Neuro: AAOx3; cranial nerves II-XII intact; strength 5/5 in upper and lower extremities; sensation to light touch in tact bilaterally.  Psych: Full affect; mood congruent  Skin: no visible rashes on limited examination    ASSESSMENT/PLAN    HELGA WHITE is a 74y man with WHO Grade IV GBM, s/p two brain resections: the 1st on 11/28/18 (Carlos) and the 2nd resection was performed on 2/27/19.  He was planned for outpatient radiation treatment with Dr. Grace as : partial brain, 15 fractions.  Due to seizures, he was transferred from [facility] to [facility], then to [facility], and now to [facility] for adjuvant radiation treatment that has not been done prior.      Please have neurosurgery see the patient as well.   He is a candidate for adjuvant palliative RT whether this is done as an inpatient/outpatient.  If his seizures are well controlled and he can or plans to be discharged please let us know so we may arrange outpatient treatment.  If he will remain in house, we can start a palliative course of inpatient RT.    We discussed the use of palliative radiation in this setting, namely to improve quality of life through the reduction of symptoms.  We talked about the risks, benefits, acute and long term side effects, as well as expected treatment outcomes.  He/She was given the opportunity to ask questions, which were answered to his/her apparent satisfaction.  He/She provided written consent to proceed with radiation therapy. We will arrange for inpatient/outpatient treatment.    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  He is s/p Resection of tumor 11/28/18  by Dr. Gonzalez and due to recurrence had a second resection done on 2/27/19.  He has yet to have radiation treatment but has met with Dr. Grace at [facility] and was planned for CT sim and treatment, was unable to have treatment due to hospital admission for seizures.   This happened at a rehab center in [facility] and he was sent to [facility], observed and sent back to rehab center where he had recurrent seizures and transferred to [facility]. Since increasing dose of Vimpat to 150 mg bid, he has been seizure free since.      MR Head w/wo IV Cont (02.28.19 @ 11:57) >  IMPRESSION:    Unchanged right pterional craniotomy with right temporal lobe resection   cavity with hemorrhagic fluid, right holohemispheric subdural extension   involving the right tentorial leaflet, bifrontal pneumocephalus, with   residual enhancement along the cavity margin, and adjacent restricted   diffusion, new ischemia not excluded. There is unchanged mild effacement   of the right cerebral hemisphere and lateral ventricle, with fluid   levels in Horns suggesting hemorrhagic/proteinaceous debris layering and   enhancement of the ependymal margins, there is no new midline shift.    Prior to surgery on 2/27- MRI below showed progression:     MR Head w/wo IV Cont (01.30.19 @ 15:12) >  Impression:    Increasing enhancement enhancement and nonenhancingFLAIR/T2 signal   abnormality within the right temporal lobe. Differential diagnosis   includes progressive a neoplasm versus pseudoprogression. Further imaging   with spectroscopy and MR perfusion may be helpful for further evaluation.    PRIOR TO 11/28 initial surgery: see MRI below:     < from: MR Head w/ IV Cont (11.22.18 @ 06:52) >  IMPRESSION:     Right 5.9 x 4 x 2.7 cm AP, TR, CC rim-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    Volume loss with encephalomalacia throughout the right inferior medial   cerebellar hemisphere, smaller foci of ischemia in the left cerebellar   hemisphere and supratentorially.    No Known Allergies    ROS: [  ] Fever  [  ] Chills  [  ]Chest Pain [  ] SOB  [  ]Cough [  ] N/V  [  ] Diarrhea [  ]Constipation [  ]Other ROS:  [  ] ROS otherwise negative    PAST MEDICAL & SURGICAL HISTORY:  Oxygen dependent  Vitamin deficiency  Constipation  Pulmonary embolism  Glioblastoma  BPH (benign prostatic hyperplasia)  CVA (cerebral vascular accident)  Diabetes  HTN (hypertension)  S/P craniotomy: 02/2019  S/P craniotomy: ~ resection of R-pariental mass (11/28/2018)  No significant past surgical history      FAMILY HISTORY:  No pertinent family history in first degree relatives      MEDICATIONS  (STANDING):  ascorbic acid 500 milliGRAM(s) Oral daily  dexamethasone     Tablet 2 milliGRAM(s) Oral two times a day  dextrose 5%. 1000 milliLiter(s) (50 mL/Hr) IV Continuous <Continuous>  dextrose 50% Injectable 12.5 Gram(s) IV Push once  dextrose 50% Injectable 25 Gram(s) IV Push once  dextrose 50% Injectable 25 Gram(s) IV Push once  enoxaparin Injectable 40 milliGRAM(s) SubCutaneous daily  finasteride 5 milliGRAM(s) Oral daily  insulin glargine Injectable (LANTUS) 18 Unit(s) SubCutaneous at bedtime  insulin lispro (HumaLOG) corrective regimen sliding scale   SubCutaneous three times a day before meals  insulin lispro (HumaLOG) corrective regimen sliding scale   SubCutaneous at bedtime  lacosamide 150 milliGRAM(s) Oral two times a day  levETIRAcetam 1500 milliGRAM(s) Oral two times a day  lisinopril 20 milliGRAM(s) Oral daily  multivitamin 1 Tablet(s) Oral daily  nystatin Powder 1 Application(s) Topical two times a day  pantoprazole    Tablet 40 milliGRAM(s) Oral before breakfast  zinc sulfate 220 milliGRAM(s) Oral daily    MEDICATIONS  (PRN):  acetaminophen   Tablet .. 650 milliGRAM(s) Oral every 6 hours PRN Mild Pain (1 - 3)  dextrose 40% Gel 15 Gram(s) Oral once PRN Blood Glucose LESS THAN 70 milliGRAM(s)/deciliter  glucagon  Injectable 1 milliGRAM(s) IntraMuscular once PRN Glucose LESS THAN 70 milligrams/deciliter  senna 2 Tablet(s) Oral at bedtime PRN Constipation      PHYSICAL EXAM  Vital Signs Last 24 Hrs  T(C): 36.9 (04 Apr 2019 05:48), Max: 36.9 (04 Apr 2019 05:48)  T(F): 98.4 (04 Apr 2019 05:48), Max: 98.4 (04 Apr 2019 05:48)  HR: 69 (04 Apr 2019 05:48) (69 - 98)  BP: 101/71 (04 Apr 2019 05:48) (101/61 - 106/71)  BP(mean): --  RR: 18 (04 Apr 2019 05:48) (15 - 18)  SpO2: 97% (04 Apr 2019 05:48) (95% - 99%)    General: somewhat soporific, no acute distress  HEENT: NC/AT; EOMI, PERRL, sclera nonicteric; external ears normal; no rhinorrhea or epistaxis; mucous membranes moist; oropharynx clear and without erythema  CV: NR, RR; no appreciable r/m/g  Lungs: scattered wheezing  Abodmen: Bowel sounds present; soft, NTND  MSK: Vertebral spine non-tender to palpation  Neuro: AAOx1; responsive, but slow- KPX 40-cranial nerves II-XII intact; strength 5/5 on right 3/5 on left,  sensation to light touch in tact bilaterally.  Psych: Full affect; mood congruent  Skin: no visible rashes on limited examination    ASSESSMENT/PLAN    HELGA WHITE is a 74y man with WHO Grade IV GBM, s/p two brain resections: the 1st on 11/28/18 (Carlos) and the 2nd resection was performed on 2/27/19.  He was planned for outpatient radiation treatment with Dr. Grace as : partial brain, 15 fractions.  Due to seizures, he was transferred from [facility] to [facility], then to [facility], and now to [facility] for adjuvant radiation treatment that has not been done prior.       He is a candidate for adjuvant palliative RT whether this is done as an inpatient/outpatient.  If his seizures are well controlled and he can or plans to be discharged please let us know so we may arrange outpatient treatment.  If he will remain in house, we can start a palliative course of inpatient RT.    We discussed the use of palliative radiation in this setting, namely to improve quality of life through the reduction of symptoms.  We talked about the risks, benefits, acute and long term side effects, as well as expected treatment outcomes.  Mr White was given the opportunity to ask questions, which were answered to his satisfaction. Consent was previously signed with Dr Grace about 6 weeks ago.    [phone] HPI:  74 yr-old man transferred from [facility] to [place], and now to [facility] s/p seizures with h/o right temporal GBM .  He is s/p resection of tumor 11/28/18  by Dr. Gonzalez and due to recurrence had a second resection done on 2/27/19.  He has yet to have radiation treatment but has met with Dr. Grace at [facility] and was planned for CT sim and treatment, was unable to have treatment due to hospital admission for seizures.   This happened at a rehab center in [facility] and he was sent to [facility], observed and sent back to rehab center where he had recurrent seizures and transferred to [facility]. Since increasing dose of Vimpat to 150 mg bid, he has been seizure free since.      MR Head w/wo IV Cont (02.28.19 @ 11:57) >  IMPRESSION:    Unchanged right pterional craniotomy with right temporal lobe resection   cavity with hemorrhagic fluid, right holohemispheric subdural extension   involving the right tentorial leaflet, bifrontal pneumocephalus, with   residual enhancement along the cavity margin, and adjacent restricted   diffusion, new ischemia not excluded. There is unchanged mild effacement   of the right cerebral hemisphere and lateral ventricle, with fluid   levels in Horns suggesting hemorrhagic/proteinaceous debris layering and   enhancement of the ependymal margins, there is no new midline shift.    Prior to surgery on 2/27- MRI below showed progression:     MR Head w/wo IV Cont (01.30.19 @ 15:12) >  Impression:    Increasing enhancement enhancement and nonenhancingFLAIR/T2 signal   abnormality within the right temporal lobe. Differential diagnosis   includes progressive a neoplasm versus pseudoprogression. Further imaging   with spectroscopy and MR perfusion may be helpful for further evaluation.    PRIOR TO 11/28 initial surgery: see MRI below:     < from: MR Head w/ IV Cont (11.22.18 @ 06:52) >  IMPRESSION:     Right 5.9 x 4 x 2.7 cm AP, TR, CC rim-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    Volume loss with encephalomalacia throughout the right inferior medial   cerebellar hemisphere, smaller foci of ischemia in the left cerebellar   hemisphere and supratentorially.    No Known Allergies    ROS: [  ] Fever  [  ] Chills  [  ]Chest Pain [  ] SOB  [  ]Cough [  ] N/V  [  ] Diarrhea [  ]Constipation [  ]Other ROS:  [  ] ROS otherwise negative    PAST MEDICAL & SURGICAL HISTORY:  Oxygen dependent  Vitamin deficiency  Constipation  Pulmonary embolism  Glioblastoma  BPH (benign prostatic hyperplasia)  CVA (cerebral vascular accident)  Diabetes  HTN (hypertension)  S/P craniotomy: 02/2019  S/P craniotomy: ~ resection of R-pariental mass (11/28/2018)  No significant past surgical history      FAMILY HISTORY:  No pertinent family history in first degree relatives      MEDICATIONS  (STANDING):  ascorbic acid 500 milliGRAM(s) Oral daily  dexamethasone     Tablet 2 milliGRAM(s) Oral two times a day  dextrose 5%. 1000 milliLiter(s) (50 mL/Hr) IV Continuous <Continuous>  dextrose 50% Injectable 12.5 Gram(s) IV Push once  dextrose 50% Injectable 25 Gram(s) IV Push once  dextrose 50% Injectable 25 Gram(s) IV Push once  enoxaparin Injectable 40 milliGRAM(s) SubCutaneous daily  finasteride 5 milliGRAM(s) Oral daily  insulin glargine Injectable (LANTUS) 18 Unit(s) SubCutaneous at bedtime  insulin lispro (HumaLOG) corrective regimen sliding scale   SubCutaneous three times a day before meals  insulin lispro (HumaLOG) corrective regimen sliding scale   SubCutaneous at bedtime  lacosamide 150 milliGRAM(s) Oral two times a day  levETIRAcetam 1500 milliGRAM(s) Oral two times a day  lisinopril 20 milliGRAM(s) Oral daily  multivitamin 1 Tablet(s) Oral daily  nystatin Powder 1 Application(s) Topical two times a day  pantoprazole    Tablet 40 milliGRAM(s) Oral before breakfast  zinc sulfate 220 milliGRAM(s) Oral daily    MEDICATIONS  (PRN):  acetaminophen   Tablet .. 650 milliGRAM(s) Oral every 6 hours PRN Mild Pain (1 - 3)  dextrose 40% Gel 15 Gram(s) Oral once PRN Blood Glucose LESS THAN 70 milliGRAM(s)/deciliter  glucagon  Injectable 1 milliGRAM(s) IntraMuscular once PRN Glucose LESS THAN 70 milligrams/deciliter  senna 2 Tablet(s) Oral at bedtime PRN Constipation      PHYSICAL EXAM  Vital Signs Last 24 Hrs  T(C): 36.9 (04 Apr 2019 05:48), Max: 36.9 (04 Apr 2019 05:48)  T(F): 98.4 (04 Apr 2019 05:48), Max: 98.4 (04 Apr 2019 05:48)  HR: 69 (04 Apr 2019 05:48) (69 - 98)  BP: 101/71 (04 Apr 2019 05:48) (101/61 - 106/71)  BP(mean): --  RR: 18 (04 Apr 2019 05:48) (15 - 18)  SpO2: 97% (04 Apr 2019 05:48) (95% - 99%)    General: somewhat soporific, no acute distress  HEENT: NC/AT; EOMI, PERRL, sclera nonicteric; external ears normal; no rhinorrhea or epistaxis; mucous membranes moist; oropharynx clear and without erythema  CV: NR, RR; no appreciable r/m/g  Lungs: scattered wheezing  Abodmen: Bowel sounds present; soft, NTND  MSK: Vertebral spine non-tender to palpation  Neuro: AAOx1; responsive, but slow- KPX 40-cranial nerves II-XII intact; strength 5/5 on right 3/5 on left,  sensation to light touch intact bilaterally.  Psych: Full affect; mood congruent  Skin: no visible rashes on limited examination    ASSESSMENT/PLAN    HELGA DUNCAN is a 74y man with WHO Grade IV GBM, s/p two brain resections: the 1st on 11/28/18 (Carlos) and the 2nd resection was performed on 2/27/19.  He was planned for outpatient radiation treatment with Dr. Grace as : partial brain, 15 fractions.  Due to seizures, he was transferred from [facility] to [facility], then to [facility], and now to [facility] for adjuvant radiation treatment that has not been done prior.       He is a candidate for adjuvant palliative RT whether this is done as an inpatient/outpatient.  If his seizures are well controlled and he can or plans to be discharged please let us know so we may arrange outpatient treatment.  If he will remain in house, we can start a palliative course of inpatient RT.    We discussed the use of palliative radiation in this setting, namely to improve quality of life through the reduction of symptoms.  We talked about the risks, benefits, acute and long term side effects, as well as expected treatment outcomes.  Mr Duncan was given the opportunity to ask questions, which were answered to his satisfaction. Consent was previously signed with Dr Grace about 6 weeks ago. Will also discuss with Dr Katy Strickland the role of Temdeanner in this particular situation.    [phone]

## 2019-04-04 NOTE — PROGRESS NOTE ADULT - SUBJECTIVE AND OBJECTIVE BOX
Patient is a 74y old  Male who presents with a chief complaint of radiation (03 Apr 2019 23:47)      SUBJECTIVE/OVERNIGHT EVENTS     No acute events overnight.    MEDICATIONS  (STANDING):  ascorbic acid 500 milliGRAM(s) Oral daily  dexamethasone     Tablet 2 milliGRAM(s) Oral two times a day  dextrose 5%. 1000 milliLiter(s) (50 mL/Hr) IV Continuous <Continuous>  dextrose 50% Injectable 12.5 Gram(s) IV Push once  dextrose 50% Injectable 25 Gram(s) IV Push once  dextrose 50% Injectable 25 Gram(s) IV Push once  enoxaparin Injectable 40 milliGRAM(s) SubCutaneous daily  finasteride 5 milliGRAM(s) Oral daily  insulin glargine Injectable (LANTUS) 18 Unit(s) SubCutaneous at bedtime  insulin lispro (HumaLOG) corrective regimen sliding scale   SubCutaneous three times a day before meals  insulin lispro (HumaLOG) corrective regimen sliding scale   SubCutaneous at bedtime  lacosamide 150 milliGRAM(s) Oral two times a day  levETIRAcetam 1500 milliGRAM(s) Oral two times a day  lisinopril 20 milliGRAM(s) Oral daily  multivitamin 1 Tablet(s) Oral daily  nystatin Powder 1 Application(s) Topical two times a day  pantoprazole    Tablet 40 milliGRAM(s) Oral before breakfast  zinc sulfate 220 milliGRAM(s) Oral daily    MEDICATIONS  (PRN):  acetaminophen   Tablet .. 650 milliGRAM(s) Oral every 6 hours PRN Mild Pain (1 - 3)  dextrose 40% Gel 15 Gram(s) Oral once PRN Blood Glucose LESS THAN 70 milliGRAM(s)/deciliter  glucagon  Injectable 1 milliGRAM(s) IntraMuscular once PRN Glucose LESS THAN 70 milligrams/deciliter  senna 2 Tablet(s) Oral at bedtime PRN Constipation    CAPILLARY BLOOD GLUCOSE      POCT Blood Glucose.: 182 mg/dL (04 Apr 2019 06:06)  POCT Blood Glucose.: 162 mg/dL (03 Apr 2019 22:49)  POCT Blood Glucose.: 174 mg/dL (03 Apr 2019 16:53)  POCT Blood Glucose.: 196 mg/dL (03 Apr 2019 11:59)    I&O's Summary        Vital Signs Last 24 Hrs  T(C): 36.9 (04 Apr 2019 05:48), Max: 36.9 (04 Apr 2019 05:48)  T(F): 98.4 (04 Apr 2019 05:48), Max: 98.4 (04 Apr 2019 05:48)  HR: 69 (04 Apr 2019 05:48) (69 - 98)  BP: 101/71 (04 Apr 2019 05:48) (101/61 - 106/71)  BP(mean): --  RR: 18 (04 Apr 2019 05:48) (15 - 18)  SpO2: 97% (04 Apr 2019 05:48) (95% - 99%)    PHYSICAL EXAM:  GENERAL: NAD, well-developed  HEAD:  Atraumatic, Normocephalic  EYES: EOMI, PERRLA, conjunctiva and sclera clear  NECK: Supple, No JVD  CHEST/LUNG: Clear to auscultation bilaterally; No wheeze  HEART: Regular rate and rhythm; No murmurs, rubs, or gallops  ABDOMEN: Soft, Nontender, Nondistended; Bowel sounds present  EXTREMITIES:  2+ Peripheral Pulses, No clubbing, cyanosis, or edema  PSYCH: AAOx3  NEUROLOGY: non-focal  SKIN: No rashes or lesions    LABS                        11.0   15.03 )-----------( 241      ( 04 Apr 2019 05:40 )             34.2                         11.8   14.0  )-----------( 242      ( 03 Apr 2019 07:40 )             36.0     04-04    137  |  100  |  22  ----------------------------<  157<H>  4.0   |  26  |  0.87  04-03    141  |  104  |  22  ----------------------------<  112<H>  3.7   |  31  |  0.84    Ca    9.0      04 Apr 2019 05:40  Ca    9.1      03 Apr 2019 07:40      PT/INR - ( 04 Apr 2019 05:40 )   PT: 12.2 SEC;   INR: 1.10          PTT - ( 04 Apr 2019 05:40 )  PTT:23.6 SEC          ( 29 Mar 2019 16:30 ) pH: 7.40  /  pCO2: 29    /  pO2: 356   / HCO3: x     / Base Excess: x     /  SaO2: >99                   RADIOLOGY & ADDITIONAL TESTS:    Imaging Personally Reviewed:    Consultant(s) Notes Reviewed:      Care Discussed with Consultants/Other Providers: Patient is a 74y old  Male who presents with a chief complaint of radiation (03 Apr 2019 23:47)      SUBJECTIVE/OVERNIGHT EVENTS     No acute events overnight. No seizures while in hospital thus far.   Denies nausea, vomiting, migraine, vision changes, seizure, confusion.    MEDICATIONS  (STANDING):  ascorbic acid 500 milliGRAM(s) Oral daily  dexamethasone     Tablet 2 milliGRAM(s) Oral two times a day  dextrose 5%. 1000 milliLiter(s) (50 mL/Hr) IV Continuous <Continuous>  dextrose 50% Injectable 12.5 Gram(s) IV Push once  dextrose 50% Injectable 25 Gram(s) IV Push once  dextrose 50% Injectable 25 Gram(s) IV Push once  enoxaparin Injectable 40 milliGRAM(s) SubCutaneous daily  finasteride 5 milliGRAM(s) Oral daily  insulin glargine Injectable (LANTUS) 18 Unit(s) SubCutaneous at bedtime  insulin lispro (HumaLOG) corrective regimen sliding scale   SubCutaneous three times a day before meals  insulin lispro (HumaLOG) corrective regimen sliding scale   SubCutaneous at bedtime  lacosamide 150 milliGRAM(s) Oral two times a day  levETIRAcetam 1500 milliGRAM(s) Oral two times a day  lisinopril 20 milliGRAM(s) Oral daily  multivitamin 1 Tablet(s) Oral daily  nystatin Powder 1 Application(s) Topical two times a day  pantoprazole    Tablet 40 milliGRAM(s) Oral before breakfast  zinc sulfate 220 milliGRAM(s) Oral daily    MEDICATIONS  (PRN):  acetaminophen   Tablet .. 650 milliGRAM(s) Oral every 6 hours PRN Mild Pain (1 - 3)  dextrose 40% Gel 15 Gram(s) Oral once PRN Blood Glucose LESS THAN 70 milliGRAM(s)/deciliter  glucagon  Injectable 1 milliGRAM(s) IntraMuscular once PRN Glucose LESS THAN 70 milligrams/deciliter  senna 2 Tablet(s) Oral at bedtime PRN Constipation    CAPILLARY BLOOD GLUCOSE      POCT Blood Glucose.: 182 mg/dL (04 Apr 2019 06:06)  POCT Blood Glucose.: 162 mg/dL (03 Apr 2019 22:49)  POCT Blood Glucose.: 174 mg/dL (03 Apr 2019 16:53)  POCT Blood Glucose.: 196 mg/dL (03 Apr 2019 11:59)    I&O's Summary        Vital Signs Last 24 Hrs  T(C): 36.9 (04 Apr 2019 05:48), Max: 36.9 (04 Apr 2019 05:48)  T(F): 98.4 (04 Apr 2019 05:48), Max: 98.4 (04 Apr 2019 05:48)  HR: 69 (04 Apr 2019 05:48) (69 - 98)  BP: 101/71 (04 Apr 2019 05:48) (101/61 - 106/71)  BP(mean): --  RR: 18 (04 Apr 2019 05:48) (15 - 18)  SpO2: 97% (04 Apr 2019 05:48) (95% - 99%)    PHYSICAL EXAM:  GENERAL: NAD, well-developed  HEAD:  Atraumatic, Normocephalic  EYES: EOMI, PERRLA, conjunctiva and sclera clear  NECK: Supple, No JVD  CHEST/LUNG: Clear to auscultation bilaterally; No wheeze  HEART: Regular rate and rhythm; No murmurs, rubs, or gallops  ABDOMEN: Soft, Nontender, Nondistended; Bowel sounds present  EXTREMITIES:  2+ Peripheral Pulses, No clubbing, cyanosis, or edema  PSYCH: AAOx3  NEUROLOGY: non-focal  SKIN: No rashes or lesions    LABS                        11.0   15.03 )-----------( 241      ( 04 Apr 2019 05:40 )             34.2                         11.8   14.0  )-----------( 242      ( 03 Apr 2019 07:40 )             36.0     04-04    137  |  100  |  22  ----------------------------<  157<H>  4.0   |  26  |  0.87  04-03    141  |  104  |  22  ----------------------------<  112<H>  3.7   |  31  |  0.84    Ca    9.0      04 Apr 2019 05:40  Ca    9.1      03 Apr 2019 07:40      PT/INR - ( 04 Apr 2019 05:40 )   PT: 12.2 SEC;   INR: 1.10          PTT - ( 04 Apr 2019 05:40 )  PTT:23.6 SEC          ( 29 Mar 2019 16:30 ) pH: 7.40  /  pCO2: 29    /  pO2: 356   / HCO3: x     / Base Excess: x     /  SaO2: >99

## 2019-04-05 LAB
ANION GAP SERPL CALC-SCNC: 11 MMO/L — SIGNIFICANT CHANGE UP (ref 7–14)
BUN SERPL-MCNC: 20 MG/DL — SIGNIFICANT CHANGE UP (ref 7–23)
CALCIUM SERPL-MCNC: 9.1 MG/DL — SIGNIFICANT CHANGE UP (ref 8.4–10.5)
CHLORIDE SERPL-SCNC: 101 MMOL/L — SIGNIFICANT CHANGE UP (ref 98–107)
CO2 SERPL-SCNC: 28 MMOL/L — SIGNIFICANT CHANGE UP (ref 22–31)
CREAT SERPL-MCNC: 0.72 MG/DL — SIGNIFICANT CHANGE UP (ref 0.5–1.3)
GLUCOSE SERPL-MCNC: 174 MG/DL — HIGH (ref 70–99)
HCT VFR BLD CALC: 37.8 % — LOW (ref 39–50)
HGB BLD-MCNC: 11.7 G/DL — LOW (ref 13–17)
MAGNESIUM SERPL-MCNC: 1.8 MG/DL — SIGNIFICANT CHANGE UP (ref 1.6–2.6)
MCHC RBC-ENTMCNC: 30.1 PG — SIGNIFICANT CHANGE UP (ref 27–34)
MCHC RBC-ENTMCNC: 31 % — LOW (ref 32–36)
MCV RBC AUTO: 97.2 FL — SIGNIFICANT CHANGE UP (ref 80–100)
NRBC # FLD: 0 K/UL — SIGNIFICANT CHANGE UP (ref 0–0)
PHOSPHATE SERPL-MCNC: 3 MG/DL — SIGNIFICANT CHANGE UP (ref 2.5–4.5)
PLATELET # BLD AUTO: 197 K/UL — SIGNIFICANT CHANGE UP (ref 150–400)
PMV BLD: 10.1 FL — SIGNIFICANT CHANGE UP (ref 7–13)
POTASSIUM SERPL-MCNC: 4.4 MMOL/L — SIGNIFICANT CHANGE UP (ref 3.5–5.3)
POTASSIUM SERPL-SCNC: 4.4 MMOL/L — SIGNIFICANT CHANGE UP (ref 3.5–5.3)
RBC # BLD: 3.89 M/UL — LOW (ref 4.2–5.8)
RBC # FLD: 15.8 % — HIGH (ref 10.3–14.5)
SODIUM SERPL-SCNC: 140 MMOL/L — SIGNIFICANT CHANGE UP (ref 135–145)
WBC # BLD: 12.77 K/UL — HIGH (ref 3.8–10.5)
WBC # FLD AUTO: 12.77 K/UL — HIGH (ref 3.8–10.5)

## 2019-04-05 PROCEDURE — 77334 RADIATION TREATMENT AID(S): CPT | Mod: 26

## 2019-04-05 PROCEDURE — 99291 CRITICAL CARE FIRST HOUR: CPT

## 2019-04-05 PROCEDURE — 95819 EEG AWAKE AND ASLEEP: CPT | Mod: 26

## 2019-04-05 PROCEDURE — 99233 SBSQ HOSP IP/OBS HIGH 50: CPT | Mod: GC

## 2019-04-05 PROCEDURE — 77290 THER RAD SIMULAJ FIELD CPLX: CPT | Mod: 26

## 2019-04-05 RX ADMIN — PANTOPRAZOLE SODIUM 40 MILLIGRAM(S): 20 TABLET, DELAYED RELEASE ORAL at 06:09

## 2019-04-05 RX ADMIN — ENOXAPARIN SODIUM 40 MILLIGRAM(S): 100 INJECTION SUBCUTANEOUS at 13:01

## 2019-04-05 RX ADMIN — Medication 1: at 13:02

## 2019-04-05 RX ADMIN — Medication 1 TABLET(S): at 13:02

## 2019-04-05 RX ADMIN — LACOSAMIDE 150 MILLIGRAM(S): 50 TABLET ORAL at 05:38

## 2019-04-05 RX ADMIN — LEVETIRACETAM 1500 MILLIGRAM(S): 250 TABLET, FILM COATED ORAL at 05:30

## 2019-04-05 RX ADMIN — NYSTATIN CREAM 1 APPLICATION(S): 100000 CREAM TOPICAL at 05:31

## 2019-04-05 RX ADMIN — LEVETIRACETAM 1500 MILLIGRAM(S): 250 TABLET, FILM COATED ORAL at 18:03

## 2019-04-05 RX ADMIN — INSULIN GLARGINE 18 UNIT(S): 100 INJECTION, SOLUTION SUBCUTANEOUS at 23:13

## 2019-04-05 RX ADMIN — LISINOPRIL 20 MILLIGRAM(S): 2.5 TABLET ORAL at 05:30

## 2019-04-05 RX ADMIN — LACOSAMIDE 150 MILLIGRAM(S): 50 TABLET ORAL at 18:03

## 2019-04-05 RX ADMIN — Medication 2: at 18:33

## 2019-04-05 RX ADMIN — NYSTATIN CREAM 1 APPLICATION(S): 100000 CREAM TOPICAL at 18:03

## 2019-04-05 RX ADMIN — Medication 2 MILLIGRAM(S): at 18:03

## 2019-04-05 RX ADMIN — Medication 2 MILLIGRAM(S): at 05:30

## 2019-04-05 RX ADMIN — FINASTERIDE 5 MILLIGRAM(S): 5 TABLET, FILM COATED ORAL at 13:02

## 2019-04-05 RX ADMIN — Medication 500 MILLIGRAM(S): at 13:02

## 2019-04-05 RX ADMIN — ZINC SULFATE TAB 220 MG (50 MG ZINC EQUIVALENT) 220 MILLIGRAM(S): 220 (50 ZN) TAB at 13:02

## 2019-04-05 NOTE — CONSULT NOTE ADULT - CONSULT REASON
To assist in the ethical dilemma caused by family conflict regarding surrogate designation for a 73 year old male patient deemed without capacity.

## 2019-04-05 NOTE — PROGRESS NOTE ADULT - SUBJECTIVE AND OBJECTIVE BOX
Patient is a 74y old  Male who presents with a chief complaint of radiation (04 Apr 2019 11:31)      SUBJECTIVE/OVERNIGHT EVENTS     No acute events overnight. No seizures noted.   Denies nausea, vomiting, constipation, diarrhea, fevers, chills, chest pain, SOB.    MEDICATIONS  (STANDING):  ascorbic acid 500 milliGRAM(s) Oral daily  dexamethasone     Tablet 2 milliGRAM(s) Oral two times a day  dextrose 5%. 1000 milliLiter(s) (50 mL/Hr) IV Continuous <Continuous>  dextrose 50% Injectable 12.5 Gram(s) IV Push once  dextrose 50% Injectable 25 Gram(s) IV Push once  dextrose 50% Injectable 25 Gram(s) IV Push once  enoxaparin Injectable 40 milliGRAM(s) SubCutaneous daily  finasteride 5 milliGRAM(s) Oral daily  insulin glargine Injectable (LANTUS) 18 Unit(s) SubCutaneous at bedtime  insulin lispro (HumaLOG) corrective regimen sliding scale   SubCutaneous three times a day before meals  insulin lispro (HumaLOG) corrective regimen sliding scale   SubCutaneous at bedtime  lacosamide 150 milliGRAM(s) Oral two times a day  levETIRAcetam 1500 milliGRAM(s) Oral two times a day  lisinopril 20 milliGRAM(s) Oral daily  multivitamin 1 Tablet(s) Oral daily  nystatin Powder 1 Application(s) Topical two times a day  pantoprazole    Tablet 40 milliGRAM(s) Oral before breakfast  zinc sulfate 220 milliGRAM(s) Oral daily    MEDICATIONS  (PRN):  acetaminophen   Tablet .. 650 milliGRAM(s) Oral every 6 hours PRN Mild Pain (1 - 3)  dextrose 40% Gel 15 Gram(s) Oral once PRN Blood Glucose LESS THAN 70 milliGRAM(s)/deciliter  glucagon  Injectable 1 milliGRAM(s) IntraMuscular once PRN Glucose LESS THAN 70 milligrams/deciliter  senna 2 Tablet(s) Oral at bedtime PRN Constipation    CAPILLARY BLOOD GLUCOSE      POCT Blood Glucose.: 144 mg/dL (05 Apr 2019 06:40)  POCT Blood Glucose.: 169 mg/dL (04 Apr 2019 22:08)  POCT Blood Glucose.: 159 mg/dL (04 Apr 2019 18:07)  POCT Blood Glucose.: 235 mg/dL (04 Apr 2019 12:17)  POCT Blood Glucose.: 167 mg/dL (04 Apr 2019 08:58)    I&O's Summary        Vital Signs Last 24 Hrs  T(C): 36.4 (05 Apr 2019 05:28), Max: 36.7 (04 Apr 2019 13:20)  T(F): 97.6 (05 Apr 2019 05:28), Max: 98 (04 Apr 2019 13:20)  HR: 67 (05 Apr 2019 05:28) (67 - 76)  BP: 143/91 (05 Apr 2019 05:28) (129/92 - 143/91)  BP(mean): --  RR: 17 (05 Apr 2019 05:28) (17 - 18)  SpO2: 99% (05 Apr 2019 05:28) (98% - 99%)    PHYSICAL EXAM:  GENERAL: NAD, well-developed  HEAD:  Atraumatic, Normocephalic  EYES: EOMI, PERRLA, conjunctiva and sclera clear  NECK: Supple, No JVD  CHEST/LUNG: Clear to auscultation bilaterally; No wheeze  HEART: Regular rate and rhythm; No murmurs, rubs, or gallops  ABDOMEN: Soft, Nontender, Nondistended; Bowel sounds present  EXTREMITIES:  2+ Peripheral Pulses, No clubbing, cyanosis, or edema  PSYCH: AAOx3  NEUROLOGY: non-focal  SKIN: No rashes or lesions    LABS                        11.7   12.77 )-----------( 197      ( 05 Apr 2019 04:58 )             37.8                         11.0   15.03 )-----------( 241      ( 04 Apr 2019 05:40 )             34.2     04-05    140  |  101  |  20  ----------------------------<  174<H>  4.4   |  28  |  0.72  04-04    137  |  100  |  22  ----------------------------<  157<H>  4.0   |  26  |  0.87    Ca    9.1      05 Apr 2019 04:58  Ca    9.0      04 Apr 2019 05:40  Phos  3.0     04-05  Mg     1.8     04-05      PT/INR - ( 04 Apr 2019 05:40 )   PT: 12.2 SEC;   INR: 1.10          PTT - ( 04 Apr 2019 05:40 )  PTT:23.6 SEC          ( 29 Mar 2019 16:30 ) pH: 7.40  /  pCO2: 29    /  pO2: 356   / HCO3: x     / Base Excess: x     /  SaO2: >99

## 2019-04-05 NOTE — PROGRESS NOTE ADULT - PROBLEM SELECTOR PLAN 1
-Due to underlying grade 4 GBM  -Has had no seizures thus far in hospital this admission  -Continue Keppra  -Continue Vimpat  -Seizure precautions  -Regular neuro checks -Due to underlying grade 4 GBM  -Has had no seizures thus far in hospital this admission  -Continue Keppra  -Continue Vimpat  -Seizure precautions  -Regular neuro checks  -Neuro consulted, 24 hr EEG ordered, will f/u recs

## 2019-04-05 NOTE — CONSULT NOTE ADULT - SUBJECTIVE AND OBJECTIVE BOX
Consult requested by: Attending: Dr. HANSEL Roberson  Resident  Dr. Lucero Contact #:   [phone]  Consultant:. LILIANA Beyer  Medical Ethics           Contact #s: [phone] (office #)  Consult purpose:   To assist in the ethical dilemma caused by family conflict regarding surrogate designation for a 73 year old male patient deemed without capacity.  Clinical summary   This is a 74 year old male known to Division of Medical Ethics since Dec 21, 2018 at [facility]. Mr. 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 Gonzalez, was transferred to [facility] on Dec 6 for inpatient physical therapy and rehabilitation to improve mobility, gait, balance, and ADLSs.  On Dec 21, 2018  the patient was evaluated by psychiatry and felt to have impaired cognition and poor insight and judgment. Psychiatrist Dr. Roblero wrote that the patient lacks the capacity to make his decision about disposition for discharge. The patient has two sons whom case management and social work has contacted to assist in discharge planning. His son Demetrio lives locally with his wife and is requesting nursing aides. Demetrio as per case management notes lacks the financial resources to provide care. Catarino, whom lives in [place] desires to provide for his father. Ethics consult called to aid in the discord between the patient’s son and determine appropriate surrogate. Specifically, the team sought clarification on the roles of the children as surrogate decision makers because of their disagreement in who should care for their father.During the course of consultation Mr. Guru Duncan named his son Catarino as his health care proxy and on Dec 24, 2018 17:23  The patient was re-evaluated by Psychiatry, Dr. RICHARD Luo, who documented "patient is sufficiently capable of making an informed decision regarding his disposition.     This admission at [facility] is for radiation s/p recurrence of tumor and 2nd right craniotomy one month ago. There was post-op right frontal SAH and some SDH. He was on Keppra 1500 mg bid and Vimpat 100 mg bid for seizure prophylaxis and transferred to rehab center in [facility]). Patient observed at Rehab to have seizures, sent to [facility], observed and sent back to rehab center where he had recurrent seizures and transferred to [facility]. Since increasing dose of Vimpat to 150 mg bid, he has been seizure free since. He was seen last week by [facility] radiation oncologist, Dr Mil Grace (tel [phone]) and plan for starting RT was interrupted by his seizure recurrence. This admission his son Demetrio and daughter in law Kayley have again stated objection to Catarino as health care proxy and are disputing the validity of form signed at [facility]    Prognosis : poor				  Patient Decision-Making Capacity:   Lacks capacity for complex decision making including discharge planning due to cognitive impairment  Name of medical decision-maker: Determined by this consult to be Son: Catarino (son) [phone](Lives in [place])  Other Stakeholder(s): Son: Demetrio ([phone]), Daughter in law Norma; Daughter:  Patient Aware of:  Diagnosis:   Yes  Prognosis:    Yes  Surrogate Aware of:  Diagnosis:  Yes   Prognosis:   Yes  Resuscitation Status:  DNR:  No   DNI:   No  Evidence of Patient’s Preference of Life-Sustaining Treatment (Written or Oral):  none  Discussion Discussed with team the current issues  Contacted Catarino vis telephone   at 0930, Catarino affirms willingness to be surrogate. Requests a code word to discuss his father as he resides in [facility]. Code word "Buster" to be utilized. Catarino reaffirms that due to Kayley, he and his brother do not communicate.   Contacted Demetrio via telephone at 10:00 for sixty minutes. Demetrio shared his concerns regarding living with his father and providing attendance to physician appointments. Reiterated with Demetrio the prior consult in which his father named Catarino and asked was there a change in events since December 24, 2018. Advised Demetrio that despite discounting [facility] health proxy, documentation at [facility] affirms Catarino. Should Chirag believe Catarino was not acting in his father’s best interest, he could pursue in the Family Court. Demetrio reports being power of  and Kayley retrieved via phone. Kayley tearful and presented that she and Demetrio were able to secure Mr. Duncan Medicaid. She stated that at [facility], Mr. Duncan has rescinded discharge to [place] under Catarino’s care. She stated " Guru did not want to go to [place] because Catarino would kick him like a dog" Ethicist reiterated that Catarino was affirmed by Mr. Duncan as health care proxy from December 24th and process for guardianship.  At 13:30 received phone call from Kayley reporting that guardianship would take too long. She stated that the [facility] health care proxy form from March 19th should be removed as per Mr. Duncan’s  and that Mr. Duncan’s own finances would need to be utilized to obtain. Again reiterated that March 2019 form aside the last documentation demonstrated Catarino. Kayley reported possible forms in Dr. Gonzalez’s office and stated that the [facility] forms would not have been an issue if [place] had not gotten the health care proxy form  Reviewed medical records from [facility] visit in December to find record of meeting where Kayley reported Mr. Duncan renounced Catarino. Discharge process completed by patient with affirmation of Catarino. Patient stated he preferred Catarino.  Reviewed medical records from [facility]. Patient signed to allow his son Annette to receive information from medical records. Patient affirmed having a health care proxy. Proxy from forms affirmed to be Catarino  BIOETHICS ANALYSIS: The central ethical issue is the conflict between (A) provider Beneficence, on one hand, (B) patient Autonomy and (C) provider Non-Maleficence, on the other hand. Beneficence relates to the health care provider’s responsibility to promote the patient’s "best interest". The Bioethical "operational principle" of Autonomy (the patient’s own right to self-determination in matters related to the integrity of his body and self) must be balanced with the other principles. All patients, regardless of age, sex, nationality, race/ethnicity, or social class have autonomy, or the inherent right to decide what is done to him or her.   Essential to autonomy is determining if the patient has decisional capacity.  Assessing a patient's medical decision-making capacity is part of every medical encounter and by using a directed clinical interview, primary care physicians are able to perform these evaluations in most cases.1. Guru Duncan has MORAL STATUS, his distinct personhood, culture, values, beliefs, and background, which must be respected.  Mr. Duncan has personhood (moral status) and in regards to the character assessment of his sons; he was able to provide assent and consent regarding his disposition to their care in December 2018. Although Kayley and Demetrio dispute the health care proxy from March 2019, there is no dispute in regards to his prior discussion naming Catarino ashealth care proxy. In the absence of the patient’s decisional capacity, the patient’s health care proxy has the moral agency to respect this autonomy and interpret what Guru Duncan would have wanted by utilizing substituted judgement.       		  Conclusion:     Catarino is Mr. Duncan’s health care agent as this is the person he has consistently named since December 24, 2018.    The family dispute is not expected to resolve as Mr. Wilson deteriorates. Medical decisions should continue as per Mr. Wilson’s expressed substituted judgment when he had capacity. His capacity was affirmed December 24th and no further medical documentation exists naming Demetrio or his wife.     Ethics commends the team for their virtue in providing care and support for Guru Duncan and his family during this challenging time. Open and honest communication about prognosis is of utmost importance when supporting a patient’s family in this situation.  Ethics will be available to the patient, family and team throughout the patient’s stay in the hospital and will continue to assuage the surrogate’s caregiver burden, grief and psycho-spiritual suffering.     Thank you again for allowing us to participate on this challenging case. Please don’t hesitate to call us if there are any additional questions or concerns.    References:    Ez & Dima (1988). Assessing patients’ capacity to consent to treatment. NEJ, 319: 7776-4360.    DEIRDRE Garcia., ABELARDO Long., & JUANIS Hoff (2018). The Bedside Capacity Assessment Tool: Further Development of a Clinical Tool to Assist with a Growing Aging Population with Increased Healthcare Complexities. The Journal of clinical ethics, 29(1), 43-51.    Ethics will remain available to the patient, family and team along this trajectory of hospitalization   More than 50% of this consult involved in coordination of care

## 2019-04-05 NOTE — PROGRESS NOTE ADULT - PROBLEM SELECTOR PLAN 2
-Now s/p 2 resections within 4 months after recurrence about a month ago. Poor prognosis. Currently resides in rehab.  -Rad onc recs appreciated, will likely pursue radiation therapy outpatient since his seizures are now controlled on increased dose of antiepileptics   -C/w steroids for at least 7 more days per OSH neurosurgery recs. No surgical intervention at this point, appreciate recs. -Now s/p 2 resections within 4 months after recurrence about a month ago. Poor prognosis. Currently resides in rehab.  -Rad onc recs appreciated, will likely pursue radiation therapy inpatient since coordination with nursing home has not been successful. His seizures are now controlled on increased dose of antiepileptics   -C/w steroids for at least 7 more days per OSH neurosurgery recs. No surgical intervention at this point, appreciate recs. -Went for radiation planning today, will receive radiotherapy as inpatient   -Now s/p 2 resections within 4 months after recurrence about a month ago. Poor prognosis. Currently resides in rehab.  -Rad onc recs appreciated, will likely pursue radiation therapy inpatient since coordination with nursing home has not been successful. His seizures are now controlled on increased dose of antiepileptics   -C/w steroids for at least 7 more days per OSH neurosurgery recs. No surgical intervention at this point, appreciate recs.

## 2019-04-05 NOTE — CHART NOTE - NSCHARTNOTEFT_GEN_A_CORE
Neurology asked to review patient's medications. Patient was recently seen by Dr. Perales at [facility] for seizure management.   74y man with WHO Grade IV GBM, s/p two brain resections: the 1st on 11/28/18 ([facility]) and the 2nd resection was performed on 2/27/19 after recurrence.   Per chart, he was previously on Keppra 1500 mg bid and Vimpat 100 mg bid for seizure prophylaxis and transferred to Rehab. He had seizure at rehab center in [facility]) and sent to [facility], observed and sent back to rehab center where he had recurrent seizures and transferred to [facility]. His Vimpat was increased to 150 mg BID. He has been seizure free since. He was [facility] for [facility] treatment.    Current Meds:  Keppra 1500mg BID  Vimpat 150mg BID   Dexamethasone 2mg BID    Patient has been seizure free on current antiepileptic dosing. Should need arise, he has room to go up on Vimpat and/or steroids. Neurology asked to review patient's medications. Patient was recently seen by Dr. Perales at [facility] for seizure management.   74y man with WHO Grade IV GBM, s/p two brain resections: the 1st on 11/28/18 ([facility]) and the 2nd resection was performed on 2/27/19 after recurrence.   Per chart, he was previously on Keppra 1500 mg bid and Vimpat 100 mg bid for seizure prophylaxis and transferred to Rehab. He had seizure at rehab center in [facility]) and sent to [facility], observed and sent back to rehab center where he had recurrent seizures and transferred to [facility]. His Vimpat was increased to 150 mg BID. He has been seizure free since. He was transferred to [facility] for adjuvant radiation treatment.    Current Meds:  Keppra 1500mg BID  Vimpat 150mg BID   Dexamethasone 2mg BID    Patient has been seizure free on current antiseizure drug regimen. Should need arise, he has room to go up on Vimpat and/or steroids.

## 2019-04-05 NOTE — EEG REPORT - NS EEG TEXT BOX
[facility]   COMPREHENSIVE EPILEPSY CENTER   REPORT OF ROUTINE VIDEO EEG     [facility]: [address], [place] [address], Ph#: [phone]  [facility]:  [address]ve, [place] [address], Ph#: [phone]  Office: [address] [place] [address], [facility] [address], [place] [address] Ph#: [phone]    Patient Name: HELGA WHITE  Age and : 74y (41)  MRN #: [id number]  Location: [facility] 9S [address]  Referring Physician: Camacho Roberson    Study Date: 19    _____________________________________________________________  TECHNICAL INFORMATION    Placement and Labeling of Electrodes:  The EEG was performed utilizing 20 channels referential EEG connections (coronal over temporal over parasagittal montage) using all standard 10-20 electrode placements with EKG.  Recording was at a sampling rate of 256 samples per second per channel.  Time synchronized digital video recording was done simultaneously with EEG recording.  A low light infrared camera was used for low light recording.  Rahul and seizure detection algorithms were utilized.    _____________________________________________________________  HISTORY    Patient is a 74y old  Male who presents with a chief complaint of radiation (2019 15:08)  PERTINENT MEDICATION:  lacosamide 150 milliGRAM(s) Oral two times a day  levETIRAcetam 1500 milliGRAM(s) Oral two times a day  _____________________________________________________________  STUDY INTERPRETATION    Findings: The background was continuous, spontaneously variable and reactive. Background predominantly consisted of theta, delta and faster activities. No posterior dominant rhythm seen.    Focal Slowing:   Continuous right hemispheric slowing with theta/delta frequencies    Sleep Background:  Drowsiness was characterized by fragmentation, attenuation, and slowing of the background activity.  .  Stage II sleep transients were not recorded.    Other Non-Epileptiform Findings:  None were present.    Interictal Epileptiform Activity:   None were present.    Events:  Clinical events: None recorded.  Seizures: None recorded.    Activation Procedures:   Hyperventilation was not performed.    Photic stimulation was performed and did not elicit any abnormality.     Artifacts:  Intermittent myogenic and movement artifacts were noted.    ECG:  The heart rate on single channel ECG was predominantly between 70-90 BPM.    _____________________________________________________________  EEG SUMMARY/CLASSIFICATION     Abnormal EEG in altered patient  - Moderate generalized slowing more prominent over right hemisphere.   _____________________________________________________________  EEG IMPRESSION/CLINICAL CORRELATE    1. Structural abnormality in the right hemisphere.   2.  Moderate nonspecific diffuse or multifocal cerebral dysfunction.   3. No epileptiform pattern or seizure seen.    Ally Palomo MD  Clinical Neurophysiology [facility]   COMPREHENSIVE EPILEPSY CENTER   REPORT OF ROUTINE VIDEO EEG     [facility]: [address], [place] [address], Ph#: [phone]  [facility]:  [address]ve, [place] [address], Ph#: [phone]  Office: [address] [place] [address], [place] [address], [place] [address] Ph#: [phone]    Patient Name: HELGA WHITE  Age and : 74y (41)  MRN #: [id number]  Location: [facility] 9S 949 A  Referring Physician: Camacho Roberson    Study Date: 19    _____________________________________________________________  TECHNICAL INFORMATION    Placement and Labeling of Electrodes:  The EEG was performed utilizing 20 channels referential EEG connections (coronal over temporal over parasagittal montage) using all standard 10-20 electrode placements with EKG.  Recording was at a sampling rate of 256 samples per second per channel.  Time synchronized digital video recording was done simultaneously with EEG recording.  A low light infrared camera was used for low light recording.  Rahul and seizure detection algorithms were utilized.    _____________________________________________________________  HISTORY    Patient is a 74y old  Male who presents with a chief complaint of radiation (2019 15:08)  PERTINENT MEDICATION:  lacosamide 150 milliGRAM(s) Oral two times a day  levETIRAcetam 1500 milliGRAM(s) Oral two times a day  _____________________________________________________________  STUDY INTERPRETATION    Findings: The background was continuous, spontaneously variable and reactive. No posterior dominant rhythm seen.    Background Slowing:  Intermittent diffuse excess delta slowing.    Focal Slowing:   Continuous right hemispheric theta/delta slowing.    Sleep Background:  Drowsiness was characterized by fragmentation, attenuation, and slowing of the background activity.  .  Stage II sleep transients were not recorded.    Other Non-Epileptiform Findings:  None were present.    Interictal Epileptiform Activity:   None were present.    Events:  Clinical events: None recorded.  Seizures: None recorded.    Activation Procedures:   Hyperventilation was not performed.    Photic stimulation was performed and did not elicit any abnormality.     Artifacts:  Intermittent myogenic and movement artifacts were noted.    ECG:  The heart rate on single channel ECG was predominantly between 70-90 BPM.    _____________________________________________________________  EEG SUMMARY/CLASSIFICATION     Abnormal EEG in altered patient.  - Continuous right hemispheric theta/delta slowing.  - Mild to moderate generalized slowing.    _____________________________________________________________  EEG IMPRESSION/CLINICAL CORRELATE    1. Structural abnormality in the right hemisphere.   2.  Mild to moderate nonspecific diffuse or multifocal cerebral dysfunction.   3.  No epileptiform pattern or seizure seen.      Ally Palomo MD  Clinical Neurophysiology    Maeve Ibanez MD  Attending Physician, [facility] Epilepsy [place]

## 2019-04-06 LAB
ANION GAP SERPL CALC-SCNC: 11 MMO/L — SIGNIFICANT CHANGE UP (ref 7–14)
BUN SERPL-MCNC: 18 MG/DL — SIGNIFICANT CHANGE UP (ref 7–23)
CALCIUM SERPL-MCNC: 9.4 MG/DL — SIGNIFICANT CHANGE UP (ref 8.4–10.5)
CHLORIDE SERPL-SCNC: 101 MMOL/L — SIGNIFICANT CHANGE UP (ref 98–107)
CO2 SERPL-SCNC: 26 MMOL/L — SIGNIFICANT CHANGE UP (ref 22–31)
CREAT SERPL-MCNC: 0.68 MG/DL — SIGNIFICANT CHANGE UP (ref 0.5–1.3)
GLUCOSE SERPL-MCNC: 165 MG/DL — HIGH (ref 70–99)
HCT VFR BLD CALC: 35.9 % — LOW (ref 39–50)
HGB BLD-MCNC: 11.4 G/DL — LOW (ref 13–17)
MAGNESIUM SERPL-MCNC: 1.8 MG/DL — SIGNIFICANT CHANGE UP (ref 1.6–2.6)
MCHC RBC-ENTMCNC: 30.2 PG — SIGNIFICANT CHANGE UP (ref 27–34)
MCHC RBC-ENTMCNC: 31.8 % — LOW (ref 32–36)
MCV RBC AUTO: 95 FL — SIGNIFICANT CHANGE UP (ref 80–100)
NRBC # FLD: 0 K/UL — SIGNIFICANT CHANGE UP (ref 0–0)
PHOSPHATE SERPL-MCNC: 2.9 MG/DL — SIGNIFICANT CHANGE UP (ref 2.5–4.5)
PLATELET # BLD AUTO: 219 K/UL — SIGNIFICANT CHANGE UP (ref 150–400)
PMV BLD: 10.4 FL — SIGNIFICANT CHANGE UP (ref 7–13)
POTASSIUM SERPL-MCNC: 3.7 MMOL/L — SIGNIFICANT CHANGE UP (ref 3.5–5.3)
POTASSIUM SERPL-SCNC: 3.7 MMOL/L — SIGNIFICANT CHANGE UP (ref 3.5–5.3)
RBC # BLD: 3.78 M/UL — LOW (ref 4.2–5.8)
RBC # FLD: 15.8 % — HIGH (ref 10.3–14.5)
SODIUM SERPL-SCNC: 138 MMOL/L — SIGNIFICANT CHANGE UP (ref 135–145)
WBC # BLD: 11.75 K/UL — HIGH (ref 3.8–10.5)
WBC # FLD AUTO: 11.75 K/UL — HIGH (ref 3.8–10.5)

## 2019-04-06 PROCEDURE — 95951: CPT | Mod: 26

## 2019-04-06 PROCEDURE — 99232 SBSQ HOSP IP/OBS MODERATE 35: CPT | Mod: GC

## 2019-04-06 RX ADMIN — FINASTERIDE 5 MILLIGRAM(S): 5 TABLET, FILM COATED ORAL at 12:34

## 2019-04-06 RX ADMIN — ZINC SULFATE TAB 220 MG (50 MG ZINC EQUIVALENT) 220 MILLIGRAM(S): 220 (50 ZN) TAB at 12:34

## 2019-04-06 RX ADMIN — Medication 1 TABLET(S): at 12:34

## 2019-04-06 RX ADMIN — NYSTATIN CREAM 1 APPLICATION(S): 100000 CREAM TOPICAL at 06:51

## 2019-04-06 RX ADMIN — Medication 1: at 13:07

## 2019-04-06 RX ADMIN — Medication 2 MILLIGRAM(S): at 06:51

## 2019-04-06 RX ADMIN — LACOSAMIDE 150 MILLIGRAM(S): 50 TABLET ORAL at 17:04

## 2019-04-06 RX ADMIN — PANTOPRAZOLE SODIUM 40 MILLIGRAM(S): 20 TABLET, DELAYED RELEASE ORAL at 06:51

## 2019-04-06 RX ADMIN — ENOXAPARIN SODIUM 40 MILLIGRAM(S): 100 INJECTION SUBCUTANEOUS at 12:34

## 2019-04-06 RX ADMIN — Medication 500 MILLIGRAM(S): at 12:34

## 2019-04-06 RX ADMIN — Medication 2 MILLIGRAM(S): at 17:04

## 2019-04-06 RX ADMIN — INSULIN GLARGINE 18 UNIT(S): 100 INJECTION, SOLUTION SUBCUTANEOUS at 22:32

## 2019-04-06 RX ADMIN — NYSTATIN CREAM 1 APPLICATION(S): 100000 CREAM TOPICAL at 17:04

## 2019-04-06 RX ADMIN — LEVETIRACETAM 1500 MILLIGRAM(S): 250 TABLET, FILM COATED ORAL at 17:03

## 2019-04-06 RX ADMIN — LISINOPRIL 20 MILLIGRAM(S): 2.5 TABLET ORAL at 06:51

## 2019-04-06 RX ADMIN — LACOSAMIDE 150 MILLIGRAM(S): 50 TABLET ORAL at 06:51

## 2019-04-06 RX ADMIN — LEVETIRACETAM 1500 MILLIGRAM(S): 250 TABLET, FILM COATED ORAL at 06:51

## 2019-04-06 NOTE — PROGRESS NOTE ADULT - PROBLEM SELECTOR PLAN 2
- Went for radiation planning today, will receive radiotherapy as inpatient   - Now s/p 2 resections within 4 months after recurrence about a month ago. Poor prognosis. Currently resides in rehab.  -Rad onc recs appreciated, will likely pursue radiation therapy inpatient since coordination with nursing home has not been successful. His seizures are now controlled on increased dose of antiepileptics   - C/w steroids for at least 7 more days per OSH neurosurgery recs. No surgical intervention at this point, appreciate recs.

## 2019-04-06 NOTE — EEG REPORT - NS EEG TEXT BOX
[facility]   COMPREHENSIVE EPILEPSY CENTER   REPORT OF CONTINUOUS VIDEO EEG     [facility]: [address], [place] [address], Ph#: [phone]  [facility]: [address], [place] [address], Ph#: [phone]  Office: [address] [place] [address], [facility] [address], [place] [address] Ph#: [phone]    Patient Name: HELGA WHITE  Age and : 74y (41)  MRN #: [id number]  Location: [facility] 94 A  Referring Physician: Camacho Roberson    Study Date: 19-19    _____________________________________________________________  HISTORY    Patient is a 74y old  Male who presents with a chief complaint of radiation (2019 15:08)    PERTINENT MEDICATIONS  (STANDING):  lacosamide 150 milliGRAM(s) Oral two times a day  levETIRAcetam 1500 milliGRAM(s) Oral two times a day  _____________________________________________________________  STUDY INTERPRETATION    Findings: The background was continuous, spontaneously variable and reactive. No posterior dominant rhythm seen.    Background Slowing:  Intermittent diffuse excess delta slowing.    Focal Slowing:   Continuous right hemispheric theta/delta slowing.    Sleep Background:  Drowsiness was characterized by fragmentation, attenuation, and slowing of the background activity.  .  Stage II sleep transients characterized by rudimentry K complexes.    Other Non-Epileptiform Findings:  None were present.    Interictal Epileptiform Activity:   None were present.    Events:  Clinical events: None recorded.  Seizures: None recorded.    Activation Procedures:   Hyperventilation was not performed.    Photic stimulation was performed and did not elicit any abnormality.     Artifacts:  Intermittent myogenic and movement artifacts were noted.    ECG:  The heart rate on single channel ECG was predominantly between 70-90 BPM.    _____________________________________________________________  EEG SUMMARY/CLASSIFICATION     Abnormal EEG in altered patient.  - Continuous right hemispheric theta/delta slowing.  - Mild to moderate generalized slowing.  _____________________________________________________________  EEG IMPRESSION/CLINICAL CORRELATE    1. Structural abnormality in the right hemisphere.   2.  Mild to moderate nonspecific diffuse or multifocal cerebral dysfunction.   3.  No epileptiform pattern or seizure seen.    Preliminary Fellow Read:    Tatyana Rehman MD  Adult EEG Fellow, [facility] Epilepsy [place] [facility]   COMPREHENSIVE EPILEPSY CENTER   REPORT OF CONTINUOUS VIDEO EEG     [facility]: [address], [place] [address], Ph#: [phone]  [facility]: [address], [place] [address], Ph#: [phone]  Office: [address] [place] [address], [facility] [address], [place] [address] Ph#: [phone]    Patient Name: HELGA WHITE  Age and : 74y (41)  MRN #: [id number]  Location: [place] [address] [address]  Referring Physician: Camacho Roberson    Study Date: 19 to 19    _____________________________________________________________  HISTORY    Patient is a 74y old  Male who presents with a chief complaint of radiation (2019 15:08)    PERTINENT MEDICATIONS  (STANDING):  lacosamide 150 milliGRAM(s) Oral two times a day  levETIRAcetam 1500 milliGRAM(s) Oral two times a day  _____________________________________________________________  STUDY INTERPRETATION    Findings: The background was continuous, spontaneously variable and reactive. Rudimentary 7hz 25uV posterior dominant rhythm seen.    Background Slowing:  Intermittent diffuse excess delta slowing.    Focal Slowing:   Continuous right hemispheric theta/delta slowing.    Sleep Background:  Drowsiness was characterized by fragmentation, attenuation, and slowing of the background activity.  .  Stage II sleep transients characterized by rudimentry K complexes.    Other Non-Epileptiform Findings:  None were present.    Interictal Epileptiform Activity:   None were present.    Events:  Clinical events: None recorded.  Seizures: None recorded.    Activation Procedures:   Hyperventilation was not performed.    Photic stimulation was performed and did not elicit any abnormality.     Artifacts:  Prominent lingual artifacts noted.    ECG:  The heart rate on single channel ECG was predominantly between 70-90 BPM.    _____________________________________________________________  EEG SUMMARY/CLASSIFICATION     Abnormal EEG in altered patient.  - Continuous right hemispheric theta/delta slowing.  - Mild to moderate generalized slowing.  _____________________________________________________________  EEG IMPRESSION/CLINICAL CORRELATE    1. Structural abnormality in the right hemisphere.   2.  Mild to moderate nonspecific diffuse or multifocal cerebral dysfunction.   3.  No epileptiform pattern or seizure seen.    Tatyana Rehman MD  Adult EEG Fellow, [facility] Epilepsy [place]

## 2019-04-06 NOTE — PROGRESS NOTE ADULT - PROBLEM SELECTOR PLAN 1
- Due to underlying grade 4 GBM  - Has had no seizures thus far in hospital this admission  - Continue Keppra  - Continue Vimpat  - Seizure precautions  - Regular neuro checks  - Neuro consulted, 24 hr EEG ordered, no seizure activity

## 2019-04-06 NOTE — PROGRESS NOTE ADULT - SUBJECTIVE AND OBJECTIVE BOX
Patient is a 74y old  Male who presents with a chief complaint of radiation (05 Apr 2019 15:08)      SUBJECTIVE / OVERNIGHT EVENTS:    REVIEW OF SYSTEMS:  CONSTITUTIONAL: No fevers or chills  EYES/ENT: No visual changes; no vertigo or throat pain  NECK: No pain or stiffness  RESPIRATORY: No cough or wheezing  CARDIOVASCULAR: No chest pain or palpitations  GASTROINTESTINAL: No abdominal or epigastric pain. No nausea or vomiting  GENITOURINARY: No dysuria, frequency or hematuria  NEUROLOGICAL: No numbness or weakness  SKIN: No itching, burning, rashes or lesions  All other review of systems negative unless indicated above.     MEDICATIONS  (STANDING):  ascorbic acid 500 milliGRAM(s) Oral daily  dexamethasone     Tablet 2 milliGRAM(s) Oral two times a day  dextrose 5%. 1000 milliLiter(s) (50 mL/Hr) IV Continuous <Continuous>  dextrose 50% Injectable 12.5 Gram(s) IV Push once  dextrose 50% Injectable 25 Gram(s) IV Push once  dextrose 50% Injectable 25 Gram(s) IV Push once  enoxaparin Injectable 40 milliGRAM(s) SubCutaneous daily  finasteride 5 milliGRAM(s) Oral daily  insulin glargine Injectable (LANTUS) 18 Unit(s) SubCutaneous at bedtime  insulin lispro (HumaLOG) corrective regimen sliding scale   SubCutaneous three times a day before meals  insulin lispro (HumaLOG) corrective regimen sliding scale   SubCutaneous at bedtime  lacosamide 150 milliGRAM(s) Oral two times a day  levETIRAcetam 1500 milliGRAM(s) Oral two times a day  lisinopril 20 milliGRAM(s) Oral daily  multivitamin 1 Tablet(s) Oral daily  nystatin Powder 1 Application(s) Topical two times a day  pantoprazole    Tablet 40 milliGRAM(s) Oral before breakfast  zinc sulfate 220 milliGRAM(s) Oral daily    MEDICATIONS  (PRN):  acetaminophen   Tablet .. 650 milliGRAM(s) Oral every 6 hours PRN Mild Pain (1 - 3)  dextrose 40% Gel 15 Gram(s) Oral once PRN Blood Glucose LESS THAN 70 milliGRAM(s)/deciliter  glucagon  Injectable 1 milliGRAM(s) IntraMuscular once PRN Glucose LESS THAN 70 milligrams/deciliter  senna 2 Tablet(s) Oral at bedtime PRN Constipation      CAPILLARY BLOOD GLUCOSE      POCT Blood Glucose.: 170 mg/dL (06 Apr 2019 13:01)  POCT Blood Glucose.: 127 mg/dL (06 Apr 2019 09:03)  POCT Blood Glucose.: 222 mg/dL (05 Apr 2019 22:53)  POCT Blood Glucose.: 234 mg/dL (05 Apr 2019 18:28)    I&O's Summary      T(C): 37.2 (04-06-19 @ 15:13), Max: 37.2 (04-06-19 @ 15:13)  HR: 72 (04-06-19 @ 15:13) (70 - 95)  BP: 112/81 (04-06-19 @ 15:13) (108/78 - 154/105)  RR: 17 (04-06-19 @ 15:13) (17 - 18)  SpO2: 99% (04-06-19 @ 15:13) (97% - 99%)    PHYSICAL EXAM:  GENERAL: NAD, well-developed  HEAD:  Atraumatic, Normocephalic  EYES: EOMI, PERRLA, conjunctiva and sclera clear  NECK: Supple, No JVD  CHEST/LUNG: Clear to auscultation bilaterally; No wheeze  HEART: Regular rate and rhythm; No murmurs, rubs, or gallops  ABDOMEN: Soft, Nontender, Nondistended; Bowel sounds present  EXTREMITIES:  2+ Peripheral Pulses, No clubbing, cyanosis, or edema  PSYCH: AAOx3  NEUROLOGY: non-focal  SKIN: No rashes or lesions    LABS:                        11.4   11.75 )-----------( 219      ( 06 Apr 2019 05:30 )             35.9     04-06    138  |  101  |  18  ----------------------------<  165<H>  3.7   |  26  |  0.68    Ca    9.4      06 Apr 2019 05:30  Phos  2.9     04-06  Mg     1.8     04-06                RADIOLOGY & ADDITIONAL TESTS:    Imaging Personally Reviewed:    Consultant(s) Notes Reviewed:      Care Discussed with Consultants/Other Providers: Patient is a 74y old  Male who presents with a chief complaint of radiation (05 Apr 2019 15:08)      SUBJECTIVE / OVERNIGHT EVENTS:  Comfortable this AM. Appeared confused. Reported that he planned to go to [place] tomorrow. AxO2.     MEDICATIONS  (STANDING):  ascorbic acid 500 milliGRAM(s) Oral daily  dexamethasone     Tablet 2 milliGRAM(s) Oral two times a day  dextrose 5%. 1000 milliLiter(s) (50 mL/Hr) IV Continuous <Continuous>  dextrose 50% Injectable 12.5 Gram(s) IV Push once  dextrose 50% Injectable 25 Gram(s) IV Push once  dextrose 50% Injectable 25 Gram(s) IV Push once  enoxaparin Injectable 40 milliGRAM(s) SubCutaneous daily  finasteride 5 milliGRAM(s) Oral daily  insulin glargine Injectable (LANTUS) 18 Unit(s) SubCutaneous at bedtime  insulin lispro (HumaLOG) corrective regimen sliding scale   SubCutaneous three times a day before meals  insulin lispro (HumaLOG) corrective regimen sliding scale   SubCutaneous at bedtime  lacosamide 150 milliGRAM(s) Oral two times a day  levETIRAcetam 1500 milliGRAM(s) Oral two times a day  lisinopril 20 milliGRAM(s) Oral daily  multivitamin 1 Tablet(s) Oral daily  nystatin Powder 1 Application(s) Topical two times a day  pantoprazole    Tablet 40 milliGRAM(s) Oral before breakfast  zinc sulfate 220 milliGRAM(s) Oral daily    MEDICATIONS  (PRN):  acetaminophen   Tablet .. 650 milliGRAM(s) Oral every 6 hours PRN Mild Pain (1 - 3)  dextrose 40% Gel 15 Gram(s) Oral once PRN Blood Glucose LESS THAN 70 milliGRAM(s)/deciliter  glucagon  Injectable 1 milliGRAM(s) IntraMuscular once PRN Glucose LESS THAN 70 milligrams/deciliter  senna 2 Tablet(s) Oral at bedtime PRN Constipation      CAPILLARY BLOOD GLUCOSE      POCT Blood Glucose.: 170 mg/dL (06 Apr 2019 13:01)  POCT Blood Glucose.: 127 mg/dL (06 Apr 2019 09:03)  POCT Blood Glucose.: 222 mg/dL (05 Apr 2019 22:53)  POCT Blood Glucose.: 234 mg/dL (05 Apr 2019 18:28)    I&O's Summary      T(C): 37.2 (04-06-19 @ 15:13), Max: 37.2 (04-06-19 @ 15:13)  HR: 72 (04-06-19 @ 15:13) (70 - 95)  BP: 112/81 (04-06-19 @ 15:13) (108/78 - 154/105)  RR: 17 (04-06-19 @ 15:13) (17 - 18)  SpO2: 99% (04-06-19 @ 15:13) (97% - 99%)    PHYSICAL EXAM:  GENERAL: NAD, well-developed,   HEAD:  Atraumatic, Normocephalic, EEG wrapping still present on cap of head  CHEST/LUNG: Clear to auscultation bilaterally; No respiratory distress  HEART: Regular rate and rhythm;   ABDOMEN: Soft, Nontender, Nondistended;   PSYCH: AAOx2  SKIN: No rashes or lesions    LABS:                        11.4   11.75 )-----------( 219      ( 06 Apr 2019 05:30 )             35.9     04-06    138  |  101  |  18  ----------------------------<  165<H>  3.7   |  26  |  0.68    Ca    9.4      06 Apr 2019 05:30  Phos  2.9     04-06  Mg     1.8     04-06                RADIOLOGY & ADDITIONAL TESTS:    Imaging Personally Reviewed:    Consultant(s) Notes Reviewed:      Care Discussed with Consultants/Other Providers:

## 2019-04-07 PROCEDURE — 95951: CPT | Mod: 26,52

## 2019-04-07 PROCEDURE — 99233 SBSQ HOSP IP/OBS HIGH 50: CPT | Mod: GC

## 2019-04-07 RX ADMIN — Medication 1: at 13:01

## 2019-04-07 RX ADMIN — Medication 1 TABLET(S): at 12:07

## 2019-04-07 RX ADMIN — NYSTATIN CREAM 1 APPLICATION(S): 100000 CREAM TOPICAL at 17:09

## 2019-04-07 RX ADMIN — LISINOPRIL 20 MILLIGRAM(S): 2.5 TABLET ORAL at 05:57

## 2019-04-07 RX ADMIN — Medication 2 MILLIGRAM(S): at 17:08

## 2019-04-07 RX ADMIN — LACOSAMIDE 150 MILLIGRAM(S): 50 TABLET ORAL at 17:07

## 2019-04-07 RX ADMIN — PANTOPRAZOLE SODIUM 40 MILLIGRAM(S): 20 TABLET, DELAYED RELEASE ORAL at 07:56

## 2019-04-07 RX ADMIN — Medication 500 MILLIGRAM(S): at 12:07

## 2019-04-07 RX ADMIN — NYSTATIN CREAM 1 APPLICATION(S): 100000 CREAM TOPICAL at 05:58

## 2019-04-07 RX ADMIN — FINASTERIDE 5 MILLIGRAM(S): 5 TABLET, FILM COATED ORAL at 12:07

## 2019-04-07 RX ADMIN — LEVETIRACETAM 1500 MILLIGRAM(S): 250 TABLET, FILM COATED ORAL at 05:56

## 2019-04-07 RX ADMIN — INSULIN GLARGINE 18 UNIT(S): 100 INJECTION, SOLUTION SUBCUTANEOUS at 22:24

## 2019-04-07 RX ADMIN — ENOXAPARIN SODIUM 40 MILLIGRAM(S): 100 INJECTION SUBCUTANEOUS at 12:07

## 2019-04-07 RX ADMIN — ZINC SULFATE TAB 220 MG (50 MG ZINC EQUIVALENT) 220 MILLIGRAM(S): 220 (50 ZN) TAB at 12:07

## 2019-04-07 RX ADMIN — LACOSAMIDE 150 MILLIGRAM(S): 50 TABLET ORAL at 06:00

## 2019-04-07 RX ADMIN — Medication 2 MILLIGRAM(S): at 05:56

## 2019-04-07 RX ADMIN — LEVETIRACETAM 1500 MILLIGRAM(S): 250 TABLET, FILM COATED ORAL at 17:08

## 2019-04-07 NOTE — EEG REPORT - NS EEG TEXT BOX
[facility]   COMPREHENSIVE EPILEPSY CENTER   REPORT OF CONTINUOUS VIDEO EEG     [facility]: [address], [place] [address], Ph#: [phone]  [facility]: [address], [place] [address], Ph#: [phone]  Office: [address] [place] [address], [facility] [address], [place] [address] Ph#: [phone]    Patient Name: HELGA WHITE  Age and : 74y ([birth year])  MRN #: [id number]  Location: [facility] 94 A  Referring Physician: Camacho Roberson    Study Date: 19 ([id number])    _____________________________________________________________  HISTORY    Patient is a 74y old  Male who presents with a chief complaint of radiation (2019 15:08)    PERTINENT MEDICATIONS  (STANDING):  lacosamide 150 milliGRAM(s) Oral two times a day  levETIRAcetam 1500 milliGRAM(s) Oral two times a day  _____________________________________________________________  STUDY INTERPRETATION    Findings: The background was continuous, spontaneously variable and reactive. Rudimentary 7hz 25uV posterior dominant rhythm seen.    Background Slowing:  Intermittent diffuse excess delta slowing.    Focal Slowing:   Continuous right hemispheric theta/delta slowing.    Sleep Background:  Drowsiness was characterized by fragmentation, attenuation, and slowing of the background activity.  .  Stage II sleep transients characterized by rudimentry K complexes.    Other Non-Epileptiform Findings:  None were present.    Interictal Epileptiform Activity:   None were present.    Events:  Clinical events: None recorded.  Seizures: None recorded.    Activation Procedures:   Hyperventilation was not performed.    Photic stimulation was performed and did not elicit any abnormality.     Artifacts:  Prominent lingual artifacts noted.    ECG:  The heart rate on single channel ECG was predominantly between 70-90 BPM.    _____________________________________________________________  EEG SUMMARY/CLASSIFICATION     Abnormal EEG in altered patient.  - Continuous right hemispheric theta/delta slowing.  - Mild to moderate generalized slowing.  _____________________________________________________________  EEG IMPRESSION/CLINICAL CORRELATE    1. Structural abnormality in the right hemisphere.   2.  Mild to moderate nonspecific diffuse or multifocal cerebral dysfunction.   3.  No epileptiform pattern or seizure seen.      Tatyana Rehman MD  Adult EEG Fellow, [facility] Epilepsy [place] [facility]   COMPREHENSIVE EPILEPSY CENTER   REPORT OF CONTINUOUS VIDEO EEG     [facility]: [address], [place] [address], Ph#: [phone]  [facility]: [address], [place] [address], Ph#: [phone]  Office: [address] [place] [address], [facility] [address], [place] [address] Ph#: [phone]    Patient Name: HELGA WHITE  Age and : 74y (41)  MRN #: [id number]  Location: [facility] 94 A  Referring Physician: Camacho Roberson    Study Date: 19 ([id number])  _____________________________________________________________  HISTORY    Patient is a 74y old  Male who presents with a chief complaint of radiation (2019 15:08)    PERTINENT MEDICATIONS  (STANDING):  lacosamide 150 milliGRAM(s) Oral two times a day  levETIRAcetam 1500 milliGRAM(s) Oral two times a day  _____________________________________________________________  STUDY INTERPRETATION    Findings: The background was continuous, spontaneously variable and reactive. Rudimentary 7hz 25uV posterior dominant rhythm seen.    Background Slowing:  Intermittent diffuse excess delta slowing.    Focal Slowing:   Continuous right hemispheric theta/delta slowing.    Sleep Background:  Drowsiness was characterized by fragmentation, attenuation, and slowing of the background activity.  .  Stage II sleep transients characterized by rudimentry K complexes.    Other Non-Epileptiform Findings:  None were present.    Interictal Epileptiform Activity:   None were present.    Events:  Clinical events: None recorded.  Seizures: None recorded.    Activation Procedures:   Hyperventilation was not performed.    Photic stimulation was performed and did not elicit any abnormality.     Artifacts:  Prominent lingual artifacts noted.    ECG:  The heart rate on single channel ECG was predominantly between 70-90 BPM.    _____________________________________________________________  EEG SUMMARY/CLASSIFICATION     Abnormal EEG in altered patient.  - Continuous right hemispheric theta/delta slowing.  - Mild to moderate generalized slowing.  _____________________________________________________________  EEG IMPRESSION/CLINICAL CORRELATE    1. Structural abnormality in the right hemisphere.   2.  Mild to moderate nonspecific diffuse or multifocal cerebral dysfunction.   3.  No epileptiform pattern or seizure seen.      Tatyana Rehman MD  Adult EEG Fellow, [facility] Epilepsy [place]

## 2019-04-07 NOTE — PROGRESS NOTE ADULT - PROBLEM SELECTOR PLAN 1
- Due to underlying grade 4 GBM  - Has had no seizures thus far in hospital this admission  - Continue Keppra  - Continue Vimpat  - Seizure precautions  - Regular neuro checks  - Neuro consulted, 24 hr EEG ordered, no seizure activity reported

## 2019-04-07 NOTE — PROGRESS NOTE ADULT - PROBLEM SELECTOR PLAN 2
- Will be receiving radiation therapy as inpatient  - Now s/p 2 resections within 4 months after recurrence about a month ago. Poor prognosis. Currently resides in rehab.  -Rad onc recs appreciated, will likely pursue radiation therapy inpatient since coordination with nursing home has not been successful. His seizures are now controlled on increased dose of antiepileptics   - C/w steroids for at least 7 more days per OSH neurosurgery recs. No surgical intervention at this point, appreciate recs. - Will be receiving radiation therapy as inpatient  - Now s/p 2 resections within 4 months after recurrence about a month ago. Poor prognosis. Currently resides in rehab.  -Rad onc recs appreciated, will pursue radiation therapy inpatient since coordination with nursing home has not been successful. His seizures are now controlled on increased dose of antiepileptics   - C/w steroids for at least 7 more days (reassess on 7/10) per OSH neurosurgery recs. No surgical intervention at this point, appreciate recs.

## 2019-04-08 LAB
ANION GAP SERPL CALC-SCNC: 10 MMO/L — SIGNIFICANT CHANGE UP (ref 7–14)
BUN SERPL-MCNC: 17 MG/DL — SIGNIFICANT CHANGE UP (ref 7–23)
CALCIUM SERPL-MCNC: 9 MG/DL — SIGNIFICANT CHANGE UP (ref 8.4–10.5)
CHLORIDE SERPL-SCNC: 103 MMOL/L — SIGNIFICANT CHANGE UP (ref 98–107)
CO2 SERPL-SCNC: 26 MMOL/L — SIGNIFICANT CHANGE UP (ref 22–31)
CREAT SERPL-MCNC: 0.72 MG/DL — SIGNIFICANT CHANGE UP (ref 0.5–1.3)
GLUCOSE SERPL-MCNC: 132 MG/DL — HIGH (ref 70–99)
HCT VFR BLD CALC: 34.6 % — LOW (ref 39–50)
HGB BLD-MCNC: 11.2 G/DL — LOW (ref 13–17)
MAGNESIUM SERPL-MCNC: 1.7 MG/DL — SIGNIFICANT CHANGE UP (ref 1.6–2.6)
MCHC RBC-ENTMCNC: 30.6 PG — SIGNIFICANT CHANGE UP (ref 27–34)
MCHC RBC-ENTMCNC: 32.4 % — SIGNIFICANT CHANGE UP (ref 32–36)
MCV RBC AUTO: 94.5 FL — SIGNIFICANT CHANGE UP (ref 80–100)
NRBC # FLD: 0 K/UL — SIGNIFICANT CHANGE UP (ref 0–0)
PHOSPHATE SERPL-MCNC: 2.9 MG/DL — SIGNIFICANT CHANGE UP (ref 2.5–4.5)
PLATELET # BLD AUTO: 219 K/UL — SIGNIFICANT CHANGE UP (ref 150–400)
PMV BLD: 10.5 FL — SIGNIFICANT CHANGE UP (ref 7–13)
POTASSIUM SERPL-MCNC: 3.3 MMOL/L — LOW (ref 3.5–5.3)
POTASSIUM SERPL-SCNC: 3.3 MMOL/L — LOW (ref 3.5–5.3)
RBC # BLD: 3.66 M/UL — LOW (ref 4.2–5.8)
RBC # FLD: 16.4 % — HIGH (ref 10.3–14.5)
SODIUM SERPL-SCNC: 139 MMOL/L — SIGNIFICANT CHANGE UP (ref 135–145)
WBC # BLD: 10.4 K/UL — SIGNIFICANT CHANGE UP (ref 3.8–10.5)
WBC # FLD AUTO: 10.4 K/UL — SIGNIFICANT CHANGE UP (ref 3.8–10.5)

## 2019-04-08 PROCEDURE — 99233 SBSQ HOSP IP/OBS HIGH 50: CPT

## 2019-04-08 RX ORDER — POTASSIUM CHLORIDE 20 MEQ
40 PACKET (EA) ORAL ONCE
Qty: 0 | Refills: 0 | Status: COMPLETED | OUTPATIENT
Start: 2019-04-08 | End: 2019-04-08

## 2019-04-08 RX ADMIN — LISINOPRIL 20 MILLIGRAM(S): 2.5 TABLET ORAL at 05:27

## 2019-04-08 RX ADMIN — LEVETIRACETAM 1500 MILLIGRAM(S): 250 TABLET, FILM COATED ORAL at 17:32

## 2019-04-08 RX ADMIN — Medication 500 MILLIGRAM(S): at 13:28

## 2019-04-08 RX ADMIN — Medication 2 MILLIGRAM(S): at 05:26

## 2019-04-08 RX ADMIN — ZINC SULFATE TAB 220 MG (50 MG ZINC EQUIVALENT) 220 MILLIGRAM(S): 220 (50 ZN) TAB at 13:28

## 2019-04-08 RX ADMIN — LACOSAMIDE 150 MILLIGRAM(S): 50 TABLET ORAL at 17:39

## 2019-04-08 RX ADMIN — FINASTERIDE 5 MILLIGRAM(S): 5 TABLET, FILM COATED ORAL at 13:28

## 2019-04-08 RX ADMIN — NYSTATIN CREAM 1 APPLICATION(S): 100000 CREAM TOPICAL at 05:28

## 2019-04-08 RX ADMIN — Medication 40 MILLIEQUIVALENT(S): at 08:49

## 2019-04-08 RX ADMIN — PANTOPRAZOLE SODIUM 40 MILLIGRAM(S): 20 TABLET, DELAYED RELEASE ORAL at 06:17

## 2019-04-08 RX ADMIN — NYSTATIN CREAM 1 APPLICATION(S): 100000 CREAM TOPICAL at 17:31

## 2019-04-08 RX ADMIN — INSULIN GLARGINE 18 UNIT(S): 100 INJECTION, SOLUTION SUBCUTANEOUS at 22:23

## 2019-04-08 RX ADMIN — LEVETIRACETAM 1500 MILLIGRAM(S): 250 TABLET, FILM COATED ORAL at 05:27

## 2019-04-08 RX ADMIN — Medication 1: at 22:22

## 2019-04-08 RX ADMIN — LACOSAMIDE 150 MILLIGRAM(S): 50 TABLET ORAL at 05:32

## 2019-04-08 RX ADMIN — Medication 2 MILLIGRAM(S): at 17:32

## 2019-04-08 RX ADMIN — Medication 1 TABLET(S): at 13:28

## 2019-04-08 RX ADMIN — ENOXAPARIN SODIUM 40 MILLIGRAM(S): 100 INJECTION SUBCUTANEOUS at 13:27

## 2019-04-08 NOTE — PROGRESS NOTE ADULT - SUBJECTIVE AND OBJECTIVE BOX
Patient is a 74y old  Male who presents with a chief complaint of radiation (07 Apr 2019 07:36)      SUBJECTIVE/OVERNIGHT EVENTS     No acute events overnight. Resting in bed this AM.  Denying nausea, vomiting, constipation, diarrhea.    MEDICATIONS  (STANDING):  ascorbic acid 500 milliGRAM(s) Oral daily  dexamethasone     Tablet 2 milliGRAM(s) Oral two times a day  dextrose 5%. 1000 milliLiter(s) (50 mL/Hr) IV Continuous <Continuous>  dextrose 50% Injectable 12.5 Gram(s) IV Push once  dextrose 50% Injectable 25 Gram(s) IV Push once  dextrose 50% Injectable 25 Gram(s) IV Push once  enoxaparin Injectable 40 milliGRAM(s) SubCutaneous daily  finasteride 5 milliGRAM(s) Oral daily  insulin glargine Injectable (LANTUS) 18 Unit(s) SubCutaneous at bedtime  insulin lispro (HumaLOG) corrective regimen sliding scale   SubCutaneous three times a day before meals  insulin lispro (HumaLOG) corrective regimen sliding scale   SubCutaneous at bedtime  lacosamide 150 milliGRAM(s) Oral two times a day  levETIRAcetam 1500 milliGRAM(s) Oral two times a day  lisinopril 20 milliGRAM(s) Oral daily  multivitamin 1 Tablet(s) Oral daily  nystatin Powder 1 Application(s) Topical two times a day  pantoprazole    Tablet 40 milliGRAM(s) Oral before breakfast  zinc sulfate 220 milliGRAM(s) Oral daily    MEDICATIONS  (PRN):  acetaminophen   Tablet .. 650 milliGRAM(s) Oral every 6 hours PRN Mild Pain (1 - 3)  dextrose 40% Gel 15 Gram(s) Oral once PRN Blood Glucose LESS THAN 70 milliGRAM(s)/deciliter  glucagon  Injectable 1 milliGRAM(s) IntraMuscular once PRN Glucose LESS THAN 70 milligrams/deciliter  senna 2 Tablet(s) Oral at bedtime PRN Constipation    CAPILLARY BLOOD GLUCOSE      POCT Blood Glucose.: 230 mg/dL (07 Apr 2019 21:56)  POCT Blood Glucose.: 127 mg/dL (07 Apr 2019 17:57)  POCT Blood Glucose.: 160 mg/dL (07 Apr 2019 12:53)  POCT Blood Glucose.: 107 mg/dL (07 Apr 2019 08:34)    I&O's Summary        Vital Signs Last 24 Hrs  T(C): 36.7 (08 Apr 2019 05:25), Max: 37.2 (07 Apr 2019 21:54)  T(F): 98 (08 Apr 2019 05:25), Max: 99 (07 Apr 2019 21:54)  HR: 79 (08 Apr 2019 05:25) (75 - 81)  BP: 124/86 (08 Apr 2019 05:25) (100/71 - 124/86)  BP(mean): --  RR: 18 (08 Apr 2019 05:25) (17 - 18)  SpO2: 97% (08 Apr 2019 05:25) (97% - 100%)    PHYSICAL EXAM:  GENERAL: NAD, well-developed  HEAD:  Atraumatic, Normocephalic  EYES: EOMI, PERRLA, conjunctiva and sclera clear  NECK: Supple, No JVD  CHEST/LUNG: Clear to auscultation bilaterally; No wheeze  HEART: Regular rate and rhythm; No murmurs, rubs, or gallops  ABDOMEN: Soft, Nontender, Nondistended; Bowel sounds present  EXTREMITIES:  2+ Peripheral Pulses, No clubbing, cyanosis, or edema  PSYCH: AAOx2  NEUROLOGY: non-focal  SKIN: No rashes or lesions    LABS

## 2019-04-08 NOTE — PROGRESS NOTE ADULT - PROBLEM SELECTOR PLAN 2
- Will be receiving radiation therapy as inpatient  - Now s/p 2 resections within 4 months after recurrence about a month ago. Poor prognosis. Currently resides in rehab.  -Rad onc recs appreciated, will pursue radiation therapy inpatient since coordination with nursing home has not been successful. His seizures are now controlled on increased dose of antiepileptics   - C/w steroids for at least 7 more days (reassess on 7/10) per OSH neurosurgery recs. No surgical intervention at this point, appreciate recs.

## 2019-04-09 PROCEDURE — 99232 SBSQ HOSP IP/OBS MODERATE 35: CPT | Mod: GC

## 2019-04-09 RX ORDER — DEXAMETHASONE 0.5 MG/5ML
2 ELIXIR ORAL
Qty: 0 | Refills: 0 | Status: DISCONTINUED | OUTPATIENT
Start: 2019-04-09 | End: 2019-04-19

## 2019-04-09 RX ADMIN — PANTOPRAZOLE SODIUM 40 MILLIGRAM(S): 20 TABLET, DELAYED RELEASE ORAL at 06:18

## 2019-04-09 RX ADMIN — FINASTERIDE 5 MILLIGRAM(S): 5 TABLET, FILM COATED ORAL at 12:40

## 2019-04-09 RX ADMIN — ENOXAPARIN SODIUM 40 MILLIGRAM(S): 100 INJECTION SUBCUTANEOUS at 12:42

## 2019-04-09 RX ADMIN — LEVETIRACETAM 1500 MILLIGRAM(S): 250 TABLET, FILM COATED ORAL at 06:21

## 2019-04-09 RX ADMIN — Medication 1: at 18:33

## 2019-04-09 RX ADMIN — Medication 2 MILLIGRAM(S): at 06:37

## 2019-04-09 RX ADMIN — Medication 1 TABLET(S): at 12:40

## 2019-04-09 RX ADMIN — ZINC SULFATE TAB 220 MG (50 MG ZINC EQUIVALENT) 220 MILLIGRAM(S): 220 (50 ZN) TAB at 12:41

## 2019-04-09 RX ADMIN — LACOSAMIDE 150 MILLIGRAM(S): 50 TABLET ORAL at 06:18

## 2019-04-09 RX ADMIN — NYSTATIN CREAM 1 APPLICATION(S): 100000 CREAM TOPICAL at 18:33

## 2019-04-09 RX ADMIN — LISINOPRIL 20 MILLIGRAM(S): 2.5 TABLET ORAL at 06:19

## 2019-04-09 RX ADMIN — Medication 500 MILLIGRAM(S): at 12:42

## 2019-04-09 RX ADMIN — Medication 2 MILLIGRAM(S): at 18:32

## 2019-04-09 RX ADMIN — NYSTATIN CREAM 1 APPLICATION(S): 100000 CREAM TOPICAL at 06:21

## 2019-04-09 RX ADMIN — INSULIN GLARGINE 18 UNIT(S): 100 INJECTION, SOLUTION SUBCUTANEOUS at 22:05

## 2019-04-09 RX ADMIN — LACOSAMIDE 150 MILLIGRAM(S): 50 TABLET ORAL at 18:36

## 2019-04-09 RX ADMIN — LEVETIRACETAM 1500 MILLIGRAM(S): 250 TABLET, FILM COATED ORAL at 18:33

## 2019-04-09 NOTE — PROGRESS NOTE ADULT - SUBJECTIVE AND OBJECTIVE BOX
Patient is a 74y old  Male who presents with a chief complaint of radiation (08 Apr 2019 07:09)      SUBJECTIVE/OVERNIGHT EVENTS     No acute events overnight.  Denies nausea, vomiting, constipation, diarrhea, fevers, chills, chest pain, SOB.    MEDICATIONS  (STANDING):  ascorbic acid 500 milliGRAM(s) Oral daily  dexamethasone     Tablet 2 milliGRAM(s) Oral two times a day  dextrose 5%. 1000 milliLiter(s) (50 mL/Hr) IV Continuous <Continuous>  dextrose 50% Injectable 12.5 Gram(s) IV Push once  dextrose 50% Injectable 25 Gram(s) IV Push once  dextrose 50% Injectable 25 Gram(s) IV Push once  enoxaparin Injectable 40 milliGRAM(s) SubCutaneous daily  finasteride 5 milliGRAM(s) Oral daily  insulin glargine Injectable (LANTUS) 18 Unit(s) SubCutaneous at bedtime  insulin lispro (HumaLOG) corrective regimen sliding scale   SubCutaneous three times a day before meals  insulin lispro (HumaLOG) corrective regimen sliding scale   SubCutaneous at bedtime  lacosamide 150 milliGRAM(s) Oral two times a day  levETIRAcetam 1500 milliGRAM(s) Oral two times a day  lisinopril 20 milliGRAM(s) Oral daily  multivitamin 1 Tablet(s) Oral daily  nystatin Powder 1 Application(s) Topical two times a day  pantoprazole    Tablet 40 milliGRAM(s) Oral before breakfast  zinc sulfate 220 milliGRAM(s) Oral daily    MEDICATIONS  (PRN):  acetaminophen   Tablet .. 650 milliGRAM(s) Oral every 6 hours PRN Mild Pain (1 - 3)  dextrose 40% Gel 15 Gram(s) Oral once PRN Blood Glucose LESS THAN 70 milliGRAM(s)/deciliter  glucagon  Injectable 1 milliGRAM(s) IntraMuscular once PRN Glucose LESS THAN 70 milligrams/deciliter  senna 2 Tablet(s) Oral at bedtime PRN Constipation    CAPILLARY BLOOD GLUCOSE      POCT Blood Glucose.: 262 mg/dL (08 Apr 2019 22:06)  POCT Blood Glucose.: 113 mg/dL (08 Apr 2019 18:28)  POCT Blood Glucose.: 145 mg/dL (08 Apr 2019 12:44)  POCT Blood Glucose.: 102 mg/dL (08 Apr 2019 08:57)    I&O's Summary        Vital Signs Last 24 Hrs  T(C): 36.6 (08 Apr 2019 21:44), Max: 36.6 (08 Apr 2019 21:44)  T(F): 97.9 (08 Apr 2019 21:44), Max: 97.9 (08 Apr 2019 21:44)  HR: 74 (08 Apr 2019 21:44) (64 - 74)  BP: 116/84 (08 Apr 2019 21:44) (106/71 - 116/84)  BP(mean): --  RR: 17 (08 Apr 2019 21:44) (16 - 17)  SpO2: 97% (08 Apr 2019 21:44) (97% - 100%)    PHYSICAL EXAM:  GENERAL: NAD, well-developed  HEAD:  Atraumatic, Normocephalic  EYES: EOMI, PERRLA, conjunctiva and sclera clear  NECK: Supple, No JVD  CHEST/LUNG: Clear to auscultation bilaterally; No wheeze  HEART: Regular rate and rhythm; No murmurs, rubs, or gallops  ABDOMEN: Soft, Nontender, Nondistended; Bowel sounds present  EXTREMITIES:  2+ Peripheral Pulses, No clubbing, cyanosis, or edema  PSYCH: AAOx2  NEUROLOGY: non-focal  SKIN: No rashes or lesions    LABS                        11.2   10.40 )-----------( 219      ( 08 Apr 2019 06:48 )             34.6     04-08    139  |  103  |  17  ----------------------------<  132<H>  3.3<L>   |  26  |  0.72    Ca    9.0      08 Apr 2019 06:48  Phos  2.9     04-08  Mg     1.7     04-08                      RADIOLOGY & ADDITIONAL TESTS:    Imaging Personally Reviewed:    Consultant(s) Notes Reviewed:      Care Discussed with Consultants/Other Providers:

## 2019-04-10 LAB
ANION GAP SERPL CALC-SCNC: 12 MMO/L — SIGNIFICANT CHANGE UP (ref 7–14)
BUN SERPL-MCNC: 14 MG/DL — SIGNIFICANT CHANGE UP (ref 7–23)
CALCIUM SERPL-MCNC: 8.9 MG/DL — SIGNIFICANT CHANGE UP (ref 8.4–10.5)
CHLORIDE SERPL-SCNC: 104 MMOL/L — SIGNIFICANT CHANGE UP (ref 98–107)
CO2 SERPL-SCNC: 24 MMOL/L — SIGNIFICANT CHANGE UP (ref 22–31)
CREAT SERPL-MCNC: 0.63 MG/DL — SIGNIFICANT CHANGE UP (ref 0.5–1.3)
GLUCOSE SERPL-MCNC: 144 MG/DL — HIGH (ref 70–99)
HCT VFR BLD CALC: 36.3 % — LOW (ref 39–50)
HGB BLD-MCNC: 11.9 G/DL — LOW (ref 13–17)
MAGNESIUM SERPL-MCNC: 1.8 MG/DL — SIGNIFICANT CHANGE UP (ref 1.6–2.6)
MCHC RBC-ENTMCNC: 30.7 PG — SIGNIFICANT CHANGE UP (ref 27–34)
MCHC RBC-ENTMCNC: 32.8 % — SIGNIFICANT CHANGE UP (ref 32–36)
MCV RBC AUTO: 93.6 FL — SIGNIFICANT CHANGE UP (ref 80–100)
NRBC # FLD: 0 K/UL — SIGNIFICANT CHANGE UP (ref 0–0)
PHOSPHATE SERPL-MCNC: 3 MG/DL — SIGNIFICANT CHANGE UP (ref 2.5–4.5)
PLATELET # BLD AUTO: 213 K/UL — SIGNIFICANT CHANGE UP (ref 150–400)
PMV BLD: 10.7 FL — SIGNIFICANT CHANGE UP (ref 7–13)
POTASSIUM SERPL-MCNC: 3.9 MMOL/L — SIGNIFICANT CHANGE UP (ref 3.5–5.3)
POTASSIUM SERPL-SCNC: 3.9 MMOL/L — SIGNIFICANT CHANGE UP (ref 3.5–5.3)
RBC # BLD: 3.88 M/UL — LOW (ref 4.2–5.8)
RBC # FLD: 15.9 % — HIGH (ref 10.3–14.5)
SODIUM SERPL-SCNC: 140 MMOL/L — SIGNIFICANT CHANGE UP (ref 135–145)
WBC # BLD: 9.72 K/UL — SIGNIFICANT CHANGE UP (ref 3.8–10.5)
WBC # FLD AUTO: 9.72 K/UL — SIGNIFICANT CHANGE UP (ref 3.8–10.5)

## 2019-04-10 PROCEDURE — 99232 SBSQ HOSP IP/OBS MODERATE 35: CPT | Mod: GC

## 2019-04-10 RX ORDER — LACOSAMIDE 50 MG/1
150 TABLET ORAL
Qty: 0 | Refills: 0 | Status: DISCONTINUED | OUTPATIENT
Start: 2019-04-10 | End: 2019-04-16

## 2019-04-10 RX ADMIN — ZINC SULFATE TAB 220 MG (50 MG ZINC EQUIVALENT) 220 MILLIGRAM(S): 220 (50 ZN) TAB at 12:53

## 2019-04-10 RX ADMIN — Medication 2 MILLIGRAM(S): at 06:06

## 2019-04-10 RX ADMIN — LEVETIRACETAM 1500 MILLIGRAM(S): 250 TABLET, FILM COATED ORAL at 06:05

## 2019-04-10 RX ADMIN — LISINOPRIL 20 MILLIGRAM(S): 2.5 TABLET ORAL at 06:05

## 2019-04-10 RX ADMIN — NYSTATIN CREAM 1 APPLICATION(S): 100000 CREAM TOPICAL at 06:06

## 2019-04-10 RX ADMIN — Medication 1 TABLET(S): at 12:53

## 2019-04-10 RX ADMIN — ENOXAPARIN SODIUM 40 MILLIGRAM(S): 100 INJECTION SUBCUTANEOUS at 12:52

## 2019-04-10 RX ADMIN — Medication 2 MILLIGRAM(S): at 18:04

## 2019-04-10 RX ADMIN — LEVETIRACETAM 1500 MILLIGRAM(S): 250 TABLET, FILM COATED ORAL at 18:04

## 2019-04-10 RX ADMIN — FINASTERIDE 5 MILLIGRAM(S): 5 TABLET, FILM COATED ORAL at 12:53

## 2019-04-10 RX ADMIN — LACOSAMIDE 150 MILLIGRAM(S): 50 TABLET ORAL at 06:05

## 2019-04-10 RX ADMIN — PANTOPRAZOLE SODIUM 40 MILLIGRAM(S): 20 TABLET, DELAYED RELEASE ORAL at 06:06

## 2019-04-10 RX ADMIN — LACOSAMIDE 150 MILLIGRAM(S): 50 TABLET ORAL at 18:04

## 2019-04-10 RX ADMIN — Medication 500 MILLIGRAM(S): at 12:53

## 2019-04-10 RX ADMIN — INSULIN GLARGINE 18 UNIT(S): 100 INJECTION, SOLUTION SUBCUTANEOUS at 22:12

## 2019-04-10 NOTE — DIETITIAN INITIAL EVALUATION ADULT. - PERTINENT LABORATORY DATA
04-10 Na 140 mmol/L Glu 144 mg/dL<H> K+ 3.9 mmol/L Cr 0.63 mg/dL BUN 14 mg/dL Phos 3.0 mg/dL Alb n/a   PAB n/a   Hgb 11.9 g/dL<L> Hct 36.3 %<L> HgA1C n/a    Glucose, Serum: 144 mg/dL <H>

## 2019-04-10 NOTE — DIETITIAN INITIAL EVALUATION ADULT. - SOURCE
patient/other (specify)/Medical record reviewed. Patient provided limited information - noted to be confused. No family/caregiver at bedside.

## 2019-04-10 NOTE — PROGRESS NOTE ADULT - SUBJECTIVE AND OBJECTIVE BOX
Patient is a 74y old  Male who presents with a chief complaint of radiation (09 Apr 2019 07:58)      SUBJECTIVE/OVERNIGHT EVENTS     No acute events overnight. Pt sitting comfortably in bed this AM.  Denies nausea, vomiting, constipation, diarrhea, SOB.    MEDICATIONS  (STANDING):  ascorbic acid 500 milliGRAM(s) Oral daily  dexamethasone     Tablet 2 milliGRAM(s) Oral two times a day  dextrose 5%. 1000 milliLiter(s) (50 mL/Hr) IV Continuous <Continuous>  dextrose 50% Injectable 12.5 Gram(s) IV Push once  dextrose 50% Injectable 25 Gram(s) IV Push once  dextrose 50% Injectable 25 Gram(s) IV Push once  enoxaparin Injectable 40 milliGRAM(s) SubCutaneous daily  finasteride 5 milliGRAM(s) Oral daily  insulin glargine Injectable (LANTUS) 18 Unit(s) SubCutaneous at bedtime  insulin lispro (HumaLOG) corrective regimen sliding scale   SubCutaneous three times a day before meals  insulin lispro (HumaLOG) corrective regimen sliding scale   SubCutaneous at bedtime  lacosamide 150 milliGRAM(s) Oral two times a day  levETIRAcetam 1500 milliGRAM(s) Oral two times a day  lisinopril 20 milliGRAM(s) Oral daily  multivitamin 1 Tablet(s) Oral daily  nystatin Powder 1 Application(s) Topical two times a day  pantoprazole    Tablet 40 milliGRAM(s) Oral before breakfast  zinc sulfate 220 milliGRAM(s) Oral daily    MEDICATIONS  (PRN):  acetaminophen   Tablet .. 650 milliGRAM(s) Oral every 6 hours PRN Mild Pain (1 - 3)  dextrose 40% Gel 15 Gram(s) Oral once PRN Blood Glucose LESS THAN 70 milliGRAM(s)/deciliter  glucagon  Injectable 1 milliGRAM(s) IntraMuscular once PRN Glucose LESS THAN 70 milligrams/deciliter  senna 2 Tablet(s) Oral at bedtime PRN Constipation    CAPILLARY BLOOD GLUCOSE      POCT Blood Glucose.: 150 mg/dL (09 Apr 2019 22:00)  POCT Blood Glucose.: 181 mg/dL (09 Apr 2019 18:03)  POCT Blood Glucose.: 242 mg/dL (09 Apr 2019 13:21)  POCT Blood Glucose.: 121 mg/dL (09 Apr 2019 08:59)    I&O's Summary        Vital Signs Last 24 Hrs  T(C): 36.3 (10 Apr 2019 06:04), Max: 36.4 (09 Apr 2019 13:43)  T(F): 97.3 (10 Apr 2019 06:04), Max: 97.6 (09 Apr 2019 13:43)  HR: 62 (10 Apr 2019 06:04) (62 - 72)  BP: 117/87 (10 Apr 2019 06:04) (117/87 - 137/99)  BP(mean): --  RR: 17 (10 Apr 2019 06:04) (17 - 17)  SpO2: 100% (10 Apr 2019 06:04) (100% - 100%)    PHYSICAL EXAM:  GENERAL: NAD, well-developed  HEAD:  Atraumatic, Normocephalic  EYES: EOMI, PERRLA, conjunctiva and sclera clear  NECK: Supple, No JVD  CHEST/LUNG: Clear to auscultation bilaterally; No wheeze  HEART: Regular rate and rhythm; No murmurs, rubs, or gallops  ABDOMEN: Soft, Nontender, Nondistended; Bowel sounds present  EXTREMITIES:  2+ Peripheral Pulses, No clubbing, cyanosis, or edema  PSYCH: AAOx2  NEUROLOGY: non-focal  SKIN: No rashes or lesions    LABS                        11.9   9.72  )-----------( 213      ( 10 Apr 2019 05:50 )             36.3     04-10    140  |  104  |  14  ----------------------------<  144<H>  3.9   |  24  |  0.63    Ca    8.9      10 Apr 2019 05:50  Phos  3.0     04-10  Mg     1.8     04-10

## 2019-04-10 NOTE — DIETITIAN INITIAL EVALUATION ADULT. - NS AS NUTRI INTERV FEED ASSISTANCE
Feeding Assistance/Please Encourage po intake, assist with meals and menu selections, provide alternatives PRN.

## 2019-04-10 NOTE — DIETITIAN INITIAL EVALUATION ADULT. - NS AS NUTRI INTERV ED CONTENT
Diet education regarding Consistent Carbohydrate diet not appropriate given advanced age and mental status.

## 2019-04-10 NOTE — DIETITIAN INITIAL EVALUATION ADULT. - OTHER INFO
Initial Dietitian Evaluation 2/2 to extended length of stay. Per chart review patient with medical history of DM2, HTN, CVA (6 years ago), and Glioblastoma (poor prognosis) presenting for radiation therapy. Patient eating lunch at time of visit and was feeding himself. Completed 100% of meal. Tolerating mechanical soft diet with thin liquids at this time. Unable to fully assess PO intake, diet and weight history prior to admission as patient did not answer questions (preoccupied with eating lunch). No GI distress (nausea/vomiting/diarrhea/constipation) noted at this time.

## 2019-04-10 NOTE — DIETITIAN INITIAL EVALUATION ADULT. - ENERGY NEEDS
Height (cm): 175.26 (03 Apr 2019 22:02)  Weight (kg): 75.7 (03 Apr 2019 22:02)  BMI (kg/m2): 24.6 (03 Apr 2019 22:02)  IBW: 72.7kg +/-10%  No pressure ulcers/DTI noted per flowsheets. No edema noted.

## 2019-04-11 PROCEDURE — 77295 3-D RADIOTHERAPY PLAN: CPT | Mod: 26

## 2019-04-11 PROCEDURE — 77334 RADIATION TREATMENT AID(S): CPT | Mod: 26

## 2019-04-11 PROCEDURE — 99232 SBSQ HOSP IP/OBS MODERATE 35: CPT | Mod: GC

## 2019-04-11 PROCEDURE — 77300 RADIATION THERAPY DOSE PLAN: CPT | Mod: 26

## 2019-04-11 RX ADMIN — ENOXAPARIN SODIUM 40 MILLIGRAM(S): 100 INJECTION SUBCUTANEOUS at 11:34

## 2019-04-11 RX ADMIN — Medication 2 MILLIGRAM(S): at 05:55

## 2019-04-11 RX ADMIN — NYSTATIN CREAM 1 APPLICATION(S): 100000 CREAM TOPICAL at 05:55

## 2019-04-11 RX ADMIN — NYSTATIN CREAM 1 APPLICATION(S): 100000 CREAM TOPICAL at 17:41

## 2019-04-11 RX ADMIN — PANTOPRAZOLE SODIUM 40 MILLIGRAM(S): 20 TABLET, DELAYED RELEASE ORAL at 05:54

## 2019-04-11 RX ADMIN — LACOSAMIDE 150 MILLIGRAM(S): 50 TABLET ORAL at 17:41

## 2019-04-11 RX ADMIN — Medication 500 MILLIGRAM(S): at 11:35

## 2019-04-11 RX ADMIN — FINASTERIDE 5 MILLIGRAM(S): 5 TABLET, FILM COATED ORAL at 11:35

## 2019-04-11 RX ADMIN — ZINC SULFATE TAB 220 MG (50 MG ZINC EQUIVALENT) 220 MILLIGRAM(S): 220 (50 ZN) TAB at 11:35

## 2019-04-11 RX ADMIN — INSULIN GLARGINE 18 UNIT(S): 100 INJECTION, SOLUTION SUBCUTANEOUS at 22:26

## 2019-04-11 RX ADMIN — LISINOPRIL 20 MILLIGRAM(S): 2.5 TABLET ORAL at 05:54

## 2019-04-11 RX ADMIN — LEVETIRACETAM 1500 MILLIGRAM(S): 250 TABLET, FILM COATED ORAL at 05:54

## 2019-04-11 RX ADMIN — Medication 2 MILLIGRAM(S): at 17:41

## 2019-04-11 RX ADMIN — LEVETIRACETAM 1500 MILLIGRAM(S): 250 TABLET, FILM COATED ORAL at 17:41

## 2019-04-11 RX ADMIN — Medication 1 TABLET(S): at 11:34

## 2019-04-11 RX ADMIN — LACOSAMIDE 150 MILLIGRAM(S): 50 TABLET ORAL at 06:01

## 2019-04-11 RX ADMIN — Medication 2: at 12:55

## 2019-04-11 NOTE — PROGRESS NOTE ADULT - PROBLEM SELECTOR PLAN 2
- Will be receiving radiation therapy as inpatient. 15 fractions total.  - Now s/p 2 resections within 4 months after recurrence about a month ago. Poor prognosis. Currently resides in rehab.  -Rad onc recs appreciated, will pursue radiation therapy inpatient since coordination with nursing home has not been successful. His seizures are now controlled on increased dose of antiepileptics   - C/w steroids for at least 7 more days (reassess on 7/10) per OSH neurosurgery recs. No surgical intervention at this point, appreciate recs.

## 2019-04-11 NOTE — PROGRESS NOTE ADULT - PROBLEM SELECTOR PLAN 1
- Has had no seizures thus far in hospital this admission. Continuing to monitor throughout radiation therapy treatments.  - Due to underlying grade 4 GBM  - Continue Keppra  - Continue Vimpat  - Seizure precautions  - Regular neuro checks  - Neuro consulted, 24 hr EEG ordered, no seizure activity reported

## 2019-04-11 NOTE — PROGRESS NOTE ADULT - SUBJECTIVE AND OBJECTIVE BOX
Patient is a 74y old  Male who presents with a chief complaint of radiation (10 Apr 2019 08:05)      SUBJECTIVE/OVERNIGHT EVENTS     No acute events overnight. Pt is resting comfortably in bed this AM. A&Ox2.  Denies nausea, vomiting, constipation, diarrhea.    MEDICATIONS  (STANDING):  ascorbic acid 500 milliGRAM(s) Oral daily  dexamethasone     Tablet 2 milliGRAM(s) Oral two times a day  dextrose 5%. 1000 milliLiter(s) (50 mL/Hr) IV Continuous <Continuous>  dextrose 50% Injectable 12.5 Gram(s) IV Push once  dextrose 50% Injectable 25 Gram(s) IV Push once  dextrose 50% Injectable 25 Gram(s) IV Push once  enoxaparin Injectable 40 milliGRAM(s) SubCutaneous daily  finasteride 5 milliGRAM(s) Oral daily  insulin glargine Injectable (LANTUS) 18 Unit(s) SubCutaneous at bedtime  insulin lispro (HumaLOG) corrective regimen sliding scale   SubCutaneous three times a day before meals  insulin lispro (HumaLOG) corrective regimen sliding scale   SubCutaneous at bedtime  lacosamide 150 milliGRAM(s) Oral two times a day  levETIRAcetam 1500 milliGRAM(s) Oral two times a day  lisinopril 20 milliGRAM(s) Oral daily  multivitamin 1 Tablet(s) Oral daily  nystatin Powder 1 Application(s) Topical two times a day  pantoprazole    Tablet 40 milliGRAM(s) Oral before breakfast  zinc sulfate 220 milliGRAM(s) Oral daily    MEDICATIONS  (PRN):  acetaminophen   Tablet .. 650 milliGRAM(s) Oral every 6 hours PRN Mild Pain (1 - 3)  dextrose 40% Gel 15 Gram(s) Oral once PRN Blood Glucose LESS THAN 70 milliGRAM(s)/deciliter  glucagon  Injectable 1 milliGRAM(s) IntraMuscular once PRN Glucose LESS THAN 70 milligrams/deciliter  senna 2 Tablet(s) Oral at bedtime PRN Constipation    CAPILLARY BLOOD GLUCOSE      POCT Blood Glucose.: 181 mg/dL (10 Apr 2019 21:50)  POCT Blood Glucose.: 160 mg/dL (10 Apr 2019 18:06)  POCT Blood Glucose.: 139 mg/dL (10 Apr 2019 12:54)  POCT Blood Glucose.: 138 mg/dL (10 Apr 2019 09:04)    I&O's Summary        Vital Signs Last 24 Hrs  T(C): 36.9 (11 Apr 2019 05:49), Max: 36.9 (10 Apr 2019 21:03)  T(F): 98.4 (11 Apr 2019 05:49), Max: 98.4 (10 Apr 2019 21:03)  HR: 74 (11 Apr 2019 05:49) (64 - 74)  BP: 118/89 (11 Apr 2019 05:49) (110/78 - 118/89)  BP(mean): --  RR: 18 (11 Apr 2019 05:49) (17 - 18)  SpO2: 100% (11 Apr 2019 05:49) (99% - 100%)    PHYSICAL EXAM:  GENERAL: NAD, well-developed  HEAD:  Atraumatic, Normocephalic  EYES: EOMI, PERRLA, conjunctiva and sclera clear  NECK: Supple, No JVD  CHEST/LUNG: Clear to auscultation bilaterally; No wheeze  HEART: Regular rate and rhythm; No murmurs, rubs, or gallops  ABDOMEN: Soft, Nontender, Nondistended; Bowel sounds present  EXTREMITIES:  2+ Peripheral Pulses, No clubbing, cyanosis, or edema  PSYCH: AAOx2  NEUROLOGY: non-focal  SKIN: No rashes or lesions    LABS                        11.9   9.72  )-----------( 213      ( 10 Apr 2019 05:50 )             36.3     04-10    140  |  104  |  14  ----------------------------<  144<H>  3.9   |  24  |  0.63    Ca    8.9      10 Apr 2019 05:50  Phos  3.0     04-10  Mg     1.8     04-10

## 2019-04-12 LAB
ANION GAP SERPL CALC-SCNC: 10 MMO/L — SIGNIFICANT CHANGE UP (ref 7–14)
BUN SERPL-MCNC: 16 MG/DL — SIGNIFICANT CHANGE UP (ref 7–23)
CALCIUM SERPL-MCNC: 9.4 MG/DL — SIGNIFICANT CHANGE UP (ref 8.4–10.5)
CHLORIDE SERPL-SCNC: 99 MMOL/L — SIGNIFICANT CHANGE UP (ref 98–107)
CO2 SERPL-SCNC: 28 MMOL/L — SIGNIFICANT CHANGE UP (ref 22–31)
CREAT SERPL-MCNC: 0.79 MG/DL — SIGNIFICANT CHANGE UP (ref 0.5–1.3)
GLUCOSE SERPL-MCNC: 137 MG/DL — HIGH (ref 70–99)
HCT VFR BLD CALC: 39.6 % — SIGNIFICANT CHANGE UP (ref 39–50)
HGB BLD-MCNC: 12.6 G/DL — LOW (ref 13–17)
MAGNESIUM SERPL-MCNC: 1.8 MG/DL — SIGNIFICANT CHANGE UP (ref 1.6–2.6)
MCHC RBC-ENTMCNC: 30.3 PG — SIGNIFICANT CHANGE UP (ref 27–34)
MCHC RBC-ENTMCNC: 31.8 % — LOW (ref 32–36)
MCV RBC AUTO: 95.2 FL — SIGNIFICANT CHANGE UP (ref 80–100)
NRBC # FLD: 0 K/UL — SIGNIFICANT CHANGE UP (ref 0–0)
PHOSPHATE SERPL-MCNC: 2.7 MG/DL — SIGNIFICANT CHANGE UP (ref 2.5–4.5)
PLATELET # BLD AUTO: 214 K/UL — SIGNIFICANT CHANGE UP (ref 150–400)
PMV BLD: 10.5 FL — SIGNIFICANT CHANGE UP (ref 7–13)
POTASSIUM SERPL-MCNC: 4.2 MMOL/L — SIGNIFICANT CHANGE UP (ref 3.5–5.3)
POTASSIUM SERPL-SCNC: 4.2 MMOL/L — SIGNIFICANT CHANGE UP (ref 3.5–5.3)
RBC # BLD: 4.16 M/UL — LOW (ref 4.2–5.8)
RBC # FLD: 15.8 % — HIGH (ref 10.3–14.5)
SODIUM SERPL-SCNC: 137 MMOL/L — SIGNIFICANT CHANGE UP (ref 135–145)
WBC # BLD: 8.56 K/UL — SIGNIFICANT CHANGE UP (ref 3.8–10.5)
WBC # FLD AUTO: 8.56 K/UL — SIGNIFICANT CHANGE UP (ref 3.8–10.5)

## 2019-04-12 PROCEDURE — 99232 SBSQ HOSP IP/OBS MODERATE 35: CPT | Mod: GC

## 2019-04-12 RX ADMIN — LACOSAMIDE 150 MILLIGRAM(S): 50 TABLET ORAL at 17:28

## 2019-04-12 RX ADMIN — Medication 2 MILLIGRAM(S): at 17:25

## 2019-04-12 RX ADMIN — FINASTERIDE 5 MILLIGRAM(S): 5 TABLET, FILM COATED ORAL at 11:10

## 2019-04-12 RX ADMIN — INSULIN GLARGINE 18 UNIT(S): 100 INJECTION, SOLUTION SUBCUTANEOUS at 22:26

## 2019-04-12 RX ADMIN — Medication 1 TABLET(S): at 11:10

## 2019-04-12 RX ADMIN — LEVETIRACETAM 1500 MILLIGRAM(S): 250 TABLET, FILM COATED ORAL at 17:25

## 2019-04-12 RX ADMIN — LACOSAMIDE 150 MILLIGRAM(S): 50 TABLET ORAL at 06:18

## 2019-04-12 RX ADMIN — ENOXAPARIN SODIUM 40 MILLIGRAM(S): 100 INJECTION SUBCUTANEOUS at 11:10

## 2019-04-12 RX ADMIN — NYSTATIN CREAM 1 APPLICATION(S): 100000 CREAM TOPICAL at 06:20

## 2019-04-12 RX ADMIN — LEVETIRACETAM 1500 MILLIGRAM(S): 250 TABLET, FILM COATED ORAL at 06:19

## 2019-04-12 RX ADMIN — ZINC SULFATE TAB 220 MG (50 MG ZINC EQUIVALENT) 220 MILLIGRAM(S): 220 (50 ZN) TAB at 11:10

## 2019-04-12 RX ADMIN — Medication 500 MILLIGRAM(S): at 11:10

## 2019-04-12 RX ADMIN — Medication 2 MILLIGRAM(S): at 06:18

## 2019-04-12 RX ADMIN — LISINOPRIL 20 MILLIGRAM(S): 2.5 TABLET ORAL at 06:20

## 2019-04-12 RX ADMIN — PANTOPRAZOLE SODIUM 40 MILLIGRAM(S): 20 TABLET, DELAYED RELEASE ORAL at 06:18

## 2019-04-12 RX ADMIN — NYSTATIN CREAM 1 APPLICATION(S): 100000 CREAM TOPICAL at 17:25

## 2019-04-12 RX ADMIN — Medication 2: at 13:39

## 2019-04-12 NOTE — PROGRESS NOTE ADULT - SUBJECTIVE AND OBJECTIVE BOX
Patient is a 74y old  Male who presents with a chief complaint of radiation (11 Apr 2019 07:24)      SUBJECTIVE/OVERNIGHT EVENTS     MEDICATIONS  (STANDING):  ascorbic acid 500 milliGRAM(s) Oral daily  dexamethasone     Tablet 2 milliGRAM(s) Oral two times a day  dextrose 5%. 1000 milliLiter(s) (50 mL/Hr) IV Continuous <Continuous>  dextrose 50% Injectable 12.5 Gram(s) IV Push once  dextrose 50% Injectable 25 Gram(s) IV Push once  dextrose 50% Injectable 25 Gram(s) IV Push once  enoxaparin Injectable 40 milliGRAM(s) SubCutaneous daily  finasteride 5 milliGRAM(s) Oral daily  insulin glargine Injectable (LANTUS) 18 Unit(s) SubCutaneous at bedtime  insulin lispro (HumaLOG) corrective regimen sliding scale   SubCutaneous three times a day before meals  insulin lispro (HumaLOG) corrective regimen sliding scale   SubCutaneous at bedtime  lacosamide 150 milliGRAM(s) Oral two times a day  levETIRAcetam 1500 milliGRAM(s) Oral two times a day  lisinopril 20 milliGRAM(s) Oral daily  multivitamin 1 Tablet(s) Oral daily  nystatin Powder 1 Application(s) Topical two times a day  pantoprazole    Tablet 40 milliGRAM(s) Oral before breakfast  zinc sulfate 220 milliGRAM(s) Oral daily    MEDICATIONS  (PRN):  acetaminophen   Tablet .. 650 milliGRAM(s) Oral every 6 hours PRN Mild Pain (1 - 3)  dextrose 40% Gel 15 Gram(s) Oral once PRN Blood Glucose LESS THAN 70 milliGRAM(s)/deciliter  glucagon  Injectable 1 milliGRAM(s) IntraMuscular once PRN Glucose LESS THAN 70 milligrams/deciliter  senna 2 Tablet(s) Oral at bedtime PRN Constipation    CAPILLARY BLOOD GLUCOSE      POCT Blood Glucose.: 192 mg/dL (11 Apr 2019 22:15)  POCT Blood Glucose.: 132 mg/dL (11 Apr 2019 18:02)  POCT Blood Glucose.: 224 mg/dL (11 Apr 2019 12:42)  POCT Blood Glucose.: 126 mg/dL (11 Apr 2019 08:42)    I&O's Summary        Vital Signs Last 24 Hrs  T(C): 36.4 (12 Apr 2019 06:17), Max: 36.7 (11 Apr 2019 14:53)  T(F): 97.6 (12 Apr 2019 06:17), Max: 98 (11 Apr 2019 14:53)  HR: 69 (12 Apr 2019 06:17) (69 - 92)  BP: 148/79 (12 Apr 2019 06:17) (108/76 - 148/79)  BP(mean): --  RR: 18 (12 Apr 2019 06:17) (18 - 18)  SpO2: 100% (12 Apr 2019 06:17) (98% - 100%)    PHYSICAL EXAM:  GENERAL: NAD, well-developed  HEAD:  Atraumatic, Normocephalic  EYES: EOMI, PERRLA, conjunctiva and sclera clear  NECK: Supple, No JVD  CHEST/LUNG: Clear to auscultation bilaterally; No wheeze  HEART: Regular rate and rhythm; No murmurs, rubs, or gallops  ABDOMEN: Soft, Nontender, Nondistended; Bowel sounds present  EXTREMITIES:  2+ Peripheral Pulses, No clubbing, cyanosis, or edema  PSYCH: AAOx3  NEUROLOGY: non-focal  SKIN: No rashes or lesions    LABS                        12.6   8.56  )-----------( 214      ( 12 Apr 2019 05:54 )             39.6     04-12    137  |  99  |  16  ----------------------------<  137<H>  4.2   |  28  |  0.79    Ca    9.4      12 Apr 2019 05:54  Phos  2.7     04-12  Mg     1.8     04-12                      RADIOLOGY & ADDITIONAL TESTS:    Imaging Personally Reviewed:    Consultant(s) Notes Reviewed:      Care Discussed with Consultants/Other Providers: Patient is a 74y old  Male who presents with a chief complaint of radiation (11 Apr 2019 07:24)      SUBJECTIVE/OVERNIGHT EVENTS     No acute events overnight.  Denies nausea, vomiting, constipation, diarrhea, fevers, chills, chest pain, SOB.    MEDICATIONS  (STANDING):  ascorbic acid 500 milliGRAM(s) Oral daily  dexamethasone     Tablet 2 milliGRAM(s) Oral two times a day  dextrose 5%. 1000 milliLiter(s) (50 mL/Hr) IV Continuous <Continuous>  dextrose 50% Injectable 12.5 Gram(s) IV Push once  dextrose 50% Injectable 25 Gram(s) IV Push once  dextrose 50% Injectable 25 Gram(s) IV Push once  enoxaparin Injectable 40 milliGRAM(s) SubCutaneous daily  finasteride 5 milliGRAM(s) Oral daily  insulin glargine Injectable (LANTUS) 18 Unit(s) SubCutaneous at bedtime  insulin lispro (HumaLOG) corrective regimen sliding scale   SubCutaneous three times a day before meals  insulin lispro (HumaLOG) corrective regimen sliding scale   SubCutaneous at bedtime  lacosamide 150 milliGRAM(s) Oral two times a day  levETIRAcetam 1500 milliGRAM(s) Oral two times a day  lisinopril 20 milliGRAM(s) Oral daily  multivitamin 1 Tablet(s) Oral daily  nystatin Powder 1 Application(s) Topical two times a day  pantoprazole    Tablet 40 milliGRAM(s) Oral before breakfast  zinc sulfate 220 milliGRAM(s) Oral daily    MEDICATIONS  (PRN):  acetaminophen   Tablet .. 650 milliGRAM(s) Oral every 6 hours PRN Mild Pain (1 - 3)  dextrose 40% Gel 15 Gram(s) Oral once PRN Blood Glucose LESS THAN 70 milliGRAM(s)/deciliter  glucagon  Injectable 1 milliGRAM(s) IntraMuscular once PRN Glucose LESS THAN 70 milligrams/deciliter  senna 2 Tablet(s) Oral at bedtime PRN Constipation    CAPILLARY BLOOD GLUCOSE      POCT Blood Glucose.: 192 mg/dL (11 Apr 2019 22:15)  POCT Blood Glucose.: 132 mg/dL (11 Apr 2019 18:02)  POCT Blood Glucose.: 224 mg/dL (11 Apr 2019 12:42)  POCT Blood Glucose.: 126 mg/dL (11 Apr 2019 08:42)    I&O's Summary        Vital Signs Last 24 Hrs  T(C): 36.4 (12 Apr 2019 06:17), Max: 36.7 (11 Apr 2019 14:53)  T(F): 97.6 (12 Apr 2019 06:17), Max: 98 (11 Apr 2019 14:53)  HR: 69 (12 Apr 2019 06:17) (69 - 92)  BP: 148/79 (12 Apr 2019 06:17) (108/76 - 148/79)  BP(mean): --  RR: 18 (12 Apr 2019 06:17) (18 - 18)  SpO2: 100% (12 Apr 2019 06:17) (98% - 100%)    PHYSICAL EXAM:  GENERAL: NAD, well-developed  HEAD:  Atraumatic, Normocephalic  EYES: EOMI, PERRLA, conjunctiva and sclera clear  NECK: Supple, No JVD  CHEST/LUNG: Clear to auscultation bilaterally; No wheeze  HEART: Regular rate and rhythm; No murmurs, rubs, or gallops  ABDOMEN: Soft, Nontender, Nondistended; Bowel sounds present  EXTREMITIES:  2+ Peripheral Pulses, No clubbing, cyanosis, or edema  PSYCH: AAOx2  NEUROLOGY: non-focal  SKIN: No rashes or lesions    LABS                        12.6   8.56  )-----------( 214      ( 12 Apr 2019 05:54 )             39.6     04-12    137  |  99  |  16  ----------------------------<  137<H>  4.2   |  28  |  0.79    Ca    9.4      12 Apr 2019 05:54  Phos  2.7     04-12  Mg     1.8     04-12                      RADIOLOGY & ADDITIONAL TESTS:    Imaging Personally Reviewed:    Consultant(s) Notes Reviewed:      Care Discussed with Consultants/Other Providers:

## 2019-04-13 PROCEDURE — 99232 SBSQ HOSP IP/OBS MODERATE 35: CPT | Mod: GC

## 2019-04-13 RX ADMIN — Medication 1: at 13:24

## 2019-04-13 RX ADMIN — Medication 500 MILLIGRAM(S): at 12:10

## 2019-04-13 RX ADMIN — Medication 1 TABLET(S): at 12:10

## 2019-04-13 RX ADMIN — INSULIN GLARGINE 18 UNIT(S): 100 INJECTION, SOLUTION SUBCUTANEOUS at 22:27

## 2019-04-13 RX ADMIN — Medication 1: at 18:03

## 2019-04-13 RX ADMIN — FINASTERIDE 5 MILLIGRAM(S): 5 TABLET, FILM COATED ORAL at 12:10

## 2019-04-13 RX ADMIN — NYSTATIN CREAM 1 APPLICATION(S): 100000 CREAM TOPICAL at 06:59

## 2019-04-13 RX ADMIN — LACOSAMIDE 150 MILLIGRAM(S): 50 TABLET ORAL at 06:59

## 2019-04-13 RX ADMIN — LACOSAMIDE 150 MILLIGRAM(S): 50 TABLET ORAL at 17:01

## 2019-04-13 RX ADMIN — Medication 2 MILLIGRAM(S): at 17:02

## 2019-04-13 RX ADMIN — LEVETIRACETAM 1500 MILLIGRAM(S): 250 TABLET, FILM COATED ORAL at 17:02

## 2019-04-13 RX ADMIN — ENOXAPARIN SODIUM 40 MILLIGRAM(S): 100 INJECTION SUBCUTANEOUS at 12:10

## 2019-04-13 RX ADMIN — LEVETIRACETAM 1500 MILLIGRAM(S): 250 TABLET, FILM COATED ORAL at 06:59

## 2019-04-13 RX ADMIN — PANTOPRAZOLE SODIUM 40 MILLIGRAM(S): 20 TABLET, DELAYED RELEASE ORAL at 06:59

## 2019-04-13 RX ADMIN — ZINC SULFATE TAB 220 MG (50 MG ZINC EQUIVALENT) 220 MILLIGRAM(S): 220 (50 ZN) TAB at 12:10

## 2019-04-13 RX ADMIN — NYSTATIN CREAM 1 APPLICATION(S): 100000 CREAM TOPICAL at 17:02

## 2019-04-13 RX ADMIN — Medication 2 MILLIGRAM(S): at 06:59

## 2019-04-13 RX ADMIN — LISINOPRIL 20 MILLIGRAM(S): 2.5 TABLET ORAL at 06:59

## 2019-04-13 NOTE — PROGRESS NOTE ADULT - SUBJECTIVE AND OBJECTIVE BOX
Patient is a 74y old  Male who presents with a chief complaint of radiation (12 Apr 2019 07:34)      SUBJECTIVE/OVERNIGHT EVENTS     No acute events overnight. Pt is slightly confused this AM. Somewhat difficult to arouse.  Denies nausea, vomiting, constipation, diarrhea, fevers.    MEDICATIONS  (STANDING):  ascorbic acid 500 milliGRAM(s) Oral daily  dexamethasone     Tablet 2 milliGRAM(s) Oral two times a day  dextrose 5%. 1000 milliLiter(s) (50 mL/Hr) IV Continuous <Continuous>  dextrose 50% Injectable 12.5 Gram(s) IV Push once  dextrose 50% Injectable 25 Gram(s) IV Push once  dextrose 50% Injectable 25 Gram(s) IV Push once  enoxaparin Injectable 40 milliGRAM(s) SubCutaneous daily  finasteride 5 milliGRAM(s) Oral daily  insulin glargine Injectable (LANTUS) 18 Unit(s) SubCutaneous at bedtime  insulin lispro (HumaLOG) corrective regimen sliding scale   SubCutaneous three times a day before meals  insulin lispro (HumaLOG) corrective regimen sliding scale   SubCutaneous at bedtime  lacosamide 150 milliGRAM(s) Oral two times a day  levETIRAcetam 1500 milliGRAM(s) Oral two times a day  lisinopril 20 milliGRAM(s) Oral daily  multivitamin 1 Tablet(s) Oral daily  nystatin Powder 1 Application(s) Topical two times a day  pantoprazole    Tablet 40 milliGRAM(s) Oral before breakfast  zinc sulfate 220 milliGRAM(s) Oral daily    MEDICATIONS  (PRN):  acetaminophen   Tablet .. 650 milliGRAM(s) Oral every 6 hours PRN Mild Pain (1 - 3)  dextrose 40% Gel 15 Gram(s) Oral once PRN Blood Glucose LESS THAN 70 milliGRAM(s)/deciliter  glucagon  Injectable 1 milliGRAM(s) IntraMuscular once PRN Glucose LESS THAN 70 milligrams/deciliter  senna 2 Tablet(s) Oral at bedtime PRN Constipation    CAPILLARY BLOOD GLUCOSE      POCT Blood Glucose.: 198 mg/dL (12 Apr 2019 22:10)  POCT Blood Glucose.: 121 mg/dL (12 Apr 2019 18:09)  POCT Blood Glucose.: 234 mg/dL (12 Apr 2019 13:34)  POCT Blood Glucose.: 100 mg/dL (12 Apr 2019 09:12)    I&O's Summary    12 Apr 2019 07:01  -  13 Apr 2019 07:00  --------------------------------------------------------  IN: 0 mL / OUT: 50 mL / NET: -50 mL          Vital Signs Last 24 Hrs  T(C): 36.4 (13 Apr 2019 06:58), Max: 36.7 (12 Apr 2019 21:09)  T(F): 97.5 (13 Apr 2019 06:58), Max: 98.1 (12 Apr 2019 21:09)  HR: 67 (13 Apr 2019 06:58) (67 - 89)  BP: 124/86 (13 Apr 2019 06:58) (119/87 - 143/97)  BP(mean): --  RR: 18 (13 Apr 2019 06:58) (18 - 18)  SpO2: 98% (13 Apr 2019 06:58) (94% - 98%)    PHYSICAL EXAM:  GENERAL: NAD, well-developed  HEAD:  Atraumatic, Normocephalic  EYES: EOMI, PERRLA, conjunctiva and sclera clear  NECK: Supple, No JVD  CHEST/LUNG: Clear to auscultation bilaterally; No wheeze  HEART: Regular rate and rhythm; No murmurs, rubs, or gallops  ABDOMEN: Soft, Nontender, Nondistended; Bowel sounds present  EXTREMITIES:  2+ Peripheral Pulses, No clubbing, cyanosis, or edema  PSYCH: AAOx2  NEUROLOGY: non-focal  SKIN: No rashes or lesions    LABS                        12.6   8.56  )-----------( 214      ( 12 Apr 2019 05:54 )             39.6     04-12    137  |  99  |  16  ----------------------------<  137<H>  4.2   |  28  |  0.79    Ca    9.4      12 Apr 2019 05:54  Phos  2.7     04-12  Mg     1.8     04-12

## 2019-04-13 NOTE — PROGRESS NOTE ADULT - PROBLEM SELECTOR PLAN 1
- Has had no seizures thus far in hospital this admission. Continuing to monitor throughout radiation therapy treatments. Should be starting RT soon.  - Due to underlying grade 4 GBM  - Continue Keppra  - Continue Vimpat  - Seizure precautions  - Regular neuro checks  - Neuro consulted, 24 hr EEG ordered, no seizure activity reported

## 2019-04-14 LAB
ANION GAP SERPL CALC-SCNC: 12 MMO/L — SIGNIFICANT CHANGE UP (ref 7–14)
BUN SERPL-MCNC: 18 MG/DL — SIGNIFICANT CHANGE UP (ref 7–23)
CALCIUM SERPL-MCNC: 9.4 MG/DL — SIGNIFICANT CHANGE UP (ref 8.4–10.5)
CHLORIDE SERPL-SCNC: 101 MMOL/L — SIGNIFICANT CHANGE UP (ref 98–107)
CO2 SERPL-SCNC: 28 MMOL/L — SIGNIFICANT CHANGE UP (ref 22–31)
CREAT SERPL-MCNC: 0.76 MG/DL — SIGNIFICANT CHANGE UP (ref 0.5–1.3)
GLUCOSE SERPL-MCNC: 145 MG/DL — HIGH (ref 70–99)
HCT VFR BLD CALC: 41.9 % — SIGNIFICANT CHANGE UP (ref 39–50)
HGB BLD-MCNC: 13.1 G/DL — SIGNIFICANT CHANGE UP (ref 13–17)
MAGNESIUM SERPL-MCNC: 1.8 MG/DL — SIGNIFICANT CHANGE UP (ref 1.6–2.6)
MCHC RBC-ENTMCNC: 30.5 PG — SIGNIFICANT CHANGE UP (ref 27–34)
MCHC RBC-ENTMCNC: 31.3 % — LOW (ref 32–36)
MCV RBC AUTO: 97.4 FL — SIGNIFICANT CHANGE UP (ref 80–100)
NRBC # FLD: 0 K/UL — SIGNIFICANT CHANGE UP (ref 0–0)
PHOSPHATE SERPL-MCNC: 3.6 MG/DL — SIGNIFICANT CHANGE UP (ref 2.5–4.5)
PLATELET # BLD AUTO: 197 K/UL — SIGNIFICANT CHANGE UP (ref 150–400)
PMV BLD: 10 FL — SIGNIFICANT CHANGE UP (ref 7–13)
POTASSIUM SERPL-MCNC: 4.4 MMOL/L — SIGNIFICANT CHANGE UP (ref 3.5–5.3)
POTASSIUM SERPL-SCNC: 4.4 MMOL/L — SIGNIFICANT CHANGE UP (ref 3.5–5.3)
RBC # BLD: 4.3 M/UL — SIGNIFICANT CHANGE UP (ref 4.2–5.8)
RBC # FLD: 16 % — HIGH (ref 10.3–14.5)
SODIUM SERPL-SCNC: 141 MMOL/L — SIGNIFICANT CHANGE UP (ref 135–145)
WBC # BLD: 9.66 K/UL — SIGNIFICANT CHANGE UP (ref 3.8–10.5)
WBC # FLD AUTO: 9.66 K/UL — SIGNIFICANT CHANGE UP (ref 3.8–10.5)

## 2019-04-14 PROCEDURE — 99232 SBSQ HOSP IP/OBS MODERATE 35: CPT | Mod: GC

## 2019-04-14 RX ADMIN — FINASTERIDE 5 MILLIGRAM(S): 5 TABLET, FILM COATED ORAL at 11:23

## 2019-04-14 RX ADMIN — Medication 1: at 13:14

## 2019-04-14 RX ADMIN — LEVETIRACETAM 1500 MILLIGRAM(S): 250 TABLET, FILM COATED ORAL at 17:30

## 2019-04-14 RX ADMIN — NYSTATIN CREAM 1 APPLICATION(S): 100000 CREAM TOPICAL at 06:57

## 2019-04-14 RX ADMIN — NYSTATIN CREAM 1 APPLICATION(S): 100000 CREAM TOPICAL at 17:30

## 2019-04-14 RX ADMIN — PANTOPRAZOLE SODIUM 40 MILLIGRAM(S): 20 TABLET, DELAYED RELEASE ORAL at 06:57

## 2019-04-14 RX ADMIN — INSULIN GLARGINE 18 UNIT(S): 100 INJECTION, SOLUTION SUBCUTANEOUS at 23:05

## 2019-04-14 RX ADMIN — ZINC SULFATE TAB 220 MG (50 MG ZINC EQUIVALENT) 220 MILLIGRAM(S): 220 (50 ZN) TAB at 11:23

## 2019-04-14 RX ADMIN — LACOSAMIDE 150 MILLIGRAM(S): 50 TABLET ORAL at 17:30

## 2019-04-14 RX ADMIN — Medication 2 MILLIGRAM(S): at 06:57

## 2019-04-14 RX ADMIN — Medication 500 MILLIGRAM(S): at 11:23

## 2019-04-14 RX ADMIN — LISINOPRIL 20 MILLIGRAM(S): 2.5 TABLET ORAL at 06:57

## 2019-04-14 RX ADMIN — ENOXAPARIN SODIUM 40 MILLIGRAM(S): 100 INJECTION SUBCUTANEOUS at 11:23

## 2019-04-14 RX ADMIN — LACOSAMIDE 150 MILLIGRAM(S): 50 TABLET ORAL at 06:57

## 2019-04-14 RX ADMIN — Medication 2 MILLIGRAM(S): at 17:30

## 2019-04-14 RX ADMIN — LEVETIRACETAM 1500 MILLIGRAM(S): 250 TABLET, FILM COATED ORAL at 06:57

## 2019-04-14 RX ADMIN — Medication 1 TABLET(S): at 11:23

## 2019-04-14 NOTE — PROGRESS NOTE ADULT - SUBJECTIVE AND OBJECTIVE BOX
Fabian Laddt  [facility] [id number]    Patient is a 74y old  Male who presents with a chief complaint of radiation (13 Apr 2019 08:11)      SUBJECTIVE / OVERNIGHT EVENTS:    MEDICATIONS  (STANDING):  ascorbic acid 500 milliGRAM(s) Oral daily  dexamethasone     Tablet 2 milliGRAM(s) Oral two times a day  dextrose 5%. 1000 milliLiter(s) (50 mL/Hr) IV Continuous <Continuous>  dextrose 50% Injectable 12.5 Gram(s) IV Push once  dextrose 50% Injectable 25 Gram(s) IV Push once  dextrose 50% Injectable 25 Gram(s) IV Push once  enoxaparin Injectable 40 milliGRAM(s) SubCutaneous daily  finasteride 5 milliGRAM(s) Oral daily  insulin glargine Injectable (LANTUS) 18 Unit(s) SubCutaneous at bedtime  insulin lispro (HumaLOG) corrective regimen sliding scale   SubCutaneous three times a day before meals  insulin lispro (HumaLOG) corrective regimen sliding scale   SubCutaneous at bedtime  lacosamide 150 milliGRAM(s) Oral two times a day  levETIRAcetam 1500 milliGRAM(s) Oral two times a day  lisinopril 20 milliGRAM(s) Oral daily  multivitamin 1 Tablet(s) Oral daily  nystatin Powder 1 Application(s) Topical two times a day  pantoprazole    Tablet 40 milliGRAM(s) Oral before breakfast  zinc sulfate 220 milliGRAM(s) Oral daily    MEDICATIONS  (PRN):  acetaminophen   Tablet .. 650 milliGRAM(s) Oral every 6 hours PRN Mild Pain (1 - 3)  dextrose 40% Gel 15 Gram(s) Oral once PRN Blood Glucose LESS THAN 70 milliGRAM(s)/deciliter  glucagon  Injectable 1 milliGRAM(s) IntraMuscular once PRN Glucose LESS THAN 70 milligrams/deciliter  senna 2 Tablet(s) Oral at bedtime PRN Constipation      Vital Signs Last 24 Hrs  T(C): 36.6 (14 Apr 2019 06:52), Max: 36.9 (13 Apr 2019 15:03)  T(F): 97.8 (14 Apr 2019 06:52), Max: 98.4 (13 Apr 2019 15:03)  HR: 75 (14 Apr 2019 06:52) (75 - 89)  BP: 117/86 (14 Apr 2019 06:52) (101/74 - 117/86)  BP(mean): --  RR: 17 (14 Apr 2019 06:52) (17 - 18)  SpO2: 96% (14 Apr 2019 06:52) (96% - 100%)  CAPILLARY BLOOD GLUCOSE      POCT Blood Glucose.: 134 mg/dL (14 Apr 2019 08:53)  POCT Blood Glucose.: 157 mg/dL (13 Apr 2019 22:13)  POCT Blood Glucose.: 163 mg/dL (13 Apr 2019 17:50)  POCT Blood Glucose.: 180 mg/dL (13 Apr 2019 12:51)    I&O's Summary    13 Apr 2019 07:01  -  14 Apr 2019 07:00  --------------------------------------------------------  IN: 100 mL / OUT: 0 mL / NET: 100 mL        PHYSICAL EXAM:  GENERAL: NAD, well-developed  HEAD:  Atraumatic, Normocephalic  EYES: EOMI, PERRLA, conjunctiva and sclera clear  NECK: Supple, No JVD  CHEST/LUNG: Clear to auscultation bilaterally; No wheeze  HEART: Regular rate and rhythm; No murmurs, rubs, or gallops  ABDOMEN: Soft, Nontender, Nondistended; Bowel sounds present  EXTREMITIES:  2+ Peripheral Pulses, No clubbing, cyanosis, or edema  PSYCH: AAOx3  NEUROLOGY: non-focal  SKIN: No rashes or lesions    LABS:                        13.1   9.66  )-----------( 197      ( 14 Apr 2019 06:35 )             41.9     04-14    141  |  101  |  18  ----------------------------<  145<H>  4.4   |  28  |  0.76    Ca    9.4      14 Apr 2019 06:35  Phos  3.6     04-14  Mg     1.8     04-14 Fabian Almaraz  [facility] [address]    Patient is a 74y old  Male who presents with a chief complaint of radiation (13 Apr 2019 08:11)      SUBJECTIVE / OVERNIGHT EVENTS:  No acute events overnight.  Patient states that he is doing well, just not able to walk.   Otherwise denies fever, chills, chest pain, palpitations, SOB, abdominal pain, N/V, diarrhea, dysuria.     MEDICATIONS  (STANDING):  ascorbic acid 500 milliGRAM(s) Oral daily  dexamethasone     Tablet 2 milliGRAM(s) Oral two times a day  dextrose 5%. 1000 milliLiter(s) (50 mL/Hr) IV Continuous <Continuous>  dextrose 50% Injectable 12.5 Gram(s) IV Push once  dextrose 50% Injectable 25 Gram(s) IV Push once  dextrose 50% Injectable 25 Gram(s) IV Push once  enoxaparin Injectable 40 milliGRAM(s) SubCutaneous daily  finasteride 5 milliGRAM(s) Oral daily  insulin glargine Injectable (LANTUS) 18 Unit(s) SubCutaneous at bedtime  insulin lispro (HumaLOG) corrective regimen sliding scale   SubCutaneous three times a day before meals  insulin lispro (HumaLOG) corrective regimen sliding scale   SubCutaneous at bedtime  lacosamide 150 milliGRAM(s) Oral two times a day  levETIRAcetam 1500 milliGRAM(s) Oral two times a day  lisinopril 20 milliGRAM(s) Oral daily  multivitamin 1 Tablet(s) Oral daily  nystatin Powder 1 Application(s) Topical two times a day  pantoprazole    Tablet 40 milliGRAM(s) Oral before breakfast  zinc sulfate 220 milliGRAM(s) Oral daily    MEDICATIONS  (PRN):  acetaminophen   Tablet .. 650 milliGRAM(s) Oral every 6 hours PRN Mild Pain (1 - 3)  dextrose 40% Gel 15 Gram(s) Oral once PRN Blood Glucose LESS THAN 70 milliGRAM(s)/deciliter  glucagon  Injectable 1 milliGRAM(s) IntraMuscular once PRN Glucose LESS THAN 70 milligrams/deciliter  senna 2 Tablet(s) Oral at bedtime PRN Constipation      Vital Signs Last 24 Hrs  T(C): 36.6 (14 Apr 2019 06:52), Max: 36.9 (13 Apr 2019 15:03)  T(F): 97.8 (14 Apr 2019 06:52), Max: 98.4 (13 Apr 2019 15:03)  HR: 75 (14 Apr 2019 06:52) (75 - 89)  BP: 117/86 (14 Apr 2019 06:52) (101/74 - 117/86)  BP(mean): --  RR: 17 (14 Apr 2019 06:52) (17 - 18)  SpO2: 96% (14 Apr 2019 06:52) (96% - 100%)  CAPILLARY BLOOD GLUCOSE      POCT Blood Glucose.: 134 mg/dL (14 Apr 2019 08:53)  POCT Blood Glucose.: 157 mg/dL (13 Apr 2019 22:13)  POCT Blood Glucose.: 163 mg/dL (13 Apr 2019 17:50)  POCT Blood Glucose.: 180 mg/dL (13 Apr 2019 12:51)    I&O's Summary    13 Apr 2019 07:01  -  14 Apr 2019 07:00  --------------------------------------------------------  IN: 100 mL / OUT: 0 mL / NET: 100 mL        PHYSICAL EXAM:  GENERAL: NAD, well-developed  HEAD:  Atraumatic, Normocephalic  EYES: EOMI, PERRLA, conjunctiva and sclera clear  NECK: Supple, No JVD  CHEST/LUNG: Clear to auscultation bilaterally; No wheeze  HEART: Regular rate and rhythm; No murmurs, rubs, or gallops  ABDOMEN: Soft, Nontender, Nondistended; Bowel sounds present  EXTREMITIES:  2+ Peripheral Pulses, No clubbing, cyanosis, or edema  PSYCH: AAOx2 (knew year not month)  NEUROLOGY: 4/5 strength LE bilaterally  SKIN: No rashes or lesions    LABS:                        13.1   9.66  )-----------( 197      ( 14 Apr 2019 06:35 )             41.9     04-14    141  |  101  |  18  ----------------------------<  145<H>  4.4   |  28  |  0.76    Ca    9.4      14 Apr 2019 06:35  Phos  3.6     04-14  Mg     1.8     04-14

## 2019-04-14 NOTE — PROGRESS NOTE ADULT - PROBLEM SELECTOR PLAN 1
- Will be receiving radiation therapy as inpatient. 15 fractions total. Potentially starting 4/15  - Now s/p 2 resections within 4 months after recurrence about a month ago. Poor prognosis. Currently resides in rehab.  -Rad onc recs appreciated, will pursue radiation therapy inpatient since coordination with nursing home has not been successful. His seizures are now controlled on increased dose of antiepileptics   - C/w steroids for at least 7 more days (reassess on 7/10) per OSH neurosurgery recs. No surgical intervention at this point, appreciate recs.

## 2019-04-15 PROCEDURE — 99232 SBSQ HOSP IP/OBS MODERATE 35: CPT | Mod: GC

## 2019-04-15 PROCEDURE — 77427 RADIATION TX MANAGEMENT X5: CPT

## 2019-04-15 PROCEDURE — 77280 THER RAD SIMULAJ FIELD SMPL: CPT | Mod: 26

## 2019-04-15 RX ADMIN — LEVETIRACETAM 1500 MILLIGRAM(S): 250 TABLET, FILM COATED ORAL at 17:35

## 2019-04-15 RX ADMIN — Medication 2 MILLIGRAM(S): at 05:13

## 2019-04-15 RX ADMIN — Medication 1: at 18:11

## 2019-04-15 RX ADMIN — Medication 1: at 12:55

## 2019-04-15 RX ADMIN — ZINC SULFATE TAB 220 MG (50 MG ZINC EQUIVALENT) 220 MILLIGRAM(S): 220 (50 ZN) TAB at 12:20

## 2019-04-15 RX ADMIN — Medication 2 MILLIGRAM(S): at 17:35

## 2019-04-15 RX ADMIN — Medication 1 TABLET(S): at 12:21

## 2019-04-15 RX ADMIN — LEVETIRACETAM 1500 MILLIGRAM(S): 250 TABLET, FILM COATED ORAL at 05:13

## 2019-04-15 RX ADMIN — LACOSAMIDE 150 MILLIGRAM(S): 50 TABLET ORAL at 17:35

## 2019-04-15 RX ADMIN — INSULIN GLARGINE 18 UNIT(S): 100 INJECTION, SOLUTION SUBCUTANEOUS at 22:15

## 2019-04-15 RX ADMIN — Medication 500 MILLIGRAM(S): at 12:20

## 2019-04-15 RX ADMIN — PANTOPRAZOLE SODIUM 40 MILLIGRAM(S): 20 TABLET, DELAYED RELEASE ORAL at 05:13

## 2019-04-15 RX ADMIN — LISINOPRIL 20 MILLIGRAM(S): 2.5 TABLET ORAL at 05:13

## 2019-04-15 RX ADMIN — NYSTATIN CREAM 1 APPLICATION(S): 100000 CREAM TOPICAL at 17:35

## 2019-04-15 RX ADMIN — NYSTATIN CREAM 1 APPLICATION(S): 100000 CREAM TOPICAL at 05:12

## 2019-04-15 RX ADMIN — FINASTERIDE 5 MILLIGRAM(S): 5 TABLET, FILM COATED ORAL at 12:21

## 2019-04-15 RX ADMIN — ENOXAPARIN SODIUM 40 MILLIGRAM(S): 100 INJECTION SUBCUTANEOUS at 12:20

## 2019-04-15 RX ADMIN — LACOSAMIDE 150 MILLIGRAM(S): 50 TABLET ORAL at 05:13

## 2019-04-15 NOTE — PROGRESS NOTE ADULT - SUBJECTIVE AND OBJECTIVE BOX
Patient is a 74y old  Male who presents with a chief complaint of radiation (14 Apr 2019 11:30)      SUBJECTIVE/OVERNIGHT EVENTS     No acute events overnight. Pt answering questions appropriately this AM.   Denying nausea, vomiting, constipation, diarrhea. Endorsing that he is hungry majority of the day.    MEDICATIONS  (STANDING):  ascorbic acid 500 milliGRAM(s) Oral daily  dexamethasone     Tablet 2 milliGRAM(s) Oral two times a day  dextrose 5%. 1000 milliLiter(s) (50 mL/Hr) IV Continuous <Continuous>  dextrose 50% Injectable 12.5 Gram(s) IV Push once  dextrose 50% Injectable 25 Gram(s) IV Push once  dextrose 50% Injectable 25 Gram(s) IV Push once  enoxaparin Injectable 40 milliGRAM(s) SubCutaneous daily  finasteride 5 milliGRAM(s) Oral daily  insulin glargine Injectable (LANTUS) 18 Unit(s) SubCutaneous at bedtime  insulin lispro (HumaLOG) corrective regimen sliding scale   SubCutaneous three times a day before meals  insulin lispro (HumaLOG) corrective regimen sliding scale   SubCutaneous at bedtime  lacosamide 150 milliGRAM(s) Oral two times a day  levETIRAcetam 1500 milliGRAM(s) Oral two times a day  lisinopril 20 milliGRAM(s) Oral daily  multivitamin 1 Tablet(s) Oral daily  nystatin Powder 1 Application(s) Topical two times a day  pantoprazole    Tablet 40 milliGRAM(s) Oral before breakfast  zinc sulfate 220 milliGRAM(s) Oral daily    MEDICATIONS  (PRN):  acetaminophen   Tablet .. 650 milliGRAM(s) Oral every 6 hours PRN Mild Pain (1 - 3)  dextrose 40% Gel 15 Gram(s) Oral once PRN Blood Glucose LESS THAN 70 milliGRAM(s)/deciliter  glucagon  Injectable 1 milliGRAM(s) IntraMuscular once PRN Glucose LESS THAN 70 milligrams/deciliter  senna 2 Tablet(s) Oral at bedtime PRN Constipation    CAPILLARY BLOOD GLUCOSE      POCT Blood Glucose.: 136 mg/dL (14 Apr 2019 22:35)  POCT Blood Glucose.: 147 mg/dL (14 Apr 2019 18:12)  POCT Blood Glucose.: 186 mg/dL (14 Apr 2019 13:09)  POCT Blood Glucose.: 134 mg/dL (14 Apr 2019 08:53)    I&O's Summary        Vital Signs Last 24 Hrs  T(C): 36.8 (15 Apr 2019 05:11), Max: 36.8 (15 Apr 2019 05:11)  T(F): 98.2 (15 Apr 2019 05:11), Max: 98.2 (15 Apr 2019 05:11)  HR: 67 (15 Apr 2019 05:11) (67 - 88)  BP: 120/89 (15 Apr 2019 05:11) (97/73 - 120/89)  BP(mean): --  RR: 18 (15 Apr 2019 05:11) (17 - 18)  SpO2: 100% (15 Apr 2019 05:11) (96% - 100%)    PHYSICAL EXAM:  GENERAL: NAD, well-developed  HEAD:  Atraumatic, Normocephalic  EYES: EOMI, PERRLA, conjunctiva and sclera clear  NECK: Supple, No JVD  CHEST/LUNG: Clear to auscultation bilaterally; No wheeze  HEART: Regular rate and rhythm; No murmurs, rubs, or gallops  ABDOMEN: Soft, Nontender, Nondistended; Bowel sounds present  EXTREMITIES:  2+ Peripheral Pulses, No clubbing, cyanosis, or edema  PSYCH: AAOx2  NEUROLOGY: non-focal, 4/5 strength UE and LE b/l  SKIN: No rashes or lesions    LABS                        13.1   9.66  )-----------( 197      ( 14 Apr 2019 06:35 )             41.9     04-14    141  |  101  |  18  ----------------------------<  145<H>  4.4   |  28  |  0.76    Ca    9.4      14 Apr 2019 06:35  Phos  3.6     04-14  Mg     1.8     04-14

## 2019-04-16 PROCEDURE — 99232 SBSQ HOSP IP/OBS MODERATE 35: CPT | Mod: GC

## 2019-04-16 RX ORDER — LACOSAMIDE 50 MG/1
150 TABLET ORAL
Qty: 0 | Refills: 0 | Status: DISCONTINUED | OUTPATIENT
Start: 2019-04-16 | End: 2019-04-19

## 2019-04-16 RX ADMIN — NYSTATIN CREAM 1 APPLICATION(S): 100000 CREAM TOPICAL at 18:15

## 2019-04-16 RX ADMIN — Medication 1 TABLET(S): at 12:03

## 2019-04-16 RX ADMIN — Medication 2: at 13:31

## 2019-04-16 RX ADMIN — LISINOPRIL 20 MILLIGRAM(S): 2.5 TABLET ORAL at 06:49

## 2019-04-16 RX ADMIN — Medication 1: at 09:09

## 2019-04-16 RX ADMIN — INSULIN GLARGINE 18 UNIT(S): 100 INJECTION, SOLUTION SUBCUTANEOUS at 22:40

## 2019-04-16 RX ADMIN — Medication 2 MILLIGRAM(S): at 18:15

## 2019-04-16 RX ADMIN — LEVETIRACETAM 1500 MILLIGRAM(S): 250 TABLET, FILM COATED ORAL at 06:49

## 2019-04-16 RX ADMIN — LACOSAMIDE 150 MILLIGRAM(S): 50 TABLET ORAL at 06:48

## 2019-04-16 RX ADMIN — FINASTERIDE 5 MILLIGRAM(S): 5 TABLET, FILM COATED ORAL at 12:05

## 2019-04-16 RX ADMIN — LEVETIRACETAM 1500 MILLIGRAM(S): 250 TABLET, FILM COATED ORAL at 18:14

## 2019-04-16 RX ADMIN — ENOXAPARIN SODIUM 40 MILLIGRAM(S): 100 INJECTION SUBCUTANEOUS at 12:05

## 2019-04-16 RX ADMIN — LACOSAMIDE 150 MILLIGRAM(S): 50 TABLET ORAL at 18:14

## 2019-04-16 RX ADMIN — Medication 500 MILLIGRAM(S): at 12:05

## 2019-04-16 RX ADMIN — NYSTATIN CREAM 1 APPLICATION(S): 100000 CREAM TOPICAL at 06:49

## 2019-04-16 RX ADMIN — Medication 2 MILLIGRAM(S): at 06:49

## 2019-04-16 RX ADMIN — PANTOPRAZOLE SODIUM 40 MILLIGRAM(S): 20 TABLET, DELAYED RELEASE ORAL at 06:49

## 2019-04-16 RX ADMIN — ZINC SULFATE TAB 220 MG (50 MG ZINC EQUIVALENT) 220 MILLIGRAM(S): 220 (50 ZN) TAB at 12:04

## 2019-04-16 NOTE — PROGRESS NOTE ADULT - SUBJECTIVE AND OBJECTIVE BOX
Patient is a 74y old  Male who presents with a chief complaint of radiation (15 Apr 2019 07:48)      SUBJECTIVE/OVERNIGHT EVENTS     No acute events overnight.  Denies nausea, vomiting, constipation, diarrhea, fevers, chills, chest pain, SOB.    MEDICATIONS  (STANDING):  ascorbic acid 500 milliGRAM(s) Oral daily  dexamethasone     Tablet 2 milliGRAM(s) Oral two times a day  dextrose 5%. 1000 milliLiter(s) (50 mL/Hr) IV Continuous <Continuous>  dextrose 50% Injectable 12.5 Gram(s) IV Push once  dextrose 50% Injectable 25 Gram(s) IV Push once  dextrose 50% Injectable 25 Gram(s) IV Push once  enoxaparin Injectable 40 milliGRAM(s) SubCutaneous daily  finasteride 5 milliGRAM(s) Oral daily  insulin glargine Injectable (LANTUS) 18 Unit(s) SubCutaneous at bedtime  insulin lispro (HumaLOG) corrective regimen sliding scale   SubCutaneous three times a day before meals  insulin lispro (HumaLOG) corrective regimen sliding scale   SubCutaneous at bedtime  lacosamide 150 milliGRAM(s) Oral two times a day  levETIRAcetam 1500 milliGRAM(s) Oral two times a day  lisinopril 20 milliGRAM(s) Oral daily  multivitamin 1 Tablet(s) Oral daily  nystatin Powder 1 Application(s) Topical two times a day  pantoprazole    Tablet 40 milliGRAM(s) Oral before breakfast  zinc sulfate 220 milliGRAM(s) Oral daily    MEDICATIONS  (PRN):  acetaminophen   Tablet .. 650 milliGRAM(s) Oral every 6 hours PRN Mild Pain (1 - 3)  dextrose 40% Gel 15 Gram(s) Oral once PRN Blood Glucose LESS THAN 70 milliGRAM(s)/deciliter  glucagon  Injectable 1 milliGRAM(s) IntraMuscular once PRN Glucose LESS THAN 70 milligrams/deciliter  senna 2 Tablet(s) Oral at bedtime PRN Constipation    CAPILLARY BLOOD GLUCOSE      POCT Blood Glucose.: 171 mg/dL (15 Apr 2019 22:10)  POCT Blood Glucose.: 185 mg/dL (15 Apr 2019 18:07)  POCT Blood Glucose.: 172 mg/dL (15 Apr 2019 12:14)  POCT Blood Glucose.: 143 mg/dL (15 Apr 2019 08:38)    I&O's Summary        Vital Signs Last 24 Hrs  T(C): 36.7 (16 Apr 2019 05:26), Max: 36.9 (15 Apr 2019 15:24)  T(F): 98 (16 Apr 2019 05:26), Max: 98.5 (15 Apr 2019 15:24)  HR: 75 (16 Apr 2019 05:26) (68 - 78)  BP: 132/95 (16 Apr 2019 05:26) (103/74 - 132/95)  BP(mean): --  RR: 17 (16 Apr 2019 05:26) (17 - 18)  SpO2: 99% (16 Apr 2019 05:26) (97% - 100%)    PHYSICAL EXAM:  GENERAL: NAD, well-developed  HEAD:  Atraumatic, Normocephalic  EYES: EOMI, PERRLA, conjunctiva and sclera clear  NECK: Supple, No JVD  CHEST/LUNG: Clear to auscultation bilaterally; No wheeze  HEART: Regular rate and rhythm; No murmurs, rubs, or gallops  ABDOMEN: Soft, Nontender, Nondistended; Bowel sounds present  EXTREMITIES:  2+ Peripheral Pulses, No clubbing, cyanosis, or edema  PSYCH: AAOx3  NEUROLOGY: non-focal  SKIN: No rashes or lesions    LABS

## 2019-04-17 LAB
ANION GAP SERPL CALC-SCNC: 10 MMO/L — SIGNIFICANT CHANGE UP (ref 7–14)
BUN SERPL-MCNC: 14 MG/DL — SIGNIFICANT CHANGE UP (ref 7–23)
CALCIUM SERPL-MCNC: 9.5 MG/DL — SIGNIFICANT CHANGE UP (ref 8.4–10.5)
CHLORIDE SERPL-SCNC: 101 MMOL/L — SIGNIFICANT CHANGE UP (ref 98–107)
CO2 SERPL-SCNC: 26 MMOL/L — SIGNIFICANT CHANGE UP (ref 22–31)
CREAT SERPL-MCNC: 0.68 MG/DL — SIGNIFICANT CHANGE UP (ref 0.5–1.3)
GLUCOSE SERPL-MCNC: 179 MG/DL — HIGH (ref 70–99)
HCT VFR BLD CALC: 40 % — SIGNIFICANT CHANGE UP (ref 39–50)
HGB BLD-MCNC: 13.2 G/DL — SIGNIFICANT CHANGE UP (ref 13–17)
MAGNESIUM SERPL-MCNC: 1.9 MG/DL — SIGNIFICANT CHANGE UP (ref 1.6–2.6)
MCHC RBC-ENTMCNC: 30.5 PG — SIGNIFICANT CHANGE UP (ref 27–34)
MCHC RBC-ENTMCNC: 33 % — SIGNIFICANT CHANGE UP (ref 32–36)
MCV RBC AUTO: 92.4 FL — SIGNIFICANT CHANGE UP (ref 80–100)
NRBC # FLD: 0.05 K/UL — SIGNIFICANT CHANGE UP (ref 0–0)
PHOSPHATE SERPL-MCNC: 2.7 MG/DL — SIGNIFICANT CHANGE UP (ref 2.5–4.5)
PLATELET # BLD AUTO: 140 K/UL — LOW (ref 150–400)
PMV BLD: 11.6 FL — SIGNIFICANT CHANGE UP (ref 7–13)
POTASSIUM SERPL-MCNC: 4.2 MMOL/L — SIGNIFICANT CHANGE UP (ref 3.5–5.3)
POTASSIUM SERPL-SCNC: 4.2 MMOL/L — SIGNIFICANT CHANGE UP (ref 3.5–5.3)
RBC # BLD: 4.33 M/UL — SIGNIFICANT CHANGE UP (ref 4.2–5.8)
RBC # FLD: 15.5 % — HIGH (ref 10.3–14.5)
SODIUM SERPL-SCNC: 137 MMOL/L — SIGNIFICANT CHANGE UP (ref 135–145)
WBC # BLD: 8.78 K/UL — SIGNIFICANT CHANGE UP (ref 3.8–10.5)
WBC # FLD AUTO: 8.78 K/UL — SIGNIFICANT CHANGE UP (ref 3.8–10.5)

## 2019-04-17 PROCEDURE — 99232 SBSQ HOSP IP/OBS MODERATE 35: CPT | Mod: GC

## 2019-04-17 RX ADMIN — ZINC SULFATE TAB 220 MG (50 MG ZINC EQUIVALENT) 220 MILLIGRAM(S): 220 (50 ZN) TAB at 13:13

## 2019-04-17 RX ADMIN — Medication 2: at 18:39

## 2019-04-17 RX ADMIN — LEVETIRACETAM 1500 MILLIGRAM(S): 250 TABLET, FILM COATED ORAL at 06:03

## 2019-04-17 RX ADMIN — LACOSAMIDE 150 MILLIGRAM(S): 50 TABLET ORAL at 06:08

## 2019-04-17 RX ADMIN — ENOXAPARIN SODIUM 40 MILLIGRAM(S): 100 INJECTION SUBCUTANEOUS at 13:13

## 2019-04-17 RX ADMIN — LACOSAMIDE 150 MILLIGRAM(S): 50 TABLET ORAL at 17:32

## 2019-04-17 RX ADMIN — FINASTERIDE 5 MILLIGRAM(S): 5 TABLET, FILM COATED ORAL at 13:13

## 2019-04-17 RX ADMIN — NYSTATIN CREAM 1 APPLICATION(S): 100000 CREAM TOPICAL at 06:02

## 2019-04-17 RX ADMIN — Medication 2: at 13:12

## 2019-04-17 RX ADMIN — NYSTATIN CREAM 1 APPLICATION(S): 100000 CREAM TOPICAL at 17:32

## 2019-04-17 RX ADMIN — Medication 2 MILLIGRAM(S): at 06:03

## 2019-04-17 RX ADMIN — PANTOPRAZOLE SODIUM 40 MILLIGRAM(S): 20 TABLET, DELAYED RELEASE ORAL at 06:02

## 2019-04-17 RX ADMIN — LEVETIRACETAM 1500 MILLIGRAM(S): 250 TABLET, FILM COATED ORAL at 17:32

## 2019-04-17 RX ADMIN — Medication 1 TABLET(S): at 13:13

## 2019-04-17 RX ADMIN — INSULIN GLARGINE 18 UNIT(S): 100 INJECTION, SOLUTION SUBCUTANEOUS at 22:19

## 2019-04-17 RX ADMIN — Medication 2 MILLIGRAM(S): at 17:32

## 2019-04-17 RX ADMIN — LISINOPRIL 20 MILLIGRAM(S): 2.5 TABLET ORAL at 06:02

## 2019-04-17 RX ADMIN — Medication 500 MILLIGRAM(S): at 13:13

## 2019-04-17 NOTE — PROGRESS NOTE ADULT - PROBLEM SELECTOR PLAN 1
- Will be receiving radiation therapy as inpatient until Fri, 4/19  - Now s/p 2 resections within 4 months after recurrence about a month ago. Poor prognosis. Currently resides in rehab.  -Rad onc recs appreciated, will pursue radiation therapy inpatient since coordination with nursing home has not been successful. His seizures are now controlled on increased dose of antiepileptics   - C/w steroids for at least 7 more days (reassess on 7/10) per OSH neurosurgery recs. No surgical intervention at this point, appreciate recs.

## 2019-04-17 NOTE — PROGRESS NOTE ADULT - SUBJECTIVE AND OBJECTIVE BOX
Patient is a 74y old  Male who presents with a chief complaint of radiation (16 Apr 2019 07:39)      SUBJECTIVE/OVERNIGHT EVENTS     MEDICATIONS  (STANDING):  ascorbic acid 500 milliGRAM(s) Oral daily  dexamethasone     Tablet 2 milliGRAM(s) Oral two times a day  dextrose 5%. 1000 milliLiter(s) (50 mL/Hr) IV Continuous <Continuous>  dextrose 50% Injectable 12.5 Gram(s) IV Push once  dextrose 50% Injectable 25 Gram(s) IV Push once  dextrose 50% Injectable 25 Gram(s) IV Push once  enoxaparin Injectable 40 milliGRAM(s) SubCutaneous daily  finasteride 5 milliGRAM(s) Oral daily  insulin glargine Injectable (LANTUS) 18 Unit(s) SubCutaneous at bedtime  insulin lispro (HumaLOG) corrective regimen sliding scale   SubCutaneous three times a day before meals  insulin lispro (HumaLOG) corrective regimen sliding scale   SubCutaneous at bedtime  lacosamide 150 milliGRAM(s) Oral two times a day  levETIRAcetam 1500 milliGRAM(s) Oral two times a day  lisinopril 20 milliGRAM(s) Oral daily  multivitamin 1 Tablet(s) Oral daily  nystatin Powder 1 Application(s) Topical two times a day  pantoprazole    Tablet 40 milliGRAM(s) Oral before breakfast  zinc sulfate 220 milliGRAM(s) Oral daily    MEDICATIONS  (PRN):  acetaminophen   Tablet .. 650 milliGRAM(s) Oral every 6 hours PRN Mild Pain (1 - 3)  dextrose 40% Gel 15 Gram(s) Oral once PRN Blood Glucose LESS THAN 70 milliGRAM(s)/deciliter  glucagon  Injectable 1 milliGRAM(s) IntraMuscular once PRN Glucose LESS THAN 70 milligrams/deciliter  senna 2 Tablet(s) Oral at bedtime PRN Constipation    CAPILLARY BLOOD GLUCOSE      POCT Blood Glucose.: 140 mg/dL (16 Apr 2019 22:31)  POCT Blood Glucose.: 140 mg/dL (16 Apr 2019 18:02)  POCT Blood Glucose.: 244 mg/dL (16 Apr 2019 12:25)    I&O's Summary        Vital Signs Last 24 Hrs  T(C): 36.4 (17 Apr 2019 05:40), Max: 36.8 (16 Apr 2019 14:52)  T(F): 97.5 (17 Apr 2019 05:40), Max: 98.2 (16 Apr 2019 14:52)  HR: 66 (17 Apr 2019 05:40) (66 - 94)  BP: 160/100 (17 Apr 2019 05:40) (126/89 - 160/100)  BP(mean): --  RR: 18 (17 Apr 2019 05:40) (18 - 18)  SpO2: 97% (17 Apr 2019 05:40) (97% - 100%)    PHYSICAL EXAM:  GENERAL: NAD, well-developed  HEAD:  Atraumatic, Normocephalic  EYES: EOMI, PERRLA, conjunctiva and sclera clear  NECK: Supple, No JVD  CHEST/LUNG: Clear to auscultation bilaterally; No wheeze  HEART: Regular rate and rhythm; No murmurs, rubs, or gallops  ABDOMEN: Soft, Nontender, Nondistended; Bowel sounds present  EXTREMITIES:  2+ Peripheral Pulses, No clubbing, cyanosis, or edema  PSYCH: AAOx3  NEUROLOGY: non-focal  SKIN: No rashes or lesions    LABS                        13.2   8.78  )-----------( 140      ( 17 Apr 2019 06:10 )             40.0     04-17    137  |  101  |  14  ----------------------------<  179<H>  4.2   |  26  |  0.68    Ca    9.5      17 Apr 2019 06:10  Phos  2.7     04-17  Mg     1.9     04-17                      RADIOLOGY & ADDITIONAL TESTS:    Imaging Personally Reviewed:    Consultant(s) Notes Reviewed:      Care Discussed with Consultants/Other Providers: Patient is a 74y old  Male who presents with a chief complaint of radiation (16 Apr 2019 07:39)      SUBJECTIVE/OVERNIGHT EVENTS     No acute events overnight. A&Ox2 this AM.  Denies nausea, vomiting, constipation, diarrhea, fevers, chills, chest pain, SOB.    MEDICATIONS  (STANDING):  ascorbic acid 500 milliGRAM(s) Oral daily  dexamethasone     Tablet 2 milliGRAM(s) Oral two times a day  dextrose 5%. 1000 milliLiter(s) (50 mL/Hr) IV Continuous <Continuous>  dextrose 50% Injectable 12.5 Gram(s) IV Push once  dextrose 50% Injectable 25 Gram(s) IV Push once  dextrose 50% Injectable 25 Gram(s) IV Push once  enoxaparin Injectable 40 milliGRAM(s) SubCutaneous daily  finasteride 5 milliGRAM(s) Oral daily  insulin glargine Injectable (LANTUS) 18 Unit(s) SubCutaneous at bedtime  insulin lispro (HumaLOG) corrective regimen sliding scale   SubCutaneous three times a day before meals  insulin lispro (HumaLOG) corrective regimen sliding scale   SubCutaneous at bedtime  lacosamide 150 milliGRAM(s) Oral two times a day  levETIRAcetam 1500 milliGRAM(s) Oral two times a day  lisinopril 20 milliGRAM(s) Oral daily  multivitamin 1 Tablet(s) Oral daily  nystatin Powder 1 Application(s) Topical two times a day  pantoprazole    Tablet 40 milliGRAM(s) Oral before breakfast  zinc sulfate 220 milliGRAM(s) Oral daily    MEDICATIONS  (PRN):  acetaminophen   Tablet .. 650 milliGRAM(s) Oral every 6 hours PRN Mild Pain (1 - 3)  dextrose 40% Gel 15 Gram(s) Oral once PRN Blood Glucose LESS THAN 70 milliGRAM(s)/deciliter  glucagon  Injectable 1 milliGRAM(s) IntraMuscular once PRN Glucose LESS THAN 70 milligrams/deciliter  senna 2 Tablet(s) Oral at bedtime PRN Constipation    CAPILLARY BLOOD GLUCOSE      POCT Blood Glucose.: 140 mg/dL (16 Apr 2019 22:31)  POCT Blood Glucose.: 140 mg/dL (16 Apr 2019 18:02)  POCT Blood Glucose.: 244 mg/dL (16 Apr 2019 12:25)    I&O's Summary        Vital Signs Last 24 Hrs  T(C): 36.4 (17 Apr 2019 05:40), Max: 36.8 (16 Apr 2019 14:52)  T(F): 97.5 (17 Apr 2019 05:40), Max: 98.2 (16 Apr 2019 14:52)  HR: 66 (17 Apr 2019 05:40) (66 - 94)  BP: 160/100 (17 Apr 2019 05:40) (126/89 - 160/100)  BP(mean): --  RR: 18 (17 Apr 2019 05:40) (18 - 18)  SpO2: 97% (17 Apr 2019 05:40) (97% - 100%)    PHYSICAL EXAM:  GENERAL: NAD, well-developed  HEAD:  Atraumatic, Normocephalic  EYES: EOMI, PERRLA, conjunctiva and sclera clear  NECK: Supple, No JVD  CHEST/LUNG: Clear to auscultation bilaterally; No wheeze  HEART: Regular rate and rhythm; No murmurs, rubs, or gallops  ABDOMEN: Soft, Nontender, Nondistended; Bowel sounds present  EXTREMITIES:  2+ Peripheral Pulses, No clubbing, cyanosis, or edema  PSYCH: AAOx3  NEUROLOGY: non-focal  SKIN: No rashes or lesions    LABS                        13.2   8.78  )-----------( 140      ( 17 Apr 2019 06:10 )             40.0     04-17    137  |  101  |  14  ----------------------------<  179<H>  4.2   |  26  |  0.68    Ca    9.5      17 Apr 2019 06:10  Phos  2.7     04-17  Mg     1.9     04-17                      RADIOLOGY & ADDITIONAL TESTS:    Imaging Personally Reviewed:    Consultant(s) Notes Reviewed:      Care Discussed with Consultants/Other Providers:

## 2019-04-17 NOTE — CHART NOTE - NSCHARTNOTEFT_GEN_A_CORE
Source: other [x] Patient asleep, A&Ox2. Spoke to PCA at bedside. Medical record reviewed. PCA reports patient consumed 100% breakfast today. Eating well at most meals. No GI distress (nausea/vomiting/diarrhea/constipation) noted at this time. Tolerating diet at this time.    Diet : Dysphagia 2 Mechanical Soft - Thin Liquids, Consistent Carbohydrate (evening snack)  PO intake:  < 50% [ ] 50-75% [ ]   % [x]  other :    Current Weight: no new weight recorded, 75.7kg (4/03)  % Weight Change: N/A    Pertinent Medications: MEDICATIONS  (STANDING):  ascorbic acid 500 milliGRAM(s) Oral daily  dexamethasone     Tablet 2 milliGRAM(s) Oral two times a day  dextrose 5%. 1000 milliLiter(s) (50 mL/Hr) IV Continuous <Continuous>  dextrose 50% Injectable 12.5 Gram(s) IV Push once  dextrose 50% Injectable 25 Gram(s) IV Push once  dextrose 50% Injectable 25 Gram(s) IV Push once  enoxaparin Injectable 40 milliGRAM(s) SubCutaneous daily  finasteride 5 milliGRAM(s) Oral daily  insulin glargine Injectable (LANTUS) 18 Unit(s) SubCutaneous at bedtime  insulin lispro (HumaLOG) corrective regimen sliding scale   SubCutaneous three times a day before meals  insulin lispro (HumaLOG) corrective regimen sliding scale   SubCutaneous at bedtime  lacosamide 150 milliGRAM(s) Oral two times a day  levETIRAcetam 1500 milliGRAM(s) Oral two times a day  lisinopril 20 milliGRAM(s) Oral daily  multivitamin 1 Tablet(s) Oral daily  nystatin Powder 1 Application(s) Topical two times a day  pantoprazole    Tablet 40 milliGRAM(s) Oral before breakfast  zinc sulfate 220 milliGRAM(s) Oral daily    MEDICATIONS  (PRN):  acetaminophen   Tablet .. 650 milliGRAM(s) Oral every 6 hours PRN Mild Pain (1 - 3)  dextrose 40% Gel 15 Gram(s) Oral once PRN Blood Glucose LESS THAN 70 milliGRAM(s)/deciliter  glucagon  Injectable 1 milliGRAM(s) IntraMuscular once PRN Glucose LESS THAN 70 milligrams/deciliter  senna 2 Tablet(s) Oral at bedtime PRN Constipation    Pertinent Labs:  04-17 Na137 mmol/L Glu 179 mg/dL<H> K+ 4.2 mmol/L Cr  0.68 mg/dL BUN 14 mg/dL 04-17 Phos 2.7 mg/dL  CAPILLARY BLOOD GLUCOSE  POCT Blood Glucose.: 201 mg/dL (17 Apr 2019 12:18)  POCT Blood Glucose.: 140 mg/dL (16 Apr 2019 22:31)  POCT Blood Glucose.: 140 mg/dL (16 Apr 2019 18:02)    Skin: No pressure ulcers/DTI noted per flowsheets.   No edema noted.    Estimated Needs:   [x] no change since previous assessment  [ ] recalculated:     Previous Nutrition Diagnosis:   [x] Biting/Chewing difficulty    Nutrition Diagnosis is [x] ongoing  [ ] resolved [ ] not applicable     Interventions:   1. Continue Dysphagia 2 Mechanical Soft - Thin Liquids, Consistent Carbohydrate (evening snack) diet.  2. Please Encourage po intake, assist with meals and menu selections, provide alternatives PRN.     Monitoring and Evaluation:   1. Monitor weights, labs, BM's, skin integrity, p.o. intake.   2. Patient to meet > 75% estimated pro/kcal needs.  3. Maintain blood glucose control.   4. Tolerance to diet.   RD to remain available, Bridget Santana RD, CDN Pager #[phone]

## 2019-04-18 ENCOUNTER — TRANSCRIPTION ENCOUNTER (OUTPATIENT)
Age: 74
End: 2019-04-18

## 2019-04-18 PROCEDURE — 99232 SBSQ HOSP IP/OBS MODERATE 35: CPT | Mod: GC

## 2019-04-18 RX ADMIN — INSULIN GLARGINE 18 UNIT(S): 100 INJECTION, SOLUTION SUBCUTANEOUS at 22:50

## 2019-04-18 RX ADMIN — LISINOPRIL 20 MILLIGRAM(S): 2.5 TABLET ORAL at 06:01

## 2019-04-18 RX ADMIN — PANTOPRAZOLE SODIUM 40 MILLIGRAM(S): 20 TABLET, DELAYED RELEASE ORAL at 06:01

## 2019-04-18 RX ADMIN — NYSTATIN CREAM 1 APPLICATION(S): 100000 CREAM TOPICAL at 18:51

## 2019-04-18 RX ADMIN — LACOSAMIDE 150 MILLIGRAM(S): 50 TABLET ORAL at 18:50

## 2019-04-18 RX ADMIN — NYSTATIN CREAM 1 APPLICATION(S): 100000 CREAM TOPICAL at 06:02

## 2019-04-18 RX ADMIN — Medication 2 MILLIGRAM(S): at 18:51

## 2019-04-18 RX ADMIN — FINASTERIDE 5 MILLIGRAM(S): 5 TABLET, FILM COATED ORAL at 12:59

## 2019-04-18 RX ADMIN — Medication 1 TABLET(S): at 12:59

## 2019-04-18 RX ADMIN — LACOSAMIDE 150 MILLIGRAM(S): 50 TABLET ORAL at 06:07

## 2019-04-18 RX ADMIN — ZINC SULFATE TAB 220 MG (50 MG ZINC EQUIVALENT) 220 MILLIGRAM(S): 220 (50 ZN) TAB at 12:59

## 2019-04-18 RX ADMIN — LEVETIRACETAM 1500 MILLIGRAM(S): 250 TABLET, FILM COATED ORAL at 06:01

## 2019-04-18 RX ADMIN — ENOXAPARIN SODIUM 40 MILLIGRAM(S): 100 INJECTION SUBCUTANEOUS at 12:59

## 2019-04-18 RX ADMIN — Medication 2: at 13:02

## 2019-04-18 RX ADMIN — Medication 500 MILLIGRAM(S): at 12:59

## 2019-04-18 RX ADMIN — LEVETIRACETAM 1500 MILLIGRAM(S): 250 TABLET, FILM COATED ORAL at 18:51

## 2019-04-18 RX ADMIN — Medication 2 MILLIGRAM(S): at 06:01

## 2019-04-18 NOTE — DISCHARGE NOTE PROVIDER - NSDCCPCAREPLAN_GEN_ALL_CORE_FT
PRINCIPAL DISCHARGE DIAGNOSIS  Diagnosis: Glioblastoma  Assessment and Plan of Treatment: During your hospitalization you received radiation therapy for your glioblastoma. You experienced no complications related to the therapy. Please follow up with your oncologist once you leave the hospital to review the treatment that was completed. If you develop any long-lasting nausea, vomiting, migraine or vision changes, please return to the hospital.      SECONDARY DISCHARGE DIAGNOSES  Diagnosis: Seizures  Assessment and Plan of Treatment: You did not have any seizures while you were in the hospital. We had the neurology team visit you in the hospital to make sure you are on the best medications for this condition. Please continue taking all your medications as prescribed. If you develop any seizures in the future, please return to the hospital.

## 2019-04-18 NOTE — PROGRESS NOTE ADULT - SUBJECTIVE AND OBJECTIVE BOX
Patient is a 74y old  Male who presents with a chief complaint of radiation (17 Apr 2019 10:09)      SUBJECTIVE/OVERNIGHT EVENTS     MEDICATIONS  (STANDING):  ascorbic acid 500 milliGRAM(s) Oral daily  dexamethasone     Tablet 2 milliGRAM(s) Oral two times a day  dextrose 5%. 1000 milliLiter(s) (50 mL/Hr) IV Continuous <Continuous>  dextrose 50% Injectable 12.5 Gram(s) IV Push once  dextrose 50% Injectable 25 Gram(s) IV Push once  dextrose 50% Injectable 25 Gram(s) IV Push once  enoxaparin Injectable 40 milliGRAM(s) SubCutaneous daily  finasteride 5 milliGRAM(s) Oral daily  insulin glargine Injectable (LANTUS) 18 Unit(s) SubCutaneous at bedtime  insulin lispro (HumaLOG) corrective regimen sliding scale   SubCutaneous three times a day before meals  insulin lispro (HumaLOG) corrective regimen sliding scale   SubCutaneous at bedtime  lacosamide 150 milliGRAM(s) Oral two times a day  levETIRAcetam 1500 milliGRAM(s) Oral two times a day  lisinopril 20 milliGRAM(s) Oral daily  multivitamin 1 Tablet(s) Oral daily  nystatin Powder 1 Application(s) Topical two times a day  pantoprazole    Tablet 40 milliGRAM(s) Oral before breakfast  zinc sulfate 220 milliGRAM(s) Oral daily    MEDICATIONS  (PRN):  acetaminophen   Tablet .. 650 milliGRAM(s) Oral every 6 hours PRN Mild Pain (1 - 3)  dextrose 40% Gel 15 Gram(s) Oral once PRN Blood Glucose LESS THAN 70 milliGRAM(s)/deciliter  glucagon  Injectable 1 milliGRAM(s) IntraMuscular once PRN Glucose LESS THAN 70 milligrams/deciliter  senna 2 Tablet(s) Oral at bedtime PRN Constipation    CAPILLARY BLOOD GLUCOSE      POCT Blood Glucose.: 200 mg/dL (17 Apr 2019 22:16)  POCT Blood Glucose.: 202 mg/dL (17 Apr 2019 18:22)  POCT Blood Glucose.: 201 mg/dL (17 Apr 2019 12:18)    I&O's Summary        Vital Signs Last 24 Hrs  T(C): 36.5 (18 Apr 2019 05:57), Max: 37.3 (17 Apr 2019 21:49)  T(F): 97.7 (18 Apr 2019 05:57), Max: 99.2 (17 Apr 2019 21:49)  HR: 88 (18 Apr 2019 05:57) (72 - 88)  BP: 140/98 (18 Apr 2019 05:57) (122/90 - 155/93)  BP(mean): --  RR: 18 (18 Apr 2019 05:57) (18 - 18)  SpO2: 100% (18 Apr 2019 05:57) (98% - 100%)    PHYSICAL EXAM:  GENERAL: NAD, well-developed  HEAD:  Atraumatic, Normocephalic  EYES: EOMI, PERRLA, conjunctiva and sclera clear  NECK: Supple, No JVD  CHEST/LUNG: Clear to auscultation bilaterally; No wheeze  HEART: Regular rate and rhythm; No murmurs, rubs, or gallops  ABDOMEN: Soft, Nontender, Nondistended; Bowel sounds present  EXTREMITIES:  2+ Peripheral Pulses, No clubbing, cyanosis, or edema  PSYCH: AAOx3  NEUROLOGY: non-focal  SKIN: No rashes or lesions    LABS                        13.2   8.78  )-----------( 140      ( 17 Apr 2019 06:10 )             40.0     04-17    137  |  101  |  14  ----------------------------<  179<H>  4.2   |  26  |  0.68    Ca    9.5      17 Apr 2019 06:10  Phos  2.7     04-17  Mg     1.9     04-17                      RADIOLOGY & ADDITIONAL TESTS:    Imaging Personally Reviewed:    Consultant(s) Notes Reviewed:      Care Discussed with Consultants/Other Providers: Patient is a 74y old  Male who presents with a chief complaint of radiation (17 Apr 2019 10:09)      SUBJECTIVE/OVERNIGHT EVENTS     No acute events overnight.  Denies nausea, vomiting, constipation, diarrhea, fevers, chills, chest pain, SOB.    MEDICATIONS  (STANDING):  ascorbic acid 500 milliGRAM(s) Oral daily  dexamethasone     Tablet 2 milliGRAM(s) Oral two times a day  dextrose 5%. 1000 milliLiter(s) (50 mL/Hr) IV Continuous <Continuous>  dextrose 50% Injectable 12.5 Gram(s) IV Push once  dextrose 50% Injectable 25 Gram(s) IV Push once  dextrose 50% Injectable 25 Gram(s) IV Push once  enoxaparin Injectable 40 milliGRAM(s) SubCutaneous daily  finasteride 5 milliGRAM(s) Oral daily  insulin glargine Injectable (LANTUS) 18 Unit(s) SubCutaneous at bedtime  insulin lispro (HumaLOG) corrective regimen sliding scale   SubCutaneous three times a day before meals  insulin lispro (HumaLOG) corrective regimen sliding scale   SubCutaneous at bedtime  lacosamide 150 milliGRAM(s) Oral two times a day  levETIRAcetam 1500 milliGRAM(s) Oral two times a day  lisinopril 20 milliGRAM(s) Oral daily  multivitamin 1 Tablet(s) Oral daily  nystatin Powder 1 Application(s) Topical two times a day  pantoprazole    Tablet 40 milliGRAM(s) Oral before breakfast  zinc sulfate 220 milliGRAM(s) Oral daily    MEDICATIONS  (PRN):  acetaminophen   Tablet .. 650 milliGRAM(s) Oral every 6 hours PRN Mild Pain (1 - 3)  dextrose 40% Gel 15 Gram(s) Oral once PRN Blood Glucose LESS THAN 70 milliGRAM(s)/deciliter  glucagon  Injectable 1 milliGRAM(s) IntraMuscular once PRN Glucose LESS THAN 70 milligrams/deciliter  senna 2 Tablet(s) Oral at bedtime PRN Constipation    CAPILLARY BLOOD GLUCOSE      POCT Blood Glucose.: 200 mg/dL (17 Apr 2019 22:16)  POCT Blood Glucose.: 202 mg/dL (17 Apr 2019 18:22)  POCT Blood Glucose.: 201 mg/dL (17 Apr 2019 12:18)    I&O's Summary        Vital Signs Last 24 Hrs  T(C): 36.5 (18 Apr 2019 05:57), Max: 37.3 (17 Apr 2019 21:49)  T(F): 97.7 (18 Apr 2019 05:57), Max: 99.2 (17 Apr 2019 21:49)  HR: 88 (18 Apr 2019 05:57) (72 - 88)  BP: 140/98 (18 Apr 2019 05:57) (122/90 - 155/93)  BP(mean): --  RR: 18 (18 Apr 2019 05:57) (18 - 18)  SpO2: 100% (18 Apr 2019 05:57) (98% - 100%)    PHYSICAL EXAM:  GENERAL: NAD, well-developed  HEAD:  Atraumatic, Normocephalic  EYES: EOMI, PERRLA, conjunctiva and sclera clear  NECK: Supple, No JVD  CHEST/LUNG: Clear to auscultation bilaterally; No wheeze  HEART: Regular rate and rhythm; No murmurs, rubs, or gallops  ABDOMEN: Soft, Nontender, Nondistended; Bowel sounds present  EXTREMITIES:  2+ Peripheral Pulses, No clubbing, cyanosis, or edema  PSYCH: AAOx2  NEUROLOGY: non-focal  SKIN: No rashes or lesions    LABS                        13.2   8.78  )-----------( 140      ( 17 Apr 2019 06:10 )             40.0     04-17    137  |  101  |  14  ----------------------------<  179<H>  4.2   |  26  |  0.68    Ca    9.5      17 Apr 2019 06:10  Phos  2.7     04-17  Mg     1.9     04-17

## 2019-04-18 NOTE — DISCHARGE NOTE PROVIDER - CARE PROVIDER_API CALL
Emiliano Trotter (MD)  Therapeutic Radiology  [address] [place] [address], [facility]  [place] [address]  Phone: [phone]  Fax: [phone]  Follow Up Time:

## 2019-04-18 NOTE — DISCHARGE NOTE PROVIDER - HOSPITAL COURSE
HPI        74 yr-old man with hx of DM2,HTN with CVA 6 years ago;  found to have right temporal mass after being found on floor at home 4 months ago. Resection of tumor 11/28/18  by Dr. Gonzalez and pathology was grade 4 GBM. He had recurrence of tumor and underwent 2nd right craniotomy one month ago. There was post-op right frontal SAH and some SDH. He was on Keppra 1500 mg bid and Vimpat 100 mg bid for seizure prophylaxis and transferred to Rehab. He had seizure at rehab center in [facility]) and sent to [facility], observed and sent back to rehab center where he had recurrent seizures and transferred to [facility]. Since increasing dose of Vimpat to 150 mg bid, he has been seizure free since. He was seen last week by [facility] radiation oncologist, Dr Mil Grace (tel [phone]) and plan for starting RT was interrupted by his seizure recurrence. Now transferred to [facility] for radiation        Hospital Course        Pt was brought up to floors and continued on his medications from the rehab center. Neurology was consulted for his seizures and no medication changes were made. He remained seizure free while in the hospital. Neurosurgery was also consulted and agreed with the plan to receive radiation. No surgical options at this point. He was also cleared to be continued on DVT ppx since he had a small hematoma around his previous craniotomy tumor bed. Radiation oncology then evaluated pt the second hospital day and agreed for a 5 day treatment plan for his cancer. The third hospital day, ethics had to be consulted. His family was having difficulty deciding who would be healthcare proxy for making medical decisions. Ethics concluded that the pt was lucid enough to indicate who he would want as his healthcare proxy. He previously named his son Catarino, who lives in [place], as his healthcare proxy. However, there were questions raised about his competency given his altered mental status from underlying tumor. He was usually A&Ox1-2 during the admission, and there were concerns within the family that this would affect his decision-making ability, and may have chosen Catarino wrongly. However, it was determined with the ethics department that his previous healthcare proxy, Catarino, would remain as his sole decision maker. An EEG was also completed with video which ruled out any seizure activity. Pt then started his radiation treatments on Monday, April 15. He finished Fri, April 19. Throughout this period he had no complications and remained asymptomatic. He had no skin or hair changes.    Pt was seen and examined today and is stable for discharge.

## 2019-04-19 ENCOUNTER — INPATIENT (INPATIENT)
Facility: HOSPITAL | Age: 74
LOS: 4 days | Discharge: NURSING, REHAB OR LONG-TERM CARE | End: 2019-04-24
Attending: HOSPITALIST | Admitting: HOSPITALIST
Payer: MEDICARE

## 2019-04-19 ENCOUNTER — INBOUND DOCUMENT (OUTPATIENT)
Age: 74
End: 2019-04-19

## 2019-04-19 ENCOUNTER — TRANSCRIPTION ENCOUNTER (OUTPATIENT)
Age: 74
End: 2019-04-19

## 2019-04-19 VITALS
OXYGEN SATURATION: 100 % | SYSTOLIC BLOOD PRESSURE: 144 MMHG | TEMPERATURE: 98 F | DIASTOLIC BLOOD PRESSURE: 87 MMHG | HEART RATE: 76 BPM | RESPIRATION RATE: 16 BRPM

## 2019-04-19 VITALS
HEART RATE: 74 BPM | RESPIRATION RATE: 16 BRPM | TEMPERATURE: 98 F | OXYGEN SATURATION: 100 % | SYSTOLIC BLOOD PRESSURE: 101 MMHG | DIASTOLIC BLOOD PRESSURE: 79 MMHG

## 2019-04-19 DIAGNOSIS — Z98.890 OTHER SPECIFIED POSTPROCEDURAL STATES: Chronic | ICD-10-CM

## 2019-04-19 PROCEDURE — 99239 HOSP IP/OBS DSCHRG MGMT >30: CPT

## 2019-04-19 RX ADMIN — FINASTERIDE 5 MILLIGRAM(S): 5 TABLET, FILM COATED ORAL at 12:05

## 2019-04-19 RX ADMIN — LEVETIRACETAM 1500 MILLIGRAM(S): 250 TABLET, FILM COATED ORAL at 05:31

## 2019-04-19 RX ADMIN — ZINC SULFATE TAB 220 MG (50 MG ZINC EQUIVALENT) 220 MILLIGRAM(S): 220 (50 ZN) TAB at 12:04

## 2019-04-19 RX ADMIN — Medication 500 MILLIGRAM(S): at 12:04

## 2019-04-19 RX ADMIN — ENOXAPARIN SODIUM 40 MILLIGRAM(S): 100 INJECTION SUBCUTANEOUS at 12:03

## 2019-04-19 RX ADMIN — Medication 2: at 13:33

## 2019-04-19 RX ADMIN — Medication 2 MILLIGRAM(S): at 05:31

## 2019-04-19 RX ADMIN — PANTOPRAZOLE SODIUM 40 MILLIGRAM(S): 20 TABLET, DELAYED RELEASE ORAL at 07:44

## 2019-04-19 RX ADMIN — NYSTATIN CREAM 1 APPLICATION(S): 100000 CREAM TOPICAL at 05:31

## 2019-04-19 RX ADMIN — LACOSAMIDE 150 MILLIGRAM(S): 50 TABLET ORAL at 05:31

## 2019-04-19 RX ADMIN — Medication 1 TABLET(S): at 12:05

## 2019-04-19 RX ADMIN — LISINOPRIL 20 MILLIGRAM(S): 2.5 TABLET ORAL at 05:31

## 2019-04-19 NOTE — PROGRESS NOTE ADULT - REASON FOR ADMISSION
radiation

## 2019-04-19 NOTE — PROGRESS NOTE ADULT - ASSESSMENT
73 yo M PMHx with T2DM, HTN, CVA in 2013, grade 4 GBM s/p resection 11/28/18 with Dr. Gonzalez, with recurrence s/p resection 3/2019, now presenting recurrent seizures now on increased seizure medications at [facility] for radiation therapy.
73 yo M PMHx with T2DM, HTN, CVA in 2013, grade 4 GBM s/p resection 11/28/18 with Dr. Gonzalez, with recurrence s/p resection 3/2019, now presenting recurrent seizures now on increased seizure medications at [facility] for radiation therapy.
73 yo M PMHx with T2DM, HTN, CVA in 2013, grade 4 GBM s/p resection 11/28/18 with Dr. Gonzalez, with recurrence s/p resection 3/2019, now presenting recurrent seizures now on increased seizure medications at [facility] for radiation therapy.
73 yo M PMHx with T2DM, HTN, CVA in 2013, grade 4 GBM s/p resection 11/28/18 with Dr. Gonzalez, with recurrence s/p resection 3/2019, now presenting recurrent seizures now on increased seizure medications at [facility] for radiation therapy.
73 yo M PMHx with T2DM, HTN, CVA in 2013, grade 4 GBM s/p resection 11/28/18 with Dr. Gonzalez, with recurrence s/p resection 3/2019, now presenting recurrent seizures now on increased seizure medications at [facility] for radiation therapy.
75 yo M PMHx with T2DM, HTN, CVA in 2013, grade 4 GBM s/p resection 11/28/18 with Dr. Gonzalez, with recurrence s/p resection 3/2019, now presenting recurrent seizures now on increased seizure medications at [facility] for radiation therapy.
75 yo M PMHx with T2DM, HTN, CVA in 2013, grade 4 GBM s/p resection 11/28/18 with Dr. Gonzalez, with recurrence s/p resection 3/2019, now presenting recurrent seizures now on increased seizure medications at [facility] for radiation therapy ending on 4/19.
75 yo M PMHx with T2DM, HTN, CVA in 2013, grade 4 GBM s/p resection 11/28/18 with Dr. Gonzalez, with recurrence s/p resection 3/2019, now presenting recurrent seizures now on increased seizure medications at [facility] for radiation therapy.
75 yo M PMHx with T2DM, HTN, CVA in 2013, grade 4 GBM s/p resection 11/28/18 with Dr. Gonzalez, with recurrence s/p resection 3/2019, now presenting recurrent seizures now on increased seizure medications at [facility] for radiation therapy.
Pt is a 74 year old man with a PMHx with IDT2DM, HTN, CVA in 2013, grade 4 GBM s/p resection 11/28/18 with Dr. Gonzalez, with recurrence s/p resection 3/2019, now presenting recurrent seizures now on increased seizure medications at [facility] for radiation therapy.
Pt is a 74 year old man with a PMHx with IDT2DM, HTN, CVA in 2013, grade 4 GBM s/p resection 11/28/18 with Dr. Gonzalez, with recurrence s/p resection 3/2019, now presenting recurrent seizures now on increased seizure medications at [facility] for radiation therapy.
73 yo M PMHx with T2DM, HTN, CVA in 2013, grade 4 GBM s/p resection 11/28/18 with Dr. Gonzalez, with recurrence s/p resection 3/2019, now presenting recurrent seizures now on increased seizure medications at [facility] for radiation therapy ending on 4/19.
75 yo M PMHx with T2DM, HTN, CVA in 2013, grade 4 GBM s/p resection 11/28/18 with Dr. Gonzalez, with recurrence s/p resection 3/2019, now presenting recurrent seizures now on increased seizure medications at [facility] for radiation therapy.
73 yo M PMHx with T2DM, HTN, CVA in 2013, grade 4 GBM s/p resection 11/28/18 with Dr. Gonzalez, with recurrence s/p resection 3/2019, now presenting recurrent seizures now on increased seizure medications at [facility] for radiation therapy.
75 yo M PMHx with T2DM, HTN, CVA in 2013, grade 4 GBM s/p resection 11/28/18 with Dr. Gonzalez, with recurrence s/p resection 3/2019, now presenting recurrent seizures now on increased seizure medications at [facility] for radiation therapy.
75 yo M PMHx with T2DM, HTN, CVA in 2013, grade 4 GBM s/p resection 11/28/18 with Dr. Gonzalez, with recurrence s/p resection 3/2019, now presenting recurrent seizures now on increased seizure medications at [facility] for radiation therapy ending on 4/19.

## 2019-04-19 NOTE — PROGRESS NOTE ADULT - PROBLEM SELECTOR PROBLEM 1
Seizures
Glioblastoma
Seizures
Glioblastoma
Seizures
Glioblastoma
Glioblastoma
Seizures

## 2019-04-19 NOTE — PROGRESS NOTE ADULT - PROBLEM SELECTOR PLAN 5
-DVT: continue lovenox, okay to continue per neurosurgery given small hematoma surgical bed around a month ago  -Diet: carb consistent  -Dispo: pending radiation

## 2019-04-19 NOTE — PROGRESS NOTE ADULT - PROBLEM SELECTOR PROBLEM 3
Benign prostatic hyperplasia, unspecified whether lower urinary tract symptoms present

## 2019-04-19 NOTE — PROGRESS NOTE ADULT - NSHPATTENDINGPLANDISCUSS_GEN_ALL_CORE
Dr Lucero
Pt
Dr Alvarado, patient
Pt
Dr Alvarado, patient
Dr Alvarado, patient
Dr Alvarado, patient, HCP Catarino via telephone
Pt
Dr Alvarado, patient
Dr Alvarado, patient

## 2019-04-19 NOTE — PROGRESS NOTE ADULT - SUBJECTIVE AND OBJECTIVE BOX
Patient is a 74y old  Male who presents with a chief complaint of radiation (18 Apr 2019 17:42)      SUBJECTIVE/OVERNIGHT EVENTS     No acute events overnight.  Denies nausea, vomiting, constipation, diarrhea, fevers, chills, chest pain, SOB.    MEDICATIONS  (STANDING):  ascorbic acid 500 milliGRAM(s) Oral daily  dexamethasone     Tablet 2 milliGRAM(s) Oral two times a day  dextrose 5%. 1000 milliLiter(s) (50 mL/Hr) IV Continuous <Continuous>  dextrose 50% Injectable 12.5 Gram(s) IV Push once  dextrose 50% Injectable 25 Gram(s) IV Push once  dextrose 50% Injectable 25 Gram(s) IV Push once  enoxaparin Injectable 40 milliGRAM(s) SubCutaneous daily  finasteride 5 milliGRAM(s) Oral daily  insulin glargine Injectable (LANTUS) 18 Unit(s) SubCutaneous at bedtime  insulin lispro (HumaLOG) corrective regimen sliding scale   SubCutaneous three times a day before meals  insulin lispro (HumaLOG) corrective regimen sliding scale   SubCutaneous at bedtime  lacosamide 150 milliGRAM(s) Oral two times a day  levETIRAcetam 1500 milliGRAM(s) Oral two times a day  lisinopril 20 milliGRAM(s) Oral daily  multivitamin 1 Tablet(s) Oral daily  nystatin Powder 1 Application(s) Topical two times a day  pantoprazole    Tablet 40 milliGRAM(s) Oral before breakfast  zinc sulfate 220 milliGRAM(s) Oral daily    MEDICATIONS  (PRN):  acetaminophen   Tablet .. 650 milliGRAM(s) Oral every 6 hours PRN Mild Pain (1 - 3)  dextrose 40% Gel 15 Gram(s) Oral once PRN Blood Glucose LESS THAN 70 milliGRAM(s)/deciliter  glucagon  Injectable 1 milliGRAM(s) IntraMuscular once PRN Glucose LESS THAN 70 milligrams/deciliter  senna 2 Tablet(s) Oral at bedtime PRN Constipation    CAPILLARY BLOOD GLUCOSE      POCT Blood Glucose.: 126 mg/dL (18 Apr 2019 22:43)  POCT Blood Glucose.: 130 mg/dL (18 Apr 2019 18:08)  POCT Blood Glucose.: 241 mg/dL (18 Apr 2019 12:32)  POCT Blood Glucose.: 123 mg/dL (18 Apr 2019 09:27)    I&O's Summary        Vital Signs Last 24 Hrs  T(C): 36.8 (19 Apr 2019 05:14), Max: 36.8 (19 Apr 2019 05:14)  T(F): 98.2 (19 Apr 2019 05:14), Max: 98.2 (19 Apr 2019 05:14)  HR: 76 (19 Apr 2019 05:14) (76 - 89)  BP: 144/87 (19 Apr 2019 05:14) (100/77 - 144/87)  BP(mean): --  RR: 16 (19 Apr 2019 05:14) (16 - 18)  SpO2: 100% (19 Apr 2019 05:14) (97% - 100%)    PHYSICAL EXAM:  GENERAL: NAD, well-developed  HEAD:  Atraumatic, Normocephalic  EYES: EOMI, PERRLA, conjunctiva and sclera clear  NECK: Supple, No JVD  CHEST/LUNG: Clear to auscultation bilaterally; No wheeze  HEART: Regular rate and rhythm; No murmurs, rubs, or gallops  ABDOMEN: Soft, Nontender, Nondistended; Bowel sounds present  EXTREMITIES:  2+ Peripheral Pulses, No clubbing, cyanosis, or edema  PSYCH: AAOx3  NEUROLOGY: non-focal  SKIN: No rashes or lesions    LABS                          RADIOLOGY & ADDITIONAL TESTS:    Imaging Personally Reviewed:    Consultant(s) Notes Reviewed:      Care Discussed with Consultants/Other Providers:

## 2019-04-19 NOTE — ED ADULT NURSE NOTE - NSFALLRSKINDICATORS_ED_ALL_ED
Anesthesia Evaluation     Patient summary reviewed and Nursing notes reviewed                Airway   Mallampati: II  TM distance: >3 FB  Neck ROM: full  No difficulty expected  Dental - normal exam         Pulmonary - normal exam   (+) asthma, sleep apnea on CPAP,   Cardiovascular - normal exam    (+) hypertension, past MI , CABG > 6 Months,     ROS comment: Heart transplant 10 years ago, doing well since    Neuro/Psych  (+) numbness, psychiatric history Depression and Anxiety,       ROS Comment: Peripheral neuropathy  GI/Hepatic/Renal/Endo    (+)  GERD,  diabetes mellitus type 2,     Musculoskeletal     (+) back pain, chronic pain, radiculopathy      ROS comment: Lumbar spinal stenosis  Abdominal  - normal exam   Substance History      OB/GYN          Other   (+) arthritis                   Anesthesia Plan    ASA 3     general     intravenous induction   Anesthetic plan and risks discussed with patient.      
no

## 2019-04-19 NOTE — PROGRESS NOTE ADULT - PROVIDER SPECIALTY LIST ADULT
Internal Medicine

## 2019-04-19 NOTE — PROGRESS NOTE ADULT - PROBLEM SELECTOR PLAN 3
- Continue finasteride
-Continue finasteride
-Continue finasteride
- Continue finasteride

## 2019-04-19 NOTE — PROGRESS NOTE ADULT - PROBLEM SELECTOR PLAN 4
- ISS  - POCT glucose  - Carb consistent diet
-ISS  -POCT glucose  -Carb consistent diet
-ISS  -POCT glucose  -Carb consistent diet
- ISS  - POCT glucose  - Carb consistent diet

## 2019-04-19 NOTE — ED ADULT NURSE NOTE - OBJECTIVE STATEMENT
Anna RN: pt a&ox3 (self, location, year) arrives to ED room 16 post possible seizure. Pt from nursing home, had witnessed seizure as per triage. Pt poor historian, unable to specify medical history and does not recall seizure. Pt denies any medical complaints at this time. VSS. Clear speech. HOB up. NH information in chart shows hx of malignant neoplasm to brain and seizures. Pt arrives with 18G to L wrist from EMS placed yesterday. SREE Hall at bedside for blood draw. Report given to primary RN Gloria.

## 2019-04-19 NOTE — ED ADULT TRIAGE NOTE - CHIEF COMPLAINT QUOTE
Pt arrives from [facility] via EMS. Per EMS, staff reports that pt had a seizure last approx 4 minutes long around 9:10pm and that pt did receive Keppra 750mg at 8pm. EMS further states no seizure activity was noted upon arrival and staff said "he now looks like baseline but he just got here this afternoon from [facility] and we really don't know him".  EMS 18G IV in LFA with 250cc of NS.

## 2019-04-19 NOTE — PROGRESS NOTE ADULT - PROBLEM SELECTOR PROBLEM 2
Glioblastoma
Seizures
Glioblastoma
Seizures
Glioblastoma
Seizures
Seizures

## 2019-04-19 NOTE — PROGRESS NOTE ADULT - ATTENDING COMMENTS
74M DM2, HTN, CVA in 2013, grade 4 GBM s/p resection 11/28/18 with Dr. Gonzalez, with recurrence s/p resection 3/2019, presented to [facility] with recurrent seizures, now controlled, transferred for palliative XRT  --For XRT starting 4/8  --appreciate neuro recs, s/p VEEG, stable on AEDs
75 yo M PMHx with T2DM, HTN, CVA in 2013, grade 4 GBM s/p resection 11/28/18 with Dr. Gonzalez, with recurrence s/p resection 3/2019, now presenting recurrent seizures now on increased seizure medications at [facility] for radiation therapy.     Denies pain or SOB. No fever or chills. Improved mental status ( mental status waxes and wanes)    Recurrent GBM: plan for inpt RT, pt cannot be transported from rehab to RT, f/u Rad Onc. c/w steroids for vasogenic edema. RT postponed till 4/15    Seizures: likely due to GBM, c/w anti-epileptics, seizure precaution . Expected to have waxing and waning mental status, will continue with re-direction, if it fails zyprexa prn
73 yo M PMHx with T2DM, HTN, CVA in 2013, grade 4 GBM s/p resection 11/28/18 with Dr. Gonzalez, with recurrence s/p resection 3/2019, now presenting recurrent seizures now on increased seizure medications at [facility] for radiation therapy.    Denies pain . Improved drowsiness    Recurrent GBM: plan for inpt RT, pt cannot be transported from rehab to RT, f/u Rad Onc. c/w steroids for vasogenic edema. Plan for RT today 4/11    Seizures: likely due to GBM, c/w anti-epileptics, seizure precaution
73 yo M PMHx with T2DM, HTN, CVA in 2013, grade 4 GBM s/p resection 11/28/18 with Dr. Gonzalez, with recurrence s/p resection 3/2019, now presenting recurrent seizures now on increased seizure medications at [facility] for radiation therapy.    Denies pain . Pt is  A& O x 2-3, 5/5 strength in all ext    Recurrent GBM: plan for inpt RT, pt cannot be transported from rehab to RT, f/u Rad Onc. c/w steroids for vasogenic edema    Seizures: likely due to GBM, c/w anti-epileptics, seizure precautions
73 yo M PMHx with T2DM, HTN, CVA in 2013, grade 4 GBM s/p resection 11/28/18 with Dr. Gonzalez, with recurrence s/p resection 3/2019, presented with recurrent seizures now on increased seizure medications at [facility] for radiation therapy.    Recurrent GBM:  c/w steroids for vasogenic edema. RT today, completese tx 4/19    Seizures: likely due to GBM, c/w anti-epileptics, seizure precaution .    stable for d/c to [facility], completes XRT today  d/c time 30 min coordinating care
75 yo M PMHx with T2DM, HTN, CVA in 2013, grade 4 GBM s/p resection 11/28/18 with Dr. Gonzalez, with recurrence s/p resection 3/2019, now presenting recurrent seizures now on increased seizure medications at [facility] for radiation therapy.    Denies pain . Pt is  A& O x 2-3, 5/5 strength in all ext    Recurrent GBM: plan for inpt RT, pt cannot be transported from rehab to RT, f/u Rad Onc. c/w steroids for vasogenic edema. ? Plan for RT 4/11    Seizures: likely due to GBM, c/w anti-epileptics, seizure precautions .
75 yo M PMHx with T2DM, HTN, CVA in 2013, grade 4 GBM s/p resection 11/28/18 with Dr. Gonzalez, with recurrence s/p resection 3/2019, now presenting recurrent seizures now on increased seizure medications at [facility] for radiation therapy.    Denies pain . Pt is drowsy on exam.     Recurrent GBM: plan for inpt RT, pt cannot be transported from rehab to RT, f/u Rad Onc. c/w steroids for vasogenic edema. Plan for RT 4/11    Seizures: likely due to GBM, c/w anti-epileptics, seizure precaution
75 yo M PMHx with T2DM, HTN, CVA in 2013, grade 4 GBM s/p resection 11/28/18 with Dr. Gonzalez, with recurrence s/p resection 3/2019, now presenting recurrent seizures now on increased seizure medications at [facility] for radiation therapy.    Pt with waxing and waning mental status. Denies pain or SOB. No fever or chills      Recurrent GBM: plan for inpt RT, pt cannot be transported from rehab to RT, f/u Rad Onc. c/w steroids for vasogenic edema. RT postponed till 4/15    Seizures: likely due to GBM, c/w anti-epileptics, seizure precaution . Expected to have waxing and waning mental status, will continue with re-direction, if it fails zyprexa prn .
75 yo M PMHx with T2DM, HTN, CVA in 2013, grade 4 GBM s/p resection 11/28/18 with Dr. Gonzalez, with recurrence s/p resection 3/2019, now presenting recurrent seizures now on increased seizure medications at [facility] for radiation therapy.    Recurrent GBM: plan for inpt RT, pt cannot be transported from rehab to RT, f/u Rad Onc. c/w steroids for vasogenic edema. RT today    Seizures: likely due to GBM, c/w anti-epileptics, seizure precaution . Expected to have waxing and waning mental status, will continue with re-direction, if it fails zyprexa prn
75 yo M PMHx with T2DM, HTN, CVA in 2013, grade 4 GBM s/p resection 11/28/18 with Dr. Gonzalez, with recurrence s/p resection 3/2019, presented with recurrent seizures now on increased seizure medications at [facility] for radiation therapy.    Recurrent GBM: plan for inpt RT, pt cannot be transported from rehab to RT, f/u Rad Onc. c/w steroids for vasogenic edema. RT today    Seizures: likely due to GBM, c/w anti-epileptics, seizure precaution .
73 yo M PMHx with T2DM, HTN, CVA in 2013, grade 4 GBM s/p resection 11/28/18 with Dr. Gonzalez, with recurrence s/p resection 3/2019, now presenting recurrent seizures now on increased seizure medications at [facility] for radiation therapy.    Pt with waxing and waning mental status. Denies pain or SOB      Recurrent GBM: plan for inpt RT, pt cannot be transported from rehab to RT, f/u Rad Onc. c/w steroids for vasogenic edema. RT postponed till 4/15    Seizures: likely due to GBM, c/w anti-epileptics, seizure precaution . Expected to have waxing and waning mental status, will continue with re-direction, if it fails zyprexa prn
73 yo M PMHx with T2DM, HTN, CVA in 2013, grade 4 GBM s/p resection 11/28/18 with Dr. Gonzalez, with recurrence s/p resection 3/2019, presented with recurrent seizures now on increased seizure medications at [facility] for radiation therapy.    Recurrent GBM: plan for inpt RT, pt cannot be transported from rehab to RT, f/u Rad Onc. c/w steroids for vasogenic edema. RT today    Seizures: likely due to GBM, c/w anti-epileptics, seizure precaution .    d/c planning to [facility], completes XRT 4/19
74M DM2, HTN, CVA in 2013, grade 4 GBM s/p resection 11/28/18 with Dr. Gonzalez, with recurrence s/p resection 3/2019, presented to [facility] with recurrent seizures, now controlled, transferred for palliative XRT  --For XRT starting 4/8  --appreciate neuro recs, s/p VEEG, stable on AEDs
75 yo M PMHx with T2DM, HTN, CVA in 2013, grade 4 GBM s/p resection 11/28/18 with Dr. Gonzalez, with recurrence s/p resection 3/2019, presented with recurrent seizures now on increased seizure medications at [facility] for radiation therapy.    Recurrent GBM:  c/w steroids for vasogenic edema. RT today, completese tx 4/19    Seizures: likely due to GBM, c/w anti-epileptics, seizure precaution .    d/c planning to [facility], completes XRT 4/19
74M DM2, HTN, CVA in 2013, grade 4 GBM s/p resection 11/28/18 with Dr. Gonzalez, with recurrence s/p resection 3/2019, presented to [facility] with recurrent seizures, now controlled, transferred for palliative XRT  --appreciate rad onc recs, for neurosurgery eval and rad onc inpt vs outpatient  --PT eval
74M DM2, HTN, CVA in 2013, grade 4 GBM s/p resection 11/28/18 with Dr. Gonzalez, with recurrence s/p resection 3/2019, presented to [facility] with recurrent seizures, now controlled, transferred for palliative XRT  --Had XRT sim today, will start treatment Monday  --appreciate neuro recs, for VEEG

## 2019-04-19 NOTE — ED ADULT NURSE NOTE - NSIMPLEMENTINTERV_GEN_ALL_ED
Implemented All Fall Risk Interventions:  Joaquin to call system. Call bell, personal items and telephone within reach. Instruct patient to call for assistance. Room bathroom lighting operational. Non-slip footwear when patient is off stretcher. Physically safe environment: no spills, clutter or unnecessary equipment. Stretcher in lowest position, wheels locked, appropriate side rails in place. Provide visual cue, wrist band, yellow gown, etc. Monitor gait and stability. Monitor for mental status changes and reorient to person, place, and time. Review medications for side effects contributing to fall risk. Reinforce activity limits and safety measures with patient and family.

## 2019-04-19 NOTE — ED ADULT NURSE NOTE - CHIEF COMPLAINT QUOTE
Pt arrives from [facility] via EMS. Per EMS, staff reports that pt had a seizure last approx 4 minutes long around 9:10pm and that pt did receive Keppra 750mg at 8pm. EMS further states no seizure activity was noted upon arrival and staff said "he now looks like baseline but he just got here this afternoon from [facility] and we really don't know him".  EMS 18G IV in LFA with 250cc of NS.

## 2019-04-20 DIAGNOSIS — E11.9 TYPE 2 DIABETES MELLITUS WITHOUT COMPLICATIONS: ICD-10-CM

## 2019-04-20 DIAGNOSIS — R79.89 OTHER SPECIFIED ABNORMAL FINDINGS OF BLOOD CHEMISTRY: ICD-10-CM

## 2019-04-20 DIAGNOSIS — I10 ESSENTIAL (PRIMARY) HYPERTENSION: ICD-10-CM

## 2019-04-20 DIAGNOSIS — C71.9 MALIGNANT NEOPLASM OF BRAIN, UNSPECIFIED: ICD-10-CM

## 2019-04-20 DIAGNOSIS — I63.9 CEREBRAL INFARCTION, UNSPECIFIED: ICD-10-CM

## 2019-04-20 DIAGNOSIS — N40.0 BENIGN PROSTATIC HYPERPLASIA WITHOUT LOWER URINARY TRACT SYMPTOMS: ICD-10-CM

## 2019-04-20 DIAGNOSIS — R56.9 UNSPECIFIED CONVULSIONS: ICD-10-CM

## 2019-04-20 DIAGNOSIS — Z29.9 ENCOUNTER FOR PROPHYLACTIC MEASURES, UNSPECIFIED: ICD-10-CM

## 2019-04-20 LAB
ALBUMIN SERPL ELPH-MCNC: 3.7 G/DL — SIGNIFICANT CHANGE UP (ref 3.3–5)
ALP SERPL-CCNC: 66 U/L — SIGNIFICANT CHANGE UP (ref 40–120)
ALT FLD-CCNC: 52 U/L — HIGH (ref 4–41)
ANION GAP SERPL CALC-SCNC: 14 MMO/L — SIGNIFICANT CHANGE UP (ref 7–14)
ANION GAP SERPL CALC-SCNC: 14 MMO/L — SIGNIFICANT CHANGE UP (ref 7–14)
APPEARANCE UR: SIGNIFICANT CHANGE UP
APTT BLD: 23 SEC — LOW (ref 27.5–36.3)
AST SERPL-CCNC: 19 U/L — SIGNIFICANT CHANGE UP (ref 4–40)
BACTERIA # UR AUTO: NEGATIVE — SIGNIFICANT CHANGE UP
BASE EXCESS BLDV CALC-SCNC: 1.2 MMOL/L — SIGNIFICANT CHANGE UP
BASE EXCESS BLDV CALC-SCNC: 1.2 MMOL/L — SIGNIFICANT CHANGE UP
BASE EXCESS BLDV CALC-SCNC: 1.6 MMOL/L — SIGNIFICANT CHANGE UP
BASE EXCESS BLDV CALC-SCNC: 5.3 MMOL/L — SIGNIFICANT CHANGE UP
BASOPHILS # BLD AUTO: 0.05 K/UL — SIGNIFICANT CHANGE UP (ref 0–0.2)
BASOPHILS NFR BLD AUTO: 0.5 % — SIGNIFICANT CHANGE UP (ref 0–2)
BILIRUB SERPL-MCNC: < 0.2 MG/DL — LOW (ref 0.2–1.2)
BILIRUB SERPL-MCNC: < 0.2 MG/DL — LOW (ref 0.2–1.2)
BILIRUB UR-MCNC: NEGATIVE — SIGNIFICANT CHANGE UP
BLOOD GAS VENOUS - CREATININE: 0.67 MG/DL — SIGNIFICANT CHANGE UP (ref 0.5–1.3)
BLOOD GAS VENOUS - CREATININE: 0.77 MG/DL — SIGNIFICANT CHANGE UP (ref 0.5–1.3)
BLOOD GAS VENOUS - CREATININE: 0.79 MG/DL — SIGNIFICANT CHANGE UP (ref 0.5–1.3)
BLOOD GAS VENOUS - CREATININE: 0.85 MG/DL — SIGNIFICANT CHANGE UP (ref 0.5–1.3)
BLOOD UR QL VISUAL: SIGNIFICANT CHANGE UP
BUN SERPL-MCNC: 15 MG/DL — SIGNIFICANT CHANGE UP (ref 7–23)
CALCIUM SERPL-MCNC: 9 MG/DL — SIGNIFICANT CHANGE UP (ref 8.4–10.5)
CALCIUM SERPL-MCNC: 9.4 MG/DL — SIGNIFICANT CHANGE UP (ref 8.4–10.5)
CHLORIDE BLDV-SCNC: 101 MMOL/L — SIGNIFICANT CHANGE UP (ref 96–108)
CHLORIDE BLDV-SCNC: 101 MMOL/L — SIGNIFICANT CHANGE UP (ref 96–108)
CHLORIDE BLDV-SCNC: 103 MMOL/L — SIGNIFICANT CHANGE UP (ref 96–108)
CHLORIDE BLDV-SCNC: 104 MMOL/L — SIGNIFICANT CHANGE UP (ref 96–108)
CHLORIDE SERPL-SCNC: 100 MMOL/L — SIGNIFICANT CHANGE UP (ref 98–107)
CHLORIDE SERPL-SCNC: 97 MMOL/L — LOW (ref 98–107)
CO2 SERPL-SCNC: 25 MMOL/L — SIGNIFICANT CHANGE UP (ref 22–31)
CO2 SERPL-SCNC: 26 MMOL/L — SIGNIFICANT CHANGE UP (ref 22–31)
COLOR SPEC: YELLOW — SIGNIFICANT CHANGE UP
CREAT SERPL-MCNC: 0.72 MG/DL — SIGNIFICANT CHANGE UP (ref 0.5–1.3)
EOSINOPHIL # BLD AUTO: 0.02 K/UL — SIGNIFICANT CHANGE UP (ref 0–0.5)
EOSINOPHIL NFR BLD AUTO: 0.2 % — SIGNIFICANT CHANGE UP (ref 0–6)
GAS PNL BLDV: 132 MMOL/L — LOW (ref 136–146)
GAS PNL BLDV: 134 MMOL/L — LOW (ref 136–146)
GAS PNL BLDV: 135 MMOL/L — LOW (ref 136–146)
GAS PNL BLDV: 138 MMOL/L — SIGNIFICANT CHANGE UP (ref 136–146)
GLUCOSE BLDV-MCNC: 149 — HIGH (ref 70–99)
GLUCOSE BLDV-MCNC: 157 — HIGH (ref 70–99)
GLUCOSE BLDV-MCNC: 199 — HIGH (ref 70–99)
GLUCOSE BLDV-MCNC: 199 — HIGH (ref 70–99)
GLUCOSE SERPL-MCNC: 151 MG/DL — HIGH (ref 70–99)
GLUCOSE UR-MCNC: NEGATIVE — SIGNIFICANT CHANGE UP
HCO3 BLDV-SCNC: 23 MMOL/L — SIGNIFICANT CHANGE UP (ref 20–27)
HCO3 BLDV-SCNC: 23 MMOL/L — SIGNIFICANT CHANGE UP (ref 20–27)
HCO3 BLDV-SCNC: 26 MMOL/L — SIGNIFICANT CHANGE UP (ref 20–27)
HCO3 BLDV-SCNC: SIGNIFICANT CHANGE UP MMOL/L (ref 20–27)
HCT VFR BLD CALC: 42.5 % — SIGNIFICANT CHANGE UP (ref 39–50)
HCT VFR BLD CALC: 42.8 % — SIGNIFICANT CHANGE UP (ref 39–50)
HCT VFR BLDV CALC: 41 % — SIGNIFICANT CHANGE UP (ref 39–51)
HCT VFR BLDV CALC: 42 % — SIGNIFICANT CHANGE UP (ref 39–51)
HCT VFR BLDV CALC: 44.9 % — SIGNIFICANT CHANGE UP (ref 39–51)
HCT VFR BLDV CALC: SIGNIFICANT CHANGE UP % (ref 39–51)
HGB BLD-MCNC: 13.5 G/DL — SIGNIFICANT CHANGE UP (ref 13–17)
HGB BLD-MCNC: 13.7 G/DL — SIGNIFICANT CHANGE UP (ref 13–17)
HGB BLDV-MCNC: 13.3 G/DL — SIGNIFICANT CHANGE UP (ref 13–17)
HGB BLDV-MCNC: 13.7 G/DL — SIGNIFICANT CHANGE UP (ref 13–17)
HGB BLDV-MCNC: 14.6 G/DL — SIGNIFICANT CHANGE UP (ref 13–17)
HGB BLDV-MCNC: SIGNIFICANT CHANGE UP G/DL (ref 13–17)
HYALINE CASTS # UR AUTO: NEGATIVE — SIGNIFICANT CHANGE UP
IMM GRANULOCYTES NFR BLD AUTO: 1.9 % — HIGH (ref 0–1.5)
INR BLD: 1.07 — SIGNIFICANT CHANGE UP (ref 0.88–1.17)
KETONES UR-MCNC: NEGATIVE — SIGNIFICANT CHANGE UP
LACTATE BLDV-MCNC: 3.2 MMOL/L — HIGH (ref 0.5–2)
LACTATE BLDV-MCNC: 4 MMOL/L — CRITICAL HIGH (ref 0.5–2)
LACTATE BLDV-MCNC: 4.5 MMOL/L — CRITICAL HIGH (ref 0.5–2)
LACTATE BLDV-MCNC: 5.6 MMOL/L — CRITICAL HIGH (ref 0.5–2)
LEUKOCYTE ESTERASE UR-ACNC: SIGNIFICANT CHANGE UP
LYMPHOCYTES # BLD AUTO: 1.12 K/UL — SIGNIFICANT CHANGE UP (ref 1–3.3)
LYMPHOCYTES # BLD AUTO: 10.7 % — LOW (ref 13–44)
MAGNESIUM SERPL-MCNC: 1.8 MG/DL — SIGNIFICANT CHANGE UP (ref 1.6–2.6)
MCHC RBC-ENTMCNC: 30.4 PG — SIGNIFICANT CHANGE UP (ref 27–34)
MCHC RBC-ENTMCNC: 31.1 PG — SIGNIFICANT CHANGE UP (ref 27–34)
MCHC RBC-ENTMCNC: 31.8 % — LOW (ref 32–36)
MCHC RBC-ENTMCNC: 32 % — SIGNIFICANT CHANGE UP (ref 32–36)
MCV RBC AUTO: 95.7 FL — SIGNIFICANT CHANGE UP (ref 80–100)
MCV RBC AUTO: 97.1 FL — SIGNIFICANT CHANGE UP (ref 80–100)
MONOCYTES # BLD AUTO: 0.76 K/UL — SIGNIFICANT CHANGE UP (ref 0–0.9)
MONOCYTES NFR BLD AUTO: 7.3 % — SIGNIFICANT CHANGE UP (ref 2–14)
NEUTROPHILS # BLD AUTO: 8.31 K/UL — HIGH (ref 1.8–7.4)
NEUTROPHILS NFR BLD AUTO: 79.4 % — HIGH (ref 43–77)
NITRITE UR-MCNC: NEGATIVE — SIGNIFICANT CHANGE UP
NRBC # FLD: 0 K/UL — SIGNIFICANT CHANGE UP (ref 0–0)
NRBC # FLD: 0 K/UL — SIGNIFICANT CHANGE UP (ref 0–0)
PCO2 BLDV: 51 MMHG — SIGNIFICANT CHANGE UP (ref 41–51)
PCO2 BLDV: 53 MMHG — HIGH (ref 41–51)
PCO2 BLDV: 53 MMHG — HIGH (ref 41–51)
PCO2 BLDV: 56 MMHG — HIGH (ref 41–51)
PH BLDV: 7.31 PH — LOW (ref 7.32–7.43)
PH BLDV: 7.32 PH — SIGNIFICANT CHANGE UP (ref 7.32–7.43)
PH BLDV: 7.34 PH — SIGNIFICANT CHANGE UP (ref 7.32–7.43)
PH BLDV: 7.38 PH — SIGNIFICANT CHANGE UP (ref 7.32–7.43)
PH UR: 6 — SIGNIFICANT CHANGE UP (ref 5–8)
PHOSPHATE SERPL-MCNC: 3.1 MG/DL — SIGNIFICANT CHANGE UP (ref 2.5–4.5)
PLATELET # BLD AUTO: 203 K/UL — SIGNIFICANT CHANGE UP (ref 150–400)
PLATELET # BLD AUTO: 205 K/UL — SIGNIFICANT CHANGE UP (ref 150–400)
PMV BLD: 10 FL — SIGNIFICANT CHANGE UP (ref 7–13)
PMV BLD: 10.5 FL — SIGNIFICANT CHANGE UP (ref 7–13)
PO2 BLDV: 22 MMHG — LOW (ref 35–40)
PO2 BLDV: 24 MMHG — LOW (ref 35–40)
PO2 BLDV: < 24 MMHG — LOW (ref 35–40)
PO2 BLDV: < 24 MMHG — LOW (ref 35–40)
POTASSIUM BLDV-SCNC: 4 MMOL/L — SIGNIFICANT CHANGE UP (ref 3.4–4.5)
POTASSIUM BLDV-SCNC: 4.2 MMOL/L — SIGNIFICANT CHANGE UP (ref 3.4–4.5)
POTASSIUM BLDV-SCNC: 4.7 MMOL/L — HIGH (ref 3.4–4.5)
POTASSIUM BLDV-SCNC: 4.7 MMOL/L — HIGH (ref 3.4–4.5)
POTASSIUM SERPL-MCNC: 4.2 MMOL/L — SIGNIFICANT CHANGE UP (ref 3.5–5.3)
POTASSIUM SERPL-MCNC: 4.6 MMOL/L — SIGNIFICANT CHANGE UP (ref 3.5–5.3)
POTASSIUM SERPL-SCNC: 4.2 MMOL/L — SIGNIFICANT CHANGE UP (ref 3.5–5.3)
POTASSIUM SERPL-SCNC: 4.6 MMOL/L — SIGNIFICANT CHANGE UP (ref 3.5–5.3)
PROT SERPL-MCNC: 6.6 G/DL — SIGNIFICANT CHANGE UP (ref 6–8.3)
PROT UR-MCNC: 20 — SIGNIFICANT CHANGE UP
PROTHROM AB SERPL-ACNC: 12.2 SEC — SIGNIFICANT CHANGE UP (ref 9.8–13.1)
RBC # BLD: 4.41 M/UL — SIGNIFICANT CHANGE UP (ref 4.2–5.8)
RBC # BLD: 4.44 M/UL — SIGNIFICANT CHANGE UP (ref 4.2–5.8)
RBC # FLD: 15.5 % — HIGH (ref 10.3–14.5)
RBC # FLD: 15.7 % — HIGH (ref 10.3–14.5)
RBC CASTS # UR COMP ASSIST: HIGH (ref 0–?)
SAO2 % BLDV: 19.8 % — LOW (ref 60–85)
SAO2 % BLDV: 22.9 % — LOW (ref 60–85)
SAO2 % BLDV: 25 % — LOW (ref 60–85)
SAO2 % BLDV: SIGNIFICANT CHANGE UP % (ref 60–85)
SODIUM SERPL-SCNC: 136 MMOL/L — SIGNIFICANT CHANGE UP (ref 135–145)
SODIUM SERPL-SCNC: 140 MMOL/L — SIGNIFICANT CHANGE UP (ref 135–145)
SP GR SPEC: 1.01 — SIGNIFICANT CHANGE UP (ref 1–1.04)
SQUAMOUS # UR AUTO: SIGNIFICANT CHANGE UP
UROBILINOGEN FLD QL: NORMAL — SIGNIFICANT CHANGE UP
WBC # BLD: 10.46 K/UL — SIGNIFICANT CHANGE UP (ref 3.8–10.5)
WBC # BLD: 9.41 K/UL — SIGNIFICANT CHANGE UP (ref 3.8–10.5)
WBC # FLD AUTO: 10.46 K/UL — SIGNIFICANT CHANGE UP (ref 3.8–10.5)
WBC # FLD AUTO: 9.41 K/UL — SIGNIFICANT CHANGE UP (ref 3.8–10.5)
WBC UR QL: >50 — HIGH (ref 0–?)

## 2019-04-20 PROCEDURE — 99223 1ST HOSP IP/OBS HIGH 75: CPT | Mod: GC

## 2019-04-20 PROCEDURE — 70450 CT HEAD/BRAIN W/O DYE: CPT | Mod: 26

## 2019-04-20 RX ORDER — DEXTROSE 50 % IN WATER 50 %
15 SYRINGE (ML) INTRAVENOUS ONCE
Qty: 0 | Refills: 0 | Status: DISCONTINUED | OUTPATIENT
Start: 2019-04-20 | End: 2019-04-24

## 2019-04-20 RX ORDER — DEXAMETHASONE 0.5 MG/5ML
4 ELIXIR ORAL EVERY 12 HOURS
Qty: 0 | Refills: 0 | Status: DISCONTINUED | OUTPATIENT
Start: 2019-04-24 | End: 2019-04-24

## 2019-04-20 RX ORDER — SODIUM CHLORIDE 9 MG/ML
500 INJECTION INTRAMUSCULAR; INTRAVENOUS; SUBCUTANEOUS ONCE
Qty: 0 | Refills: 0 | Status: DISCONTINUED | OUTPATIENT
Start: 2019-04-20 | End: 2019-04-20

## 2019-04-20 RX ORDER — SODIUM CHLORIDE 9 MG/ML
1000 INJECTION INTRAMUSCULAR; INTRAVENOUS; SUBCUTANEOUS ONCE
Qty: 0 | Refills: 0 | Status: COMPLETED | OUTPATIENT
Start: 2019-04-20 | End: 2019-04-20

## 2019-04-20 RX ORDER — LISINOPRIL 2.5 MG/1
20 TABLET ORAL DAILY
Qty: 0 | Refills: 0 | Status: DISCONTINUED | OUTPATIENT
Start: 2019-04-20 | End: 2019-04-24

## 2019-04-20 RX ORDER — LEVETIRACETAM 250 MG/1
1500 TABLET, FILM COATED ORAL
Qty: 0 | Refills: 0 | Status: DISCONTINUED | OUTPATIENT
Start: 2019-04-20 | End: 2019-04-24

## 2019-04-20 RX ORDER — DEXTROSE 50 % IN WATER 50 %
12.5 SYRINGE (ML) INTRAVENOUS ONCE
Qty: 0 | Refills: 0 | Status: DISCONTINUED | OUTPATIENT
Start: 2019-04-20 | End: 2019-04-24

## 2019-04-20 RX ORDER — GLUCAGON INJECTION, SOLUTION 0.5 MG/.1ML
1 INJECTION, SOLUTION SUBCUTANEOUS ONCE
Qty: 0 | Refills: 0 | Status: DISCONTINUED | OUTPATIENT
Start: 2019-04-20 | End: 2019-04-24

## 2019-04-20 RX ORDER — ENOXAPARIN SODIUM 100 MG/ML
40 INJECTION SUBCUTANEOUS EVERY 24 HOURS
Qty: 0 | Refills: 0 | Status: DISCONTINUED | OUTPATIENT
Start: 2019-04-20 | End: 2019-04-24

## 2019-04-20 RX ORDER — INSULIN LISPRO 100/ML
VIAL (ML) SUBCUTANEOUS AT BEDTIME
Qty: 0 | Refills: 0 | Status: DISCONTINUED | OUTPATIENT
Start: 2019-04-20 | End: 2019-04-24

## 2019-04-20 RX ORDER — DEXAMETHASONE 0.5 MG/5ML
8 ELIXIR ORAL EVERY 12 HOURS
Qty: 0 | Refills: 0 | Status: DISCONTINUED | OUTPATIENT
Start: 2019-04-21 | End: 2019-04-23

## 2019-04-20 RX ORDER — ASCORBIC ACID 60 MG
500 TABLET,CHEWABLE ORAL DAILY
Qty: 0 | Refills: 0 | Status: DISCONTINUED | OUTPATIENT
Start: 2019-04-20 | End: 2019-04-24

## 2019-04-20 RX ORDER — LACOSAMIDE 50 MG/1
200 TABLET ORAL
Qty: 0 | Refills: 0 | Status: DISCONTINUED | OUTPATIENT
Start: 2019-04-20 | End: 2019-04-24

## 2019-04-20 RX ORDER — NYSTATIN CREAM 100000 [USP'U]/G
1 CREAM TOPICAL
Qty: 0 | Refills: 0 | Status: DISCONTINUED | OUTPATIENT
Start: 2019-04-20 | End: 2019-04-24

## 2019-04-20 RX ORDER — LACOSAMIDE 50 MG/1
200 TABLET ORAL ONCE
Qty: 0 | Refills: 0 | Status: DISCONTINUED | OUTPATIENT
Start: 2019-04-20 | End: 2019-04-20

## 2019-04-20 RX ORDER — ZINC SULFATE TAB 220 MG (50 MG ZINC EQUIVALENT) 220 (50 ZN) MG
220 TAB ORAL DAILY
Qty: 0 | Refills: 0 | Status: DISCONTINUED | OUTPATIENT
Start: 2019-04-20 | End: 2019-04-24

## 2019-04-20 RX ORDER — DEXAMETHASONE 0.5 MG/5ML
12 ELIXIR ORAL ONCE
Qty: 0 | Refills: 0 | Status: COMPLETED | OUTPATIENT
Start: 2019-04-20 | End: 2019-04-20

## 2019-04-20 RX ORDER — INSULIN LISPRO 100/ML
VIAL (ML) SUBCUTANEOUS
Qty: 0 | Refills: 0 | Status: DISCONTINUED | OUTPATIENT
Start: 2019-04-20 | End: 2019-04-24

## 2019-04-20 RX ORDER — LEVETIRACETAM 250 MG/1
750 TABLET, FILM COATED ORAL
Qty: 0 | Refills: 0 | Status: DISCONTINUED | OUTPATIENT
Start: 2019-04-20 | End: 2019-04-20

## 2019-04-20 RX ORDER — DEXAMETHASONE 0.5 MG/5ML
2 ELIXIR ORAL
Qty: 0 | Refills: 0 | Status: DISCONTINUED | OUTPATIENT
Start: 2019-04-20 | End: 2019-04-20

## 2019-04-20 RX ORDER — SODIUM CHLORIDE 9 MG/ML
1000 INJECTION, SOLUTION INTRAVENOUS
Qty: 0 | Refills: 0 | Status: DISCONTINUED | OUTPATIENT
Start: 2019-04-20 | End: 2019-04-24

## 2019-04-20 RX ORDER — FINASTERIDE 5 MG/1
5 TABLET, FILM COATED ORAL DAILY
Qty: 0 | Refills: 0 | Status: DISCONTINUED | OUTPATIENT
Start: 2019-04-20 | End: 2019-04-24

## 2019-04-20 RX ORDER — CEFTRIAXONE 500 MG/1
1 INJECTION, POWDER, FOR SOLUTION INTRAMUSCULAR; INTRAVENOUS ONCE
Qty: 0 | Refills: 0 | Status: COMPLETED | OUTPATIENT
Start: 2019-04-20 | End: 2019-04-20

## 2019-04-20 RX ORDER — SENNA PLUS 8.6 MG/1
2 TABLET ORAL AT BEDTIME
Qty: 0 | Refills: 0 | Status: DISCONTINUED | OUTPATIENT
Start: 2019-04-20 | End: 2019-04-24

## 2019-04-20 RX ORDER — PANTOPRAZOLE SODIUM 20 MG/1
40 TABLET, DELAYED RELEASE ORAL
Qty: 0 | Refills: 0 | Status: DISCONTINUED | OUTPATIENT
Start: 2019-04-20 | End: 2019-04-24

## 2019-04-20 RX ORDER — DEXTROSE 50 % IN WATER 50 %
25 SYRINGE (ML) INTRAVENOUS ONCE
Qty: 0 | Refills: 0 | Status: DISCONTINUED | OUTPATIENT
Start: 2019-04-20 | End: 2019-04-24

## 2019-04-20 RX ORDER — ACETAMINOPHEN 500 MG
650 TABLET ORAL EVERY 6 HOURS
Qty: 0 | Refills: 0 | Status: DISCONTINUED | OUTPATIENT
Start: 2019-04-20 | End: 2019-04-24

## 2019-04-20 RX ORDER — DEXAMETHASONE 0.5 MG/5ML
12 ELIXIR ORAL ONCE
Qty: 0 | Refills: 0 | Status: DISCONTINUED | OUTPATIENT
Start: 2019-04-20 | End: 2019-04-20

## 2019-04-20 RX ADMIN — SODIUM CHLORIDE 2000 MILLILITER(S): 9 INJECTION INTRAMUSCULAR; INTRAVENOUS; SUBCUTANEOUS at 04:50

## 2019-04-20 RX ADMIN — FINASTERIDE 5 MILLIGRAM(S): 5 TABLET, FILM COATED ORAL at 12:21

## 2019-04-20 RX ADMIN — LEVETIRACETAM 1500 MILLIGRAM(S): 250 TABLET, FILM COATED ORAL at 12:21

## 2019-04-20 RX ADMIN — NYSTATIN CREAM 1 APPLICATION(S): 100000 CREAM TOPICAL at 17:14

## 2019-04-20 RX ADMIN — Medication 12 MILLIGRAM(S): at 14:40

## 2019-04-20 RX ADMIN — PANTOPRAZOLE SODIUM 40 MILLIGRAM(S): 20 TABLET, DELAYED RELEASE ORAL at 12:22

## 2019-04-20 RX ADMIN — CEFTRIAXONE 100 GRAM(S): 500 INJECTION, POWDER, FOR SOLUTION INTRAMUSCULAR; INTRAVENOUS at 07:02

## 2019-04-20 RX ADMIN — LISINOPRIL 20 MILLIGRAM(S): 2.5 TABLET ORAL at 15:00

## 2019-04-20 RX ADMIN — Medication 1 TABLET(S): at 12:21

## 2019-04-20 RX ADMIN — ZINC SULFATE TAB 220 MG (50 MG ZINC EQUIVALENT) 220 MILLIGRAM(S): 220 (50 ZN) TAB at 13:31

## 2019-04-20 RX ADMIN — Medication 500 MILLIGRAM(S): at 12:21

## 2019-04-20 RX ADMIN — LACOSAMIDE 200 MILLIGRAM(S): 50 TABLET ORAL at 04:01

## 2019-04-20 RX ADMIN — LEVETIRACETAM 1500 MILLIGRAM(S): 250 TABLET, FILM COATED ORAL at 17:13

## 2019-04-20 RX ADMIN — LACOSAMIDE 200 MILLIGRAM(S): 50 TABLET ORAL at 17:13

## 2019-04-20 RX ADMIN — ENOXAPARIN SODIUM 40 MILLIGRAM(S): 100 INJECTION SUBCUTANEOUS at 12:28

## 2019-04-20 NOTE — H&P ADULT - PROBLEM SELECTOR PLAN 5
- History of CVA in 2013 with residual L sided weakness  - Not on antiplatelet due to history of craniotomy  - Unclear why patient not on statin, but likely limited benefits now as patient has poor prognosis from GBM - -140/80-90, at goal  - Continue with home medication Lisinopril 20 mg with holding parameters  - Monitor BP

## 2019-04-20 NOTE — H&P ADULT - PROBLEM SELECTOR PLAN 6
- -140/80-90, at goal  - Continue with home medication Lisinopril 20 mg with holding parameters  - Monitor BP - History of CVA in 2013 with residual L sided weakness  - Not on antiplatelet due to history of craniotomy  - Unclear why patient not on statin, but likely limited benefits now as patient has poor prognosis from GBM

## 2019-04-20 NOTE — H&P ADULT - ATTENDING COMMENTS
Agree with above. Discussed with neurology.  continue Vimpat. At this point will not perform vEEG as will not change managemtn unless is actively seizing.  Per neuro re: need for inpatient monitoring.     #Lactic acidosis  -likely in setting of seizure, no signs of infectious etiology.

## 2019-04-20 NOTE — H&P ADULT - NSHPREVIEWOFSYSTEMS_GEN_ALL_CORE
Constitutional: No fever, or chills. No recent weight loss or weight gain.   HEENT: No dry eyes or eye irritation. No postnasal drip or nasal congestion.  CV: No chest pain, or palpitations. No orthopnea.   Resp: No cough, or sputum production. No shortness of breath or dyspnea on exertion.   GI: No nausea or vomiting. No diarrhea or constipation. No abdominal pain.   : No dysuria, no nocturia or increased urinary frequency.  Musculoskeletal: No back pain, no myalgias  Skin: No rash or itchiness.   Neurological: No headache or dizziness. No syncope, no weakness, numbness.  Psychiatric: Denies depressed mood.   Endocrine: No cold or heat intolerance. No dry skin.  Hematologic/Lymphatic: No anemia or bleeding problem. Constitutional: No fever, or chills. No recent weight loss or weight gain.   HEENT: No dry eyes or eye irritation. No postnasal drip or nasal congestion.  CV: No chest pain, or palpitations   Resp: No cough, or sputum production. No shortness of breath    GI: No nausea or vomiting. No diarrhea or constipation. No abdominal pain.   : No dysuria, no nocturia or increased urinary frequency.  Musculoskeletal: No back pain   Skin: No rash    Neurological: No headache   Psychiatric: Denies depressed mood.   Hematologic/Lymphatic: No bleeding problem.

## 2019-04-20 NOTE — ED PROVIDER NOTE - CPE EDP ENMT NORM
Esophagogastroduodenoscopy Procedure Note    Place of Service: [facility] [address] Day Surgery    Patient Name: Pam Sears    Date of Procedure: 2/22/2017    Surgeon: Cy Vaughan MD    Referring Physician: No ref. provider found    Primary Care Provider: Woodrow Frank MD    Operative Procedure: EGD - esophagogastroduodenoscopy    Preoperative Diagnosis: Dysphagia    Anesthesia Medications administered: MAC as per Anesthesia and followed by general anesthesia  Lidocaine 4% 15 mL gargle was notadministered.     Endoscope: [id number]     Accessories: None    I have discussed the risks, benefits, and alternatives to upper endoscopy/enteroscopy with the patient [who demonstrated understanding], including but not limited to the risks of bleeding, infection, pain, death, as well as the risks of anesthesia and perforation all leading to prolonged hospitalization, surgical intervention, or even death. I also specifically mentioned the miss rate of upper endoscopy of 5-10% in the best of all circumstances. All questions were answered to the patient’s satisfaction. The patient signed informed consent and elected to proceed with upper endoscopy/enteroscopy with intervention [i.e. polypectomy, ablation, stent placement, etc.] as indicated.    Procedure Description:?The patient was placed in the left lateral position and monitored continuously with automatic blood pressure, ECG tracing, pulse oximetry monitoring and direct observations. Bite block placed and oxygen administered by a nasal cannula as needed. Medications were administered incrementally over the course of the procedure to achieve an adequate level of conscious sedation. After adequate sedation, the endoscope was carefully introduced into the oropharynx and passed in to the esophagus.? There was normal pharyngeal and laryngeal structure.  The esophagus and GE junction were carefully examined. After advancement of the endoscope into  the stomach, a careful examination was performed, including views of the antrum, incisura angularis, corpus and retroflexed views of the cardia and fundus.?   The pylorus was then intubated without any difficulty and the endoscope was advanced to the second portion of the duodenum. Careful examination of the second portion of the duodenum and the bulb was then performed. Findings and intervention are detailed below. The stomach was then decompressed and the endoscope withdrawn.  Overall, the patient tolerated the procedure well without undue discomfort, hypotension or desaturation. The patient recovered in the observation area and was discharged when adequately recovered.?    Findings:     Esophagus:   The Z-line was measured at 36 cm, GEJ at 36 cm and hiatus at 36 cm from the incisors.?  The esophagus was full of undigested food and liquid. We initially were able to push some of the food in the stomach and we then advanced the scope to the duodenum. After withdrawing the scope to the esophagus, it appeared to be tortious with what appeared to multiple rings/web especially in the upper esophagus with no esophageal motility noted. We then decided the patient to be intubated to protect the airway. After the intubation the scope was reintroduced and we were able to push most of the food to the stomach. Again no motility was noted. Due to presence of food since morning the distal esophagus appeared to be erythematous with some friability    Stomach:  Normal    Duodenum/small bowel:   Normal    Other          Complications: no    Impression:   1. tortious esophagus with likely achalasia vs esophageal webs causing food impaction     Recommendations:   Continue IV PPI drip, hold plavix, for now, we will proceed with EGD tomorrow to assess the esophagus, NPO for now  After EGD we will consider a esophagogram to rule out achalasia       - - -

## 2019-04-20 NOTE — H&P ADULT - PROBLEM SELECTOR PLAN 3
- Last A1C 7.7 in 02/2019  - Continue with ISS and monitor FS  - Continue with Carb consistent diet - Last A1C 7.7 in 02/2019  - Holding home Metformin   - Continue with ISS and monitor FS  - Continue with Carb consistent diet - Lactate elevated at 4.0, repeat 5.6  - Likely in the setting of recent seizure, less likely - Lactate elevated at 4.0, repeat 5.6  - Likely in the setting of recent seizure, less likely type A lactic acidosis  - Continue to monitor lactate level - Lactate elevated at 4.0, repeat 5.6  - Likely in the setting of recent seizure, less likely type A lactic acidosis

## 2019-04-20 NOTE — H&P ADULT - PROBLEM SELECTOR PLAN 8
- DVT prophylaxis: Lovenox, no hemorrhage on CT head and patient is high risk for DVT and PE  - GI prophylaxis: PPI PO  - Diet: Low salt, CC diet with supplement  - Dispo: PT ordered    Danielle Fernandes PGY-2  Internal medicine Day admit  Pager [phone] - DVT prophylaxis: Lovenox, no hemorrhage on CT head and patient is high risk for DVT and PE  - GI prophylaxis: PPI PO in the setting of chronic steroid use  - Diet: Low salt, CC diet with supplement  - Dispo: PT ordered    Danielle Fernandes PGY-2  Internal medicine Day admit  Pager [phone]

## 2019-04-20 NOTE — H&P ADULT - PROBLEM SELECTOR PLAN 4
- Continue with Finasteride - Last A1C 7.7 in 02/2019  - Holding home Metformin   - Continue with ISS and monitor FS  - Continue with Carb consistent diet

## 2019-04-20 NOTE — H&P ADULT - PROBLEM SELECTOR PLAN 7
- DVT prophylaxis: Lovenox, no hemorrhage on CT head and patient is high risk for DVT and PE  - GI prophylaxis: PPI PO  - Diet: Low salt, CC diet with supplement    Danielle Fernandes PGY-2  Internal medicine Day admit  Pager [phone] - DVT prophylaxis: Lovenox, no hemorrhage on CT head and patient is high risk for DVT and PE  - GI prophylaxis: PPI PO  - Diet: Low salt, CC diet with supplement  - Dispo: PT ordered    Danielle Fernandes PGY-2  Internal medicine Day admit  Pager [phone] - Continue with Finasteride - Continue with Finasteride  - Monitor UOP

## 2019-04-20 NOTE — H&P ADULT - NSHPPHYSICALEXAM_GEN_ALL_CORE
Vital Signs Last 24 Hrs  T(C): 36.4 (20 Apr 2019 08:56), Max: 36.6 (19 Apr 2019 22:04)  T(F): 97.5 (20 Apr 2019 08:56), Max: 97.9 (19 Apr 2019 22:04)  HR: 64 (20 Apr 2019 08:56) (64 - 78)  BP: 143/97 (20 Apr 2019 08:56) (101/79 - 143/97)  BP(mean): --  RR: 17 (20 Apr 2019 08:56) (14 - 17)  SpO2: 95% (20 Apr 2019 08:56) (95% - 100%)    CAPILLARY BLOOD GLUCOSE      POCT Blood Glucose.: 115 mg/dL (20 Apr 2019 00:44)      PHYSICAL EXAM:  General: Alert and cooperative. Not in acute stress. Well developed, well nourished.   Head: Normocephalic, no mass and lesions.  Eyes: Intact visual fields. PERRLA, clear conjunctiva. EOMI, no ptosis.   Throat: Oral cavity and pharynx normal. No inflammation, swelling, exudate, or lesions. Teeth and gingiva in good general condition.  Neck: No lymphadenopathy, no masses, no thyromegaly. Carotid pulses 2+. No JVD.   Respiratory: Bilateral lung clear to auscultation, no crackles, no wheezes, no rhonchi.   Cardiovascular: S1/S2 auscultated, no murmur, or gallop. Rhythm is regular. There is no peripheral edema, cyanosis or pallor. Extremities are warm and well perfused. Capillary refill is less than 2 seconds. No carotid bruits.  Abdomen: Soft, non-tender, nondistended, no guarding or rebound tenderness. Active bowel sounds in all 4 quadrants. No hepatosplenomegaly.   Musculoskeletal: Adequately aligned spine. ROM intact spine and extremities. No joint erythema or tenderness. Normal muscular development. Normal gait.   Extremities: No significant deformity or joint abnormality. Peripheral pulses intact. No varicosities. No peripheral edema, atrophy.   Skin: Intact, no rash. Normal color, texture and turgor with no lesions or eruptions.  Neurological: AOAx4. CN2-12 grosslly intact. Strength and sensation symmetric and intact throughout. Reflexes 2+ throughout. Cerebellar testing normal.  Psychiatry: The mental examination revealed the patient was oriented to person, place, and time. The patient was able to demonstrate good judgement and reason, without hallucinations, abnormal affect or abnormal behaviors during the examination. Patient is not suicidal. Vital Signs Last 24 Hrs  T(C): 36.4 (20 Apr 2019 08:56), Max: 36.6 (19 Apr 2019 22:04)  T(F): 97.5 (20 Apr 2019 08:56), Max: 97.9 (19 Apr 2019 22:04)  HR: 64 (20 Apr 2019 08:56) (64 - 78)  BP: 143/97 (20 Apr 2019 08:56) (101/79 - 143/97)  BP(mean): --  RR: 17 (20 Apr 2019 08:56) (14 - 17)  SpO2: 95% (20 Apr 2019 08:56) (95% - 100%)    CAPILLARY BLOOD GLUCOSE      POCT Blood Glucose.: 115 mg/dL (20 Apr 2019 00:44)      PHYSICAL EXAM:  GENERAL: NAD, well-developed  HEAD:  Atraumatic, Normocephalic. Post surgical scar noted  EYES: EOMI, PERRLA, conjunctiva and sclera clear  NECK: Supple, No JVD  CHEST/LUNG: Clear to auscultation bilaterally; No wheeze  HEART: Regular rate and rhythm; No murmurs, rubs, or gallops  ABDOMEN: Soft, Nontender, Nondistended; Bowel sounds present  EXTREMITIES:  2+ Peripheral Pulses, No clubbing, cyanosis, or edema  PSYCH: AAOx3  NEUROLOGY: LLE strength 4/5 compared to RLE strength which is 5/5. CN II to XII grossly normal.   SKIN: No rashes or lesions Vital Signs Last 24 Hrs  T(C): 36.4 (20 Apr 2019 08:56), Max: 36.6 (19 Apr 2019 22:04)  T(F): 97.5 (20 Apr 2019 08:56), Max: 97.9 (19 Apr 2019 22:04)  HR: 64 (20 Apr 2019 08:56) (64 - 78)  BP: 143/97 (20 Apr 2019 08:56) (101/79 - 143/97)  BP(mean): --  RR: 17 (20 Apr 2019 08:56) (14 - 17)  SpO2: 95% (20 Apr 2019 08:56) (95% - 100%)    CAPILLARY BLOOD GLUCOSE      POCT Blood Glucose.: 115 mg/dL (20 Apr 2019 00:44)      PHYSICAL EXAM:  GENERAL: NAD, well-developed  HEAD:  Atraumatic, Normocephalic. Post surgical scar noted  EYES: EOMI,  conjunctiva and sclera clear  NECK: Supple, No JVD  CHEST/LUNG: Clear to auscultation bilaterally; No wheeze  HEART: Regular rate and rhythm; No murmurs, rubs, or gallops  ABDOMEN: Soft, Nontender, Nondistended; Bowel sounds present  EXTREMITIES:  2+ Peripheral Pulses, No clubbing, cyanosis, or edema  PSYCH: AAOx3  NEUROLOGY: LLE strength 4/5 compared to RLE strength which is 5/5. CN II to XII grossly normal.   SKIN: No rashes or lesions

## 2019-04-20 NOTE — CONSULT NOTE ADULT - ASSESSMENT
74 year old male presenting with breakthrough seizure. Patient has PMH of GBM s/p right craniotomy in 2019, CVA with residual L sided weakness. Denies fever, chills, CP, SOB, abdominal pain, n/v, worsening weakness, numbness/tingling.  Neuro exam with left sided weakness, stable since last presentation    Breakthrough seizure    Recommend:  As per Dr. Perales's prior note, continue keppra 1500 BID, increase vimpat to 200 BID, continue dexamethasone 2 BID  CTH  Rule out infectious/metabolic derangements

## 2019-04-20 NOTE — ED ADULT NURSE REASSESSMENT NOTE - NS ED NURSE REASSESS COMMENT FT1
Pt confused A&Ox1. Pt given food, resting comfortably in bed. Pt appears stable and comfortable. Sinus rhythm on monitor. respirations equal, nonlabored, no sign of respiratory distress. Denies any complaints at this time. Vitally stable. Medicated as per orders. Will reassess

## 2019-04-20 NOTE — ED PROVIDER NOTE - CARE PLAN
Principal Discharge DX:	Seizure Principal Discharge DX:	Seizure  Secondary Diagnosis:	UTI (urinary tract infection)

## 2019-04-20 NOTE — H&P ADULT - PROBLEM SELECTOR PROBLEM 3
Type 2 diabetes mellitus without complication, unspecified whether long term insulin use Elevated lactic acid level

## 2019-04-20 NOTE — CONSULT NOTE ADULT - SUBJECTIVE AND OBJECTIVE BOX
Neurology Consult    Reason for consult: Seizure    HPI: Patient is a 74 year old male presenting with breakthrough seizure. Patient has PMH of GBM s/p right craniotomy in 2019, CVA with residual L sided weakness. Denies fever, chills, CP, SOB, abdominal pain, n/v, worsening weakness, numbness/tingling.    REVIEW OF SYSTEMS:  Constitutional: No fever, chills, fatigue, weakness.  Eyes: No eye pain, visual disturbances, or discharge.  ENT:  No difficulty hearing, tinnitus, vertigo. No sinus or throat pain.  Neck: No pain or stiffness.  Respiratory: No cough, dyspnea, wheezing.  Cardiovascular: No chest pain, palpitations.  Gastrointestinal: No abdominal pain. No nausea, vomiting, diarrhea, or constipation.   Genitourinary: No dysuria, frequency, hematuria or incontinence.  Neurological: No headaches, lightheadedness, vertigo, numbness or tremors.  Psychiatric: No depression, anxiety, mood swings, or difficulty sleeping.  Musculoskeletal: No joint pain or swelling. No muscle, back, or extremity pain.  Skin: No itching, burning, rashes or lesions.   Lymph Nodes: No enlarged glands  Endocrine: No heat or cold intolerance, no hair loss.  Allergy and Immunologic: No hives or eczema.    MEDICATIONS  Keppra 1500 BID  Dexamethasone 2 mg BID  Vimpat 150 BID    PMH: Oxygen dependent  Vitamin deficiency  Constipation  Pulmonary embolism  Glioblastoma  BPH (benign prostatic hyperplasia)  CVA (cerebral vascular accident)  Diabetes  HTN (hypertension)     PSH: S/P craniotomy  S/P craniotomy  No significant past surgical history    FAMILY HISTORY:  No pertinent family history in first degree relatives    SOCIAL HISTORY:  No history of tobacco or alcohol use     Allergies  No Known Allergies    Vital Signs Last 24 Hrs  T(C): 36.3 (19 Apr 2019 23:59), Max: 36.8 (19 Apr 2019 05:14)  T(F): 97.4 (19 Apr 2019 23:59), Max: 98.2 (19 Apr 2019 05:14)  HR: 75 (19 Apr 2019 23:59) (74 - 76)  BP: 137/98 (19 Apr 2019 23:59) (101/79 - 144/87)  RR: 14 (19 Apr 2019 23:59) (14 - 16)  SpO2: 98% (19 Apr 2019 23:59) (98% - 100%)    Neurological Examination:    Mental Status: Patient is alert, awake, oriented to self and place. Thinks it is July 2019. Patient is fluent, no dysarthria, no aphasia. Follows commands well and able to answer questions appropriately. Mood and affect normal.    Cranial Nerves: PERRL, EOMI, visual field intact, V1-V3 intact, no gross facial asymmetry, tongue/uvula midline    Motor Exam: No drift  Right upper extremity: 5/5  Left upper extremity: deltoid 4+/5  Right lower extremity: 5/5  Left lower extremity: hip flexion 4+/5    Normal bulk/tone    Sensory: Intact to light touch bilaterally. No extinction    Coordination: Finger to nose intact bilaterally     GENERAL: No acute distress  HEENT:  Normocephalic, atraumatic  EXTREMITIES: No edema, clubbing, cyanosis  MUSCULOSKELETAL: Normal range of motion  SKIN: No rashes    LABS:      04-20    136  |  97<L>  |  x   ----------------------------<  x   4.6   |  25  |  x     Ca    9.0      20 Apr 2019 00:20    TPro  x   /  Alb  x   /  TBili  < 0.2<L>  /  DBili  x   /  AST  x   /  ALT  x   /  AlkPhos  x   04-20

## 2019-04-20 NOTE — H&P ADULT - PROBLEM SELECTOR PROBLEM 4
BPH (benign prostatic hyperplasia) Type 2 diabetes mellitus without complication, unspecified whether long term insulin use

## 2019-04-20 NOTE — H&P ADULT - NSHPLABSRESULTS_GEN_ALL_CORE
LABS:                        13.5   10.46 )-----------( 205      ( 2019 00:20 )             42.5     Hgb Trend: 13.5<--, 13.2<--, 13.1<--      136  |  97<L>  |  x   ----------------------------<  x   4.6   |  25  |  x     Ca    9.0      2019 00:20    TPro  x   /  Alb  x   /  TBili  < 0.2<L>  /  DBili  x   /  AST  x   /  ALT  x   /  AlkPhos  x       Creatinine Trend: 0.68<--, 0.76<--, 0.79<--, 0.63<--, 0.72<--, 0.68<--    Urinalysis Basic - ( 2019 02:00 )    Color: YELLOW / Appearance: Lt TURBID / S.015 / pH: 6.0  Gluc: NEGATIVE / Ketone: NEGATIVE  / Bili: NEGATIVE / Urobili: NORMAL   Blood: TRACE / Protein: 20 / Nitrite: NEGATIVE   Leuk Esterase: LARGE / RBC: 11-25 / WBC >50   Sq Epi: OCC / Non Sq Epi: x / Bacteria: NEGATIVE        Venous Blood Gas:   @ 05:40  7.32/53/< 24/23/25.0  VBG Lactate: 5.6  Venous Blood Gas:   @ 03:48  7.34/51/24/Insufficient quant/Insufficient quant  VBG Lactate: 4.5  Venous Blood Gas:   @ 00:20  7.31/56/22/23/22.9  VBG Lactate: 4.0      < from: CT Head No Cont (19 @ 02:14) >    FINDINGS:     Right pterional craniotomy with improving postsurgical changes of the   right temporal lobe and decreased subarachnoid/subdural hemorrhage and   surrounding edema since 3/29/2019. Residual droplet of pneumocephalus. No   new areas of hemorrhage or mass effect. No midline shift.    Enlargement of the ventricles and sulci compatible with age-related   cerebral volume loss. Old right thalamic lacunar infarct.  Patchy white matter heterogeneity is nonspecific but likely reflects   chronic microvascular disease.    The visualized paranasal sinuses are clear. The mastoid air cells are   clear.  The soft tissues ofthe scalp are unremarkable.    IMPRESSION:     No new intracranial hemorrhage. Right pterional craniotomy with improving   postsurgical changes of the right temporal lobe. No new hemorrhage, mass   effect or midline shift.    If symptoms persist, correlate with neurologic evaluation to determine if   further evaluation with contrast-enhanced brain MRI is warranted, if   there are no contraindications.    < end of copied text >

## 2019-04-20 NOTE — H&P ADULT - PROBLEM SELECTOR PLAN 2
- First diagnosed in 10/2018 s/p 1st resection on 11/28/2018, pathology showing grade 4 GBM complicated by recurrence of the tumor with 2nd right craniotomy 02/27/2019 with post op complicated by right frontal SAH  - s/p 5 XRT 4/15-4/19 - First diagnosed in 10/2018 s/p 1st resection on 11/28/2018, pathology showing grade 4 GBM complicated by recurrence of the tumor with 2nd right craniotomy 02/27/2019 with post op complicated by right frontal SAH  - s/p 5 XRT 4/15-4/19  - CT head showing no new intracranial hemorrhage, right pterional craniotomy with improving postsurgical changes of the right temporal lobe   - Continue with Decadron 2 mg BID  - Fall, seizure prophylaxis  - Guarded prognosis - First diagnosed in 10/2018 s/p 1st resection on 11/28/2018, pathology showing grade 4 GBM complicated by recurrence of the tumor with 2nd right craniotomy 02/27/2019 with post op complicated by right frontal SAH  - s/p 5 XRT 4/15-4/19  - CT head showing no new intracranial hemorrhage, right pterional craniotomy with improving postsurgical changes of the right temporal lobe   - Ordered for Decadron 12 mg now, continue with Decadron 8 mg Q12H for 3 days, then Decadron 4 mg Q12H afterwards  - Fall, seizure prophylaxis  - Guarded prognosis

## 2019-04-20 NOTE — ED ADULT NURSE REASSESSMENT NOTE - NS ED NURSE REASSESS COMMENT FT1
Pt turned, cleaned and positioned. Skin intact. Pt now resting comfortably in bed. Denies any complaints at this time. respirations equal, nonlabored, no sign of respiratory distress. Sinus rhythm on monitor. Safety maintained, side rails up in bed. Pt stable, will reassess Pt arrived with 1L NS running through IV. 20 gauge IV noted to the left hand. Pt turned, cleaned and positioned. Skin intact. Pt now resting comfortably in bed. Denies any complaints at this time. respirations equal, nonlabored, no sign of respiratory distress. Sinus rhythm on monitor. Safety maintained, side rails up in bed. Pt stable, will reassess
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
## 2019-04-20 NOTE — ED PROVIDER NOTE - OBJECTIVE STATEMENT
74M h/o DM2,HTN, CVA, GBM s/p resection and radiation w seizures presents from [facility] with seizure. He was just admitted for a course of radiation, discharged today and had a seizure this evening. By NH report, he took his keppra dose this evening, had a 4min seizure witnessed at approximately 9pm. Patient now feels well no complaints of pain. Review of records reveals he had no seizures during recent hospitalization, current meds include Keppra, lacosamide and dexamethasone.

## 2019-04-20 NOTE — H&P ADULT - HISTORY OF PRESENT ILLNESS
Patient is a 74 year old man with history of T2DM (last A1C 7.7 in 02/2019), HTN, CVA in 2013 with residual left sided weakness, R temporal grade 4 GBM s/p 1st resection on 11/28/2018 complicated by recurrence of the tumor with 2nd right craniotomy 02/27/2019 with post op complicated by right frontal SAH, recurrent seizure, DVT s/p IVC filter presented with seizure at rehab.     In terms of patient's seizure, he was previously on Keppra 1500 mg BID and Vimpat 100 mg BID for seizure prophylaxis and transferred to rehab after his 2nd craniotomy. While he was at [facility], he had seizure and was sent to [facility] and sent back to rehab after being seizure free. He had recurrent seizure in rehab and was thus admitted to [facility] from 3/29-4/2, in which his Vimpat dose was increased to 150 mg BID. Patient was then admitted to [facility] from 4/3 to 4/19, in which he underwent 5 sessions of XRT (4/15-4/19) during the admission. EEG was performed during the admission was found to have no epileptiform, mild to moderate nonspecific diffuse or multifocal cerebral dysfunction. Neurology was consulted for antiepileptic medication management and was no medication change was made. Patient remained seizure free during the hospital. Patient was discharged to [facility] on the morning of 4/19. Per rehab report, patient was noted to have 4 min generalized tonic clonic seizure around 9 pm on 4/19 and was thus brought to the ED.     Of note, ethics committee was involved during the last admission as patient's family was having difficulty deciding who would be the healthcare proxy. Patient was deemed to have capacity for medical decisions. Patient's Son Catarino, who currently resides in [place], is patient's healthcare proxy.     In the ED, initial vitals were Temp 36.6, HR 70-80, -140/80-90, RR 14-17, O2 saturation >95% on room air. He received Lacosamide 200 mg x1, 1 L NS bolus and 1g of Ceftriaxone in the ED. Patient is a 74 year old man with history of T2DM (last A1C 7.7 in 02/2019), HTN, CVA in 2013 with residual left sided weakness, R temporal grade 4 GBM s/p 1st resection on 11/28/2018 complicated by recurrence of the tumor with 2nd right craniotomy 02/27/2019 with post op complicated by right frontal SAH, recurrent seizure, DVT s/p IVC filter presented with seizure at rehab.     In terms of patient's seizure, he was previously on Keppra 1500 mg BID and Vimpat 100 mg BID for seizure prophylaxis and transferred to rehab after his 2nd craniotomy. While he was at [facility], he had seizure and was sent to [facility] and sent back to rehab after being seizure free. He had recurrent seizure in rehab and was thus admitted to [facility] from 3/29-4/2, in which his Vimpat dose was increased to 150 mg BID. Patient was then admitted to [facility] from 4/3 to 4/19, in which he underwent 5 sessions of XRT (4/15-4/19) during the admission. EEG was performed during the admission was found to have no epileptiform, mild to moderate nonspecific diffuse or multifocal cerebral dysfunction. Neurology was consulted for antiepileptic medication management and was no medication change was made. Patient remained seizure free during the hospital. [facility] was discharged [facility] on the morning of 4/19. Per rehab report, patient was noted to have 4 min generalized tonic clonic seizure around 9 pm on 4/19 and was thus brought to the ED.     Patient was seen on the medicine floor, resting comfortable. He does not recall his seizure episode. He said he was sleeping on his bed and then did not remember anything until he was in the ED. He denies chest pain or shortness of breath. He endorses regular BM, no nausea or vomiting, tolerating diet well. He denies dysuria or polyuria. He denies fever or chills.     Of note, ethics committee was involved during the last admission as patient's family was having difficulty deciding who would be the healthcare proxy. Patient was deemed to have capacity for medical decisions. Patient's Son Catarino, who currently resides in [place], is patient's healthcare proxy.     In the ED, initial vitals were Temp 36.6, HR 70-80, -140/80-90, RR 14-17, O2 saturation >95% on room air. He received Lacosamide 200 mg x1, 1 L NS bolus and 1g of Ceftriaxone in the ED.

## 2019-04-20 NOTE — PATIENT PROFILE ADULT - PRIMARY ROLES/RESPONSIBILITIES
Quality 130: Documentation Of Current Medications In The Medical Record: Current Medications with Name, Dosage, Frequency, or Route not Documented, Reason not Given Detail Level: Detailed Quality 402: Tobacco Use And Help With Quitting Among Adolescents: Patient screened for tobacco and never smoked Quality 431: Preventive Care And Screening: Unhealthy Alcohol Use - Screening: Patient screened for unhealthy alcohol use using a single question and scores less than 2 times per year none

## 2019-04-20 NOTE — CONSULT NOTE ADULT - ATTENDING COMMENTS
patient seen and examined with housestamarily on 4/20/19  IMAGING reviewed  [facility] chart reviewed  [facility] reports reviewed  [facility]    Briefly, 75 yo RH man with PMHx right temporo-frontal GBM (IDH non mutant, MGMT status unknown), s/p resection 11/28/2018 ([facility]), then patient and family spent some time deciding between care here at [facility] vs care in [place]. In the end, chose [facility], saw Dr Strickland and was set up for re-resection as tumor has recurred since resection 11/28/18. He completed re-resection on 2/27/19 and contacted rad onc on 4/2/19 that treatment could not be given due to admissions for seizures at OSH.    Patient is now on Keppra 8217-3651, Vimpat 200-200 and dex 2-2.    EXAM  ALERT, not oriented to date, but knew "[facility]" and "2019" thought it was march and perseverated on date.  follows many commands  no overt field cut, but difficult exam  (+) left drift    IMAGING  I personally reviewed CT-4/20/19 0213, which demonstrates right posterior /superior temporal lobe hypondensity and MRI 2/28/19 (post-op) which demonstrates residual enhancing disease around the resection cavity.    IMPRESSION/PLAN  75 YO man with left temporal GBM, s/p resection 11/28/18, s/p re-resection 2/27/19, also subtotal, with likely seizure focus related to tumor's temporal lobe location.    SEIZURES -- Continue with keppra 4525-0891, vimpat 200-200. May control his spells  CEREBRAL EDEMA -- given seizures, likely now with increased cerebral edema, would given decadron 12mg once, and increase standing decadron to 8mg q12 x3 days, then 4mg q12 thereafter.  BRAIN TUMOR -- LIKELY to benefit from RT and temolozomide at some point.  DISPO -- prefer d/c home with follow-up with Dr Strickland. May call [phone] for appointment.

## 2019-04-20 NOTE — H&P ADULT - ASSESSMENT
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

## 2019-04-20 NOTE — ED PROVIDER NOTE - PROGRESS NOTE DETAILS
Seen by neuro, agree with labs and CT. If negative recommend increase lacosamide to 200 BID. CHRISTIAN Coburn MD Lactate increasing, UTI, will admit for monitoring of lactate

## 2019-04-20 NOTE — H&P ADULT - PROBLEM SELECTOR PLAN 1
- Likely in the setting of grade 4 GBM  - Continue with current antiepileptics including   - Continue with seizure and fall precaution   - Neurology recs appreciated  - VEEG ordered for monitoring of seizure activity - Likely in the setting of grade 4 GBM  - Recurrent seizure likely due to missing antiepileptic dose  - Continue with Keppra 1500 mg BID and increase Vimpat to 200 mg BID  - Continue with seizure and fall precaution   - Neurology recs appreciated  - VEEG ordered for monitoring of seizure activity  - Keppra level ordered - Likely in the setting of grade 4 GBM  - Recurrent seizure likely due to missing antiepileptic dose  - Continue with Keppra 1500 mg BID and increase Vimpat to 200 mg BID  - Continue with seizure and fall precaution   - Neurology recs appreciated  - Keppra level ordered

## 2019-04-21 DIAGNOSIS — R78.81 BACTEREMIA: ICD-10-CM

## 2019-04-21 LAB
ANION GAP SERPL CALC-SCNC: 14 MMO/L — SIGNIFICANT CHANGE UP (ref 7–14)
BACTERIA UR CULT: SIGNIFICANT CHANGE UP
BASOPHILS # BLD AUTO: 0.02 K/UL — SIGNIFICANT CHANGE UP (ref 0–0.2)
BASOPHILS NFR BLD AUTO: 0.2 % — SIGNIFICANT CHANGE UP (ref 0–2)
BUN SERPL-MCNC: 17 MG/DL — SIGNIFICANT CHANGE UP (ref 7–23)
CALCIUM SERPL-MCNC: 9.4 MG/DL — SIGNIFICANT CHANGE UP (ref 8.4–10.5)
CHLORIDE SERPL-SCNC: 98 MMOL/L — SIGNIFICANT CHANGE UP (ref 98–107)
CO2 SERPL-SCNC: 24 MMOL/L — SIGNIFICANT CHANGE UP (ref 22–31)
CREAT SERPL-MCNC: 0.69 MG/DL — SIGNIFICANT CHANGE UP (ref 0.5–1.3)
EOSINOPHIL # BLD AUTO: 0 K/UL — SIGNIFICANT CHANGE UP (ref 0–0.5)
EOSINOPHIL NFR BLD AUTO: 0 % — SIGNIFICANT CHANGE UP (ref 0–6)
GLUCOSE SERPL-MCNC: 252 MG/DL — HIGH (ref 70–99)
HCT VFR BLD CALC: 39.6 % — SIGNIFICANT CHANGE UP (ref 39–50)
HGB BLD-MCNC: 12.4 G/DL — LOW (ref 13–17)
IMM GRANULOCYTES NFR BLD AUTO: 0.7 % — SIGNIFICANT CHANGE UP (ref 0–1.5)
LYMPHOCYTES # BLD AUTO: 0.9 K/UL — LOW (ref 1–3.3)
LYMPHOCYTES # BLD AUTO: 9.5 % — LOW (ref 13–44)
MAGNESIUM SERPL-MCNC: 1.7 MG/DL — SIGNIFICANT CHANGE UP (ref 1.6–2.6)
MCHC RBC-ENTMCNC: 30.3 PG — SIGNIFICANT CHANGE UP (ref 27–34)
MCHC RBC-ENTMCNC: 31.3 % — LOW (ref 32–36)
MCV RBC AUTO: 96.8 FL — SIGNIFICANT CHANGE UP (ref 80–100)
MONOCYTES # BLD AUTO: 0.28 K/UL — SIGNIFICANT CHANGE UP (ref 0–0.9)
MONOCYTES NFR BLD AUTO: 2.9 % — SIGNIFICANT CHANGE UP (ref 2–14)
NEUTROPHILS # BLD AUTO: 8.23 K/UL — HIGH (ref 1.8–7.4)
NEUTROPHILS NFR BLD AUTO: 86.7 % — HIGH (ref 43–77)
NRBC # FLD: 0 K/UL — SIGNIFICANT CHANGE UP (ref 0–0)
PHOSPHATE SERPL-MCNC: 2.9 MG/DL — SIGNIFICANT CHANGE UP (ref 2.5–4.5)
PLATELET # BLD AUTO: 185 K/UL — SIGNIFICANT CHANGE UP (ref 150–400)
PMV BLD: 10.4 FL — SIGNIFICANT CHANGE UP (ref 7–13)
POTASSIUM SERPL-MCNC: 4.6 MMOL/L — SIGNIFICANT CHANGE UP (ref 3.5–5.3)
POTASSIUM SERPL-SCNC: 4.6 MMOL/L — SIGNIFICANT CHANGE UP (ref 3.5–5.3)
RBC # BLD: 4.09 M/UL — LOW (ref 4.2–5.8)
RBC # FLD: 15.6 % — HIGH (ref 10.3–14.5)
SODIUM SERPL-SCNC: 136 MMOL/L — SIGNIFICANT CHANGE UP (ref 135–145)
SPECIMEN SOURCE: SIGNIFICANT CHANGE UP
WBC # BLD: 9.5 K/UL — SIGNIFICANT CHANGE UP (ref 3.8–10.5)
WBC # FLD AUTO: 9.5 K/UL — SIGNIFICANT CHANGE UP (ref 3.8–10.5)

## 2019-04-21 PROCEDURE — 99232 SBSQ HOSP IP/OBS MODERATE 35: CPT | Mod: GC

## 2019-04-21 RX ADMIN — Medication 2: at 13:21

## 2019-04-21 RX ADMIN — Medication 8 MILLIGRAM(S): at 17:00

## 2019-04-21 RX ADMIN — Medication 1 TABLET(S): at 11:42

## 2019-04-21 RX ADMIN — LISINOPRIL 20 MILLIGRAM(S): 2.5 TABLET ORAL at 06:50

## 2019-04-21 RX ADMIN — Medication 8 MILLIGRAM(S): at 06:50

## 2019-04-21 RX ADMIN — SENNA PLUS 2 TABLET(S): 8.6 TABLET ORAL at 22:26

## 2019-04-21 RX ADMIN — FINASTERIDE 5 MILLIGRAM(S): 5 TABLET, FILM COATED ORAL at 11:43

## 2019-04-21 RX ADMIN — Medication 500 MILLIGRAM(S): at 11:42

## 2019-04-21 RX ADMIN — LACOSAMIDE 200 MILLIGRAM(S): 50 TABLET ORAL at 17:00

## 2019-04-21 RX ADMIN — Medication 2: at 18:19

## 2019-04-21 RX ADMIN — NYSTATIN CREAM 1 APPLICATION(S): 100000 CREAM TOPICAL at 06:55

## 2019-04-21 RX ADMIN — ZINC SULFATE TAB 220 MG (50 MG ZINC EQUIVALENT) 220 MILLIGRAM(S): 220 (50 ZN) TAB at 11:43

## 2019-04-21 RX ADMIN — NYSTATIN CREAM 1 APPLICATION(S): 100000 CREAM TOPICAL at 17:00

## 2019-04-21 RX ADMIN — ENOXAPARIN SODIUM 40 MILLIGRAM(S): 100 INJECTION SUBCUTANEOUS at 11:43

## 2019-04-21 RX ADMIN — Medication 1: at 22:26

## 2019-04-21 RX ADMIN — LEVETIRACETAM 1500 MILLIGRAM(S): 250 TABLET, FILM COATED ORAL at 06:50

## 2019-04-21 RX ADMIN — PANTOPRAZOLE SODIUM 40 MILLIGRAM(S): 20 TABLET, DELAYED RELEASE ORAL at 06:50

## 2019-04-21 RX ADMIN — LACOSAMIDE 200 MILLIGRAM(S): 50 TABLET ORAL at 06:55

## 2019-04-21 RX ADMIN — LEVETIRACETAM 1500 MILLIGRAM(S): 250 TABLET, FILM COATED ORAL at 17:00

## 2019-04-21 RX ADMIN — Medication 1: at 09:13

## 2019-04-21 NOTE — PROGRESS NOTE ADULT - PROBLEM SELECTOR PLAN 5
- Last A1C 7.7 in 02/2019  - Holding home Metformin   - Continue with ISS and monitor FS  - Continue with Carb consistent diet

## 2019-04-21 NOTE — PROGRESS NOTE ADULT - PROBLEM SELECTOR PLAN 2
- First diagnosed in 10/2018 s/p 1st resection on 11/28/2018, pathology showing grade 4 GBM complicated by recurrence of the tumor with 2nd right craniotomy 02/27/2019 with post op complicated by right frontal SAH  - s/p 5 XRT 4/15-4/19  - CT head showing no new intracranial hemorrhage, right pterional craniotomy with improving postsurgical changes of the right temporal lobe   - Ordered for Decadron 12 mg now, continue with Decadron 8 mg Q12H for 3 days, then Decadron 4 mg Q12H afterwards  - Fall, seizure prophylaxis  - Guarded prognosis

## 2019-04-21 NOTE — PROGRESS NOTE ADULT - PROBLEM SELECTOR PLAN 3
- One bottle growing GPC in clusters on gram stain however culture negative. Second bottle negative. Follow up speciation, if Coag negative staph likely contaminant. Hold off on abx as no signs of active infection

## 2019-04-21 NOTE — PROGRESS NOTE ADULT - PROBLEM SELECTOR PLAN 1
- Likely in the setting of grade 4 GBM  - Recurrent seizure likely due to missing antiepileptic dose  - Continue with Keppra 1500 mg BID and Vimpat to 200 mg BID  - Continue with seizure and fall precaution   - Neurology recs appreciated  - Keppra level ordered

## 2019-04-21 NOTE — PROGRESS NOTE ADULT - PROBLEM SELECTOR PLAN 6
- -140/80-90, at goal  - Continue with home medication Lisinopril 20 mg with hold parameters  - Monitor BP

## 2019-04-21 NOTE — PROGRESS NOTE ADULT - SUBJECTIVE AND OBJECTIVE BOX
Patient is a 74y old  Male who presents with a chief complaint of Seizure (20 Apr 2019 10:59)      SUBJECTIVE / OVERNIGHT EVENTS:    No acute overnight events    ROS: (  ) Fever, (  )Chills,  (  )Nausea/Vomiting, (  ) Cough, (  )Shortness of breath, (  )Chest Pain    MEDICATIONS  (STANDING):  ascorbic acid 500 milliGRAM(s) Oral daily  dexamethasone     Tablet 8 milliGRAM(s) Oral every 12 hours  dextrose 5%. 1000 milliLiter(s) (50 mL/Hr) IV Continuous <Continuous>  dextrose 50% Injectable 12.5 Gram(s) IV Push once  dextrose 50% Injectable 25 Gram(s) IV Push once  dextrose 50% Injectable 25 Gram(s) IV Push once  enoxaparin Injectable 40 milliGRAM(s) SubCutaneous every 24 hours  finasteride 5 milliGRAM(s) Oral daily  insulin lispro (HumaLOG) corrective regimen sliding scale   SubCutaneous three times a day before meals  insulin lispro (HumaLOG) corrective regimen sliding scale   SubCutaneous at bedtime  lacosamide 200 milliGRAM(s) Oral two times a day  levETIRAcetam 1500 milliGRAM(s) Oral two times a day  lisinopril 20 milliGRAM(s) Oral daily  multivitamin 1 Tablet(s) Oral daily  nystatin Powder 1 Application(s) Topical two times a day  pantoprazole    Tablet 40 milliGRAM(s) Oral before breakfast  senna 2 Tablet(s) Oral at bedtime  zinc sulfate 220 milliGRAM(s) Oral daily    MEDICATIONS  (PRN):  acetaminophen   Tablet .. 650 milliGRAM(s) Oral every 6 hours PRN Temp greater or equal to 38C (100.4F), Mild Pain (1 - 3)  dextrose 40% Gel 15 Gram(s) Oral once PRN Blood Glucose LESS THAN 70 milliGRAM(s)/deciliter  glucagon  Injectable 1 milliGRAM(s) IntraMuscular once PRN Glucose LESS THAN 70 milligrams/deciliter      T(C): 36.8 (04-21 @ 06:48), Max: 36.8 (04-20 @ 14:23)   HR: 64   BP: 126/92   RR: 17   SpO2: 100%    PHYSICAL EXAM:  GENERAL: NAD, well-developed  HEAD:  Atraumatic, Normocephalic  CHEST/LUNG: Clear to auscultation bilaterally; No wheeze  HEART: Regular rate and rhythm; No murmurs, rubs, or gallops  ABDOMEN: Soft, Nontender, Nondistended; Bowel sounds present  EXTREMITIES:  2+ Peripheral Pulses, No clubbing, cyanosis, or edema  PSYCH: AAOx3  NEUROLOGY: non-focal    LABS:                        12.4   9.50  )-----------( 185      ( 21 Apr 2019 10:30 )             39.6      04-21    136  |  98  |  17  ----------------------------<  252<H>  4.6   |  24  |  0.69    Ca    9.4      21 Apr 2019 10:30  Phos  2.9     04-21  Mg     1.7     04-21    TPro  6.6  /  Alb  3.7  /  TBili  < 0.2<L>  /  DBili  x   /  AST  19  /  ALT  52<H>  /  AlkPhos  66  04-20       CAPILLARY BLOOD GLUCOSE      POCT Blood Glucose.: 192 mg/dL (21 Apr 2019 08:53)  POCT Blood Glucose.: 236 mg/dL (21 Apr 2019 06:20)  POCT Blood Glucose.: 178 mg/dL (20 Apr 2019 21:53)  POCT Blood Glucose.: 148 mg/dL (20 Apr 2019 18:07)  POCT Blood Glucose.: 137 mg/dL (20 Apr 2019 12:58)      RADIOLOGY & ADDITIONAL TESTS:    Imaging Personally Reviewed:  Consultant(s) Notes Reviewed:    Care Discussed with Consultants/Other Providers:

## 2019-04-21 NOTE — PROGRESS NOTE ADULT - SUBJECTIVE AND OBJECTIVE BOX
Patient is a 74y old  Male who presents with a chief complaint of Seizure (20 Apr 2019 10:59)      SUBJECTIVE / OVERNIGHT EVENTS:    No acute overnight events. Pt denies acute complaints.    ROS: As per hpi, otherwise negative.     MEDICATIONS  (STANDING):  ascorbic acid 500 milliGRAM(s) Oral daily  dexamethasone     Tablet 8 milliGRAM(s) Oral every 12 hours  dextrose 5%. 1000 milliLiter(s) (50 mL/Hr) IV Continuous <Continuous>  dextrose 50% Injectable 12.5 Gram(s) IV Push once  dextrose 50% Injectable 25 Gram(s) IV Push once  dextrose 50% Injectable 25 Gram(s) IV Push once  enoxaparin Injectable 40 milliGRAM(s) SubCutaneous every 24 hours  finasteride 5 milliGRAM(s) Oral daily  insulin lispro (HumaLOG) corrective regimen sliding scale   SubCutaneous three times a day before meals  insulin lispro (HumaLOG) corrective regimen sliding scale   SubCutaneous at bedtime  lacosamide 200 milliGRAM(s) Oral two times a day  levETIRAcetam 1500 milliGRAM(s) Oral two times a day  lisinopril 20 milliGRAM(s) Oral daily  multivitamin 1 Tablet(s) Oral daily  nystatin Powder 1 Application(s) Topical two times a day  pantoprazole    Tablet 40 milliGRAM(s) Oral before breakfast  senna 2 Tablet(s) Oral at bedtime  zinc sulfate 220 milliGRAM(s) Oral daily    MEDICATIONS  (PRN):  acetaminophen   Tablet .. 650 milliGRAM(s) Oral every 6 hours PRN Temp greater or equal to 38C (100.4F), Mild Pain (1 - 3)  dextrose 40% Gel 15 Gram(s) Oral once PRN Blood Glucose LESS THAN 70 milliGRAM(s)/deciliter  glucagon  Injectable 1 milliGRAM(s) IntraMuscular once PRN Glucose LESS THAN 70 milligrams/deciliter      T(C): 36.8 (04-21 @ 06:48), Max: 36.8 (04-20 @ 14:23)   HR: 64   BP: 126/92   RR: 17   SpO2: 100%    PHYSICAL EXAM:  	GENERAL: NAD, well-developed  	HEAD:  Atraumatic, Normocephalic. Post surgical scar noted  	EYES: EOMI,  conjunctiva and sclera clear  	NECK: Supple, No JVD  	CHEST/LUNG: Clear to auscultation bilaterally; No wheeze  	HEART: Regular rate and rhythm; No murmurs, rubs, or gallops  	ABDOMEN: Soft, Nontender, Nondistended; Bowel sounds present  	EXTREMITIES:  2+ Peripheral Pulses, No clubbing, cyanosis, or edema  	PSYCH: AAOx3  	NEUROLOGY: LLE strength 4/5 compared to RLE strength which is 5/5. CN II to XII grossly normal.   SKIN: No rashes or lesions    LABS:                        12.4   9.50  )-----------( 185      ( 21 Apr 2019 10:30 )             39.6      04-21    136  |  98  |  17  ----------------------------<  252<H>  4.6   |  24  |  0.69    Ca    9.4      21 Apr 2019 10:30  Phos  2.9     04-21  Mg     1.7     04-21    TPro  6.6  /  Alb  3.7  /  TBili  < 0.2<L>  /  DBili  x   /  AST  19  /  ALT  52<H>  /  AlkPhos  66  04-20       CAPILLARY BLOOD GLUCOSE      POCT Blood Glucose.: 192 mg/dL (21 Apr 2019 08:53)  POCT Blood Glucose.: 236 mg/dL (21 Apr 2019 06:20)  POCT Blood Glucose.: 178 mg/dL (20 Apr 2019 21:53)  POCT Blood Glucose.: 148 mg/dL (20 Apr 2019 18:07)  POCT Blood Glucose.: 137 mg/dL (20 Apr 2019 12:58)      RADIOLOGY & ADDITIONAL TESTS:    Imaging Personally Reviewed:  Consultant(s) Notes Reviewed:  Neurology  Care Discussed with Consultants/Other Providers:

## 2019-04-21 NOTE — PROGRESS NOTE ADULT - PROBLEM SELECTOR PLAN 7
- History of CVA in 2013 with residual L sided weakness  - Not on antiplatelet due to history of craniotomy  - Unclear why patient not on statin, but likely limited benefits now as patient has poor prognosis from GBM

## 2019-04-21 NOTE — PROGRESS NOTE ADULT - ASSESSMENT
73 yo M PMHx with T2DM, HTN, CVA in 2013, grade 4 GBM s/p resection 11/28/18 with Dr. Gonzalez, with recurrence s/p resection 3/2019, now presenting recurrent seizures now on increased seizure medications at [facility] for radiation therapy ending on 4/19.

## 2019-04-21 NOTE — PROGRESS NOTE ADULT - PROBLEM SELECTOR PLAN 9
- DVT prophylaxis: Lovenox, no hemorrhage on CT head and patient is high risk for DVT and PE  - GI prophylaxis: PPI PO in the setting of chronic steroid use  - Diet: Low salt, CC diet with supplement  - Dispo: PT ordered

## 2019-04-22 LAB
ANION GAP SERPL CALC-SCNC: 10 MMO/L — SIGNIFICANT CHANGE UP (ref 7–14)
BASE EXCESS BLDV CALC-SCNC: 3.5 MMOL/L — SIGNIFICANT CHANGE UP
BLOOD GAS VENOUS - CREATININE: 0.69 MG/DL — SIGNIFICANT CHANGE UP (ref 0.5–1.3)
BUN SERPL-MCNC: 21 MG/DL — SIGNIFICANT CHANGE UP (ref 7–23)
CALCIUM SERPL-MCNC: 10 MG/DL — SIGNIFICANT CHANGE UP (ref 8.4–10.5)
CHLORIDE BLDV-SCNC: 102 MMOL/L — SIGNIFICANT CHANGE UP (ref 96–108)
CHLORIDE SERPL-SCNC: 99 MMOL/L — SIGNIFICANT CHANGE UP (ref 98–107)
CO2 SERPL-SCNC: 27 MMOL/L — SIGNIFICANT CHANGE UP (ref 22–31)
CREAT SERPL-MCNC: 0.76 MG/DL — SIGNIFICANT CHANGE UP (ref 0.5–1.3)
GAS PNL BLDV: 137 MMOL/L — SIGNIFICANT CHANGE UP (ref 136–146)
GLUCOSE BLDV-MCNC: 224 — HIGH (ref 70–99)
GLUCOSE SERPL-MCNC: 228 MG/DL — HIGH (ref 70–99)
HCO3 BLDV-SCNC: 25 MMOL/L — SIGNIFICANT CHANGE UP (ref 20–27)
HCT VFR BLD CALC: 41.1 % — SIGNIFICANT CHANGE UP (ref 39–50)
HCT VFR BLDV CALC: 41.8 % — SIGNIFICANT CHANGE UP (ref 39–51)
HGB BLD-MCNC: 13.3 G/DL — SIGNIFICANT CHANGE UP (ref 13–17)
HGB BLDV-MCNC: 13.6 G/DL — SIGNIFICANT CHANGE UP (ref 13–17)
LACTATE BLDV-MCNC: 2.9 MMOL/L — HIGH (ref 0.5–2)
LEVETIRACETAM SERPL-MCNC: 15.5 MCG/ML — SIGNIFICANT CHANGE UP (ref 12–46)
LEVETIRACETAM SERPL-MCNC: 35.8 MCG/ML — SIGNIFICANT CHANGE UP (ref 12–46)
MAGNESIUM SERPL-MCNC: 1.9 MG/DL — SIGNIFICANT CHANGE UP (ref 1.6–2.6)
MCHC RBC-ENTMCNC: 30.3 PG — SIGNIFICANT CHANGE UP (ref 27–34)
MCHC RBC-ENTMCNC: 32.4 % — SIGNIFICANT CHANGE UP (ref 32–36)
MCV RBC AUTO: 93.6 FL — SIGNIFICANT CHANGE UP (ref 80–100)
NRBC # FLD: 0 K/UL — SIGNIFICANT CHANGE UP (ref 0–0)
PCO2 BLDV: 51 MMHG — SIGNIFICANT CHANGE UP (ref 41–51)
PH BLDV: 7.37 PH — SIGNIFICANT CHANGE UP (ref 7.32–7.43)
PHOSPHATE SERPL-MCNC: 3.2 MG/DL — SIGNIFICANT CHANGE UP (ref 2.5–4.5)
PLATELET # BLD AUTO: 191 K/UL — SIGNIFICANT CHANGE UP (ref 150–400)
PMV BLD: 10.1 FL — SIGNIFICANT CHANGE UP (ref 7–13)
PO2 BLDV: 25 MMHG — LOW (ref 35–40)
POTASSIUM BLDV-SCNC: 4.2 MMOL/L — SIGNIFICANT CHANGE UP (ref 3.4–4.5)
POTASSIUM SERPL-MCNC: 4.5 MMOL/L — SIGNIFICANT CHANGE UP (ref 3.5–5.3)
POTASSIUM SERPL-SCNC: 4.5 MMOL/L — SIGNIFICANT CHANGE UP (ref 3.5–5.3)
RBC # BLD: 4.39 M/UL — SIGNIFICANT CHANGE UP (ref 4.2–5.8)
RBC # FLD: 15.4 % — HIGH (ref 10.3–14.5)
SAO2 % BLDV: 34.3 % — LOW (ref 60–85)
SODIUM SERPL-SCNC: 136 MMOL/L — SIGNIFICANT CHANGE UP (ref 135–145)
WBC # BLD: 10.43 K/UL — SIGNIFICANT CHANGE UP (ref 3.8–10.5)
WBC # FLD AUTO: 10.43 K/UL — SIGNIFICANT CHANGE UP (ref 3.8–10.5)

## 2019-04-22 PROCEDURE — 99232 SBSQ HOSP IP/OBS MODERATE 35: CPT

## 2019-04-22 RX ADMIN — ENOXAPARIN SODIUM 40 MILLIGRAM(S): 100 INJECTION SUBCUTANEOUS at 11:54

## 2019-04-22 RX ADMIN — Medication 3: at 13:19

## 2019-04-22 RX ADMIN — NYSTATIN CREAM 1 APPLICATION(S): 100000 CREAM TOPICAL at 18:21

## 2019-04-22 RX ADMIN — LACOSAMIDE 200 MILLIGRAM(S): 50 TABLET ORAL at 18:22

## 2019-04-22 RX ADMIN — Medication 1: at 22:41

## 2019-04-22 RX ADMIN — LEVETIRACETAM 1500 MILLIGRAM(S): 250 TABLET, FILM COATED ORAL at 07:11

## 2019-04-22 RX ADMIN — LEVETIRACETAM 1500 MILLIGRAM(S): 250 TABLET, FILM COATED ORAL at 18:21

## 2019-04-22 RX ADMIN — Medication 8 MILLIGRAM(S): at 07:11

## 2019-04-22 RX ADMIN — FINASTERIDE 5 MILLIGRAM(S): 5 TABLET, FILM COATED ORAL at 11:53

## 2019-04-22 RX ADMIN — Medication 3: at 18:22

## 2019-04-22 RX ADMIN — Medication 8 MILLIGRAM(S): at 18:21

## 2019-04-22 RX ADMIN — NYSTATIN CREAM 1 APPLICATION(S): 100000 CREAM TOPICAL at 07:11

## 2019-04-22 RX ADMIN — SENNA PLUS 2 TABLET(S): 8.6 TABLET ORAL at 22:43

## 2019-04-22 RX ADMIN — LACOSAMIDE 200 MILLIGRAM(S): 50 TABLET ORAL at 07:11

## 2019-04-22 RX ADMIN — LISINOPRIL 20 MILLIGRAM(S): 2.5 TABLET ORAL at 07:11

## 2019-04-22 RX ADMIN — PANTOPRAZOLE SODIUM 40 MILLIGRAM(S): 20 TABLET, DELAYED RELEASE ORAL at 07:11

## 2019-04-22 RX ADMIN — Medication 500 MILLIGRAM(S): at 11:54

## 2019-04-22 RX ADMIN — Medication 2: at 09:41

## 2019-04-22 RX ADMIN — Medication 1 TABLET(S): at 11:53

## 2019-04-22 RX ADMIN — ZINC SULFATE TAB 220 MG (50 MG ZINC EQUIVALENT) 220 MILLIGRAM(S): 220 (50 ZN) TAB at 11:54

## 2019-04-22 NOTE — DIETITIAN INITIAL EVALUATION ADULT. - DIET TYPE
Ensure Enlive 240mls 2x daily (700kcal, 40g protein)./dysphagia 2, mechanical soft, thin liquids/consistent carbohydrate (no snacks)

## 2019-04-22 NOTE — PROGRESS NOTE ADULT - PROBLEM SELECTOR PLAN 7
- History of CVA in 2013 with residual L sided weakness  - Not on antiplatelet due to history of craniotomy  - Unclear why patient not on statin, but likely limited benefits now as patient has poor prognosis from GBM History of CVA in 2013 with residual L sided weakness    - Not on antiplatelet due to history of craniotomy  - Unclear why patient not on statin, but likely limited benefits now as patient has poor prognosis from GBM

## 2019-04-22 NOTE — DIETITIAN INITIAL EVALUATION ADULT. - PROBLEM SELECTOR PLAN 8
- DVT prophylaxis: Lovenox, no hemorrhage on CT head and patient is high risk for DVT and PE  - GI prophylaxis: PPI PO in the setting of chronic steroid use  - Diet: Low salt, CC diet with supplement  - Dispo: PT ordered    Danielle Fernandes PGY-2  Internal medicine Day admit  Pager [phone]

## 2019-04-22 NOTE — DIETITIAN INITIAL EVALUATION ADULT. - PROBLEM SELECTOR PLAN 3
- Lactate elevated at 4.0, repeat 5.6  - Likely in the setting of recent seizure, less likely type A lactic acidosis

## 2019-04-22 NOTE — PROGRESS NOTE ADULT - PROBLEM SELECTOR PLAN 6
- -140/80-90, at goal  - Continue with home medication Lisinopril 20 mg with hold parameters  - Monitor BP SBPs at goal    - Continue with home medication Lisinopril 20 mg with hold parameters  - Monitor BP

## 2019-04-22 NOTE — DIETITIAN INITIAL EVALUATION ADULT. - OTHER INFO
Patient seen for nutrition consult for assessment. 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  Patient was eating well during previous admission which continues during this admission. RN reports patient consumed 100% breakfast today. Tolerating mechanical soft diet. NKFA. No GI distress (nausea/vomiting/diarrhea/constipation) noted at this time. Patient unable to provide UBW and weight history prior to admission. No reported weight loss in chart. Patient with hyperglycemia in-house, recommend to change Ensure Enilve supplement to Glucerna Shake. Nutrition recommendations verbalized with MD.

## 2019-04-22 NOTE — PROGRESS NOTE ADULT - SUBJECTIVE AND OBJECTIVE BOX
Dr. Dionicio Schwartz  Internal Medicine PGY-1   Pager# [phone]    Patient is a 74y old  Male who presents with a chief complaint of Seizure (21 Apr 2019 13:06)      SUBJECTIVE / OVERNIGHT EVENTS:    MEDICATIONS  (STANDING):  ascorbic acid 500 milliGRAM(s) Oral daily  dexamethasone     Tablet 8 milliGRAM(s) Oral every 12 hours  dextrose 5%. 1000 milliLiter(s) (50 mL/Hr) IV Continuous <Continuous>  dextrose 50% Injectable 12.5 Gram(s) IV Push once  dextrose 50% Injectable 25 Gram(s) IV Push once  dextrose 50% Injectable 25 Gram(s) IV Push once  enoxaparin Injectable 40 milliGRAM(s) SubCutaneous every 24 hours  finasteride 5 milliGRAM(s) Oral daily  insulin lispro (HumaLOG) corrective regimen sliding scale   SubCutaneous three times a day before meals  insulin lispro (HumaLOG) corrective regimen sliding scale   SubCutaneous at bedtime  lacosamide 200 milliGRAM(s) Oral two times a day  levETIRAcetam 1500 milliGRAM(s) Oral two times a day  lisinopril 20 milliGRAM(s) Oral daily  multivitamin 1 Tablet(s) Oral daily  nystatin Powder 1 Application(s) Topical two times a day  pantoprazole    Tablet 40 milliGRAM(s) Oral before breakfast  senna 2 Tablet(s) Oral at bedtime  zinc sulfate 220 milliGRAM(s) Oral daily    MEDICATIONS  (PRN):  acetaminophen   Tablet .. 650 milliGRAM(s) Oral every 6 hours PRN Temp greater or equal to 38C (100.4F), Mild Pain (1 - 3)  dextrose 40% Gel 15 Gram(s) Oral once PRN Blood Glucose LESS THAN 70 milliGRAM(s)/deciliter  glucagon  Injectable 1 milliGRAM(s) IntraMuscular once PRN Glucose LESS THAN 70 milligrams/deciliter      Vital Signs Last 24 Hrs  T(C): 36.6 (22 Apr 2019 07:06), Max: 36.7 (21 Apr 2019 21:21)  T(F): 97.8 (22 Apr 2019 07:06), Max: 98 (21 Apr 2019 21:21)  HR: 62 (22 Apr 2019 07:06) (62 - 78)  BP: 159/99 (22 Apr 2019 07:06) (116/84 - 159/99)  BP(mean): --  RR: 17 (22 Apr 2019 07:06) (17 - 17)  SpO2: 99% (22 Apr 2019 07:06) (98% - 99%)  CAPILLARY BLOOD GLUCOSE      POCT Blood Glucose.: 272 mg/dL (21 Apr 2019 22:10)  POCT Blood Glucose.: 247 mg/dL (21 Apr 2019 18:07)  POCT Blood Glucose.: 245 mg/dL (21 Apr 2019 12:40)  POCT Blood Glucose.: 192 mg/dL (21 Apr 2019 08:53)    I&O's Summary      PHYSICAL EXAM:  GENERAL: NAD, well-developed  HEAD:  Atraumatic, Normocephalic  EYES: EOMI, PERRLA, conjunctiva and sclera clear  NECK: Supple, No JVD  CHEST/LUNG: Clear to auscultation bilaterally; No wheeze  HEART: Regular rate and rhythm; No murmurs, rubs, or gallops  ABDOMEN: Soft, Nontender, Nondistended; Bowel sounds present  EXTREMITIES:  2+ Peripheral Pulses, No clubbing, cyanosis, or edema  PSYCH: AAOx3  NEUROLOGY: non-focal  SKIN: No rashes or lesions Dr. Dionicio Schwartz  Internal Medicine PGY-1   Pager# [phone]    Patient is a 74y old  Male who presents with a chief complaint of Seizure (21 Apr 2019 13:06)      SUBJECTIVE / OVERNIGHT EVENTS: No acute events overnnight. Pt at bedside denies any nausea, vomiting, chest pain, new numbness, tingling, or seizure like activity.     MEDICATIONS  (STANDING):  ascorbic acid 500 milliGRAM(s) Oral daily  dexamethasone     Tablet 8 milliGRAM(s) Oral every 12 hours  dextrose 5%. 1000 milliLiter(s) (50 mL/Hr) IV Continuous <Continuous>  dextrose 50% Injectable 12.5 Gram(s) IV Push once  dextrose 50% Injectable 25 Gram(s) IV Push once  dextrose 50% Injectable 25 Gram(s) IV Push once  enoxaparin Injectable 40 milliGRAM(s) SubCutaneous every 24 hours  finasteride 5 milliGRAM(s) Oral daily  insulin lispro (HumaLOG) corrective regimen sliding scale   SubCutaneous three times a day before meals  insulin lispro (HumaLOG) corrective regimen sliding scale   SubCutaneous at bedtime  lacosamide 200 milliGRAM(s) Oral two times a day  levETIRAcetam 1500 milliGRAM(s) Oral two times a day  lisinopril 20 milliGRAM(s) Oral daily  multivitamin 1 Tablet(s) Oral daily  nystatin Powder 1 Application(s) Topical two times a day  pantoprazole    Tablet 40 milliGRAM(s) Oral before breakfast  senna 2 Tablet(s) Oral at bedtime  zinc sulfate 220 milliGRAM(s) Oral daily    MEDICATIONS  (PRN):  acetaminophen   Tablet .. 650 milliGRAM(s) Oral every 6 hours PRN Temp greater or equal to 38C (100.4F), Mild Pain (1 - 3)  dextrose 40% Gel 15 Gram(s) Oral once PRN Blood Glucose LESS THAN 70 milliGRAM(s)/deciliter  glucagon  Injectable 1 milliGRAM(s) IntraMuscular once PRN Glucose LESS THAN 70 milligrams/deciliter      Vital Signs Last 24 Hrs  T(C): 36.6 (22 Apr 2019 07:06), Max: 36.7 (21 Apr 2019 21:21)  T(F): 97.8 (22 Apr 2019 07:06), Max: 98 (21 Apr 2019 21:21)  HR: 62 (22 Apr 2019 07:06) (62 - 78)  BP: 159/99 (22 Apr 2019 07:06) (116/84 - 159/99)  BP(mean): --  RR: 17 (22 Apr 2019 07:06) (17 - 17)  SpO2: 99% (22 Apr 2019 07:06) (98% - 99%)  CAPILLARY BLOOD GLUCOSE      POCT Blood Glucose.: 272 mg/dL (21 Apr 2019 22:10)  POCT Blood Glucose.: 247 mg/dL (21 Apr 2019 18:07)  POCT Blood Glucose.: 245 mg/dL (21 Apr 2019 12:40)  POCT Blood Glucose.: 192 mg/dL (21 Apr 2019 08:53)    I&O's Summary      PHYSICAL EXAM:  GENERAL: NAD,   HEAD:  Atraumatic, Normocephalic  EYES: EOMI, PERRLA, conjunctiva and sclera clear  NECK: Supple, No JVD  CHEST/LUNG: Clear to auscultation bilaterally; No wheeze  HEART: Regular rate and rhythm; No murmurs, rubs, or gallops  ABDOMEN: Soft, Nontender, Nondistended; Bowel sounds present  EXTREMITIES:  2+ Peripheral Pulses, No clubbing, cyanosis, or edema  PSYCH: AAOx2-3  NEUROLOGY: 3-4/5 UE b/l motor strength, 2/5 LE b/l motor strength, Sensation grossly intact.   SKIN: No rashes or lesions      LABS:                        13.3   10.43 )-----------( 191      ( 22 Apr 2019 08:00 )             41.1     04-22    136  |  99  |  21  ----------------------------<  228<H>  4.5   |  27  |  0.76    Ca    10.0      22 Apr 2019 08:00  Phos  3.2     04-22  Mg     1.9     04-22

## 2019-04-22 NOTE — DIETITIAN INITIAL EVALUATION ADULT. - ENERGY NEEDS
Height (cm): 177.8 (20 Apr 2019 08:56)  Weight (kg): 78 (20 Apr 2019 08:56)  BMI (kg/m2): 24.7 (20 Apr 2019 08:56)  IBW: 75.2kg +/-10%  No edema noted. No pressure ulcers/DTI noted per flowsheets.

## 2019-04-22 NOTE — DIETITIAN INITIAL EVALUATION ADULT. - PERTINENT MEDS FT
acetaminophen   Tablet .. PRN  ascorbic acid  dexamethasone     Tablet  dextrose 40% Gel PRN  dextrose 5%.  dextrose 50% Injectable  dextrose 50% Injectable  dextrose 50% Injectable  enoxaparin Injectable  finasteride  glucagon  Injectable PRN  insulin lispro (HumaLOG) corrective regimen sliding scale  insulin lispro (HumaLOG) corrective regimen sliding scale  lacosamide  levETIRAcetam  lisinopril  multivitamin  nystatin Powder  pantoprazole    Tablet  senna  zinc sulfate

## 2019-04-22 NOTE — PROGRESS NOTE ADULT - PROBLEM SELECTOR PLAN 2
- First diagnosed in 10/2018 s/p 1st resection on 11/28/2018, pathology showing grade 4 GBM complicated by recurrence of the tumor with 2nd right craniotomy 02/27/2019 with post op complicated by right frontal SAH  - s/p 5 XRT 4/15-4/19  - CT head showing no new intracranial hemorrhage, right pterional craniotomy with improving postsurgical changes of the right temporal lobe   - Ordered for Decadron 12 mg now, continue with Decadron 8 mg Q12H for 3 days, then Decadron 4 mg Q12H afterwards  - Fall, seizure prophylaxis  - Guarded prognosis First diagnosed in 10/2018 s/p 1st resection on 11/28/2018, pathology showing grade 4 GBM complicated by recurrence of the tumor with 2nd right craniotomy 02/27/2019 with post op complicated by right frontal SAH    - s/p 5 XRT 4/15-4/19  - CT head showing no new intracranial hemorrhage, right pterional craniotomy with improving postsurgical changes of the right temporal lobe   - s/p Dexamethasone 12 mg. Now on 8 mg Q12H for 3 days (4/22) will then decrease to  4 mg Q12H afterwards  - Fall, seizure prophylaxis  - Guarded prognosis

## 2019-04-22 NOTE — DIETITIAN INITIAL EVALUATION ADULT. - NS AS NUTRI INTERV MEDICAL AND FOOD SUPPLEMENTS
Recommend Glucerna Therapeutic Nutrition Shake 240mls 1x daily (220kcals, 10g protein) (D/C Ensure Enlive)

## 2019-04-22 NOTE — PROGRESS NOTE ADULT - ASSESSMENT
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 Awaiting negative repeat BCxs.

## 2019-04-22 NOTE — PROGRESS NOTE ADULT - PROBLEM SELECTOR PLAN 9
- DVT prophylaxis: Lovenox, no hemorrhage on CT head and patient is high risk for DVT and PE  - GI prophylaxis: PPI PO in the setting of chronic steroid use  - Diet: Low salt, CC diet with supplement  - Dispo: PT ordered - DVT prophylaxis: Lovenox, no hemorrhage on CT head and patient is high risk for DVT and PE  - GI prophylaxis: PPI PO in the setting of chronic steroid use  - Diet: Dysphagia 2 Mechanical Soft Thin Liquids   - Dispo: PT ordered - DVT prophylaxis: Lovenox, no hemorrhage on CT head and patient is high risk for DVT and PE  - GI prophylaxis: PPI PO in the setting of chronic steroid use  - Diet: Dysphagia 2 Mechanical Soft Thin Liquids   - Dispo: PT eval

## 2019-04-22 NOTE — PROGRESS NOTE ADULT - PROBLEM SELECTOR PLAN 3
- One bottle growing GPC in clusters on gram stain however culture negative. Second bottle negative. Follow up speciation, if Coag negative staph likely contaminant. Hold off on abx as no signs of active infection One bottle growing GPC in clusters on gram stain however culture negative. Second bottle negative. Follow up speciation, if Coag negative staph likely contaminant.     - 2 sets of repeat BCx pending in lab 4/22.   - Hold off on abx as no signs of active infection

## 2019-04-22 NOTE — DIETITIAN INITIAL EVALUATION ADULT. - PROBLEM SELECTOR PLAN 1
- Likely in the setting of grade 4 GBM  - Recurrent seizure likely due to missing antiepileptic dose  - Continue with Keppra 1500 mg BID and increase Vimpat to 200 mg BID  - Continue with seizure and fall precaution   - Neurology recs appreciated  - Keppra level ordered

## 2019-04-22 NOTE — PROGRESS NOTE ADULT - PROBLEM SELECTOR PLAN 5
- Last A1C 7.7 in 02/2019  - Holding home Metformin   - Continue with ISS and monitor FS  - Continue with Carb consistent diet Last A1C 7.7 in 02/2019    - Holding home Metformin   - Continue with ISS and monitor FS  - Continue with Carb consistent diet

## 2019-04-22 NOTE — PROGRESS NOTE ADULT - PROBLEM SELECTOR PLAN 1
- Likely in the setting of grade 4 GBM  - Recurrent seizure likely due to missing antiepileptic dose  - Continue with Keppra 1500 mg BID and Vimpat to 200 mg BID  - Continue with seizure and fall precaution   - Neurology recs appreciated  - Keppra level ordered Likely in the setting of grade 4 GBM Recurrent seizure likely due to missing antiepileptic dose    - Continue with Keppra 1500 mg BID and Vimpat to 200 mg BID  - Continue with seizure and fall precaution   - Neurology recs appreciated  - Keppra level ordered Likely in the setting of grade 4 GBM Recurrent seizure likely due to missing antiepileptic dose    - Continue with Keppra 1500 mg BID and Vimpat to 200 mg BID  - Continue with seizure and fall precaution   - Neurology recs appreciated  -Pending Keppra level

## 2019-04-22 NOTE — DIETITIAN INITIAL EVALUATION ADULT. - PERTINENT LABORATORY DATA
04-22 Na 136 mmol/L Glu 228 mg/dL<H> K+ 4.5 mmol/L Cr 0.76 mg/dL BUN 21 mg/dL Phos 3.2 mg/dL Alb n/a   PAB n/a   Hgb 13.3 g/dL Hct 41.1 % HgA1C n/a    Glucose, Serum: 228 mg/dL <H>

## 2019-04-22 NOTE — DIETITIAN INITIAL EVALUATION ADULT. - PROBLEM SELECTOR PLAN 5
- -140/80-90, at goal  - Continue with home medication Lisinopril 20 mg with holding parameters  - Monitor BP

## 2019-04-23 ENCOUNTER — TRANSCRIPTION ENCOUNTER (OUTPATIENT)
Age: 74
End: 2019-04-23

## 2019-04-23 DIAGNOSIS — Z71.89 OTHER SPECIFIED COUNSELING: ICD-10-CM

## 2019-04-23 LAB
BACTERIA BLD CULT: SIGNIFICANT CHANGE UP
SPECIMEN SOURCE: SIGNIFICANT CHANGE UP
SPECIMEN SOURCE: SIGNIFICANT CHANGE UP

## 2019-04-23 PROCEDURE — 99232 SBSQ HOSP IP/OBS MODERATE 35: CPT

## 2019-04-23 RX ORDER — AMLODIPINE BESYLATE 2.5 MG/1
5 TABLET ORAL DAILY
Qty: 0 | Refills: 0 | Status: DISCONTINUED | OUTPATIENT
Start: 2019-04-23 | End: 2019-04-23

## 2019-04-23 RX ORDER — LACOSAMIDE 50 MG/1
1 TABLET ORAL
Qty: 0 | Refills: 0 | COMMUNITY

## 2019-04-23 RX ORDER — INSULIN GLARGINE 100 [IU]/ML
6 INJECTION, SOLUTION SUBCUTANEOUS AT BEDTIME
Qty: 0 | Refills: 0 | Status: DISCONTINUED | OUTPATIENT
Start: 2019-04-23 | End: 2019-04-24

## 2019-04-23 RX ADMIN — INSULIN GLARGINE 6 UNIT(S): 100 INJECTION, SOLUTION SUBCUTANEOUS at 22:46

## 2019-04-23 RX ADMIN — LEVETIRACETAM 1500 MILLIGRAM(S): 250 TABLET, FILM COATED ORAL at 07:25

## 2019-04-23 RX ADMIN — Medication 8 MILLIGRAM(S): at 07:25

## 2019-04-23 RX ADMIN — NYSTATIN CREAM 1 APPLICATION(S): 100000 CREAM TOPICAL at 17:55

## 2019-04-23 RX ADMIN — NYSTATIN CREAM 1 APPLICATION(S): 100000 CREAM TOPICAL at 07:25

## 2019-04-23 RX ADMIN — LACOSAMIDE 200 MILLIGRAM(S): 50 TABLET ORAL at 17:54

## 2019-04-23 RX ADMIN — SENNA PLUS 2 TABLET(S): 8.6 TABLET ORAL at 22:45

## 2019-04-23 RX ADMIN — Medication 2: at 22:44

## 2019-04-23 RX ADMIN — LISINOPRIL 20 MILLIGRAM(S): 2.5 TABLET ORAL at 07:25

## 2019-04-23 RX ADMIN — LACOSAMIDE 200 MILLIGRAM(S): 50 TABLET ORAL at 07:27

## 2019-04-23 RX ADMIN — Medication 2: at 08:59

## 2019-04-23 RX ADMIN — AMLODIPINE BESYLATE 5 MILLIGRAM(S): 2.5 TABLET ORAL at 13:38

## 2019-04-23 RX ADMIN — ZINC SULFATE TAB 220 MG (50 MG ZINC EQUIVALENT) 220 MILLIGRAM(S): 220 (50 ZN) TAB at 12:58

## 2019-04-23 RX ADMIN — Medication 1 TABLET(S): at 12:57

## 2019-04-23 RX ADMIN — Medication 500 MILLIGRAM(S): at 12:57

## 2019-04-23 RX ADMIN — ENOXAPARIN SODIUM 40 MILLIGRAM(S): 100 INJECTION SUBCUTANEOUS at 12:56

## 2019-04-23 RX ADMIN — Medication 2: at 12:56

## 2019-04-23 RX ADMIN — FINASTERIDE 5 MILLIGRAM(S): 5 TABLET, FILM COATED ORAL at 12:57

## 2019-04-23 RX ADMIN — LEVETIRACETAM 1500 MILLIGRAM(S): 250 TABLET, FILM COATED ORAL at 17:54

## 2019-04-23 RX ADMIN — Medication 5: at 18:16

## 2019-04-23 RX ADMIN — PANTOPRAZOLE SODIUM 40 MILLIGRAM(S): 20 TABLET, DELAYED RELEASE ORAL at 07:25

## 2019-04-23 NOTE — DISCHARGE NOTE PROVIDER - HOSPITAL COURSE
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         In terms of patient's seizure, he was previously on Keppra 1500 mg BID and Vimpat 100 mg BID for seizure prophylaxis and transferred to rehab after his 2nd craniotomy. While he was at [facility], he had seizure and was sent to [facility] and sent back to rehab after being seizure free. He had recurrent seizure in rehab and was thus admitted to [facility] from 3/29-4/2, in which his Vimpat dose was increased to 150 mg BID. Patient was then admitted to [facility] from 4/3 to 4/19, in which he underwent 5 sessions of XRT (4/15-4/19) during the admission. EEG was performed during the admission was found to have no epileptiform, mild to moderate nonspecific diffuse or multifocal cerebral dysfunction. Neurology was consulted for antiepileptic medication management and was no medication change was made. Patient remained seizure free during the hospital. Patient was discharged to [facility] on the morning of 4/19. Per rehab report, patient was noted to have 4 min generalized tonic clonic seizure around 9 pm on 4/19 and was thus brought to the ED.         Patient was seen on the medicine floor, resting comfortable. He does not recall his seizure episode. He said he was sleeping on his bed and then did not remember anything until he was in the ED. He denies chest pain or shortness of breath. He endorses regular BM, no nausea or vomiting, tolerating diet well. He denies dysuria or polyuria. He denies fever or chills.         Of note, ethics committee was involved during the last admission as patient's family was having difficulty deciding who would be the healthcare proxy. Patient was deemed to have capacity for medical decisions. Patient's Son Catarino, who currently resides in [place], is patient's healthcare proxy.         In the ED, initial vitals were Temp 36.6, HR 70-80, -140/80-90, RR 14-17, O2 saturation >95% on room air. He received Lacosamide 200 mg x1, 1 L NS bolus and 1g of Ceftriaxone in the ED.         HOSPITAL COURSE    Patient was admitted to medicine service and continued on his antiepileptics (Levetiracetam 1000 mg BID + Lacosamide 200 mg PO BID) without any seizure like activity. Pt also was tapered down on Dexamethasone. Of note patients first BCxs 4/20 grew G+ cocci in clusters considered a contaminant. Repeat BCx 4/22 were NGD patient remained afebrile and HD stable throughout this time off abx.         At this time patient is clinically stable for discharge.        WORK UP    VBG 4/20: 7.31/56/23 Lactate 4.0         MR Head w/wo IV cont 2/28: Unchanged right pterional craniotomy with R. temporal lobe resection cavity with hemorrhagic fluid, right holohemispheric subdural extension involving the right tentorial leaflet, bifrontal pneumocephalus with residal enhancement.        CT Head 4/20: No new intracranial hemorrhage. Right pterional craniotomy with improving post surgical changes of the right temporal lobe. No hemorrhage, mass, effect or midline shift.         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         In terms of patient's seizure, he was previously on Keppra 1500 mg BID and Vimpat 100 mg BID for seizure prophylaxis and transferred to rehab after his 2nd craniotomy. While he was at [facility], he had seizure and was sent to [facility] and sent back to rehab after being seizure free. He had recurrent seizure in rehab and was thus admitted to [facility] from 3/29-4/2, in which his Vimpat dose was increased to 150 mg BID. Patient was then admitted to [facility] from 4/3 to 4/19, in which he underwent 5 sessions of XRT (4/15-4/19) during the admission. EEG was performed during the admission was found to have no epileptiform, mild to moderate nonspecific diffuse or multifocal cerebral dysfunction. Neurology was consulted for antiepileptic medication management and was no medication change was made. Patient remained seizure free during the hospital. Patient was discharged to [facility] on the morning of 4/19. Per rehab report, patient was noted to have 4 min generalized tonic clonic seizure around 9 pm on 4/19 and was thus brought to the ED.         Patient was seen on the medicine floor, resting comfortable. He does not recall his seizure episode. He said he was sleeping on his bed and then did not remember anything until he was in the ED. He denies chest pain or shortness of breath. He endorses regular BM, no nausea or vomiting, tolerating diet well. He denies dysuria or polyuria. He denies fever or chills.         Of note, ethics committee was involved during the last admission as patient's family was having difficulty deciding who would be the healthcare proxy. Patient was deemed to have capacity for medical decisions. Patient's Son Catarino, who currently resides in [place], is patient's healthcare proxy.         In the ED, initial vitals were Temp 36.6, HR 70-80, -140/80-90, RR 14-17, O2 saturation >95% on room air. He received Lacosamide 200 mg x1, 1 L NS bolus and 1g of Ceftriaxone in the ED.         HOSPITAL COURSE    Patient was admitted to medicine service and continued on his antiepileptics (Levetiracetam 1000 mg BID ) and increased to 200 mg Lacosamide 200 mg PO BID without any seizure like activity during hospital stay. Pt also was tapered down on Dexamethasone. Of note patients first BCxs 4/20 grew G+ cocci in clusters considered a contaminant. Repeat BCx 4/22 were NGD patient remained afebrile and HD stable throughout this time off abx.         At this time patient is clinically stable for discharge.        WORK UP    VBG 4/20: 7.31/56/23 Lactate 4.0         MR Head w/wo IV cont 2/28: Unchanged right pterional craniotomy with R. temporal lobe resection cavity with hemorrhagic fluid, right holohemispheric subdural extension involving the right tentorial leaflet, bifrontal pneumocephalus with residal enhancement.        CT Head 4/20: No new intracranial hemorrhage. Right pterional craniotomy with improving post surgical changes of the right temporal lobe. No hemorrhage, mass, effect or midline shift.         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         In terms of patient's seizure, he was previously on Keppra 1500 mg BID and Vimpat 100 mg BID for seizure prophylaxis and transferred to rehab after his 2nd craniotomy. While he was at [facility], he had seizure and was sent to [facility] and sent back to rehab after being seizure free. He had recurrent seizure in rehab and was thus admitted to [facility] from 3/29-4/2, in which his Vimpat dose was increased to 150 mg BID. Patient was then admitted to [facility] from 4/3 to 4/19, in which he underwent 5 sessions of XRT (4/15-4/19) during the admission. EEG was performed during the admission was found to have no epileptiform, mild to moderate nonspecific diffuse or multifocal cerebral dysfunction. Neurology was consulted for antiepileptic medication management and was no medication change was made. Patient remained seizure free during the hospital. Patient was discharged to [facility] on the morning of 4/19. Per rehab report, patient was noted to have 4 min generalized tonic clonic seizure around 9 pm on 4/19 and was thus brought to the ED.         Patient was seen on the medicine floor, resting comfortable. He does not recall his seizure episode. He said he was sleeping on his bed and then did not remember anything until he was in the ED. He denies chest pain or shortness of breath. He endorses regular BM, no nausea or vomiting, tolerating diet well. He denies dysuria or polyuria. He denies fever or chills.         Of note, ethics committee was involved during the last admission as patient's family was having difficulty deciding who would be the healthcare proxy. Patient was deemed to have capacity for medical decisions. Patient's Son Catarino, who currently resides in [place], is patient's healthcare proxy.         In the ED, initial vitals were Temp 36.6, HR 70-80, -140/80-90, RR 14-17, O2 saturation >95% on room air. He received Lacosamide 200 mg x1, 1 L NS bolus and 1g of Ceftriaxone in the ED.         HOSPITAL COURSE    Patient was admitted to medicine service and continued on his antiepileptics (Levetiracetam 1000 mg BID ) and increased to 200 mg Lacosamide 200 mg PO BID without any seizure like activity during hospital stay. Pt also was tapered down on Dexamethasone. Of note patients first BCxs 4/20 grew G+ cocci in clusters considered a contaminant. Repeat BCx 4/22 were NGD patient remained afebrile and HD stable throughout this time off abx. He was started on lantus 6 units given hyperglycemic episodes in the hospital.        At this time patient is clinically stable for discharge.        WORK UP    VBG 4/20: 7.31/56/23 Lactate 4.0         MR Head w/wo IV cont 2/28: Unchanged right pterional craniotomy with R. temporal lobe resection cavity with hemorrhagic fluid, right holohemispheric subdural extension involving the right tentorial leaflet, bifrontal pneumocephalus with residal enhancement.        CT Head 4/20: No new intracranial hemorrhage. Right pterional craniotomy with improving post surgical changes of the right temporal lobe. No hemorrhage, mass, effect or midline shift.         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         In terms of patient's seizure, he was previously on Keppra 1500 mg BID and Vimpat 100 mg BID for seizure prophylaxis and transferred to rehab after his 2nd craniotomy. While he was at [facility], he had seizure and was sent to [facility] and sent back to rehab after being seizure free. He had recurrent seizure in rehab and was thus admitted to [facility] from 3/29-4/2, in which his Vimpat dose was increased to 150 mg BID. Patient was then admitted to [facility] from 4/3 to 4/19, in which he underwent 5 sessions of XRT (4/15-4/19) during the admission. EEG was performed during the admission was found to have no epileptiform, mild to moderate nonspecific diffuse or multifocal cerebral dysfunction. Neurology was consulted for antiepileptic medication management and was no medication change was made. Patient remained seizure free during the hospital. Patient was discharged [facility] on the morning of 4/19. Per rehab report, patient was noted to have 4 min generalized tonic clonic seizure around 9 pm on 4/19 and was thus brought to the ED.         Patient was seen on the medicine floor, resting comfortable. He does not recall his seizure episode. He said he was sleeping on his bed and then did not remember anything until he was in the ED. He denies chest pain or shortness of breath. He endorses regular BM, no nausea or vomiting, tolerating diet well. He denies dysuria or polyuria. He denies fever or chills.         Of note, ethics committee was involved during the last admission as patient's family was having difficulty deciding who would be the healthcare proxy. Patient was deemed to have capacity for medical decisions. Patient's Son Catarino, who currently resides in [place], is patient's healthcare proxy.         In the ED, initial vitals were Temp 36.6, HR 70-80, -140/80-90, RR 14-17, O2 saturation >95% on room air. He received Lacosamide 200 mg x1, 1 L NS bolus and 1g of Ceftriaxone in the ED.         HOSPITAL COURSE    Patient was admitted to medicine service and continued on his antiepileptics (Levetiracetam 1000 mg BID ) and increased to 200 mg Lacosamide 200 mg PO BID without any seizure like activity during hospital stay. Pt also was tapered down on Dexamethasone. Of note patients first BCxs 4/20 grew G+ cocci in clusters considered a contaminant. Repeat BCx 4/22 were NGD patient remained afebrile and HD stable throughout this time off abx. He was started on lantus 6 units given hyperglycemic episodes in the hospital.        At this time patient is clinically stable for discharge..        WORK UP    VBG 4/20: 7.31/56/23 Lactate 4.0         MR Head w/wo IV cont 2/28: Unchanged right pterional craniotomy with R. temporal lobe resection cavity with hemorrhagic fluid, right holohemispheric subdural extension involving the right tentorial leaflet, bifrontal pneumocephalus with residal enhancement.        CT Head 4/20: No new intracranial hemorrhage. Right pterional craniotomy with improving post surgical changes of the right temporal lobe. No hemorrhage, mass, effect or midline shift.         BCx 4/22: NGD     BCx 4/20: GPC    UA 4/20: WBC>50     UCx 4/20: Contaminated        Levetiracitam 4/20: 35.8 --> 15.5

## 2019-04-23 NOTE — PROGRESS NOTE ADULT - PROBLEM SELECTOR PLAN 1
Likely in the setting of grade 4 GBM Recurrent seizure likely due to missing antiepileptic dose    - Continue with Keppra 1500 mg BID and Vimpat to 200 mg BID  - Continue with seizure and fall precaution   - Neurology recs appreciated  -Pending Keppra level Likely in the setting of grade 4 GBM Recurrent seizure likely due to missing antiepileptic dose    - Continue with Levetiracetam 1500 mg BID and Vimpat to 200 mg BID  - Levetiracetam Lvls: 4/20 35.8 --> 4/20 15.5   - Continue with seizure and fall precaution   - Neurology recs appreciated

## 2019-04-23 NOTE — PROGRESS NOTE ADULT - ASSESSMENT
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 Awaiting negative repeat BCxs.

## 2019-04-23 NOTE — PHYSICAL THERAPY INITIAL EVALUATION ADULT - PERTINENT HX OF CURRENT PROBLEM, REHAB EVAL
Patient is a 74 year old male admitted to [facility] on 4/20 from rehab s/p seizure. 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

## 2019-04-23 NOTE — PROGRESS NOTE ADULT - PROBLEM SELECTOR PLAN 5
Last A1C 7.7 in 02/2019    - Holding home Metformin   - Continue with ISS and monitor FS  - Continue with Carb consistent diet Last A1C 7.7 in 02/2019    - Holding home Metformin   - FS not at goal arley while on steroids. Will increase ISS and monitor FS  - Continue with Carb consistent diet

## 2019-04-23 NOTE — PROVIDER CONTACT NOTE (OTHER) - ASSESSMENT
Pt. is currently resting comfortably & in no acute distress.  Denies, headache, dizziness, lightheadedness.  Currently confused.  VS otherwise stable

## 2019-04-23 NOTE — PHYSICAL THERAPY INITIAL EVALUATION ADULT - ADDITIONAL COMMENTS
Patient is a poor historian; all information needs to be verified. All information was obtained from chart review from recent admission. Patient was admitted from [facility]. Unable to obtain information regarding PLOF.    Patient was left semi-supine in bed as found, all lines/tubes intact and call jasso within reach, SREE belle

## 2019-04-23 NOTE — PROGRESS NOTE ADULT - PROBLEM SELECTOR PLAN 9
- DVT prophylaxis: Lovenox, no hemorrhage on CT head and patient is high risk for DVT and PE  - GI prophylaxis: PPI PO in the setting of chronic steroid use  - Diet: Dysphagia 2 Mechanical Soft Thin Liquids   - Dispo: PT eval - DVT prophylaxis: Lovenox, no hemorrhage on CT head and patient is high risk for DVT and PE  - GI prophylaxis: PPI PO in the setting of chronic steroid use  - Diet: Dysphagia 2 Mechanical Soft Thin Liquids   - Dispo: PT eval- 4/23 PT rec rehab

## 2019-04-23 NOTE — PROGRESS NOTE ADULT - SUBJECTIVE AND OBJECTIVE BOX
Dr. Dionicio Schwartz  Internal Medicine PGY-1   Pager# [phone]    Patient is a 74y old  Male who presents with a chief complaint of Seizure (22 Apr 2019 07:26)      SUBJECTIVE / OVERNIGHT EVENTS: No acute events overnight.     MEDICATIONS  (STANDING):  ascorbic acid 500 milliGRAM(s) Oral daily  dexamethasone     Tablet 8 milliGRAM(s) Oral every 12 hours  dextrose 5%. 1000 milliLiter(s) (50 mL/Hr) IV Continuous <Continuous>  dextrose 50% Injectable 12.5 Gram(s) IV Push once  dextrose 50% Injectable 25 Gram(s) IV Push once  dextrose 50% Injectable 25 Gram(s) IV Push once  enoxaparin Injectable 40 milliGRAM(s) SubCutaneous every 24 hours  finasteride 5 milliGRAM(s) Oral daily  insulin lispro (HumaLOG) corrective regimen sliding scale   SubCutaneous three times a day before meals  insulin lispro (HumaLOG) corrective regimen sliding scale   SubCutaneous at bedtime  lacosamide 200 milliGRAM(s) Oral two times a day  levETIRAcetam 1500 milliGRAM(s) Oral two times a day  lisinopril 20 milliGRAM(s) Oral daily  multivitamin 1 Tablet(s) Oral daily  nystatin Powder 1 Application(s) Topical two times a day  pantoprazole    Tablet 40 milliGRAM(s) Oral before breakfast  senna 2 Tablet(s) Oral at bedtime  zinc sulfate 220 milliGRAM(s) Oral daily    MEDICATIONS  (PRN):  acetaminophen   Tablet .. 650 milliGRAM(s) Oral every 6 hours PRN Temp greater or equal to 38C (100.4F), Mild Pain (1 - 3)  dextrose 40% Gel 15 Gram(s) Oral once PRN Blood Glucose LESS THAN 70 milliGRAM(s)/deciliter  glucagon  Injectable 1 milliGRAM(s) IntraMuscular once PRN Glucose LESS THAN 70 milligrams/deciliter      Vital Signs Last 24 Hrs  T(C): 37.1 (22 Apr 2019 21:41), Max: 37.1 (22 Apr 2019 21:41)  T(F): 98.7 (22 Apr 2019 21:41), Max: 98.7 (22 Apr 2019 21:41)  HR: 83 (22 Apr 2019 21:41) (78 - 83)  BP: 124/94 (22 Apr 2019 21:41) (124/94 - 127/86)  BP(mean): --  RR: 18 (22 Apr 2019 21:41) (17 - 18)  SpO2: 99% (22 Apr 2019 21:41) (97% - 99%)  CAPILLARY BLOOD GLUCOSE      POCT Blood Glucose.: 291 mg/dL (22 Apr 2019 22:02)  POCT Blood Glucose.: 277 mg/dL (22 Apr 2019 17:59)  POCT Blood Glucose.: 279 mg/dL (22 Apr 2019 13:15)  POCT Blood Glucose.: 209 mg/dL (22 Apr 2019 09:02)    I&O's Summary      PHYSICAL EXAM:  GENERAL: NAD, well-developed  HEAD:  Atraumatic, Normocephalic  EYES: EOMI, PERRLA, conjunctiva and sclera clear  NECK: Supple, No JVD  CHEST/LUNG: Clear to auscultation bilaterally; No wheeze  HEART: Regular rate and rhythm; No murmurs, rubs, or gallops  ABDOMEN: Soft, Nontender, Nondistended; Bowel sounds present  EXTREMITIES:  2+ Peripheral Pulses, No clubbing, cyanosis, or edema  PSYCH: AAOx3  NEUROLOGY: non-focal  SKIN: No rashes or lesions      Levetiracetam Level, Serum (04.20.19 @ 12:37)    Levetiracetam Level, Serum: 15.5: -------------------ADDITIONAL  INFORMATION-------------------  This test was developed and its performance characteristics  determined by [facility] in a manner consistent with CLIA  requirements. This test has not been cleared or approved by  the U.S. Food and Drug Administration.  Test Performed by:  [facility]  [address] [place] [address] mcg/mL    Levetiracetam Level, Serum (04.20.19 @ 00:20)    Levetiracetam Level, Serum: 35.8: -------------------ADDITIONAL  INFORMATION-------------------  This test was developed and its performance characteristics  determined by [facility] in a manner consistent with CLIA  requirements. This test has not been cleared or approved by  the U.S. Food and Drug Administration.  Test Performed by:  [facility]  [address] [place] [address] mcg/mL Dr. Dionicio Schwartz  Internal Medicine PGY-1   Pager# [phone]    Patient is a 74y old  Male who presents with a chief complaint of Seizure (22 Apr 2019 07:26)      SUBJECTIVE / OVERNIGHT EVENTS: No acute events overnight.     MEDICATIONS  (STANDING):  ascorbic acid 500 milliGRAM(s) Oral daily  dexamethasone     Tablet 8 milliGRAM(s) Oral every 12 hours  dextrose 5%. 1000 milliLiter(s) (50 mL/Hr) IV Continuous <Continuous>  dextrose 50% Injectable 12.5 Gram(s) IV Push once  dextrose 50% Injectable 25 Gram(s) IV Push once  dextrose 50% Injectable 25 Gram(s) IV Push once  enoxaparin Injectable 40 milliGRAM(s) SubCutaneous every 24 hours  finasteride 5 milliGRAM(s) Oral daily  insulin lispro (HumaLOG) corrective regimen sliding scale   SubCutaneous three times a day before meals  insulin lispro (HumaLOG) corrective regimen sliding scale   SubCutaneous at bedtime  lacosamide 200 milliGRAM(s) Oral two times a day  levETIRAcetam 1500 milliGRAM(s) Oral two times a day  lisinopril 20 milliGRAM(s) Oral daily  multivitamin 1 Tablet(s) Oral daily  nystatin Powder 1 Application(s) Topical two times a day  pantoprazole    Tablet 40 milliGRAM(s) Oral before breakfast  senna 2 Tablet(s) Oral at bedtime  zinc sulfate 220 milliGRAM(s) Oral daily    MEDICATIONS  (PRN):  acetaminophen   Tablet .. 650 milliGRAM(s) Oral every 6 hours PRN Temp greater or equal to 38C (100.4F), Mild Pain (1 - 3)  dextrose 40% Gel 15 Gram(s) Oral once PRN Blood Glucose LESS THAN 70 milliGRAM(s)/deciliter  glucagon  Injectable 1 milliGRAM(s) IntraMuscular once PRN Glucose LESS THAN 70 milligrams/deciliter      Vital Signs Last 24 Hrs  T(C): 37.1 (22 Apr 2019 21:41), Max: 37.1 (22 Apr 2019 21:41)  T(F): 98.7 (22 Apr 2019 21:41), Max: 98.7 (22 Apr 2019 21:41)  HR: 83 (22 Apr 2019 21:41) (78 - 83)  BP: 124/94 (22 Apr 2019 21:41) (124/94 - 127/86)  BP(mean): --  RR: 18 (22 Apr 2019 21:41) (17 - 18)  SpO2: 99% (22 Apr 2019 21:41) (97% - 99%)  CAPILLARY BLOOD GLUCOSE      POCT Blood Glucose.: 291 mg/dL (22 Apr 2019 22:02)  POCT Blood Glucose.: 277 mg/dL (22 Apr 2019 17:59)  POCT Blood Glucose.: 279 mg/dL (22 Apr 2019 13:15)  POCT Blood Glucose.: 209 mg/dL (22 Apr 2019 09:02)    I&O's Summary      PHYSICAL EXAM:  GENERAL: NAD,   HEAD:  Atraumatic, Normocephalic  EYES: EOMI, PERRLA, conjunctiva and sclera clear  NECK: Supple, No JVD  CHEST/LUNG: Clear to auscultation bilaterally; No wheeze  HEART: Regular rate and rhythm; No murmurs, rubs, or gallops  ABDOMEN: Soft, Nontender, Nondistended; Bowel sounds present  EXTREMITIES:  2+ Peripheral Pulses, No clubbing, cyanosis, or edema  PSYCH: AAOx2-3  NEUROLOGY: 3-4/5 UE b/l motor strength, 2/5 LE b/l motor strength, Sensation grossly intact.   SKIN: No rashes or lesions      Levetiracetam Level, Serum (04.20.19 @ 12:37)    Levetiracetam Level, Serum: 15.5: -------------------ADDITIONAL  INFORMATION-------------------  This test was developed and its performance characteristics  determined by [facility] in a manner consistent with CLIA  requirements. This test has not been cleared or approved by  the U.S. Food and Drug Administration.  Test Performed by:  [facility]  [address] [place] [address] mcg/mL    Levetiracetam Level, Serum (04.20.19 @ 00:20)    Levetiracetam Level, Serum: 35.8: -------------------ADDITIONAL  INFORMATION-------------------  This test was developed and its performance characteristics  determined by [facility] in a manner consistent with CLIA  requirements. This test has not been cleared or approved by  the U.S. Food and Drug Administration.  Test Performed by:  [facility]  [address] [place] [address] mcg/mL

## 2019-04-23 NOTE — DISCHARGE NOTE PROVIDER - CARE PROVIDER_API CALL
Emiliano Trotter (MD)  Therapeutic Radiology  [address] [place] [address], [facility] [place] [address]  Phone: [phone]  Fax: [phone]  Follow Up Time: Emiliano Trotter)  Therapeutic Radiology  [address] [facility], Entrance A  [place] [address]  Phone: [phone]  Fax: [phone]  Follow Up Time:     Jose Pimentel)  Neurology  [address] [place] [address]  Phone: [phone]  Fax: [phone]  Follow Up Time:

## 2019-04-23 NOTE — DISCHARGE NOTE PROVIDER - PROVIDER TOKENS
PROVIDER:[TOKEN:[[id number]]] PROVIDER:[TOKEN:[[id number]]],PROVIDER:[TOKEN:[[id number]]]

## 2019-04-23 NOTE — PROGRESS NOTE ADULT - PROBLEM SELECTOR PLAN 2
First diagnosed in 10/2018 s/p 1st resection on 11/28/2018, pathology showing grade 4 GBM complicated by recurrence of the tumor with 2nd right craniotomy 02/27/2019 with post op complicated by right frontal SAH    - s/p 5 XRT 4/15-4/19  - CT head showing no new intracranial hemorrhage, right pterional craniotomy with improving postsurgical changes of the right temporal lobe   - s/p Dexamethasone 12 mg. Now on 8 mg Q12H for 3 days (4/22) will then decrease to  4 mg Q12H afterwards  - Fall, seizure prophylaxis  - Guarded prognosis First diagnosed in 10/2018 s/p 1st resection on 11/28/2018, pathology showing grade 4 GBM complicated by recurrence of the tumor with 2nd right craniotomy 02/27/2019 with post op complicated by right frontal SAH    - s/p 5 XRT 4/15-4/19  - CT head showing no new intracranial hemorrhage, right pterional craniotomy with improving postsurgical changes of the right temporal lobe   - s/p Dexamethasone 12 mg. Now on 8 mg Q12H for 3 days (4/22) will then decrease to 4 mg Q12H afterwards  - Fall, seizure prophylaxis  - Guarded prognosis

## 2019-04-23 NOTE — DISCHARGE NOTE PROVIDER - CARE PROVIDERS DIRECT ADDRESSES
[email or web address]

## 2019-04-23 NOTE — PHYSICAL THERAPY INITIAL EVALUATION ADULT - PATIENT PROFILE REVIEW, REHAB EVAL
PT orders received: no formal activity order. Consult with SREE GONZALES, pt may participate in PT evaluation./yes

## 2019-04-23 NOTE — PHYSICAL THERAPY INITIAL EVALUATION ADULT - LEVEL OF INDEPENDENCE: SIT/STAND, REHAB EVAL
Patient continually reaching across bed for breakfast tray, attempting to lie down./unable to perform

## 2019-04-23 NOTE — DISCHARGE NOTE PROVIDER - NSDCCPCAREPLAN_GEN_ALL_CORE_FT
PRINCIPAL DISCHARGE DIAGNOSIS  Diagnosis: Seizure  Assessment and Plan of Treatment:       SECONDARY DISCHARGE DIAGNOSES  Diagnosis: UTI (urinary tract infection)  Assessment and Plan of Treatment: PRINCIPAL DISCHARGE DIAGNOSIS  Diagnosis: Seizure  Assessment and Plan of Treatment: You were found to have seizure like activity. You were started on steroids and your anti seizure medications and you did not have any more seizures. It is important you follow up with your Neurologist within 2-4 weeks of being in rehab. PRINCIPAL DISCHARGE DIAGNOSIS  Diagnosis: Seizure  Assessment and Plan of Treatment: You were found to have seizure like activity. You were started on steroids and your anti seizure medications and you did not have any more seizures. It is important you follow up with your Neurologist within 2-4 weeks of being in rehab.      SECONDARY DISCHARGE DIAGNOSES  Diagnosis: Type 2 diabetes mellitus without complication, unspecified whether long term insulin use  Assessment and Plan of Treatment: Type 2 diabetes mellitus without complication, unspecified whether long term insulin use  You should continue with your metformin. You were started on lantus 6 units at bedtime due to high sugar readings likely in the setting of steroid use. Continue to follow up with your PMD.

## 2019-04-23 NOTE — PROGRESS NOTE ADULT - PROBLEM SELECTOR PLAN 3
One bottle growing GPC in clusters on gram stain however culture negative. Second bottle negative. Follow up speciation, if Coag negative staph likely contaminant.     - 2 sets of repeat BCx pending in lab 4/22.   - Hold off on abx as no signs of active infection

## 2019-04-23 NOTE — PROGRESS NOTE ADULT - PROBLEM SELECTOR PLAN 7
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

## 2019-04-24 ENCOUNTER — INBOUND DOCUMENT (OUTPATIENT)
Age: 74
End: 2019-04-24

## 2019-04-24 ENCOUNTER — TRANSCRIPTION ENCOUNTER (OUTPATIENT)
Age: 74
End: 2019-04-24

## 2019-04-24 VITALS
RESPIRATION RATE: 18 BRPM | SYSTOLIC BLOOD PRESSURE: 111 MMHG | OXYGEN SATURATION: 100 % | DIASTOLIC BLOOD PRESSURE: 83 MMHG | HEART RATE: 77 BPM | TEMPERATURE: 98 F

## 2019-04-24 PROCEDURE — 99239 HOSP IP/OBS DSCHRG MGMT >30: CPT

## 2019-04-24 RX ORDER — METFORMIN HYDROCHLORIDE 850 MG/1
1 TABLET ORAL
Qty: 60 | Refills: 0 | OUTPATIENT
Start: 2019-04-24 | End: 2019-05-23

## 2019-04-24 RX ORDER — INSULIN GLARGINE 100 [IU]/ML
6 INJECTION, SOLUTION SUBCUTANEOUS
Qty: 0 | Refills: 0 | COMMUNITY

## 2019-04-24 RX ORDER — INSULIN LISPRO 100/ML
2 VIAL (ML) SUBCUTANEOUS
Qty: 0 | Refills: 0 | Status: DISCONTINUED | OUTPATIENT
Start: 2019-04-24 | End: 2019-04-24

## 2019-04-24 RX ORDER — DEXAMETHASONE 0.5 MG/5ML
1 ELIXIR ORAL
Qty: 0 | Refills: 0 | COMMUNITY
Start: 2019-04-24

## 2019-04-24 RX ADMIN — Medication 500 MILLIGRAM(S): at 13:03

## 2019-04-24 RX ADMIN — PANTOPRAZOLE SODIUM 40 MILLIGRAM(S): 20 TABLET, DELAYED RELEASE ORAL at 06:29

## 2019-04-24 RX ADMIN — LEVETIRACETAM 1500 MILLIGRAM(S): 250 TABLET, FILM COATED ORAL at 06:29

## 2019-04-24 RX ADMIN — Medication 2 UNIT(S): at 13:02

## 2019-04-24 RX ADMIN — FINASTERIDE 5 MILLIGRAM(S): 5 TABLET, FILM COATED ORAL at 13:02

## 2019-04-24 RX ADMIN — LACOSAMIDE 200 MILLIGRAM(S): 50 TABLET ORAL at 06:29

## 2019-04-24 RX ADMIN — ENOXAPARIN SODIUM 40 MILLIGRAM(S): 100 INJECTION SUBCUTANEOUS at 13:02

## 2019-04-24 RX ADMIN — Medication 4 MILLIGRAM(S): at 06:29

## 2019-04-24 RX ADMIN — Medication 3: at 13:01

## 2019-04-24 RX ADMIN — Medication 2 UNIT(S): at 09:20

## 2019-04-24 RX ADMIN — NYSTATIN CREAM 1 APPLICATION(S): 100000 CREAM TOPICAL at 06:29

## 2019-04-24 RX ADMIN — LISINOPRIL 20 MILLIGRAM(S): 2.5 TABLET ORAL at 06:29

## 2019-04-24 RX ADMIN — Medication 1 TABLET(S): at 13:04

## 2019-04-24 RX ADMIN — ZINC SULFATE TAB 220 MG (50 MG ZINC EQUIVALENT) 220 MILLIGRAM(S): 220 (50 ZN) TAB at 13:04

## 2019-04-24 NOTE — DISCHARGE NOTE NURSING/CASE MANAGEMENT/SOCIAL WORK - NSDCPEPTSTRK_GEN_ALL_CORE
Call 911 for stroke/Prescribed medications/Risk factors for stroke/Need for follow up after discharge/Stroke education booklet/Stroke support groups for patients, families, and friends/Stroke warning signs and symptoms/Signs and symptoms of stroke

## 2019-04-24 NOTE — PROGRESS NOTE ADULT - PROBLEM SELECTOR PLAN 8
- Continue with Finasteride  - Monitor UOP

## 2019-04-24 NOTE — PROGRESS NOTE ADULT - PROBLEM SELECTOR PROBLEM 4
Type 2 diabetes mellitus with complication, unspecified whether long term insulin use
Elevated lactic acid level

## 2019-04-24 NOTE — PROGRESS NOTE ADULT - PROBLEM SELECTOR PLAN 1
Likely in the setting of grade 4 GBM Recurrent seizure likely due to missing antiepileptic dose    - Continue with Levetiracetam 1500 mg BID and Vimpat to 200 mg BID  - Levetiracetam Lvls: 4/20 35.8 --> 4/20 15.5   - Continue with seizure and fall precaution   - Neurology recs appreciated

## 2019-04-24 NOTE — PROGRESS NOTE ADULT - PROBLEM SELECTOR PLAN 4
- Lactate elevated at 4.0, repeat 5.6, downtrending, last level 3.2  - Likely in the setting of recent seizure, less likely type A lactic acidosis
- ISS  - POCT glucose  - Carb consistent diet
- Lactate elevated at 4.0, repeat 5.6, downtrending, last level 3.2  - Likely in the setting of recent seizure, less likely type A lactic acidosis

## 2019-04-24 NOTE — PROGRESS NOTE ADULT - SUBJECTIVE AND OBJECTIVE BOX
Dr. Dionicio Schwartz  Internal Medicine PGY-1   Pager# [phone]    Patient is a 74y old  Male who presents with a chief complaint of Seizure (23 Apr 2019 15:57)      SUBJECTIVE / OVERNIGHT EVENTS: No acute events overnight.     MEDICATIONS  (STANDING):  ascorbic acid 500 milliGRAM(s) Oral daily  dexamethasone     Tablet 4 milliGRAM(s) Oral every 12 hours  dextrose 5%. 1000 milliLiter(s) (50 mL/Hr) IV Continuous <Continuous>  dextrose 50% Injectable 12.5 Gram(s) IV Push once  dextrose 50% Injectable 25 Gram(s) IV Push once  dextrose 50% Injectable 25 Gram(s) IV Push once  enoxaparin Injectable 40 milliGRAM(s) SubCutaneous every 24 hours  finasteride 5 milliGRAM(s) Oral daily  insulin glargine Injectable (LANTUS) 6 Unit(s) SubCutaneous at bedtime  insulin lispro (HumaLOG) corrective regimen sliding scale   SubCutaneous three times a day before meals  insulin lispro (HumaLOG) corrective regimen sliding scale   SubCutaneous at bedtime  lacosamide 200 milliGRAM(s) Oral two times a day  levETIRAcetam 1500 milliGRAM(s) Oral two times a day  lisinopril 20 milliGRAM(s) Oral daily  multivitamin 1 Tablet(s) Oral daily  nystatin Powder 1 Application(s) Topical two times a day  pantoprazole    Tablet 40 milliGRAM(s) Oral before breakfast  senna 2 Tablet(s) Oral at bedtime  zinc sulfate 220 milliGRAM(s) Oral daily    MEDICATIONS  (PRN):  acetaminophen   Tablet .. 650 milliGRAM(s) Oral every 6 hours PRN Temp greater or equal to 38C (100.4F), Mild Pain (1 - 3)  dextrose 40% Gel 15 Gram(s) Oral once PRN Blood Glucose LESS THAN 70 milliGRAM(s)/deciliter  glucagon  Injectable 1 milliGRAM(s) IntraMuscular once PRN Glucose LESS THAN 70 milligrams/deciliter      Vital Signs Last 24 Hrs  T(C): 36.6 (24 Apr 2019 06:19), Max: 37.1 (23 Apr 2019 21:34)  T(F): 97.9 (24 Apr 2019 06:19), Max: 98.8 (23 Apr 2019 21:34)  HR: 66 (24 Apr 2019 06:19) (66 - 89)  BP: 149/96 (24 Apr 2019 06:19) (119/88 - 149/96)  BP(mean): --  RR: 18 (24 Apr 2019 06:19) (17 - 18)  SpO2: 95% (24 Apr 2019 06:19) (95% - 100%)  CAPILLARY BLOOD GLUCOSE      POCT Blood Glucose.: 304 mg/dL (23 Apr 2019 22:11)  POCT Blood Glucose.: 364 mg/dL (23 Apr 2019 18:03)  POCT Blood Glucose.: 242 mg/dL (23 Apr 2019 12:31)  POCT Blood Glucose.: 230 mg/dL (23 Apr 2019 08:40)    I&O's Summary      PHYSICAL EXAM:  GENERAL: NAD, well-developed  HEAD:  Atraumatic, Normocephalic  EYES: EOMI, PERRLA, conjunctiva and sclera clear  NECK: Supple, No JVD  CHEST/LUNG: Clear to auscultation bilaterally; No wheeze  HEART: Regular rate and rhythm; No murmurs, rubs, or gallops  ABDOMEN: Soft, Nontender, Nondistended; Bowel sounds present  EXTREMITIES:  2+ Peripheral Pulses, No clubbing, cyanosis, or edema  PSYCH: AAOx3  NEUROLOGY: non-focal  SKIN: No rashes or lesions    NO LABS DRAWN.

## 2019-04-24 NOTE — PROGRESS NOTE ADULT - PROBLEM SELECTOR PLAN 9
- DVT prophylaxis: Lovenox, no hemorrhage on CT head and patient is high risk for DVT and PE  - GI prophylaxis: PPI PO in the setting of chronic steroid use  - Diet: Dysphagia 2 Mechanical Soft Thin Liquids   - Dispo: PT eval- 4/23 PT rec rehab

## 2019-04-24 NOTE — PROGRESS NOTE ADULT - PROBLEM SELECTOR PLAN 2
First diagnosed in 10/2018 s/p 1st resection on 11/28/2018, pathology showing grade 4 GBM complicated by recurrence of the tumor with 2nd right craniotomy 02/27/2019 with post op complicated by right frontal SAH    - s/p 5 XRT 4/15-4/19  - CT head showing no new intracranial hemorrhage, right pterional craniotomy with improving postsurgical changes of the right temporal lobe   - s/p Dexamethasone 12 mg. Now on 8 mg Q12H for 3 days (4/22) will then decrease to 4 mg Q12H afterwards  - Fall, seizure prophylaxis  - Guarded prognosis

## 2019-04-24 NOTE — DISCHARGE NOTE NURSING/CASE MANAGEMENT/SOCIAL WORK - NSDCDPATPORTLINK_GEN_ALL_CORE
You can access the [facility] Portal, offered by [facility], by registering with the following website: [email or web address]

## 2019-04-24 NOTE — PROGRESS NOTE ADULT - PROBLEM SELECTOR PROBLEM 5
Type 2 diabetes mellitus without complication, unspecified whether long term insulin use
Prophylactic measure
Type 2 diabetes mellitus without complication, unspecified whether long term insulin use

## 2019-04-24 NOTE — PROGRESS NOTE ADULT - PROBLEM/PLAN-4
DISPLAY PLAN FREE TEXT
Strong peripheral pulses
DISPLAY PLAN FREE TEXT

## 2019-04-24 NOTE — PROGRESS NOTE ADULT - PROBLEM SELECTOR PROBLEM 3
Benign prostatic hyperplasia, unspecified whether lower urinary tract symptoms present
Bacteremia

## 2019-04-24 NOTE — PROGRESS NOTE ADULT - ATTENDING COMMENTS
74M DM2 (last A1C 7.7 in 02/2019), HTN, CVA in 2013 with residual left sided weakness, R temporal grade 4 GBM s/p 1st resection on 11/28/2018 complicated by recurrence of the tumor with 2nd right craniotomy 02/27/2019 with post op complicated by right frontal SAH with recent admission for 5 sessions of XRT, recurrent seizure, DVT s/p IVC filter presented with seizure at rehab.  --no sz for 3 weeks at [facility]; possibly did not get meds on time at [facility]. monitor for recurrence  --repeat blood cx, GPC seen on gram stain probably contaminant
74M DM2 (last A1C 7.7 in 02/2019), HTN, CVA in 2013 with residual left sided weakness, R temporal grade 4 GBM s/p 1st resection on 11/28/2018 complicated by recurrence of the tumor with 2nd right craniotomy 02/27/2019 with post op complicated by right frontal SAH with recent admission for 5 sessions of XRT, recurrent seizure, DVT s/p IVC filter presented with seizure at rehab.    Per chart review no sz for 3 weeks at [facility]; Possibly did not get meds on time at [facility] (however keppra levels now back and was in therapeutic range) vs nature of his underlining brain mass. No signs of underlining trigger such as infection. 4/20 blood cx gram stain+ coag neg staph but  repeat blood cx NTD and this was likely contaminant. Pt stable off abx.   -c/w current dose of anti-epileptics and decadron taper.   -Attempting to reach pts HCP to discuss ACP and fill out MOLST form.  -D/c planning to rehab for today at 3pm. D/w SW/TAHIRA. Further details outlined in discharge documentation. Spent 34 min in discharge planning and coordination.
74M DM2 (last A1C 7.7 in 02/2019), HTN, CVA in 2013 with residual left sided weakness, R temporal grade 4 GBM s/p 1st resection on 11/28/2018 complicated by recurrence of the tumor with 2nd right craniotomy 02/27/2019 with post op complicated by right frontal SAH with recent admission for 5 sessions of XRT, recurrent seizure, DVT s/p IVC filter presented with seizure at rehab.    Per chart review no sz for 3 weeks at [facility]; Possibly did not get meds on time at [facility] (however keppra levels now back and was in therapeutic range) vs nature of his underlining brain mass. No signs of underlining trigger such as infection. 4/20 blood cx gram stain+ coag neg staph but suspect contaminant as pt stable off abx.   -Continue to monitor off abx for now. F/u repeat blood cx from 4/22 however low suspicion at this time for true bacteremia   -c/w current dose of anti-epileptics and decadron taper.   -D/c planning to rehab. D/w SW/CM will f/u progress.
74M DM2 (last A1C 7.7 in 02/2019), HTN, CVA in 2013 with residual left sided weakness, R temporal grade 4 GBM s/p 1st resection on 11/28/2018 complicated by recurrence of the tumor with 2nd right craniotomy 02/27/2019 with post op complicated by right frontal SAH with recent admission for 5 sessions of XRT, recurrent seizure, DVT s/p IVC filter presented with seizure at rehab.    -Per chart review no sz for 3 weeks at [facility]; Possibly did not get meds on time at [facility].   -No signs of underlining trigger such as infection. 4/20 blood cx gram stain+ GPC in cluster but suspect contaminant as pt stable off abx. Pending repeat blood cx today 4/22. Continue to monitor off abx for now.   -c/w current dose of anti-epileptics and decadron taper.   -D/c planning likely back to rehab. Pending PT eval.

## 2019-04-24 NOTE — PROGRESS NOTE ADULT - ASSESSMENT
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 Awaiting negative repeat BCxs. 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

## 2019-04-24 NOTE — PROGRESS NOTE ADULT - PROBLEM SELECTOR PLAN 5
Last A1C 7.7 in 02/2019    - Holding home Metformin   - FS not at goal arley while on steroids. Will increase ISS and monitor FS  - Continue with Carb consistent diet

## 2019-04-25 LAB — BACTERIA BLD CULT: SIGNIFICANT CHANGE UP

## 2019-04-27 LAB
BACTERIA BLD CULT: SIGNIFICANT CHANGE UP
BACTERIA BLD CULT: SIGNIFICANT CHANGE UP

## 2019-05-09 ENCOUNTER — APPOINTMENT (OUTPATIENT)
Dept: NEUROLOGY | Facility: CLINIC | Age: 74
End: 2019-05-09
Payer: MEDICARE

## 2019-05-09 DIAGNOSIS — Z00.00 ENCOUNTER FOR GENERAL ADULT MEDICAL EXAMINATION W/OUT ABNORMAL FINDINGS: ICD-10-CM

## 2019-05-09 PROCEDURE — 99214 OFFICE O/P EST MOD 30 MIN: CPT

## 2019-05-09 NOTE — DISCUSSION/SUMMARY
[FreeTextEntry1] : Patient seen and examined\par I had a conversation with his son and explained how patient wont be a candidate for chemotherapy at this time. Son is going to have a conversation with his care plan team at the rehab center today and will call me with an update on his level of functioning at the center\par I suggested Hospice care for patient. Son in agreement and will tell his decision in couple of days\par Son may take him to [place] for in patient hospice/ Home hospice\par Referred to

## 2019-05-09 NOTE — HISTORY OF PRESENT ILLNESS
[FreeTextEntry1] : Mr. Guru Duncan is a 73 yo male who presented at this office for a neuro oncology follow up\par In brief\par 11/22/2018- He was found on the floor  He was brought to the hospital and found to have a right temporal mass - 5.9 x 4 x 2.7 cm with ring enhancement and mass effect. \par 11/28/2018- Underwent a large resection of the mass. Pathology showed GBM, IDH wt. \par Post-operative MRI showed small nodular residual. He was sent to Rehab and family had some conflict about whether he was going to [place] for treatment or staying local and ultimately did nothing.\par 12/7/2018- Patient got admitted to [facility] secondary to seizures and also found to have bilateral PE's at the time\par 1/30/2019- MRI showed  showed a recurrent mass - 4.9 x 3.1 x 2.8 cm. \par 2/7/2019- Saw Dr Strickland. Recommended resection of the mass\par 2/27/2019- Second resection - surgery complicated by SAH, DVT and IVC filter placement.\par He completed a 5 day course of RT in 4/19 and then discharged to rehab where he had a GTC seizure and was brought back to the ER at [facility].\par \par Since that time\par \par \par

## 2019-05-09 NOTE — PHYSICAL EXAM
[FreeTextEntry1] : He is awake but not alert - he can tell me his name - month "March" not the year or the president.\par He perseverates.\par He does not follow simple commands.\par He appears to have a LUQ defect.\par Face is slightly asymmetric.\par Left moderate hemiparesis.\par \par \par

## 2019-06-14 ENCOUNTER — CHART COPY (OUTPATIENT)
Age: 74
End: 2019-06-14

## 2019-07-11 ENCOUNTER — INBOUND DOCUMENT (OUTPATIENT)
Age: 74
End: 2019-07-11

## 2019-09-09 NOTE — H&P ADULT - NSHPSOURCEINFORD_GEN_ALL_CORE
Physician/Provider/Chart(s) Pre-Endoscopy History and Physical     Billy Stock MRN# [id number]   YOB: 1948 Age: 70 year old     Date of Procedure: 9/9/2019  Primary care provider: Malcolm Schafer  Type of Endoscopy: Colonoscopy with possible biopsy, possible polypectomy  Reason for Procedure: screen  Type of Anesthesia Anticipated: Conscious Sedation    HPI:    Billy is a 70 year old male who will be undergoing the above procedure.      A history and physical has been performed. The patient's medications and allergies have been reviewed. The risks and benefits of the procedure and the sedation options and risks were discussed with the patient.  All questions were answered and informed consent was obtained.      He denies a personal or family history of anesthesia complications or bleeding disorders.     Patient Active Problem List   Diagnosis     Allergic rhinitis due to pollen     Carcinoma in situ of prostate     Bell's palsy     Glaucoma     Hypertension goal BP (blood pressure) < 140/90     CARDIOVASCULAR SCREENING; LDL GOAL LESS THAN 100     Colon polyps     Gout     HCD (health care directive)     SARAH (obstructive sleep apnea)     Hyperlipidemia LDL goal <100     Gout of foot, unspecified cause, unspecified chronicity, unspecified laterality     Chronic atrial fibrillation (H)     Long term current use of anticoagulants with INR goal of 2.0-3.0     Essential hypertension, benign     Psoas hematoma, right, secondary to anticoagulant therapy     Kidney cyst, acquired     Prostate cancer (H)     Left shoulder pain     Parkinson disease (H)        Past Medical History:   Diagnosis Date     Bell's palsy 10/15/2002     CA IN SITU PROSTATE 10/15/2002     Chronic atrial fibrillation (H) 7/1/10     Glaucoma 4/10/2003     Problem list name updated by automated process. Provider to review     Gout 5/16/2013     Hyperlipidemia LDL goal <100 9/10/2014     Hypertension goal BP (blood pressure) < 140/90 6/28/2006     SARAH  (obstructive sleep apnea) 2013     Parkinson disease (H) 2019     Pericarditis      Renal cyst         Past Surgical History:   Procedure Laterality Date     CATARACT IOL, RT/LT  10/15, 11/15     COLONOSCOPY      [facility]     COLONOSCOPY  2014    Dr. Long [facility]     COLONOSCOPY N/A 2014    Procedure: COMBINED COLONOSCOPY, SINGLE BIOPSY/POLYPECTOMY BY BIOPSY;  Surgeon: Puneet Long MD;  Location:  GI     EYE SURGERY      glaucoma surgery right eye     HERNIA REPAIR       PROSTATE SURGERY       SUPRAPUBIC PROSTATECTOMY  2002     VASECTOMY         Social History     Tobacco Use     Smoking status: Never Smoker     Smokeless tobacco: Never Used   Substance Use Topics     Alcohol use: No     Alcohol/week: 0.0 oz       Family History   Problem Relation Age of Onset     Hypertension Maternal Grandfather      Cerebrovascular Disease Maternal Grandfather      Hypertension Maternal Grandmother      Cerebrovascular Disease Maternal Grandmother      Hypertension Mother          age 51, MVA     Hypertension Brother      Heart Failure Brother      Bronchitis Brother      Other - See Comments Brother         multiple myeloma     Heart Surgery Brother         defibrilater     Prostate Problems Brother      Diabetes Brother      Other Cancer Brother         Multiple Myeloma     Breast Cancer Maternal Aunt         hx     Cancer Maternal Aunt         cervical cancer     Cancer - colorectal Maternal Aunt      Unknown/Adopted Father         Don't know father's history     Diabetes Son      Colon Cancer Other        Prior to Admission medications    Medication Sig Start Date End Date Taking? Authorizing Provider   allopurinol (ZYLOPRIM) 100 MG tablet Take 1 tablet (100 mg) by mouth daily 10/29/18  Yes Malcolm Schafer MD   amLODIPine (NORVASC) 10 MG tablet TAKE ONE TABLET BY MOUTH ONCE DAILY DUE  FOR  APPOINTMENT 10/29/18  Yes Malcolm Schafer MD   carbidopa-levodopa (SINEMET)  MG tablet  "Take 1 tablet by mouth 3 times daily 2/12/19  Yes Reported, Patient   ipratropium (ATROVENT) 0.03 % nasal spray Spray 2 sprays into both nostrils every 12 hours 6/3/19  Yes Malcolm Schafer MD   lisinopril (PRINIVIL/ZESTRIL) 10 MG tablet TAKE TWO TABLETS BY MOUTH  DAILY 10/29/18  Yes Malcolm Schafer MD   LORazepam (ATIVAN) 1 MG tablet Take 1 tablet (1 mg) by mouth nightly as needed for sleep 7/6/19  Yes Serg Morales MD   metoprolol succinate (TOPROL-XL) 100 MG 24 hr tablet TAKE 1 TABLET EVERY DAY 10/31/18  Yes Malcolm Schafer MD   acetaminophen (TYLENOL) 325 MG tablet Take 1-2 tablets by mouth every 6 hours as needed.    Reported, Patient   FLUZONE HIGH-DOSE 0.5 ML injection ADM 0.5ML IM UTD 10/15/18   Reported, Patient   meclizine (ANTIVERT) 25 MG tablet Take 1 tablet (25 mg) by mouth every 6 hours as needed for dizziness 11/20/18   Ritesh Morin PA-C   triamcinolone (KENALOG) 0.5 % cream Apply sparingly to affected area three times daily. 8/21/18   Terri Yancey PA-C   UNABLE TO FIND MEDICATION NAME: Tabavit, multivitmain without vitamin K in it.    Reported, Patient   warfarin (COUMADIN) 5 MG tablet TAKE 1 TABLET (5 MG) DAILY EXCEPT TAKE 1/2 TABLET (2.5 MG) ON WEDNESDAY OR AS INSTRUCTED BY INR CLINIC 5/14/19   Malcolm Schafer MD       Allergies   Allergen Reactions     Cats      Codeine      Hallucinated when taking codeine with another medication     Codeine Unknown     Maple Tree      Morphine Visual Disturbance and Other (See Comments)     Watermelon [Citrullus Vulgaris]      Itching throat, hives        REVIEW OF SYSTEMS:   5 point ROS negative except as noted above in HPI, including Gen., Resp., CV, GI &  system review.    PHYSICAL EXAM:   BP (!) 163/98   Ht 1.854 m (6' 1\")   Wt 104.3 kg (230 lb)   SpO2 99%   BMI 30.34 kg/m   Estimated body mass index is 30.34 kg/m  as calculated from the following:    Height as of this encounter: 1.854 m (6' 1\").    Weight as of " this encounter: 104.3 kg (230 lb).   GENERAL APPEARANCE: alert, and oriented  MENTAL STATUS: alert  AIRWAY EXAM: Mallampatti Class I (visualization of the soft palate, fauces, uvula, anterior and posterior pillars)  RESP: lungs clear to auscultation - no rales, rhonchi or wheezes  CV: regular rates and rhythm  DIAGNOSTICS:    Not indicated    IMPRESSION   ASA Class 2 - Mild systemic disease    PLAN:   Plan for Colonoscopy with possible biopsy, possible polypectomy. We discussed the risks, benefits and alternatives and the patient wished to proceed.    The above has been forwarded to the consulting provider.      Signed Electronically by: Puneet Long MD  September 9, 2019

## 2019-10-17 NOTE — PATIENT PROFILE ADULT - FUNCTIONAL SCREEN CURRENT LEVEL: SWALLOWING (IF SCORE 2 OR MORE FOR ANY ITEM, CONSULT REHAB SERVICES), MLM)
Detail Level: Generalized Detail Level: Simple Detail Level: Zone 0 = swallows foods/liquids without difficulty

## 2019-11-19 NOTE — PATIENT PROFILE ADULT - DO YOU FEEL LIKE HURTING YOURSELF OR OTHERS?
November 19, 2019                   [facility]  [address]  [facility] [address]  Phone: [phone]   Patient: Gurpreet Youngblood   MR Number: [id number]   YOB: 1993   Date of Visit: 11/19/2019     To whom it may concern,    Gurpreet Youngblood was seen in the Orthopedic clinic 11/19/2019 for back pain.  He is able to return to work 11/26/2019 with the following restrictions: no lifting, pushing or pulling over 10lbs; he may require breaks to sit.  Please provide him with a sturdy chair with lumbar support.      If you have any questions or concerns, please contact the office at [phone].        Sincerely,          Lucia Arreola PA-C                no

## 2019-12-11 NOTE — PATIENT PROFILE ADULT - DO YOU FEEL UNSAFE AT WORK?
Celeste Amato is a 45 year old female presenting with [facility] care   Additional concerns:  1. migraines are getting worse  2.  sinus symptoms feeling like infection.   3. Had a blood clot in lower left lobe 2017 still having intermittent pain and discomfort increased in the last few days and worse at noct, keeping her up. Seeing Hemaotology today.   4. Hot flashes, due to clot not able to do   Fasting today?  Yes       Medications and allergies reviewed and updated.   Denies latex allergy or sensitivity.  Preferred pharmacy loaded. Refills needed today?  No        Social History     Tobacco Use   Smoking Status Never Smoker   Smokeless Tobacco Never Used     Health Maintenance Due   Topic Date Due   • Influenza Vaccine (1) 09/01/2019   • Breast Cancer Screening  12/10/2019     Patient is due for topics listed above, she wishes to proceed with Mammogram, but is not proceeding with Immunization(s) Influenza at this time. The following has occured: Orders placed for Mammogram.    VARICELLA STATUS: non-immune  Advance Directives:  discussed and Advance Directive document was given to the patient  Patient's  would like communication of their results via:      [employer] Containment    Cell Phone:   Telephone Information:   Mobile [phone]     Okay to leave a message containing results? Yes   not applicable

## 2020-01-01 NOTE — PROGRESS NOTE ADULT - PROBLEM SELECTOR PROBLEM 4
Talked with Lesley Maria D Rene, KORY, LPN  2020  4:35 PM              Bacteremia

## 2020-02-27 NOTE — CONSULT NOTE ADULT - SUBJECTIVE AND OBJECTIVE BOX
Recommended against surgery at this time given that patient is happy with present vision. 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 Asked  to consult for decision making capacity.     History of Present Illness:  Mr. Duncan is a 74 year old male with a PMHx of GBM s/p right craniotomy in 2019, CVA with residual L sided weakness who presented to [facility] on 2/12/19 with seizures likely from recurrence of righ temporal lobe GBM complicated by sepsis from viral URI, acute embolic CVA, PFO, LLE DVT s/p IVC, and SAH.     Pt was BIBEMS from nursing home for active seizure 2/12/19. Per ED he was actively seizing for 30 mins prior to EMS arrival, and displayed left sided face, arm, and leg jerking in the ED. Pt received Ativan and Keppra and seizure broke after 20 mins in the ED.   On admission, pt was found to be febrile with leukocytosis 31.56 meeting SIRS criteria and was found to have +coronavirus. Neurology and neurosurgery were consulted with the plan to have repeat craniotomy and GBM resection pending bacteremia clearance.   On 2/15 rapid responses called for pt due to 3 repeat seizures and unresponsiveness with new left facial droop- given Ativan and Keppra per neurosurgery recommendations MRI 2/15/29 showed 3 discrete foci of acute infarction within the right occipital lobe, left corpus callosum within the genu and left frontal lobe in the region of the precentral gyrus. Pt placed on heparin gtt for anticoagulation during workup. Doppler U/S showed acute DVT of left common femoral, popliteal, posterior tibial, and peroneal vein, and ALBERTO 2/20/19 showed evidence of a PFO. Pt had placement of IVC filter on 2/19/19 and heparin gtt was discontinued in preparation for neurosurgical procedure which was scheduled for 2/25 but was delayed due to episodes of bradycardia in 40s. Pt had R craniotomy for resection of brain tumor on 2/27/19. Post/op course c/b seizures 2/2 SAH on CT 3/2 for which pt was given Keppra and Vimpat, and transferred to SICU for further monitoring. Pt seizures and hemorrhage stabilized and plan for discharge to rehab. On the day of discharge he was medically and neurologically stable for discharge to rehab on 3/6/19. (06 Mar 2019 14:35)      Symptoms and concerns-Treatment team asked for consult regarding capacity to determine a health care proxy.   Has given inconsistent choice with treatment team regarding this issue deferring to son who lives in [place], there may be concerns regarding   vulnerability to family members financially.     Premorbid functioning/ status-Pt resides with alone in [facility], originally from [place].     Precipitating factors/ stressors or triggers-Pt intially stated to team that he would choose his son who resides in [place] as his HCP. To writer he states that he  has filled out paper work for Demetrio his son who is local. He states Demetrio has helped with such tasks as snow removal, food shopping, bill paying.  He states he has signed his own consents for surgeries, but reports he can no longer sign his name after CVA. Today is confused and history obtained is inconsistent   eg he reports 3 children (2 sons and 1 daughter) and other chart reviews state only 2 sons.     Psychiatric Review of Systems:  Pt denied anxiety, depression, aware he has some confusion and word finding difficulty, at times, uses the wrong word, eg he told writer he   had a "tombstone" removed, meaning tumor. His only complaint is burning on urination- he is completing Cipro for UTI.        Dangerousness-None, is not agitated or combative.   including  -suicidality  -aggression        Psychotic spectrum symptoms- None  including  -delusional material, PI, or bizarre thoughts  -perceptual disturbances/ hallucinations  - thought disorganization      MEDICATIONS  (STANDING):  ascorbic acid 500 milliGRAM(s) Oral daily  dexamethasone     Tablet 2 milliGRAM(s) Oral every 12 hours  dexamethasone     Tablet   Oral   enoxaparin Injectable 40 milliGRAM(s) SubCutaneous daily  finasteride 5 milliGRAM(s) Oral daily  insulin glargine Injectable (LANTUS) 26 Unit(s) SubCutaneous at bedtime  insulin lispro (HumaLOG) corrective regimen sliding scale   SubCutaneous Before meals and at bedtime  insulin lispro Injectable (HumaLOG) 6 Unit(s) SubCutaneous three times a day before meals  lacosamide 100 milliGRAM(s) Oral every 12 hours  levETIRAcetam 1500 milliGRAM(s) Oral every 12 hours  lisinopril 20 milliGRAM(s) Oral daily  metFORMIN 500 milliGRAM(s) Oral two times a day  multivitamin 1 Tablet(s) Oral daily  nystatin Powder 1 Application(s) Topical two times a day  pantoprazole    Tablet 40 milliGRAM(s) Oral before breakfast  zinc sulfate 220 milliGRAM(s) Oral daily    MEDICATIONS  (PRN):  acetaminophen   Tablet .. 650 milliGRAM(s) Oral every 6 hours PRN Mild Pain (1 - 3)  senna 2 Tablet(s) Oral at bedtime PRN Constipation      Allergies  No Known Allergies    PAST MEDICAL & SURGICAL HISTORY:  Glioblastoma  BPH (benign prostatic hyperplasia)  CVA (cerebral vascular accident)  Diabetes  HTN (hypertension)  S/P craniotomy: ~ resection of R-pariental mass (11/28/2018)      Past Psychiatric History: None          Suicidality/ Aggression-None      Substance Use History: None      Social and Personal History: Retired welding contractor.   Upbringing/ Family of Origin-has either HSG or 10th grade education.     Marriage/ children, romantic and meaningful relationships- has (?) 3 children.  FAMILY HISTORY:  No pertinent family history in first degree relatives          T(C): 36.8 (03-15-19 @ 07:38), Max: 36.8 (03-14-19 @ 20:40)  HR: 84 (03-15-19 @ 07:38) (78 - 84)  BP: 121/86 (03-15-19 @ 07:38) (106/73 - 121/86)  RR: 14 (03-15-19 @ 07:38) (14 - 14)  SpO2: 95% (03-15-19 @ 07:38) (95% - 98%)  Wt(kg): --  Mental Status Examination:  Pt is an elderly AA male who reports his staples have been removed. He is alert, oriented to person and hospital, unclear what hospital he is in "not [facility], not the one in [place]".  He has soft slow speech, he is generally fluent with some word finding deficits. He is cooperative with treatment overall, and told writer that he would choose son Demetrio regarding HCP as he is closest geographically and that he is equally close with both sons. Pt has visual neglect as per physiatry notes upper/lower quadrant of left visual field. Pt had fair tolerance to OT, some confabulation, yes/no accuracy 50% of time increasing with cues. Again hx is inconsistent with HSG,, vs 10th grade education in personal history.  Studies:    Laboratory testing-                        11.7   12.2  )-----------( 209      ( 14 Mar 2019 05:28 )             36.3     03-14    134<L>  |  99  |  22  ----------------------------<  152<H>  4.2   |  24  |  0.80    Ca    8.9      14 Mar 2019 05:28      POCT Blood Glucose.: 133 mg/dL (15 Mar 2019 17:04)      Imaging-      Capacity Assessment: Major Neurocognitive disorder due to GBM resection and CVA.     At present time, this patient has partial capacity regarding HCP, would ask again when decisions regarding disposition become clearer and later on in rehab course when cognitive improvements are more consistent.     The patient is able to communicate a consistent choice, understands the relevant information at hand, appreciates the consequences of his/her decision, and can manipulate the relevant information in a rational manner. The patient however seems to have demonstrated indecisiveness regarding this, and may change his mind so as not to offend his children or cause conflict.  If son Demetrio is his HCP, documentation would have to be produced regarding this, at present patient despite deficits is aware enough to make minor decisions regarding his health care.    Recommendations:  Potentially conference call involving the children re: prognosis and dispositional issues so that agreement can be reached.    Medication- no psych meds indicated at present time.     Hospital course Follow-up:  ( re-consult as needed)-

## 2020-06-16 NOTE — PROGRESS NOTE ADULT - PROBLEM SELECTOR PROBLEM 2
Received fax from pharmacy requesting refill on pts medication(s). Pt was last seen in office on 12/30/2019  and has a follow up scheduled for 10/7/2020. Will send request to  Dr. Feliciano Martin  for patient.      Requested Prescriptions     Pending Prescriptions Disp Refills    escitalopram (LEXAPRO) 20 MG tablet [Pharmacy Med Name: ESCITALOPRAM 20 MG TAB *OPHELIA*] 30 tablet 10     Sig: TAKE ONE TABLET BY MOUTH EVERY DAY    levothyroxine (SYNTHROID) 50 MCG tablet [Pharmacy Med Name: LEVOTHYROXINE 50 MCG TABLET] 30 tablet 10     Sig: TAKE ONE TABLET BY MOUTH EVERY MORNING ON AN EMPTY STOMACH Hyponatremia

## 2020-08-30 NOTE — PATIENT PROFILE ADULT - NSPROGENANESREACTION_GEN_A_NUR
no previous reaction
Implemented All Universal Safety Interventions:  [place] to call system. Call bell, personal items and telephone within reach. Instruct patient to call for assistance. Room bathroom lighting operational. Non-slip footwear when patient is off stretcher. Physically safe environment: no spills, clutter or unnecessary equipment. Stretcher in lowest position, wheels locked, appropriate side rails in place.

## 2021-01-19 NOTE — ED ADULT TRIAGE NOTE - WEIGHT IN LBS
Eloy Newman MD, Karlos Suggs, [facility]  Tiffanie Kehrt CNP  Elvis Mulligan CNP [address] [place] [address]  Connie Dickson [address] [facility], [address] [facility] [phone]   [facility] Connie Dickson. [address] [place] [address]. Deb Potts 37 [phone]     Video Visit- Follow up    Pursuant to the emergency declaration under the [address] [place] [address] [facility] authority and the [employer] [place] and [employer] Act, this Virtual  Visit was conducted, with patient's consent, to reduce the patient's risk of exposure to COVID-19 and provide continuity of care for an established patient. Services were provided through a video synchronous discussion virtually to substitute for in-person clinic visit. Patient was located in their home. Assessment/Plan:     1. Obstructive sleep apnea treated with BiPAP  Assessment & Plan:  Chronic- Stable. Reviewed compliance download with pt. Supplies and parts as needed for his machine. These are medically necessary. Continue medications per his PCP and other physicians. Limit caffeine use after 3pm.  2. Paroxysmal atrial fibrillation (HCC)  Assessment & Plan:  Chronic- Stable. Cont meds per PCP and other physicians. 3. Anxiety  Assessment & Plan:  Chronic- Stable. Cont meds per PCP and other physicians. 4. Non morbid obesity, unspecified obesity type  Assessment & Plan:  Chronic-Stable. Encouraged him to work on weight loss through diet and exercise. The primary encounter diagnosis was Obstructive sleep apnea treated with BiPAP. Diagnoses of Paroxysmal atrial fibrillation (HCC), Anxiety, and Non morbid obesity, unspecified obesity type were also pertinent to this visit. The chronic medical conditions listed are directly related to the primary diagnosis listed above. The management of the primary diagnosis affects the secondary diagnosis and vice versa. Subjective:     Patient ID: Hilda Kruse is a 38 y.o. male.     Chief Complaint Patient presents with    Sleep Apnea     Subjective   HPI:    Machine Modem/Download Info:  Compliance (hours/night): 5.7 hrs/night  Download AHI (/hour): 1.5 /HR  Average IPAP Pressure: 15.2 cmH2O  Average EPAP Pressure: 11.1 cmH2O AUTO BIPAP - Settings (Sonny)  IPAP Max: 25 cmH2O  EPAP Min: 11 cmH2O  Pressure Support Min: 3  Pressure Support Max: 7   Comfort Settings  Humidity Level (0-8): 5  Flex/EPR (0-3): 3       JESUS MANUEL: He continues to do well with his machine at the current settings. No issues with EDS, snoring, or apneas. He is waking refreshed, for the most part, in the am.  No HA, dryness, or congestion. No issues using his machine. [facility]    [place] - Total score: 3    Current Outpatient Medications   Medication Sig Dispense Refill    venlafaxine (EFFEXOR XR) 37.5 MG extended release capsule Take 1 capsule by mouth daily 90 capsule 1    atorvastatin (LIPITOR) 20 MG tablet Take 1 tablet by mouth nightly 90 tablet 3     No current facility-administered medications for this visit.         Allergies as of 01/19/2021    (No Known Allergies)         Electronically signed by Sol Alfred MD on 1/19/2021 at 2:37 PM 179.8

## 2022-03-01 NOTE — PROGRESS NOTE ADULT - SUBJECTIVE AND OBJECTIVE BOX
Patient is a 74y old  Male who presents with a chief complaint of radiation (06 Apr 2019 15:17)      SUBJECTIVE/OVERNIGHT EVENTS     No acute events overnight. He is A&Ox2 this AM, slightly tired appearing.  Denies nausea, vomiting, constipation, diarrhea.    MEDICATIONS  (STANDING):  ascorbic acid 500 milliGRAM(s) Oral daily  dexamethasone     Tablet 2 milliGRAM(s) Oral two times a day  dextrose 5%. 1000 milliLiter(s) (50 mL/Hr) IV Continuous <Continuous>  dextrose 50% Injectable 12.5 Gram(s) IV Push once  dextrose 50% Injectable 25 Gram(s) IV Push once  dextrose 50% Injectable 25 Gram(s) IV Push once  enoxaparin Injectable 40 milliGRAM(s) SubCutaneous daily  finasteride 5 milliGRAM(s) Oral daily  insulin glargine Injectable (LANTUS) 18 Unit(s) SubCutaneous at bedtime  insulin lispro (HumaLOG) corrective regimen sliding scale   SubCutaneous three times a day before meals  insulin lispro (HumaLOG) corrective regimen sliding scale   SubCutaneous at bedtime  lacosamide 150 milliGRAM(s) Oral two times a day  levETIRAcetam 1500 milliGRAM(s) Oral two times a day  lisinopril 20 milliGRAM(s) Oral daily  multivitamin 1 Tablet(s) Oral daily  nystatin Powder 1 Application(s) Topical two times a day  pantoprazole    Tablet 40 milliGRAM(s) Oral before breakfast  zinc sulfate 220 milliGRAM(s) Oral daily    MEDICATIONS  (PRN):  acetaminophen   Tablet .. 650 milliGRAM(s) Oral every 6 hours PRN Mild Pain (1 - 3)  dextrose 40% Gel 15 Gram(s) Oral once PRN Blood Glucose LESS THAN 70 milliGRAM(s)/deciliter  glucagon  Injectable 1 milliGRAM(s) IntraMuscular once PRN Glucose LESS THAN 70 milligrams/deciliter  senna 2 Tablet(s) Oral at bedtime PRN Constipation    CAPILLARY BLOOD GLUCOSE      POCT Blood Glucose.: 232 mg/dL (06 Apr 2019 22:08)  POCT Blood Glucose.: 128 mg/dL (06 Apr 2019 17:58)  POCT Blood Glucose.: 170 mg/dL (06 Apr 2019 13:01)  POCT Blood Glucose.: 127 mg/dL (06 Apr 2019 09:03)    I&O's Summary        Vital Signs Last 24 Hrs  T(C): 36.6 (07 Apr 2019 05:54), Max: 37.2 (06 Apr 2019 15:13)  T(F): 97.9 (07 Apr 2019 05:54), Max: 99 (06 Apr 2019 15:13)  HR: 75 (07 Apr 2019 05:54) (72 - 75)  BP: 125/93 (07 Apr 2019 05:54) (112/81 - 125/93)  BP(mean): --  RR: 17 (07 Apr 2019 05:54) (17 - 18)  SpO2: 100% (07 Apr 2019 05:54) (99% - 100%)    PHYSICAL EXAM:  GENERAL: NAD, well-developed  HEAD:  Atraumatic, Normocephalic  EYES: EOMI, PERRLA, conjunctiva and sclera clear  NECK: Supple, No JVD  CHEST/LUNG: Clear to auscultation bilaterally; No wheeze  HEART: Regular rate and rhythm; No murmurs, rubs, or gallops  ABDOMEN: Soft, Nontender, Nondistended; Bowel sounds present  EXTREMITIES:  2+ Peripheral Pulses, No clubbing, cyanosis, or edema  PSYCH: AAOx2  NEUROLOGY: non-focal  SKIN: No rashes or lesions    LABS                        11.4   11.75 )-----------( 219      ( 06 Apr 2019 05:30 )             35.9     04-06    138  |  101  |  18  ----------------------------<  165<H>  3.7   |  26  |  0.68    Ca    9.4      06 Apr 2019 05:30  Phos  2.9     04-06  Mg     1.8     04-06                      RADIOLOGY & ADDITIONAL TESTS:    Imaging Personally Reviewed:    Consultant(s) Notes Reviewed:      Care Discussed with Consultants/Other Providers: Attending Attestation (For Attendings USE Only)...

## 2022-03-10 NOTE — PHYSICAL THERAPY INITIAL EVALUATION ADULT - BALANCE DISTURBANCE, IDENTIFIED IMPAIRMENT CONTRIBUTE, REHAB EVAL
impaired postural control/decreased strength

## 2022-05-18 NOTE — PATIENT PROFILE ADULT - NSPROSPHOSPCHAPLAINYN_GEN_A_NUR
He has episodes up to 6 seconds while sleeping without CPAP. No further episodes since using the CPAP all the time.  He had pauses up to 9 seconds in the past. Now up to 5 seconds.  Continue current management.  No need for pacemaker at this time.   no

## 2022-07-12 NOTE — DISCHARGE NOTE NURSING/CASE MANAGEMENT/SOCIAL WORK - NSDCDPATPORTLINK_GEN_ALL_CORE
PT(PATIENT) VERIFIED     CONTACTED PT(PATIENT) PER PROVIDER'S INSTRUCTIONS   You can access the [facility] Patient Portal, offered by [facility], by registering with the following website: [email or web address]

## 2022-12-27 NOTE — ED PROVIDER NOTE - PMH
Quality 110: Preventive Care And Screening: Influenza Immunization: Influenza Immunization not Administered for Documented Reasons.
Quality 111:Pneumonia Vaccination Status For Older Adults: Pneumococcal vaccine (PPSV23) was not administered on or after patient’s 60th birthday and before the end of the measurement period, reason not otherwise specified
Detail Level: Detailed
BPH (benign prostatic hyperplasia)    CVA (cerebral vascular accident)    Diabetes    HTN (hypertension)

## 2022-12-27 NOTE — PROGRESS NOTE ADULT - PROBLEM SELECTOR PLAN 1

## 2023-05-30 NOTE — DIETITIAN INITIAL EVALUATION ADULT. - POUNDS LOST/GAINED
[de-identified] : Left Knee: Range of Motion in Degrees	\par 	                  Claimant:	Normal:	\par Flexion Active	  135 	                135-degrees	\par Flexion Passive	  135	                135-degrees	\par Extension Active	  0-5	                0-5-degrees	\par Extension Passive	  0-5	                0-5-degrees	\par \par No weakness to flexion/extension. No evidence of instability in the AP plane or varus or valgus stress.  Negative  Lachman.  Negative pivot shift.  Negative anterior drawer test.  Negative posterior drawer test.  Negative Cathy.  Negative Apley grind.  No medial or lateral joint line tenderness.  Positive tenderness over the medial and lateral facet of the patella.  Positive patellofemoral crepitations.  No lateral tilting patella.  No patella apprehension.  Positive crepitation in the medial and lateral femoral condyle.  No proximal or distal swelling, edema or tenderness.  No gross motor or sensory deficits. Mild intra-articular swelling.  2+ DP and PT pulses. No varus or valgus malalignment.  Skin is intact.  No rashes, scars or lesions. \par 
29

## 2023-06-02 NOTE — ED PROVIDER NOTE - NSCAREINITIATED _GEN_ER
Discharge instructions provided by Dr. Robert.  Ambulated to discharge with steady gait.    Jeannie Coburn(Attending)

## 2023-06-16 NOTE — DIETITIAN INITIAL EVALUATION ADULT. - PROBLEM SELECTOR PLAN 6
Neck and shoulder pain, spasms while taking flexeril with no relief
A home lisinopril & HCTZ in setting of impending OJ 2/2 rhabdo, can start amlodipine 5mg if SBP >140   -continue to monitor vitals q6 for now

## 2023-08-03 NOTE — ED ADULT NURSE NOTE - NSFALLRSKOUTCOME_ED_ALL_ED
Note Text (......Xxx Chief Complaint.): This diagnosis correlates with the Detail Level: Zone Other (Free Text): Patient advised to contact the office if this papule does nor go away. Render Risk Assessment In Note?: no Other (Free Text): 1 week post Botox, photos taken to compare left eye drooping. Discussed options for next Botox injection. Pt is happy with results this far\\n\\nI feel her  on the left side is heavier and this is what she is noticing Detail Level: Simple Fall Risk

## 2023-08-21 NOTE — H&P ADULT - NEGATIVE ENMT SYMPTOMS
Instructions: This plan will send the code FBSE to the PM system.  DO NOT or CHANGE the price. Detail Level: Simple Price (Do Not Change): 0.00 no hearing difficulty/no ear pain/no tinnitus/no vertigo

## 2024-01-11 NOTE — CONSULT NOTE ADULT - PROBLEM SELECTOR PROBLEM 4
Type 2 diabetes mellitus with complication, unspecified whether long term insulin use
SIRS (systemic inflammatory response syndrome)
180

## 2024-03-13 NOTE — PROGRESS NOTE ADULT - PROBLEM SELECTOR PLAN 5
Physical Therapy Visit    Visit Type: Daily Treatment Note  Visit: 7  Referring Provider: Duyen Soto MD  Medical Diagnosis (from order): R26.9 - Gait disturbance  R29.898 - Weakness of both lower extremities  R26.89 - Balance problems     SUBJECTIVE                                                                                                               Pt reports her R knee has been sore over this last week, made appt with Ortho Dr Darliss Lesches for tomorrow; biofreeze, ibuprofen, icing; woke last night due to R knee pain, achey in the past week. Gel injection Dec 1. Has been doing Sit to be Fit. No falls. R LE does get very tired and it is difficult to move it at times. Quad sets seemed to help R knee pain. No issues with her L shoulder, careful with movement.                Pain / Symptoms  - Pain rating (out of 10): Current: 3       OBJECTIVE                                                                                                                                       Treatment     Therapeutic Exercise  Educated pt in diaphragmatic breathing x 10 in standing with wide SHARON, feet in neutral;   Sidestepping B x 6' x 4  Sit to stands - discontinued due to R knee sx  Static standing: glute/quad sets x 5, 10 sec holds; postural awareness exercise x 30 sec x 4- added to HEP  Marching x10, 2 sets focus on quiet feet  Hip clock - alternating legs x 6 on each side- added for home  Alternate reaching <90 degrees elevation x 20 with feet in neutral  Lateral weight shifting x 10 with feet in neutral  Seated :  Yellow tband low row with focus on gentle scap retraction x 10  Quad sets R x 10- foot in neutral  SLR R with assist x 8, 5 sec holds  Trunk SB R and L x 10  TA trunk rotation R and L x 6 B  Diaphragmatic breathing x 6 with spine lengthening focus and manual assist  X 6 with hands on rib cage  Leg taps at 45 with reach x 12 alternating legs  Hip add sets with ball x 12   Semi tandem A/P weight shifting with toe up, heel up x 10 each way  Standing with shoulder width SHARON: manual resistance to R > L anterior diagonal sling- x 10 each direction      Neuromuscular Re-Education        Skilled input: verbal instruction/cues, tactile instruction/cues, demonstration and posture correction    Writer verbally educated and received verbal consent for hand placement, positioning of patient, and techniques to be performed today from patient for clothing adjustments for techniques as described above and how they are pertinent to the patient's plan of care. Home Exercise Program  Issued written instructions of LE strengthening    ASSESSMENT                                                                                                            Pt presents with multifactorial balance deficit with c/o R knee pain/instability which affects her balance. Pt to see Ortho tomorrow to address sx. Focused session on improved positioning of R LE in standing and with gait to alleviate valgus stress on her knee. Also focused on R LE strengthening in supine, seated and standing. No increase in her R knee sx at end of session. Patient will continue to benefit from skilled physical therapy to address deficits and progress toward goals.    Pain/symptoms after session (out of 10): 3  Education:   - Results of above outlined education: Verbalizes understanding and Demonstrates understanding    PLAN                                                                                                                           Suggestions for next session as indicated: Progress per plan of care- stand stability exercises, reassess next visit       Therapy procedure time and total treatment time can be found documented on the Time Entry flowsheet On home metformin, will hold while in hospital   keeping patient NPO for now, will start Low HISS w/ FSG checks q6 hrs   -check A1c ?in setting of seizure given found down in urine and CT findings   -c/w IVF hydration with NS at 100c/hr, stop after 24hrs  -continue to trend CK

## 2024-04-01 NOTE — DISCHARGE NOTE NURSING/CASE MANAGEMENT/SOCIAL WORK - NSDCPNDISPN_GEN_ALL_CORE
Inpatient Nutrition Assessment    Admit Date: 3/21/2024   Total duration of encounter: 11 days   Patient Age: 68 y.o.    Nutrition Recommendation/Prescription     Diet Heart Healthy ordered  Continue Boost VHC TID (provides 530 kcal and 22 g protein per container)  Encouraged adequate PO intake  Monitor appetite/PO intake, weight, and labs    Communication of Recommendations: reviewed with nurse, reviewed with patient, and reviewed with family    Nutrition Assessment     Malnutrition Assessment/Nutrition-Focused Physical Exam    Malnutrition Context: chronic illness (03/27/24 1418)  Malnutrition Level: moderate (03/27/24 1418)  Energy Intake (Malnutrition): other (see comments) (Does not meet criteria) (03/27/24 1418)  Weight Loss (Malnutrition): other (see comments) (Does not meet criteria) (03/27/24 1418)  Subcutaneous Fat (Malnutrition): moderate depletion (03/27/24 1418)     Upper Arm Region (Subcutaneous Fat Loss): moderate depletion     Muscle Mass (Malnutrition): moderate depletion (03/27/24 1418)  Catholic Region (Muscle Loss): moderate depletion  Clavicle Bone Region (Muscle Loss): moderate depletion  Clavicle and Acromion Bone Region (Muscle Loss): moderate depletion  Scapular Bone Region (Muscle Loss): moderate depletion              Fluid Accumulation (Malnutrition): other (see comments) (Does not meet criteria) (03/27/24 1418)        A minimum of two characteristics is recommended for diagnosis of either severe or non-severe malnutrition.    Chart Review    Reason Seen: length of stay and follow-up    Malnutrition Screening Tool Results   Have you recently lost weight without trying?: No  Have you been eating poorly because of a decreased appetite?: No   MST Score: 0   Diagnosis:  CAD (Multivessel)- Status Post CABG (3.25.24) LIMA to LAD, SVG to OM, SVG to Distal RCA (HANK Ligation)    - LM: 90% Distal, LAD: 70% Ostial, LCX: 40-50% Proximal, RCA: (Dominant) 80-90% Serially Calcified Lesions (EF 50%)  (3.21.24)  Hypertension  PAD    - REIA 70-80% Stenosis at Pelvic Brim (3.21.24)  Nicotine Dependence/Chronic Tobacco Use  ROBBI    - 50-69% MUSTAPHA (CUS 3.21.24)  Anemia    Relevant Medical History:   Hypertension, Claudication, SOB, CP, Chronic Tobacco Use     Scheduled Medications:  [START ON 4/3/2024] amiodarone, 200 mg, BID  [START ON 4/6/2024] amiodarone, 200 mg, Daily  amiodarone, 400 mg, BID  amLODIPine, 5 mg, Daily  apixaban, 5 mg, BID  aspirin, 81 mg, Daily  atorvastatin, 40 mg, Daily  docusate sodium, 100 mg, BID  folic acid, 1 mg, Daily  LIDOcaine HCL 20 mg/ml (2%), 2 mL, Once  metoprolol succinate, 12.5 mg, Daily  pantoprazole, 40 mg, Daily  sucralfate, 1 g, QID (AC & HS)    Continuous Infusions:  loperamide    PRN Medications: acetaminophen, albumin human 5%, aluminum-magnesium hydroxide-simethicone, calcium gluconate IVPB, calcium gluconate IVPB, calcium gluconate IVPB, diazePAM, diphenhydrAMINE, heparin, porcine (PF), hydrALAZINE, HYDROcodone-acetaminophen, lactulose 10 gram/15 ml, loperamide, magnesium sulfate IVPB, magnesium sulfate IVPB, metoclopramide, morphine, morphine, mupirocin, nitroGLYCERIN, ondansetron, ondansetron, oxyCODONE, potassium chloride in water, potassium chloride in water, potassium chloride in water, sodium chloride 0.9%, sodium phosphate 15 mmol in dextrose 5 % (D5W) 250 mL IVPB    Calorie Containing IV Medications: no significant kcals from medications at this time    Recent Labs   Lab 03/25/24  1702 03/25/24  2234 03/26/24  0308 03/27/24  0148 03/28/24  0426 03/29/24  0849 03/30/24  0240 03/31/24  0338 04/01/24  0900   NA  --   --  135* 132* 134* 134* 135* 135* 136   K 3.5   < > 4.3 4.1 4.4 4.5 3.9 3.7 4.0   CALCIUM  --   --  8.3* 8.6* 9.3 9.0 8.8 9.2 9.4   PHOS  --   --  3.0  --   --   --   --   --   --    MG  --   --  1.80 1.90 1.90 2.00 1.90 2.10  --    CHLORIDE  --   --  109* 105 100 95* 92* 92* 90*   CO2  --   --  19* 20* 26 28 32* 35* 34*   BUN  --   --  14.1 12.6 19.0 27.6*  "27.2* 21.8 19.1   CREATININE  --   --  0.74 0.67* 0.71* 0.75 0.68* 0.68* 0.77   EGFRNORACEVR  --   --  >60 >60 >60 >60 >60 >60 >60   GLUCOSE  --   --  140* 150* 105 104 106 112 137*   BILITOT  --   --  1.9* 1.3 1.0  --  1.0 0.9 1.2   ALKPHOS  --   --  48 59 64  --  102 124 143   ALT  --   --  21 23 23  --  24 36 48   AST  --   --  83* 85* 52*  --  40* 40* 45*   ALBUMIN  --   --  3.1* 3.0* 2.8*  --  2.7* 2.6* 3.0*   WBC  --   --  10.01 12.80*  12.76* 13.44* 11.34 9.07 8.83  --    HGB 10.6*  --  11.4* 10.9*  11.0* 11.2* 10.8* 10.5* 10.5*  --    HCT 31.5*  --  33.5* 32.8*  31.8* 32.9* 31.0* 31.1* 30.7*  --     < > = values in this interval not displayed.     Nutrition Orders:  Diet Heart Healthy  Dietary nutrition supplements Boost Very High Calorie Nutritional Drink - Vanilla; All Meals    Appetite/Oral Intake: poor/25-50% of meals  Factors Affecting Nutritional Intake: decreased appetite  Food/Episcopalian/Cultural Preferences: none reported  Food Allergies: none reported  Last Bowel Movement: 03/23/24  Wound(s):  none noted    Comments    3/27/2024: Pt reports a good appetite/PO intake prior to admit. Pt reports a poor appetite/PO intake currently. Pt agreeable to Boost VHC TID. Pt denies N/V/D/C and chewing/swallowing difficulties. Pt reports UBW as 52.3 kg. Last BM noted. Encouraged adequate PO intake. Will monitor.    4/1/2024: Pt's grandson reports ~25% PO intake with some appetite improvement. The grandson reports pt drinking Boost VHC with no reported N/V/D/C. Last BM noted, possible constipation, will relay to nurse. Will monitor.    Anthropometrics    Height: 5' 4" (162.6 cm), Height Method: Stated  Last Weight: 51 kg (112 lb 6.4 oz) (04/01/24 0232), Weight Method: Standard Scale  BMI (Calculated): 19.3  BMI Classification: underweight (BMI less than 22 if >65 years of age)        Ideal Body Weight (IBW), Male: 130 lb     % Ideal Body Weight, Male (lb): 81.74 %                 Usual Body Weight (UBW), kg: " 52.3 kg  % Usual Body Weight: 102.12     Usual Weight Provided By: patient    Wt Readings from Last 5 Encounters:   04/01/24 51 kg (112 lb 6.4 oz)   04/10/23 48.5 kg (107 lb)     Weight Change(s) Since Admission:   3/27/2024: 53.3 kg  4/1/2024: 51 kg  Wt Readings from Last 1 Encounters:   04/01/24 0232 51 kg (112 lb 6.4 oz)   03/31/24 0421 51 kg (112 lb 6.4 oz)   03/30/24 0436 54.1 kg (119 lb 3.2 oz)   03/29/24 0556 50.4 kg (111 lb 1.8 oz)   03/28/24 0601 55.4 kg (122 lb 1.6 oz)   03/27/24 0600 53.3 kg (117 lb 8.1 oz)   03/26/24 0527 52 kg (114 lb 10.2 oz)   03/25/24 0509 47.3 kg (104 lb 4.4 oz)   03/22/24 0549 51.4 kg (113 lb 6.4 oz)   03/21/24 0855 48.2 kg (106 lb 4.2 oz)   Admit Weight: 48.2 kg (106 lb 4.2 oz) (03/21/24 0855), Weight Method: Standard Scale    Estimated Needs    Weight Used For Calorie Calculations: 53.3 kg (117 lb 8.1 oz)  Energy Calorie Requirements (kcal): 3081-7398 (30-35 kcal/kg)  Energy Need Method: Kcal/kg  Weight Used For Protein Calculations: 53.3 kg (117 lb 8.1 oz)  Protein Requirements: 64-80 (1.2-1.5 g/kg)  Fluid Requirements (mL): 1599 (1 mL/kcal)    Enteral Nutrition     Patient not receiving enteral nutrition at this time.    Parenteral Nutrition     Patient not receiving parenteral nutrition support at this time.    Evaluation of Received Nutrient Intake    Calories: meeting estimated needs (including ONS)  Protein: meeting estimated needs (including ONS)    Patient Education     Not applicable.    Nutrition Diagnosis     PES: Inadequate oral intake related to acute illness as evidenced by poor PO intake >3 days. (active)     PES: Moderate chronic disease or condition related malnutrition related to chronic illness as evidenced by moderate fat depletion and moderate muscle depletion. (active)    Nutrition Interventions     Intervention(s): general/healthful diet and commercial beverage    Goal: Meet greater than 80% of nutritional needs by follow-up. (goal progressing)  Goal:  Consume % of meals/snacks by follow-up. (goal progressing)    Nutrition Goals & Monitoring     Dietitian will monitor: food and beverage intake, weight, electrolyte/renal panel, glucose/endocrine profile, and gastrointestinal profile    Nutrition Risk/Follow-Up: moderate (follow-up in 3-5 days)   Please consult if re-assessment needed sooner.       Opioids not applicable/not prescribed

## 2024-07-30 NOTE — DIETITIAN INITIAL EVALUATION ADULT. - SOURCE
patient/Medical record reviewed. Patient forgetful, able to provided limited information. No family at bedside. Patient known to writer from recent admission to [facility].
none

## 2024-08-15 NOTE — PROGRESS NOTE ADULT - PROBLEM SELECTOR PLAN 3
possible type 2 MI (demand) given high troponin w/ upward trend, also with T-wave inversions on lead 3 and AVF patient w/o chest pain, unable to recall events  -continue to trend troponin until peak  -obtain TTE  -cardiology following
TTE w/o wall motion abnormalities. troponin leak due to Rhabdo as per cardiology.
TTE w/o wall motion abnormalities. troponin leak due to Rhabdo as per cardiology.  CK downtrending
Time-based billing (NON-critical care)

## 2025-04-24 NOTE — ED ADULT TRIAGE NOTE - NS ED TRIAGE HISTORIAN
#Non-Oliguric severe KDIGO LETY stage 3 with evidence of kidney recovery  Etiology: Multifactorial likely secondary to hemodynamic changes  Secondary to hemodynamic changes  Baseline creatinine 0.93 mg/dL  Current creatinine: 2.1 mg/dL, trending down with IV fluids  Peak creatinine: 2.7 mg/dL  UA: Microhematuria, leukocyturia  Renal imaging : No hydronephrosis  Treatment:  No indication for dialysis at this time, kidney function getting better  Maintain MAP:  Over 65 mmHg if possible/avoid hypoperfusion:  Hold parameters on blood pressure medications  Avoid nephrotoxic agents such as NSAIDs, and IV contrast if possible. Avoid opioids   Adjust medications to GFR     EMS/Patient

## 2025-06-09 NOTE — PROGRESS NOTE ADULT - ASSESSMENT
What to Expect Post Anesthesia    Rest and take it easy for the first 24 hours.  A responsible adult is recommended to remain with you during that time.  It is normal to feel sleepy.  We encourage you to not do anything that requires balance, judgment or coordination.    FOR 24 HOURS DO NOT:  Drive, operate machinery or run household appliances.  Drink beer or alcoholic beverages.  Make important decisions or sign legal documents.    To avoid nausea, slowly advance diet as tolerated, avoiding spicy or greasy foods for the first day.  Add more substantial food to your diet according to your provider's instructions.  Babies can be fed formula or breast milk as soon as they are hungry.  INCREASE FLUIDS AND FIBER TO AVOID CONSTIPATION.    MILD FLU-LIKE SYMPTOMS ARE NORMAL.  YOU MAY EXPERIENCE GENERALIZED MUSCLE ACHES, THROAT IRRITATION, HEADACHE AND/OR SOME NAUSEA.    Last pain medication given:  Toradol (like ibuprofen/motrin) given at 8:30am, can take another dose of ibuprofen/motrin at 2:30pm          Kevon's weight today is 14lbs   73year old male stable s/p Right Craniotomy for resection of Brain Tumor